# Patient Record
Sex: MALE | Race: WHITE | NOT HISPANIC OR LATINO | Employment: OTHER | URBAN - METROPOLITAN AREA
[De-identification: names, ages, dates, MRNs, and addresses within clinical notes are randomized per-mention and may not be internally consistent; named-entity substitution may affect disease eponyms.]

---

## 2019-01-15 ENCOUNTER — LAB VISIT (OUTPATIENT)
Dept: LAB | Facility: HOSPITAL | Age: 54
End: 2019-01-15
Attending: NURSE PRACTITIONER
Payer: COMMERCIAL

## 2019-01-15 ENCOUNTER — INITIAL CONSULT (OUTPATIENT)
Dept: HEMATOLOGY/ONCOLOGY | Facility: CLINIC | Age: 54
End: 2019-01-15
Payer: COMMERCIAL

## 2019-01-15 VITALS
SYSTOLIC BLOOD PRESSURE: 136 MMHG | OXYGEN SATURATION: 99 % | HEART RATE: 79 BPM | TEMPERATURE: 98 F | WEIGHT: 169.31 LBS | DIASTOLIC BLOOD PRESSURE: 90 MMHG

## 2019-01-15 DIAGNOSIS — D58.2 ELEVATED HEMOGLOBIN: ICD-10-CM

## 2019-01-15 DIAGNOSIS — R53.83 FATIGUE, UNSPECIFIED TYPE: ICD-10-CM

## 2019-01-15 DIAGNOSIS — F17.210 SMOKING GREATER THAN 30 PACK YEARS: ICD-10-CM

## 2019-01-15 DIAGNOSIS — R71.8 ELEVATED HEMATOCRIT: ICD-10-CM

## 2019-01-15 DIAGNOSIS — R71.8 ELEVATED HEMATOCRIT: Primary | ICD-10-CM

## 2019-01-15 DIAGNOSIS — Z12.9 SCREENING FOR CANCER: ICD-10-CM

## 2019-01-15 LAB
ANION GAP SERPL CALC-SCNC: 8 MMOL/L
BASOPHILS # BLD AUTO: 0.05 K/UL
BASOPHILS NFR BLD: 0.6 %
BUN SERPL-MCNC: 8 MG/DL
CALCIUM SERPL-MCNC: 9.2 MG/DL
CHLORIDE SERPL-SCNC: 107 MMOL/L
CO2 SERPL-SCNC: 24 MMOL/L
CREAT SERPL-MCNC: 0.8 MG/DL
CRP SERPL-MCNC: 1 MG/L
DIFFERENTIAL METHOD: ABNORMAL
EOSINOPHIL # BLD AUTO: 0.4 K/UL
EOSINOPHIL NFR BLD: 4.5 %
ERYTHROCYTE [DISTWIDTH] IN BLOOD BY AUTOMATED COUNT: 15 %
EST. GFR  (AFRICAN AMERICAN): >60 ML/MIN/1.73 M^2
EST. GFR  (NON AFRICAN AMERICAN): >60 ML/MIN/1.73 M^2
FERRITIN SERPL-MCNC: 43 NG/ML
GLUCOSE SERPL-MCNC: 96 MG/DL
HCT VFR BLD AUTO: 48.2 %
HGB BLD-MCNC: 16.5 G/DL
IRON SERPL-MCNC: 50 UG/DL
LYMPHOCYTES # BLD AUTO: 4.3 K/UL
LYMPHOCYTES NFR BLD: 48.3 %
MCH RBC QN AUTO: 30.5 PG
MCHC RBC AUTO-ENTMCNC: 34.2 G/DL
MCV RBC AUTO: 89 FL
MONOCYTES # BLD AUTO: 0.5 K/UL
MONOCYTES NFR BLD: 5.4 %
NEUTROPHILS # BLD AUTO: 3.7 K/UL
NEUTROPHILS NFR BLD: 41.2 %
PLATELET # BLD AUTO: 290 K/UL
PMV BLD AUTO: 9 FL
POTASSIUM SERPL-SCNC: 4 MMOL/L
RBC # BLD AUTO: 5.41 M/UL
SATURATED IRON: 12 %
SODIUM SERPL-SCNC: 139 MMOL/L
TESTOST SERPL-MCNC: 638 NG/DL
TOTAL IRON BINDING CAPACITY: 417 UG/DL
TRANSFERRIN SERPL-MCNC: 282 MG/DL
TSH SERPL DL<=0.005 MIU/L-ACNC: 1.61 UIU/ML
VIT B12 SERPL-MCNC: >2000 PG/ML
WBC # BLD AUTO: 8.94 K/UL

## 2019-01-15 PROCEDURE — 86140 C-REACTIVE PROTEIN: CPT

## 2019-01-15 PROCEDURE — 85025 COMPLETE CBC W/AUTO DIFF WBC: CPT

## 2019-01-15 PROCEDURE — 81403 MOPATH PROCEDURE LEVEL 4: CPT

## 2019-01-15 PROCEDURE — 83540 ASSAY OF IRON: CPT

## 2019-01-15 PROCEDURE — 84443 ASSAY THYROID STIM HORMONE: CPT

## 2019-01-15 PROCEDURE — 82607 VITAMIN B-12: CPT

## 2019-01-15 PROCEDURE — 81219 CALR GENE COM VARIANTS: CPT

## 2019-01-15 PROCEDURE — 99999 PR PBB SHADOW E&M-NEW PATIENT-LVL III: CPT | Mod: PBBFAC,,, | Performed by: NURSE PRACTITIONER

## 2019-01-15 PROCEDURE — 99205 PR OFFICE/OUTPT VISIT, NEW, LEVL V, 60-74 MIN: ICD-10-PCS | Mod: S$GLB,,, | Performed by: NURSE PRACTITIONER

## 2019-01-15 PROCEDURE — 82728 ASSAY OF FERRITIN: CPT

## 2019-01-15 PROCEDURE — 82668 ASSAY OF ERYTHROPOIETIN: CPT

## 2019-01-15 PROCEDURE — 81270 JAK2 GENE: CPT

## 2019-01-15 PROCEDURE — 84403 ASSAY OF TOTAL TESTOSTERONE: CPT

## 2019-01-15 PROCEDURE — 81403 MOPATH PROCEDURE LEVEL 4: CPT | Mod: 91

## 2019-01-15 PROCEDURE — 99205 OFFICE O/P NEW HI 60 MIN: CPT | Mod: S$GLB,,, | Performed by: NURSE PRACTITIONER

## 2019-01-15 PROCEDURE — 36415 COLL VENOUS BLD VENIPUNCTURE: CPT

## 2019-01-15 PROCEDURE — 99999 PR PBB SHADOW E&M-NEW PATIENT-LVL III: ICD-10-PCS | Mod: PBBFAC,,, | Performed by: NURSE PRACTITIONER

## 2019-01-15 PROCEDURE — 80048 BASIC METABOLIC PNL TOTAL CA: CPT

## 2019-01-15 RX ORDER — LANOLIN ALCOHOL/MO/W.PET/CERES
100 CREAM (GRAM) TOPICAL DAILY
COMMUNITY
End: 2019-05-27

## 2019-01-15 RX ORDER — LIDOCAINE HYDROCHLORIDE 10 MG/ML
1 INJECTION, SOLUTION EPIDURAL; INFILTRATION; INTRACAUDAL; PERINEURAL ONCE
Status: CANCELLED | OUTPATIENT
Start: 2019-01-15 | End: 2019-01-15

## 2019-01-15 RX ORDER — BUPROPION HYDROCHLORIDE 100 MG/1
300 TABLET ORAL DAILY
COMMUNITY
End: 2019-03-04

## 2019-01-15 RX ORDER — NAPROXEN SODIUM 220 MG/1
81 TABLET, FILM COATED ORAL DAILY
COMMUNITY
End: 2020-02-28

## 2019-01-15 RX ORDER — FERROUS SULFATE 324(65)MG
325 TABLET, DELAYED RELEASE (ENTERIC COATED) ORAL DAILY
COMMUNITY
End: 2019-01-25 | Stop reason: ALTCHOICE

## 2019-01-15 NOTE — LETTER
January 15, 2019      Kaylin Fxo MD  [address] [facility] [address]           [place] - [facility]ology [facility]cology  [id number] [place] [address]  Phone: [phone]  Fax: [phone]          Patient: Sukhjinder Presley   MR Number: [id number]   YOB: 1965   Date of Visit: 1/15/2019       Dear Dr. Kaylin Fox:    Thank you for referring Sukhjinder Presley to me for evaluation. Attached you will find relevant portions of my assessment and plan of care.    If you have questions, please do not hesitate to call me. I look forward to following Sukhjinder Presley along with you.    Sincerely,    Fannie Presley, NP    Enclosure  CC:  No Recipients    If you would like to receive this communication electronically, please contact [email or web address] or [phone] to request more information on When You Wish Link access.    For providers and/or their staff who would like to refer a patient to [facility], please contact us through our one-stop-shop provider referral line, Kody Alvarado, at [phone].    If you feel you have received this communication in error or would no longer like to receive these types of communications, please e-mail [email or web address]

## 2019-01-15 NOTE — ASSESSMENT & PLAN NOTE
Review of past medical records reveal H/H 20.5/60. The patient does report having repeat Phlebotomies since this time. He reports more recent labs have been drawn but I do not have access to these records     PV workup today. Will also check Testosterone, TSH, Vitamin B12 for c/o fatigue and ED. Will also have patient scheduled for Abdominal US  and CT Chest     Amb referral placed to Smoking Cessation Program for 35 year smoking history    F/U 10 days to discuss results and further treatment recommendations

## 2019-01-15 NOTE — PROGRESS NOTES
Subjective:       Patient ID: Sukhjinder Presley is a 53 y.o. male.    Chief Complaint: Consult for evaluation of Polycythemia Vera    HPI: 53 y.o male who presents to the Heme-Onc clinic today as a referral for Dr. Fox, PCP for evaluation and management of PV. He denies any significant medical history. The patient tells me he has had an elevated platelet count for at least the past 10 years. He tells me he was found to have a hemoglobin around 20 and was asked to have phlebotomies every 4 weeks by his PCP as he has been doing. Reports his last phlebotomy was 1/4/19. He is a current 1.5 PPD smoker with a 35+ year smoking history. He reports blurry vision, fatigue, erectile dysfunction. The patient denies headache, flushing, heat intolerance, CP, SOB, Dizziness, syncope, bowel or urinary complaints, hematuria, hematochezia, melena or any other symptoms.    Social History     Socioeconomic History    Marital status: Unknown     Spouse name: Not on file    Number of children: Not on file    Years of education: Not on file    Highest education level: Not on file   Social Needs    Financial resource strain: Not on file    Food insecurity - worry: Not on file    Food insecurity - inability: Not on file    Transportation needs - medical: Not on file    Transportation needs - non-medical: Not on file   Occupational History    Not on file   Tobacco Use    Smoking status: Current Every Day Smoker     Packs/day: 1.50     Years: 35.00     Pack years: 52.50     Types: Cigarettes     Start date: 1/2/1984   Substance and Sexual Activity    Alcohol use: Yes     Alcohol/week: 27.6 oz     Types: 42 Cans of beer, 4 Shots of liquor per week     Comment: nancie Virgen    Drug use: Yes    Sexual activity: Yes     Partners: Female     Birth control/protection: None   Other Topics Concern    Not on file   Social History Narrative    Not on file       Past Medical History:   Diagnosis Date    Anemia        Family History   Problem  Relation Age of Onset    Intestinal malrotation Mother     Leukemia Father     No Known Problems Sister     Coronary artery disease Brother     Coronary artery disease Maternal Grandmother     Stomach cancer Maternal Grandfather        Past Surgical History:   Procedure Laterality Date    HERNIA REPAIR  1995    TONSILLECTOMY         Review of Systems   Constitutional: Positive for activity change and fatigue. Negative for appetite change, chills, diaphoresis, fever and unexpected weight change.   HENT: Negative for congestion, mouth sores, nosebleeds, sore throat, trouble swallowing and voice change.    Eyes: Positive for visual disturbance. Negative for photophobia, pain and discharge.   Respiratory: Positive for cough (intermittent). Negative for choking, chest tightness, shortness of breath, wheezing and stridor.    Cardiovascular: Negative for chest pain, palpitations and leg swelling.   Gastrointestinal: Negative for abdominal distention, abdominal pain, anal bleeding, blood in stool, constipation, diarrhea, nausea, rectal pain and vomiting.   Genitourinary: Negative for difficulty urinating, dysuria and hematuria.   Musculoskeletal: Negative for arthralgias, back pain and myalgias.   Skin: Negative for pallor, rash and wound.   Neurological: Negative for dizziness, syncope, weakness, light-headedness, numbness and headaches.   Hematological: Negative for adenopathy. Does not bruise/bleed easily.   Psychiatric/Behavioral: The patient is nervous/anxious.             Medication List           Accurate as of 1/15/19  5:02 PM. If you have any questions, ask your nurse or doctor.               CONTINUE taking these medications    aspirin 81 MG Chew     buPROPion 100 MG tablet  Commonly known as:  WELLBUTRIN     ferrous sulfate 324 mg (65 mg iron) Tbec     VITAMIN B-12 1000 MCG tablet  Generic drug:  cyanocobalamin          Objective:     Vitals:    01/15/19 1435   BP: (!) 136/90   Pulse: 79   Temp: 98 °F  (36.7 °C)       Physical Exam   Constitutional: He is oriented to person, place, and time. He appears well-developed and well-nourished. He is cooperative.   HENT:   Head: Normocephalic.   Right Ear: Hearing, tympanic membrane and external ear normal. No drainage.   Left Ear: Hearing, tympanic membrane and external ear normal. No drainage.   Nose: Nose normal.   Mouth/Throat: Oropharynx is clear and moist.   Eyes: Conjunctivae, EOM and lids are normal. Right eye exhibits no discharge. Left eye exhibits no discharge. No scleral icterus.   Neck: Normal range of motion. No thyroid mass present.   Cardiovascular: Normal rate, regular rhythm and normal heart sounds.   No murmur heard.  Pulmonary/Chest: Effort normal and breath sounds normal. No respiratory distress. He has no wheezes. He has no rhonchi. He has no rales.   Abdominal: Soft. Bowel sounds are normal. He exhibits no distension. There is no tenderness.   Genitourinary:   Genitourinary Comments: deferred   Musculoskeletal: Normal range of motion. He exhibits no edema.   Lymphadenopathy:        Head (right side): No submandibular, no preauricular and no posterior auricular adenopathy present.        Head (left side): No submandibular, no preauricular and no posterior auricular adenopathy present.        Right cervical: No superficial cervical adenopathy present.       Left cervical: No superficial cervical adenopathy present.        Right: No supraclavicular adenopathy present.        Left: No supraclavicular adenopathy present.   Neurological: He is alert and oriented to person, place, and time.   Skin: Skin is warm, dry and intact. No cyanosis. Nails show no clubbing.   Psychiatric: His speech is normal and behavior is normal. Thought content normal. His mood appears anxious.   Vitals reviewed.       Assessment:     Problem List Items Addressed This Visit        Oncology    Elevated hematocrit - Primary     Review of past medical records reveal H/H 20.5/60.  The patient does report having repeat Phlebotomies since this time. He reports more recent labs have been drawn but I do not have access to these records     PV workup today. Will also check Testosterone, TSH, Vitamin B12 for c/o fatigue and ED. Will also have patient scheduled for Abdominal US  and CT Chest     Amb referral placed to Smoking Cessation Program for 35 year smoking history    F/U 10 days to discuss results and further treatment recommendations         Relevant Orders    CBC auto differential (Completed)    Basic metabolic panel (Completed)    MPN (JAK2 V617F)WITH REFLEX TO CALR,MPL    Iron and TIBC    Ferritin    C-reactive protein (Completed)    US Abdomen Complete    Erythropoietin    Bcr/Abl Gene Rearrangement (PCR)    JAK2 Exon 12 And Other Non-V617 Mutation Detection, Bld    CBC auto differential    Elevated hemoglobin    Relevant Orders    CBC auto differential (Completed)    Basic metabolic panel (Completed)    MPN (JAK2 V617F)WITH REFLEX TO CALR,MPL    Iron and TIBC    Ferritin    C-reactive protein (Completed)    US Abdomen Complete    Erythropoietin    Bcr/Abl Gene Rearrangement (PCR)    JAK2 Exon 12 And Other Non-V617 Mutation Detection, Bld    CBC auto differential      Other Visit Diagnoses     Fatigue, unspecified type        Relevant Orders    TSH (Completed)    Vitamin B12    Testosterone    Screening for cancer        Relevant Orders    CT Chest Without Contrast    Smoking greater than 30 pack years        Relevant Orders    Ambulatory referral to Smoking Cessation Program            Plan:     Elevated hematocrit  -     CBC auto differential; Future; Expected date: 01/15/2019  -     Basic metabolic panel; Future; Expected date: 01/15/2019  -     MPN (JAK2 V617F)WITH REFLEX TO CALR,MPL; Future; Expected date: 01/15/2019  -     Iron and TIBC; Future; Expected date: 01/15/2019  -     Ferritin; Future; Expected date: 01/15/2019  -     C-reactive protein; Future; Expected date:  01/15/2019  -     US Abdomen Complete; Future; Expected date: 01/15/2019  -     Erythropoietin; Future; Expected date: 01/15/2019  -     Bcr/Abl Gene Rearrangement (PCR); Future; Expected date: 01/15/2019  -     JAK2 Exon 12 And Other Non-V617 Mutation Detection, Bld; Future; Expected date: 01/15/2019  -     CBC auto differential; Future; Expected date: 01/15/2019    Elevated hemoglobin  -     CBC auto differential; Future; Expected date: 01/15/2019  -     Basic metabolic panel; Future; Expected date: 01/15/2019  -     MPN (JAK2 V617F)WITH REFLEX TO CALR,MPL; Future; Expected date: 01/15/2019  -     Iron and TIBC; Future; Expected date: 01/15/2019  -     Ferritin; Future; Expected date: 01/15/2019  -     C-reactive protein; Future; Expected date: 01/15/2019  -     US Abdomen Complete; Future; Expected date: 01/15/2019  -     Erythropoietin; Future; Expected date: 01/15/2019  -     Bcr/Abl Gene Rearrangement (PCR); Future; Expected date: 01/15/2019  -     JAK2 Exon 12 And Other Non-V617 Mutation Detection, Bld; Future; Expected date: 01/15/2019  -     CBC auto differential; Future; Expected date: 01/15/2019    Fatigue, unspecified type  -     TSH; Future; Expected date: 01/15/2019  -     Vitamin B12; Future; Expected date: 01/15/2019  -     Testosterone; Future; Expected date: 01/15/2019    Screening for cancer  -     CT Chest Without Contrast; Future; Expected date: 01/15/2019    Smoking greater than 30 pack years  -     Ambulatory referral to Smoking Cessation Program    Other orders  -     lidocaine (PF) 10 mg/ml (1%) injection 10 mg          I will review assessment/plan with collaborating physician     KOFFI Woodruff

## 2019-01-17 LAB — EPO SERPL-ACNC: 6.2 MIU/ML

## 2019-01-21 LAB
JAK2 EXON 12 MUTATION DETECTION BLOOD: NORMAL
PATH REPORT.FINAL DX SPEC: NORMAL

## 2019-01-23 ENCOUNTER — TELEPHONE (OUTPATIENT)
Dept: RADIOLOGY | Facility: HOSPITAL | Age: 54
End: 2019-01-23

## 2019-01-23 LAB
MPNR  FINAL DIAGNOSIS: NORMAL
MPNR  SPECIMEN TYPE: NORMAL
MPNR RESULT: NORMAL

## 2019-01-25 ENCOUNTER — OFFICE VISIT (OUTPATIENT)
Dept: HEMATOLOGY/ONCOLOGY | Facility: CLINIC | Age: 54
End: 2019-01-25
Payer: COMMERCIAL

## 2019-01-25 ENCOUNTER — HOSPITAL ENCOUNTER (OUTPATIENT)
Dept: RADIOLOGY | Facility: HOSPITAL | Age: 54
Discharge: HOME | End: 2019-01-25
Attending: NURSE PRACTITIONER
Payer: COMMERCIAL

## 2019-01-25 VITALS
SYSTOLIC BLOOD PRESSURE: 122 MMHG | TEMPERATURE: 99 F | OXYGEN SATURATION: 98 % | DIASTOLIC BLOOD PRESSURE: 82 MMHG | HEIGHT: 72 IN | RESPIRATION RATE: 20 BRPM | WEIGHT: 168 LBS | BODY MASS INDEX: 22.75 KG/M2 | HEART RATE: 82 BPM

## 2019-01-25 DIAGNOSIS — D58.2 ELEVATED HEMOGLOBIN: ICD-10-CM

## 2019-01-25 DIAGNOSIS — R71.8 ELEVATED HEMATOCRIT: ICD-10-CM

## 2019-01-25 DIAGNOSIS — H04.123 DRY EYES: ICD-10-CM

## 2019-01-25 DIAGNOSIS — H54.7 DECREASED VISION: ICD-10-CM

## 2019-01-25 DIAGNOSIS — R71.8 ELEVATED HEMATOCRIT: Primary | ICD-10-CM

## 2019-01-25 DIAGNOSIS — E61.1 IRON DEFICIENCY: ICD-10-CM

## 2019-01-25 PROCEDURE — 99214 PR OFFICE/OUTPT VISIT, EST, LEVL IV, 30-39 MIN: ICD-10-PCS | Mod: S$GLB,,, | Performed by: NURSE PRACTITIONER

## 2019-01-25 PROCEDURE — 76700 US EXAM ABDOM COMPLETE: CPT | Mod: TC

## 2019-01-25 PROCEDURE — 99999 PR PBB SHADOW E&M-EST. PATIENT-LVL III: ICD-10-PCS | Mod: PBBFAC,,, | Performed by: NURSE PRACTITIONER

## 2019-01-25 PROCEDURE — 76700 US EXAM ABDOM COMPLETE: CPT | Mod: 26,,, | Performed by: RADIOLOGY

## 2019-01-25 PROCEDURE — 3008F BODY MASS INDEX DOCD: CPT | Mod: CPTII,S$GLB,, | Performed by: NURSE PRACTITIONER

## 2019-01-25 PROCEDURE — 99999 PR PBB SHADOW E&M-EST. PATIENT-LVL III: CPT | Mod: PBBFAC,,, | Performed by: NURSE PRACTITIONER

## 2019-01-25 PROCEDURE — 99214 OFFICE O/P EST MOD 30 MIN: CPT | Mod: S$GLB,,, | Performed by: NURSE PRACTITIONER

## 2019-01-25 PROCEDURE — 76700 US ABDOMEN COMPLETE: ICD-10-PCS | Mod: 26,,, | Performed by: RADIOLOGY

## 2019-01-25 PROCEDURE — 3008F PR BODY MASS INDEX (BMI) DOCUMENTED: ICD-10-PCS | Mod: CPTII,S$GLB,, | Performed by: NURSE PRACTITIONER

## 2019-01-25 NOTE — ASSESSMENT & PLAN NOTE
Hemoglobin 17.1 in the upper limits of normal. Workup for polycythemia vera negative.  No mutations noted erythropoietin level within normal limits.    For now we will continue to follow patient's H&H trend.  I have asked him to hold phlebotomies for now continue taking a baby aspirin p.o. Daily.  He will begin smoking cessation program 01/30/2019    I have placed ambulatory referral to Ophthalmology for continue complaints of decreased vision and dry eyes.    F/U 1 month with CBC, iron studies.  Will also add bcr/ABL fish to lab work

## 2019-01-25 NOTE — PROGRESS NOTES
Subjective:       Patient ID: Sukhjinder Presley is a 53 y.o. male.    Chief Complaint: Consult for evaluation of Polycythemia Vera    HPI: 53 y.o male who presents to the Heme-Onc clinic today as a referral for Dr. Fox, PCP for evaluation and management of PV. He denies any significant medical history. The patient tells me he has had an elevated platelet count for at least the past 10 years. He tells me he was found to have a hemoglobin around 20 and was asked to have phlebotomies every 4 weeks by his PCP as he has been doing. Reports his last phlebotomy was 1/4/19. He is a current 1.5 PPD smoker with a 35+ year smoking history.  Laboratory workup not consistent with polycythemia vera diagnosis. CT Chest negative for malignancy. Abdominal US unremarkable.    Hemoglobin 17.1, in the upper limits of normal. Remaining CBC unremarkable. BMP WNL. I had detailed discussion with patient. I strongly recommended him to proceed with smoking cessation. I did discuss patient Colonoscopy recommendations. He declines at this time but agrees to revisit discussion at follow up visit.    The patient is scheduled to begin smoking cessation program 01/30/2019.      He reports continued blurry vision, fatigue, erectile dysfunction. The patient denies headache, flushing, heat intolerance, CP, SOB, Dizziness, syncope, bowel or urinary complaints, hematuria, hematochezia, melena or any other symptoms.    Social History     Socioeconomic History    Marital status: Unknown     Spouse name: Not on file    Number of children: Not on file    Years of education: Not on file    Highest education level: Not on file   Social Needs    Financial resource strain: Not on file    Food insecurity - worry: Not on file    Food insecurity - inability: Not on file    Transportation needs - medical: Not on file    Transportation needs - non-medical: Not on file   Occupational History    Not on file   Tobacco Use    Smoking status: Current Every Day  Smoker     Packs/day: 1.50     Years: 35.00     Pack years: 52.50     Types: Cigarettes     Start date: 1/2/1984   Substance and Sexual Activity    Alcohol use: Yes     Alcohol/week: 27.6 oz     Types: 42 Cans of beer, 4 Shots of liquor per week     Comment: nancie Virgen    Drug use: Yes    Sexual activity: Yes     Partners: Female     Birth control/protection: None   Other Topics Concern    Not on file   Social History Narrative    Not on file       Past Medical History:   Diagnosis Date    Anemia        Family History   Problem Relation Age of Onset    Intestinal malrotation Mother     Leukemia Father     No Known Problems Sister     Coronary artery disease Brother     Coronary artery disease Maternal Grandmother     Stomach cancer Maternal Grandfather        Past Surgical History:   Procedure Laterality Date    HERNIA REPAIR  1995    TONSILLECTOMY         Review of Systems   Constitutional: Positive for activity change and fatigue. Negative for appetite change, chills, diaphoresis, fever and unexpected weight change.   HENT: Negative for congestion, mouth sores, nosebleeds, sore throat, trouble swallowing and voice change.    Eyes: Positive for visual disturbance. Negative for photophobia, pain and discharge.   Respiratory: Positive for cough (intermittent). Negative for choking, chest tightness, shortness of breath, wheezing and stridor.    Cardiovascular: Negative for chest pain, palpitations and leg swelling.   Gastrointestinal: Negative for abdominal distention, abdominal pain, anal bleeding, blood in stool, constipation, diarrhea, nausea, rectal pain and vomiting.   Genitourinary: Negative for difficulty urinating, dysuria and hematuria.   Musculoskeletal: Negative for arthralgias, back pain and myalgias.   Skin: Negative for pallor, rash and wound.   Neurological: Negative for dizziness, syncope, weakness, light-headedness, numbness and headaches.   Hematological: Negative for adenopathy. Does  not bruise/bleed easily.   Psychiatric/Behavioral: The patient is nervous/anxious.             Medication List           Accurate as of 1/25/19 12:05 PM. If you have any questions, ask your nurse or doctor.               CONTINUE taking these medications    aspirin 81 MG Chew     buPROPion 100 MG tablet  Commonly known as:  WELLBUTRIN     VITAMIN B-12 1000 MCG tablet  Generic drug:  cyanocobalamin        STOP taking these medications    ferrous sulfate 324 mg (65 mg iron) Tbec  Stopped by:  Fannie Presley NP          Objective:     Vitals:    01/25/19 1109   BP: 122/82   Pulse: 82   Resp: 20   Temp: 98.8 °F (37.1 °C)       Physical Exam   Constitutional: He is oriented to person, place, and time. He appears well-developed and well-nourished. He is cooperative.   HENT:   Head: Normocephalic.   Right Ear: Hearing, tympanic membrane and external ear normal. No drainage.   Left Ear: Hearing, tympanic membrane and external ear normal. No drainage.   Nose: Nose normal.   Mouth/Throat: Oropharynx is clear and moist.   Eyes: Conjunctivae, EOM and lids are normal. Right eye exhibits no discharge. Left eye exhibits no discharge. No scleral icterus.   Neck: Normal range of motion. No thyroid mass present.   Cardiovascular: Normal rate, regular rhythm and normal heart sounds.   No murmur heard.  Pulmonary/Chest: Effort normal and breath sounds normal. No respiratory distress. He has no wheezes. He has no rhonchi. He has no rales.   Abdominal: Soft. Bowel sounds are normal. He exhibits no distension. There is no tenderness.   Genitourinary:   Genitourinary Comments: deferred   Musculoskeletal: Normal range of motion. He exhibits no edema.   Lymphadenopathy:        Head (right side): No submandibular, no preauricular and no posterior auricular adenopathy present.        Head (left side): No submandibular, no preauricular and no posterior auricular adenopathy present.        Right cervical: No superficial cervical adenopathy  present.       Left cervical: No superficial cervical adenopathy present.        Right: No supraclavicular adenopathy present.        Left: No supraclavicular adenopathy present.   Neurological: He is alert and oriented to person, place, and time.   Skin: Skin is warm, dry and intact. No cyanosis. Nails show no clubbing.   Psychiatric: His speech is normal and behavior is normal. Thought content normal. His mood appears anxious.   Vitals reviewed.       Assessment:     Problem List Items Addressed This Visit        Oncology    Elevated hematocrit - Primary     Hemoglobin 17.1 in the upper limits of normal. Workup for polycythemia vera negative.  No mutations noted erythropoietin level within normal limits.    For now we will continue to follow patient's H&H trend.  I have asked him to hold phlebotomies for now continue taking a baby aspirin p.o. Daily.  He will begin smoking cessation program 01/30/2019    I have placed ambulatory referral to Ophthalmology for continue complaints of decreased vision and dry eyes.    F/U 1 month with CBC, iron studies.  Will also add bcr/ABL fish to lab work         Relevant Orders    BCR/ABL, FISH (D-FISH), Blood    CBC auto differential    Basic metabolic panel    Iron and TIBC    Ferritin    C-reactive protein    Elevated hemoglobin    Relevant Orders    BCR/ABL, FISH (D-FISH), Blood    CBC auto differential    Basic metabolic panel    Iron and TIBC    Ferritin    C-reactive protein      Other Visit Diagnoses     Dry eyes        Relevant Orders    Ambulatory Referral to Ophthalmology    Decreased vision        Relevant Orders    Ambulatory Referral to Ophthalmology    Iron deficiency        Relevant Orders    CBC auto differential    Basic metabolic panel    Iron and TIBC    Ferritin    C-reactive protein            Plan:     Elevated hematocrit  -     BCR/ABL, FISH (D-FISH), Blood; Future; Expected date: 01/25/2019  -     CBC auto differential; Future; Expected date:  01/25/2019  -     Basic metabolic panel; Future; Expected date: 01/25/2019  -     Iron and TIBC; Future; Expected date: 01/25/2019  -     Ferritin; Future; Expected date: 01/25/2019  -     C-reactive protein; Future; Expected date: 01/25/2019    Elevated hemoglobin  -     BCR/ABL, FISH (D-FISH), Blood; Future; Expected date: 01/25/2019  -     CBC auto differential; Future; Expected date: 01/25/2019  -     Basic metabolic panel; Future; Expected date: 01/25/2019  -     Iron and TIBC; Future; Expected date: 01/25/2019  -     Ferritin; Future; Expected date: 01/25/2019  -     C-reactive protein; Future; Expected date: 01/25/2019    Dry eyes  -     Ambulatory Referral to Ophthalmology    Decreased vision  -     Ambulatory Referral to Ophthalmology    Iron deficiency  -     CBC auto differential; Future; Expected date: 01/25/2019  -     Basic metabolic panel; Future; Expected date: 01/25/2019  -     Iron and TIBC; Future; Expected date: 01/25/2019  -     Ferritin; Future; Expected date: 01/25/2019  -     C-reactive protein; Future; Expected date: 01/25/2019          I will review assessment/plan with collaborating physician     KOFFI Woodruff

## 2019-01-30 ENCOUNTER — CLINICAL SUPPORT (OUTPATIENT)
Dept: SMOKING CESSATION | Facility: CLINIC | Age: 54
End: 2019-01-30
Payer: COMMERCIAL

## 2019-01-30 DIAGNOSIS — F17.200 NICOTINE DEPENDENCE: Primary | ICD-10-CM

## 2019-01-30 PROCEDURE — 99404 PREV MED CNSL INDIV APPRX 60: CPT | Mod: S$GLB,,, | Performed by: GENERAL PRACTICE

## 2019-01-30 PROCEDURE — 99404 PR PREVENT COUNSEL,INDIV,60 MIN: ICD-10-PCS | Mod: S$GLB,,, | Performed by: GENERAL PRACTICE

## 2019-01-30 PROCEDURE — 99999 PR PBB SHADOW E&M-EST. PATIENT-LVL I: ICD-10-PCS | Mod: PBBFAC,,,

## 2019-01-30 PROCEDURE — 99999 PR PBB SHADOW E&M-EST. PATIENT-LVL I: CPT | Mod: PBBFAC,,,

## 2019-01-30 RX ORDER — IBUPROFEN 200 MG
1 TABLET ORAL DAILY
Qty: 14 PATCH | Refills: 1 | Status: SHIPPED | OUTPATIENT
Start: 2019-01-30 | End: 2019-02-21 | Stop reason: SDUPTHER

## 2019-02-07 ENCOUNTER — CLINICAL SUPPORT (OUTPATIENT)
Dept: SMOKING CESSATION | Facility: CLINIC | Age: 54
End: 2019-02-07
Payer: COMMERCIAL

## 2019-02-07 DIAGNOSIS — F17.200 NICOTINE DEPENDENCE: Primary | ICD-10-CM

## 2019-02-07 PROCEDURE — 99404 PR PREVENT COUNSEL,INDIV,60 MIN: ICD-10-PCS | Mod: S$GLB,,, | Performed by: GENERAL PRACTICE

## 2019-02-07 PROCEDURE — 99404 PREV MED CNSL INDIV APPRX 60: CPT | Mod: S$GLB,,, | Performed by: GENERAL PRACTICE

## 2019-02-07 NOTE — PROGRESS NOTES
Individual Follow-Up Form    2/7/2019    Clinical Status of Patient: Outpatient    Length of Service: 60 minutes    Continuing Medication: yes  Patches       Target Symptoms: Withdrawal and medication side effects. The following were  rated moderate (3) to severe (4) on TCRS:  · Moderate (3): increased appetite  · Severe (4): none    Comments: Patient was seen today for a smoking cessation progress update. He states that he has been able to reduce his smoking from 1 1/2 ppd to 5 cigarettes daily. He continues to use the 21 mg nicotine patches daily with no adverse side effects noted. Discussed an aggressive tapering plan for the remaining cigarettes. Discussed no more purchasing cigarettes. He verbalized understanding. His wife continues to smoke but is interested in quitting. Discussed healthy eating habits. He denies any negative thoughts or behavior at this time. Will continue on 21 mg nicotine patches until next scheduled follow up appointment in 2 weeks.    Diagnosis: F17.200    Next Visit: 2 weeks

## 2019-02-21 ENCOUNTER — CLINICAL SUPPORT (OUTPATIENT)
Dept: SMOKING CESSATION | Facility: CLINIC | Age: 54
End: 2019-02-21
Payer: COMMERCIAL

## 2019-02-21 ENCOUNTER — OFFICE VISIT (OUTPATIENT)
Dept: OPHTHALMOLOGY | Facility: CLINIC | Age: 54
End: 2019-02-21
Payer: COMMERCIAL

## 2019-02-21 DIAGNOSIS — H04.129 DRY EYE: Primary | ICD-10-CM

## 2019-02-21 DIAGNOSIS — F17.200 NICOTINE DEPENDENCE: Primary | ICD-10-CM

## 2019-02-21 PROCEDURE — 99999 PR PBB SHADOW E&M-EST. PATIENT-LVL I: ICD-10-PCS | Mod: PBBFAC,,,

## 2019-02-21 PROCEDURE — 99404 PR PREVENT COUNSEL,INDIV,60 MIN: ICD-10-PCS | Mod: S$GLB,,, | Performed by: GENERAL PRACTICE

## 2019-02-21 PROCEDURE — 92004 PR EYE EXAM, NEW PATIENT,COMPREHESV: ICD-10-PCS | Mod: S$GLB,,, | Performed by: OPTOMETRIST

## 2019-02-21 PROCEDURE — 99999 PR PBB SHADOW E&M-EST. PATIENT-LVL I: CPT | Mod: PBBFAC,,, | Performed by: OPTOMETRIST

## 2019-02-21 PROCEDURE — 99999 PR PBB SHADOW E&M-EST. PATIENT-LVL I: CPT | Mod: PBBFAC,,,

## 2019-02-21 PROCEDURE — 92004 COMPRE OPH EXAM NEW PT 1/>: CPT | Mod: S$GLB,,, | Performed by: OPTOMETRIST

## 2019-02-21 PROCEDURE — 99999 PR PBB SHADOW E&M-EST. PATIENT-LVL I: ICD-10-PCS | Mod: PBBFAC,,, | Performed by: OPTOMETRIST

## 2019-02-21 PROCEDURE — 99404 PREV MED CNSL INDIV APPRX 60: CPT | Mod: S$GLB,,, | Performed by: GENERAL PRACTICE

## 2019-02-21 RX ORDER — IBUPROFEN 200 MG
1 TABLET ORAL DAILY
Qty: 14 PATCH | Refills: 0 | Status: SHIPPED | OUTPATIENT
Start: 2019-02-21 | End: 2019-03-28 | Stop reason: DRUGHIGH

## 2019-02-21 RX ORDER — MICONAZOLE NITRATE 2 %
2 CREAM (GRAM) TOPICAL
Qty: 380 EACH | Refills: 1 | Status: SHIPPED | OUTPATIENT
Start: 2019-02-21 | End: 2019-05-27

## 2019-02-21 RX ORDER — LOTEPREDNOL ETABONATE 5 MG/G
GEL OPHTHALMIC
Qty: 5 G | Refills: 0 | Status: SHIPPED | OUTPATIENT
Start: 2019-02-21 | End: 2019-03-07

## 2019-02-21 NOTE — PROGRESS NOTES
Individual Follow-Up Form    2/21/2019    Clinical Status of Patient: Outpatient    Length of Service: 60 minutes    Continuing Medication: yes  Patches    Other Medications: nicotine gum as needed     Target Symptoms: Withdrawal and medication side effects. The following were  rated moderate (3) to severe (4) on TCRS:  · Moderate (3): increased appetite, desire tobacco  · Severe (4): none    Comments: Patient was seen today for a smoking cessation progress update. The patient remains on the prescribed tobacco cessation medication regimen of 21 mg Nicoderm CQ daily without any negative side effects at this time. The patient denies any abnormal behavioral or mental changes at this time. He states that he has been able to reduce his smoking to 2-3 cigarettes daily but unable to completely refrain from smoking. Discussed triggers and cues. Discussed the need and want to smoke. He feels that he needs additional support. Discussed the use of nicotine gum. He verbalized understanding.Discussed healthy eating habits and physical activities. Will continue to encourage and monitor progress.    Diagnosis: F17.200    Next Visit: 2 weeks

## 2019-02-21 NOTE — LETTER
February 22, 2019      Fannie Presley NP  [address] [place] [address]           [facility]hthal[facility]  [address] [facility] [address] [facility]  [place] [address]  Phone: [phone]  Fax: [phone]          Patient: Sukhjinder Presley   MR Number: [id number]   YOB: 1965   Date of Visit: 2/21/2019       Dear Fannie Presley:    Thank you for referring Sukhjinder Presley to me for evaluation. Attached you will find relevant portions of my assessment and plan of care.    If you have questions, please do not hesitate to call me. I look forward to following Sukhjinder Presley along with you.    Sincerely,    Rah Grady, OD    Enclosure  CC:  No Recipients    If you would like to receive this communication electronically, please contact [email or web address] or [phone] to request more information on [employer] access.    For providers and/or their staff who would like to refer a patient to [facility], please contact us through our one-stop-shop provider referral line, Kody Alvarado, at [phone].    If you feel you have received this communication in error or would no longer like to receive these types of communications, please e-mail [email or web address]

## 2019-02-22 NOTE — PROGRESS NOTES
HPI     PTs last exam was more than 10 years ago. PT c/o blurred near va and   wears otc readers prn.   HPI    Any vision changes since last exam: decreased overall va  Eye pain: no  Other ocular symptoms: dryness, itching, redness, crusting OU, chronic,   clear eyes prn with temporary relief    Do you wear currently wear glasses or contacts? otc readers    Interested in contacts today? no    Do you plan on getting new glasses today? If needed      Last edited by Dianna Dubon MA on 2/21/2019  3:22 PM. (History)            Assessment /Plan     For exam results, see Encounter Report.    Dry eye    Other orders  -     loteprednol etabonate (LOTEMAX) 0.5 % DrpG; Place 1 drop into both eyes 4 (four) times daily for 7 days, THEN 1 drop 2 (two) times daily for 7 days.  Dispense: 5 g; Refill: 0      D/c clear eyes  Start lotemax gel as above  In 2 weeks, start Theratears bid-prn OU  If symptoms worsen or persist, RTC and will consider restasis/xiidra     Otherwise, RTC 1 yr for undilated eye exam or PRN if any problems.   Discussed above and answered questions.

## 2019-03-04 ENCOUNTER — CLINICAL SUPPORT (OUTPATIENT)
Dept: SMOKING CESSATION | Facility: CLINIC | Age: 54
End: 2019-03-04
Payer: COMMERCIAL

## 2019-03-04 ENCOUNTER — OFFICE VISIT (OUTPATIENT)
Dept: HEMATOLOGY/ONCOLOGY | Facility: CLINIC | Age: 54
End: 2019-03-04
Payer: COMMERCIAL

## 2019-03-04 ENCOUNTER — LAB VISIT (OUTPATIENT)
Dept: LAB | Facility: HOSPITAL | Age: 54
End: 2019-03-04
Payer: COMMERCIAL

## 2019-03-04 VITALS
SYSTOLIC BLOOD PRESSURE: 132 MMHG | WEIGHT: 172.38 LBS | BODY MASS INDEX: 23.35 KG/M2 | DIASTOLIC BLOOD PRESSURE: 85 MMHG | HEIGHT: 72 IN | OXYGEN SATURATION: 98 % | TEMPERATURE: 99 F | HEART RATE: 87 BPM

## 2019-03-04 DIAGNOSIS — E61.1 IRON DEFICIENCY: ICD-10-CM

## 2019-03-04 DIAGNOSIS — F17.200 NICOTINE DEPENDENCE: Primary | ICD-10-CM

## 2019-03-04 DIAGNOSIS — D58.2 ELEVATED HEMOGLOBIN: ICD-10-CM

## 2019-03-04 DIAGNOSIS — R71.8 ELEVATED HEMATOCRIT: Primary | ICD-10-CM

## 2019-03-04 DIAGNOSIS — R71.8 ELEVATED HEMATOCRIT: ICD-10-CM

## 2019-03-04 LAB
ANION GAP SERPL CALC-SCNC: 11 MMOL/L
BASOPHILS # BLD AUTO: 0.07 K/UL
BASOPHILS NFR BLD: 0.8 %
BUN SERPL-MCNC: 8 MG/DL
CALCIUM SERPL-MCNC: 9.8 MG/DL
CHLORIDE SERPL-SCNC: 103 MMOL/L
CO2 SERPL-SCNC: 27 MMOL/L
CREAT SERPL-MCNC: 0.9 MG/DL
CRP SERPL-MCNC: 4.3 MG/L
DIFFERENTIAL METHOD: ABNORMAL
EOSINOPHIL # BLD AUTO: 0.5 K/UL
EOSINOPHIL NFR BLD: 5.6 %
ERYTHROCYTE [DISTWIDTH] IN BLOOD BY AUTOMATED COUNT: 13.7 %
EST. GFR  (AFRICAN AMERICAN): >60 ML/MIN/1.73 M^2
EST. GFR  (NON AFRICAN AMERICAN): >60 ML/MIN/1.73 M^2
FERRITIN SERPL-MCNC: 18 NG/ML
GLUCOSE SERPL-MCNC: 155 MG/DL
HCT VFR BLD AUTO: 54.3 %
HGB BLD-MCNC: 17.8 G/DL
IMM GRANULOCYTES # BLD AUTO: 0.02 K/UL
IMM GRANULOCYTES NFR BLD AUTO: 0.2 %
IRON SERPL-MCNC: 70 UG/DL
LYMPHOCYTES # BLD AUTO: 3 K/UL
LYMPHOCYTES NFR BLD: 35.5 %
MCH RBC QN AUTO: 29.4 PG
MCHC RBC AUTO-ENTMCNC: 32.8 G/DL
MCV RBC AUTO: 90 FL
MONOCYTES # BLD AUTO: 0.4 K/UL
MONOCYTES NFR BLD: 4.8 %
NEUTROPHILS # BLD AUTO: 4.6 K/UL
NEUTROPHILS NFR BLD: 53.3 %
NRBC BLD-RTO: 0 /100 WBC
PLATELET # BLD AUTO: 258 K/UL
PMV BLD AUTO: 8.8 FL
POTASSIUM SERPL-SCNC: 4.2 MMOL/L
RBC # BLD AUTO: 6.05 M/UL
SATURATED IRON: 15 %
SODIUM SERPL-SCNC: 141 MMOL/L
TOTAL IRON BINDING CAPACITY: 459 UG/DL
TRANSFERRIN SERPL-MCNC: 310 MG/DL
WBC # BLD AUTO: 8.57 K/UL

## 2019-03-04 PROCEDURE — 99404 PREV MED CNSL INDIV APPRX 60: CPT | Mod: S$GLB,,, | Performed by: GENERAL PRACTICE

## 2019-03-04 PROCEDURE — 85025 COMPLETE CBC W/AUTO DIFF WBC: CPT

## 2019-03-04 PROCEDURE — 3008F BODY MASS INDEX DOCD: CPT | Mod: CPTII,S$GLB,, | Performed by: NURSE PRACTITIONER

## 2019-03-04 PROCEDURE — 3008F PR BODY MASS INDEX (BMI) DOCUMENTED: ICD-10-PCS | Mod: CPTII,S$GLB,, | Performed by: NURSE PRACTITIONER

## 2019-03-04 PROCEDURE — 82728 ASSAY OF FERRITIN: CPT

## 2019-03-04 PROCEDURE — 99214 OFFICE O/P EST MOD 30 MIN: CPT | Mod: S$GLB,,, | Performed by: NURSE PRACTITIONER

## 2019-03-04 PROCEDURE — 86140 C-REACTIVE PROTEIN: CPT

## 2019-03-04 PROCEDURE — 99999 PR PBB SHADOW E&M-EST. PATIENT-LVL III: CPT | Mod: PBBFAC,,, | Performed by: NURSE PRACTITIONER

## 2019-03-04 PROCEDURE — 99214 PR OFFICE/OUTPT VISIT, EST, LEVL IV, 30-39 MIN: ICD-10-PCS | Mod: S$GLB,,, | Performed by: NURSE PRACTITIONER

## 2019-03-04 PROCEDURE — 99404 PR PREVENT COUNSEL,INDIV,60 MIN: ICD-10-PCS | Mod: S$GLB,,, | Performed by: GENERAL PRACTICE

## 2019-03-04 PROCEDURE — 36415 COLL VENOUS BLD VENIPUNCTURE: CPT

## 2019-03-04 PROCEDURE — 80048 BASIC METABOLIC PNL TOTAL CA: CPT

## 2019-03-04 PROCEDURE — 83540 ASSAY OF IRON: CPT

## 2019-03-04 PROCEDURE — 99999 PR PBB SHADOW E&M-EST. PATIENT-LVL III: ICD-10-PCS | Mod: PBBFAC,,, | Performed by: NURSE PRACTITIONER

## 2019-03-04 RX ORDER — BUPROPION HYDROCHLORIDE 300 MG/1
TABLET ORAL
COMMUNITY
Start: 2019-02-11 | End: 2019-05-27

## 2019-03-04 NOTE — PROGRESS NOTES
Individual Follow-Up Form    3/4/2019    Clinical Status of Patient: Outpatient    Length of Service: 60 minutes    Continuing Medication: yes  Patches    Other Medications: nicotine gum as needed     Target Symptoms: Withdrawal and medication side effects. The following were  rated moderate (3) to severe (4) on TCRS:  · Moderate (3): irritable, restless, desire tobacco, sleep disturbances  · Severe (4): none    Comments: Patient was seen today for a smoking cessation progress update. He is here today with his wife (also a smoker) and their 3 children. Discussed progress made thus far. He states that the weekend is very hard for him to refrain because his wife also smokes. She is interested in quitting but does not qualify for our program. Discussed coping strategies and house rules. Both verbalized understanding. The patient remains on the prescribed tobacco cessation medication regimen of 21 mg Nicoderm CQ daily with nicotine gum as needed without any negative side effects at this time. He feels that during the work week, his cigarette that he has at work after eating breakfast is the hardest to refrain from and states that if he can get past that cigarette he is confident that he will be successful for the rest of the day. Discussed making changes to his morning routine. The patient denies any abnormal behavioral or mental changes at this time. His wife states that he is very irritable on the weekends. He states that he smokes more on the weekends. Discussed not bring cigarettes with him to work. Discussed changes at home. Will continue to encourage and monitor progress. Will follow up in 1 week by telephone.  Diagnosis: F17.200    Next Visit: 1 week

## 2019-03-04 NOTE — PATIENT INSTRUCTIONS
Hematocrit  Does this test have other names?  HCT, packed cell volume, PCV  What is this test?  This test measures how much of your blood is made up of red blood cells.  Normal blood contains white blood cells, red blood cells, platelets, and the fluid portion called plasma. The word hematocrit means to separate. In this test, your red blood cells are  from the rest of your blood so they can be measured.  Your hematocrit (HCT) shows whether you have a normal amount of red blood cells, too many, or too few. To measure your HCT, your blood sample is spun at a high speed to separate the red blood cells.  Why do I need this test?  You may need this blood test as part of routine blood testing. You may also need your hematocrit checked before having surgery or if your healthcare provider suspects you have a red blood cell disorder. Too many red blood cells, or thick blood, is called polycythemia. Too few red blood cells, or thin blood, is called anemia.  Polycythemia may cause:  · Heart attack  · Stroke  · Headache  · Blurred vision  · Dizziness  Anemia can be caused by blood loss, your body making fewer red blood cells, or increased destruction of red blood cells. Symptoms may include:  · Shortness of breath  · Dizziness  · Headache  · Cold, pale skin  · Chest pain  What other tests might I have along with this test?  Your healthcare provider may also order a complete blood count, or CBC, which is a blood test that counts all the different types of cells in your blood.  Your healthcare provider may also order a test that measures your hemoglobin to find out how much oxygen your red blood cells are carrying.  What do my test results mean?  Many things may affect your lab test results. These include the method each lab uses to do the test. Even if your test results are different from the normal value, you may not have a problem. To learn what the results mean for you, talk with your healthcare provider.  Results  are given as a percentage. Normal hematocrit values are different for men, women, and children. Normal values are:  · 36 to 48 percent for women  · 42 to 52 percent for men  · 30 to 44 percent for children, depending upon age  If your HCT is high, it may mean your body is making too many red blood cells. Your HCT may also be high if your plasma is too low. This can happen when you are dehydrated or in shock.  If your HCT is low, it means you may have:  · Anemia  · White blood cell cancers, such as leukemia  · Chronic illness  · Bleeding  How is this test done?  The test requires a blood sample, which is drawn through a needle from a vein in your arm.  Does this test pose any risks?  Taking a blood sample with a needle carries risks that include bleeding, infection, bruising, or feeling dizzy. When the needle pricks your arm, you may feel a slight stinging sensation or pain. Afterward, the site may be slightly sore.  What might affect my test results?  Living at a high altitude may cause your HCT to be higher than normal. Being pregnant or being older than 60 can cause your HCT to be lower than normal.  Certain medicines can also affect your results.  How do I get ready for this test?  You don't need to prepare for this test. But be sure your healthcare provider knows about all medicines, herbs, vitamins, and supplements you are taking. This includes medicines that don't need a prescription and any illicit drugs you may use.  © 7566-9695 [employer]. [address] [place] [address], [place] [address]. All rights reserved. This information is not intended as a substitute for professional medical care. Always follow your healthcare professional's instructions.

## 2019-03-04 NOTE — PROGRESS NOTES
Subjective:       Patient ID: Sukhjinder Presley is a 53 y.o. male.    Chief Complaint: Abnormal Lab    53 year old male, presents for ongoing evaluation of Polycythemia. Patient was referred by PCP for polycythemia which was treated with monthly blood donation. Initial workup, negative for JAK2 and Hgb/Hct were in normal range. Patient is a long term smoker, and reports a 3 pack per day habit. He is being seen by the smoking cessation clinic and has reduced smoking to 1 pack per day. His goal is to quit smoking. No other significant medical history.       Review of Systems   Constitutional: Negative for chills, diaphoresis, fever and unexpected weight change.   HENT: Negative for congestion, hearing loss, nosebleeds, postnasal drip and trouble swallowing.    Eyes: Negative for discharge and visual disturbance.   Respiratory: Negative for cough, chest tightness and shortness of breath.    Cardiovascular: Negative for chest pain and palpitations.   Gastrointestinal: Negative for abdominal distention, abdominal pain, constipation, diarrhea, nausea and vomiting.   Endocrine: Negative for cold intolerance and heat intolerance.   Genitourinary: Negative for difficulty urinating, dysuria, flank pain, frequency and hematuria.   Musculoskeletal: Negative for arthralgias, back pain and myalgias.   Skin: Negative.  Negative for color change, pallor and rash.   Neurological: Negative for dizziness, weakness, light-headedness and headaches.   Hematological: Negative for adenopathy. Does not bruise/bleed easily.   Psychiatric/Behavioral: Negative for agitation, behavioral problems and confusion. The patient is nervous/anxious.        Medication List with Changes/Refills   Current Medications    ASPIRIN 81 MG CHEW    Take 81 mg by mouth once daily.    BUPROPION (WELLBUTRIN XL) 300 MG 24 HR TABLET        CYANOCOBALAMIN (VITAMIN B-12) 1000 MCG TABLET    Take 100 mcg by mouth once daily.    LOTEPREDNOL ETABONATE (LOTEMAX) 0.5 % DRPG     Place 1 drop into both eyes 4 (four) times daily for 7 days, THEN 1 drop 2 (two) times daily for 7 days.    NICOTINE (NICODERM CQ) 21 MG/24 HR    Place 1 patch onto the skin once daily.    NICOTINE, POLACRILEX, (NICORETTE) 2 MG GUM    Take 1 each (2 mg total) by mouth as needed (use no more than 8 pieces of gum in 24 hours).   Discontinued Medications    BUPROPION (WELLBUTRIN) 100 MG TABLET    Take 300 mg by mouth once daily.      Objective:     Vitals:    03/04/19 0948   BP: 132/85   Pulse: 87   Temp: 98.8 °F (37.1 °C)     Physical Exam   Constitutional: He is oriented to person, place, and time. He appears well-developed and well-nourished. No distress.   HENT:   Head: Normocephalic and atraumatic.   Right Ear: Hearing and external ear normal.   Left Ear: Hearing and external ear normal.   Nose: Nose normal. No mucosal edema or rhinorrhea. No epistaxis.   Mouth/Throat: Uvula is midline, oropharynx is clear and moist and mucous membranes are normal.   Eyes: Conjunctivae and EOM are normal. Pupils are equal, round, and reactive to light. Right eye exhibits no chemosis and no discharge. Left eye exhibits no chemosis and no discharge.   Neck: Trachea normal and normal range of motion. Neck supple. No thyroid mass and no thyromegaly present.   Cardiovascular: Normal rate, regular rhythm, normal heart sounds and intact distal pulses.   No murmur heard.  Pulses:       Dorsalis pedis pulses are 2+ on the right side, and 2+ on the left side.   Pulmonary/Chest: Effort normal and breath sounds normal. No respiratory distress. He has no decreased breath sounds. He has no wheezes.   Abdominal: Soft. Bowel sounds are normal. He exhibits no distension. There is no tenderness.   Musculoskeletal: Normal range of motion. He exhibits no edema or tenderness.   Lymphadenopathy:     He has no cervical adenopathy.     He has no axillary adenopathy.        Right: No supraclavicular adenopathy present.        Left: No supraclavicular  adenopathy present.   Neurological: He is alert and oriented to person, place, and time. He has normal strength.   Skin: Skin is warm, dry and intact. Capillary refill takes less than 2 seconds. No rash noted. He is not diaphoretic. No erythema.   Psychiatric: His speech is normal and behavior is normal. Judgment and thought content normal. His mood appears anxious. Cognition and memory are normal.   Vitals reviewed.      Assessment:       Problem List Items Addressed This Visit        Oncology    Elevated hematocrit - Primary    Relevant Orders    CBC auto differential    Comprehensive metabolic panel    Elevated hemoglobin          Plan:       1) Hct: 54.3, patient is scheduled to donate blood today. He refused to have therapeutic phlebotomy performed at clinic.  2) Return to clinic in 1 month for labs only and Return in 2 months with labs for follow-up.     I will review assessment/plan with collaborating physician Dr. Cosby.

## 2019-03-28 ENCOUNTER — TELEPHONE (OUTPATIENT)
Dept: SMOKING CESSATION | Facility: CLINIC | Age: 54
End: 2019-03-28

## 2019-03-28 ENCOUNTER — CLINICAL SUPPORT (OUTPATIENT)
Dept: SMOKING CESSATION | Facility: CLINIC | Age: 54
End: 2019-03-28
Payer: COMMERCIAL

## 2019-03-28 DIAGNOSIS — F17.200 NICOTINE DEPENDENCE: Primary | ICD-10-CM

## 2019-03-28 PROCEDURE — 99401 PREV MED CNSL INDIV APPRX 15: CPT | Mod: S$GLB,,, | Performed by: GENERAL PRACTICE

## 2019-03-28 PROCEDURE — 99999 PR PBB SHADOW E&M-EST. PATIENT-LVL I: CPT | Mod: PBBFAC,,,

## 2019-03-28 PROCEDURE — 99401 PR PREVENT COUNSEL,INDIV,15 MIN: ICD-10-PCS | Mod: S$GLB,,, | Performed by: GENERAL PRACTICE

## 2019-03-28 PROCEDURE — 99999 PR PBB SHADOW E&M-EST. PATIENT-LVL I: ICD-10-PCS | Mod: PBBFAC,,,

## 2019-03-28 RX ORDER — IBUPROFEN 200 MG
1 TABLET ORAL DAILY
Qty: 14 PATCH | Refills: 1 | Status: SHIPPED | OUTPATIENT
Start: 2019-03-28 | End: 2019-04-04 | Stop reason: DRUGHIGH

## 2019-03-28 NOTE — PROGRESS NOTES
Individual Follow-Up Form    3/28/2019    Clinical Status of Patient: Outpatient    Length of Service: 15 minutes    Continuing Medication: yes  Patches    Other Medications: nicotine gum as needed     Target Symptoms: Withdrawal and medication side effects. The following were  rated moderate (3) to severe (4) on TCRS:  · Moderate (3): irritable, desire tobacco   · Severe (4): none    Comments: Spoke with patient by telephone for a smoking cessation progress update. He states that he was able to go 7 days without smoking while using the nicotine patch but became stressed at work on day and smoked 2 cigarettes. He ran out of patches and has relapsed. He is only smoking 5-8 cigarettes per day. Discussed Step 2 patch use and possible side effects. He verbalized understanding. Discussed coping strategies. Encouraged a clinic follow up appointment. He denies any negative thoughts or behavior at this time. Will continue to encourage and monitor progress.    Diagnosis: F17.200    Next Visit: 1 week

## 2019-04-04 ENCOUNTER — CLINICAL SUPPORT (OUTPATIENT)
Dept: SMOKING CESSATION | Facility: CLINIC | Age: 54
End: 2019-04-04
Payer: COMMERCIAL

## 2019-04-04 ENCOUNTER — LAB VISIT (OUTPATIENT)
Dept: LAB | Facility: HOSPITAL | Age: 54
End: 2019-04-04
Attending: NURSE PRACTITIONER
Payer: COMMERCIAL

## 2019-04-04 DIAGNOSIS — F17.200 NICOTINE DEPENDENCE: Primary | ICD-10-CM

## 2019-04-04 DIAGNOSIS — R71.8 ELEVATED HEMATOCRIT: ICD-10-CM

## 2019-04-04 LAB
ALBUMIN SERPL BCP-MCNC: 3.1 G/DL (ref 3.5–5.2)
ALP SERPL-CCNC: 101 U/L (ref 55–135)
ALT SERPL W/O P-5'-P-CCNC: 10 U/L (ref 10–44)
ANION GAP SERPL CALC-SCNC: 9 MMOL/L (ref 8–16)
AST SERPL-CCNC: 17 U/L (ref 10–40)
BASOPHILS # BLD AUTO: 0.1 K/UL (ref 0–0.2)
BASOPHILS NFR BLD: 1 % (ref 0–1.9)
BILIRUB SERPL-MCNC: 0.5 MG/DL (ref 0.1–1)
BUN SERPL-MCNC: 11 MG/DL (ref 6–20)
CALCIUM SERPL-MCNC: 9.3 MG/DL (ref 8.7–10.5)
CHLORIDE SERPL-SCNC: 104 MMOL/L (ref 95–110)
CO2 SERPL-SCNC: 26 MMOL/L (ref 23–29)
CREAT SERPL-MCNC: 0.8 MG/DL (ref 0.5–1.4)
DIFFERENTIAL METHOD: NORMAL
EOSINOPHIL # BLD AUTO: 0.5 K/UL (ref 0–0.5)
EOSINOPHIL NFR BLD: 5.3 % (ref 0–8)
ERYTHROCYTE [DISTWIDTH] IN BLOOD BY AUTOMATED COUNT: 13.5 % (ref 11.5–14.5)
EST. GFR  (AFRICAN AMERICAN): >60 ML/MIN/1.73 M^2
EST. GFR  (NON AFRICAN AMERICAN): >60 ML/MIN/1.73 M^2
GLUCOSE SERPL-MCNC: 87 MG/DL (ref 70–110)
HCT VFR BLD AUTO: 46.7 % (ref 40–54)
HGB BLD-MCNC: 15.5 G/DL (ref 14–18)
IMM GRANULOCYTES # BLD AUTO: 0.02 K/UL (ref 0–0.04)
IMM GRANULOCYTES NFR BLD AUTO: 0.2 % (ref 0–0.5)
LYMPHOCYTES # BLD AUTO: 3.1 K/UL (ref 1–4.8)
LYMPHOCYTES NFR BLD: 30.8 % (ref 18–48)
MCH RBC QN AUTO: 29.1 PG (ref 27–31)
MCHC RBC AUTO-ENTMCNC: 33.2 G/DL (ref 32–36)
MCV RBC AUTO: 88 FL (ref 82–98)
MONOCYTES # BLD AUTO: 0.8 K/UL (ref 0.3–1)
MONOCYTES NFR BLD: 7.6 % (ref 4–15)
NEUTROPHILS # BLD AUTO: 5.5 K/UL (ref 1.8–7.7)
NEUTROPHILS NFR BLD: 55.1 % (ref 38–73)
NRBC BLD-RTO: 0 /100 WBC
PLATELET # BLD AUTO: 323 K/UL (ref 150–350)
PMV BLD AUTO: 9.2 FL (ref 9.2–12.9)
POTASSIUM SERPL-SCNC: 3.9 MMOL/L (ref 3.5–5.1)
PROT SERPL-MCNC: 7.8 G/DL (ref 6–8.4)
RBC # BLD AUTO: 5.33 M/UL (ref 4.6–6.2)
SODIUM SERPL-SCNC: 139 MMOL/L (ref 136–145)
WBC # BLD AUTO: 10.01 K/UL (ref 3.9–12.7)

## 2019-04-04 PROCEDURE — 99402 PR PREVENT COUNSEL,INDIV,30 MIN: ICD-10-PCS | Mod: S$GLB,,, | Performed by: GENERAL PRACTICE

## 2019-04-04 PROCEDURE — 85025 COMPLETE CBC W/AUTO DIFF WBC: CPT

## 2019-04-04 PROCEDURE — 99999 PR PBB SHADOW E&M-EST. PATIENT-LVL I: ICD-10-PCS | Mod: PBBFAC,,,

## 2019-04-04 PROCEDURE — 36415 COLL VENOUS BLD VENIPUNCTURE: CPT

## 2019-04-04 PROCEDURE — 99999 PR PBB SHADOW E&M-EST. PATIENT-LVL I: CPT | Mod: PBBFAC,,,

## 2019-04-04 PROCEDURE — 80053 COMPREHEN METABOLIC PANEL: CPT

## 2019-04-04 PROCEDURE — 99402 PREV MED CNSL INDIV APPRX 30: CPT | Mod: S$GLB,,, | Performed by: GENERAL PRACTICE

## 2019-04-04 RX ORDER — NICOTINE 7MG/24HR
1 PATCH, TRANSDERMAL 24 HOURS TRANSDERMAL DAILY
Qty: 7 PATCH | Refills: 1 | Status: SHIPPED | OUTPATIENT
Start: 2019-04-04 | End: 2019-05-27

## 2019-04-04 NOTE — PROGRESS NOTES
Individual Follow-Up Form    4/4/2019    Clinical Status of Patient: Outpatient    Length of Service: 30 minutes    Continuing Medication: yes  Wellbutrin    Other Medications: Nicotine patches     Target Symptoms: Withdrawal and medication side effects. The following were  rated moderate (3) to severe (4) on TCRS:  · Moderate (3): sleep disturbances  · Severe (4): none    Comments: Patient was seen today for a smoking cessation progress update. He states that he was able to go 1 week without smoking but ran out of patches and began to smoke again. He states that once he picked up his patches he has not been able to refrain from smoking. He states that his smoking is in the morning when driving to work and in the evening when drinking beer. He states that if he keeps himself busy in the evening, he is not tempted to smoke. Discussed coping strategies and routine changes. He verbalized understanding. The patient remains on the prescribed tobacco cessation medication regimen of 14 mg Nicoderm CQ without any negative side effects at this time. The patient denies any abnormal behavioral or mental changes at this time. Will continue to encourage and monitor progress.    Diagnosis: F17.200    Next Visit: 2 weeks

## 2019-05-06 ENCOUNTER — OFFICE VISIT (OUTPATIENT)
Dept: HEMATOLOGY/ONCOLOGY | Facility: CLINIC | Age: 54
End: 2019-05-06
Payer: COMMERCIAL

## 2019-05-06 ENCOUNTER — LAB VISIT (OUTPATIENT)
Dept: LAB | Facility: HOSPITAL | Age: 54
End: 2019-05-06
Attending: INTERNAL MEDICINE
Payer: COMMERCIAL

## 2019-05-06 VITALS
OXYGEN SATURATION: 99 % | DIASTOLIC BLOOD PRESSURE: 87 MMHG | TEMPERATURE: 98 F | HEIGHT: 72 IN | BODY MASS INDEX: 23.26 KG/M2 | SYSTOLIC BLOOD PRESSURE: 124 MMHG | HEART RATE: 100 BPM | WEIGHT: 171.75 LBS

## 2019-05-06 DIAGNOSIS — R71.8 ELEVATED HEMATOCRIT: Primary | ICD-10-CM

## 2019-05-06 DIAGNOSIS — D58.2 ELEVATED HEMOGLOBIN: ICD-10-CM

## 2019-05-06 DIAGNOSIS — R71.8 ELEVATED HEMATOCRIT: ICD-10-CM

## 2019-05-06 LAB
ALBUMIN SERPL BCP-MCNC: 3.2 G/DL (ref 3.5–5.2)
ALP SERPL-CCNC: 95 U/L (ref 55–135)
ALT SERPL W/O P-5'-P-CCNC: 10 U/L (ref 10–44)
ANION GAP SERPL CALC-SCNC: 9 MMOL/L (ref 8–16)
AST SERPL-CCNC: 11 U/L (ref 10–40)
BASOPHILS # BLD AUTO: 0.07 K/UL (ref 0–0.2)
BASOPHILS NFR BLD: 0.7 % (ref 0–1.9)
BILIRUB SERPL-MCNC: 0.3 MG/DL (ref 0.1–1)
BUN SERPL-MCNC: 13 MG/DL (ref 6–20)
CALCIUM SERPL-MCNC: 9.4 MG/DL (ref 8.7–10.5)
CHLORIDE SERPL-SCNC: 105 MMOL/L (ref 95–110)
CO2 SERPL-SCNC: 27 MMOL/L (ref 23–29)
CREAT SERPL-MCNC: 0.8 MG/DL (ref 0.5–1.4)
DIFFERENTIAL METHOD: ABNORMAL
EOSINOPHIL # BLD AUTO: 0.6 K/UL (ref 0–0.5)
EOSINOPHIL NFR BLD: 6 % (ref 0–8)
ERYTHROCYTE [DISTWIDTH] IN BLOOD BY AUTOMATED COUNT: 13.4 % (ref 11.5–14.5)
EST. GFR  (AFRICAN AMERICAN): >60 ML/MIN/1.73 M^2
EST. GFR  (NON AFRICAN AMERICAN): >60 ML/MIN/1.73 M^2
GLUCOSE SERPL-MCNC: 101 MG/DL (ref 70–110)
HCT VFR BLD AUTO: 44.7 % (ref 40–54)
HGB BLD-MCNC: 15.6 G/DL (ref 14–18)
LYMPHOCYTES # BLD AUTO: 3.6 K/UL (ref 1–4.8)
LYMPHOCYTES NFR BLD: 34.6 % (ref 18–48)
MCH RBC QN AUTO: 29.5 PG (ref 27–31)
MCHC RBC AUTO-ENTMCNC: 34.9 G/DL (ref 32–36)
MCV RBC AUTO: 85 FL (ref 82–98)
MONOCYTES # BLD AUTO: 0.9 K/UL (ref 0.3–1)
MONOCYTES NFR BLD: 9.1 % (ref 4–15)
NEUTROPHILS # BLD AUTO: 5.1 K/UL (ref 1.8–7.7)
NEUTROPHILS NFR BLD: 49.6 % (ref 38–73)
PLATELET # BLD AUTO: 343 K/UL (ref 150–350)
PMV BLD AUTO: 8.9 FL (ref 9.2–12.9)
POTASSIUM SERPL-SCNC: 4.3 MMOL/L (ref 3.5–5.1)
PROT SERPL-MCNC: 8 G/DL (ref 6–8.4)
RBC # BLD AUTO: 5.29 M/UL (ref 4.6–6.2)
SODIUM SERPL-SCNC: 141 MMOL/L (ref 136–145)
WBC # BLD AUTO: 10.25 K/UL (ref 3.9–12.7)

## 2019-05-06 PROCEDURE — 80053 COMPREHEN METABOLIC PANEL: CPT

## 2019-05-06 PROCEDURE — 3008F BODY MASS INDEX DOCD: CPT | Mod: CPTII,S$GLB,, | Performed by: NURSE PRACTITIONER

## 2019-05-06 PROCEDURE — 99214 OFFICE O/P EST MOD 30 MIN: CPT | Mod: S$GLB,,, | Performed by: NURSE PRACTITIONER

## 2019-05-06 PROCEDURE — 99999 PR PBB SHADOW E&M-EST. PATIENT-LVL III: ICD-10-PCS | Mod: PBBFAC,,, | Performed by: NURSE PRACTITIONER

## 2019-05-06 PROCEDURE — 3008F PR BODY MASS INDEX (BMI) DOCUMENTED: ICD-10-PCS | Mod: CPTII,S$GLB,, | Performed by: NURSE PRACTITIONER

## 2019-05-06 PROCEDURE — 99214 PR OFFICE/OUTPT VISIT, EST, LEVL IV, 30-39 MIN: ICD-10-PCS | Mod: S$GLB,,, | Performed by: NURSE PRACTITIONER

## 2019-05-06 PROCEDURE — 36415 COLL VENOUS BLD VENIPUNCTURE: CPT

## 2019-05-06 PROCEDURE — 85025 COMPLETE CBC W/AUTO DIFF WBC: CPT

## 2019-05-06 PROCEDURE — 99999 PR PBB SHADOW E&M-EST. PATIENT-LVL III: CPT | Mod: PBBFAC,,, | Performed by: NURSE PRACTITIONER

## 2019-05-06 RX ORDER — ALLOPURINOL 300 MG/1
300 TABLET ORAL
COMMUNITY
Start: 2019-05-01 | End: 2019-05-27

## 2019-05-06 RX ORDER — KETOROLAC TROMETHAMINE 30 MG/ML
2 INJECTION, SOLUTION INTRAMUSCULAR; INTRAVENOUS
COMMUNITY
End: 2019-05-27

## 2019-05-06 RX ORDER — INDOMETHACIN 50 MG/1
CAPSULE ORAL
COMMUNITY
Start: 2019-05-01 | End: 2019-05-27

## 2019-05-06 NOTE — PROGRESS NOTES
Subjective:       Patient ID: Sukhjinder Presley is a 53 y.o. male.    Chief Complaint: Abnormal Lab and Results    53 year old male, presents for ongoing evaluation of Polycythemia. Patient was referred by PCP for polycythemia which was treated with monthly blood donation. Initial workup, negative for JAK2 and Hgb/Hct were in normal range. Patient is a long term smoker, and reports a 3 pack per day habit. He is being seen by the smoking cessation clinic and has reduced smoking to 1 pack per day. His goal is to quit smoking. No other significant medical history.     Review of Systems   Constitutional: Negative for chills, diaphoresis, fever and unexpected weight change.   HENT: Negative for congestion, hearing loss, nosebleeds, postnasal drip and trouble swallowing.    Eyes: Negative for discharge and visual disturbance.   Respiratory: Negative for cough, chest tightness and shortness of breath.    Cardiovascular: Negative for chest pain and palpitations.   Gastrointestinal: Negative for abdominal distention, abdominal pain, constipation, diarrhea, nausea and vomiting.   Endocrine: Negative for cold intolerance and heat intolerance.   Genitourinary: Negative for difficulty urinating, dysuria, flank pain, frequency and hematuria.   Musculoskeletal: Negative for arthralgias, back pain and myalgias.   Skin: Negative.  Negative for color change, pallor and rash.   Neurological: Negative for dizziness, weakness, light-headedness and headaches.   Hematological: Negative for adenopathy. Does not bruise/bleed easily.   Psychiatric/Behavioral: Negative for agitation, behavioral problems and confusion. The patient is nervous/anxious.        Medication List with Changes/Refills   Current Medications    ALLOPURINOL (ZYLOPRIM) 300 MG TABLET        ASPIRIN 81 MG CHEW    Take 81 mg by mouth once daily.    BUPROPION (WELLBUTRIN XL) 300 MG 24 HR TABLET        CYANOCOBALAMIN (VITAMIN B-12) 1000 MCG TABLET    Take 100 mcg by mouth once  daily.    INDOMETHACIN (INDOCIN) 50 MG CAPSULE        KETOROLAC (TORADOL) 60 MG/2 ML SOLN    2 mLs.    NICOTINE (NICODERM CQ) 7 MG/24 HR    Place 1 patch onto the skin once daily.    NICOTINE, POLACRILEX, (NICORETTE) 2 MG GUM    Take 1 each (2 mg total) by mouth as needed (use no more than 8 pieces of gum in 24 hours).     Objective:     Vitals:    05/06/19 0858   BP: 124/87   Pulse: 100   Temp: 97.5 °F (36.4 °C)     Physical Exam   Constitutional: He is oriented to person, place, and time. He appears well-developed and well-nourished. No distress.   HENT:   Head: Normocephalic and atraumatic.   Right Ear: Hearing and external ear normal.   Left Ear: Hearing and external ear normal.   Nose: Nose normal. No mucosal edema or rhinorrhea. No epistaxis.   Mouth/Throat: Uvula is midline, oropharynx is clear and moist and mucous membranes are normal.   Eyes: Pupils are equal, round, and reactive to light. Conjunctivae and EOM are normal. Right eye exhibits no chemosis and no discharge. Left eye exhibits no chemosis and no discharge.   Neck: Trachea normal and normal range of motion. Neck supple. No thyroid mass and no thyromegaly present.   Cardiovascular: Normal rate, regular rhythm, normal heart sounds and intact distal pulses.   No murmur heard.  Pulses:       Dorsalis pedis pulses are 2+ on the right side, and 2+ on the left side.   Pulmonary/Chest: Effort normal and breath sounds normal. No respiratory distress. He has no decreased breath sounds. He has no wheezes.   Abdominal: Soft. Bowel sounds are normal. He exhibits no distension. There is no tenderness.   Musculoskeletal: Normal range of motion. He exhibits no edema or tenderness.   Lymphadenopathy:     He has no cervical adenopathy.     He has no axillary adenopathy.        Right: No supraclavicular adenopathy present.        Left: No supraclavicular adenopathy present.   Neurological: He is alert and oriented to person, place, and time. He has normal strength.    Skin: Skin is warm, dry and intact. Capillary refill takes less than 2 seconds. No rash noted. He is not diaphoretic. No erythema.   Psychiatric: His speech is normal and behavior is normal. Judgment and thought content normal. His mood appears anxious. Cognition and memory are normal.   Vitals reviewed.      Assessment:       Problem List Items Addressed This Visit        Oncology    Elevated hematocrit - Primary    Relevant Orders    CBC auto differential    Comprehensive metabolic panel    Elevated hemoglobin          Plan:       1) Hct: 44.7, patient is scheduled to donate blood this week. He refused to have therapeutic phlebotomy performed at clinic.  2) Return to clinic in 2 months with labs for follow-up.     I will review assessment/plan with collaborating physician Dr. Aden.

## 2019-05-06 NOTE — PATIENT INSTRUCTIONS
Hematocrit  Does this test have other names?  HCT, packed cell volume, PCV  What is this test?  This test measures how much of your blood is made up of red blood cells.  Normal blood contains white blood cells, red blood cells, platelets, and the fluid portion called plasma. The word hematocrit means to separate. In this test, your red blood cells are  from the rest of your blood so they can be measured.  Your hematocrit (HCT) shows whether you have a normal amount of red blood cells, too many, or too few. To measure your HCT, your blood sample is spun at a high speed to separate the red blood cells.  Why do I need this test?  You may need this blood test as part of routine blood testing. You may also need your hematocrit checked before having surgery or if your healthcare provider suspects you have a red blood cell disorder. Too many red blood cells, or thick blood, is called polycythemia. Too few red blood cells, or thin blood, is called anemia.  Polycythemia may cause:  · Heart attack  · Stroke  · Headache  · Blurred vision  · Dizziness  Anemia can be caused by blood loss, your body making fewer red blood cells, or increased destruction of red blood cells. Symptoms may include:  · Shortness of breath  · Dizziness  · Headache  · Cold, pale skin  · Chest pain  What other tests might I have along with this test?  Your healthcare provider may also order a complete blood count, or CBC, which is a blood test that counts all the different types of cells in your blood.  Your healthcare provider may also order a test that measures your hemoglobin to find out how much oxygen your red blood cells are carrying.  What do my test results mean?  Many things may affect your lab test results. These include the method each lab uses to do the test. Even if your test results are different from the normal value, you may not have a problem. To learn what the results mean for you, talk with your healthcare provider.  Results  are given as a percentage. Normal hematocrit values are different for men, women, and children. Normal values are:  · 36 to 48 percent for women  · 42 to 52 percent for men  · 30 to 44 percent for children, depending upon age  If your HCT is high, it may mean your body is making too many red blood cells. Your HCT may also be high if your plasma is too low. This can happen when you are dehydrated or in shock.  If your HCT is low, it means you may have:  · Anemia  · White blood cell cancers, such as leukemia  · Chronic illness  · Bleeding  How is this test done?  The test requires a blood sample, which is drawn through a needle from a vein in your arm.  Does this test pose any risks?  Taking a blood sample with a needle carries risks that include bleeding, infection, bruising, or feeling dizzy. When the needle pricks your arm, you may feel a slight stinging sensation or pain. Afterward, the site may be slightly sore.  What might affect my test results?  Living at a high altitude may cause your HCT to be higher than normal. Being pregnant or being older than 60 can cause your HCT to be lower than normal.  Certain medicines can also affect your results.  How do I get ready for this test?  You don't need to prepare for this test. But be sure your healthcare provider knows about all medicines, herbs, vitamins, and supplements you are taking. This includes medicines that don't need a prescription and any illicit drugs you may use.  © 4974-2630 [employer]. [address] [place] [address], [place] [address]. All rights reserved. This information is not intended as a substitute for professional medical care. Always follow your healthcare professional's instructions.

## 2019-05-27 ENCOUNTER — OFFICE VISIT (OUTPATIENT)
Dept: INTERNAL MEDICINE | Facility: CLINIC | Age: 54
End: 2019-05-27
Payer: COMMERCIAL

## 2019-05-27 VITALS
SYSTOLIC BLOOD PRESSURE: 110 MMHG | OXYGEN SATURATION: 97 % | HEART RATE: 96 BPM | DIASTOLIC BLOOD PRESSURE: 68 MMHG | TEMPERATURE: 99 F | WEIGHT: 167.31 LBS | BODY MASS INDEX: 22.69 KG/M2

## 2019-05-27 DIAGNOSIS — Z23 NEED FOR PNEUMOCOCCAL VACCINATION: ICD-10-CM

## 2019-05-27 DIAGNOSIS — Z00.00 ROUTINE GENERAL MEDICAL EXAMINATION AT A HEALTH CARE FACILITY: Primary | ICD-10-CM

## 2019-05-27 DIAGNOSIS — K92.1 HEMATOCHEZIA: ICD-10-CM

## 2019-05-27 DIAGNOSIS — K62.9 ANAL LESION: ICD-10-CM

## 2019-05-27 DIAGNOSIS — F17.200 SMOKER: ICD-10-CM

## 2019-05-27 DIAGNOSIS — D75.1 POLYCYTHEMIA: ICD-10-CM

## 2019-05-27 DIAGNOSIS — K59.00 CONSTIPATION, UNSPECIFIED CONSTIPATION TYPE: ICD-10-CM

## 2019-05-27 PROCEDURE — 99396 PREV VISIT EST AGE 40-64: CPT | Mod: 25,S$GLB,, | Performed by: INTERNAL MEDICINE

## 2019-05-27 PROCEDURE — 90471 IMMUNIZATION ADMIN: CPT | Mod: S$GLB,,, | Performed by: INTERNAL MEDICINE

## 2019-05-27 PROCEDURE — 90732 PPSV23 VACC 2 YRS+ SUBQ/IM: CPT | Mod: S$GLB,,, | Performed by: INTERNAL MEDICINE

## 2019-05-27 PROCEDURE — 99999 PR PBB SHADOW E&M-EST. PATIENT-LVL III: ICD-10-PCS | Mod: PBBFAC,,, | Performed by: INTERNAL MEDICINE

## 2019-05-27 PROCEDURE — 90732 PNEUMOCOCCAL POLYSACCHARIDE VACCINE 23-VALENT =>2YO SQ IM: ICD-10-PCS | Mod: S$GLB,,, | Performed by: INTERNAL MEDICINE

## 2019-05-27 PROCEDURE — 99396 PR PREVENTIVE VISIT,EST,40-64: ICD-10-PCS | Mod: 25,S$GLB,, | Performed by: INTERNAL MEDICINE

## 2019-05-27 PROCEDURE — 90471 PNEUMOCOCCAL POLYSACCHARIDE VACCINE 23-VALENT =>2YO SQ IM: ICD-10-PCS | Mod: S$GLB,,, | Performed by: INTERNAL MEDICINE

## 2019-05-27 PROCEDURE — 99999 PR PBB SHADOW E&M-EST. PATIENT-LVL III: CPT | Mod: PBBFAC,,, | Performed by: INTERNAL MEDICINE

## 2019-05-27 NOTE — PATIENT INSTRUCTIONS
Magnesium citrate over counter today. Repeat tomorrow if now response.     miralax daily if needed after magnesium citrate.

## 2019-05-28 NOTE — PROGRESS NOTES
Subjective:      Patient ID: Sukhjinder Presley is a 53 y.o. male.    Chief Complaint: Establish Care    HPI   54 yo with   Patient Active Problem List   Diagnosis    Elevated hematocrit    Elevated hemoglobin    Smoker    Polycythemia     Past Medical History:   Diagnosis Date    Anemia      Here today for annual prev exam.  Compliant with meds without significant side effects. Energy and appetite are good.   Reports 3 weeks of constipation, bloody mucous type stool. Some abd cramping with straining for bms. Reports h/o hemorrhoids. Had small amounts of stool this week.   Review of Systems   Constitutional: Negative for chills and fever.   HENT: Negative for ear pain and sore throat.    Respiratory: Negative for cough.    Cardiovascular: Negative for chest pain, palpitations and leg swelling.   Gastrointestinal: Positive for constipation. Negative for abdominal distention, blood in stool, diarrhea, nausea and vomiting.   Genitourinary: Negative for dysuria and hematuria.   Skin: Negative for rash.   Neurological: Negative for seizures and syncope.     Objective:   /68 (BP Location: Left arm, Patient Position: Sitting, BP Method: Medium (Manual))   Pulse 96   Temp 99.1 °F (37.3 °C) (Tympanic)   Wt 75.9 kg (167 lb 5.3 oz)   SpO2 97%   BMI 22.69 kg/m²     Physical Exam   Constitutional: He is oriented to person, place, and time. He appears well-developed and well-nourished. No distress.   HENT:   Head: Normocephalic and atraumatic.   Mouth/Throat: Oropharynx is clear and moist.   Eyes: Pupils are equal, round, and reactive to light. EOM are normal.   Neck: Neck supple. No thyromegaly present.   Cardiovascular: Normal rate and regular rhythm.   Pulmonary/Chest: Breath sounds normal. He has no wheezes. He has no rales.   Abdominal: Soft. Bowel sounds are normal. He exhibits no distension and no mass. There is no tenderness. There is no rebound and no guarding.   Lymphadenopathy:     He has no cervical  adenopathy.   Neurological: He is alert and oriented to person, place, and time.   Skin: Skin is warm and dry.   Psychiatric: He has a normal mood and affect. His behavior is normal.   mostly flesh colored lesion to anus. On of which has erythematous area c/w mucosa. No thrombosed external hemorrhoids.     Assessment:     1. Routine general medical examination at a health care facility    2. Smoker    3. Constipation, unspecified constipation type    4. Hematochezia    5. Need for pneumococcal vaccination    6. Polycythemia    7. Anal lesion      Plan:   Routine general medical examination at a health care facility  -     Lipid panel; Future; Expected date: 05/27/2019  -     PSA, Screening; Future; Expected date: 05/27/2019    Smoker  -     (In Office Administered) Pneumococcal Polysaccharide Vaccine (23 Valent) (SQ/IM)    Constipation, unspecified constipation type  -     Ambulatory Referral to Gastroenterology    Hematochezia  -     Ambulatory Referral to Gastroenterology    Need for pneumococcal vaccination  -     (In Office Administered) Pneumococcal Polysaccharide Vaccine (23 Valent) (SQ/IM)    Polycythemia    Anal lesion  -     Ambulatory Referral to Gastroenterology        Lab Frequency Next Occurrence   CT Chest Without Contrast Once 01/15/2019   CBC auto differential Once 05/06/2019   Comprehensive metabolic panel Once 05/06/2019   Lipid panel Once 05/27/2019   PSA, Screening Once 05/27/2019       Problem List Items Addressed This Visit        Oncology    Polycythemia    Overview     Sees [facility].          Current Assessment & Plan     Cont recs and f/u as per heme/onc            Other    Smoker    Relevant Orders    (In Office Administered) Pneumococcal Polysaccharide Vaccine (23 Valent) (SQ/IM) (Completed)      Other Visit Diagnoses     Routine general medical examination at a health care facility    -  Primary    Relevant Orders    Lipid panel    PSA, Screening    Constipation, unspecified  constipation type        Relevant Orders    Ambulatory Referral to Gastroenterology    Hematochezia        Relevant Orders    Ambulatory Referral to Gastroenterology    Need for pneumococcal vaccination        Relevant Orders    (In Office Administered) Pneumococcal Polysaccharide Vaccine (23 Valent) (SQ/IM) (Completed)    Anal lesion        Relevant Orders    Ambulatory Referral to Gastroenterology          Follow up in about 1 year (around 5/27/2020), or if symptoms worsen or fail to improve.

## 2019-05-29 ENCOUNTER — CLINICAL SUPPORT (OUTPATIENT)
Dept: SMOKING CESSATION | Facility: CLINIC | Age: 54
End: 2019-05-29
Payer: COMMERCIAL

## 2019-05-29 DIAGNOSIS — F17.200 NICOTINE DEPENDENCE: Primary | ICD-10-CM

## 2019-05-29 PROCEDURE — 99401 PR PREVENT COUNSEL,INDIV,15 MIN: ICD-10-PCS | Mod: S$GLB,,, | Performed by: GENERAL PRACTICE

## 2019-05-29 PROCEDURE — 99401 PREV MED CNSL INDIV APPRX 15: CPT | Mod: S$GLB,,, | Performed by: GENERAL PRACTICE

## 2019-05-29 NOTE — PROGRESS NOTES
Individual Follow-Up Form    5/29/2019    Clinical Status of Patient: Outpatient    Length of Service: 15 minutes    Continuing Medication: no     Target Symptoms: Withdrawal and medication side effects. The following were  rated moderate (3) to severe (4) on TCRS:  · Moderate (3): desire tobacco  · Severe (4): none    Comments: Spoke with patient in regards to his smoking cessation progress. He stated that he was told by his PCP to discontinue all medications because he was to begin Prevnar shots. He states that he has not used any cessation medications in at least a week and states that he remains at 3-5 cigarettes daily. He states that he is still interested in continuing with the smoking cessation program and will accept advice in ways to quit smoking without medication. Discussed tapering plan and coping strategies. Discussed the expiration of his Trust benefits. He states that he would like to renew his benefits so that he can continue with the program. Will renew when available. The patient denies any abnormal behavioral or mental changes at this time. Will continue to encourage and monitor progress.    Diagnosis: F17.200    Next Visit: 2 weeks

## 2019-06-01 ENCOUNTER — LAB VISIT (OUTPATIENT)
Dept: LAB | Facility: HOSPITAL | Age: 54
End: 2019-06-01
Attending: INTERNAL MEDICINE
Payer: COMMERCIAL

## 2019-06-01 DIAGNOSIS — Z00.00 ROUTINE GENERAL MEDICAL EXAMINATION AT A HEALTH CARE FACILITY: ICD-10-CM

## 2019-06-01 LAB
CHOLEST SERPL-MCNC: 158 MG/DL (ref 120–199)
CHOLEST/HDLC SERPL: 5.3 {RATIO} (ref 2–5)
COMPLEXED PSA SERPL-MCNC: 0.37 NG/ML (ref 0–4)
HDLC SERPL-MCNC: 30 MG/DL (ref 40–75)
HDLC SERPL: 19 % (ref 20–50)
LDLC SERPL CALC-MCNC: 89.2 MG/DL (ref 63–159)
NONHDLC SERPL-MCNC: 128 MG/DL
TRIGL SERPL-MCNC: 194 MG/DL (ref 30–150)

## 2019-06-01 PROCEDURE — 84153 ASSAY OF PSA TOTAL: CPT

## 2019-06-01 PROCEDURE — 80061 LIPID PANEL: CPT

## 2019-06-01 PROCEDURE — 36415 COLL VENOUS BLD VENIPUNCTURE: CPT

## 2019-06-02 ENCOUNTER — PATIENT MESSAGE (OUTPATIENT)
Dept: INTERNAL MEDICINE | Facility: CLINIC | Age: 54
End: 2019-06-02

## 2019-06-03 NOTE — TELEPHONE ENCOUNTER
Pt stated he saw his lipid panel results on [facility] and wants to know what results mean.  Notified pt that Dr. Elizalde has not yet had a chance to review his results since he just completed lab work on Saturday and Dr. Elizalde is not in clinic on Saturday or Sunday.  Notified pt that once Dr. Elizalde has reviewed his results, he will be contacted either thru [facility] account or by phone call.  Pt verbalized understanding.

## 2019-06-04 ENCOUNTER — TELEPHONE (OUTPATIENT)
Dept: INTERNAL MEDICINE | Facility: CLINIC | Age: 54
End: 2019-06-04

## 2019-06-04 NOTE — TELEPHONE ENCOUNTER
Conveyed lab results and recommendation to pt who verbalized understanding and scheduled appt with Dr. Elizalde for 6/6/19.  Pt also scheduled GI appt with Eli De Los Santos for 6/5/19 at 1:40 at [facility].

## 2019-06-04 NOTE — TELEPHONE ENCOUNTER
----- Message from Tiera Castaneda sent at 6/4/2019  3:42 PM CDT -----  Contact: Adelaida/wife  Type:  Needs Medical Advice    Who Called: Adelaida  Symptoms (please be specific): Abnormal amount of bowel movements along with dark colored stool  How long has patient had these symptoms: Last visit  Pharmacy name and phone #: [facility]  Ph: [phone]  Would the patient rather a call back or a response via [facility]? Call back   Best Call Back Number: Please call her at [phone]  Additional Information: n/a

## 2019-06-04 NOTE — TELEPHONE ENCOUNTER
----- Message from Sherice Santamaria sent at 6/4/2019  9:35 AM CDT -----  Contact: pt  Type:  Test Results    Who Called: Pt  Name of Test (Lab/Mammo/Etc):Lab results  Date of Test: 6/1  Ordering Provider: Dr Elizalde  Where the test was performed: [facility]  Would the patient rather a call back or a response via [facility]?call back  Best Call Back Number: [phone]  Additional Information:  na

## 2019-06-04 NOTE — TELEPHONE ENCOUNTER
psa is normal.Lipid panel indicates cholesterol medication or further testing regarding cardiac risk should be considered. Please have pt f/u with me in clinic to discuss further.

## 2019-06-05 ENCOUNTER — OFFICE VISIT (OUTPATIENT)
Dept: GASTROENTEROLOGY | Facility: CLINIC | Age: 54
End: 2019-06-05
Payer: COMMERCIAL

## 2019-06-05 ENCOUNTER — HOSPITAL ENCOUNTER (OUTPATIENT)
Dept: RADIOLOGY | Facility: HOSPITAL | Age: 54
Discharge: HOME | End: 2019-06-05
Attending: NURSE PRACTITIONER
Payer: COMMERCIAL

## 2019-06-05 VITALS
SYSTOLIC BLOOD PRESSURE: 102 MMHG | BODY MASS INDEX: 19.59 KG/M2 | HEART RATE: 61 BPM | HEIGHT: 72 IN | DIASTOLIC BLOOD PRESSURE: 70 MMHG | WEIGHT: 144.63 LBS

## 2019-06-05 DIAGNOSIS — Z80.0 FAMILY HISTORY OF STOMACH CANCER: ICD-10-CM

## 2019-06-05 DIAGNOSIS — K92.1 MELENA: ICD-10-CM

## 2019-06-05 DIAGNOSIS — R19.5 CHANGE IN STOOL: ICD-10-CM

## 2019-06-05 DIAGNOSIS — R63.4 WEIGHT LOSS: ICD-10-CM

## 2019-06-05 DIAGNOSIS — K62.5 RECTAL BLEEDING: ICD-10-CM

## 2019-06-05 DIAGNOSIS — K62.5 RECTAL BLEEDING: Primary | ICD-10-CM

## 2019-06-05 PROCEDURE — 3008F PR BODY MASS INDEX (BMI) DOCUMENTED: ICD-10-PCS | Mod: CPTII,S$GLB,, | Performed by: NURSE PRACTITIONER

## 2019-06-05 PROCEDURE — 3008F BODY MASS INDEX DOCD: CPT | Mod: CPTII,S$GLB,, | Performed by: NURSE PRACTITIONER

## 2019-06-05 PROCEDURE — 74019 RADEX ABDOMEN 2 VIEWS: CPT | Mod: TC

## 2019-06-05 PROCEDURE — 99204 OFFICE O/P NEW MOD 45 MIN: CPT | Mod: S$GLB,,, | Performed by: NURSE PRACTITIONER

## 2019-06-05 PROCEDURE — 99999 PR PBB SHADOW E&M-EST. PATIENT-LVL III: ICD-10-PCS | Mod: PBBFAC,,, | Performed by: NURSE PRACTITIONER

## 2019-06-05 PROCEDURE — 99204 PR OFFICE/OUTPT VISIT, NEW, LEVL IV, 45-59 MIN: ICD-10-PCS | Mod: S$GLB,,, | Performed by: NURSE PRACTITIONER

## 2019-06-05 PROCEDURE — 74019 RADEX ABDOMEN 2 VIEWS: CPT | Mod: 26,,, | Performed by: RADIOLOGY

## 2019-06-05 PROCEDURE — 99999 PR PBB SHADOW E&M-EST. PATIENT-LVL III: CPT | Mod: PBBFAC,,, | Performed by: NURSE PRACTITIONER

## 2019-06-05 PROCEDURE — 74019 XR ABDOMEN FLAT AND ERECT: ICD-10-PCS | Mod: 26,,, | Performed by: RADIOLOGY

## 2019-06-05 RX ORDER — SODIUM, POTASSIUM,MAG SULFATES 17.5-3.13G
SOLUTION, RECONSTITUTED, ORAL ORAL
Qty: 354 ML | Refills: 1 | Status: ON HOLD | OUTPATIENT
Start: 2019-06-05 | End: 2019-06-06 | Stop reason: ALTCHOICE

## 2019-06-05 RX ORDER — SODIUM, POTASSIUM,MAG SULFATES 17.5-3.13G
SOLUTION, RECONSTITUTED, ORAL ORAL
Qty: 354 ML | Refills: 0 | Status: SHIPPED | OUTPATIENT
Start: 2019-06-05 | End: 2019-06-05

## 2019-06-05 NOTE — PROGRESS NOTES
Clinic Consult:  [facility] Consultation Note    Reason for Consult:  The primary encounter diagnosis was Rectal bleeding. Diagnoses of Change in stool, Melena, Family history of stomach cancer, and Weight loss were also pertinent to this visit.    PCP: Drake Elizalde   [address] [facility] / [place] [address]    HPI:  This is a 53 y.o. male here for evaluation of the above. He was referred by Dr. Elizalde. He presents to clinic with bowel changes. He reports doing well until a couple of months ago when he started with weight loss and acute change in bowel habits. He developed constipation and abdominal pain over his lower abdomen. He reports having only very small thin stools. He saw primary care for this and was told to take magnesium citrate. He completed one bottle last week which did produce bowel movements but he continued with abdominal pain and trouble having a bowel movement the next day. He has also been having rectal bleeding. It is a mix between bright red blood and darker red and black stools. He denies any epigastric pain. He has had a 28 lb weight loss over the last couple of months. No prior colonoscopy. He does have a family history of stomach cancer but no family history of colon cancer.     Review of Systems   Constitutional: Positive for weight loss. Negative for fever and malaise/fatigue.   HENT: Negative for sore throat.    Respiratory: Negative for cough and wheezing.    Cardiovascular: Negative for chest pain and palpitations.   Gastrointestinal: Positive for abdominal pain, blood in stool, constipation and melena. Negative for heartburn, nausea and vomiting.   Genitourinary: Negative for dysuria and frequency.   Musculoskeletal: Negative for back pain, joint pain, myalgias and neck pain.   Skin: Negative for itching and rash.   Neurological: Negative for dizziness, speech change, seizures, loss of consciousness and headaches.   Psychiatric/Behavioral: Negative for depression  and substance abuse. The patient is not nervous/anxious.        Medical History:  has a past medical history of Anemia.    Surgical History:  has a past surgical history that includes Hernia repair (1995) and Tonsillectomy.    Family History: family history includes Coronary artery disease in his brother and maternal grandmother; Intestinal malrotation in his mother; Leukemia in his father; No Known Problems in his sister; Stomach cancer in his maternal grandfather..     Social History:  reports that he has been smoking cigarettes.  He started smoking about 35 years ago. He has a 35.00 pack-year smoking history. His smokeless tobacco use includes chew. He reports that he drinks about 27.6 oz of alcohol per week. He reports that he does not use drugs.    Allergies: Reviewed    Home Medications:   Current Outpatient Medications on File Prior to Visit   Medication Sig Dispense Refill    aspirin 81 MG Chew Take 81 mg by mouth once daily.       No current facility-administered medications on file prior to visit.        Physical Exam:  /70   Pulse 61   Ht 6' (1.829 m)   Wt 65.6 kg (144 lb 10 oz)   BMI 19.61 kg/m²   Body mass index is 19.61 kg/m².  Physical Exam   Constitutional: He is oriented to person, place, and time and well-developed, well-nourished, and in no distress. No distress.   HENT:   Head: Normocephalic.   Eyes: Pupils are equal, round, and reactive to light. Conjunctivae are normal.   Cardiovascular: Normal rate, regular rhythm and normal heart sounds.   Pulmonary/Chest: Effort normal and breath sounds normal. No respiratory distress.   Abdominal: Soft. Bowel sounds are normal. He exhibits no distension. There is no tenderness.   Neurological: He is alert and oriented to person, place, and time. No cranial nerve deficit.   Skin: Skin is warm and dry. No rash noted.   Psychiatric: Mood and affect normal.       Labs: Pertinent labs reviewed.    Assessment:  1. Rectal bleeding    2. Change in stool     3. Melena    4. Family history of stomach cancer    5. Weight loss         Recommendations:  Rectal bleeding  Change in stool  Weight loss  - I have concerns for colon cancer given his acute change in bowel pattern, rectal bleeding, and weight loss.   - will plan for colonoscopy tomorrow. He did eat a lunch today. Discussed with endoscopy and appointment is for the afternoon. He will complete his prep early (0600) and will notify endoscopy lab if he is not clean (by 0900) and he will complete 1/2 of a miralax prep.   -     Case request GI: COLONOSCOPY, EGD (ESOPHAGOGASTRODUODENOSCOPY)  -     Discontinue: SUPREP BOWEL PREP KIT 17.5-3.13-1.6 gram SolR; Use as directed  Dispense: 354 mL; Refill: 0  -     X-Ray Abdomen Flat And Erect; Future; Expected date: 06/05/2019    Melena  Family history of stomach cancer  - will plan for EGD at time of colonoscopy   -     Case request GI: COLONOSCOPY, EGD (ESOPHAGOGASTRODUODENOSCOPY)  -     Discontinue: SUPREP BOWEL PREP KIT 17.5-3.13-1.6 gram SolR; Use as directed  Dispense: 354 mL; Refill: 0  -     X-Ray Abdomen Flat And Erect; Future; Expected date: 06/05/2019    Other orders  -     SUPREP BOWEL PREP KIT 17.5-3.13-1.6 gram SolR; Use as directed  Dispense: 354 mL; Refill: 1    Follow up to be determined after procedure.    Thank you so much for allowing me to participate in the care of KOFFI Sam

## 2019-06-05 NOTE — LETTER
June 5, 2019      Drake Elizalde MD  [address]  [place] [address]           [facility]  [address] [place] [address]  Phone: [phone]  Fax: [phone]          Patient: Sukhjinder Presley   MR Number: [id number]   YOB: 1965   Date of Visit: 6/5/2019       Dear Dr. Drake Elizalde:    Thank you for referring Sukhjinder Presley to me for evaluation. Attached you will find relevant portions of my assessment and plan of care.    If you have questions, please do not hesitate to call me. I look forward to following Sukhjinder Presley along with you.    Sincerely,    Eli De Los Santos, NP    Enclosure  CC:  No Recipients    If you would like to receive this communication electronically, please contact [email or web address] or [phone] to request more information on [employer] access.    For providers and/or their staff who would like to refer a patient to [facility], please contact us through our one-stop-shop provider referral line, Kody Alvarado, at [phone].    If you feel you have received this communication in error or would no longer like to receive these types of communications, please e-mail [email or web address]

## 2019-06-06 ENCOUNTER — HOSPITAL ENCOUNTER (OUTPATIENT)
Facility: HOSPITAL | Age: 54
Discharge: HOME | End: 2019-06-06
Attending: INTERNAL MEDICINE | Admitting: INTERNAL MEDICINE
Payer: COMMERCIAL

## 2019-06-06 ENCOUNTER — ANESTHESIA (OUTPATIENT)
Dept: ENDOSCOPY | Facility: HOSPITAL | Age: 54
End: 2019-06-06
Payer: COMMERCIAL

## 2019-06-06 ENCOUNTER — ANESTHESIA EVENT (OUTPATIENT)
Dept: ENDOSCOPY | Facility: HOSPITAL | Age: 54
End: 2019-06-06
Payer: COMMERCIAL

## 2019-06-06 VITALS
DIASTOLIC BLOOD PRESSURE: 75 MMHG | BODY MASS INDEX: 21.83 KG/M2 | HEIGHT: 72 IN | HEART RATE: 81 BPM | OXYGEN SATURATION: 100 % | WEIGHT: 161.19 LBS | SYSTOLIC BLOOD PRESSURE: 112 MMHG | RESPIRATION RATE: 18 BRPM | TEMPERATURE: 98 F

## 2019-06-06 DIAGNOSIS — K62.89 RECTAL MASS: Primary | ICD-10-CM

## 2019-06-06 DIAGNOSIS — K63.89 COLONIC MASS: Primary | ICD-10-CM

## 2019-06-06 DIAGNOSIS — N20.0 KIDNEY STONES: Primary | ICD-10-CM

## 2019-06-06 PROBLEM — K62.5 RECTAL BLEEDING: Status: ACTIVE | Noted: 2019-06-06

## 2019-06-06 PROCEDURE — 37000009 HC ANESTHESIA EA ADD 15 MINS: Performed by: INTERNAL MEDICINE

## 2019-06-06 PROCEDURE — 27201012 HC FORCEPS, HOT/COLD, DISP: Performed by: INTERNAL MEDICINE

## 2019-06-06 PROCEDURE — 43235 EGD DIAGNOSTIC BRUSH WASH: CPT | Performed by: INTERNAL MEDICINE

## 2019-06-06 PROCEDURE — 88305 TISSUE SPECIMEN TO PATHOLOGY - SURGERY: ICD-10-PCS | Mod: 26,,, | Performed by: PATHOLOGY

## 2019-06-06 PROCEDURE — 63600175 PHARM REV CODE 636 W HCPCS: Performed by: NURSE ANESTHETIST, CERTIFIED REGISTERED

## 2019-06-06 PROCEDURE — 43235 PR EGD, FLEX, DIAGNOSTIC: ICD-10-PCS | Mod: 51,,, | Performed by: INTERNAL MEDICINE

## 2019-06-06 PROCEDURE — 45385 COLONOSCOPY W/LESION REMOVAL: CPT | Performed by: INTERNAL MEDICINE

## 2019-06-06 PROCEDURE — 88305 TISSUE EXAM BY PATHOLOGIST: CPT | Performed by: PATHOLOGY

## 2019-06-06 PROCEDURE — 25000003 PHARM REV CODE 250: Performed by: INTERNAL MEDICINE

## 2019-06-06 PROCEDURE — 45380 PR COLONOSCOPY,BIOPSY: ICD-10-PCS | Mod: 59,,, | Performed by: INTERNAL MEDICINE

## 2019-06-06 PROCEDURE — 43235 EGD DIAGNOSTIC BRUSH WASH: CPT | Mod: 51,,, | Performed by: INTERNAL MEDICINE

## 2019-06-06 PROCEDURE — 45381 COLONOSCOPY SUBMUCOUS NJX: CPT | Mod: 51,,, | Performed by: INTERNAL MEDICINE

## 2019-06-06 PROCEDURE — 45385 PR COLONOSCOPY,REMV LESN,SNARE: ICD-10-PCS | Mod: 33,,, | Performed by: INTERNAL MEDICINE

## 2019-06-06 PROCEDURE — 45381 COLONOSCOPY SUBMUCOUS NJX: CPT | Performed by: INTERNAL MEDICINE

## 2019-06-06 PROCEDURE — 45385 COLONOSCOPY W/LESION REMOVAL: CPT | Mod: 33,,, | Performed by: INTERNAL MEDICINE

## 2019-06-06 PROCEDURE — 37000008 HC ANESTHESIA 1ST 15 MINUTES: Performed by: INTERNAL MEDICINE

## 2019-06-06 PROCEDURE — 45380 COLONOSCOPY AND BIOPSY: CPT | Mod: 59,,, | Performed by: INTERNAL MEDICINE

## 2019-06-06 PROCEDURE — 27201089 HC SNARE, DISP (ANY): Performed by: INTERNAL MEDICINE

## 2019-06-06 PROCEDURE — 45381 PR COLONOSCPY,FLEX,W/DIR SUBMUC INJECT: ICD-10-PCS | Mod: 51,,, | Performed by: INTERNAL MEDICINE

## 2019-06-06 PROCEDURE — 25000003 PHARM REV CODE 250: Performed by: NURSE ANESTHETIST, CERTIFIED REGISTERED

## 2019-06-06 PROCEDURE — 88305 TISSUE EXAM BY PATHOLOGIST: CPT | Mod: 26,,, | Performed by: PATHOLOGY

## 2019-06-06 PROCEDURE — 45380 COLONOSCOPY AND BIOPSY: CPT | Performed by: INTERNAL MEDICINE

## 2019-06-06 RX ORDER — SODIUM CHLORIDE, SODIUM LACTATE, POTASSIUM CHLORIDE, CALCIUM CHLORIDE 600; 310; 30; 20 MG/100ML; MG/100ML; MG/100ML; MG/100ML
INJECTION, SOLUTION INTRAVENOUS CONTINUOUS
Status: DISCONTINUED | OUTPATIENT
Start: 2019-06-06 | End: 2019-06-06 | Stop reason: HOSPADM

## 2019-06-06 RX ORDER — PROPOFOL 10 MG/ML
VIAL (ML) INTRAVENOUS
Status: DISCONTINUED | OUTPATIENT
Start: 2019-06-06 | End: 2019-06-06

## 2019-06-06 RX ORDER — SODIUM CHLORIDE, SODIUM LACTATE, POTASSIUM CHLORIDE, CALCIUM CHLORIDE 600; 310; 30; 20 MG/100ML; MG/100ML; MG/100ML; MG/100ML
INJECTION, SOLUTION INTRAVENOUS CONTINUOUS PRN
Status: DISCONTINUED | OUTPATIENT
Start: 2019-06-06 | End: 2019-06-06

## 2019-06-06 RX ORDER — LIDOCAINE HYDROCHLORIDE 20 MG/ML
INJECTION, SOLUTION EPIDURAL; INFILTRATION; INTRACAUDAL; PERINEURAL
Status: DISCONTINUED | OUTPATIENT
Start: 2019-06-06 | End: 2019-06-06

## 2019-06-06 RX ADMIN — SODIUM CHLORIDE, SODIUM LACTATE, POTASSIUM CHLORIDE, AND CALCIUM CHLORIDE: .6; .31; .03; .02 INJECTION, SOLUTION INTRAVENOUS at 02:06

## 2019-06-06 RX ADMIN — PROPOFOL 40 MG: 10 INJECTION, EMULSION INTRAVENOUS at 03:06

## 2019-06-06 RX ADMIN — LIDOCAINE HYDROCHLORIDE 100 MG: 20 INJECTION, SOLUTION EPIDURAL; INFILTRATION; INTRACAUDAL; PERINEURAL at 02:06

## 2019-06-06 RX ADMIN — PROPOFOL 40 MG: 10 INJECTION, EMULSION INTRAVENOUS at 02:06

## 2019-06-06 RX ADMIN — PROPOFOL 70 MG: 10 INJECTION, EMULSION INTRAVENOUS at 02:06

## 2019-06-06 RX ADMIN — PROPOFOL 30 MG: 10 INJECTION, EMULSION INTRAVENOUS at 02:06

## 2019-06-06 RX ADMIN — PROPOFOL 50 MG: 10 INJECTION, EMULSION INTRAVENOUS at 02:06

## 2019-06-06 NOTE — TRANSFER OF CARE
Anesthesia Transfer of Care Note    Patient: Sukhjinder Presley    Procedure(s) Performed: Procedure(s) (LRB):  COLONOSCOPY (N/A)  EGD (ESOPHAGOGASTRODUODENOSCOPY) (N/A)    Patient location: GI    Anesthesia Type: MAC    Transport from OR: Transported from OR on room air with adequate spontaneous ventilation    Post pain: adequate analgesia    Post assessment: no apparent anesthetic complications and tolerated procedure well    Post vital signs: stable    Level of consciousness: awake, alert and oriented    Nausea/Vomiting: no nausea/vomiting    Complications: none    Transfer of care protocol was followed      Last vitals:   Visit Vitals  /71 (BP Location: Left arm, Patient Position: Lying)   Pulse 85   Temp 36.7 °C (98 °F) (Temporal)   Resp 18   Ht 6' (1.829 m)   Wt 73.1 kg (161 lb 2.5 oz)   SpO2 97%   BMI 21.86 kg/m²

## 2019-06-06 NOTE — [employer] PATIENT INSTRUCTIONS
Discharge Summary/Instructions after an Endoscopic Procedure  Patient Name: Sukhjinder Presley  Patient MRN: [id number]  Patient YOB: 1965 Thursday, June 06, 2019 Anjelica Saavedra MD  RESTRICTIONS:  During your procedure today, you received medications for sedation.  These   medications may affect your judgment, balance and coordination.  Therefore,   for 24 hours, you have the following restrictions:   - DO NOT drive a car, operate machinery, make legal/financial decisions,   sign important papers or drink alcohol.    ACTIVITY:  Today: no heavy lifting, straining or running due to procedural   sedation/anesthesia.  The following day: return to full activity including work.  DIET:  Eat and drink normally unless instructed otherwise.     TREATMENT FOR COMMON SIDE EFFECTS:  - Mild abdominal pain, nausea, belching, bloating or excessive gas:  rest,   eat lightly and use a heating pad.  - Sore Throat: treat with throat lozenges and/or gargle with warm salt   water.  - Because air was used during the procedure, expelling large amounts of air   from your rectum or belching is normal.  - If a bowel prep was taken, you may not have a bowel movement for 1-3 days.    This is normal.  SYMPTOMS TO WATCH FOR AND REPORT TO YOUR PHYSICIAN:  1. Abdominal pain or bloating, other than gas cramps.  2. Chest pain.  3. Back pain.  4. Signs of infection such as: chills or fever occurring within 24 hours   after the procedure.  5. Rectal bleeding, which would show as bright red, maroon, or black stools.   (A tablespoon of blood from the rectum is not serious, especially if   hemorrhoids are present.)  6. Vomiting.  7. Weakness or dizziness.  GO DIRECTLY TO THE NEAREST EMERGENCY ROOM IF YOU HAVE ANY OF THE FOLLOWING:      Difficulty breathing              Chills and/or fever over 101 F   Persistent vomiting and/or vomiting blood   Severe abdominal pain   Severe chest pain   Black, tarry stools   Bleeding- more than one  tablespoon   Any other symptom or condition that you feel may need urgent attention  Your doctor recommends these additional instructions:  If any biopsies were taken, your doctors clinic will contact you in 1 to 2   weeks with any results.  - Patient has a contact number available for emergencies.  The signs and   symptoms of potential delayed complications were discussed with the   patient.  Return to normal activities tomorrow.  Written discharge   instructions were provided to the patient.   - Discharge patient to home (via wheelchair).   - Resume previous diet today.   - Continue present medications.   - Await pathology results.   - Refer to a colo-rectal surgeon in 1 week.   - Repeat colonoscopy is recommended.  The colonoscopy date will be   determined after pathology results from today's exam become available for   review.  For questions, problems or results please call your physician Anjelica Saavedra MD at Work:  [phone]  If you have any questions about the above instructions, call the GI   department at [phone] or call the endoscopy unit at [phone]   from 7am until 3 pm.  [facility], EMERGENCY ROOM PHONE NUMBER:   [phone]  IF A COMPLICATION OR EMERGENCY SITUATION ARISES AND YOU ARE UNABLE TO REACH   YOUR PHYSICIAN - GO DIRECTLY TO THE EMERGENCY ROOM.  I have read or have had read to me these discharge instructions for my   procedure and have received a written copy.  I understand these   instructions and will follow-up with my physician if I have any questions.     __________________________________       _____________________________________  Nurse Signature                                          Patient/Designated   Responsible Party Signature  MD Anjelica Pérez MD  6/6/2019 3:36:14 PM  This report has been verified and signed electronically.  [employer]

## 2019-06-06 NOTE — ANESTHESIA POSTPROCEDURE EVALUATION
Anesthesia Post Evaluation    Patient: Sukhjinder Presley    Procedure(s) Performed: Procedure(s) (LRB):  COLONOSCOPY (N/A)  EGD (ESOPHAGOGASTRODUODENOSCOPY) (N/A)    Final Anesthesia Type: MAC  Patient location during evaluation: GI PACU  Patient participation: Yes- Able to Participate  Level of consciousness: awake and alert and oriented  Post-procedure vital signs: reviewed and stable  Pain management: adequate  Airway patency: patent  PONV status at discharge: No PONV  Anesthetic complications: no      Cardiovascular status: hemodynamically stable  Respiratory status: unassisted, spontaneous ventilation and room air  Hydration status: euvolemic  Follow-up not needed.          Vitals Value Taken Time   /71 6/6/2019  3:28 PM   Temp 36.7 °C (98 °F) 6/6/2019  3:28 PM   Pulse 85 6/6/2019  3:28 PM   Resp 18 6/6/2019  3:28 PM   SpO2 97 % 6/6/2019  3:28 PM         No case tracking events are documented in the log.      Pain/Stacy Score: Stacy Score: 8 (6/6/2019  3:28 PM)

## 2019-06-06 NOTE — DISCHARGE INSTRUCTIONS
Understanding Colon and Rectal Polyps    The colon (also called the large intestine) is a muscular tube that forms the last part of the digestive tract. It absorbs water and stores food waste. The colon is about 4 to 6 feet long. The rectum is the last 6 inches of the colon. The colon and rectum have a smooth lining composed of millions of cells. Changes in these cells can lead to growths in the colon that can become cancerous and should be removed. Multiple tests are available to screen for colon cancer, but the colonoscopy is the most recommended test. During colonoscopy, these polyps can be removed. How often you need this test depends on many things including your condition, your family history, symptoms, and what the findings were at the previous colonoscopy.   When the colon lining changes  Changes that happen in the cells that line the colon or rectum can lead to growths called polyps. Over a period of years, polyps can turn cancerous. Removing polyps early may prevent cancer from ever forming.  Polyps  Polyps are fleshy clumps of tissue that form on the lining of the colon or rectum. Small polyps are usually benign (not cancerous). However, over time, cells in a polyp can change and become cancerous. Certain types of polyps known as adenomatous polyps are premalignant. The risk for invasive cancer increases with the size of the polyp and certain cell and gene features. This means that they can become cancerous if they're not removed. Hyperplastic polyps are benign. They can grow quite large and not turn cancerous.   Cancer  Almost all colorectal cancers start when polyp cells begin growing abnormally. As a cancerous tumor grows, it may involve more and more of the colon or rectum. In time, cancer can also grow beyond the colon or rectum and spread to nearby organs or to glands called lymph nodes. The cells can also travel to other parts of the body. This is known as metastasis. The earlier a cancerous  tumor is removed, the better the chance of preventing its spread.    Date Last Reviewed: 8/1/2016  © 6464-3974 [employer]. [address] [place] [address], [place] [address]. All rights reserved. This information is not intended as a substitute for professional medical care. Always follow your healthcare professional's instructions.        Upper GI Endoscopy     During endoscopy, a long, flexible tube is used to view the inside of your upper GI tract.      Upper GI endoscopy allows your healthcare provider to look directly into the beginning of your gastrointestinal (GI) tract. The esophagus, stomach, and duodenum (the first part of the small intestine) make up the upper GI tract.   Before the exam  Follow these and any other instructions you are given before your endoscopy. If you dont follow the healthcare providers instructions carefully, the test may need to be canceled or done over:  · Don't eat or drink anything after midnight the night before your exam. If your exam is in the afternoon, drink only clear liquids in the morning. Don't eat or drink anything for 8 hours before the exam. In some cases, you may be able to take medicines with sips of water until 2 hours before the procedure. Speak with your healthcare provider about this.   · Bring your X-rays and any other test results you have.  · Because you will be sedated, arrange for an adult to drive you home after the exam.  · Tell your healthcare provider before the exam if you are taking any medicines or have any medical problems.  The procedure  Here is what to expect:  · You will lie on the endoscopy table. Usually patients lie on the left side.  · You will be monitored and given oxygen.  · Your throat may be numbed with a spray or gargle. You are given medicine through an intravenous (IV) line that will help you relax and remain comfortable. You may be awake or asleep during the procedure.  · The healthcare provider will put the endoscope in  your mouth and down your esophagus. It is thinner than most pieces of food that you swallow. It will not affect your breathing. The medicine helps keep you from gagging.  · Air is put into your GI tract to expand it. It can make you burp.  · During the procedure, the healthcare provider can take biopsies (tissue samples), remove abnormalities, such as polyps, or treat abnormalities through a variety of devices placed through the endoscope. You will not feel this.   · The endoscope carries images of your upper GI tract to a video screen. If you are awake, you may be able to look at the images.  · After the procedure is done, you will rest for a time. An adult must drive you home.  When to call your healthcare provider  Contact your healthcare provider if you have:  · Black or tarry stools, or blood in your stool  · Fever  · Pain in your belly that does not go away  · Nausea and vomiting, or vomiting blood   Date Last Reviewed: 7/1/2016  © 1408-8224 [employer]. [address] [place] [address], [place] [address]. All rights reserved. This information is not intended as a substitute for professional medical care. Always follow your healthcare professional's instructions.

## 2019-06-06 NOTE — DISCHARGE SUMMARY
Endoscopy Discharge Summary      Admit Date: 6/6/2019    Discharge Date and Time:  6/6/2019 3:40 PM    Attending Physician: Anjelica Saavedra MD     Discharge Physician: Anjelica Saavedra MD     Principal Admitting Diagnoses: Rectal bleeding         Discharge Diagnosis: The encounter diagnosis was Colonic mass.     Discharged Condition: Good    Indication for Admission: Rectal bleeding     Hospital Course: Patient was admitted for an inpatient procedure and tolerated the procedure well with no complications.    Significant Diagnostic Studies: Colonoscopy with biopsy    Pathology (if any):  Specimen (12h ago, onward)    Start     Ordered    06/06/19 1457  Specimen to Pathology - Surgery  Once     Comments:  1. Ascending Colon Polyp2. Rectal Mass Biopsies     Start Status     06/06/19 1457 Collected (06/06/19 1524) Order ID: [id number]       06/06/19 1524          Estimated Blood Loss: 0 ml.    Discussed with: patient.    Disposition: Home.    Follow Up/Patient Instructions:   Current Discharge Medication List      CONTINUE these medications which have NOT CHANGED    Details   aspirin 81 MG Chew Take 81 mg by mouth once daily.             Discharge Procedure Orders   Diet general     Call MD for:  temperature >100.4     Call MD for:  persistent nausea and vomiting     Call MD for:  severe uncontrolled pain     Call MD for:  difficulty breathing, headache or visual disturbances     Activity as tolerated       Follow-up Information     Anjelica Saavedra MD. Call in 1 week.    Specialty:  Gastroenterology  Why:  For pathology results  Contact information:  [id number]  [place] [id number] [phone]             Jan New MD. Schedule an appointment as soon as possible for a visit in 1 week.    Specialty:  Colon and Rectal Surgery  Contact information:  [address] [place] [address] [address] [place] [id number] [phone]

## 2019-06-06 NOTE — H&P
PRE PROCEDURE H&P    Patient Name: Sukhjinder Presley  MRN: [id number]  : 1965  Date of Procedure:  2019  Referring Physician: Eli De Los Santos NP  Primary Physician: Drake Elizalde MD  Procedure Physician: Anjelica Saavedra MD       Planned Procedure: Colonoscopy and EGD  Diagnosis: anemia, rectal bleeding  Chief Complaint: Same as above    HPI: Patient is an 53 y.o. male is here for the above.     Last colonoscopy: no prior   Family history: negative   Anticoagulation: none     Past Medical History:   Past Medical History:   Diagnosis Date    Anemia         Past Surgical History:  Past Surgical History:   Procedure Laterality Date    HERNIA REPAIR      TONSILLECTOMY          Home Medications:  Prior to Admission medications    Medication Sig Start Date End Date Taking? Authorizing Provider   SUPREP BOWEL PREP KIT 17.5-3.13-1.6 gram SolR Use as directed 19 Yes Eli De Los Santos NP   aspirin 81 MG Chew Take 81 mg by mouth once daily.    Historical Provider, MD        Allergies:  Review of patient's allergies indicates:  No Known Allergies     Social History:   Social History     Socioeconomic History    Marital status: Significant Other     Spouse name: Not on file    Number of children: Not on file    Years of education: Not on file    Highest education level: Not on file   Occupational History    Not on file   Social Needs    Financial resource strain: Somewhat hard    Food insecurity:     Worry: Sometimes true     Inability: Sometimes true    Transportation needs:     Medical: No     Non-medical: No   Tobacco Use    Smoking status: Current Every Day Smoker     Packs/day: 1.00     Years: 35.00     Pack years: 35.00     Types: Cigarettes     Start date: 1984    Smokeless tobacco: Current User     Types: Chew   Substance and Sexual Activity    Alcohol use: Yes     Alcohol/week: 27.6 oz     Types: 42 Cans of beer, 4 Shots of liquor per week     Frequency: 4 or more times a week      Drinks per session: 5 or 6     Binge frequency: Daily or almost daily     Comment: nancie Virgen    Drug use: Never    Sexual activity: Yes     Partners: Female     Birth control/protection: None   Lifestyle    Physical activity:     Days per week: 1 day     Minutes per session: 60 min    Stress: Not on file   Relationships    Social connections:     Talks on phone: More than three times a week     Gets together: More than three times a week     Attends Religion service: Not on file     Active member of club or organization: No     Attends meetings of clubs or organizations: Never     Relationship status: Living with partner   Other Topics Concern    Not on file   Social History Narrative    Not on file       Family History:  Family History   Problem Relation Age of Onset    Intestinal malrotation Mother     Leukemia Father     No Known Problems Sister     Coronary artery disease Brother     Coronary artery disease Maternal Grandmother     Stomach cancer Maternal Grandfather        ROS: No acute cardiac events, no acute respiratory complaints.     Physical Exam (all patients):    /81 (BP Location: Left arm, Patient Position: Lying)   Pulse 95   Temp 98.8 °F (37.1 °C) (Temporal)   Resp 18   Ht 6' (1.829 m)   Wt 73.1 kg (161 lb 2.5 oz)   SpO2 99%   BMI 21.86 kg/m²   Lungs: Clear to auscultation bilaterally, respirations unlabored  Heart: Regular rate and rhythm, S1 and S2 normal, no obvious murmurs  Abdomen:         Soft, non-tender, bowel sounds normal, no masses, no organomegaly    Lab Results   Component Value Date    WBC 10.25 05/06/2019    MCV 85 05/06/2019    RDW 13.4 05/06/2019     05/06/2019     05/06/2019    BUN 13 05/06/2019     05/06/2019    K 4.3 05/06/2019     05/06/2019        SEDATION PLAN: per anesthesia      History reviewed, vital signs satisfactory, cardiopulmonary status satisfactory, sedation options, risks and plans have been discussed with the  patient  All their questions were answered and the patient agrees to the sedation procedures as planned and the patient is deemed an appropriate candidate for the sedation as planned.    Procedure explained to patient, informed consent obtained and placed in chart.    Anjelica Saavedra  6/6/2019  2:21 PM

## 2019-06-06 NOTE — [employer] PATIENT INSTRUCTIONS
Discharge Summary/Instructions after an Endoscopic Procedure  Patient Name: Sukhjinder Presley  Patient MRN: [id number]  Patient YOB: 1965 Thursday, June 06, 2019 Anjelica Saavedra MD  RESTRICTIONS:  During your procedure today, you received medications for sedation.  These   medications may affect your judgment, balance and coordination.  Therefore,   for 24 hours, you have the following restrictions:   - DO NOT drive a car, operate machinery, make legal/financial decisions,   sign important papers or drink alcohol.    ACTIVITY:  Today: no heavy lifting, straining or running due to procedural   sedation/anesthesia.  The following day: return to full activity including work.  DIET:  Eat and drink normally unless instructed otherwise.     TREATMENT FOR COMMON SIDE EFFECTS:  - Mild abdominal pain, nausea, belching, bloating or excessive gas:  rest,   eat lightly and use a heating pad.  - Sore Throat: treat with throat lozenges and/or gargle with warm salt   water.  - Because air was used during the procedure, expelling large amounts of air   from your rectum or belching is normal.  - If a bowel prep was taken, you may not have a bowel movement for 1-3 days.    This is normal.  SYMPTOMS TO WATCH FOR AND REPORT TO YOUR PHYSICIAN:  1. Abdominal pain or bloating, other than gas cramps.  2. Chest pain.  3. Back pain.  4. Signs of infection such as: chills or fever occurring within 24 hours   after the procedure.  5. Rectal bleeding, which would show as bright red, maroon, or black stools.   (A tablespoon of blood from the rectum is not serious, especially if   hemorrhoids are present.)  6. Vomiting.  7. Weakness or dizziness.  GO DIRECTLY TO THE NEAREST EMERGENCY ROOM IF YOU HAVE ANY OF THE FOLLOWING:      Difficulty breathing              Chills and/or fever over 101 F   Persistent vomiting and/or vomiting blood   Severe abdominal pain   Severe chest pain   Black, tarry stools   Bleeding- more than one  tablespoon   Any other symptom or condition that you feel may need urgent attention  Your doctor recommends these additional instructions:  If any biopsies were taken, your doctors clinic will contact you in 1 to 2   weeks with any results.  - Patient has a contact number available for emergencies.  The signs and   symptoms of potential delayed complications were discussed with the   patient.  Return to normal activities tomorrow.  Written discharge   instructions were provided to the patient.   - Discharge patient to home (via wheelchair).   - Resume previous diet today.   - Continue present medications.  For questions, problems or results please call your physician Anjelica Saavedra MD at Work:  [phone]  If you have any questions about the above instructions, call the GI   department at [phone] or call the endoscopy unit at [phone]   from 7am until 3 pm.  [facility] [place], EMERGENCY ROOM PHONE NUMBER:   [phone]  IF A COMPLICATION OR EMERGENCY SITUATION ARISES AND YOU ARE UNABLE TO REACH   YOUR PHYSICIAN - GO DIRECTLY TO THE EMERGENCY ROOM.  I have read or have had read to me these discharge instructions for my   procedure and have received a written copy.  I understand these   instructions and will follow-up with my physician if I have any questions.     __________________________________       _____________________________________  Nurse Signature                                          Patient/Designated   Responsible Party Signature  MD Anjelica Pérez MD  6/6/2019 3:38:27 PM  This report has been verified and signed electronically.  [employer]

## 2019-06-06 NOTE — ANESTHESIA PREPROCEDURE EVALUATION
06/06/2019  Sukhjinder Presley is a 53 y.o., male.    Anesthesia Evaluation    I have reviewed the Patient Summary Reports.    I have reviewed the Nursing Notes.   I have reviewed the Medications.     Review of Systems  Anesthesia Hx:  No problems with previous Anesthesia  Denies Family Hx of Anesthesia complications.   Denies Personal Hx of Anesthesia complications.   Social:  Smoker, Alcohol Use 1ppd x 30+yrs   Hematology/Oncology:     Oncology Normal    -- Anemia:   Cardiovascular:   Denies Hypertension.  Denies MI.   Denies CABG/stent.         Pulmonary:   Denies COPD.  Denies Asthma.  Denies Sleep Apnea.    Renal/:  Renal/ Normal     Hepatic/GI:   Bowel Prep. Denies GERD. Denies Liver Disease.  Denies Hepatitis.    Musculoskeletal:  Musculoskeletal Normal    Neurological:   Denies CVA. Denies Seizures.    Endocrine:  Endocrine Normal        Physical Exam  General:  Well nourished    Airway/Jaw/Neck:  Airway Findings: Mouth Opening: Normal Tongue: Normal  General Airway Assessment: Adult  Mallampati: II      Dental:  Dental Findings: In tact   Chest/Lungs:  Chest/Lungs Findings: Clear to auscultation, Normal Respiratory Rate     Heart/Vascular:  Heart Findings: Rate: Normal  Rhythm: Regular Rhythm  Sounds: Normal             Anesthesia Plan  Type of Anesthesia, risks & benefits discussed:  Anesthesia Type:  MAC  Patient's Preference:   Intra-op Monitoring Plan: standard ASA monitors  Intra-op Monitoring Plan Comments:   Post Op Pain Control Plan:   Post Op Pain Control Plan Comments:   Induction:   IV  Beta Blocker:  Patient is not currently on a Beta-Blocker (No further documentation required).       Informed Consent: Patient understands risks and agrees with Anesthesia plan.  Questions answered. Anesthesia consent signed with patient.  ASA Score: 2     Day of Surgery Review of History & Physical: I have  interviewed and examined the patient. I have reviewed the patient's H&P dated:  There are no significant changes.          Ready For Surgery From Anesthesia Perspective.

## 2019-06-06 NOTE — ANESTHESIA RELEASE NOTE
Anesthesia Release from PACU Note    Patient: Sukhjinder Presley    Procedure(s) Performed: Procedure(s) (LRB):  COLONOSCOPY (N/A)  EGD (ESOPHAGOGASTRODUODENOSCOPY) (N/A)    Anesthesia type: MAC    Post pain: Adequate analgesia    Post assessment: no apparent anesthetic complications and tolerated procedure well    Last Vitals:   Visit Vitals  /71 (BP Location: Left arm, Patient Position: Lying)   Pulse 85   Temp 36.7 °C (98 °F) (Temporal)   Resp 18   Ht 6' (1.829 m)   Wt 73.1 kg (161 lb 2.5 oz)   SpO2 97%   BMI 21.86 kg/m²       Post vital signs: stable    Level of consciousness: awake, alert  and oriented    Nausea/Vomiting: no nausea/no vomiting    Complications: none    Airway Patency: patent    Respiratory: unassisted, spontaneous ventilation, room air    Cardiovascular: stable and blood pressure at baseline    Hydration: euvolemic

## 2019-06-07 ENCOUNTER — PATIENT MESSAGE (OUTPATIENT)
Dept: GASTROENTEROLOGY | Facility: CLINIC | Age: 54
End: 2019-06-07

## 2019-06-10 ENCOUNTER — TELEPHONE (OUTPATIENT)
Dept: GASTROENTEROLOGY | Facility: CLINIC | Age: 54
End: 2019-06-10

## 2019-06-11 ENCOUNTER — OFFICE VISIT (OUTPATIENT)
Dept: SURGERY | Facility: CLINIC | Age: 54
End: 2019-06-11
Payer: COMMERCIAL

## 2019-06-11 ENCOUNTER — LAB VISIT (OUTPATIENT)
Dept: LAB | Facility: HOSPITAL | Age: 54
End: 2019-06-11
Attending: COLON & RECTAL SURGERY
Payer: COMMERCIAL

## 2019-06-11 ENCOUNTER — TELEPHONE (OUTPATIENT)
Dept: INTERNAL MEDICINE | Facility: CLINIC | Age: 54
End: 2019-06-11

## 2019-06-11 VITALS
SYSTOLIC BLOOD PRESSURE: 112 MMHG | WEIGHT: 165.13 LBS | DIASTOLIC BLOOD PRESSURE: 70 MMHG | TEMPERATURE: 99 F | HEART RATE: 96 BPM | BODY MASS INDEX: 22.39 KG/M2

## 2019-06-11 DIAGNOSIS — F17.200 SMOKER: ICD-10-CM

## 2019-06-11 DIAGNOSIS — K62.5 RECTAL BLEEDING: ICD-10-CM

## 2019-06-11 DIAGNOSIS — K62.89 RECTAL MASS: ICD-10-CM

## 2019-06-11 DIAGNOSIS — C20 RECTAL ADENOCARCINOMA: Primary | ICD-10-CM

## 2019-06-11 DIAGNOSIS — C20 RECTAL ADENOCARCINOMA: ICD-10-CM

## 2019-06-11 LAB
BASOPHILS # BLD AUTO: 0.05 K/UL (ref 0–0.2)
BASOPHILS NFR BLD: 0.4 % (ref 0–1.9)
DIFFERENTIAL METHOD: ABNORMAL
EOSINOPHIL # BLD AUTO: 0.7 K/UL (ref 0–0.5)
EOSINOPHIL NFR BLD: 5.7 % (ref 0–8)
ERYTHROCYTE [DISTWIDTH] IN BLOOD BY AUTOMATED COUNT: 14 % (ref 11.5–14.5)
HCT VFR BLD AUTO: 41.9 % (ref 40–54)
HGB BLD-MCNC: 13.1 G/DL (ref 14–18)
INR PPP: 1 (ref 0.8–1.2)
LYMPHOCYTES # BLD AUTO: 4.5 K/UL (ref 1–4.8)
LYMPHOCYTES NFR BLD: 38 % (ref 18–48)
MCH RBC QN AUTO: 26.5 PG (ref 27–31)
MCHC RBC AUTO-ENTMCNC: 31.3 G/DL (ref 32–36)
MCV RBC AUTO: 85 FL (ref 82–98)
MONOCYTES # BLD AUTO: 0.8 K/UL (ref 0.3–1)
MONOCYTES NFR BLD: 6.7 % (ref 4–15)
NEUTROPHILS # BLD AUTO: 5.8 K/UL (ref 1.8–7.7)
NEUTROPHILS NFR BLD: 49.2 % (ref 38–73)
PLATELET # BLD AUTO: 461 K/UL (ref 150–350)
PMV BLD AUTO: 8.6 FL (ref 9.2–12.9)
PROTHROMBIN TIME: 10.2 SEC (ref 9–12.5)
RBC # BLD AUTO: 4.95 M/UL (ref 4.6–6.2)
WBC # BLD AUTO: 11.88 K/UL (ref 3.9–12.7)

## 2019-06-11 PROCEDURE — 45300 PROCTOSIGMOIDOSCOPY DX: CPT | Mod: S$GLB,,, | Performed by: COLON & RECTAL SURGERY

## 2019-06-11 PROCEDURE — 99245 OFF/OP CONSLTJ NEW/EST HI 55: CPT | Mod: 25,S$GLB,, | Performed by: COLON & RECTAL SURGERY

## 2019-06-11 PROCEDURE — 45300 PR PROCTOSIGMOIDOSCOPY,RIGID,DIAG2S: ICD-10-PCS | Mod: S$GLB,,, | Performed by: COLON & RECTAL SURGERY

## 2019-06-11 PROCEDURE — 99999 PR PBB SHADOW E&M-EST. PATIENT-LVL III: ICD-10-PCS | Mod: PBBFAC,,, | Performed by: COLON & RECTAL SURGERY

## 2019-06-11 PROCEDURE — 36415 COLL VENOUS BLD VENIPUNCTURE: CPT

## 2019-06-11 PROCEDURE — 99245 PR OFFICE CONSULTATION,LEVEL V: ICD-10-PCS | Mod: 25,S$GLB,, | Performed by: COLON & RECTAL SURGERY

## 2019-06-11 PROCEDURE — 99999 PR PBB SHADOW E&M-EST. PATIENT-LVL III: CPT | Mod: PBBFAC,,, | Performed by: COLON & RECTAL SURGERY

## 2019-06-11 PROCEDURE — 80048 BASIC METABOLIC PNL TOTAL CA: CPT

## 2019-06-11 PROCEDURE — 85025 COMPLETE CBC W/AUTO DIFF WBC: CPT

## 2019-06-11 PROCEDURE — 82378 CARCINOEMBRYONIC ANTIGEN: CPT

## 2019-06-11 PROCEDURE — 85610 PROTHROMBIN TIME: CPT

## 2019-06-11 NOTE — PROGRESS NOTES
History & Physical    SUBJECTIVE:     Chief Complaint   Patient presents with    Consult     Rectal Mass    CC: rectal adenocarcinoma  Ref: Anjelica Saavedra MD    History of Present Illness:  Patient is a 53 y.o. male presents for evaluation of rectal cancer.  Patient reports he has had increasing pelvic/rectal pain along with decreased bowel movements over the last 6 weeks.  Reports an uncomfortable feeling in his pelvis associated with mucous discharge from his rectum as well as bloody bowel movements that are thin and less than normal. He reports associated chills, fatigue and 16 lb weight loss over the past 5 months.  He has also noticed a decrease in appetite as well. He underwent a colonoscopy on 06/06/2019 where a nonobstructing rectal mass was found with biopsy showing well-differentiated adenocarcinoma.  He was then referred to [facility] for evaluation.  He has no family history of colorectal cancer or IBD.    PMHx:  Nephrolithiasis  PSHx:  Left inguinal hernia repair, lithotripsy  All: NKDA  FamHx: negative for CRC or IBD; +gastric cancer in maternal grandfather  SocHx: +tobacco (1ppd); +etoh (6 beers/day); no illicits      Review of patient's allergies indicates:  No Known Allergies    Current Outpatient Medications   Medication Sig Dispense Refill    aspirin 81 MG Chew Take 81 mg by mouth once daily.       No current facility-administered medications for this visit.        Past Medical History:   Diagnosis Date    Anemia      Past Surgical History:   Procedure Laterality Date    COLONOSCOPY N/A 6/6/2019    [facility] at [facility]    EGD (ESOPHAGOGASTRODUODENOSCOPY) N/A 6/6/2019    Per[facility] by [facility] at [facility] ENDO    HERNIA REPAIR  1995    TONSILLECTOMY       Family History   Problem Relation Age of Onset    Intestinal malrotation Mother     Leukemia Father     No Known Problems Sister     Coronary artery disease Brother     Coronary artery  disease Maternal Grandmother     Stomach cancer Maternal Grandfather      Social History     Tobacco Use    Smoking status: Current Every Day Smoker     Packs/day: 1.00     Years: 35.00     Pack years: 35.00     Types: Cigarettes     Start date: 1/2/1984    Smokeless tobacco: Current User     Types: Chew   Substance Use Topics    Alcohol use: Yes     Alcohol/week: 27.6 oz     Types: 42 Cans of beer, 4 Shots of liquor per week     Frequency: 4 or more times a week     Drinks per session: 5 or 6     Binge frequency: Daily or almost daily     Comment: nancie Virgen    Drug use: Never        Review of Systems:  Review of Systems   Constitutional: Positive for appetite change, chills, fatigue and unexpected weight change. Negative for activity change and fever.   HENT: Negative for congestion, ear pain, sore throat and trouble swallowing.    Eyes: Negative for pain, redness and itching.   Respiratory: Negative for cough, shortness of breath and wheezing.    Cardiovascular: Negative for chest pain, palpitations and leg swelling.   Gastrointestinal: Positive for anal bleeding, blood in stool, constipation and rectal pain. Negative for abdominal distention, abdominal pain and diarrhea.   Endocrine: Negative for cold intolerance, heat intolerance and polyuria.   Genitourinary: Negative for dysuria, flank pain, frequency and hematuria.   Musculoskeletal: Negative for gait problem, joint swelling and neck pain.   Skin: Negative for color change, rash and wound.   Allergic/Immunologic: Negative for environmental allergies and immunocompromised state.   Neurological: Negative for dizziness, speech difficulty, weakness and numbness.   Psychiatric/Behavioral: Negative for agitation, confusion and hallucinations.       OBJECTIVE:     Vital Signs (Most Recent)  Temp: 98.9 °F (37.2 °C) (06/11/19 1259)  Pulse: 96 (06/11/19 1259)  BP: 112/70 (06/11/19 1259)     74.9 kg (165 lb 2 oz)     Physical Exam:  Physical Exam   Constitutional:  He is oriented to person, place, and time. He appears well-developed.   HENT:   Head: Normocephalic and atraumatic.   Eyes: Conjunctivae and EOM are normal.   Neck: Normal range of motion. No thyromegaly present.   Cardiovascular: Normal rate and regular rhythm.   Pulmonary/Chest: Effort normal. No respiratory distress.   Abdominal: Soft. He exhibits no distension and no mass. There is no tenderness.   Genitourinary:   Genitourinary Comments: Anorectal: +right anterior external hemorrhoid which is enlarged and nonthrombosed; ELOISA with good tone, no blood, no palpable lesions/masses   Musculoskeletal: Normal range of motion. He exhibits no edema or tenderness.   Neurological: He is alert and oriented to person, place, and time.   Skin: Skin is warm and dry. Capillary refill takes less than 2 seconds. No rash noted.   Psychiatric: He has a normal mood and affect.     Proctoscopy Procedure Note    Pre-procedure diagnosis: Rectal adenocarcinoma    Post-procedure diagnosis: Rectal adenocarcinoma    Procedure:  Proctoscopy    Surgeon: Jan New MD    Assistant: Rupali Young MA    Specimen: none    Findings:  Proctoscope inserted to 15 cm.  Circumferentially examined. Left lateral malignant appearing mass seen at 11-13 cm with most inferior portion being at 10-11 cm with distal tattoo visible.  No other visible lesions apparent.  External hemorrhoids visible, most enlarged in right anterolateral position.    Patient tolerated procedure well.      Laboratory  Lab Results   Component Value Date    WBC 10.25 05/06/2019    HGB 15.6 05/06/2019    HCT 44.7 05/06/2019     05/06/2019    CHOL 158 06/01/2019    TRIG 194 (H) 06/01/2019    HDL 30 (L) 06/01/2019    ALT 10 05/06/2019    AST 11 05/06/2019     05/06/2019    K 4.3 05/06/2019     05/06/2019    CREATININE 0.8 05/06/2019    BUN 13 05/06/2019    CO2 27 05/06/2019    TSH 1.607 01/15/2019    PSA 0.37 06/01/2019         Diagnostic Results:  Colonoscopy  images and report reviewed which shows a rectal mass that malignant    ASSESSMENT/PLAN:     53-year-old male with rectal adenocarcinoma    - needs to complete staging workup with MRI of pelvis rectal cancer protocol, CT scan chest abdomen pelvis to rule out metastatic disease  - will send for lab work to include CBC, BMP, coags and CEA level  - once workup complete, will present at [facility] for consensus opinion regarding treatment (surgery vs neoadj chemo-rad vs chemo)  - RTC 2 weeks once full staging workup completed    Rectal adenocarcinoma  -     MRI Rectal Cancer W W/O Contrast; Future; Expected date: 06/11/2019  -     CBC auto differential; Future; Expected date: 06/11/2019  -     Basic metabolic panel; Future; Expected date: 06/11/2019  -     Protime-INR; Future; Expected date: 06/11/2019  -     CEA; Future; Expected date: 06/11/2019  -     CT Chest Abdoment Pelvis With Contrast; Future; Expected date: 06/11/2019      Jan New MD  Colon and Rectal Surgery  [facility]

## 2019-06-11 NOTE — LETTER
June 11, 2019      Anjelica Saavedra MD  [address]  [place] [address]           '[facility]  [address] [place] [address]  Phone: [phone]  Fax: [phone]          Patient: Sukhjinder Presley   MR Number: [id number]   YOB: 1965   Date of Visit: 6/11/2019       Dear Dr. Anjelica Saavedra:    Thank you for referring Sukhjinder Presley to me for evaluation. Attached you will find relevant portions of my assessment and plan of care.    If you have questions, please do not hesitate to call me. I look forward to following Sukhjinder Presley along with you.    Sincerely,    Jan New MD    Enclosure  CC:  No Recipients    If you would like to receive this communication electronically, please contact [email or web address] or [phone] to request more information on [employer] Link access.    For providers and/or their staff who would like to refer a patient to [facility], please contact us through our one-stop-shop provider referral line, [facility], at [phone].    If you feel you have received this communication in error or would no longer like to receive these types of communications, please e-mail [email or web address]

## 2019-06-11 NOTE — TELEPHONE ENCOUNTER
----- Message from Sherice Santamaria sent at 6/11/2019  2:11 PM CDT -----  Contact: wife/Adelaida  Please call pt wife @ [phone] regarding pt appt today, states pt at [facility] taking lab, wife states pt will be on the way after, do not know long, but pt still wants to come to appt today, any questions call adelaida @ [phone].

## 2019-06-11 NOTE — TELEPHONE ENCOUNTER
Informed pt that if he is more than 30 minutes late, he will have to reschedule his appt. Pt verbalized understanding.

## 2019-06-12 ENCOUNTER — CLINICAL SUPPORT (OUTPATIENT)
Dept: SMOKING CESSATION | Facility: CLINIC | Age: 54
End: 2019-06-12
Payer: COMMERCIAL

## 2019-06-12 DIAGNOSIS — F17.200 NICOTINE DEPENDENCE: Primary | ICD-10-CM

## 2019-06-12 LAB
ANION GAP SERPL CALC-SCNC: 10 MMOL/L (ref 8–16)
BUN SERPL-MCNC: 11 MG/DL (ref 6–20)
CALCIUM SERPL-MCNC: 9.8 MG/DL (ref 8.7–10.5)
CHLORIDE SERPL-SCNC: 103 MMOL/L (ref 95–110)
CO2 SERPL-SCNC: 26 MMOL/L (ref 23–29)
CREAT SERPL-MCNC: 0.8 MG/DL (ref 0.5–1.4)
EST. GFR  (AFRICAN AMERICAN): >60 ML/MIN/1.73 M^2
EST. GFR  (NON AFRICAN AMERICAN): >60 ML/MIN/1.73 M^2
GLUCOSE SERPL-MCNC: 84 MG/DL (ref 70–110)
POTASSIUM SERPL-SCNC: 4.8 MMOL/L (ref 3.5–5.1)
SODIUM SERPL-SCNC: 139 MMOL/L (ref 136–145)

## 2019-06-12 PROCEDURE — 99407 BEHAV CHNG SMOKING > 10 MIN: CPT | Mod: S$GLB,,, | Performed by: GENERAL PRACTICE

## 2019-06-12 PROCEDURE — 99407 PR TOBACCO USE CESSATION INTENSIVE >10 MINUTES: ICD-10-PCS | Mod: S$GLB,,, | Performed by: GENERAL PRACTICE

## 2019-06-13 ENCOUNTER — OFFICE VISIT (OUTPATIENT)
Dept: INTERNAL MEDICINE | Facility: CLINIC | Age: 54
End: 2019-06-13
Payer: COMMERCIAL

## 2019-06-13 ENCOUNTER — CLINICAL SUPPORT (OUTPATIENT)
Dept: SMOKING CESSATION | Facility: CLINIC | Age: 54
End: 2019-06-13
Payer: COMMERCIAL

## 2019-06-13 VITALS
BODY MASS INDEX: 22.54 KG/M2 | TEMPERATURE: 99 F | OXYGEN SATURATION: 98 % | WEIGHT: 166.25 LBS | HEART RATE: 110 BPM | SYSTOLIC BLOOD PRESSURE: 110 MMHG | DIASTOLIC BLOOD PRESSURE: 82 MMHG

## 2019-06-13 DIAGNOSIS — Z91.89 AT RISK FOR CORONARY ARTERY DISEASE: ICD-10-CM

## 2019-06-13 DIAGNOSIS — F17.200 NICOTINE DEPENDENCE: Primary | ICD-10-CM

## 2019-06-13 LAB — CEA SERPL-MCNC: 2.2 NG/ML (ref 0–5)

## 2019-06-13 PROCEDURE — 99999 PR PBB SHADOW E&M-EST. PATIENT-LVL III: CPT | Mod: PBBFAC,,, | Performed by: INTERNAL MEDICINE

## 2019-06-13 PROCEDURE — 99999 PR PBB SHADOW E&M-EST. PATIENT-LVL I: CPT | Mod: PBBFAC,,,

## 2019-06-13 PROCEDURE — 3008F BODY MASS INDEX DOCD: CPT | Mod: CPTII,S$GLB,, | Performed by: INTERNAL MEDICINE

## 2019-06-13 PROCEDURE — 99403 PREV MED CNSL INDIV APPRX 45: CPT | Mod: S$GLB,,, | Performed by: GENERAL PRACTICE

## 2019-06-13 PROCEDURE — 99999 PR PBB SHADOW E&M-EST. PATIENT-LVL I: ICD-10-PCS | Mod: PBBFAC,,,

## 2019-06-13 PROCEDURE — 99213 PR OFFICE/OUTPT VISIT, EST, LEVL III, 20-29 MIN: ICD-10-PCS | Mod: S$GLB,,, | Performed by: INTERNAL MEDICINE

## 2019-06-13 PROCEDURE — 99999 PR PBB SHADOW E&M-EST. PATIENT-LVL III: ICD-10-PCS | Mod: PBBFAC,,, | Performed by: INTERNAL MEDICINE

## 2019-06-13 PROCEDURE — 99403 PR PREVENT COUNSEL,INDIV,45 MIN: ICD-10-PCS | Mod: S$GLB,,, | Performed by: GENERAL PRACTICE

## 2019-06-13 PROCEDURE — 3008F PR BODY MASS INDEX (BMI) DOCUMENTED: ICD-10-PCS | Mod: CPTII,S$GLB,, | Performed by: INTERNAL MEDICINE

## 2019-06-13 PROCEDURE — 99213 OFFICE O/P EST LOW 20 MIN: CPT | Mod: S$GLB,,, | Performed by: INTERNAL MEDICINE

## 2019-06-13 RX ORDER — VARENICLINE TARTRATE 0.5 (11)-1
KIT ORAL
Qty: 1 PACKAGE | Refills: 0 | Status: SHIPPED | OUTPATIENT
Start: 2019-06-13 | End: 2019-07-26 | Stop reason: ALTCHOICE

## 2019-06-13 NOTE — PROGRESS NOTES
"Individual Follow-Up Form    6/13/2019    Clinical Status of Patient: Outpatient    Length of Service: 45 minutes     Target Symptoms: Withdrawal and medication side effects. The following were  rated moderate (3) to severe (4) on TCRS:  · Moderate (3): desire tobacco, decreased appetite, difficulty concentrating. Sleep disturbances  · Severe (4): none    Comments: Patient was seen today for a smoking cessation progress update. He states that he is only using aspirin at this time but is interested in using a cessation medication. Discussed options of medications. He states that he needs to quit "now". He stated that his wife needs to quit with him. She is here today with her . She states that if he quits then she feels like she would be able to quit. He stated that he does not want to use Wellbutrin again because he felt anxious. He is interested in using Chantix. Discussed use and possible side effects. Discussed an aggressive tapering quit plan. He verbalized understanding. Will follow up in 1 week for a progress update.     Diagnosis: F17.200    Next Visit: 1 week  "

## 2019-06-13 NOTE — PROGRESS NOTES
Subjective:      Patient ID: Sukhjinder Presley is a 53 y.o. male.    Chief Complaint: Results    HPI   54 yo with   Patient Active Problem List   Diagnosis    Elevated hematocrit    Elevated hemoglobin    Smoker    Polycythemia    Rectal bleeding    At risk for coronary artery disease     Past Medical History:   Diagnosis Date    Anemia      Here today for lab review/ management of elevated ascvd.   Brother with mi in his 40s.     Past Surgical History:   Procedure Laterality Date    COLONOSCOPY N/A 6/6/2019    Performed [facility] at [facility] END[facility]    EGD (ESOPHAGOGASTRODUODENOSCOPY) N/A 6/6/2019    Performed by [facility] at [facility] ENDO    HERNIA REPAIR  1995    TONSILLECTOMY       Social History     Socioeconomic History    Marital status: Significant Other     Spouse name: Not on file    Number of children: Not on file    Years of education: Not on file    Highest education level: Not on file   Occupational History    Not on file   Social Needs    Financial resource strain: Somewhat hard    Food insecurity:     Worry: Sometimes true     Inability: Sometimes true    Transportation needs:     Medical: No     Non-medical: No   Tobacco Use    Smoking status: Current Every Day Smoker     Packs/day: 1.00     Years: 35.00     Pack years: 35.00     Types: Cigarettes     Start date: 1/2/1984    Smokeless tobacco: Current User     Types: Chew   Substance and Sexual Activity    Alcohol use: Yes     Alcohol/week: 27.6 oz     Types: 42 Cans of beer, 4 Shots of liquor per week     Frequency: 4 or more times a week     Drinks per session: 5 or 6     Binge frequency: Daily or almost daily     Comment: nancie Virgen    Drug use: Never    Sexual activity: Yes     Partners: Female     Birth control/protection: None   Lifestyle    Physical activity:     Days per week: 1 day     Minutes per session: 60 min    Stress: Not on file   Relationships    Social connections:     Talks on phone: More than  three times a week     Gets together: More than three times a week     Attends Hinduism service: Not on file     Active member of club or organization: No     Attends meetings of clubs or organizations: Never     Relationship status: Living with partner   Other Topics Concern    Not on file   Social History Narrative    Not on file     family history includes Coronary artery disease in his brother and maternal grandmother; Intestinal malrotation in his mother; Leukemia in his father; No Known Problems in his sister; Stomach cancer in his maternal grandfather.    Review of Systems   Constitutional: Negative for chills and fever.   HENT: Negative for ear pain and sore throat.    Respiratory: Negative for cough.    Cardiovascular: Negative for chest pain.   Gastrointestinal: Negative for abdominal pain and blood in stool.   Genitourinary: Negative for dysuria and hematuria.   Neurological: Negative for seizures and syncope.     Objective:   /82 (BP Location: Right arm, Patient Position: Sitting, BP Method: Medium (Manual))   Pulse 110   Temp 98.9 °F (37.2 °C) (Tympanic)   Wt 75.4 kg (166 lb 3.6 oz)   SpO2 98%   BMI 22.54 kg/m²     Physical Exam   Constitutional: He appears well-developed and well-nourished. No distress.   Cardiovascular: Normal rate.   Pulmonary/Chest: Effort normal and breath sounds normal.   Skin: Skin is warm and dry.   Psychiatric: He has a normal mood and affect. His behavior is normal.       Assessment:     1. At risk for coronary artery disease      Plan:   At risk for coronary artery disease  -     CT Cardiac Scoring; Future; Expected date: 06/13/2019     Patient agrees to statin medication if recommended.  Risks and benefits of statins discussed with patient including statin induced myalgia, rhabdo myelo cysts, and statin induced liver injury.    Lab Frequency Next Occurrence   CT Chest Without Contrast Once 01/15/2019   CBC auto differential Once 05/06/2019   Comprehensive  metabolic panel Once 05/06/2019   MRI Rectal Cancer W W/O Contrast Once 06/11/2019   CT Chest Abdoment Pelvis With Contrast Once 06/11/2019       Problem List Items Addressed This Visit        Cardiac/Vascular    At risk for coronary artery disease    Relevant Orders    CT Cardiac Scoring (Completed)          Follow up if symptoms worsen or fail to improve.

## 2019-06-14 ENCOUNTER — TELEPHONE (OUTPATIENT)
Dept: RADIOLOGY | Facility: HOSPITAL | Age: 54
End: 2019-06-14

## 2019-06-14 ENCOUNTER — PATIENT MESSAGE (OUTPATIENT)
Dept: SURGERY | Facility: CLINIC | Age: 54
End: 2019-06-14

## 2019-06-17 ENCOUNTER — HOSPITAL ENCOUNTER (OUTPATIENT)
Dept: RADIOLOGY | Facility: HOSPITAL | Age: 54
Discharge: HOME | End: 2019-06-17
Attending: COLON & RECTAL SURGERY
Payer: COMMERCIAL

## 2019-06-17 DIAGNOSIS — Z91.89 AT RISK FOR CORONARY ARTERY DISEASE: ICD-10-CM

## 2019-06-17 DIAGNOSIS — C20 RECTAL ADENOCARCINOMA: ICD-10-CM

## 2019-06-17 PROCEDURE — 71260 CT CHEST ABDOMEN PELVIS WITH CONTRAST (XPD): ICD-10-PCS | Mod: 26,,, | Performed by: RADIOLOGY

## 2019-06-17 PROCEDURE — 72197 MRI PELVIS W/O & W/DYE: CPT | Mod: TC

## 2019-06-17 PROCEDURE — 74177 CT ABD & PELVIS W/CONTRAST: CPT | Mod: 26,,, | Performed by: RADIOLOGY

## 2019-06-17 PROCEDURE — 75571 CT HRT W/O DYE W/CA TEST: CPT | Mod: TC

## 2019-06-17 PROCEDURE — 75571 CT HRT W/O DYE W/CA TEST: CPT | Mod: 26,,, | Performed by: RADIOLOGY

## 2019-06-17 PROCEDURE — 75571 CT CALCIUM SCORING CARDIAC: ICD-10-PCS | Mod: 26,,, | Performed by: RADIOLOGY

## 2019-06-17 PROCEDURE — 72197 MRI RECTAL CANCER W W/O CONTRAST: ICD-10-PCS | Mod: 26,,, | Performed by: RADIOLOGY

## 2019-06-17 PROCEDURE — 25500020 PHARM REV CODE 255: Performed by: COLON & RECTAL SURGERY

## 2019-06-17 PROCEDURE — 74177 CT ABD & PELVIS W/CONTRAST: CPT | Mod: TC

## 2019-06-17 PROCEDURE — 74177 CT CHEST ABDOMEN PELVIS WITH CONTRAST (XPD): ICD-10-PCS | Mod: 26,,, | Performed by: RADIOLOGY

## 2019-06-17 PROCEDURE — 71260 CT THORAX DX C+: CPT | Mod: 26,,, | Performed by: RADIOLOGY

## 2019-06-17 PROCEDURE — 72197 MRI PELVIS W/O & W/DYE: CPT | Mod: 26,,, | Performed by: RADIOLOGY

## 2019-06-17 PROCEDURE — A9585 GADOBUTROL INJECTION: HCPCS | Performed by: COLON & RECTAL SURGERY

## 2019-06-17 RX ORDER — GADOBUTROL 604.72 MG/ML
7.5 INJECTION INTRAVENOUS
Status: COMPLETED | OUTPATIENT
Start: 2019-06-17 | End: 2019-06-17

## 2019-06-17 RX ADMIN — IOHEXOL 75 ML: 350 INJECTION, SOLUTION INTRAVENOUS at 08:06

## 2019-06-17 RX ADMIN — IOHEXOL 30 ML: 350 INJECTION, SOLUTION INTRAVENOUS at 07:06

## 2019-06-17 RX ADMIN — GADOBUTROL 7.5 ML: 604.72 INJECTION INTRAVENOUS at 11:06

## 2019-06-19 ENCOUNTER — PATIENT MESSAGE (OUTPATIENT)
Dept: SURGERY | Facility: CLINIC | Age: 54
End: 2019-06-19

## 2019-06-19 ENCOUNTER — PATIENT MESSAGE (OUTPATIENT)
Dept: INTERNAL MEDICINE | Facility: CLINIC | Age: 54
End: 2019-06-19

## 2019-06-24 ENCOUNTER — TELEPHONE (OUTPATIENT)
Dept: PHARMACY | Facility: CLINIC | Age: 54
End: 2019-06-24

## 2019-06-24 ENCOUNTER — OFFICE VISIT (OUTPATIENT)
Dept: HEMATOLOGY/ONCOLOGY | Facility: CLINIC | Age: 54
End: 2019-06-24
Payer: COMMERCIAL

## 2019-06-24 ENCOUNTER — OFFICE VISIT (OUTPATIENT)
Dept: SURGERY | Facility: CLINIC | Age: 54
End: 2019-06-24
Payer: COMMERCIAL

## 2019-06-24 VITALS
TEMPERATURE: 98 F | HEART RATE: 82 BPM | WEIGHT: 167.13 LBS | SYSTOLIC BLOOD PRESSURE: 117 MMHG | DIASTOLIC BLOOD PRESSURE: 81 MMHG | BODY MASS INDEX: 22.66 KG/M2

## 2019-06-24 VITALS
TEMPERATURE: 98 F | OXYGEN SATURATION: 98 % | WEIGHT: 167.13 LBS | DIASTOLIC BLOOD PRESSURE: 81 MMHG | HEIGHT: 72 IN | SYSTOLIC BLOOD PRESSURE: 117 MMHG | BODY MASS INDEX: 22.64 KG/M2 | HEART RATE: 82 BPM

## 2019-06-24 DIAGNOSIS — C20 RECTAL ADENOCARCINOMA: Primary | ICD-10-CM

## 2019-06-24 DIAGNOSIS — C20 RECTAL CANCER: Primary | ICD-10-CM

## 2019-06-24 PROCEDURE — 99999 PR PBB SHADOW E&M-EST. PATIENT-LVL III: CPT | Mod: PBBFAC,,, | Performed by: INTERNAL MEDICINE

## 2019-06-24 PROCEDURE — 99245 PR OFFICE CONSULTATION,LEVEL V: ICD-10-PCS | Mod: S$GLB,,, | Performed by: INTERNAL MEDICINE

## 2019-06-24 PROCEDURE — 99214 PR OFFICE/OUTPT VISIT, EST, LEVL IV, 30-39 MIN: ICD-10-PCS | Mod: S$GLB,,, | Performed by: COLON & RECTAL SURGERY

## 2019-06-24 PROCEDURE — 3008F BODY MASS INDEX DOCD: CPT | Mod: CPTII,S$GLB,, | Performed by: COLON & RECTAL SURGERY

## 2019-06-24 PROCEDURE — 99245 OFF/OP CONSLTJ NEW/EST HI 55: CPT | Mod: S$GLB,,, | Performed by: INTERNAL MEDICINE

## 2019-06-24 PROCEDURE — 3008F PR BODY MASS INDEX (BMI) DOCUMENTED: ICD-10-PCS | Mod: CPTII,S$GLB,, | Performed by: COLON & RECTAL SURGERY

## 2019-06-24 PROCEDURE — 99999 PR PBB SHADOW E&M-EST. PATIENT-LVL III: ICD-10-PCS | Mod: PBBFAC,,, | Performed by: INTERNAL MEDICINE

## 2019-06-24 PROCEDURE — 99999 PR PBB SHADOW E&M-EST. PATIENT-LVL III: ICD-10-PCS | Mod: PBBFAC,,, | Performed by: COLON & RECTAL SURGERY

## 2019-06-24 PROCEDURE — 99214 OFFICE O/P EST MOD 30 MIN: CPT | Mod: S$GLB,,, | Performed by: COLON & RECTAL SURGERY

## 2019-06-24 PROCEDURE — 99999 PR PBB SHADOW E&M-EST. PATIENT-LVL III: CPT | Mod: PBBFAC,,, | Performed by: COLON & RECTAL SURGERY

## 2019-06-24 RX ORDER — TRAMADOL HYDROCHLORIDE 50 MG/1
50 TABLET ORAL EVERY 6 HOURS PRN
Qty: 90 TABLET | Refills: 0 | Status: SHIPPED | OUTPATIENT
Start: 2019-06-24 | End: 2020-02-28

## 2019-06-24 RX ORDER — CAPECITABINE 500 MG/1
TABLET, FILM COATED ORAL
Qty: 168 TABLET | Refills: 0 | Status: SHIPPED | OUTPATIENT
Start: 2019-07-08 | End: 2020-10-13

## 2019-06-24 RX ORDER — OXYCODONE HYDROCHLORIDE 5 MG/1
5 TABLET ORAL EVERY 4 HOURS PRN
Qty: 10 TABLET | Refills: 0 | Status: SHIPPED | OUTPATIENT
Start: 2019-06-24 | End: 2019-07-05 | Stop reason: SDUPTHER

## 2019-06-24 NOTE — PROGRESS NOTES
History & Physical    SUBJECTIVE:     Chief Complaint   Patient presents with    Follow-up   CC: rectal adenocarcinoma  Ref: Anjelica Saavedra MD    History of Present Illness:  Patient is a 53 y.o. male presents for evaluation of rectal cancer.  Patient reports he has had increasing pelvic/rectal pain along with decreased bowel movements over the last 6 weeks.  Reports an uncomfortable feeling in his pelvis associated with mucous discharge from his rectum as well as bloody bowel movements that are thin and less than normal. He reports associated chills, fatigue and 16 lb weight loss over the past 5 months.  He has also noticed a decrease in appetite as well. He underwent a colonoscopy on 06/06/2019 where a nonobstructing rectal mass was found with biopsy showing well-differentiated adenocarcinoma.  He was then referred to Colorectal surgery Clinic for evaluation.  He has no family history of colorectal cancer or IBD.    Interval history:  Since last clinic visit, the patient has done well. He underwent a CT scan of chest abdomen pelvis without any definitive signs of metastasis.  He underwent a pelvic MRI rectal cancer protocol which showed that the tumor was invading through to the pericolonic fat with threatening the margin of the mesorectal fascia, as well as 2 suspicious lymph nodes.  Continues to have bowel movements although he states that these are mucus in nature.  He is tolerating a diet although less than usual.  Denies any fever, chills, nausea, vomiting.  Is attempting to quit smoking right now with Chantix.    PMHx:  Nephrolithiasis  PSHx:  Left inguinal hernia repair, lithotripsy  All: NKDA  FamHx: negative for CRC or IBD; +gastric cancer in maternal grandfather  SocHx: +tobacco (1ppd); +etoh (6 beers/day); no illicits      Review of patient's allergies indicates:  No Known Allergies    Current Outpatient Medications   Medication Sig Dispense Refill    aspirin 81 MG Chew Take 81 mg by mouth once daily.       varenicline (CHANTIX STARTING MONTH BOX) 0.5 mg (11)- 1 mg (42) tablet Take one 0.5mg tab by mouth once daily X3 days,then increase to one 0.5mg tab twice daily X4 days,then increase to one 1mg tab twice daily 1 Package 0     No current facility-administered medications for this visit.        Past Medical History:   Diagnosis Date    Anemia      Past Surgical History:   Procedure Laterality Date    COLONOSCOPY N/A 6/6/2019    [facility]    EGD (ESOPHAGOGASTRODUODENOSCOPY) N/A 6/6/2019    [facility] at [facility]    HERNIA REPAIR  1995    TONSILLECTOMY       Family History   Problem Relation Age of Onset    Intestinal malrotation Mother     Leukemia Father     No Known Problems Sister     Coronary artery disease Brother     Coronary artery disease Maternal Grandmother     Stomach cancer Maternal Grandfather      Social History     Tobacco Use    Smoking status: Current Every Day Smoker     Packs/day: 1.00     Years: 35.00     Pack years: 35.00     Types: Cigarettes     Start date: 1/2/1984    Smokeless tobacco: Current User     Types: Chew   Substance Use Topics    Alcohol use: Yes     Alcohol/week: 27.6 oz     Types: 42 Cans of beer, 4 Shots of liquor per week     Frequency: 4 or more times a week     Drinks per session: 5 or 6     Binge frequency: Daily or almost daily     Comment: nancie Virgen    Drug use: Never        Review of Systems:  Review of Systems   Constitutional: Positive for appetite change, chills, fatigue and unexpected weight change. Negative for activity change and fever.   HENT: Negative for congestion, ear pain, sore throat and trouble swallowing.    Eyes: Negative for pain, redness and itching.   Respiratory: Negative for cough, shortness of breath and wheezing.    Cardiovascular: Negative for chest pain, palpitations and leg swelling.   Gastrointestinal: Positive for anal bleeding, blood in stool, constipation and rectal  pain. Negative for abdominal distention, abdominal pain and diarrhea.   Endocrine: Negative for cold intolerance, heat intolerance and polyuria.   Genitourinary: Negative for dysuria, flank pain, frequency and hematuria.   Musculoskeletal: Negative for gait problem, joint swelling and neck pain.   Skin: Negative for color change, rash and wound.   Allergic/Immunologic: Negative for environmental allergies and immunocompromised state.   Neurological: Negative for dizziness, speech difficulty, weakness and numbness.   Psychiatric/Behavioral: Negative for agitation, confusion and hallucinations.       OBJECTIVE:     Vital Signs (Most Recent)  Temp: 98.2 °F (36.8 °C) (06/24/19 1214)  Pulse: 82 (06/24/19 1214)  BP: 117/81 (06/24/19 1214)     75.8 kg (167 lb 1.7 oz)     Physical Exam:  Physical Exam   Constitutional: He is oriented to person, place, and time. He appears well-developed.   HENT:   Head: Normocephalic and atraumatic.   Eyes: Conjunctivae and EOM are normal.   Neck: Normal range of motion. No thyromegaly present.   Cardiovascular: Normal rate and regular rhythm.   Pulmonary/Chest: Effort normal. No respiratory distress.   Abdominal: Soft. He exhibits no distension and no mass. There is no tenderness.   Genitourinary:   Genitourinary Comments: Anorectal: +right anterior external hemorrhoid which is enlarged and nonthrombosed; ELOISA with good tone, no blood, no palpable lesions/masses   Musculoskeletal: Normal range of motion. He exhibits no edema or tenderness.   Neurological: He is alert and oriented to person, place, and time.   Skin: Skin is warm and dry. Capillary refill takes less than 2 seconds. No rash noted.   Psychiatric: He has a normal mood and affect.     Laboratory  Lab Results   Component Value Date    WBC 11.88 06/11/2019    HGB 13.1 (L) 06/11/2019    HCT 41.9 06/11/2019     (H) 06/11/2019    CHOL 158 06/01/2019    TRIG 194 (H) 06/01/2019    HDL 30 (L) 06/01/2019    ALT 10 05/06/2019    AST  11 05/06/2019     06/11/2019    K 4.8 06/11/2019     06/11/2019    CREATININE 0.8 06/11/2019    BUN 11 06/11/2019    CO2 26 06/11/2019    TSH 1.607 01/15/2019    PSA 0.37 06/01/2019    INR 1.0 06/11/2019       Diagnostic Results:  Colonoscopy images and report reviewed which shows a rectal mass that malignant     MRI:  FINDINGS:  Tumor Location: Tumor location (from anal verge):    Mid with the inferior margins measuring approximately 8 cm from the anal verge.    Relationship to anterior peritoneal reflection: Straddles with the majority of the tumor positioned above the peritoneal reflection.    Tumor Characteristics:    Circumferential extent/location (clock face): Near circumferential at the inferior and mid portions of the tumor from 10:00 to 8:00 more superior portions of the tumor appear circumferential.    Tumor Length: 9.7 cm    Mucinous: No    Tumor Extent:    Does doesextend beyond muscularis into perirectal fat.    There is isspiculation of the periectal fat.    The tumor abuts the surface of the mesial rectal fascia on the left.  There is otherwise no definite evidence of invasion of the adjacent structures.    Lymph Nodes:    There are are 2 perirectal lymph nodes greater than 5 mmin the mesorectal fat. 9 mm lymph nodes in the mesorectal fat on the right abuts the mesorectal fascia.    There are multiple subcentimeter lymph nodes within the pelvic sidewall bilaterally..    There are no lymph nodes that are suspicious based on heterogeneous speculated morphology.    There is no evident vascular invasion.      Impression       Large rectal mass as detailed above with invasion of the muscularis and abutment of the mesorectal fascia on the left.    Two suspicious mesorectal lymph nodes, the larger of which abuts the mesorectal fascia on the right.         CT Scan:  FINDINGS:  Chest:    There are no discrete nodules or masses identified in the lungs.  No pleural effusion.  A few subcentimeter  lymph nodes present in the mediastinum.  No definite pathologic lymphadenopathy.  Heart and pericardium are unremarkable.    Abdomen/pelvis:    There are tiny granulomatous calcifications in the liver and spleen.  No other hepatic or splenic lesions.  No radiopaque gallstones or biliary tract dilatation.  Portal vein is patent.    There is a 16 mm calculus in the lower pole of the left kidney and an adjacent smaller calculus measuring 5 mm.  No hydronephrosis or other urinary obstructive change on either side.  Adrenal glands are not enlarged.    There is a large irregular soft tissue mass in the rectum measuring 7.7 x 6.9 cm transverse and approximately 9.5 cm craniocaudad.  There is thickening and infiltration of the perirectal fascia and a small amount of fluid in the presacral space.  Small lymph nodes are identified in the mesorectal compartment.  No bowel obstruction.    There is aortoiliac atherosclerosis.  Small subcentimeter lymph nodes present in the periaortic retroperitoneum.    The urinary bladder has a normal contour.  Punctate calcifications present within the prostate gland.    Skeletal:    There are no suspicious intraosseous lesions.      Impression       1. Large rectal mass highly suspicious for malignancy.  Associated stranding of the perirectal fascia and small mesorectal compartment lymph nodes.  2. Nonobstructing left nephrolithiasis.         ASSESSMENT/PLAN:     53-year-old male with rectal adenocarcinoma with likely T3N1 disease based upon preop imaging without evidence of metastatic disease    - long discussion with the patient regarding the natural course and history of rectal cancer, especially with high rectal cancers involving a threatened CRM and positive oscar disease.  Discussed that I would recommend pursuing neoadjuvant chemo radiation treatment (long-course) with re-evaluation of an MRI following this treatment and likely surgical excision if favorable response and lack of  metastatic disease after this neoadjuvant treatment.  - Will present at Rectal Cancer MD for consensus opinion regarding treatment   - referred to Medical Oncology for discussion regarding chemotherapy.  Patient is hesitant to undergo any chemotherapy or radiation as he is concerned about spread of disease and previous stories he has heard from other people regarding treatments as well as what he saw his family member go through during chemotherapy.  I discussed this in detail with him and discussed that he should at least have a conversation with the oncology department regarding chemotherapy as neoadjuvant chemo radiation during this stage has been shown to decrease local cancer recurrence.  I did discuss that this does not increase his overall survival based upon previous studies.  - will also refer to Radiation Oncology for discussion for neoadjuvant radiation treatment concurrently with chemotherapy  - discussed with him that if he pursues chemoradiation, he may require MediPort placement and we will be available to do that as needed  - RTC after tumor board discussion and evaluation by Medical Oncology and Hematology Oncology    Rectal adenocarcinoma  -     Ambulatory Referral to Hematology / Oncology  -     Ambulatory Referral to Radiation Oncology    Jan New MD  Colon and Rectal Surgery  [facility]

## 2019-06-24 NOTE — TELEPHONE ENCOUNTER
Informed Patient  that [facility] received prescription for Xeloda and benefits investigation is required.  [facility] will be back in touch once insurance determination is received.

## 2019-06-24 NOTE — PROGRESS NOTES
Subjective:       Patient ID: Sukhjinder Presley is a 53 y.o. male.    Chief Complaint: Rectal cancer [C20]  HPI: We have an opportunity to see Mr. Sukhjinder Presley in Hematology Oncology clinic at [facility] on 06/24/2019.  Mr. Sukhjinder Presley is a 53 y.o.with tenesmus and rectal bleeding.  Colonoscopy showed rectal cancer and ascending colon polyp.  Has tenesmus 16-18 times per day.      No history exists.     Past Medical History:   Diagnosis Date    Anemia      Family History   Problem Relation Age of Onset    Intestinal malrotation Mother     Leukemia Father     No Known Problems Sister     Coronary artery disease Brother     Coronary artery disease Maternal Grandmother     Stomach cancer Maternal Grandfather      Social History     Socioeconomic History    Marital status: Significant Other     Spouse name: Not on file    Number of children: Not on file    Years of education: Not on file    Highest education level: Not on file   Occupational History    Not on file   Social Needs    Financial resource strain: Somewhat hard    Food insecurity:     Worry: Sometimes true     Inability: Sometimes true    Transportation needs:     Medical: No     Non-medical: No   Tobacco Use    Smoking status: Current Every Day Smoker     Packs/day: 1.00     Years: 35.00     Pack years: 35.00     Types: Cigarettes     Start date: 1/2/1984    Smokeless tobacco: Current User     Types: Chew   Substance and Sexual Activity    Alcohol use: Yes     Alcohol/week: 27.6 oz     Types: 42 Cans of beer, 4 Shots of liquor per week     Frequency: 4 or more times a week     Drinks per session: 5 or 6     Binge frequency: Daily or almost daily     Comment: nancie Virgen    Drug use: Never    Sexual activity: Yes     Partners: Female     Birth control/protection: None   Lifestyle    Physical activity:     Days per week: 1 day     Minutes per session: 60 min    Stress: Not on file   Relationships    Social connections:     Talks  on phone: More than three times a week     Gets together: More than three times a week     Attends [employer] service: Not on file     Active member of club or organization: No     Attends meetings of clubs or organizations: Never     Relationship status: Living with partner   Other Topics Concern    Not on file   Social History Narrative    Not on file     Past Surgical History:   Procedure Laterality Date    COLONOSCOPY N/A 6/6/2019    Per[facility] at [facility]O    EGD (ESOPHAGOGASTRODUODENOSCOPY) N/A 6/6/2019    Per[facility] by [facility] at [facility] ENDO    HERNIA REPAIR  1995    TONSILLECTOMY       Current Outpatient Medications   Medication Sig Dispense Refill    aspirin 81 MG Chew Take 81 mg by mouth once daily.      varenicline (CHANTIX STARTING MONTH BOX) 0.5 mg (11)- 1 mg (42) tablet Take one 0.5mg tab by mouth once daily X3 days,then increase to one 0.5mg tab twice daily X4 days,then increase to one 1mg tab twice daily 1 Package 0    oxyCODONE (ROXICODONE) 5 MG immediate release tablet Take 1 tablet (5 mg total) by mouth every 4 (four) hours as needed for Pain. 10 tablet 0    traMADol (ULTRAM) 50 mg tablet Take 1 tablet (50 mg total) by mouth every 6 (six) hours as needed for Pain. 90 tablet 0     No current facility-administered medications for this visit.        Labs:  Lab Results   Component Value Date    WBC 11.88 06/11/2019    HGB 13.1 (L) 06/11/2019    HCT 41.9 06/11/2019    MCV 85 06/11/2019     (H) 06/11/2019     BMP  Lab Results   Component Value Date     06/11/2019    K 4.8 06/11/2019     06/11/2019    CO2 26 06/11/2019    BUN 11 06/11/2019    CREATININE 0.8 06/11/2019    CALCIUM 9.8 06/11/2019    ANIONGAP 10 06/11/2019    ESTGFRAFRICA >60.0 06/11/2019    EGFRNONAA >60.0 06/11/2019     Lab Results   Component Value Date    ALT 10 05/06/2019    AST 11 05/06/2019    ALKPHOS 95 05/06/2019    BILITOT 0.3 05/06/2019       Lab Results   Component Value  Date    IRON 70 03/04/2019    TIBC 459 (H) 03/04/2019    FERRITIN 18 (L) 03/04/2019     Lab Results   Component Value Date    QQHPYNOW57 >2000 (H) 01/15/2019     No results found for: FOLATE  Lab Results   Component Value Date    TSH 1.607 01/15/2019       I have reviewed the radiology reports and examined the scan/xray images.    Review of Systems   Constitutional: Negative.    HENT: Negative.    Eyes: Negative.    Respiratory: Negative.    Cardiovascular: Negative.    Gastrointestinal: Negative.    Endocrine: Negative.    Genitourinary: Negative.    Musculoskeletal: Negative.    Skin: Negative.    Allergic/Immunologic: Negative.    Neurological: Negative.    Hematological: Negative.    Psychiatric/Behavioral: Negative.      ECOG SCORE              Objective:     Vitals:    06/24/19 1320   BP: 117/81   Pulse: 82   Temp: 98.2 °F (36.8 °C)   Body mass index is 22.66 kg/m².  Physical Exam   Constitutional: He is oriented to person, place, and time. He appears well-developed and well-nourished.   HENT:   Head: Normocephalic and atraumatic.   Eyes: Conjunctivae and EOM are normal.   Neck: Normal range of motion. Neck supple.   Cardiovascular: Normal rate and regular rhythm.   Pulmonary/Chest: Effort normal and breath sounds normal.   Abdominal: Soft. Bowel sounds are normal.   Musculoskeletal: Normal range of motion.   Neurological: He is alert and oriented to person, place, and time.   Skin: Skin is warm and dry.   Psychiatric: He has a normal mood and affect. His behavior is normal. Judgment and thought content normal.   Nursing note and vitals reviewed.        Assessment:      1. Rectal cancer           Plan:     Rectal cancer   We discussed neoadjuvant chemoradiation with xeloda during radiation.  Refer to see Dr. Mishra.  Would use standard approach with neoadjuvant chemoradiation follow by LAR/APR the adjuvant chemotherapy.    We discussed the benefit and risk of treatment.  Goal of treatment is for curative intent  with improved chance of cancer free survival and overall survival. Risk of treatment includes but is not limited to bone marrow suppression, fatigue, nausea, coronary artery spasm.  -     traMADol (ULTRAM) 50 mg tablet; Take 1 tablet (50 mg total) by mouth every 6 (six) hours as needed for Pain.  Dispense: 90 tablet; Refill: 0  -     oxyCODONE (ROXICODONE) 5 MG immediate release tablet; Take 1 tablet (5 mg total) by mouth every 4 (four) hours as needed for Pain.  Dispense: 10 tablet; Refill: 0

## 2019-06-25 NOTE — TELEPHONE ENCOUNTER
Capecitabine is approved by the patient's insurance with a high copay. Patient may be eligible for a [facility] al for Capecitabine copay assistance. We will be assisting the patient in the application process. [facility] application requires prescriber consent and signature. Sending a staff message to Dr Song Aden regarding [facility] al for Capecitabine copay assistance and faxing the application for his review and signature.

## 2019-06-25 NOTE — TELEPHONE ENCOUNTER
DOCUMENTATION ONLY:  Capecitabine 500mg Tablet #168/38 does not require a prior authorization through the patient's insurance.     Co-pay: $100.00    Patient Assistance IS required. Sending to the financial assistance team to investigate assistance options. Dez TORRES

## 2019-06-26 ENCOUNTER — CLINICAL SUPPORT (OUTPATIENT)
Dept: SMOKING CESSATION | Facility: CLINIC | Age: 54
End: 2019-06-26
Payer: COMMERCIAL

## 2019-06-26 DIAGNOSIS — F17.200 NICOTINE DEPENDENCE: Primary | ICD-10-CM

## 2019-06-26 PROCEDURE — 99407 BEHAV CHNG SMOKING > 10 MIN: CPT | Mod: S$GLB,,, | Performed by: GENERAL PRACTICE

## 2019-06-26 PROCEDURE — 99407 PR TOBACCO USE CESSATION INTENSIVE >10 MINUTES: ICD-10-PCS | Mod: S$GLB,,, | Performed by: GENERAL PRACTICE

## 2019-06-26 NOTE — PROGRESS NOTES
[facility]  RADIATION ONCOLOGY CONSULTATION    Name: Sukhjinder Presley  : 1965      Patient Referred To Radiation Oncology By:  Dr. Jan New MD  [id number] [place] [address] [place] [address]    DIAGNOSIS:  Rectal adenocarcinoma stage cIIIB: cT3 cN2a cM0  1. 19 EGD and colonoscopy showed a large nonobstructing mass the rectum, circumferential with oozing.  There was a 7 mm ascending colon polyp.  The rectal mass returned well-differentiated adenocarcinoma and the ascending colon polyp returned tubular adenoma.    2. Proctoscopy showed a left lateral mass 11-13 cm with the most inferior portion at 10-11 cm visible tattoo.    3. 19 CT chest, abdomen and pelvis with contrast showed a large soft tissue mass in the rectum 7.7 x 6.9 x 9.5 cm with thickening and infiltration of the perirectal fascia and fluid in the presacral space with small lymph nodes in the mesorectum.  On my review, there is a round lymph node just to the left of the rectosigmoid junction just superior to the level the bifurcation of the iliacs into the internal and external.  There was also a small round lymph node just anterior to left side of L5 vertebral body.    4. 19 MRI rectal protocol showed a tumor in the mid rectum with inferior margin 8 cm from the anal verge with the majority of the tumor above the peritoneal reflection.  The tumor was near circumferential at the inferior and mid portions from 10-8 o'clock and more superiorly the tumor was circumferential.  It measured 9.7 cm in length.  It extended beyond the muscularis into the perirectal fat.  It abutted the surface of the mesorectal fascia on the left.  There were perirectal lymph nodes greater than 5 mm short axis in the mesorectal fat measuring 7.4 mm in the left anterior lateral mesorectum, 6.4 mm more inferior in the left lateral mesorectum, 5.8 mm right lateral mesorectum and 9 mm lymph node in the mesorectal fat on the right  abutting the mesorectal fascia.  There were bilateral subcentimeter pelvic sidewall lymph nodes.  5. 6/11/19 CEA 2.2    HISTORY OF PRESENT ILLNESS:  Sukhjinder Presley is a pleasant 53 y.o. male who has a history of polycythemia vera.  He presented with anemia, rectal pain, bloody mucus, 16 lb 5 month weight loss and decreased bowel movements.  6/6/19 EGD and colonoscopy showed a large nonobstructing mass the rectum, circumferential with oozing.  There was a 7 mm ascending colon polyp.  The rectal mass returned well-differentiated adenocarcinoma and the ascending colon polyp returned tubular adenoma.  He was seen by Dr. New colorectal surgery.  He had a right anterior external hemorrhoid non thrombosed and large.  Good rectal tone was seen on ELOISA with no blood or palpable masses.  Proctoscopy showed a left lateral mass 11-13 cm with the most inferior portion at 10-11 cm with a visible tattoo.  He was seen by Dr. Aden and given tramadol and oxycodone.  6/17/19 CT chest, abdomen and pelvis with contrast showed a large soft tissue mass in the rectum 7.7 x 6.9 x 9.5 cm with thickening and infiltration of the perirectal fascia and fluid in the presacral space with small lymph nodes in the mesorectum.  On my review, there is around lymph node just to the left of the rectosigmoid junction just superior to the level the bifurcation of the iliacs into the internal and external.  There was also a small round lymph node just anterior to left side of L5 vertebral body.  6/17/19 MRI rectal protocol showed a tumor in the mid rectum with inferior margin 8 cm from the anal verge with the majority of the tumor above the peritoneal reflection.  The tumor was near circumferential at the inferior and mid portions from 10-8 o'clock and more superiorly the tumor was circumferential.  It measured 9.7 cm in length.  It extended beyond the muscularis into the perirectal fat.  It abutted the surface of the mesorectal fascia on the left.  There  were perirectal lymph nodes greater than 5 mm short axis in the mesorectal fat measuring 7.4 mm in the left anterior lateral mesorectum, 6.4 mm more inferior in the left lateral mesorectum, 5.8 mm right lateral mesorectum and 9 mm lymph node in the mesorectal fat on the right abutting the mesorectal fascia.  There were bilateral subcentimeter pelvic sidewall lymph nodes.    REVIEW OF SYSTEMS: (Positive findings bold, otherwise negative)   Constitutional: fever, fatigue, weight loss 22 lbs past 6 months  Eyes: blurred vision in the past 3 months, double vision   ENT: ear pain, new mouth lesions, jaw pain, difficulty swallowing, sore throat  Cardiovascular: chest pain on exertion, reflux, extremity swelling  Respiratory: shortness of breath, dyspnea, cough, hemoptysis.   GI: abdominal pain, diarrhea, constipation, blood in stool, painful bowel movements  : painful or burning urination, denies blood in urine  Musculoskeletal: new bone or joint pains  Neurologic: headache, seizure, focal numbness or tingling, balance changes, speech changes  Lymph: new or enlarged lymph nodes  Psychiatric: depression, anxiety    PRIOR RADIATION HISTORY: None    PAST MEDICAL HISTORY:  Past Medical History:   Diagnosis Date    Anemia        PAST SURGICAL HISTORY:  Past Surgical History:   Procedure Laterality Date    COLONOSCOPY N/A 6/6/2019    Per[facility]    EGD (ESOPHAGOGASTRODUODENOSCOPY) N/A 6/6/2019    Performed by [facility] at [facility] END[facility]    HERNIA REPAIR  1995    TONSILLECTOMY         ALLERGIES:   Review of patient's allergies indicates:  No Known Allergies    MEDICATIONS:    Current Outpatient Medications:     aspirin 81 MG Chew, Take 81 mg by mouth once daily., Disp: , Rfl:     traMADol (ULTRAM) 50 mg tablet, Take 1 tablet (50 mg total) by mouth every 6 (six) hours as needed for Pain., Disp: 90 tablet, Rfl: 0    varenicline (CHANTIX STARTING MONTH BOX) 0.5 mg (11)- 1 mg (42)  tablet, Take one 0.5mg tab by mouth once daily X3 days,then increase to one 0.5mg tab twice daily X4 days,then increase to one 1mg tab twice daily, Disp: 1 Package, Rfl: 0    [START ON 7/8/2019] capecitabine (XELODA) 500 MG Tab, Take 3 tablets (1500 mg) by mouth twice daily after breakfast and dinner on days of radiation only., Disp: 168 tablet, Rfl: 0    oxyCODONE (ROXICODONE) 5 MG immediate release tablet, Take 1 tablet (5 mg total) by mouth every 4 (four) hours as needed for Pain., Disp: 10 tablet, Rfl: 0    SOCIAL HISTORY:  Social History     Socioeconomic History    Marital status: Significant Other     Spouse name: Not on file    Number of children: Not on file    Years of education: Not on file    Highest education level: Not on file   Occupational History    Not on file   Social Needs    Financial resource strain: Somewhat hard    Food insecurity:     Worry: Sometimes true     Inability: Sometimes true    Transportation needs:     Medical: No     Non-medical: No   Tobacco Use    Smoking status: Current Every Day Smoker     Packs/day: 1.00     Years: 35.00     Pack years: 35.00     Types: Cigarettes     Start date: 1/2/1984    Smokeless tobacco: Current User     Types: Chew   Substance and Sexual Activity    Alcohol use: Yes     Alcohol/week: 27.6 oz     Types: 42 Cans of beer, 4 Shots of liquor per week     Frequency: 4 or more times a week     Drinks per session: 5 or 6     Binge frequency: Daily or almost daily     Comment: nancie Virgen    Drug use: Never    Sexual activity: Yes     Partners: Female     Birth control/protection: None   Lifestyle    Physical activity:     Days per week: 1 day     Minutes per session: 60 min    Stress: Not on file   Relationships    Social connections:     Talks on phone: More than three times a week     Gets together: More than three times a week     Attends Jewish service: Not on file     Active member of club or organization: No     Attends meetings of  clubs or organizations: Never     Relationship status: Living with partner   Other Topics Concern    Not on file   Social History Narrative    Not on file       FAMILY HISTORY:  Family History   Problem Relation Age of Onset    Intestinal malrotation Mother     Leukemia Father     No Known Problems Sister     Coronary artery disease Brother     Coronary artery disease Maternal Grandmother     Stomach cancer Maternal Grandfather        PHYSICAL EXAMINATION:  Constitutional: well appearing, no acute distress, ECOG 1  Vitals:    /76   Pulse 95   Temp 99 °F (37.2 °C)   Resp 18   Ht 6' (1.829 m)   Wt 75.9 kg (167 lb 6.4 oz)   SpO2 98%   BMI 22.70 kg/m²   Eyes: sclera anicteric, EOMI, pupils equal, round and reactive to light  ENT: oral cavity without lesions, moist mucous membranes  Lymphatic: no cervical, supraclavicular or inguinal adenopathy  Cardiovascular: regular rate, no murmurs, no edema of the upper or lower extremities, radial pulse 2+  Respiratory: unlabored effort, clear to auscultation, no wheezes  Abdomen: soft, non-tender, no rigidity, no masses, no hepatomegaly  Rectal: good sphincter tone, mass palpable at the distal extent of the inspecting digit in occupies over 50% of the lumen, no blood  Spine: non-tender to percussion cervical, thoracic and lumbosacral spine    IMAGING AND LABORATORY FINDINGS: As per HPI; images reviewed personally.    ASSESSMENT:  Locally advanced rectal adenocarcinoma    PLAN:  We discussed the role of radiation in the treatment of node positive rectal adenocarcinoma or T3 disease that may have a positive circumferential resection margin.  I agree with Dr. Aden and Dr. New that long course neoadjuvant chemoradiation with 5 FU based chemotherapy followed by surgery and adjuvant chemotherapy, if indicated, is reasonable.  I recommend 45 Gy in 25 fractions to the pelvis with a 5.4 Gy in 3 fraction boost to gross disease including enlarged lymph nodes.  I will  consider IMRT to spare the small bowel, bone marrow, bladder.  Radiation planning will be performed soon will begin concurrent with chemotherapy in the next 2 weeks.    We discussed the techniques, toxicities and indications of radiation and I answered the patient's questions to their apparent satisfaction.    JUANA Mishra M.D.  Radiation Oncology  [facility] [address] [place] [address]  Ph: [phone]  [email or web address]

## 2019-06-27 ENCOUNTER — INITIAL CONSULT (OUTPATIENT)
Dept: RADIATION ONCOLOGY | Facility: CLINIC | Age: 54
End: 2019-06-27
Payer: COMMERCIAL

## 2019-06-27 VITALS
OXYGEN SATURATION: 98 % | HEIGHT: 72 IN | HEART RATE: 95 BPM | TEMPERATURE: 99 F | WEIGHT: 167.38 LBS | RESPIRATION RATE: 18 BRPM | SYSTOLIC BLOOD PRESSURE: 110 MMHG | BODY MASS INDEX: 22.67 KG/M2 | DIASTOLIC BLOOD PRESSURE: 76 MMHG

## 2019-06-27 DIAGNOSIS — C20 RECTAL CANCER: Primary | ICD-10-CM

## 2019-06-27 PROCEDURE — 99245 PR OFFICE CONSULTATION,LEVEL V: ICD-10-PCS | Mod: S$GLB,,, | Performed by: RADIOLOGY

## 2019-06-27 PROCEDURE — 99999 PR PBB SHADOW E&M-EST. PATIENT-LVL III: CPT | Mod: PBBFAC,,, | Performed by: RADIOLOGY

## 2019-06-27 PROCEDURE — 99999 PR PBB SHADOW E&M-EST. PATIENT-LVL III: ICD-10-PCS | Mod: PBBFAC,,, | Performed by: RADIOLOGY

## 2019-06-27 PROCEDURE — 99245 OFF/OP CONSLTJ NEW/EST HI 55: CPT | Mod: S$GLB,,, | Performed by: RADIOLOGY

## 2019-06-27 NOTE — LETTER
June 27, 2019      Jan New MD  [address] [place] [address] Dr Xiomy ROSA [address]            [facility] - Radiation Oncology  [id number] [place] [address] [address]  [place] [address]  Phone: [phone]  Fax: [phone]          Patient: Sukhjinder Presley   MR Number: [id number]   YOB: 1965   Date of Visit: 6/27/2019       Dear Dr. Jan New:    Thank you for referring Sukhjinder Presley to me for evaluation. Attached you will find relevant portions of my assessment and plan of care.    If you have questions, please do not hesitate to call me. I look forward to following Sukhjinder Presley along with you.    Sincerely,    Artem Mishra II, MD    Enclosure  CC:  No Recipients    If you would like to receive this communication electronically, please contact [email or web address] or [phone] to request more information on [email or web address] Link access.    For providers and/or their staff who would like to refer a patient to [facility], please contact us through our one-stop-shop provider referral line, Kody Alvarado, at [phone].    If you feel you have received this communication in error or would no longer like to receive these types of communications, please e-mail [email or web address]

## 2019-06-28 ENCOUNTER — TELEPHONE (OUTPATIENT)
Dept: HEMATOLOGY/ONCOLOGY | Facility: CLINIC | Age: 54
End: 2019-06-28

## 2019-06-28 NOTE — TELEPHONE ENCOUNTER
Called to speak with pt regarding chart note received from Dr. Mishra with radiation oncology.  After reviewing and speaking with Dr. Aden it was noted that the pt is going to be taking concurrent chemo radiation with Xeloda.  Pt stated that he has spoken with [facility] and paying his copay will not be a problem if needed.  Explained to pt the need for chemotherapy teaching prior to starting.  Pt scheduled for teaching on same day as XRT SIM per request.  Pt stated that he is trying to take off as little time from work as possible.  Spoke with Radiation staff and had SIM moved from that morning to that afternoon to help accommodate his work schedule.  Pt stated that Dr. Aden had said that he would begin treatment on 7/8.  Explained that he will only take the xeloda on days of radiation.  Explained that once the SIM is complete that it normally takes 7 days for the treatment plan to be complete.  Pt verbalized understanding.  Pt stated that he will be here on 7/3.  Explained to call with any questions or concerns.

## 2019-07-01 NOTE — TELEPHONE ENCOUNTER
FOR DOCUMENTATION ONLY:  Financial Assistance for Capecitabine is approved from 7/1/19 to 12/31/19  Source: [facility]  Assistance Amount: $100/month for 6 months

## 2019-07-01 NOTE — TELEPHONE ENCOUNTER
Patient is approved for [facility] al. Capecitabine is approved with the patient's insurance/al with a $0.00 copay. We will reach out to the patient to notify of the approval, offer consultation, and schedule a delivery of the medication. Sending a staff message to Dr Song Aden regarding Capecitabine approval.

## 2019-07-02 NOTE — TELEPHONE ENCOUNTER
2nd attempt: LVM for the patient in regards to initial Xeloda consult/shipment. Patient should initiate on 7/8 - sent MyChart to request a call back since today is the last day of the week we can ship medications.

## 2019-07-02 NOTE — TELEPHONE ENCOUNTER
The patient returned our call in regards to initial capecitabine (Xeloda) consult/shipment. Capecitabine will be shipped on 7/2 to arrive at patient's home on 7/3 via [employer]. $ 0 copay after applying CAGNO to $100 copay. Patient plans to start capecitabine on 7/8 with concurrent radiation. Address confirmed. The patient declined in-depth consultation since he has chemo school scheduled 7/3.    Patient was already aware of administration directions:  -Take 3 tablets (1500mg) by mouth twice daily within 30 minutes of a meal. He's aware to take on days of RT ONLY. We will dispense full quantity in one fill to prevent him from having to pay a high copay ([employer] only authorizes funding once per month).  -Reviewed PO chemo handling precautions.    Patient was counseled on the following possible side effects, which include, but are not limited to:  - Rash: HC1% cream will be provided with first shipment only. Apply topically prn rash - avoid open skin or wounds.  - HF syndrome: Eucerin cream will be provided with first shipment only. Apply to hands and feet at least once per day to help prevent HF syndrome.  - Subsequent creams can be purchased OTC at local pharmacy.    DDIs:  Medication list reviewed, no DDIs expected with Xeloda.    Patient was given 2 patient education handouts on how to handle oral chemotherapy and specific recommendations- do's and don'ts.    We reviewed [facility] background including hours, RPh on call, [employer] shipping - no signature required, charting system and communication with provider's offices. Patient plans to start capecitabine on 7/8. Patient understands to report any medication changes to [facility] and provider. All questions answered and addressed to patients satisfaction. Informed him a pharmacist will f/u with patient in 1 week from start and follow chart to ensure no Xeloda needed again in future.

## 2019-07-03 ENCOUNTER — HOSPITAL ENCOUNTER (OUTPATIENT)
Dept: RADIATION THERAPY | Facility: HOSPITAL | Age: 54
Discharge: HOME | End: 2019-07-03
Attending: RADIOLOGY
Payer: COMMERCIAL

## 2019-07-03 ENCOUNTER — OFFICE VISIT (OUTPATIENT)
Dept: HEMATOLOGY/ONCOLOGY | Facility: CLINIC | Age: 54
End: 2019-07-03
Payer: COMMERCIAL

## 2019-07-03 ENCOUNTER — HOSPITAL ENCOUNTER (OUTPATIENT)
Dept: RADIOLOGY | Facility: HOSPITAL | Age: 54
Discharge: HOME | End: 2019-07-03
Attending: RADIOLOGY
Payer: COMMERCIAL

## 2019-07-03 DIAGNOSIS — C20 RECTAL CANCER: ICD-10-CM

## 2019-07-03 DIAGNOSIS — Z71.9 ENCOUNTER FOR EDUCATION: Primary | ICD-10-CM

## 2019-07-03 PROCEDURE — 77334 RADIATION TREATMENT AID(S): CPT | Mod: 26,,, | Performed by: RADIOLOGY

## 2019-07-03 PROCEDURE — 77290 THER RAD SIMULAJ FIELD CPLX: CPT | Mod: 26,,, | Performed by: RADIOLOGY

## 2019-07-03 PROCEDURE — 77334 RADIATION TREATMENT AID(S): CPT | Mod: TC | Performed by: RADIOLOGY

## 2019-07-03 PROCEDURE — 99215 OFFICE O/P EST HI 40 MIN: CPT | Mod: S$GLB,,, | Performed by: NURSE PRACTITIONER

## 2019-07-03 PROCEDURE — 77263 THER RADIOLOGY TX PLNG CPLX: CPT | Mod: ,,, | Performed by: RADIOLOGY

## 2019-07-03 PROCEDURE — 77334 PR  RADN TREATMENT AID(S) COMPLX: ICD-10-PCS | Mod: 26,,, | Performed by: RADIOLOGY

## 2019-07-03 PROCEDURE — 77014 HC CT GUIDANCE RADIATION THERAPY FLDS PLACEMENT: CPT | Mod: TC | Performed by: RADIOLOGY

## 2019-07-03 PROCEDURE — 99999 PR PBB SHADOW E&M-EST. PATIENT-LVL I: CPT | Mod: PBBFAC,,, | Performed by: NURSE PRACTITIONER

## 2019-07-03 PROCEDURE — 99999 PR PBB SHADOW E&M-EST. PATIENT-LVL I: ICD-10-PCS | Mod: PBBFAC,,, | Performed by: NURSE PRACTITIONER

## 2019-07-03 PROCEDURE — 77263 PR  RADIATION THERAPY PLAN COMPLEX: ICD-10-PCS | Mod: ,,, | Performed by: RADIOLOGY

## 2019-07-03 PROCEDURE — 77290 THER RAD SIMULAJ FIELD CPLX: CPT | Mod: TC | Performed by: RADIOLOGY

## 2019-07-03 PROCEDURE — 77290 PR  SET RADN THERAPY FIELD COMPLEX: ICD-10-PCS | Mod: 26,,, | Performed by: RADIOLOGY

## 2019-07-03 PROCEDURE — 99215 PR OFFICE/OUTPT VISIT, EST, LEVL V, 40-54 MIN: ICD-10-PCS | Mod: S$GLB,,, | Performed by: NURSE PRACTITIONER

## 2019-07-03 NOTE — PROGRESS NOTES
52 y/o male who presents to the [facility] today for chemotherapy teaching.  Patient given the Navigation Notebook.  I had a detailed discussion with patient in regards to how to use notebook.  I had a detailed discussion with patient regarding the rationale for chemotherapy, how the chemotherapy works, the process of treatment, potential side effects along with managing the potential side effects.  Also discussed with patient signs and symptoms to report.     I had a detailed discussion with the patient and reviewed the specific medication(s) and gave them a handout describing the potential side effects of XELODA along with the management of those potential side effects. Also discussed with patient signs and symptoms to report.     Discussed in detail potential side effects such as:  Nausea and vomiting  Bone Marrow Suppression  Thrombocytopenia precautions  Anemia  Leukopenia  Fatigue  Anorexia  Stomatitis  Diarrhea  Cystitis  Gastritis  Fever > 100.4  Antiemetics instructions  Skin care  Constipation  Rash  Photosensitivity  Sunscreen SPF 30   Small frequent meals  Increased protein  Increased calories  Vitamin support   Taste alterations  Neuropathys  Increased Hydration  Renal toxicity   DVTs  Stress   Depression   Community resources  Hand and foot syndrome- symptoms and self care tips  Importance of monitoring blood sugars if diabetic and reporting elevations or lows    60 minutes face to face time spent with patient. 50 minutes spent educating patient regarding XELODA. 10 minutes spent adrdessing questions/concerns. Also signed consent

## 2019-07-04 ENCOUNTER — PATIENT MESSAGE (OUTPATIENT)
Dept: HEMATOLOGY/ONCOLOGY | Facility: CLINIC | Age: 54
End: 2019-07-04

## 2019-07-05 ENCOUNTER — TELEPHONE (OUTPATIENT)
Dept: HEMATOLOGY/ONCOLOGY | Facility: CLINIC | Age: 54
End: 2019-07-05

## 2019-07-05 DIAGNOSIS — C20 RECTAL CANCER: ICD-10-CM

## 2019-07-05 RX ORDER — OXYCODONE HYDROCHLORIDE 5 MG/1
5 TABLET ORAL EVERY 4 HOURS PRN
Qty: 90 TABLET | Refills: 0 | Status: SHIPPED | OUTPATIENT
Start: 2019-07-05 | End: 2019-07-29 | Stop reason: SDUPTHER

## 2019-07-05 NOTE — TELEPHONE ENCOUNTER
"Called pt to address Distress Screening 8/10. The most pressing concern for him today is significant pain. He said he's gotten to wear he has had tears in his eyes and complains of pressure, having difficulty sitting. SW will attempt to further escalate with Dr. Aden. Provided pt with SW phone number to call if he has not gotten a response before the end of today. Also discussed that radiation will hopefully help ease some of this pain and encouraged him to take his pain medication as prescribed, that taking it more regimented may help prevent the pain from becoming unbearable.     In terms of distress screening noted concerns; normalized and validated emotional concerns. Discussed that should this begin to interfere with QOL, there are things that can be done to help (counseling, medication).     Pt says he has met deductible for this year but is receptive to financial assistance. Has a al for oral chemo. GUERITA will see if there is other outside assistance available to ease financial burden of treatment.     He works as a  and is planning to continue working as much as he can. Says his job is helpful when it comes to needing to take off.     He said someone told him about [facility] "for protein drinks." GUERITA will make a referral and ask them to contact him about registering.     No other needs expressed at this time. Plans made to f/u in person on 7/10 to assess any additional needs. Provided him with SW contact info.   "

## 2019-07-08 PROCEDURE — 77470 SPECIAL RADIATION TREATMENT: CPT | Mod: 59,TC | Performed by: RADIOLOGY

## 2019-07-08 PROCEDURE — 77301 PR  INTEN MOD RADIOTHER PLAN W/DOSE VOL HIST: ICD-10-PCS | Mod: 26,,, | Performed by: RADIOLOGY

## 2019-07-08 PROCEDURE — 77301 RADIOTHERAPY DOSE PLAN IMRT: CPT | Mod: 26,,, | Performed by: RADIOLOGY

## 2019-07-08 PROCEDURE — 77301 RADIOTHERAPY DOSE PLAN IMRT: CPT | Mod: TC | Performed by: RADIOLOGY

## 2019-07-09 PROCEDURE — 77338 DESIGN MLC DEVICE FOR IMRT: CPT | Mod: 26,,, | Performed by: RADIOLOGY

## 2019-07-09 PROCEDURE — 77300 RADIATION THERAPY DOSE PLAN: CPT | Mod: 26,,, | Performed by: RADIOLOGY

## 2019-07-09 PROCEDURE — 77338 DESIGN MLC DEVICE FOR IMRT: CPT | Mod: TC | Performed by: RADIOLOGY

## 2019-07-09 PROCEDURE — 77300 RADIATION THERAPY DOSE PLAN: CPT | Mod: TC | Performed by: RADIOLOGY

## 2019-07-09 PROCEDURE — 77300 PR RADIATION THERAPY,DOSIMETRY PLAN: ICD-10-PCS | Mod: 26,,, | Performed by: RADIOLOGY

## 2019-07-09 PROCEDURE — 77338 PR  MLC IMRT DESIGN & CONSTRUCTION PER IMRT PLAN: ICD-10-PCS | Mod: 26,,, | Performed by: RADIOLOGY

## 2019-07-10 ENCOUNTER — DOCUMENTATION ONLY (OUTPATIENT)
Dept: RADIATION ONCOLOGY | Facility: CLINIC | Age: 54
End: 2019-07-10

## 2019-07-10 ENCOUNTER — OFFICE VISIT (OUTPATIENT)
Dept: HEMATOLOGY/ONCOLOGY | Facility: CLINIC | Age: 54
End: 2019-07-10
Payer: COMMERCIAL

## 2019-07-10 ENCOUNTER — LAB VISIT (OUTPATIENT)
Dept: LAB | Facility: HOSPITAL | Age: 54
End: 2019-07-10
Attending: INTERNAL MEDICINE
Payer: COMMERCIAL

## 2019-07-10 VITALS
TEMPERATURE: 99 F | HEART RATE: 90 BPM | HEIGHT: 72 IN | RESPIRATION RATE: 18 BRPM | OXYGEN SATURATION: 96 % | SYSTOLIC BLOOD PRESSURE: 111 MMHG | DIASTOLIC BLOOD PRESSURE: 69 MMHG | WEIGHT: 161.38 LBS | BODY MASS INDEX: 21.86 KG/M2

## 2019-07-10 DIAGNOSIS — C20 RECTAL CANCER: ICD-10-CM

## 2019-07-10 DIAGNOSIS — C20 RECTAL CANCER: Primary | ICD-10-CM

## 2019-07-10 DIAGNOSIS — R11.0 NAUSEA: Primary | ICD-10-CM

## 2019-07-10 DIAGNOSIS — R50.9 FEVER AND CHILLS: Primary | ICD-10-CM

## 2019-07-10 LAB
ALBUMIN SERPL BCP-MCNC: 3.3 G/DL (ref 3.5–5.2)
ALP SERPL-CCNC: 82 U/L (ref 55–135)
ALT SERPL W/O P-5'-P-CCNC: 10 U/L (ref 10–44)
ANION GAP SERPL CALC-SCNC: 9 MMOL/L (ref 8–16)
AST SERPL-CCNC: 11 U/L (ref 10–40)
BASOPHILS # BLD AUTO: 0.03 K/UL (ref 0–0.2)
BASOPHILS NFR BLD: 0.2 % (ref 0–1.9)
BILIRUB SERPL-MCNC: 0.6 MG/DL (ref 0.1–1)
BUN SERPL-MCNC: 9 MG/DL (ref 6–20)
CALCIUM SERPL-MCNC: 9.6 MG/DL (ref 8.7–10.5)
CEA SERPL-MCNC: 2.3 NG/ML (ref 0–5)
CHLORIDE SERPL-SCNC: 98 MMOL/L (ref 95–110)
CO2 SERPL-SCNC: 27 MMOL/L (ref 23–29)
CREAT SERPL-MCNC: 0.8 MG/DL (ref 0.5–1.4)
DIFFERENTIAL METHOD: ABNORMAL
EOSINOPHIL # BLD AUTO: 0.5 K/UL (ref 0–0.5)
EOSINOPHIL NFR BLD: 4.3 % (ref 0–8)
ERYTHROCYTE [DISTWIDTH] IN BLOOD BY AUTOMATED COUNT: 13.4 % (ref 11.5–14.5)
EST. GFR  (AFRICAN AMERICAN): >60 ML/MIN/1.73 M^2
EST. GFR  (NON AFRICAN AMERICAN): >60 ML/MIN/1.73 M^2
GLUCOSE SERPL-MCNC: 119 MG/DL (ref 70–110)
HCT VFR BLD AUTO: 32.9 % (ref 40–54)
HGB BLD-MCNC: 11.6 G/DL (ref 14–18)
LYMPHOCYTES # BLD AUTO: 4.2 K/UL (ref 1–4.8)
LYMPHOCYTES NFR BLD: 34.7 % (ref 18–48)
MCH RBC QN AUTO: 28.3 PG (ref 27–31)
MCHC RBC AUTO-ENTMCNC: 35.3 G/DL (ref 32–36)
MCV RBC AUTO: 80 FL (ref 82–98)
MONOCYTES # BLD AUTO: 1.1 K/UL (ref 0.3–1)
MONOCYTES NFR BLD: 8.9 % (ref 4–15)
NEUTROPHILS # BLD AUTO: 6.3 K/UL (ref 1.8–7.7)
NEUTROPHILS NFR BLD: 52.1 % (ref 38–73)
PLATELET # BLD AUTO: 378 K/UL (ref 150–350)
PMV BLD AUTO: 8.4 FL (ref 9.2–12.9)
POTASSIUM SERPL-SCNC: 3.9 MMOL/L (ref 3.5–5.1)
PROT SERPL-MCNC: 8.1 G/DL (ref 6–8.4)
RBC # BLD AUTO: 4.1 M/UL (ref 4.6–6.2)
SODIUM SERPL-SCNC: 134 MMOL/L (ref 136–145)
WBC # BLD AUTO: 12.2 K/UL (ref 3.9–12.7)

## 2019-07-10 PROCEDURE — 3008F PR BODY MASS INDEX (BMI) DOCUMENTED: ICD-10-PCS | Mod: CPTII,S$GLB,, | Performed by: INTERNAL MEDICINE

## 2019-07-10 PROCEDURE — 99214 PR OFFICE/OUTPT VISIT, EST, LEVL IV, 30-39 MIN: ICD-10-PCS | Mod: S$GLB,,, | Performed by: INTERNAL MEDICINE

## 2019-07-10 PROCEDURE — 77014 HC CT GUIDANCE RADIATION THERAPY FLDS PLACEMENT: CPT | Mod: TC | Performed by: RADIOLOGY

## 2019-07-10 PROCEDURE — 99999 PR PBB SHADOW E&M-EST. PATIENT-LVL III: ICD-10-PCS | Mod: PBBFAC,,, | Performed by: INTERNAL MEDICINE

## 2019-07-10 PROCEDURE — 82378 CARCINOEMBRYONIC ANTIGEN: CPT

## 2019-07-10 PROCEDURE — 80053 COMPREHEN METABOLIC PANEL: CPT

## 2019-07-10 PROCEDURE — 77386 HC IMRT, COMPLEX: CPT | Performed by: RADIOLOGY

## 2019-07-10 PROCEDURE — 99214 OFFICE O/P EST MOD 30 MIN: CPT | Mod: S$GLB,,, | Performed by: INTERNAL MEDICINE

## 2019-07-10 PROCEDURE — 99999 PR PBB SHADOW E&M-EST. PATIENT-LVL III: CPT | Mod: PBBFAC,,, | Performed by: INTERNAL MEDICINE

## 2019-07-10 PROCEDURE — 3008F BODY MASS INDEX DOCD: CPT | Mod: CPTII,S$GLB,, | Performed by: INTERNAL MEDICINE

## 2019-07-10 PROCEDURE — 36415 COLL VENOUS BLD VENIPUNCTURE: CPT

## 2019-07-10 PROCEDURE — 85025 COMPLETE CBC W/AUTO DIFF WBC: CPT

## 2019-07-10 RX ORDER — MORPHINE SULFATE 15 MG/1
15-30 TABLET, FILM COATED, EXTENDED RELEASE ORAL NIGHTLY
Qty: 60 TABLET | Refills: 0 | Status: SHIPPED | OUTPATIENT
Start: 2019-07-10 | End: 2019-07-29 | Stop reason: SDUPTHER

## 2019-07-10 RX ORDER — PROMETHAZINE HYDROCHLORIDE 25 MG/1
25 TABLET ORAL EVERY 4 HOURS
Qty: 25 TABLET | Refills: 2 | Status: SHIPPED | OUTPATIENT
Start: 2019-07-10 | End: 2019-12-04 | Stop reason: SDUPTHER

## 2019-07-10 NOTE — PLAN OF CARE
START ON PATHWAY REGIMEN - Colorectal    [id number]        Capecitabine (Xeloda(R))     **Always confirm dose/schedule in your pharmacy ordering system**    Patient Characteristics:  Rectal Neoadjuvant/Adjuvant, T3 - T4, N0 or Any T, N+, Neoadjuvant Therapy  Current evidence of distant metastases<= No  AJCC T Category: TX  AJCC N Category: NX  AJCC M Category: M0  AJCC 8 Stage Grouping: IIIA  Intent of Therapy:  Curative Intent, Discussed with Patient

## 2019-07-10 NOTE — PROGRESS NOTES
Subjective:       Patient ID: Sukhjinder Presley is a 53 y.o. male.    Chief Complaint: Rectal cancer [C20]  HPI: We have an opportunity to see Mr. Sukhjinder Presley in Hematology Oncology clinic at [facility] on 07/10/2019.  Mr. Sukhjinder Presley is a 53 y.o. gentleman with rectal cancer starting neoadjuvant chemoradiation today.  Reported having had chills over the past 3 days.  Denies any cough, congestion, dysuria.       Rectal cancer    6/24/2019 Initial Diagnosis     Rectal cancer         6/26/2019 Cancer Staged     Staging form: Colon and Rectum, AJCC 8th Edition  - Clinical stage from 6/26/2019: Stage IIIB (cT3, cN2a, cM0) - Signed by Artem Mishra II, MD on 6/26/2019         7/10/2019 -  Chemotherapy     Treatment Summary   Plan Name: OP CAPECITABINE 5 DAYS + RADIOTHERAPY  Treatment Goal: Curative  Status: Active  Start Date: 7/10/2019 (Planned)  End Date: 7/10/2019 (Planned)  Provider: Song Aden MD  Chemotherapy: [No matching medication found in this treatment plan]          Past Medical History:   Diagnosis Date    Anemia      Family History   Problem Relation Age of Onset    Intestinal malrotation Mother     Leukemia Father     No Known Problems Sister     Coronary artery disease Brother     Coronary artery disease Maternal Grandmother     Stomach cancer Maternal Grandfather      Social History     Socioeconomic History    Marital status: Significant Other     Spouse name: Not on file    Number of children: Not on file    Years of education: Not on file    Highest education level: Not on file   Occupational History    Not on file   Social Needs    Financial resource strain: Somewhat hard    Food insecurity:     Worry: Sometimes true     Inability: Sometimes true    Transportation needs:     Medical: No     Non-medical: No   Tobacco Use    Smoking status: Current Every Day Smoker     Packs/day: 1.00     Years: 35.00     Pack years: 35.00     Types: Cigarettes     Start date: 1/2/1984     Smokeless tobacco: Current User     Types: Chew   Substance and Sexual Activity    Alcohol use: Yes     Alcohol/week: 27.6 oz     Types: 42 Cans of beer, 4 Shots of liquor per week     Frequency: 4 or more times a week     Drinks per session: 5 or 6     Binge frequency: Daily or almost daily     Comment: nancie Virgen    Drug use: Never    Sexual activity: Yes     Partners: Female     Birth control/protection: None   Lifestyle    Physical activity:     Days per week: 1 day     Minutes per session: 60 min    Stress: Not on file   Relationships    Social connections:     Talks on phone: More than three times a week     Gets together: More than three times a week     Attends [employer]ism service: Not on file     Active member of club or organization: No     Attends meetings of clubs or organizations: Never     Relationship status: Living with partner   Other Topics Concern    Not on file   Social History Narrative    Not on file     Past Surgical History:   Procedure Laterality Date    COLONOSCOPY N/A 6/6/2019    [facility]    EGD (ESOPHAGOGASTRODUODENOSCOPY) N/A 6/6/2019    Performed by [facility] at [facility] ENDO    HERNIA REPAIR  1995    TONSILLECTOMY       Current Outpatient Medications   Medication Sig Dispense Refill    aspirin 81 MG Chew Take 81 mg by mouth once daily.      capecitabine (XELODA) 500 MG Tab Take 3 tablets (1500 mg) by mouth twice daily after breakfast and dinner on days of radiation only. 168 tablet 0    oxyCODONE (ROXICODONE) 5 MG immediate release tablet Take 1 tablet (5 mg total) by mouth every 4 (four) hours as needed for Pain. 90 tablet 0    promethazine (PHENERGAN) 25 MG tablet Take 1 tablet (25 mg total) by mouth every 4 (four) hours. 25 tablet 2    traMADol (ULTRAM) 50 mg tablet Take 1 tablet (50 mg total) by mouth every 6 (six) hours as needed for Pain. 90 tablet 0    varenicline (CHANTIX STARTING MONTH BOX) 0.5 mg (11)- 1 mg (42)  tablet Take one 0.5mg tab by mouth once daily X3 days,then increase to one 0.5mg tab twice daily X4 days,then increase to one 1mg tab twice daily 1 Package 0    morphine (MS CONTIN) 15 MG 12 hr tablet Take 1-2 tablets (15-30 mg total) by mouth nightly. 60 tablet 0     No current facility-administered medications for this visit.        Labs:  Lab Results   Component Value Date    WBC 11.88 06/11/2019    HGB 13.1 (L) 06/11/2019    HCT 41.9 06/11/2019    MCV 85 06/11/2019     (H) 06/11/2019     BMP  Lab Results   Component Value Date     06/11/2019    K 4.8 06/11/2019     06/11/2019    CO2 26 06/11/2019    BUN 11 06/11/2019    CREATININE 0.8 06/11/2019    CALCIUM 9.8 06/11/2019    ANIONGAP 10 06/11/2019    ESTGFRAFRICA >60.0 06/11/2019    EGFRNONAA >60.0 06/11/2019     Lab Results   Component Value Date    ALT 10 05/06/2019    AST 11 05/06/2019    ALKPHOS 95 05/06/2019    BILITOT 0.3 05/06/2019       Lab Results   Component Value Date    IRON 70 03/04/2019    TIBC 459 (H) 03/04/2019    FERRITIN 18 (L) 03/04/2019     Lab Results   Component Value Date    GVCPJCND09 >2000 (H) 01/15/2019     No results found for: FOLATE  Lab Results   Component Value Date    TSH 1.607 01/15/2019       I have reviewed the radiology reports and examined the scan/xray images.    Review of Systems   Constitutional: Negative.    HENT: Negative.    Eyes: Negative.    Respiratory: Negative.    Cardiovascular: Negative.    Gastrointestinal: Negative.    Endocrine: Negative.    Genitourinary: Negative.    Musculoskeletal: Negative.    Skin: Negative.    Allergic/Immunologic: Negative.    Neurological: Negative.    Hematological: Negative.    Psychiatric/Behavioral: Negative.      ECOG SCORE    0 - Fully active-able to carry on all pre-disease performance without restriction            Objective:     Vitals:    07/10/19 1612   BP: 111/69   Pulse: 90   Resp: 18   Temp: 99.3 °F (37.4 °C)   Body mass index is 21.89 kg/m².  Physical  Exam   Constitutional: He is oriented to person, place, and time. He appears well-developed and well-nourished.   HENT:   Head: Normocephalic and atraumatic.   Eyes: Conjunctivae and EOM are normal.   Neck: Normal range of motion. Neck supple.   Cardiovascular: Normal rate and regular rhythm.   Pulmonary/Chest: Effort normal and breath sounds normal.   Abdominal: Soft. Bowel sounds are normal.   Musculoskeletal: Normal range of motion.   Neurological: He is alert and oriented to person, place, and time.   Skin: Skin is warm and dry.   Psychiatric: He has a normal mood and affect. His behavior is normal. Judgment and thought content normal.   Nursing note and vitals reviewed.        Assessment:      1. Fever and chills    2. Rectal cancer           Plan:     Fever and chills  Likely viral  Advised to drink a lot of fluids  Check UA    Rectal cancer  -     morphine (MS CONTIN) 15 MG 12 hr tablet; Take 1-2 tablets (15-30 mg total) by mouth nightly.  Dispense: 60 tablet; Refill: 0

## 2019-07-11 PROCEDURE — 77014 HC CT GUIDANCE RADIATION THERAPY FLDS PLACEMENT: CPT | Mod: TC | Performed by: RADIOLOGY

## 2019-07-11 PROCEDURE — 77386 HC IMRT, COMPLEX: CPT | Performed by: RADIOLOGY

## 2019-07-11 NOTE — PLAN OF CARE
Problem: Adult Inpatient Plan of Care  Goal: Plan of Care Review  Outcome: Ongoing (interventions implemented as appropriate)  Day 1 of outpatient xrt to the rectum. Pelvis handout & verbal instructions were given. Contact info was provided. Skin care & side effects reviewed, skin care products given. Patient verbalized understanding.

## 2019-07-12 PROCEDURE — 77386 HC IMRT, COMPLEX: CPT | Performed by: RADIOLOGY

## 2019-07-12 PROCEDURE — 77014 HC CT GUIDANCE RADIATION THERAPY FLDS PLACEMENT: CPT | Mod: TC | Performed by: RADIOLOGY

## 2019-07-15 PROCEDURE — 77014 HC CT GUIDANCE RADIATION THERAPY FLDS PLACEMENT: CPT | Mod: TC | Performed by: RADIOLOGY

## 2019-07-15 PROCEDURE — 77386 HC IMRT, COMPLEX: CPT | Performed by: RADIOLOGY

## 2019-07-16 ENCOUNTER — DOCUMENTATION ONLY (OUTPATIENT)
Dept: RADIATION ONCOLOGY | Facility: CLINIC | Age: 54
End: 2019-07-16

## 2019-07-16 ENCOUNTER — DOCUMENTATION ONLY (OUTPATIENT)
Dept: HEMATOLOGY/ONCOLOGY | Facility: CLINIC | Age: 54
End: 2019-07-16

## 2019-07-16 PROCEDURE — 77014 HC CT GUIDANCE RADIATION THERAPY FLDS PLACEMENT: CPT | Mod: TC | Performed by: RADIOLOGY

## 2019-07-16 PROCEDURE — 77336 RADIATION PHYSICS CONSULT: CPT | Performed by: RADIOLOGY

## 2019-07-16 PROCEDURE — 77386 HC IMRT, COMPLEX: CPT | Performed by: RADIOLOGY

## 2019-07-16 NOTE — PLAN OF CARE
Problem: Adult Inpatient Plan of Care  Goal: Plan of Care Review  Outcome: Ongoing (interventions implemented as appropriate)  Day 5 outpatient xrt to rectum. Rectal bleeding has stopped. BM is easier. Pt c/o poor appetite. Encouraged to drink supplements. Will continue to monitor.

## 2019-07-16 NOTE — PROGRESS NOTES
Brief f/u with pt prior to his check in with Dr. David. Pt is well-groomed, smiling and in good spirits. Provided him with SW contact info as well as checklist of services. Encouraged him to look at it and f/u with SW later this week to be sure if he needs anything else it is addressed. Plans made to f/u again later this week.

## 2019-07-17 ENCOUNTER — OFFICE VISIT (OUTPATIENT)
Dept: HEMATOLOGY/ONCOLOGY | Facility: CLINIC | Age: 54
End: 2019-07-17
Payer: COMMERCIAL

## 2019-07-17 VITALS
DIASTOLIC BLOOD PRESSURE: 72 MMHG | TEMPERATURE: 99 F | WEIGHT: 158.06 LBS | BODY MASS INDEX: 21.41 KG/M2 | OXYGEN SATURATION: 98 % | HEART RATE: 98 BPM | RESPIRATION RATE: 18 BRPM | HEIGHT: 72 IN | SYSTOLIC BLOOD PRESSURE: 112 MMHG

## 2019-07-17 DIAGNOSIS — N20.0 KIDNEY STONE: ICD-10-CM

## 2019-07-17 DIAGNOSIS — C20 RECTAL CANCER: Primary | ICD-10-CM

## 2019-07-17 PROCEDURE — 99999 PR PBB SHADOW E&M-EST. PATIENT-LVL III: CPT | Mod: PBBFAC,,, | Performed by: INTERNAL MEDICINE

## 2019-07-17 PROCEDURE — 3008F BODY MASS INDEX DOCD: CPT | Mod: CPTII,S$GLB,, | Performed by: INTERNAL MEDICINE

## 2019-07-17 PROCEDURE — 99215 OFFICE O/P EST HI 40 MIN: CPT | Mod: 25,S$GLB,, | Performed by: INTERNAL MEDICINE

## 2019-07-17 PROCEDURE — 99999 PR PBB SHADOW E&M-EST. PATIENT-LVL III: ICD-10-PCS | Mod: PBBFAC,,, | Performed by: INTERNAL MEDICINE

## 2019-07-17 PROCEDURE — 77014 HC CT GUIDANCE RADIATION THERAPY FLDS PLACEMENT: CPT | Mod: TC | Performed by: RADIOLOGY

## 2019-07-17 PROCEDURE — 3008F PR BODY MASS INDEX (BMI) DOCUMENTED: ICD-10-PCS | Mod: CPTII,S$GLB,, | Performed by: INTERNAL MEDICINE

## 2019-07-17 PROCEDURE — 77386 HC IMRT, COMPLEX: CPT | Performed by: RADIOLOGY

## 2019-07-17 PROCEDURE — 99215 PR OFFICE/OUTPT VISIT, EST, LEVL V, 40-54 MIN: ICD-10-PCS | Mod: 25,S$GLB,, | Performed by: INTERNAL MEDICINE

## 2019-07-17 RX ORDER — DRONABINOL 5 MG/1
5 CAPSULE ORAL
Qty: 60 CAPSULE | Refills: 1 | Status: SHIPPED | OUTPATIENT
Start: 2019-07-17 | End: 2019-07-29 | Stop reason: SDUPTHER

## 2019-07-17 RX ORDER — SILVER SULFADIAZINE 10 G/1000G
CREAM TOPICAL
Qty: 400 G | Refills: 1 | Status: SHIPPED | OUTPATIENT
Start: 2019-07-17 | End: 2020-02-28

## 2019-07-17 RX ORDER — MEGESTROL ACETATE 40 MG/1
40 TABLET ORAL 4 TIMES DAILY
Qty: 90 TABLET | Refills: 1 | Status: SHIPPED | OUTPATIENT
Start: 2019-07-17 | End: 2020-01-31 | Stop reason: SDUPTHER

## 2019-07-17 NOTE — PROGRESS NOTES
Subjective:       Patient ID: Sukhjinder Presley is a 53 y.o. male.    Chief Complaint: Rectal cancer [C20]  HPI: We have an opportunity to see Mr. Sukhjinder Presley in Hematology Oncology clinic at [facility] on 07/17/2019.  Mr. Sukhjinder Presley is a 53 y.o. gentleman with rectal cancer on neoadjuvant chemoradiation.  Has perianal discomfort, decreased appetite, weight loss.       Rectal cancer    6/24/2019 Initial Diagnosis     Rectal cancer         6/26/2019 Cancer Staged     Staging form: Colon and Rectum, AJCC 8th Edition  - Clinical stage from 6/26/2019: Stage IIIB (cT3, cN2a, cM0) - Signed by Artem Mishra II, MD on 6/26/2019         7/10/2019 -  Chemotherapy     Treatment Summary   Plan Name: OP CAPECITABINE 5 DAYS + RADIOTHERAPY  Treatment Goal: Curative  Status: Active  Start Date: 7/10/2019 (Planned)  End Date: 7/10/2019 (Planned)  Provider: Song Aden MD  Chemotherapy: [No matching medication found in this treatment plan]          Past Medical History:   Diagnosis Date    Anemia      Family History   Problem Relation Age of Onset    Intestinal malrotation Mother     Leukemia Father     No Known Problems Sister     Coronary artery disease Brother     Coronary artery disease Maternal Grandmother     Stomach cancer Maternal Grandfather      Social History     Socioeconomic History    Marital status: Significant Other     Spouse name: Not on file    Number of children: Not on file    Years of education: Not on file    Highest education level: Not on file   Occupational History    Not on file   Social Needs    Financial resource strain: Somewhat hard    Food insecurity:     Worry: Sometimes true     Inability: Sometimes true    Transportation needs:     Medical: No     Non-medical: No   Tobacco Use    Smoking status: Current Every Day Smoker     Packs/day: 1.00     Years: 35.00     Pack years: 35.00     Types: Cigarettes     Start date: 1/2/1984    Smokeless tobacco: Current User      Types: Chew   Substance and Sexual Activity    Alcohol use: Yes     Alcohol/week: 27.6 oz     Types: 42 Cans of beer, 4 Shots of liquor per week     Frequency: 4 or more times a week     Drinks per session: 5 or 6     Binge frequency: Daily or almost daily     Comment: nancie Virgen    Drug use: Never    Sexual activity: Yes     Partners: Female     Birth control/protection: None   Lifestyle    Physical activity:     Days per week: 1 day     Minutes per session: 60 min    Stress: Not on file   Relationships    Social connections:     Talks on phone: More than three times a week     Gets together: More than three times a week     Attends Latter day service: Not on file     Active member of club or organization: No     Attends meetings of clubs or organizations: Never     Relationship status: Living with partner   Other Topics Concern    Not on file   Social History Narrative    Not on file     Past Surgical History:   Procedure Laterality Date    COLONOSCOPY N/A 6/6/2019    [facility]    EGD (ESOPHAGOGASTRODUODENOSCOPY) N/A 6/6/2019    [facility] ENDO    HERNIA REPAIR  1995    TONSILLECTOMY       Current Outpatient Medications   Medication Sig Dispense Refill    aspirin 81 MG Chew Take 81 mg by mouth once daily.      capecitabine (XELODA) 500 MG Tab Take 3 tablets (1500 mg) by mouth twice daily after breakfast and dinner on days of radiation only. 168 tablet 0    morphine (MS CONTIN) 15 MG 12 hr tablet Take 1-2 tablets (15-30 mg total) by mouth nightly. 60 tablet 0    oxyCODONE (ROXICODONE) 5 MG immediate release tablet Take 1 tablet (5 mg total) by mouth every 4 (four) hours as needed for Pain. 90 tablet 0    promethazine (PHENERGAN) 25 MG tablet Take 1 tablet (25 mg total) by mouth every 4 (four) hours. 25 tablet 2    traMADol (ULTRAM) 50 mg tablet Take 1 tablet (50 mg total) by mouth every 6 (six) hours as needed for Pain. 90 tablet 0     varenicline (CHANTIX STARTING MONTH BOX) 0.5 mg (11)- 1 mg (42) tablet Take one 0.5mg tab by mouth once daily X3 days,then increase to one 0.5mg tab twice daily X4 days,then increase to one 1mg tab twice daily 1 Package 0    dronabinol (MARINOL) 5 MG capsule Take 1 capsule (5 mg total) by mouth 2 (two) times daily before meals. 60 capsule 1    megestrol (MEGACE) 40 MG Tab Take 1 tablet (40 mg total) by mouth 4 (four) times daily. 90 tablet 1    silver sulfADIAZINE 1% (SILVADENE) 1 % cream Apply to affected area daily 400 g 1     No current facility-administered medications for this visit.        Labs:  Lab Results   Component Value Date    WBC 12.20 07/10/2019    HGB 11.6 (L) 07/10/2019    HCT 32.9 (L) 07/10/2019    MCV 80 (L) 07/10/2019     (H) 07/10/2019     BMP  Lab Results   Component Value Date     (L) 07/10/2019    K 3.9 07/10/2019    CL 98 07/10/2019    CO2 27 07/10/2019    BUN 9 07/10/2019    CREATININE 0.8 07/10/2019    CALCIUM 9.6 07/10/2019    ANIONGAP 9 07/10/2019    ESTGFRAFRICA >60 07/10/2019    EGFRNONAA >60 07/10/2019     Lab Results   Component Value Date    ALT 10 07/10/2019    AST 11 07/10/2019    ALKPHOS 82 07/10/2019    BILITOT 0.6 07/10/2019       Lab Results   Component Value Date    IRON 70 03/04/2019    TIBC 459 (H) 03/04/2019    FERRITIN 18 (L) 03/04/2019     Lab Results   Component Value Date    PIDYLFIW38 >2000 (H) 01/15/2019     No results found for: FOLATE  Lab Results   Component Value Date    TSH 1.607 01/15/2019       I have reviewed the radiology reports and examined the scan/xray images.    Review of Systems   Constitutional: Negative.    HENT: Negative.    Eyes: Negative.    Respiratory: Negative.    Cardiovascular: Negative.    Gastrointestinal: Negative.    Endocrine: Negative.    Genitourinary: Negative.    Musculoskeletal: Negative.    Skin: Negative.    Allergic/Immunologic: Negative.    Neurological: Negative.    Hematological: Negative.     Psychiatric/Behavioral: Negative.      ECOG SCORE    0 - Fully active-able to carry on all pre-disease performance without restriction            Objective:     Vitals:    07/17/19 1548   BP: 112/72   Pulse: 98   Resp: 18   Temp: 99.2 °F (37.3 °C)   Body mass index is 21.44 kg/m².  Physical Exam   Constitutional: He is oriented to person, place, and time. He appears well-developed and well-nourished.   HENT:   Head: Normocephalic and atraumatic.   Eyes: Conjunctivae and EOM are normal.   Neck: Normal range of motion. Neck supple.   Cardiovascular: Normal rate and regular rhythm.   Pulmonary/Chest: Effort normal and breath sounds normal.   Abdominal: Soft. Bowel sounds are normal.   Musculoskeletal: Normal range of motion.   Neurological: He is alert and oriented to person, place, and time.   Skin: Skin is warm and dry.   Psychiatric: He has a normal mood and affect. His behavior is normal. Judgment and thought content normal.   Nursing note and vitals reviewed.        Assessment:      1. Rectal cancer    2. Kidney stone           Plan:     Rectal cancer    Continue chemoradiation.  -     dronabinol (MARINOL) 5 MG capsule; Take 1 capsule (5 mg total) by mouth 2 (two) times daily before meals.  Dispense: 60 capsule; Refill: 1  -     megestrol (MEGACE) 40 MG Tab; Take 1 tablet (40 mg total) by mouth 4 (four) times daily.  Dispense: 90 tablet; Refill: 1  -     CBC auto differential; Future; Expected date: 07/24/2019  -     Comprehensive metabolic panel; Future; Expected date: 07/24/2019  -     CEA; Future; Expected date: 07/24/2019  -     Iron and TIBC; Future; Expected date: 07/24/2019  -     Ferritin; Future; Expected date: 07/24/2019  -     silver sulfADIAZINE 1% (SILVADENE) 1 % cream; Apply to affected area daily  Dispense: 400 g; Refill: 1    Kidney stone    Will monitor while doing rectal cancer treatment  Advised to drink a lot of fluids daily

## 2019-07-18 PROCEDURE — 77386 HC IMRT, COMPLEX: CPT | Performed by: RADIOLOGY

## 2019-07-18 PROCEDURE — 77014 HC CT GUIDANCE RADIATION THERAPY FLDS PLACEMENT: CPT | Mod: TC | Performed by: RADIOLOGY

## 2019-07-19 ENCOUNTER — DOCUMENTATION ONLY (OUTPATIENT)
Dept: HEMATOLOGY/ONCOLOGY | Facility: CLINIC | Age: 54
End: 2019-07-19

## 2019-07-19 PROCEDURE — 77014 HC CT GUIDANCE RADIATION THERAPY FLDS PLACEMENT: CPT | Mod: TC | Performed by: RADIOLOGY

## 2019-07-19 PROCEDURE — 77386 HC IMRT, COMPLEX: CPT | Performed by: RADIOLOGY

## 2019-07-19 NOTE — PROGRESS NOTES
Brief f/u with pt. Only requests help with financial assistance. SW will look into available financial assistance and SW colleague will f/u with pt next week to discuss available options. Pt appreciative and had no other needs at this time.

## 2019-07-22 PROCEDURE — 77014 HC CT GUIDANCE RADIATION THERAPY FLDS PLACEMENT: CPT | Mod: TC | Performed by: RADIOLOGY

## 2019-07-22 PROCEDURE — 77386 HC IMRT, COMPLEX: CPT | Performed by: RADIOLOGY

## 2019-07-23 ENCOUNTER — PATIENT MESSAGE (OUTPATIENT)
Dept: HEMATOLOGY/ONCOLOGY | Facility: CLINIC | Age: 54
End: 2019-07-23

## 2019-07-23 PROCEDURE — 77336 RADIATION PHYSICS CONSULT: CPT | Performed by: RADIOLOGY

## 2019-07-23 PROCEDURE — 77386 HC IMRT, COMPLEX: CPT | Performed by: RADIOLOGY

## 2019-07-23 PROCEDURE — 77014 HC CT GUIDANCE RADIATION THERAPY FLDS PLACEMENT: CPT | Mod: TC | Performed by: RADIOLOGY

## 2019-07-24 ENCOUNTER — DOCUMENTATION ONLY (OUTPATIENT)
Dept: RADIATION ONCOLOGY | Facility: CLINIC | Age: 54
End: 2019-07-24

## 2019-07-24 ENCOUNTER — TELEPHONE (OUTPATIENT)
Dept: RADIOLOGY | Facility: HOSPITAL | Age: 54
End: 2019-07-24

## 2019-07-24 ENCOUNTER — HOSPITAL ENCOUNTER (EMERGENCY)
Facility: HOSPITAL | Age: 54
Discharge: HOME | End: 2019-07-24
Attending: FAMILY MEDICINE
Payer: COMMERCIAL

## 2019-07-24 ENCOUNTER — OFFICE VISIT (OUTPATIENT)
Dept: HEMATOLOGY/ONCOLOGY | Facility: CLINIC | Age: 54
End: 2019-07-24
Payer: COMMERCIAL

## 2019-07-24 ENCOUNTER — LAB VISIT (OUTPATIENT)
Dept: LAB | Facility: HOSPITAL | Age: 54
End: 2019-07-24
Attending: NURSE PRACTITIONER
Payer: COMMERCIAL

## 2019-07-24 ENCOUNTER — INFUSION (OUTPATIENT)
Dept: INFUSION THERAPY | Facility: HOSPITAL | Age: 54
End: 2019-07-24
Attending: NURSE PRACTITIONER
Payer: COMMERCIAL

## 2019-07-24 VITALS
WEIGHT: 150.13 LBS | TEMPERATURE: 100 F | OXYGEN SATURATION: 99 % | HEIGHT: 72 IN | DIASTOLIC BLOOD PRESSURE: 74 MMHG | BODY MASS INDEX: 20.34 KG/M2 | SYSTOLIC BLOOD PRESSURE: 111 MMHG | HEART RATE: 87 BPM | RESPIRATION RATE: 16 BRPM

## 2019-07-24 VITALS
DIASTOLIC BLOOD PRESSURE: 82 MMHG | BODY MASS INDEX: 20.34 KG/M2 | SYSTOLIC BLOOD PRESSURE: 122 MMHG | OXYGEN SATURATION: 98 % | HEART RATE: 103 BPM | RESPIRATION RATE: 17 BRPM | HEIGHT: 72 IN | TEMPERATURE: 101 F | WEIGHT: 150.13 LBS

## 2019-07-24 VITALS — TEMPERATURE: 102 F

## 2019-07-24 DIAGNOSIS — R50.9 FEVER: Primary | ICD-10-CM

## 2019-07-24 DIAGNOSIS — R41.3 MEMORY LOSS: ICD-10-CM

## 2019-07-24 DIAGNOSIS — R50.9 FEVER, UNSPECIFIED FEVER CAUSE: ICD-10-CM

## 2019-07-24 DIAGNOSIS — R41.3 MEMORY LOSS: Primary | ICD-10-CM

## 2019-07-24 DIAGNOSIS — C20 RECTAL CANCER: ICD-10-CM

## 2019-07-24 DIAGNOSIS — R50.9 FEVER, UNSPECIFIED FEVER CAUSE: Primary | ICD-10-CM

## 2019-07-24 DIAGNOSIS — R63.4 WEIGHT LOSS: ICD-10-CM

## 2019-07-24 DIAGNOSIS — C20 RECTAL CANCER: Primary | ICD-10-CM

## 2019-07-24 DIAGNOSIS — E87.6 HYPOKALEMIA: ICD-10-CM

## 2019-07-24 LAB
ALBUMIN SERPL BCP-MCNC: 2.7 G/DL (ref 3.5–5.2)
ALBUMIN SERPL BCP-MCNC: 2.7 G/DL (ref 3.5–5.2)
ALP SERPL-CCNC: 69 U/L (ref 55–135)
ALP SERPL-CCNC: 72 U/L (ref 55–135)
ALT SERPL W/O P-5'-P-CCNC: 8 U/L (ref 10–44)
ALT SERPL W/O P-5'-P-CCNC: 8 U/L (ref 10–44)
ANION GAP SERPL CALC-SCNC: 12 MMOL/L (ref 8–16)
ANION GAP SERPL CALC-SCNC: 12 MMOL/L (ref 8–16)
AST SERPL-CCNC: 12 U/L (ref 10–40)
AST SERPL-CCNC: 13 U/L (ref 10–40)
BASOPHILS # BLD AUTO: 0.01 K/UL (ref 0–0.2)
BASOPHILS # BLD AUTO: 0.02 K/UL (ref 0–0.2)
BASOPHILS NFR BLD: 0.2 % (ref 0–1.9)
BASOPHILS NFR BLD: 0.3 % (ref 0–1.9)
BILIRUB SERPL-MCNC: 0.4 MG/DL (ref 0.1–1)
BILIRUB SERPL-MCNC: 0.4 MG/DL (ref 0.1–1)
BILIRUB UR QL STRIP: NEGATIVE
BUN SERPL-MCNC: 10 MG/DL (ref 6–20)
BUN SERPL-MCNC: 10 MG/DL (ref 6–20)
CALCIUM SERPL-MCNC: 9.3 MG/DL (ref 8.7–10.5)
CALCIUM SERPL-MCNC: 9.5 MG/DL (ref 8.7–10.5)
CEA SERPL-MCNC: 1.8 NG/ML (ref 0–5)
CHLORIDE SERPL-SCNC: 102 MMOL/L (ref 95–110)
CHLORIDE SERPL-SCNC: 102 MMOL/L (ref 95–110)
CLARITY UR: CLEAR
CO2 SERPL-SCNC: 25 MMOL/L (ref 23–29)
CO2 SERPL-SCNC: 27 MMOL/L (ref 23–29)
COLOR UR: YELLOW
CREAT SERPL-MCNC: 0.7 MG/DL (ref 0.5–1.4)
CREAT SERPL-MCNC: 0.8 MG/DL (ref 0.5–1.4)
DIFFERENTIAL METHOD: ABNORMAL
DIFFERENTIAL METHOD: ABNORMAL
EOSINOPHIL # BLD AUTO: 0.2 K/UL (ref 0–0.5)
EOSINOPHIL # BLD AUTO: 0.2 K/UL (ref 0–0.5)
EOSINOPHIL NFR BLD: 2.4 % (ref 0–8)
EOSINOPHIL NFR BLD: 2.7 % (ref 0–8)
ERYTHROCYTE [DISTWIDTH] IN BLOOD BY AUTOMATED COUNT: 13.5 % (ref 11.5–14.5)
ERYTHROCYTE [DISTWIDTH] IN BLOOD BY AUTOMATED COUNT: 13.7 % (ref 11.5–14.5)
EST. GFR  (AFRICAN AMERICAN): >60 ML/MIN/1.73 M^2
EST. GFR  (AFRICAN AMERICAN): >60 ML/MIN/1.73 M^2
EST. GFR  (NON AFRICAN AMERICAN): >60 ML/MIN/1.73 M^2
EST. GFR  (NON AFRICAN AMERICAN): >60 ML/MIN/1.73 M^2
FERRITIN SERPL-MCNC: 180 NG/ML (ref 20–300)
GLUCOSE SERPL-MCNC: 122 MG/DL (ref 70–110)
GLUCOSE SERPL-MCNC: 138 MG/DL (ref 70–110)
GLUCOSE UR QL STRIP: NEGATIVE
HCT VFR BLD AUTO: 31.3 % (ref 40–54)
HCT VFR BLD AUTO: 31.8 % (ref 40–54)
HGB BLD-MCNC: 10.8 G/DL (ref 14–18)
HGB BLD-MCNC: 10.8 G/DL (ref 14–18)
HGB UR QL STRIP: NEGATIVE
IRON SERPL-MCNC: 19 UG/DL (ref 45–160)
KETONES UR QL STRIP: NEGATIVE
LACTATE SERPL-SCNC: 2.1 MMOL/L (ref 0.5–2.2)
LEUKOCYTE ESTERASE UR QL STRIP: NEGATIVE
LYMPHOCYTES # BLD AUTO: 1.2 K/UL (ref 1–4.8)
LYMPHOCYTES # BLD AUTO: 1.3 K/UL (ref 1–4.8)
LYMPHOCYTES NFR BLD: 18.8 % (ref 18–48)
LYMPHOCYTES NFR BLD: 19.7 % (ref 18–48)
MAGNESIUM SERPL-MCNC: 1.7 MG/DL (ref 1.6–2.6)
MAGNESIUM SERPL-MCNC: 1.8 MG/DL (ref 1.6–2.6)
MCH RBC QN AUTO: 27.4 PG (ref 27–31)
MCH RBC QN AUTO: 27.6 PG (ref 27–31)
MCHC RBC AUTO-ENTMCNC: 34 G/DL (ref 32–36)
MCHC RBC AUTO-ENTMCNC: 34.5 G/DL (ref 32–36)
MCV RBC AUTO: 80 FL (ref 82–98)
MCV RBC AUTO: 81 FL (ref 82–98)
MONOCYTES # BLD AUTO: 0.5 K/UL (ref 0.3–1)
MONOCYTES # BLD AUTO: 0.7 K/UL (ref 0.3–1)
MONOCYTES NFR BLD: 10.2 % (ref 4–15)
MONOCYTES NFR BLD: 7.5 % (ref 4–15)
NEUTROPHILS # BLD AUTO: 4.5 K/UL (ref 1.8–7.7)
NEUTROPHILS # BLD AUTO: 4.7 K/UL (ref 1.8–7.7)
NEUTROPHILS NFR BLD: 67.7 % (ref 38–73)
NEUTROPHILS NFR BLD: 71.3 % (ref 38–73)
NITRITE UR QL STRIP: NEGATIVE
PH UR STRIP: 6 [PH] (ref 5–8)
PHOSPHATE SERPL-MCNC: 2.9 MG/DL (ref 2.7–4.5)
PHOSPHATE SERPL-MCNC: 3 MG/DL (ref 2.7–4.5)
PLATELET # BLD AUTO: 275 K/UL (ref 150–350)
PLATELET # BLD AUTO: 281 K/UL (ref 150–350)
PMV BLD AUTO: 7.8 FL (ref 9.2–12.9)
PMV BLD AUTO: 8 FL (ref 9.2–12.9)
POTASSIUM SERPL-SCNC: 3.4 MMOL/L (ref 3.5–5.1)
POTASSIUM SERPL-SCNC: 3.9 MMOL/L (ref 3.5–5.1)
PROCALCITONIN SERPL IA-MCNC: 0.04 NG/ML
PROT SERPL-MCNC: 7.5 G/DL (ref 6–8.4)
PROT SERPL-MCNC: 7.7 G/DL (ref 6–8.4)
PROT UR QL STRIP: NEGATIVE
RBC # BLD AUTO: 3.91 M/UL (ref 4.6–6.2)
RBC # BLD AUTO: 3.94 M/UL (ref 4.6–6.2)
SATURATED IRON: 8 % (ref 20–50)
SODIUM SERPL-SCNC: 139 MMOL/L (ref 136–145)
SODIUM SERPL-SCNC: 141 MMOL/L (ref 136–145)
SP GR UR STRIP: 1.02 (ref 1–1.03)
TOTAL IRON BINDING CAPACITY: 246 UG/DL (ref 250–450)
TRANSFERRIN SERPL-MCNC: 166 MG/DL (ref 200–375)
URN SPEC COLLECT METH UR: NORMAL
UROBILINOGEN UR STRIP-ACNC: 1 EU/DL
WBC # BLD AUTO: 6.56 K/UL (ref 3.9–12.7)
WBC # BLD AUTO: 6.66 K/UL (ref 3.9–12.7)

## 2019-07-24 PROCEDURE — 83540 ASSAY OF IRON: CPT

## 2019-07-24 PROCEDURE — 93010 EKG 12-LEAD: ICD-10-PCS | Mod: ,,, | Performed by: INTERNAL MEDICINE

## 2019-07-24 PROCEDURE — 81003 URINALYSIS AUTO W/O SCOPE: CPT

## 2019-07-24 PROCEDURE — 77014 HC CT GUIDANCE RADIATION THERAPY FLDS PLACEMENT: CPT | Mod: TC | Performed by: RADIOLOGY

## 2019-07-24 PROCEDURE — 96361 HYDRATE IV INFUSION ADD-ON: CPT

## 2019-07-24 PROCEDURE — 80053 COMPREHEN METABOLIC PANEL: CPT

## 2019-07-24 PROCEDURE — 99285 EMERGENCY DEPT VISIT HI MDM: CPT | Mod: 25

## 2019-07-24 PROCEDURE — 85025 COMPLETE CBC W/AUTO DIFF WBC: CPT | Mod: 91

## 2019-07-24 PROCEDURE — 83735 ASSAY OF MAGNESIUM: CPT | Mod: 91

## 2019-07-24 PROCEDURE — 84100 ASSAY OF PHOSPHORUS: CPT | Mod: 91

## 2019-07-24 PROCEDURE — 63600175 PHARM REV CODE 636 W HCPCS: Performed by: NURSE PRACTITIONER

## 2019-07-24 PROCEDURE — 84100 ASSAY OF PHOSPHORUS: CPT

## 2019-07-24 PROCEDURE — 83735 ASSAY OF MAGNESIUM: CPT

## 2019-07-24 PROCEDURE — 87040 BLOOD CULTURE FOR BACTERIA: CPT | Mod: 59

## 2019-07-24 PROCEDURE — 82378 CARCINOEMBRYONIC ANTIGEN: CPT

## 2019-07-24 PROCEDURE — 85025 COMPLETE CBC W/AUTO DIFF WBC: CPT

## 2019-07-24 PROCEDURE — 99215 PR OFFICE/OUTPT VISIT, EST, LEVL V, 40-54 MIN: ICD-10-PCS | Mod: 25,S$GLB,, | Performed by: NURSE PRACTITIONER

## 2019-07-24 PROCEDURE — 63600175 PHARM REV CODE 636 W HCPCS: Performed by: FAMILY MEDICINE

## 2019-07-24 PROCEDURE — 99215 OFFICE O/P EST HI 40 MIN: CPT | Mod: 25,S$GLB,, | Performed by: NURSE PRACTITIONER

## 2019-07-24 PROCEDURE — 96360 HYDRATION IV INFUSION INIT: CPT

## 2019-07-24 PROCEDURE — 3008F PR BODY MASS INDEX (BMI) DOCUMENTED: ICD-10-PCS | Mod: CPTII,S$GLB,, | Performed by: NURSE PRACTITIONER

## 2019-07-24 PROCEDURE — 82728 ASSAY OF FERRITIN: CPT

## 2019-07-24 PROCEDURE — 84145 PROCALCITONIN (PCT): CPT

## 2019-07-24 PROCEDURE — 80053 COMPREHEN METABOLIC PANEL: CPT | Mod: 91

## 2019-07-24 PROCEDURE — 93005 ELECTROCARDIOGRAM TRACING: CPT

## 2019-07-24 PROCEDURE — 93010 ELECTROCARDIOGRAM REPORT: CPT | Mod: ,,, | Performed by: INTERNAL MEDICINE

## 2019-07-24 PROCEDURE — 36415 COLL VENOUS BLD VENIPUNCTURE: CPT

## 2019-07-24 PROCEDURE — 99999 PR PBB SHADOW E&M-EST. PATIENT-LVL III: CPT | Mod: PBBFAC,,, | Performed by: NURSE PRACTITIONER

## 2019-07-24 PROCEDURE — 83605 ASSAY OF LACTIC ACID: CPT

## 2019-07-24 PROCEDURE — 3008F BODY MASS INDEX DOCD: CPT | Mod: CPTII,S$GLB,, | Performed by: NURSE PRACTITIONER

## 2019-07-24 PROCEDURE — 99999 PR PBB SHADOW E&M-EST. PATIENT-LVL III: ICD-10-PCS | Mod: PBBFAC,,, | Performed by: NURSE PRACTITIONER

## 2019-07-24 PROCEDURE — 77386 HC IMRT, COMPLEX: CPT | Performed by: RADIOLOGY

## 2019-07-24 RX ORDER — HEPARIN 100 UNIT/ML
500 SYRINGE INTRAVENOUS
Status: CANCELLED | OUTPATIENT
Start: 2019-07-24

## 2019-07-24 RX ORDER — SODIUM CHLORIDE 9 MG/ML
1000 INJECTION, SOLUTION INTRAVENOUS
Status: COMPLETED | OUTPATIENT
Start: 2019-07-24 | End: 2019-07-24

## 2019-07-24 RX ORDER — POTASSIUM CHLORIDE 20 MEQ/1
20 TABLET, EXTENDED RELEASE ORAL DAILY
Qty: 3 TABLET | Refills: 0 | Status: SHIPPED | OUTPATIENT
Start: 2019-07-24 | End: 2019-07-27

## 2019-07-24 RX ORDER — SODIUM CHLORIDE 0.9 % (FLUSH) 0.9 %
10 SYRINGE (ML) INJECTION
Status: CANCELLED | OUTPATIENT
Start: 2019-07-24

## 2019-07-24 RX ORDER — AMOXICILLIN AND CLAVULANATE POTASSIUM 875; 125 MG/1; MG/1
1 TABLET, FILM COATED ORAL 2 TIMES DAILY
Qty: 14 TABLET | Refills: 0 | Status: SHIPPED | OUTPATIENT
Start: 2019-07-24 | End: 2019-08-05

## 2019-07-24 RX ADMIN — SODIUM CHLORIDE 1000 ML: 0.9 INJECTION, SOLUTION INTRAVENOUS at 04:07

## 2019-07-24 RX ADMIN — SODIUM CHLORIDE 2043 ML: 0.9 INJECTION, SOLUTION INTRAVENOUS at 05:07

## 2019-07-24 NOTE — DISCHARGE INSTRUCTIONS
[place] [facility] [facility] [place]  [address] [facility] [facility]  [address] [address]  [phone] phone     [phone] fax  Hours of Operation: Monday- Friday 8:00am- 5:00pm  After hours phone  [phone]  Hematology / Oncology Physicians on call      Dr. Harjeet Aden      Please call with any concerns regarding your appointment today.

## 2019-07-24 NOTE — PLAN OF CARE
Problem: Adult Inpatient Plan of Care  Goal: Plan of Care Review  Outcome: Ongoing (interventions implemented as appropriate)  Day 11 outpatient xrt to rectum. Temp 100.6 and fatigued. Will get fluids today in med onc. Rectal urgency. No BRBPR past week other than when wipes.7 lbs wt loss past week. Needs to increase to 3-4 supplements per day. Will continue to monitor.

## 2019-07-24 NOTE — ED PROVIDER NOTES
SCRIBE #1 NOTE: I, Lydia Maria, am scribing for, and in the presence of, Yanna Akins MD. I have scribed the entire note.       History     Chief Complaint   Patient presents with    Fever     had tmax 102.6 @[facility]/ sent over     Review of patient's allergies indicates:  No Known Allergies      History of Present Illness     HPI     7/24/2019, 5:29 PM  History obtained from the patient and family member      History of Present Illness: Sukhjinder Presley is a 53 y.o. male patient with a PMHx of anemia and rectal cancer who presents to the Emergency Department for evaluation of a fever (Tmax 102.6 F) which onset gradually 2 weeks ago. Family member reports pt has been running low grade fevers (99 F) since pt began taking Xeloda (chemotherapy). He reports a spike in temperature today. Pt visited with Fannie Presley NP (Hematology and Oncology) for sxs of short term memory loss issues earlier today and referred to the ED for further evaluation of sxs. Symptoms are constant and moderate in severity. No mitigating or exacerbating factors reported. Associated sxs include fatigue, chills, and decreased appetite. Patient denies any diaphoresis, activity change, sore throat, rhinorrhea, SOB, CP, abd pain, n/v/d, dysuria, urinary frequency/urgency, dizziness, extremity weakness/numbness, and all other sxs at this time. Pt is also undergoing radiation at this time. No further complaints or concerns at this time.       Arrival mode: Personal vehicle    PCP: Drake Elizalde MD        Past Medical History:  Past Medical History:   Diagnosis Date    Anemia     Cancer        Past Surgical History:  Past Surgical History:   Procedure Laterality Date    COLONOSCOPY N/A 6/6/2019    Performed by [facility] at [facility]    EGD (ESOPHAGOGASTRODUODENOSCOPY) N/A 6/6/2019    Performed by [facility]ra MD at [facility] ENDO    HERNIA REPAIR  1995    TONSILLECTOMY           Family History:  Family History   Problem  Relation Age of Onset    Intestinal malrotation Mother     Leukemia Father     No Known Problems Sister     Coronary artery disease Brother     Coronary artery disease Maternal Grandmother     Stomach cancer Maternal Grandfather        Social History:  Social History     Tobacco Use    Smoking status: Current Every Day Smoker     Packs/day: 1.00     Years: 35.00     Pack years: 35.00     Types: Cigarettes     Start date: 1/2/1984    Smokeless tobacco: Current User     Types: Chew   Substance and Sexual Activity    Alcohol use: Yes     Alcohol/week: 27.6 oz     Types: 42 Cans of beer, 4 Shots of liquor per week     Frequency: 4 or more times a week     Drinks per session: 5 or 6     Binge frequency: Daily or almost daily     Comment: nancie Virgen    Drug use: Never    Sexual activity: Yes     Partners: Female     Birth control/protection: None        Review of Systems     Review of Systems   Constitutional: Positive for appetite change (decreased), chills, fatigue and fever (Tmax 102.6 F). Negative for activity change and diaphoresis.   HENT: Negative for rhinorrhea and sore throat.    Respiratory: Negative for cough and shortness of breath.    Cardiovascular: Negative for chest pain and leg swelling.   Gastrointestinal: Negative for abdominal pain, diarrhea, nausea and vomiting.   Genitourinary: Negative for dysuria, frequency and urgency.   Musculoskeletal: Negative for back pain.   Skin: Negative for rash.   Neurological: Negative for dizziness, weakness and numbness.   Hematological: Does not bruise/bleed easily.   All other systems reviewed and are negative.       Physical Exam     Initial Vitals [07/24/19 1655]   BP Pulse Resp Temp SpO2   128/73 107 15 100 °F (37.8 °C) 99 %      MAP       --          Physical Exam  Nursing Notes and Vital Signs Reviewed.  Constitutional: Patient is in no acute distress. Well-developed and well-nourished.  Head: Atraumatic. Normocephalic.  Eyes: PERRL. EOM intact.  Conjunctivae are not pale. No scleral icterus.  ENT: Mucous membranes are moist. Oropharynx is clear and symmetric.    Neck: Supple. Full ROM. No lymphadenopathy.  Cardiovascular: Regular rate. Regular rhythm. No murmurs, rubs, or gallops. Distal pulses are 2+ and symmetric.  Pulmonary/Chest: No respiratory distress. Clear to auscultation bilaterally. No wheezing or rales.  Abdominal: Soft and non-distended.  There is no tenderness.  No rebound, guarding, or rigidity.  Musculoskeletal: Moves all extremities. No obvious deformities. No edema. No calf tenderness.  Skin: Warm and dry.  Neurological:  Alert, awake, and appropriate.  Normal speech.  No acute focal neurological deficits are appreciated.  Psychiatric: Normal affect. Good eye contact. Appropriate in content.     ED Course   Procedures  ED Vital Signs:  Vitals:    07/24/19 1655 07/24/19 1715 07/24/19 1828 07/24/19 1832   BP: 128/73  108/67    Pulse: 107 107 85    Resp: 15  13    Temp: 100 °F (37.8 °C)   99.4 °F (37.4 °C)   TempSrc: Oral   Oral   SpO2: 99%  98%    Weight: 68.1 kg (150 lb 2.1 oz)      Height: 6' (1.829 m)       07/24/19 1930   BP: 111/74   Pulse: 87   Resp: 16   Temp: 99.5 °F (37.5 °C)   TempSrc: Oral   SpO2: 99%   Weight:    Height:        Abnormal Lab Results:  Labs Reviewed   CBC W/ AUTO DIFFERENTIAL - Abnormal; Notable for the following components:       Result Value    RBC 3.91 (*)     Hemoglobin 10.8 (*)     Hematocrit 31.3 (*)     Mean Corpuscular Volume 80 (*)     MPV 8.0 (*)     All other components within normal limits   COMPREHENSIVE METABOLIC PANEL - Abnormal; Notable for the following components:    Glucose 122 (*)     Albumin 2.7 (*)     ALT 8 (*)     All other components within normal limits   CULTURE, BLOOD    Narrative:     Aerobic and anaerobic   CULTURE, BLOOD    Narrative:     Aerobic and anaerobic   LACTIC ACID, PLASMA   URINALYSIS, REFLEX TO URINE CULTURE    Narrative:     Preferred Collection Type->Urine, Clean Catch    PROCALCITONIN        All Lab Results:  Results for orders placed or performed during the hospital encounter of 07/24/19   Blood culture x two cultures. Draw prior to antibiotics.   Result Value Ref Range    Blood Culture, Routine No Growth to date     Blood Culture, Routine No Growth to date    Blood culture x two cultures. Draw prior to antibiotics.   Result Value Ref Range    Blood Culture, Routine No Growth to date     Blood Culture, Routine No Growth to date    CBC auto differential   Result Value Ref Range    WBC 6.66 3.90 - 12.70 K/uL    RBC 3.91 (L) 4.60 - 6.20 M/uL    Hemoglobin 10.8 (L) 14.0 - 18.0 g/dL    Hematocrit 31.3 (L) 40.0 - 54.0 %    Mean Corpuscular Volume 80 (L) 82 - 98 fL    Mean Corpuscular Hemoglobin 27.6 27.0 - 31.0 pg    Mean Corpuscular Hemoglobin Conc 34.5 32.0 - 36.0 g/dL    RDW 13.7 11.5 - 14.5 %    Platelets 281 150 - 350 K/uL    MPV 8.0 (L) 9.2 - 12.9 fL    Gran # (ANC) 4.5 1.8 - 7.7 K/uL    Lymph # 1.3 1.0 - 4.8 K/uL    Mono # 0.7 0.3 - 1.0 K/uL    Eos # 0.2 0.0 - 0.5 K/uL    Baso # 0.02 0.00 - 0.20 K/uL    Gran% 67.7 38.0 - 73.0 %    Lymph% 19.7 18.0 - 48.0 %    Mono% 10.2 4.0 - 15.0 %    Eosinophil% 2.4 0.0 - 8.0 %    Basophil% 0.3 0.0 - 1.9 %    Differential Method Automated    Comprehensive metabolic panel   Result Value Ref Range    Sodium 139 136 - 145 mmol/L    Potassium 3.9 3.5 - 5.1 mmol/L    Chloride 102 95 - 110 mmol/L    CO2 25 23 - 29 mmol/L    Glucose 122 (H) 70 - 110 mg/dL    BUN, Bld 10 6 - 20 mg/dL    Creatinine 0.7 0.5 - 1.4 mg/dL    Calcium 9.3 8.7 - 10.5 mg/dL    Total Protein 7.5 6.0 - 8.4 g/dL    Albumin 2.7 (L) 3.5 - 5.2 g/dL    Total Bilirubin 0.4 0.1 - 1.0 mg/dL    Alkaline Phosphatase 69 55 - 135 U/L    AST 13 10 - 40 U/L    ALT 8 (L) 10 - 44 U/L    Anion Gap 12 8 - 16 mmol/L    eGFR if African American >60 >60 mL/min/1.73 m^2    eGFR if non African American >60 >60 mL/min/1.73 m^2   Lactic acid, plasma #1   Result Value Ref Range    Lactate (Lactic Acid)  2.1 0.5 - 2.2 mmol/L   Urinalysis, Reflex to Urine Culture Urine, Clean Catch   Result Value Ref Range    Specimen UA Urine, Clean Catch     Color, UA Yellow Yellow, Straw, Reshma    Appearance, UA Clear Clear    pH, UA 6.0 5.0 - 8.0    Specific Gravity, UA 1.025 1.005 - 1.030    Protein, UA Negative Negative    Glucose, UA Negative Negative    Ketones, UA Negative Negative    Bilirubin (UA) Negative Negative    Occult Blood UA Negative Negative    Nitrite, UA Negative Negative    Urobilinogen, UA 1.0 <2.0 EU/dL    Leukocytes, UA Negative Negative   Procalcitonin   Result Value Ref Range    Procalcitonin 0.04 <0.25 ng/mL       Imaging Results:  Imaging Results          CT Head Without Contrast (Final result)  Result time 07/24/19 18:59:21    Final result by Jack Lopez Jr., MD (07/24/19 18:59:21)                 Impression:      No acute or significant CT abnormality.    All CT scans at this facility use dose modulation, iterative reconstruction, and/or weight base dosing when appropriate to reduce radiation dose to as low as reasonably achievable.      Electronically signed by: Jack Lopez Jr., MD  Date:    07/24/2019  Time:    18:59             Narrative:    EXAMINATION:  CT HEAD WITHOUT CONTRAST    CLINICAL HISTORY:  Confusion/delirium, altered LOC, unexplained;    TECHNIQUE:  Contiguous axial images were obtained from the skull base through the vertex without intravenous contrast.    COMPARISON:  None    FINDINGS:  No intracranial hemorrhage. No mass effect or midline shift. Normal parenchymal attenuation. The ventricles and sulci are normal in size and configuration. The paranasal sinuses and mastoid air cells are clear.  No concerning osseous findings.                               X-Ray Chest AP Portable (Final result)  Result time 07/24/19 17:45:10    Final result by Jack Lopez Jr., MD (07/24/19 17:45:10)                 Impression:      No acute findings.      Electronically signed by: Jack Lopez  MD Pura  Date:    07/24/2019  Time:    17:45             Narrative:    EXAMINATION:  XR CHEST AP PORTABLE    CLINICAL HISTORY:  Sepsis;    COMPARISON:  None    FINDINGS:  Lungs are clear.  Heart size within normal limits.No significant bony findings.                                 The EKG was ordered, reviewed, and independently interpreted by the ED provider.  Interpretation time: 1706  Rate: 95 BPM  Rhythm: normal sinus rhythm  Interpretation: Possible left atrial enlargement. Incomplete RBBB. No STEMI.           The Emergency Provider reviewed the vital signs and test results, which are outlined above.     ED Discussion     7:22 PM: Discussed pt's case with Dr. Abad (Hem and Onc) who recommends holding Xeloda and he will speak with Dr. Aden tomorrow.    7:45 PM: Reassessed pt at this time. Discussed with pt all pertinent ED information and results. Discussed pt dx and plan of tx. Gave pt all f/u and return to the ED instructions. All questions and concerns were addressed at this time. Pt expresses understanding of information and instructions, and is comfortable with plan to discharge. Pt is stable for discharge.    I discussed with patient and/or family/caretaker that evaluation in the ED does not suggest any emergent or life threatening medical conditions requiring immediate intervention beyond what was provided in the ED, and I believe patient is safe for discharge.  Regardless, an unremarkable evaluation in the ED does not preclude the development or presence of a serious of life threatening condition. As such, patient was instructed to return immediately for any worsening or change in current symptoms.      ED Medication(s):  Medications   sodium chloride 0.9% bolus 2,043 mL (0 mL/kg × 68.1 kg Intravenous Stopped 7/24/19 1938)       Discharge Medication List as of 7/24/2019  7:25 PM      START taking these medications    Details   amoxicillin-clavulanate 875-125mg (AUGMENTIN) 875-125 mg per tablet Take  1 tablet by mouth 2 (two) times daily., Starting Wed 7/24/2019, Print             Follow-up Information     Song Aden MD.    Specialty:  Hematology and Oncology  Contact information:  [address]  [place] [address]  [phone]                         Medical Decision Making:   Clinical Tests:   Lab Tests: Ordered  Radiological Study: Ordered and Reviewed  Medical Tests: Ordered and Reviewed             Scribe Attestation:   Scribe #1: I performed the above scribed service and the documentation accurately describes the services I performed. I attest to the accuracy of the note.     Attending:   Physician Attestation Statement for Scribe #1: I, Yanna Akins MD, personally performed the services described in this documentation, as scribed by Lydia Maria, in my presence, and it is both accurate and complete.           Clinical Impression       ICD-10-CM ICD-9-CM   1. Fever R50.9 780.60       Disposition:   Disposition: Discharged  Condition: Stable         Yanna Akins MD  07/26/19 3370

## 2019-07-24 NOTE — PROGRESS NOTES
Subjective:       Patient ID: Sukhjinder Presley is a 53 y.o. male.    Chief Complaint: Fatigue, Memory loss, appetite loss    HPI: 53 y.o with rectal cancer currently being treated with neoadjuvant chemoradiation. He presents today with c/o decreased concentration at work, short term memory loss, decreased appetite, weight loss, fatigue.    On exam patient found to have low grade 100.6. . Patient denies dysuria. Does report frequent urination at night. Does report noting odor to urine. Denies CP, SOB, dizziness, lightheadedness, chills, night sweats, cough, sore throat, congestion, rhinorrhea.   Social History     Socioeconomic History    Marital status: Significant Other     Spouse name: Not on file    Number of children: Not on file    Years of education: Not on file    Highest education level: Not on file   Occupational History    Not on file   Social Needs    Financial resource strain: Somewhat hard    Food insecurity:     Worry: Sometimes true     Inability: Sometimes true    Transportation needs:     Medical: No     Non-medical: No   Tobacco Use    Smoking status: Current Every Day Smoker     Packs/day: 1.00     Years: 35.00     Pack years: 35.00     Types: Cigarettes     Start date: 1/2/1984    Smokeless tobacco: Current User     Types: Chew   Substance and Sexual Activity    Alcohol use: Yes     Alcohol/week: 27.6 oz     Types: 42 Cans of beer, 4 Shots of liquor per week     Frequency: 4 or more times a week     Drinks per session: 5 or 6     Binge frequency: Daily or almost daily     Comment: nancie Virgen    Drug use: Never    Sexual activity: Yes     Partners: Female     Birth control/protection: None   Lifestyle    Physical activity:     Days per week: 1 day     Minutes per session: 60 min    Stress: Not on file   Relationships    Social connections:     Talks on phone: More than three times a week     Gets together: More than three times a week     Attends [employer] service: Not on file      Active member of club or organization: No     Attends meetings of clubs or organizations: Never     Relationship status: Living with partner   Other Topics Concern    Not on file   Social History Narrative    Not on file       Past Medical History:   Diagnosis Date    Anemia        Family History   Problem Relation Age of Onset    Intestinal malrotation Mother     Leukemia Father     No Known Problems Sister     Coronary artery disease Brother     Coronary artery disease Maternal Grandmother     Stomach cancer Maternal Grandfather        Past Surgical History:   Procedure Laterality Date    COLONOSCOPY N/A 6/6/2019    Performed [facility]    EGD (ESOPHAGOGASTRODUODENOSCOPY) N/A 6/6/2019    Performed by [facility]jel[facility] MD at [facility]    HERNIA REPAIR  1995    TONSILLECTOMY         Review of Systems   Constitutional: Positive for activity change, appetite change, fatigue and unexpected weight change. Negative for chills, diaphoresis and fever.   HENT: Negative for congestion, mouth sores, nosebleeds, sore throat, trouble swallowing and voice change.    Eyes: Negative for photophobia and visual disturbance.   Respiratory: Negative for cough, chest tightness, shortness of breath and wheezing.    Cardiovascular: Negative for chest pain, palpitations and leg swelling.   Gastrointestinal: Negative for abdominal distention, abdominal pain, constipation, diarrhea, nausea and vomiting.   Genitourinary: Positive for decreased urine volume and frequency. Negative for difficulty urinating, dysuria, flank pain, hematuria and urgency.   Musculoskeletal: Negative for arthralgias, back pain and myalgias.   Skin: Negative for pallor, rash and wound.   Neurological: Negative for dizziness, syncope, facial asymmetry, weakness, light-headedness and headaches.   Hematological: Negative for adenopathy. Does not bruise/bleed easily.   Psychiatric/Behavioral: Positive for decreased concentration,  dysphoric mood and sleep disturbance. The patient is nervous/anxious.          Medication List with Changes/Refills   New Medications    POTASSIUM CHLORIDE SA (K-DUR,KLOR-CON) 20 MEQ TABLET    Take 1 tablet (20 mEq total) by mouth once daily. for 3 days   Current Medications    ASPIRIN 81 MG CHEW    Take 81 mg by mouth once daily.    CAPECITABINE (XELODA) 500 MG TAB    Take 3 tablets (1500 mg) by mouth twice daily after breakfast and dinner on days of radiation only.    DRONABINOL (MARINOL) 5 MG CAPSULE    Take 1 capsule (5 mg total) by mouth 2 (two) times daily before meals.    MEGESTROL (MEGACE) 40 MG TAB    Take 1 tablet (40 mg total) by mouth 4 (four) times daily.    MORPHINE (MS CONTIN) 15 MG 12 HR TABLET    Take 1-2 tablets (15-30 mg total) by mouth nightly.    OXYCODONE (ROXICODONE) 5 MG IMMEDIATE RELEASE TABLET    Take 1 tablet (5 mg total) by mouth every 4 (four) hours as needed for Pain.    PROMETHAZINE (PHENERGAN) 25 MG TABLET    Take 1 tablet (25 mg total) by mouth every 4 (four) hours.    SILVER SULFADIAZINE 1% (SILVADENE) 1 % CREAM    Apply to affected area daily    TRAMADOL (ULTRAM) 50 MG TABLET    Take 1 tablet (50 mg total) by mouth every 6 (six) hours as needed for Pain.    VARENICLINE (CHANTIX STARTING MONTH BOX) 0.5 MG (11)- 1 MG (42) TABLET    Take one 0.5mg tab by mouth once daily X3 days,then increase to one 0.5mg tab twice daily X4 days,then increase to one 1mg tab twice daily     Objective:     Vitals:    07/24/19 1455   BP: 122/82   Pulse: 103   Resp: 17   Temp: (!) 100.6 °F (38.1 °C)     Lab Results   Component Value Date    WBC 6.56 07/24/2019    HGB 10.8 (L) 07/24/2019    HCT 31.8 (L) 07/24/2019    MCV 81 (L) 07/24/2019     07/24/2019     BMP  Lab Results   Component Value Date     07/24/2019    K 3.4 (L) 07/24/2019     07/24/2019    CO2 27 07/24/2019    BUN 10 07/24/2019    CREATININE 0.8 07/24/2019    CALCIUM 9.5 07/24/2019    ANIONGAP 12 07/24/2019    ESTGFRAFRICA  >60 07/24/2019    EGFRNONAA >60 07/24/2019     Lab Results   Component Value Date    ALT 8 (L) 07/24/2019    AST 12 07/24/2019    ALKPHOS 72 07/24/2019    BILITOT 0.4 07/24/2019         Physical Exam   Constitutional: He is oriented to person, place, and time. He appears well-developed. He is cooperative. He appears ill. He appears distressed.   HENT:   Head: Normocephalic.   Right Ear: External ear normal.   Left Ear: External ear normal.   Nose: Nose normal.   Mouth/Throat: Oropharynx is clear and moist.   Eyes: Conjunctivae, EOM and lids are normal. Right eye exhibits no discharge. Left eye exhibits no discharge. No scleral icterus.   Neck: Normal range of motion. No thyroid mass present.   Cardiovascular: Regular rhythm and normal heart sounds. Tachycardia present.   No murmur heard.  Pulmonary/Chest: Effort normal and breath sounds normal. No respiratory distress. He has no wheezes. He has no rhonchi. He has no rales.   Abdominal: Soft. Bowel sounds are normal. He exhibits no distension. There is no tenderness.   Genitourinary:   Genitourinary Comments: deferred   Musculoskeletal: Normal range of motion. He exhibits no edema.   Lymphadenopathy:        Head (right side): No submandibular, no preauricular and no posterior auricular adenopathy present.        Head (left side): No submandibular, no preauricular and no posterior auricular adenopathy present.        Right cervical: No superficial cervical adenopathy present.       Left cervical: No superficial cervical adenopathy present.   Neurological: He is alert and oriented to person, place, and time.   Skin: Skin is warm, dry and intact.   Psychiatric: His speech is normal and behavior is normal. Thought content normal. His mood appears anxious. He exhibits a depressed mood.   Vitals reviewed.       Assessment:     Problem List Items Addressed This Visit        Oncology    Rectal cancer     Patient seen urgently today for evaluation of short term memory issues.  Patient noted to have fever 100.6.    Will check UA with urine culture today. Spoke with Dr. Aden, patient's primary Oncologist who agree to f/u UA results in my absence today.  Proceed with 1L NS fluid bolus today. Patient scheduled for CXR (fever workup), CT head in am (memory loss).     Nutrition consult placed for decreased appetite, unintentional weight loss secondary to anorexia. Discussed with patient increase in protein/calories. Encouraged to try protein shakes/supplements    F/u as previously scheduled with Dr. Aden 7/29.  He knows to contact office for fever > 100.4. Discussed S&S to report to ED.  Encouraged adequate PO hydration 2L daily. Discussed techniques to increase calorie consumption.                 Other    Fever    Relevant Orders    Urinalysis    Urine culture    X-Ray Chest PA And Lateral    CT Head W Wo Contrast    Urine culture      Other Visit Diagnoses     Memory loss    -  Primary    Relevant Orders    Magnesium    Phosphorus    Urinalysis    Urine culture    X-Ray Chest PA And Lateral    CT Head W Wo Contrast    Weight loss        Relevant Orders    Ambulatory consult to Nutrition Services    Hypokalemia        Relevant Medications    potassium chloride SA (K-DUR,KLOR-CON) 20 MEQ tablet            Plan:     Memory loss  -     Magnesium; Future; Expected date: 07/24/2019  -     Phosphorus; Future; Expected date: 07/24/2019  -     Urinalysis; Future; Expected date: 07/24/2019  -     Urine culture  -     Cancel: BLOOD CULTURE X'S 2; Future; Expected date: 07/24/2019  -     X-Ray Chest PA And Lateral; Future; Expected date: 07/24/2019  -     CT Head W Wo Contrast; Future; Expected date: 07/24/2019    Fever, unspecified fever cause  -     Urinalysis; Future; Expected date: 07/24/2019  -     Urine culture  -     Cancel: BLOOD CULTURE X'S 2; Future; Expected date: 07/24/2019  -     X-Ray Chest PA And Lateral; Future; Expected date: 07/24/2019  -     CT Head W Wo Contrast; Future; Expected  date: 07/24/2019  -     Urine culture; Future; Expected date: 07/24/2019    Weight loss  -     Ambulatory consult to Nutrition Services    Hypokalemia  -     potassium chloride SA (K-DUR,KLOR-CON) 20 MEQ tablet; Take 1 tablet (20 mEq total) by mouth once daily. for 3 days  Dispense: 3 tablet; Refill: 0    Rectal cancer          I will review assessment/plan with collaborating physician Dr. Song Presley, [facility]

## 2019-07-24 NOTE — ASSESSMENT & PLAN NOTE
Patient seen urgently today for evaluation of short term memory issues. Patient noted to have fever 100.6.    Will check UA with urine culture today. Spoke with Dr. Aden, patient's primary Oncologist who agree to f/u UA results in my absence today.  Proceed with 1L NS fluid bolus today. Patient scheduled for CXR (fever workup), CT head in am (memory loss).     Nutrition consult placed for decreased appetite, unintentional weight loss secondary to anorexia. Discussed with patient increase in protein/calories. Encouraged to try protein shakes/supplements    F/u as previously scheduled with Dr. Aden 7/29.  He knows to contact office for fever > 100.4. Discussed S&S to report to ED.  Encouraged adequate PO hydration 2L daily. Discussed techniques to increase calorie consumption.

## 2019-07-25 ENCOUNTER — PATIENT MESSAGE (OUTPATIENT)
Dept: HEMATOLOGY/ONCOLOGY | Facility: CLINIC | Age: 54
End: 2019-07-25

## 2019-07-25 PROCEDURE — 77014 HC CT GUIDANCE RADIATION THERAPY FLDS PLACEMENT: CPT | Mod: TC | Performed by: RADIOLOGY

## 2019-07-25 PROCEDURE — 77386 HC IMRT, COMPLEX: CPT | Performed by: RADIOLOGY

## 2019-07-25 NOTE — ED NOTES
In bed resting,aaox3,skin warm dry to touch,equal bilateral chest rise and fall denies any shortness of breath,side rails up x 2,bed locked and in low position,C-monitor on and recording 88 normal sinus,instructed to call with any needs. Patient reports feeling better than when he initially came into the ER

## 2019-07-26 ENCOUNTER — PATIENT MESSAGE (OUTPATIENT)
Dept: INTERNAL MEDICINE | Facility: CLINIC | Age: 54
End: 2019-07-26

## 2019-07-26 ENCOUNTER — CLINICAL SUPPORT (OUTPATIENT)
Dept: SMOKING CESSATION | Facility: CLINIC | Age: 54
End: 2019-07-26
Payer: COMMERCIAL

## 2019-07-26 DIAGNOSIS — F17.200 NICOTINE DEPENDENCE: Primary | ICD-10-CM

## 2019-07-26 PROCEDURE — 99999 PR PBB SHADOW E&M-EST. PATIENT-LVL I: ICD-10-PCS | Mod: PBBFAC,,,

## 2019-07-26 PROCEDURE — 77014 HC CT GUIDANCE RADIATION THERAPY FLDS PLACEMENT: CPT | Mod: TC | Performed by: RADIOLOGY

## 2019-07-26 PROCEDURE — 77386 HC IMRT, COMPLEX: CPT | Performed by: RADIOLOGY

## 2019-07-26 PROCEDURE — 99401 PREV MED CNSL INDIV APPRX 15: CPT | Mod: S$GLB,,, | Performed by: GENERAL PRACTICE

## 2019-07-26 PROCEDURE — 99999 PR PBB SHADOW E&M-EST. PATIENT-LVL I: CPT | Mod: PBBFAC,,,

## 2019-07-26 PROCEDURE — 99401 PR PREVENT COUNSEL,INDIV,15 MIN: ICD-10-PCS | Mod: S$GLB,,, | Performed by: GENERAL PRACTICE

## 2019-07-26 RX ORDER — VARENICLINE TARTRATE 1 MG/1
1 TABLET, FILM COATED ORAL 2 TIMES DAILY
Qty: 60 TABLET | Refills: 2 | Status: SHIPPED | OUTPATIENT
Start: 2019-07-26 | End: 2020-02-28

## 2019-07-26 NOTE — PROGRESS NOTES
Individual Follow-Up Form    7/26/2019    Clinical Status of Patient: Outpatient    Length of Service: 15 minutes    Continuing Medication: yes  Chantix    Target Symptoms: Withdrawal and medication side effects. The following were  rated moderate (3) to severe (4) on TCRS:  · Moderate (3): desire tobacco, loss of appetite, fatigue, tired, sleep disturbances  · Severe (4): none    Comments: Spoke with patient by telephone in regards to his smoking cessation progress. He stated that he completed the starter pack of Chantix and was able to reduce his smoking to 2-3 cigarettes daily. He states that he continues with chemotherapy and radiation. He stated that he has been feeling tired and fatigues but feels that it may be from the chemotherapy. He states that he has no appetite and forces himself to eat so he can take medicine. Discussed how nicotine can affect his chemotherapy and radiation results/ benefits. Discussed how nicotine can exacerbate the side affects of chemotherapy. Discussed his success in reducing the amount that he smokes daily but encouraged total abstinence. He verbalized understanding and stated that he would like to continue to use Chantix. Discussed a new prescription being sent to his pharmacy. Reviewed possible side effects of Chantix and proper dose instructions. Reviewed Trust benefits and encouraged a clinic follow up appointment. Will continue to encourage and monitor progress.    Diagnosis: F17.200    Next Visit: 2 weeks

## 2019-07-29 ENCOUNTER — OFFICE VISIT (OUTPATIENT)
Dept: HEMATOLOGY/ONCOLOGY | Facility: CLINIC | Age: 54
End: 2019-07-29
Payer: COMMERCIAL

## 2019-07-29 ENCOUNTER — DOCUMENTATION ONLY (OUTPATIENT)
Dept: HEMATOLOGY/ONCOLOGY | Facility: CLINIC | Age: 54
End: 2019-07-29

## 2019-07-29 VITALS
SYSTOLIC BLOOD PRESSURE: 109 MMHG | WEIGHT: 151.44 LBS | HEIGHT: 72 IN | TEMPERATURE: 99 F | HEART RATE: 106 BPM | DIASTOLIC BLOOD PRESSURE: 73 MMHG | OXYGEN SATURATION: 95 % | BODY MASS INDEX: 20.51 KG/M2

## 2019-07-29 DIAGNOSIS — G47.00 INSOMNIA, UNSPECIFIED TYPE: ICD-10-CM

## 2019-07-29 DIAGNOSIS — C20 RECTAL CANCER: Primary | ICD-10-CM

## 2019-07-29 LAB — BACTERIA BLD CULT: NORMAL

## 2019-07-29 PROCEDURE — 77014 HC CT GUIDANCE RADIATION THERAPY FLDS PLACEMENT: CPT | Mod: TC | Performed by: RADIOLOGY

## 2019-07-29 PROCEDURE — 99999 PR PBB SHADOW E&M-EST. PATIENT-LVL III: CPT | Mod: PBBFAC,,, | Performed by: INTERNAL MEDICINE

## 2019-07-29 PROCEDURE — 3008F BODY MASS INDEX DOCD: CPT | Mod: CPTII,S$GLB,, | Performed by: INTERNAL MEDICINE

## 2019-07-29 PROCEDURE — 99215 PR OFFICE/OUTPT VISIT, EST, LEVL V, 40-54 MIN: ICD-10-PCS | Mod: S$GLB,,, | Performed by: INTERNAL MEDICINE

## 2019-07-29 PROCEDURE — 77386 HC IMRT, COMPLEX: CPT | Performed by: RADIOLOGY

## 2019-07-29 PROCEDURE — 3008F PR BODY MASS INDEX (BMI) DOCUMENTED: ICD-10-PCS | Mod: CPTII,S$GLB,, | Performed by: INTERNAL MEDICINE

## 2019-07-29 PROCEDURE — 99999 PR PBB SHADOW E&M-EST. PATIENT-LVL III: ICD-10-PCS | Mod: PBBFAC,,, | Performed by: INTERNAL MEDICINE

## 2019-07-29 PROCEDURE — 99215 OFFICE O/P EST HI 40 MIN: CPT | Mod: S$GLB,,, | Performed by: INTERNAL MEDICINE

## 2019-07-29 RX ORDER — MORPHINE SULFATE 15 MG/1
45 TABLET, FILM COATED, EXTENDED RELEASE ORAL NIGHTLY
Qty: 90 TABLET | Refills: 0 | Status: SHIPPED | OUTPATIENT
Start: 2019-07-29 | End: 2019-10-15 | Stop reason: SDUPTHER

## 2019-07-29 RX ORDER — DRONABINOL 10 MG/1
10 CAPSULE ORAL
Qty: 60 CAPSULE | Refills: 1 | Status: SHIPPED | OUTPATIENT
Start: 2019-07-29 | End: 2019-09-30 | Stop reason: SDUPTHER

## 2019-07-29 RX ORDER — TEMAZEPAM 7.5 MG/1
7.5-15 CAPSULE ORAL NIGHTLY PRN
Qty: 60 CAPSULE | Refills: 0 | Status: SHIPPED | OUTPATIENT
Start: 2019-07-29 | End: 2019-08-28

## 2019-07-29 RX ORDER — OXYCODONE HYDROCHLORIDE 10 MG/1
10 TABLET ORAL EVERY 8 HOURS PRN
Qty: 90 TABLET | Refills: 0 | Status: SHIPPED | OUTPATIENT
Start: 2019-07-29 | End: 2019-08-19 | Stop reason: SDUPTHER

## 2019-07-29 NOTE — PROGRESS NOTES
Subjective:       Patient ID: Sukhjinder Presley is a 53 y.o. male.    Chief Complaint: Rectal cancer [C20]  HPI: We have an opportunity to see Mr. Sukhjinder Presley in Hematology Oncology clinic at [facility] on 07/29/2019.  Mr. Sukhjinder Presley is a 53 y.o. gentleman with rectal canceron neoadjuvant chemoradiation today. Has significant rectal pain, tenesmus, passing mucous, decreased appetite.       Rectal cancer    6/24/2019 Initial Diagnosis     Rectal cancer         6/26/2019 Cancer Staged     Staging form: Colon and Rectum, AJCC 8th Edition  - Clinical stage from 6/26/2019: Stage IIIB (cT3, cN2a, cM0) - Signed by Artem Mishra II, MD on 6/26/2019         7/10/2019 -  Chemotherapy     Treatment Summary   Plan Name: OP CAPECITABINE 5 DAYS + RADIOTHERAPY  Treatment Goal: Curative  Status: Active  Start Date: 7/10/2019 (Planned)  End Date: 7/10/2019 (Planned)  Provider: Song Aden MD  Chemotherapy: [No matching medication found in this treatment plan]          Past Medical History:   Diagnosis Date    Anemia     Cancer      Family History   Problem Relation Age of Onset    Intestinal malrotation Mother     Leukemia Father     No Known Problems Sister     Coronary artery disease Brother     Coronary artery disease Maternal Grandmother     Stomach cancer Maternal Grandfather      Social History     Socioeconomic History    Marital status: Significant Other     Spouse name: Not on file    Number of children: Not on file    Years of education: Not on file    Highest education level: Not on file   Occupational History    Not on file   Social Needs    Financial resource strain: Somewhat hard    Food insecurity:     Worry: Sometimes true     Inability: Sometimes true    Transportation needs:     Medical: No     Non-medical: No   Tobacco Use    Smoking status: Current Every Day Smoker     Packs/day: 1.00     Years: 35.00     Pack years: 35.00     Types: Cigarettes     Start date: 1/2/1984     Smokeless tobacco: Current User     Types: Chew   Substance and Sexual Activity    Alcohol use: Yes     Alcohol/week: 27.6 oz     Types: 42 Cans of beer, 4 Shots of liquor per week     Frequency: 4 or more times a week     Drinks per session: 5 or 6     Binge frequency: Daily or almost daily     Comment: nancie Virgen    Drug use: Never    Sexual activity: Yes     Partners: Female     Birth control/protection: None   Lifestyle    Physical activity:     Days per week: 1 day     Minutes per session: 60 min    Stress: Not on file   Relationships    Social connections:     Talks on phone: More than three times a week     Gets together: More than three times a week     Attends Ep[employer] service: Not on file     Active member of club or organization: No     Attends meetings of clubs or organizations: Never     Relationship status: Living with partner   Other Topics Concern    Not on file   Social History Narrative    Not on file     Past Surgical History:   Procedure Laterality Date    COLONOSCOPY N/A 6/6/2019    [facility]    EGD (ESOPHAGOGASTRODUODENOSCOPY) N/A 6/6/2019    [facility]    HERNIA REPAIR  1995    TONSILLECTOMY       Current Outpatient Medications   Medication Sig Dispense Refill    amoxicillin-clavulanate 875-125mg (AUGMENTIN) 875-125 mg per tablet Take 1 tablet by mouth 2 (two) times daily. 14 tablet 0    aspirin 81 MG Chew Take 81 mg by mouth once daily.      capecitabine (XELODA) 500 MG Tab Take 3 tablets (1500 mg) by mouth twice daily after breakfast and dinner on days of radiation only. 168 tablet 0    dronabinol (MARINOL) 10 MG capsule Take 1 capsule (10 mg total) by mouth 2 (two) times daily before meals. 60 capsule 1    megestrol (MEGACE) 40 MG Tab Take 1 tablet (40 mg total) by mouth 4 (four) times daily. 90 tablet 1    morphine (MS CONTIN) 15 MG 12 hr tablet Take 3 tablets (45 mg total) by mouth nightly. 90 tablet  0    oxyCODONE (ROXICODONE) 10 mg Tab immediate release tablet Take 1 tablet (10 mg total) by mouth every 8 (eight) hours as needed for Pain. 90 tablet 0    promethazine (PHENERGAN) 25 MG tablet Take 1 tablet (25 mg total) by mouth every 4 (four) hours. 25 tablet 2    silver sulfADIAZINE 1% (SILVADENE) 1 % cream Apply to affected area daily 400 g 1    temazepam (RESTORIL) 7.5 MG Cap Take 1-2 capsules (7.5-15 mg total) by mouth nightly as needed (sleep). 60 capsule 0    traMADol (ULTRAM) 50 mg tablet Take 1 tablet (50 mg total) by mouth every 6 (six) hours as needed for Pain. 90 tablet 0    varenicline (CHANTIX) 1 mg Tab Take 1 tablet (1 mg total) by mouth 2 (two) times daily. 60 tablet 2     No current facility-administered medications for this visit.        Labs:  Lab Results   Component Value Date    WBC 6.66 07/24/2019    HGB 10.8 (L) 07/24/2019    HCT 31.3 (L) 07/24/2019    MCV 80 (L) 07/24/2019     07/24/2019     BMP  Lab Results   Component Value Date     07/24/2019    K 3.9 07/24/2019     07/24/2019    CO2 25 07/24/2019    BUN 10 07/24/2019    CREATININE 0.7 07/24/2019    CALCIUM 9.3 07/24/2019    ANIONGAP 12 07/24/2019    ESTGFRAFRICA >60 07/24/2019    EGFRNONAA >60 07/24/2019     Lab Results   Component Value Date    ALT 8 (L) 07/24/2019    AST 13 07/24/2019    ALKPHOS 69 07/24/2019    BILITOT 0.4 07/24/2019       Lab Results   Component Value Date    IRON 19 (L) 07/24/2019    TIBC 246 (L) 07/24/2019    FERRITIN 180 07/24/2019     Lab Results   Component Value Date    INMARVVZ99 >2000 (H) 01/15/2019     No results found for: FOLATE  Lab Results   Component Value Date    TSH 1.607 01/15/2019       I have reviewed the radiology reports and examined the scan/xray images.    Review of Systems   Constitutional: Negative.    HENT: Negative.    Eyes: Negative.    Respiratory: Negative.    Cardiovascular: Negative.    Gastrointestinal: Negative.    Endocrine: Negative.    Genitourinary:  Negative.    Musculoskeletal: Negative.    Skin: Negative.    Allergic/Immunologic: Negative.    Neurological: Negative.    Hematological: Negative.    Psychiatric/Behavioral: Negative.      ECOG SCORE    0 - Fully active-able to carry on all pre-disease performance without restriction            Objective:     Vitals:    07/29/19 1549   BP: 109/73   Pulse: 106   Temp: 98.7 °F (37.1 °C)   Body mass index is 20.54 kg/m².  Physical Exam   Constitutional: He is oriented to person, place, and time. He appears well-developed and well-nourished.   HENT:   Head: Normocephalic and atraumatic.   Eyes: Conjunctivae and EOM are normal.   Neck: Normal range of motion. Neck supple.   Cardiovascular: Normal rate and regular rhythm.   Pulmonary/Chest: Effort normal and breath sounds normal.   Abdominal: Soft. Bowel sounds are normal.   Musculoskeletal: Normal range of motion.   Neurological: He is alert and oriented to person, place, and time.   Skin: Skin is warm and dry.   Psychiatric: He has a normal mood and affect. His behavior is normal. Judgment and thought content normal.   Nursing note and vitals reviewed.        Assessment:      1. Rectal cancer    2. Insomnia, unspecified type           Plan:     Rectal cancer  Continue on xeloda 1500 mg bid on days of radiation  -     CT Pelvis With Contrast; Future; Expected date: 07/29/2019  -     dronabinol (MARINOL) 10 MG capsule; Take 1 capsule (10 mg total) by mouth 2 (two) times daily before meals.  Dispense: 60 capsule; Refill: 1  -     morphine (MS CONTIN) 15 MG 12 hr tablet; Take 3 tablets (45 mg total) by mouth nightly.  Dispense: 90 tablet; Refill: 0  -     oxyCODONE (ROXICODONE) 10 mg Tab immediate release tablet; Take 1 tablet (10 mg total) by mouth every 8 (eight) hours as needed for Pain.  Dispense: 90 tablet; Refill: 0    Insomnia, unspecified type  -     temazepam (RESTORIL) 7.5 MG Cap; Take 1-2 capsules (7.5-15 mg total) by mouth nightly as needed (sleep).  Dispense:  60 capsule; Refill: 0

## 2019-07-29 NOTE — PROGRESS NOTES
Met with pt to f/u. He was accompanied by a female (she was not introduced). Pt has applied for [facility] Financial Assistance. In the meantime, gave pt information on the [employer]. Also gave him a gas card to help with cost of coming to treatment every day. We discussed internal patient assistance, if needed. Referral to Cancer Services requested (it was not noticed until later that this had been done the first time SW called pt). Nevertheless, referral was completed a second time, specifically for Boost. Questions asked about FMLA. Encouraged him to speak to HR about what they require and that nurse practitioners will assist with paperwork if needed. No additional needs requested at this time. SW will continue to follow pt and remain available for assistance with ongoing needs.

## 2019-07-30 ENCOUNTER — HOSPITAL ENCOUNTER (OUTPATIENT)
Dept: RADIOLOGY | Facility: HOSPITAL | Age: 54
Discharge: HOME | End: 2019-07-30
Attending: INTERNAL MEDICINE
Payer: COMMERCIAL

## 2019-07-30 ENCOUNTER — TELEPHONE (OUTPATIENT)
Dept: PHARMACY | Facility: CLINIC | Age: 54
End: 2019-07-30

## 2019-07-30 ENCOUNTER — TELEPHONE (OUTPATIENT)
Dept: RADIOLOGY | Facility: HOSPITAL | Age: 54
End: 2019-07-30

## 2019-07-30 DIAGNOSIS — C20 RECTAL CANCER: ICD-10-CM

## 2019-07-30 LAB — BACTERIA BLD CULT: NORMAL

## 2019-07-30 PROCEDURE — 72193 CT PELVIS WITH  CONTRAST: ICD-10-PCS | Mod: 26,,, | Performed by: RADIOLOGY

## 2019-07-30 PROCEDURE — 72193 CT PELVIS W/DYE: CPT | Mod: TC

## 2019-07-30 PROCEDURE — 77336 RADIATION PHYSICS CONSULT: CPT | Performed by: RADIOLOGY

## 2019-07-30 PROCEDURE — 72193 CT PELVIS W/DYE: CPT | Mod: 26,,, | Performed by: RADIOLOGY

## 2019-07-30 PROCEDURE — 77014 HC CT GUIDANCE RADIATION THERAPY FLDS PLACEMENT: CPT | Mod: TC | Performed by: RADIOLOGY

## 2019-07-30 PROCEDURE — 25500020 PHARM REV CODE 255: Performed by: INTERNAL MEDICINE

## 2019-07-30 PROCEDURE — 77386 HC IMRT, COMPLEX: CPT | Performed by: RADIOLOGY

## 2019-07-30 RX ADMIN — IOHEXOL 75 ML: 350 INJECTION, SOLUTION INTRAVENOUS at 11:07

## 2019-07-30 RX ADMIN — IOHEXOL 30 ML: 350 INJECTION, SOLUTION INTRAVENOUS at 09:07

## 2019-07-30 NOTE — TELEPHONE ENCOUNTER
"7 Day Touchbase - Xeloda:  Patient reached and confirmed that he started Xeloda/XRT on 7/10/19.  He was initially planned to have 5.5 weeks of Xeloda/XRT on Monday through Friday only.  This would have totaled 28 radiation treatments but they have since reduced him down to 25.  Discussed appropriate disposal as he may have a handful of pills remaining.      As for treatment, he notes that he has "good days and bad days".  His major issues are decreased appetite and fatigue.  He was recently started on Megace tablets to help boost his appetite and reports drinking Boost w/Protein 3 or 4 times daily.  He eats smaller meals/snacks throughout the day and dose feel like the Megace is helping overall.  He reports a weight loss of 30 lbs since diagnosis a little over a month ago.      For his fatigue, it was discussed that nutrition can definitely play a role.  He continues to increase his appetite regularly.  Additionally, this is an unfortunate side effect of the medication as well as the radiation procedures.  He also reports not sleeping well at night which is compounding the issue.  He said he gets to sleep fine but due to his condition, he wakes up many times/night to use the restroom.      Confirmed appropriate dosing, administration and handling.  He did not have any additional questions or concerns but is looking forward to completing his last 11 treatment and having therapy complete.  He is aware to call [facility] or provider for any questions or concerns that may arise.    "

## 2019-07-31 ENCOUNTER — DOCUMENTATION ONLY (OUTPATIENT)
Dept: RADIATION ONCOLOGY | Facility: CLINIC | Age: 54
End: 2019-07-31

## 2019-07-31 PROCEDURE — 77014 HC CT GUIDANCE RADIATION THERAPY FLDS PLACEMENT: CPT | Mod: TC | Performed by: RADIOLOGY

## 2019-07-31 PROCEDURE — 77386 HC IMRT, COMPLEX: CPT | Performed by: RADIOLOGY

## 2019-07-31 NOTE — PLAN OF CARE
Problem: Adult Inpatient Plan of Care  Goal: Plan of Care Review  Outcome: Ongoing (interventions implemented as appropriate)  Day 16 outpatient xrt rectum. Tolerating therapy well. Eating more food for breakfast; Taking 1-3 cans per day of supplements. No nausea. Will continue to monitor.

## 2019-08-01 ENCOUNTER — HOSPITAL ENCOUNTER (OUTPATIENT)
Dept: RADIATION THERAPY | Facility: HOSPITAL | Age: 54
Discharge: HOME | End: 2019-08-01
Attending: RADIOLOGY
Payer: COMMERCIAL

## 2019-08-01 PROCEDURE — 77014 HC CT GUIDANCE RADIATION THERAPY FLDS PLACEMENT: CPT | Mod: TC | Performed by: RADIOLOGY

## 2019-08-01 PROCEDURE — 77386 HC IMRT, COMPLEX: CPT | Performed by: RADIOLOGY

## 2019-08-02 PROCEDURE — 77014 HC CT GUIDANCE RADIATION THERAPY FLDS PLACEMENT: CPT | Mod: TC | Performed by: RADIOLOGY

## 2019-08-02 PROCEDURE — 77386 HC IMRT, COMPLEX: CPT | Performed by: RADIOLOGY

## 2019-08-05 ENCOUNTER — OFFICE VISIT (OUTPATIENT)
Dept: HEMATOLOGY/ONCOLOGY | Facility: CLINIC | Age: 54
End: 2019-08-05
Payer: COMMERCIAL

## 2019-08-05 VITALS
HEART RATE: 85 BPM | SYSTOLIC BLOOD PRESSURE: 102 MMHG | WEIGHT: 155.19 LBS | BODY MASS INDEX: 21.02 KG/M2 | TEMPERATURE: 98 F | OXYGEN SATURATION: 99 % | DIASTOLIC BLOOD PRESSURE: 67 MMHG | HEIGHT: 72 IN

## 2019-08-05 DIAGNOSIS — C20 RECTAL CANCER: Primary | ICD-10-CM

## 2019-08-05 DIAGNOSIS — D50.0 IRON DEFICIENCY ANEMIA DUE TO CHRONIC BLOOD LOSS: ICD-10-CM

## 2019-08-05 PROBLEM — D50.9 IRON DEFICIENCY ANEMIA: Status: ACTIVE | Noted: 2019-08-05

## 2019-08-05 PROCEDURE — 77014 HC CT GUIDANCE RADIATION THERAPY FLDS PLACEMENT: CPT | Mod: TC | Performed by: RADIOLOGY

## 2019-08-05 PROCEDURE — 3008F BODY MASS INDEX DOCD: CPT | Mod: CPTII,S$GLB,, | Performed by: INTERNAL MEDICINE

## 2019-08-05 PROCEDURE — 99215 PR OFFICE/OUTPT VISIT, EST, LEVL V, 40-54 MIN: ICD-10-PCS | Mod: S$GLB,,, | Performed by: INTERNAL MEDICINE

## 2019-08-05 PROCEDURE — 77386 HC IMRT, COMPLEX: CPT | Performed by: RADIOLOGY

## 2019-08-05 PROCEDURE — 99999 PR PBB SHADOW E&M-EST. PATIENT-LVL III: CPT | Mod: PBBFAC,,, | Performed by: INTERNAL MEDICINE

## 2019-08-05 PROCEDURE — 99999 PR PBB SHADOW E&M-EST. PATIENT-LVL III: ICD-10-PCS | Mod: PBBFAC,,, | Performed by: INTERNAL MEDICINE

## 2019-08-05 PROCEDURE — 99215 OFFICE O/P EST HI 40 MIN: CPT | Mod: S$GLB,,, | Performed by: INTERNAL MEDICINE

## 2019-08-05 PROCEDURE — 3008F PR BODY MASS INDEX (BMI) DOCUMENTED: ICD-10-PCS | Mod: CPTII,S$GLB,, | Performed by: INTERNAL MEDICINE

## 2019-08-05 NOTE — PROGRESS NOTES
Subjective:       Patient ID: Sukhjinder Presley is a 53 y.o. male.    Chief Complaint: Rectal cancer [C20]  HPI: We have an opportunity to see Mr. Sukhjinder Presley in Hematology Oncology clinic at [facility] on 08/05/2019.  Mr. Sukhjinder Presley is a 53 y.o. gentleman with rectal cancer on neoadjuvant chemoradiation today. Has significant rectal pain, tenesmus, passing mucous, decreased appetite.  Reported symptoms have improved with better pain control and appetite.       Rectal cancer    6/24/2019 Initial Diagnosis     Rectal cancer         6/26/2019 Cancer Staged     Staging form: Colon and Rectum, AJCC 8th Edition  - Clinical stage from 6/26/2019: Stage IIIB (cT3, cN2a, cM0) - Signed by Artem Mishra II, MD on 6/26/2019         7/10/2019 -  Chemotherapy     Treatment Summary   Plan Name: OP CAPECITABINE 5 DAYS + RADIOTHERAPY  Treatment Goal: Curative  Status: Active  Start Date: 7/10/2019 (Planned)  End Date: 7/10/2019 (Planned)  Provider: Song Aden MD  Chemotherapy: [No matching medication found in this treatment plan]          Past Medical History:   Diagnosis Date    Anemia     Cancer      Family History   Problem Relation Age of Onset    Intestinal malrotation Mother     Leukemia Father     No Known Problems Sister     Coronary artery disease Brother     Coronary artery disease Maternal Grandmother     Stomach cancer Maternal Grandfather      Social History     Socioeconomic History    Marital status: Significant Other     Spouse name: Not on file    Number of children: Not on file    Years of education: Not on file    Highest education level: Not on file   Occupational History    Not on file   Social Needs    Financial resource strain: Somewhat hard    Food insecurity:     Worry: Sometimes true     Inability: Sometimes true    Transportation needs:     Medical: No     Non-medical: No   Tobacco Use    Smoking status: Current Every Day Smoker     Packs/day: 1.00     Years: 35.00      Pack years: 35.00     Types: Cigarettes     Start date: 1/2/1984    Smokeless tobacco: Current User     Types: Chew   Substance and Sexual Activity    Alcohol use: Yes     Alcohol/week: 27.6 oz     Types: 42 Cans of beer, 4 Shots of liquor per week     Frequency: 4 or more times a week     Drinks per session: 5 or 6     Binge frequency: Daily or almost daily     Comment: nancie Virgen    Drug use: Never    Sexual activity: Yes     Partners: Female     Birth control/protection: None   Lifestyle    Physical activity:     Days per week: 1 day     Minutes per session: 60 min    Stress: Not on file   Relationships    Social connections:     Talks on phone: More than three times a week     Gets together: More than three times a week     Attends Faith service: Not on file     Active member of club or organization: No     Attends meetings of clubs or organizations: Never     Relationship status: Living with partner   Other Topics Concern    Not on file   Social History Narrative    Not on file     Past Surgical History:   Procedure Laterality Date    COLONOSCOPY N/A 6/6/2019    [facility]    EGD (ESOPHAGOGASTRODUODENOSCOPY) N/A 6/6/2019    [facility]    HERNIA REPAIR  1995    TONSILLECTOMY       Current Outpatient Medications   Medication Sig Dispense Refill    aspirin 81 MG Chew Take 81 mg by mouth once daily.      capecitabine (XELODA) 500 MG Tab Take 3 tablets (1500 mg) by mouth twice daily after breakfast and dinner on days of radiation only. 168 tablet 0    dronabinol (MARINOL) 10 MG capsule Take 1 capsule (10 mg total) by mouth 2 (two) times daily before meals. 60 capsule 1    megestrol (MEGACE) 40 MG Tab Take 1 tablet (40 mg total) by mouth 4 (four) times daily. 90 tablet 1    morphine (MS CONTIN) 15 MG 12 hr tablet Take 3 tablets (45 mg total) by mouth nightly. 90 tablet 0    oxyCODONE (ROXICODONE) 10 mg Tab immediate release  tablet Take 1 tablet (10 mg total) by mouth every 8 (eight) hours as needed for Pain. 90 tablet 0    promethazine (PHENERGAN) 25 MG tablet Take 1 tablet (25 mg total) by mouth every 4 (four) hours. 25 tablet 2    silver sulfADIAZINE 1% (SILVADENE) 1 % cream Apply to affected area daily 400 g 1    temazepam (RESTORIL) 7.5 MG Cap Take 1-2 capsules (7.5-15 mg total) by mouth nightly as needed (sleep). 60 capsule 0    traMADol (ULTRAM) 50 mg tablet Take 1 tablet (50 mg total) by mouth every 6 (six) hours as needed for Pain. 90 tablet 0    varenicline (CHANTIX) 1 mg Tab Take 1 tablet (1 mg total) by mouth 2 (two) times daily. 60 tablet 2    iron fum-B12-IF-C-folic acid (FOLTRIN) 110-0.5 mg capsule Take 1 capsule by mouth 2 (two) times daily. 60 capsule 1     No current facility-administered medications for this visit.        Labs:  Lab Results   Component Value Date    WBC 6.66 07/24/2019    HGB 10.8 (L) 07/24/2019    HCT 31.3 (L) 07/24/2019    MCV 80 (L) 07/24/2019     07/24/2019     BMP  Lab Results   Component Value Date     07/24/2019    K 3.9 07/24/2019     07/24/2019    CO2 25 07/24/2019    BUN 10 07/24/2019    CREATININE 0.7 07/24/2019    CALCIUM 9.3 07/24/2019    ANIONGAP 12 07/24/2019    ESTGFRAFRICA >60 07/24/2019    EGFRNONAA >60 07/24/2019     Lab Results   Component Value Date    ALT 8 (L) 07/24/2019    AST 13 07/24/2019    ALKPHOS 69 07/24/2019    BILITOT 0.4 07/24/2019       Lab Results   Component Value Date    IRON 19 (L) 07/24/2019    TIBC 246 (L) 07/24/2019    FERRITIN 180 07/24/2019     Lab Results   Component Value Date    UFDYEMYC68 >2000 (H) 01/15/2019     No results found for: FOLATE  Lab Results   Component Value Date    TSH 1.607 01/15/2019       I have reviewed the radiology reports and examined the scan/xray images.    Review of Systems   Constitutional: Negative.    HENT: Negative.    Eyes: Negative.    Respiratory: Negative.    Cardiovascular: Negative.     Gastrointestinal: Negative.    Endocrine: Negative.    Genitourinary: Negative.    Musculoskeletal: Negative.    Skin: Negative.    Allergic/Immunologic: Negative.    Neurological: Negative.    Hematological: Negative.    Psychiatric/Behavioral: Negative.      ECOG SCORE    0 - Fully active-able to carry on all pre-disease performance without restriction            Objective:     Vitals:    08/05/19 1456   BP: 102/67   Pulse: 85   Temp: 98.2 °F (36.8 °C)   Body mass index is 21.05 kg/m².  Physical Exam   Constitutional: He is oriented to person, place, and time. He appears well-developed and well-nourished.   HENT:   Head: Normocephalic and atraumatic.   Eyes: Conjunctivae and EOM are normal.   Neck: Normal range of motion. Neck supple.   Cardiovascular: Normal rate and regular rhythm.   Pulmonary/Chest: Effort normal and breath sounds normal.   Abdominal: Soft. Bowel sounds are normal.   Musculoskeletal: Normal range of motion.   Neurological: He is alert and oriented to person, place, and time.   Skin: Skin is warm and dry.   Psychiatric: He has a normal mood and affect. His behavior is normal. Judgment and thought content normal.   Nursing note and vitals reviewed.        Assessment:      1. Rectal cancer    2. Iron deficiency anemia due to chronic blood loss           Plan:     Rectal cancer  Continue on neoadjuvant xeloda concurrently with radiation.  Has appointment to see Dr. New on 8/19 for next step in cancer treatment.      Iron deficiency anemia due to chronic blood loss    -     iron fum-B12-IF-C-folic acid (FOLTRIN) 110-0.5 mg capsule; Take 1 capsule by mouth 2 (two) times daily.  Dispense: 60 capsule; Refill: 1  -     CBC auto differential; Future; Expected date: 08/21/2019  -     Comprehensive metabolic panel; Future; Expected date: 08/21/2019  -     Ferritin; Future; Expected date: 08/21/2019  -     Folate; Future; Expected date: 08/21/2019  -     Vitamin B12; Future; Expected date:  08/21/2019  -     Iron and TIBC; Future; Expected date: 08/21/2019

## 2019-08-06 PROCEDURE — 77336 RADIATION PHYSICS CONSULT: CPT | Performed by: RADIOLOGY

## 2019-08-06 PROCEDURE — 77014 HC CT GUIDANCE RADIATION THERAPY FLDS PLACEMENT: CPT | Mod: TC | Performed by: RADIOLOGY

## 2019-08-06 PROCEDURE — 77386 HC IMRT, COMPLEX: CPT | Performed by: RADIOLOGY

## 2019-08-07 ENCOUNTER — DOCUMENTATION ONLY (OUTPATIENT)
Dept: RADIATION ONCOLOGY | Facility: CLINIC | Age: 54
End: 2019-08-07

## 2019-08-07 DIAGNOSIS — L58.9 RADIATION DERMATITIS: Primary | ICD-10-CM

## 2019-08-07 PROCEDURE — 77014 HC CT GUIDANCE RADIATION THERAPY FLDS PLACEMENT: CPT | Mod: TC | Performed by: RADIOLOGY

## 2019-08-07 PROCEDURE — 77386 HC IMRT, COMPLEX: CPT | Performed by: RADIOLOGY

## 2019-08-07 RX ORDER — LIDOCAINE 50 MG/G
OINTMENT TOPICAL
Qty: 30 G | Refills: 3 | Status: SHIPPED | OUTPATIENT
Start: 2019-08-07 | End: 2019-08-19 | Stop reason: SDUPTHER

## 2019-08-07 NOTE — PLAN OF CARE
Problem: Adult Inpatient Plan of Care  Goal: Plan of Care Review  Outcome: Ongoing (interventions implemented as appropriate)  Day 21 of outpatient xrt to the rectum. no diarrhea, no N&V, slight pain off and on, takes tramdol. Will continue to monitor.

## 2019-08-08 PROCEDURE — 77014 HC CT GUIDANCE RADIATION THERAPY FLDS PLACEMENT: CPT | Mod: TC | Performed by: RADIOLOGY

## 2019-08-08 PROCEDURE — 77386 HC IMRT, COMPLEX: CPT | Performed by: RADIOLOGY

## 2019-08-09 PROCEDURE — 77014 HC CT GUIDANCE RADIATION THERAPY FLDS PLACEMENT: CPT | Mod: TC | Performed by: RADIOLOGY

## 2019-08-09 PROCEDURE — 77386 HC IMRT, COMPLEX: CPT | Performed by: RADIOLOGY

## 2019-08-12 PROCEDURE — 77014 HC CT GUIDANCE RADIATION THERAPY FLDS PLACEMENT: CPT | Mod: TC | Performed by: RADIOLOGY

## 2019-08-12 PROCEDURE — 77386 HC IMRT, COMPLEX: CPT | Performed by: RADIOLOGY

## 2019-08-13 PROCEDURE — 77386 HC IMRT, COMPLEX: CPT | Performed by: RADIOLOGY

## 2019-08-13 PROCEDURE — 77336 RADIATION PHYSICS CONSULT: CPT | Performed by: RADIOLOGY

## 2019-08-13 PROCEDURE — 77014 HC CT GUIDANCE RADIATION THERAPY FLDS PLACEMENT: CPT | Mod: TC | Performed by: RADIOLOGY

## 2019-08-19 ENCOUNTER — OFFICE VISIT (OUTPATIENT)
Dept: SURGERY | Facility: CLINIC | Age: 54
End: 2019-08-19
Payer: COMMERCIAL

## 2019-08-19 ENCOUNTER — PATIENT MESSAGE (OUTPATIENT)
Dept: SURGERY | Facility: CLINIC | Age: 54
End: 2019-08-19

## 2019-08-19 VITALS
BODY MASS INDEX: 21.71 KG/M2 | SYSTOLIC BLOOD PRESSURE: 104 MMHG | HEART RATE: 88 BPM | DIASTOLIC BLOOD PRESSURE: 68 MMHG | TEMPERATURE: 99 F | WEIGHT: 160.06 LBS

## 2019-08-19 DIAGNOSIS — C20 RECTAL CANCER: ICD-10-CM

## 2019-08-19 DIAGNOSIS — K62.5 RECTAL BLEEDING: ICD-10-CM

## 2019-08-19 DIAGNOSIS — C20 RECTAL ADENOCARCINOMA: Primary | ICD-10-CM

## 2019-08-19 DIAGNOSIS — L58.9 RADIATION DERMATITIS: ICD-10-CM

## 2019-08-19 DIAGNOSIS — C80.1 CANCER: Primary | ICD-10-CM

## 2019-08-19 PROCEDURE — 99214 PR OFFICE/OUTPT VISIT, EST, LEVL IV, 30-39 MIN: ICD-10-PCS | Mod: S$GLB,,, | Performed by: COLON & RECTAL SURGERY

## 2019-08-19 PROCEDURE — 99999 PR PBB SHADOW E&M-EST. PATIENT-LVL IV: ICD-10-PCS | Mod: PBBFAC,,, | Performed by: COLON & RECTAL SURGERY

## 2019-08-19 PROCEDURE — 99999 PR PBB SHADOW E&M-EST. PATIENT-LVL IV: CPT | Mod: PBBFAC,,, | Performed by: COLON & RECTAL SURGERY

## 2019-08-19 PROCEDURE — 3008F BODY MASS INDEX DOCD: CPT | Mod: CPTII,S$GLB,, | Performed by: COLON & RECTAL SURGERY

## 2019-08-19 PROCEDURE — 99214 OFFICE O/P EST MOD 30 MIN: CPT | Mod: S$GLB,,, | Performed by: COLON & RECTAL SURGERY

## 2019-08-19 PROCEDURE — 3008F PR BODY MASS INDEX (BMI) DOCUMENTED: ICD-10-PCS | Mod: CPTII,S$GLB,, | Performed by: COLON & RECTAL SURGERY

## 2019-08-19 RX ORDER — LIDOCAINE HYDROCHLORIDE 10 MG/ML
1 INJECTION, SOLUTION EPIDURAL; INFILTRATION; INTRACAUDAL; PERINEURAL ONCE
Status: DISCONTINUED | OUTPATIENT
Start: 2019-08-19 | End: 2019-10-08 | Stop reason: HOSPADM

## 2019-08-19 RX ORDER — SODIUM CHLORIDE, SODIUM LACTATE, POTASSIUM CHLORIDE, CALCIUM CHLORIDE 600; 310; 30; 20 MG/100ML; MG/100ML; MG/100ML; MG/100ML
INJECTION, SOLUTION INTRAVENOUS CONTINUOUS
Status: CANCELLED | OUTPATIENT
Start: 2019-08-19

## 2019-08-19 RX ORDER — ONDANSETRON 2 MG/ML
4 INJECTION INTRAMUSCULAR; INTRAVENOUS EVERY 12 HOURS PRN
Status: CANCELLED | OUTPATIENT
Start: 2019-08-19

## 2019-08-19 RX ORDER — OXYCODONE HYDROCHLORIDE 10 MG/1
10 TABLET ORAL EVERY 8 HOURS PRN
Qty: 90 TABLET | Refills: 0 | Status: SHIPPED | OUTPATIENT
Start: 2019-08-19 | End: 2019-09-04 | Stop reason: SDUPTHER

## 2019-08-19 RX ORDER — METRONIDAZOLE 500 MG/100ML
500 INJECTION, SOLUTION INTRAVENOUS
Status: CANCELLED | OUTPATIENT
Start: 2019-08-19

## 2019-08-19 RX ORDER — GABAPENTIN 100 MG/1
800 CAPSULE ORAL
Status: CANCELLED | OUTPATIENT
Start: 2019-08-19 | End: 2019-08-19

## 2019-08-19 RX ORDER — LIDOCAINE 50 MG/G
OINTMENT TOPICAL
Qty: 30 G | Refills: 3 | Status: SHIPPED | OUTPATIENT
Start: 2019-08-19 | End: 2019-11-22 | Stop reason: SDUPTHER

## 2019-08-19 RX ORDER — HEPARIN SODIUM 5000 [USP'U]/ML
5000 INJECTION, SOLUTION INTRAVENOUS; SUBCUTANEOUS
Status: CANCELLED | OUTPATIENT
Start: 2019-08-19 | End: 2019-08-20

## 2019-08-19 RX ORDER — ACETAMINOPHEN 325 MG/1
1000 TABLET ORAL ONCE
Status: CANCELLED | OUTPATIENT
Start: 2019-08-19 | End: 2019-08-19

## 2019-08-19 RX ORDER — OXYCODONE HYDROCHLORIDE 10 MG/1
10 TABLET ORAL EVERY 8 HOURS PRN
Qty: 90 TABLET | Refills: 0 | Status: SHIPPED | OUTPATIENT
Start: 2019-08-19 | End: 2019-08-19 | Stop reason: SDUPTHER

## 2019-08-19 RX ORDER — CELECOXIB 100 MG/1
400 CAPSULE ORAL
Status: CANCELLED | OUTPATIENT
Start: 2019-08-19 | End: 2019-08-19

## 2019-08-19 NOTE — TELEPHONE ENCOUNTER
----- Message from Flores Sharp sent at 8/19/2019  9:42 AM CDT -----  Contact: [facility]  Caller is calling to have supporting document to have controlled substance (more than 7 days) Please resend the script with documents.           ..  [facility] - [facility] GIRON [facility] [address] [place] [address] [address] [facility]  [place] [id number]  Phone: [phone] Fax: [phone]

## 2019-08-19 NOTE — PROGRESS NOTES
History & Physical    SUBJECTIVE:     Chief Complaint   Patient presents with    Established Patient     Follow Up    CC: rectal adenocarcinoma  Ref: Anjelica Saavedra MD    History of Present Illness:  Patient is a 53 y.o. male presents for evaluation of rectal cancer.  Patient reports he has had increasing pelvic/rectal pain along with decreased bowel movements over the last 6 weeks.  Reports an uncomfortable feeling in his pelvis associated with mucous discharge from his rectum as well as bloody bowel movements that are thin and less than normal. He reports associated chills, fatigue and 16 lb weight loss over the past 5 months.  He has also noticed a decrease in appetite as well. He underwent a colonoscopy on 06/06/2019 where a nonobstructing rectal mass was found with biopsy showing well-differentiated adenocarcinoma.  He was then referred to [facility] for evaluation.  He has no family history of colorectal cancer or IBD.    8/13/19: Completed neoadj chemoXRT    Interval history:  Since last clinic visit, the patient has completed neoadjuvant chemo XRT on 08/13/2019.  He has had good response to his treatment for his radiation oncologist.  He states that he is no longer having any blood in his stool or rectal bleeding. He does have some perianal excoriations from his radiation treatment as the field was low.  He is tolerating regular diet.  He denies any fever, chills, nausea, vomiting.  He is still smoking.  He has gained around 10 lb recently.      PMHx:  Nephrolithiasis  PSHx:  Left inguinal hernia repair, lithotripsy  All: NKDA  FamHx: negative for CRC or IBD; +gastric cancer in maternal grandfather  SocHx: +tobacco (1ppd); +etoh (6 beers/day); no illicits      Review of patient's allergies indicates:  No Known Allergies    Current Outpatient Medications   Medication Sig Dispense Refill    aspirin 81 MG Chew Take 81 mg by mouth once daily.      capecitabine (XELODA) 500 MG Tab Take 3 tablets  (1500 mg) by mouth twice daily after breakfast and dinner on days of radiation only. 168 tablet 0    dronabinol (MARINOL) 10 MG capsule Take 1 capsule (10 mg total) by mouth 2 (two) times daily before meals. 60 capsule 1    iron fum-B12-IF-C-folic acid (FOLTRIN) 110-0.5 mg capsule Take 1 capsule by mouth 2 (two) times daily. 60 capsule 1    lidocaine (XYLOCAINE) 5 % Oint ointment Apply topically as needed. 30 g 3    megestrol (MEGACE) 40 MG Tab Take 1 tablet (40 mg total) by mouth 4 (four) times daily. 90 tablet 1    morphine (MS CONTIN) 15 MG 12 hr tablet Take 3 tablets (45 mg total) by mouth nightly. 90 tablet 0    oxyCODONE (ROXICODONE) 10 mg Tab immediate release tablet Take 1 tablet (10 mg total) by mouth every 8 (eight) hours as needed for Pain (medically necessary). 90 tablet 0    promethazine (PHENERGAN) 25 MG tablet Take 1 tablet (25 mg total) by mouth every 4 (four) hours. 25 tablet 2    silver sulfADIAZINE 1% (SILVADENE) 1 % cream Apply to affected area daily 400 g 1    temazepam (RESTORIL) 7.5 MG Cap Take 1-2 capsules (7.5-15 mg total) by mouth nightly as needed (sleep). 60 capsule 0    traMADol (ULTRAM) 50 mg tablet Take 1 tablet (50 mg total) by mouth every 6 (six) hours as needed for Pain. 90 tablet 0    varenicline (CHANTIX) 1 mg Tab Take 1 tablet (1 mg total) by mouth 2 (two) times daily. 60 tablet 2     No current facility-administered medications for this visit.        Past Medical History:   Diagnosis Date    Anemia     Cancer      Past Surgical History:   Procedure Laterality Date    COLONOSCOPY N/A 6/6/2019    [facility]    EGD (ESOPHAGOGASTRODUODENOSCOPY) N/A 6/6/2019    [facility]    HERNIA REPAIR  1995    TONSILLECTOMY       Family History   Problem Relation Age of Onset    Intestinal malrotation Mother     Leukemia Father     No Known Problems Sister     Coronary artery disease Brother     Coronary  artery disease Maternal Grandmother     Stomach cancer Maternal Grandfather      Social History     Tobacco Use    Smoking status: Current Every Day Smoker     Packs/day: 1.00     Years: 35.00     Pack years: 35.00     Types: Cigarettes     Start date: 1/2/1984    Smokeless tobacco: Current User     Types: Chew   Substance Use Topics    Alcohol use: Yes     Alcohol/week: 27.6 oz     Types: 42 Cans of beer, 4 Shots of liquor per week     Frequency: 4 or more times a week     Drinks per session: 5 or 6     Binge frequency: Daily or almost daily     Comment: nancie Virgen    Drug use: Never        Review of Systems:  Review of Systems   Constitutional: Negative for activity change, appetite change, chills, fatigue, fever and unexpected weight change.   HENT: Negative for congestion, ear pain, sore throat and trouble swallowing.    Eyes: Negative for pain, redness and itching.   Respiratory: Negative for cough, shortness of breath and wheezing.    Cardiovascular: Negative for chest pain, palpitations and leg swelling.   Gastrointestinal: Positive for rectal pain. Negative for abdominal distention, abdominal pain, anal bleeding, blood in stool, constipation and diarrhea.   Endocrine: Negative for cold intolerance, heat intolerance and polyuria.   Genitourinary: Negative for dysuria, flank pain, frequency and hematuria.   Musculoskeletal: Negative for gait problem, joint swelling and neck pain.   Skin: Negative for color change, rash and wound.   Allergic/Immunologic: Negative for environmental allergies and immunocompromised state.   Neurological: Negative for dizziness, speech difficulty, weakness and numbness.   Psychiatric/Behavioral: Negative for agitation, confusion and hallucinations.       OBJECTIVE:     Vital Signs (Most Recent)  Temp: 98.9 °F (37.2 °C) (08/19/19 1026)  Pulse: 88 (08/19/19 1026)  BP: 104/68 (08/19/19 1026)     72.6 kg (160 lb 0.9 oz)     Physical Exam:  Physical Exam   Constitutional: He is  oriented to person, place, and time. He appears well-developed.   HENT:   Head: Normocephalic and atraumatic.   Eyes: Conjunctivae and EOM are normal.   Neck: Normal range of motion. No thyromegaly present.   Cardiovascular: Normal rate and regular rhythm.   Pulmonary/Chest: Effort normal. No respiratory distress.   Abdominal: Soft. He exhibits no distension and no mass. There is no tenderness.   Genitourinary:   Genitourinary Comments: Anorectal: deferred due to patient request   Musculoskeletal: Normal range of motion. He exhibits no edema or tenderness.   Neurological: He is alert and oriented to person, place, and time.   Skin: Skin is warm and dry. Capillary refill takes less than 2 seconds. No rash noted.   Psychiatric: He has a normal mood and affect.     Laboratory  Lab Results   Component Value Date    WBC 6.66 07/24/2019    HGB 10.8 (L) 07/24/2019    HCT 31.3 (L) 07/24/2019     07/24/2019    CHOL 158 06/01/2019    TRIG 194 (H) 06/01/2019    HDL 30 (L) 06/01/2019    ALT 8 (L) 07/24/2019    AST 13 07/24/2019     07/24/2019    K 3.9 07/24/2019     07/24/2019    CREATININE 0.7 07/24/2019    BUN 10 07/24/2019    CO2 25 07/24/2019    TSH 1.607 01/15/2019    PSA 0.37 06/01/2019    INR 1.0 06/11/2019       Diagnostic Results:  Colonoscopy images and report reviewed which shows a rectal mass that malignant     MRI:  FINDINGS:  Tumor Location: Tumor location (from anal verge):    Mid with the inferior margins measuring approximately 8 cm from the anal verge.    Relationship to anterior peritoneal reflection: Straddles with the majority of the tumor positioned above the peritoneal reflection.    Tumor Characteristics:    Circumferential extent/location (clock face): Near circumferential at the inferior and mid portions of the tumor from 10:00 to 8:00 more superior portions of the tumor appear circumferential.    Tumor Length: 9.7 cm    Mucinous: No    Tumor Extent:    Does doesextend beyond  muscularis into perirectal fat.    There is isspiculation of the periectal fat.    The tumor abuts the surface of the mesial rectal fascia on the left.  There is otherwise no definite evidence of invasion of the adjacent structures.    Lymph Nodes:    There are are 2 perirectal lymph nodes greater than 5 mmin the mesorectal fat. 9 mm lymph nodes in the mesorectal fat on the right abuts the mesorectal fascia.    There are multiple subcentimeter lymph nodes within the pelvic sidewall bilaterally..    There are no lymph nodes that are suspicious based on heterogeneous speculated morphology.    There is no evident vascular invasion.      Impression       Large rectal mass as detailed above with invasion of the muscularis and abutment of the mesorectal fascia on the left.    Two suspicious mesorectal lymph nodes, the larger of which abuts the mesorectal fascia on the right.         CT Scan:  FINDINGS:  Chest:    There are no discrete nodules or masses identified in the lungs.  No pleural effusion.  A few subcentimeter lymph nodes present in the mediastinum.  No definite pathologic lymphadenopathy.  Heart and pericardium are unremarkable.    Abdomen/pelvis:    There are tiny granulomatous calcifications in the liver and spleen.  No other hepatic or splenic lesions.  No radiopaque gallstones or biliary tract dilatation.  Portal vein is patent.    There is a 16 mm calculus in the lower pole of the left kidney and an adjacent smaller calculus measuring 5 mm.  No hydronephrosis or other urinary obstructive change on either side.  Adrenal glands are not enlarged.    There is a large irregular soft tissue mass in the rectum measuring 7.7 x 6.9 cm transverse and approximately 9.5 cm craniocaudad.  There is thickening and infiltration of the perirectal fascia and a small amount of fluid in the presacral space.  Small lymph nodes are identified in the mesorectal compartment.  No bowel obstruction.    There is aortoiliac  atherosclerosis.  Small subcentimeter lymph nodes present in the periaortic retroperitoneum.    The urinary bladder has a normal contour.  Punctate calcifications present within the prostate gland.    Skeletal:    There are no suspicious intraosseous lesions.      Impression       1. Large rectal mass highly suspicious for malignancy.  Associated stranding of the perirectal fascia and small mesorectal compartment lymph nodes.  2. Nonobstructing left nephrolithiasis.         ASSESSMENT/PLAN:     53-year-old male with rectal adenocarcinoma with likely T3N1 disease based upon preop imaging without evidence of metastatic disease now s/p neoadj chemoXRT which completed on 8/13/19    - Discussed with the patient that we would re-image his rectum and pelvis around 6 weeks post-chemoXRT treatment  - Will have patient RTC following this for evaluation and preop appointment  - Likely operative intervention 8-12 weeks post chemoXRT completion  - discuss the need of smoking cessation to the patient and the increased risk of anastomotic complications following surgery as well as increased cancer risk with continued smoking  - RTC 6-7 weeks    Rectal adenocarcinoma  -     MRI Rectal Cancer W W/O Contrast; Future; Expected date: 09/24/2019    Jan New MD  Colon and Rectal Surgery  [facility]

## 2019-08-22 ENCOUNTER — PATIENT MESSAGE (OUTPATIENT)
Dept: HEMATOLOGY/ONCOLOGY | Facility: CLINIC | Age: 54
End: 2019-08-22

## 2019-08-28 ENCOUNTER — PATIENT MESSAGE (OUTPATIENT)
Dept: INTERNAL MEDICINE | Facility: CLINIC | Age: 54
End: 2019-08-28

## 2019-08-28 NOTE — TELEPHONE ENCOUNTER
flonase nasal spray 2 spays each nostril for nasal congestion, post nasal drip, runny nose    Delsym as needed for cough    Allegra or zyrtec for allergies, post nasal drip, runny nose, watery eyes.    cepacol throat lozenges for sore throat    mucinex for chest congestion.     Tylenol or ibuprofen for pain or fever.

## 2019-09-03 ENCOUNTER — LAB VISIT (OUTPATIENT)
Dept: LAB | Facility: HOSPITAL | Age: 54
End: 2019-09-03
Attending: INTERNAL MEDICINE
Payer: COMMERCIAL

## 2019-09-03 ENCOUNTER — OFFICE VISIT (OUTPATIENT)
Dept: HEMATOLOGY/ONCOLOGY | Facility: CLINIC | Age: 54
End: 2019-09-03
Attending: INTERNAL MEDICINE
Payer: COMMERCIAL

## 2019-09-03 VITALS
WEIGHT: 162.25 LBS | TEMPERATURE: 98 F | DIASTOLIC BLOOD PRESSURE: 79 MMHG | SYSTOLIC BLOOD PRESSURE: 116 MMHG | BODY MASS INDEX: 21.98 KG/M2 | HEART RATE: 91 BPM | OXYGEN SATURATION: 95 % | HEIGHT: 72 IN

## 2019-09-03 DIAGNOSIS — D50.0 IRON DEFICIENCY ANEMIA DUE TO CHRONIC BLOOD LOSS: ICD-10-CM

## 2019-09-03 DIAGNOSIS — C20 RECTAL CANCER: ICD-10-CM

## 2019-09-03 DIAGNOSIS — D75.1 POLYCYTHEMIA: ICD-10-CM

## 2019-09-03 DIAGNOSIS — K62.5 RECTAL BLEEDING: ICD-10-CM

## 2019-09-03 DIAGNOSIS — C20 RECTAL CANCER: Primary | ICD-10-CM

## 2019-09-03 LAB
ALBUMIN SERPL BCP-MCNC: 3.2 G/DL (ref 3.5–5.2)
ALP SERPL-CCNC: 67 U/L (ref 55–135)
ALT SERPL W/O P-5'-P-CCNC: 7 U/L (ref 10–44)
ANION GAP SERPL CALC-SCNC: 8 MMOL/L (ref 8–16)
AST SERPL-CCNC: 13 U/L (ref 10–40)
BASOPHILS # BLD AUTO: 0.03 K/UL (ref 0–0.2)
BASOPHILS NFR BLD: 0.7 % (ref 0–1.9)
BILIRUB SERPL-MCNC: 0.4 MG/DL (ref 0.1–1)
BUN SERPL-MCNC: 5 MG/DL (ref 6–20)
CALCIUM SERPL-MCNC: 9.3 MG/DL (ref 8.7–10.5)
CHLORIDE SERPL-SCNC: 108 MMOL/L (ref 95–110)
CO2 SERPL-SCNC: 27 MMOL/L (ref 23–29)
CREAT SERPL-MCNC: 0.7 MG/DL (ref 0.5–1.4)
DIFFERENTIAL METHOD: ABNORMAL
EOSINOPHIL # BLD AUTO: 0.3 K/UL (ref 0–0.5)
EOSINOPHIL NFR BLD: 5.7 % (ref 0–8)
ERYTHROCYTE [DISTWIDTH] IN BLOOD BY AUTOMATED COUNT: 17.1 % (ref 11.5–14.5)
EST. GFR  (AFRICAN AMERICAN): >60 ML/MIN/1.73 M^2
EST. GFR  (NON AFRICAN AMERICAN): >60 ML/MIN/1.73 M^2
FERRITIN SERPL-MCNC: 100 NG/ML (ref 20–300)
FOLATE SERPL-MCNC: 8.3 NG/ML (ref 4–24)
GLUCOSE SERPL-MCNC: 108 MG/DL (ref 70–110)
HCT VFR BLD AUTO: 31.6 % (ref 40–54)
HGB BLD-MCNC: 10.3 G/DL (ref 14–18)
IMM GRANULOCYTES # BLD AUTO: 0.01 K/UL (ref 0–0.04)
IMM GRANULOCYTES NFR BLD AUTO: 0.2 % (ref 0–0.5)
IRON SERPL-MCNC: 56 UG/DL (ref 45–160)
LYMPHOCYTES # BLD AUTO: 1.3 K/UL (ref 1–4.8)
LYMPHOCYTES NFR BLD: 29.4 % (ref 18–48)
MCH RBC QN AUTO: 28.7 PG (ref 27–31)
MCHC RBC AUTO-ENTMCNC: 32.6 G/DL (ref 32–36)
MCV RBC AUTO: 88 FL (ref 82–98)
MONOCYTES # BLD AUTO: 0.3 K/UL (ref 0.3–1)
MONOCYTES NFR BLD: 7.8 % (ref 4–15)
NEUTROPHILS # BLD AUTO: 2.5 K/UL (ref 1.8–7.7)
NEUTROPHILS NFR BLD: 56.4 % (ref 38–73)
NRBC BLD-RTO: 0 /100 WBC
PLATELET # BLD AUTO: 226 K/UL (ref 150–350)
PMV BLD AUTO: 8.2 FL (ref 9.2–12.9)
POTASSIUM SERPL-SCNC: 4.6 MMOL/L (ref 3.5–5.1)
PROT SERPL-MCNC: 7.3 G/DL (ref 6–8.4)
RBC # BLD AUTO: 3.59 M/UL (ref 4.6–6.2)
SATURATED IRON: 18 % (ref 20–50)
SODIUM SERPL-SCNC: 143 MMOL/L (ref 136–145)
TOTAL IRON BINDING CAPACITY: 317 UG/DL (ref 250–450)
TRANSFERRIN SERPL-MCNC: 214 MG/DL (ref 200–375)
VIT B12 SERPL-MCNC: 497 PG/ML (ref 210–950)
WBC # BLD AUTO: 4.36 K/UL (ref 3.9–12.7)

## 2019-09-03 PROCEDURE — 3008F PR BODY MASS INDEX (BMI) DOCUMENTED: ICD-10-PCS | Mod: CPTII,S$GLB,, | Performed by: NURSE PRACTITIONER

## 2019-09-03 PROCEDURE — 99215 PR OFFICE/OUTPT VISIT, EST, LEVL V, 40-54 MIN: ICD-10-PCS | Mod: S$GLB,,, | Performed by: NURSE PRACTITIONER

## 2019-09-03 PROCEDURE — 3008F BODY MASS INDEX DOCD: CPT | Mod: CPTII,S$GLB,, | Performed by: NURSE PRACTITIONER

## 2019-09-03 PROCEDURE — 80053 COMPREHEN METABOLIC PANEL: CPT

## 2019-09-03 PROCEDURE — 99999 PR PBB SHADOW E&M-EST. PATIENT-LVL IV: ICD-10-PCS | Mod: PBBFAC,,, | Performed by: NURSE PRACTITIONER

## 2019-09-03 PROCEDURE — 99215 OFFICE O/P EST HI 40 MIN: CPT | Mod: S$GLB,,, | Performed by: NURSE PRACTITIONER

## 2019-09-03 PROCEDURE — 99999 PR PBB SHADOW E&M-EST. PATIENT-LVL IV: CPT | Mod: PBBFAC,,, | Performed by: NURSE PRACTITIONER

## 2019-09-03 PROCEDURE — 85025 COMPLETE CBC W/AUTO DIFF WBC: CPT

## 2019-09-03 PROCEDURE — 82607 VITAMIN B-12: CPT

## 2019-09-03 PROCEDURE — 82728 ASSAY OF FERRITIN: CPT

## 2019-09-03 PROCEDURE — 82746 ASSAY OF FOLIC ACID SERUM: CPT

## 2019-09-03 PROCEDURE — 83540 ASSAY OF IRON: CPT

## 2019-09-03 PROCEDURE — 36415 COLL VENOUS BLD VENIPUNCTURE: CPT

## 2019-09-03 NOTE — LETTER
September 4, 2019      Song Aden MD  [address]  [place] [address]           [facility]  [address]  [place] [address]  Phone: [phone]  Fax: [phone]          Patient: Sukhjinder Presley   MR Number: [id number]   YOB: 1965   Date of Visit: 9/3/2019       Dear Dr. Song Aedn:    Thank you for referring Sukhjinder Presley to me for evaluation. Attached you will find relevant portions of my assessment and plan of care.    If you have questions, please do not hesitate to call me. I look forward to following Sukhjinder Presley along with you.    Sincerely,    Maricarmen Simpson NP    Enclosure  CC:  No Recipients    If you would like to receive this communication electronically, please contact [email or web address] or [phone] to request more information on [employer] access.    For providers and/or their staff who would like to refer a patient to [facility], please contact us through our one-stop-shop provider referral line, Kody Alvarado, at [phone].    If you feel you have received this communication in error or would no longer like to receive these types of communications, please e-mail [email or web address]

## 2019-09-03 NOTE — PATIENT INSTRUCTIONS
Nutrition During Chemotherapy     Drink plenty of liquids, such as water.     During chemotherapy, the energy provided by a healthy diet can help you rebuild normal cells. It can also help you keep up your strength and fight infection. As a result, you may feel better and be more able to cope with side effects. Ask your doctor about your nutrition needs.  Drink plenty of fluids  · Fluids help the body produce urine and decrease constipation. They help prevent kidney and bladder problems. They also help replace fluids lost from vomiting and diarrhea.  · Try water, unsweetened juices, and other flavored drinks without caffeine. They flush toxins from the body.  Get enough calories  · Calories are fuel. The body uses this fuel to perform all of its functions, including healing.  · Its OK to be lean, but be sure you are not underweight. If you are, try eating more calories.  · Eat calorie-dense foods such as avocados, peanut butter, eggs, and ice cream.  · If you need extra calories, add butter, gravy, and sauces to foods (if tolerated).  · If you don't need the extra calories, try to limit foods that are fried, greasy, or high in fat or added sugar.  Eat protein, fruits, and vegetables  · Protein builds muscle, bone, skin, and blood. It helps your body heal and fight infection. It also helps boost your energy level.  · Good protein choices include yogurt, eggs, chicken, lean meats, beans, and peanut butter.  · Fruits and vegetables are full of important vitamins, minerals, and fiber to help your body function properly.  · Try to eat a variety of vegetables, fruits, whole grains, and beans.  · Ask your doctor about instant protein powder or other supplements.  Eating right during treatment  Side effects may make it a little harder to eat well on some days. The following tips will help you continue to get the nutrition you need:  · Be open to new foods and recipes.  · Eat small portions often and slowly.  · Have a  healthy snack instead of a meal if you are not very hungry.  · Try eating in a new setting.  · Physical activity, such as walking, can help increase your appetite. Try to be active for at least 30 minutes each day.  · Boost your diet by getting the vitamins and minerals you need from fruits, vegetables, and whole grains.  · If you live alone and are not up to cooking, ask your healthcare provider about Meals on Wheels or other outreach programs.  · Sometimes, it is best to follow your appetitie. Eat when you are hungry, but when you ar enot, forcing yourself to eat can make you feel bad, nauseated, or even cause you to vomit.   Date Last Reviewed: 1/6/2016 © 2000-2017 [employer]. [address] [place] [address], [place] [address]. All rights reserved. This information is not intended as a substitute for professional medical care. Always follow your healthcare professional's instructions.          Iron-Deficiency Anemia (Adult)  Red blood cells carry oxygen to the tissues of your body. Anemia is a condition in which you have too few red blood cells. You need iron to make red cells. Anemia makes you feel tired and run down. When anemia becomes severe, your skin becomes pale. You may feel short of breath after physical activity. Other symptoms include:  · Headaches  · Dizziness  · Leg cramps with physical activity  · Drowsiness  · Restless legs  Your anemia is caused by not having enough iron in your body. This may be because of:  · Loss of blood. This can be caused by heavy menstrual periods. It can also be caused by bleeding from the stomach or intestines.  · Poor diet. You may not be eating enough foods that contain iron.  · Inability to absorb iron from the foods you eat  · Pregnancy  If your blood count is low enough, your healthcare provider may prescribe an iron supplement. It usually takes about 2 to 3 months of treatment with iron supplements to correct anemia. Severe cases of anemia need a blood  transfusion to quickly ease symptoms and deliver more oxygen to the cells.  Home care  Follow these guidelines when caring for yourself at home:  · Eat foods high in iron. This will boost the amount of iron stored in your body. It is a natural way to build up the number of blood cells. Good sources of iron include beef, liver, spinach and other dark green leafy vegetables, whole grains, beans, and nuts.  · Do not overexert yourself.  · Talk with your healthcare provider before traveling by air or traveling to high altitudes.  Follow-up care  Follow up with your healthcare provider in 2 months, or as advised. This is to have another red blood cell count to be sure your anemia has been fixed.  When to seek medical advice  Call your healthcare provider right away if any of these occur:  · Shortness of breath or chest pain  · Dizziness or fainting  · Vomiting blood or passing red or black-colored stool   Date Last Reviewed: 2/25/2016  © 2426-6299 [employer]. [address] [place] [address], [place] [address]. All rights reserved. This information is not intended as a substitute for professional medical care. Always follow your healthcare professional's instructions.

## 2019-09-03 NOTE — PROGRESS NOTES
Subjective:       Patient ID: Sukhjinder Presley is a 53 y.o. male.    Chief Complaint: Cancer and Results    53 year old male, presents for ongoing evaluation of Polycythemia. Patient was referred by PCP for polycythemia which was treated with monthly blood donation. Initial workup, negative for JAK2 and Hgb/Hct were in normal range. Patient is a long term smoker, and reports a 3 pack per day habit. He is being seen by the smoking cessation clinic and has reduced smoking to 1 pack per day. His goal is to quit smoking. No other significant medical history.  Patient completed neoadjuvant chemoradiation (Xeloda) for rectal cancer on 8/13/19. Reported symptoms have improved with better pain control and appetite.    Cancer   Pertinent negatives include no abdominal pain, arthralgias, chest pain, chills, congestion, coughing, diaphoresis, fever, headaches, myalgias, nausea, rash, vomiting or weakness.     Review of Systems   Constitutional: Negative for chills, diaphoresis, fever and unexpected weight change.   HENT: Negative for congestion, hearing loss, nosebleeds, postnasal drip and trouble swallowing.    Eyes: Negative for discharge and visual disturbance.   Respiratory: Negative for cough, chest tightness and shortness of breath.    Cardiovascular: Negative for chest pain and palpitations.   Gastrointestinal: Negative for abdominal distention, abdominal pain, constipation, diarrhea, nausea and vomiting.   Endocrine: Negative for cold intolerance and heat intolerance.   Genitourinary: Negative for difficulty urinating, dysuria, flank pain, frequency and hematuria.   Musculoskeletal: Negative for arthralgias, back pain and myalgias.   Skin: Negative.  Negative for color change, pallor and rash.   Neurological: Negative for dizziness, weakness, light-headedness and headaches.   Hematological: Negative for adenopathy. Does not bruise/bleed easily.   Psychiatric/Behavioral: Negative for agitation, behavioral problems and  confusion. The patient is nervous/anxious.        Medication List with Changes/Refills   Current Medications    ASPIRIN 81 MG CHEW    Take 81 mg by mouth once daily.    CAPECITABINE (XELODA) 500 MG TAB    Take 3 tablets (1500 mg) by mouth twice daily after breakfast and dinner on days of radiation only.    DRONABINOL (MARINOL) 10 MG CAPSULE    Take 1 capsule (10 mg total) by mouth 2 (two) times daily before meals.    IRON FUM-B12-IF-C-FOLIC ACID (FOLTRIN) 110-0.5 MG CAPSULE    Take 1 capsule by mouth 2 (two) times daily.    LIDOCAINE (XYLOCAINE) 5 % OINT OINTMENT    Apply topically as needed.    MEGESTROL (MEGACE) 40 MG TAB    Take 1 tablet (40 mg total) by mouth 4 (four) times daily.    MORPHINE (MS CONTIN) 15 MG 12 HR TABLET    Take 3 tablets (45 mg total) by mouth nightly.    PROMETHAZINE (PHENERGAN) 25 MG TABLET    Take 1 tablet (25 mg total) by mouth every 4 (four) hours.    SILVER SULFADIAZINE 1% (SILVADENE) 1 % CREAM    Apply to affected area daily    TRAMADOL (ULTRAM) 50 MG TABLET    Take 1 tablet (50 mg total) by mouth every 6 (six) hours as needed for Pain.    VARENICLINE (CHANTIX) 1 MG TAB    Take 1 tablet (1 mg total) by mouth 2 (two) times daily.   Changed and/or Refilled Medications    Modified Medication Previous Medication    OXYCODONE (ROXICODONE) 10 MG TAB IMMEDIATE RELEASE TABLET oxyCODONE (ROXICODONE) 10 mg Tab immediate release tablet       Take 1 tablet (10 mg total) by mouth every 8 (eight) hours as needed for Pain (medically necessary).    Take 1 tablet (10 mg total) by mouth every 8 (eight) hours as needed for Pain (medically necessary).     Objective:     Vitals:    09/03/19 1134   BP: 116/79   Pulse: 91   Temp: 98.4 °F (36.9 °C)     Physical Exam   Constitutional: He is oriented to person, place, and time. He appears well-developed and well-nourished. No distress.   HENT:   Head: Normocephalic and atraumatic.   Right Ear: Hearing and external ear normal.   Left Ear: Hearing and external  ear normal.   Nose: Nose normal. No mucosal edema or rhinorrhea. No epistaxis.   Mouth/Throat: Uvula is midline, oropharynx is clear and moist and mucous membranes are normal.   Eyes: Pupils are equal, round, and reactive to light. Conjunctivae and EOM are normal. Right eye exhibits no chemosis and no discharge. Left eye exhibits no chemosis and no discharge.   Neck: Trachea normal and normal range of motion. Neck supple. No thyroid mass and no thyromegaly present.   Cardiovascular: Normal rate, regular rhythm, normal heart sounds and intact distal pulses.   No murmur heard.  Pulses:       Dorsalis pedis pulses are 2+ on the right side, and 2+ on the left side.   Pulmonary/Chest: Effort normal and breath sounds normal. No respiratory distress. He has no decreased breath sounds. He has no wheezes.   Abdominal: Soft. Bowel sounds are normal. He exhibits no distension. There is no tenderness.   Musculoskeletal: Normal range of motion. He exhibits no edema or tenderness.   Lymphadenopathy:     He has no cervical adenopathy.     He has no axillary adenopathy.        Right: No supraclavicular adenopathy present.        Left: No supraclavicular adenopathy present.   Neurological: He is alert and oriented to person, place, and time. He has normal strength.   Skin: Skin is warm, dry and intact. Capillary refill takes less than 2 seconds. No rash noted. He is not diaphoretic. No erythema.   Psychiatric: His speech is normal and behavior is normal. Judgment and thought content normal. His mood appears anxious. Cognition and memory are normal.   Vitals reviewed.      Assessment:       Problem List Items Addressed This Visit        Oncology    Polycythemia    Rectal cancer - Primary    Relevant Orders    CBC auto differential    Comprehensive metabolic panel    Ferritin    Iron and TIBC    Iron deficiency anemia    Relevant Orders    CBC auto differential    Comprehensive metabolic panel    Ferritin    Iron and TIBC       GI     Rectal bleeding          Plan:       Rectal Cancer:  --Scheduled for surgery 10/8/19.     LISA:  --Will review lab results when available- patient is iron deficient  --Injectafer x2 doses  --Return to clinic in 8 weeks with labs prior to evaluate.     I will review assessment/plan with collaborating physician Dr. Aden.

## 2019-09-04 ENCOUNTER — PATIENT MESSAGE (OUTPATIENT)
Dept: HEMATOLOGY/ONCOLOGY | Facility: CLINIC | Age: 54
End: 2019-09-04

## 2019-09-04 DIAGNOSIS — C20 RECTAL CANCER: ICD-10-CM

## 2019-09-04 PROBLEM — D50.0 IRON DEFICIENCY ANEMIA DUE TO CHRONIC BLOOD LOSS: Status: ACTIVE | Noted: 2019-09-04

## 2019-09-04 RX ORDER — HEPARIN 100 UNIT/ML
500 SYRINGE INTRAVENOUS
Status: CANCELLED | OUTPATIENT
Start: 2019-09-18

## 2019-09-04 RX ORDER — OXYCODONE HYDROCHLORIDE 10 MG/1
10 TABLET ORAL EVERY 8 HOURS PRN
Qty: 90 TABLET | Refills: 0 | Status: SHIPPED | OUTPATIENT
Start: 2019-09-04 | End: 2019-09-30 | Stop reason: SDUPTHER

## 2019-09-04 RX ORDER — SODIUM CHLORIDE 0.9 % (FLUSH) 0.9 %
10 SYRINGE (ML) INJECTION
Status: CANCELLED | OUTPATIENT
Start: 2019-09-11

## 2019-09-04 RX ORDER — SODIUM CHLORIDE 0.9 % (FLUSH) 0.9 %
10 SYRINGE (ML) INJECTION
Status: CANCELLED | OUTPATIENT
Start: 2019-09-18

## 2019-09-04 RX ORDER — HEPARIN 100 UNIT/ML
500 SYRINGE INTRAVENOUS
Status: CANCELLED | OUTPATIENT
Start: 2019-09-11

## 2019-09-10 ENCOUNTER — PATIENT MESSAGE (OUTPATIENT)
Dept: HEMATOLOGY/ONCOLOGY | Facility: CLINIC | Age: 54
End: 2019-09-10

## 2019-09-11 ENCOUNTER — INFUSION (OUTPATIENT)
Dept: INFUSION THERAPY | Facility: HOSPITAL | Age: 54
End: 2019-09-11
Attending: INTERNAL MEDICINE
Payer: COMMERCIAL

## 2019-09-11 VITALS
DIASTOLIC BLOOD PRESSURE: 72 MMHG | HEART RATE: 85 BPM | TEMPERATURE: 99 F | RESPIRATION RATE: 18 BRPM | OXYGEN SATURATION: 96 % | SYSTOLIC BLOOD PRESSURE: 111 MMHG

## 2019-09-11 DIAGNOSIS — D50.0 IRON DEFICIENCY ANEMIA DUE TO CHRONIC BLOOD LOSS: Primary | ICD-10-CM

## 2019-09-11 PROCEDURE — 63600175 PHARM REV CODE 636 W HCPCS: Performed by: NURSE PRACTITIONER

## 2019-09-11 PROCEDURE — 63600175 PHARM REV CODE 636 W HCPCS: Mod: JG | Performed by: NURSE PRACTITIONER

## 2019-09-11 PROCEDURE — 96365 THER/PROPH/DIAG IV INF INIT: CPT

## 2019-09-11 RX ADMIN — FERRIC CARBOXYMALTOSE INJECTION 750 MG: 50 INJECTION, SOLUTION INTRAVENOUS at 01:09

## 2019-09-11 NOTE — PLAN OF CARE
"Problem: Adult Inpatient Plan of Care  Goal: Plan of Care Review  Outcome: Ongoing (interventions implemented as appropriate)  Pt states, " I feel tired a lot"      "

## 2019-09-11 NOTE — DISCHARGE INSTRUCTIONS
.  [facility]  [address] [address]  [address]  [phone] phone     [phone] fax  Hours of Operation: Monday- Friday 8:00am- 5:00pm  After hours phone  [phone]  Hematology / Oncology Physicians on call      Dr. Harjeet Aden    Please call with any concerns regarding your appointment today.

## 2019-09-11 NOTE — PLAN OF CARE
Problem: Adult Inpatient Plan of Care  Goal: Patient-Specific Goal (Individualization)  Outcome: Ongoing (interventions implemented as appropriate)  Pt likes delmi

## 2019-09-13 ENCOUNTER — PATIENT MESSAGE (OUTPATIENT)
Dept: HEMATOLOGY/ONCOLOGY | Facility: CLINIC | Age: 54
End: 2019-09-13

## 2019-09-16 RX ORDER — SODIUM CHLORIDE 0.9 % (FLUSH) 0.9 %
10 SYRINGE (ML) INJECTION
Status: CANCELLED | OUTPATIENT
Start: 2019-09-20

## 2019-09-16 RX ORDER — HEPARIN 100 UNIT/ML
500 SYRINGE INTRAVENOUS
Status: CANCELLED | OUTPATIENT
Start: 2019-09-20

## 2019-09-17 ENCOUNTER — PATIENT MESSAGE (OUTPATIENT)
Dept: HEMATOLOGY/ONCOLOGY | Facility: CLINIC | Age: 54
End: 2019-09-17

## 2019-09-18 ENCOUNTER — INFUSION (OUTPATIENT)
Dept: INFUSION THERAPY | Facility: HOSPITAL | Age: 54
End: 2019-09-18
Attending: INTERNAL MEDICINE
Payer: COMMERCIAL

## 2019-09-18 VITALS
TEMPERATURE: 98 F | HEART RATE: 89 BPM | DIASTOLIC BLOOD PRESSURE: 69 MMHG | OXYGEN SATURATION: 98 % | SYSTOLIC BLOOD PRESSURE: 112 MMHG | RESPIRATION RATE: 18 BRPM

## 2019-09-18 DIAGNOSIS — D50.0 IRON DEFICIENCY ANEMIA DUE TO CHRONIC BLOOD LOSS: Primary | ICD-10-CM

## 2019-09-18 PROCEDURE — 96365 THER/PROPH/DIAG IV INF INIT: CPT

## 2019-09-18 PROCEDURE — 63600175 PHARM REV CODE 636 W HCPCS: Performed by: INTERNAL MEDICINE

## 2019-09-18 RX ADMIN — FERRIC CARBOXYMALTOSE INJECTION 750 MG: 50 INJECTION, SOLUTION INTRAVENOUS at 01:09

## 2019-09-18 NOTE — PLAN OF CARE
Problem: Adult Inpatient Plan of Care  Goal: Patient-Specific Goal (Individualization)  Outcome: Ongoing (interventions implemented as appropriate)  Pt likes feet up, blanket, and pillow

## 2019-09-18 NOTE — DISCHARGE INSTRUCTIONS
FALL PREVENTION   Falls often occur due to slipping, tripping or losing your balance. Here are ways to reduce your risk of falling again.   Was there anything that caused your fall that can be fixed, removed or replaced?   Make your home safe by keeping walkways clear of objects you may trip over.   Use non-slip pads under rugs.   Do not walk in poorly lit areas.   Do not stand on chairs or wobbly ladders.   Use caution when reaching overhead or looking upward. This position can cause a loss of balance.   Be sure your shoes fit properly, have non-slip bottoms and are in good condition.   Be cautious when going up and down stairs, curbs, and when walking on uneven sidewalks.   If your balance is poor, consider using a cane or walker.   If your fall was related to alcohol use, stop or limit alcohol intake.   If your fall was related to use of sleeping medicines, talk to your doctor about this. You may need to reduce your dosage at bedtime if you awaken during the night to go to the bathroom.   To reduce the need for nighttime bathroom trips:   Avoid drinking fluids for several hours before going to bed   Empty your bladder before going to bed   Men can keep a urinal at the bedside   © 9521-7457 [facility], [address] [place] [address], [place] [address]. All rights reserved. This information is not intended as a substitute for professional medical care. Always follow your healthcare professional's instructions.  [facility]  [address] [address]  [phone] phone     [phone] fax  Hours of Operation: Monday- Friday 8:00am- 5:00pm  After hours phone  [phone]  Hematology / Oncology Physicians on call      Dr. Harjeet Aden      Please call with any concerns regarding your appointment today.

## 2019-09-18 NOTE — PLAN OF CARE
"Problem: Adult Inpatient Plan of Care  Goal: Plan of Care Review  Outcome: Ongoing (interventions implemented as appropriate)  Pt states "I am doing much better"      "

## 2019-09-23 ENCOUNTER — TELEPHONE (OUTPATIENT)
Dept: RADIOLOGY | Facility: HOSPITAL | Age: 54
End: 2019-09-23

## 2019-09-24 ENCOUNTER — CLINICAL SUPPORT (OUTPATIENT)
Dept: SMOKING CESSATION | Facility: CLINIC | Age: 54
End: 2019-09-24
Payer: COMMERCIAL

## 2019-09-24 ENCOUNTER — HOSPITAL ENCOUNTER (OUTPATIENT)
Dept: RADIOLOGY | Facility: HOSPITAL | Age: 54
Discharge: HOME | End: 2019-09-24
Attending: COLON & RECTAL SURGERY
Payer: COMMERCIAL

## 2019-09-24 DIAGNOSIS — F17.200 NICOTINE DEPENDENCE: Primary | ICD-10-CM

## 2019-09-24 DIAGNOSIS — C20 RECTAL ADENOCARCINOMA: ICD-10-CM

## 2019-09-24 PROCEDURE — 72197 MRI RECTAL CANCER W W/O CONTRAST: ICD-10-PCS | Mod: 26,,, | Performed by: RADIOLOGY

## 2019-09-24 PROCEDURE — 25500020 PHARM REV CODE 255: Performed by: COLON & RECTAL SURGERY

## 2019-09-24 PROCEDURE — 99407 BEHAV CHNG SMOKING > 10 MIN: CPT | Mod: S$GLB,,, | Performed by: GENERAL PRACTICE

## 2019-09-24 PROCEDURE — 72197 MRI PELVIS W/O & W/DYE: CPT | Mod: 26,,, | Performed by: RADIOLOGY

## 2019-09-24 PROCEDURE — 99407 PR TOBACCO USE CESSATION INTENSIVE >10 MINUTES: ICD-10-PCS | Mod: S$GLB,,, | Performed by: GENERAL PRACTICE

## 2019-09-24 PROCEDURE — 72197 MRI PELVIS W/O & W/DYE: CPT | Mod: TC

## 2019-09-24 PROCEDURE — A9585 GADOBUTROL INJECTION: HCPCS | Performed by: COLON & RECTAL SURGERY

## 2019-09-24 RX ORDER — IBUPROFEN 200 MG
1 TABLET ORAL DAILY
Qty: 14 PATCH | Refills: 1 | Status: SHIPPED | OUTPATIENT
Start: 2019-09-24 | End: 2020-02-28

## 2019-09-24 RX ORDER — GADOBUTROL 604.72 MG/ML
7 INJECTION INTRAVENOUS
Status: COMPLETED | OUTPATIENT
Start: 2019-09-24 | End: 2019-09-24

## 2019-09-24 RX ADMIN — GADOBUTROL 7 ML: 604.72 INJECTION INTRAVENOUS at 12:09

## 2019-09-30 ENCOUNTER — OFFICE VISIT (OUTPATIENT)
Dept: HEMATOLOGY/ONCOLOGY | Facility: CLINIC | Age: 54
End: 2019-09-30
Payer: COMMERCIAL

## 2019-09-30 ENCOUNTER — OFFICE VISIT (OUTPATIENT)
Dept: SURGERY | Facility: CLINIC | Age: 54
End: 2019-09-30
Payer: COMMERCIAL

## 2019-09-30 ENCOUNTER — LAB VISIT (OUTPATIENT)
Dept: LAB | Facility: HOSPITAL | Age: 54
End: 2019-09-30
Attending: COLON & RECTAL SURGERY
Payer: COMMERCIAL

## 2019-09-30 VITALS
WEIGHT: 162.94 LBS | DIASTOLIC BLOOD PRESSURE: 82 MMHG | HEART RATE: 86 BPM | SYSTOLIC BLOOD PRESSURE: 112 MMHG | TEMPERATURE: 98 F | BODY MASS INDEX: 22.1 KG/M2

## 2019-09-30 VITALS
HEART RATE: 91 BPM | SYSTOLIC BLOOD PRESSURE: 119 MMHG | RESPIRATION RATE: 18 BRPM | WEIGHT: 164.44 LBS | DIASTOLIC BLOOD PRESSURE: 78 MMHG | HEIGHT: 72 IN | OXYGEN SATURATION: 99 % | TEMPERATURE: 98 F | BODY MASS INDEX: 22.27 KG/M2

## 2019-09-30 DIAGNOSIS — C20 RECTAL ADENOCARCINOMA: Primary | ICD-10-CM

## 2019-09-30 DIAGNOSIS — C20 RECTAL CANCER: Primary | ICD-10-CM

## 2019-09-30 DIAGNOSIS — C20 RECTAL ADENOCARCINOMA: ICD-10-CM

## 2019-09-30 DIAGNOSIS — F17.200 SMOKER: ICD-10-CM

## 2019-09-30 LAB
ALBUMIN SERPL BCP-MCNC: 3.5 G/DL (ref 3.5–5.2)
ALP SERPL-CCNC: 95 U/L (ref 55–135)
ALT SERPL W/O P-5'-P-CCNC: 9 U/L (ref 10–44)
ANION GAP SERPL CALC-SCNC: 11 MMOL/L (ref 8–16)
AST SERPL-CCNC: 15 U/L (ref 10–40)
BASOPHILS # BLD AUTO: 0.05 K/UL (ref 0–0.2)
BASOPHILS NFR BLD: 0.8 % (ref 0–1.9)
BILIRUB SERPL-MCNC: 0.4 MG/DL (ref 0.1–1)
BUN SERPL-MCNC: 5 MG/DL (ref 6–20)
CALCIUM SERPL-MCNC: 8.8 MG/DL (ref 8.7–10.5)
CEA SERPL-MCNC: 1.4 NG/ML (ref 0–5)
CHLORIDE SERPL-SCNC: 103 MMOL/L (ref 95–110)
CO2 SERPL-SCNC: 25 MMOL/L (ref 23–29)
CREAT SERPL-MCNC: 0.8 MG/DL (ref 0.5–1.4)
DIFFERENTIAL METHOD: ABNORMAL
EOSINOPHIL # BLD AUTO: 0.3 K/UL (ref 0–0.5)
EOSINOPHIL NFR BLD: 5 % (ref 0–8)
ERYTHROCYTE [DISTWIDTH] IN BLOOD BY AUTOMATED COUNT: 15.1 % (ref 11.5–14.5)
EST. GFR  (AFRICAN AMERICAN): >60 ML/MIN/1.73 M^2
EST. GFR  (NON AFRICAN AMERICAN): >60 ML/MIN/1.73 M^2
GLUCOSE SERPL-MCNC: 137 MG/DL (ref 70–110)
HCT VFR BLD AUTO: 40.9 % (ref 40–54)
HGB BLD-MCNC: 13.5 G/DL (ref 14–18)
IMM GRANULOCYTES # BLD AUTO: 0.03 K/UL (ref 0–0.04)
IMM GRANULOCYTES NFR BLD AUTO: 0.5 % (ref 0–0.5)
LYMPHOCYTES # BLD AUTO: 1.7 K/UL (ref 1–4.8)
LYMPHOCYTES NFR BLD: 27.6 % (ref 18–48)
MCH RBC QN AUTO: 30.3 PG (ref 27–31)
MCHC RBC AUTO-ENTMCNC: 33 G/DL (ref 32–36)
MCV RBC AUTO: 92 FL (ref 82–98)
MONOCYTES # BLD AUTO: 0.4 K/UL (ref 0.3–1)
MONOCYTES NFR BLD: 6.1 % (ref 4–15)
NEUTROPHILS # BLD AUTO: 3.6 K/UL (ref 1.8–7.7)
NEUTROPHILS NFR BLD: 60 % (ref 38–73)
NRBC BLD-RTO: 0 /100 WBC
PLATELET # BLD AUTO: 338 K/UL (ref 150–350)
PMV BLD AUTO: 9 FL (ref 9.2–12.9)
POTASSIUM SERPL-SCNC: 3.7 MMOL/L (ref 3.5–5.1)
PROT SERPL-MCNC: 7.9 G/DL (ref 6–8.4)
RBC # BLD AUTO: 4.45 M/UL (ref 4.6–6.2)
SODIUM SERPL-SCNC: 139 MMOL/L (ref 136–145)
WBC # BLD AUTO: 6.02 K/UL (ref 3.9–12.7)

## 2019-09-30 PROCEDURE — 99214 PR OFFICE/OUTPT VISIT, EST, LEVL IV, 30-39 MIN: ICD-10-PCS | Mod: 25,S$GLB,, | Performed by: COLON & RECTAL SURGERY

## 2019-09-30 PROCEDURE — 3008F BODY MASS INDEX DOCD: CPT | Mod: CPTII,S$GLB,, | Performed by: INTERNAL MEDICINE

## 2019-09-30 PROCEDURE — 80053 COMPREHEN METABOLIC PANEL: CPT

## 2019-09-30 PROCEDURE — 85025 COMPLETE CBC W/AUTO DIFF WBC: CPT

## 2019-09-30 PROCEDURE — 45300 PROCTOSIGMOIDOSCOPY DX: CPT | Mod: S$GLB,,, | Performed by: COLON & RECTAL SURGERY

## 2019-09-30 PROCEDURE — 3008F PR BODY MASS INDEX (BMI) DOCUMENTED: ICD-10-PCS | Mod: CPTII,S$GLB,, | Performed by: INTERNAL MEDICINE

## 2019-09-30 PROCEDURE — 99999 PR PBB SHADOW E&M-EST. PATIENT-LVL IV: ICD-10-PCS | Mod: PBBFAC,,, | Performed by: COLON & RECTAL SURGERY

## 2019-09-30 PROCEDURE — 99214 OFFICE O/P EST MOD 30 MIN: CPT | Mod: 25,S$GLB,, | Performed by: COLON & RECTAL SURGERY

## 2019-09-30 PROCEDURE — 3008F BODY MASS INDEX DOCD: CPT | Mod: CPTII,S$GLB,, | Performed by: COLON & RECTAL SURGERY

## 2019-09-30 PROCEDURE — 99215 PR OFFICE/OUTPT VISIT, EST, LEVL V, 40-54 MIN: ICD-10-PCS | Mod: S$GLB,,, | Performed by: INTERNAL MEDICINE

## 2019-09-30 PROCEDURE — 99999 PR PBB SHADOW E&M-EST. PATIENT-LVL III: ICD-10-PCS | Mod: PBBFAC,,, | Performed by: INTERNAL MEDICINE

## 2019-09-30 PROCEDURE — 99999 PR PBB SHADOW E&M-EST. PATIENT-LVL III: CPT | Mod: PBBFAC,,, | Performed by: INTERNAL MEDICINE

## 2019-09-30 PROCEDURE — 36415 COLL VENOUS BLD VENIPUNCTURE: CPT

## 2019-09-30 PROCEDURE — 45300 PR PROCTOSIGMOIDOSCOPY,RIGID,DIAG2S: ICD-10-PCS | Mod: S$GLB,,, | Performed by: COLON & RECTAL SURGERY

## 2019-09-30 PROCEDURE — 99999 PR PBB SHADOW E&M-EST. PATIENT-LVL IV: CPT | Mod: PBBFAC,,, | Performed by: COLON & RECTAL SURGERY

## 2019-09-30 PROCEDURE — 3008F PR BODY MASS INDEX (BMI) DOCUMENTED: ICD-10-PCS | Mod: CPTII,S$GLB,, | Performed by: COLON & RECTAL SURGERY

## 2019-09-30 PROCEDURE — 82378 CARCINOEMBRYONIC ANTIGEN: CPT

## 2019-09-30 PROCEDURE — 99215 OFFICE O/P EST HI 40 MIN: CPT | Mod: S$GLB,,, | Performed by: INTERNAL MEDICINE

## 2019-09-30 RX ORDER — PROCHLORPERAZINE MALEATE 10 MG
10 TABLET ORAL EVERY 6 HOURS PRN
Qty: 60 TABLET | Refills: 1 | Status: SHIPPED | OUTPATIENT
Start: 2019-09-30 | End: 2020-02-28

## 2019-09-30 RX ORDER — DIPHENOXYLATE HYDROCHLORIDE AND ATROPINE SULFATE 2.5; .025 MG/1; MG/1
1-2 TABLET ORAL 4 TIMES DAILY PRN
Qty: 120 TABLET | Refills: 1 | Status: SHIPPED | OUTPATIENT
Start: 2019-09-30 | End: 2019-11-29 | Stop reason: SDUPTHER

## 2019-09-30 RX ORDER — LIDOCAINE HYDROCHLORIDE 10 MG/ML
1 INJECTION, SOLUTION EPIDURAL; INFILTRATION; INTRACAUDAL; PERINEURAL ONCE
Status: DISCONTINUED | OUTPATIENT
Start: 2019-09-30 | End: 2019-10-08 | Stop reason: HOSPADM

## 2019-09-30 RX ORDER — SODIUM CHLORIDE 9 MG/ML
INJECTION, SOLUTION INTRAVENOUS CONTINUOUS
Status: CANCELLED | OUTPATIENT
Start: 2019-09-30

## 2019-09-30 RX ORDER — OXYCODONE HYDROCHLORIDE 10 MG/1
10 TABLET ORAL EVERY 8 HOURS PRN
Qty: 90 TABLET | Refills: 0 | Status: SHIPPED | OUTPATIENT
Start: 2019-09-30 | End: 2019-10-15 | Stop reason: SDUPTHER

## 2019-09-30 RX ORDER — ONDANSETRON 8 MG/1
8 TABLET, ORALLY DISINTEGRATING ORAL EVERY 12 HOURS PRN
Qty: 50 TABLET | Refills: 1 | Status: SHIPPED | OUTPATIENT
Start: 2019-09-30 | End: 2019-12-04 | Stop reason: SDUPTHER

## 2019-09-30 RX ORDER — ONDANSETRON 2 MG/ML
4 INJECTION INTRAMUSCULAR; INTRAVENOUS EVERY 12 HOURS PRN
Status: CANCELLED | OUTPATIENT
Start: 2019-09-30

## 2019-09-30 RX ORDER — DRONABINOL 10 MG/1
10 CAPSULE ORAL
Qty: 60 CAPSULE | Refills: 1 | Status: SHIPPED | OUTPATIENT
Start: 2019-09-30 | End: 2019-12-04 | Stop reason: SDUPTHER

## 2019-09-30 NOTE — PROGRESS NOTES
Subjective:       Patient ID: Sukhjinder Presley is a 53 y.o. male.    Chief Complaint: Rectal cancer [C20]  HPI: We have an opportunity to see Mr. Sukhjinder Presley in Hematology Oncology clinic at [facility] on 09/30/2019.  Mr. Sukhjinder Presley is a 53 y.o. gentleman with rectal cancer completed neoadjuvant chemoradiation with concurrent xeloda.  Reported symptoms have improved with better pain control and appetite.  Restaging MRI rectum showed      Impression       1. Large rectal mass with invasion of the muscularis and involvement of the mesorectal fat and fascia along the left and posterior aspect of the mass.  Single lymph node within the mesorectal fat posteriorly measuring approximately 6 mm.  Additional subcentimeter obturator and external iliac chain lymph nodes also seen bilaterally.  When compared to the study prior to neoadjuvant therapy there has been significant decrease in wall thickening.     Has been evaluated by Dr. New, given fascia involvement, likely resection would be R1.  Recommended total neoadjuvant chemotherapy prior to surgery.       Rectal cancer    6/24/2019 Initial Diagnosis     Rectal cancer      6/26/2019 Cancer Staged     Staging form: Colon and Rectum, AJCC 8th Edition  - Clinical stage from 6/26/2019: Stage IIIB (cT3, cN2a, cM0) - Signed by Artem Mishra II, MD on 6/26/2019      7/10/2019 - 8/13/2019 Radiation Therapy     Treatment Site Ref. ID Energy Dose/Fx (Gy) #Fx Dose Correction (Gy) Total Dose (Gy) Start Date End Date Elapsed Days   RECTUM Pelvis 6X 2 25 / 25 0 50 7/10/2019 8/13/2019 34           7/10/2019 - 7/10/2019 Chemotherapy     Treatment Summary   Plan Name: OP CAPECITABINE 5 DAYS + RADIOTHERAPY  Treatment Goal: Curative  Status: Inactive  Start Date: [No treatment day found]  End Date: [No treatment day found]  Provider: Song Aden MD  Chemotherapy: [No matching medication found in this treatment plan]      10/9/2019 -  Chemotherapy     Treatment Summary    Plan Name: OP FOLFOXIRI Q2W  Treatment Goal: Curative  Status: Active  Start Date: 10/9/2019 (Planned)  End Date: 3/13/2020 (Planned)  Provider: Song Aden MD  Chemotherapy: fluorouracil (ADRUCIL) 3,200 mg/m2 = 6,240 mg in sodium chloride 0.9% 224.8 mL chemo infusion, 3,200 mg/m2, Intravenous, Over 48 hours, 0 of 12 cycles  irinotecan (CAMPTOSAR) 165 mg/m2 = 322 mg in sodium chloride 0.9% 500 mL chemo infusion, 165 mg/m2, Intravenous, Clinic/HOD 1 time, 0 of 12 cycles  leucovorin calcium 200 mg/m2 = 390 mg in dextrose 5 % 250 mL infusion, 200 mg/m2, Intravenous, Clinic/HOD 1 time, 0 of 12 cycles  oxaliplatin (ELOXATIN) 85 mg/m2 = 166 mg in dextrose 5 % 500 mL chemo infusion, 85 mg/m2, Intravenous, Clinic/HOD 1 time, 0 of 12 cycles       Past Medical History:   Diagnosis Date    Anemia     Cancer      Family History   Problem Relation Age of Onset    Intestinal malrotation Mother     Leukemia Father     No Known Problems Sister     Coronary artery disease Brother     Coronary artery disease Maternal Grandmother     Stomach cancer Maternal Grandfather      Social History     Socioeconomic History    Marital status: Significant Other     Spouse name: Not on file    Number of children: Not on file    Years of education: Not on file    Highest education level: Not on file   Occupational History    Not on file   Social Needs    Financial resource strain: Somewhat hard    Food insecurity:     Worry: Sometimes true     Inability: Sometimes true    Transportation needs:     Medical: No     Non-medical: No   Tobacco Use    Smoking status: Current Every Day Smoker     Packs/day: 1.00     Years: 35.00     Pack years: 35.00     Types: Cigarettes     Start date: 1/2/1984    Smokeless tobacco: Current User     Types: Chew   Substance and Sexual Activity    Alcohol use: Yes     Alcohol/week: 46.0 standard drinks     Types: 42 Cans of beer, 4 Shots of liquor per week     Frequency: 4 or more times a week      Drinks per session: 5 or 6     Binge frequency: Daily or almost daily     Comment: nancie Virgen    Drug use: Never    Sexual activity: Yes     Partners: Female     Birth control/protection: None   Lifestyle    Physical activity:     Days per week: 1 day     Minutes per session: 60 min    Stress: Not on file   Relationships    Social connections:     Talks on phone: More than three times a week     Gets together: More than three times a week     Attends [employer] service: Not on file     Active member of club or organization: No     Attends meetings of clubs or organizations: Never     Relationship status: Living with partner   Other Topics Concern    Not on file   Social History Narrative    Not on file     Past Surgical History:   Procedure Laterality Date    COLONOSCOPY N/A 6/6/2019    Procedure: COLONOSCOPY;  Surgeon: Anjelica Saavedra MD;  Location: [facility];  Service: Endoscopy;  Laterality: N/A;    ESOPHAGOGASTRODUODENOSCOPY N/A 6/6/2019    Procedure: EGD (ESOPHAGOGASTRODUODENOSCOPY);  Surgeon: Anjelica Saavedra MD;  Location: [facility];  Service: Endoscopy;  Laterality: N/A;    HERNIA REPAIR  1995    TONSILLECTOMY       Current Outpatient Medications   Medication Sig Dispense Refill    aspirin 81 MG Chew Take 81 mg by mouth once daily.      capecitabine (XELODA) 500 MG Tab Take 3 tablets (1500 mg) by mouth twice daily after breakfast and dinner on days of radiation only. 168 tablet 0    dronabinol (MARINOL) 10 MG capsule Take 1 capsule (10 mg total) by mouth 2 (two) times daily before meals. 60 capsule 1    iron fum-B12-IF-C-folic acid (FOLTRIN) 110-0.5 mg capsule Take 1 capsule by mouth 2 (two) times daily. 60 capsule 1    lidocaine (XYLOCAINE) 5 % Oint ointment Apply topically as needed. 30 g 3    megestrol (MEGACE) 40 MG Tab Take 1 tablet (40 mg total) by mouth 4 (four) times daily. 90 tablet 1    morphine (MS CONTIN) 15 MG 12 hr tablet Take 3 tablets (45 mg total) by mouth nightly. 90 tablet  0    nicotine (NICODERM CQ) 21 mg/24 hr Place 1 patch onto the skin once daily. 14 patch 1    oxyCODONE (ROXICODONE) 10 mg Tab immediate release tablet Take 1 tablet (10 mg total) by mouth every 8 (eight) hours as needed for Pain (medically necessary). 90 tablet 0    promethazine (PHENERGAN) 25 MG tablet Take 1 tablet (25 mg total) by mouth every 4 (four) hours. 25 tablet 2    silver sulfADIAZINE 1% (SILVADENE) 1 % cream Apply to affected area daily 400 g 1    traMADol (ULTRAM) 50 mg tablet Take 1 tablet (50 mg total) by mouth every 6 (six) hours as needed for Pain. 90 tablet 0    varenicline (CHANTIX) 1 mg Tab Take 1 tablet (1 mg total) by mouth 2 (two) times daily. 60 tablet 2    diphenoxylate-atropine 2.5-0.025 mg (LOMOTIL) 2.5-0.025 mg per tablet Take 1-2 tablets by mouth 4 (four) times daily as needed for Diarrhea. 120 tablet 1    ondansetron (ZOFRAN-ODT) 8 MG TbDL Take 1 tablet (8 mg total) by mouth every 12 (twelve) hours as needed. 50 tablet 1    prochlorperazine (COMPAZINE) 10 MG tablet Take 1 tablet (10 mg total) by mouth every 6 (six) hours as needed. 60 tablet 1     Current Facility-Administered Medications   Medication Dose Route Frequency Provider Last Rate Last Dose    lidocaine (PF) 10 mg/ml (1%) injection 10 mg  1 mL Intradermal Once Jan New MD        lidocaine (PF) 10 mg/ml (1%) injection 10 mg  1 mL Intradermal Once Jan New MD           Labs:  Lab Results   Component Value Date    WBC 4.36 09/03/2019    HGB 10.3 (L) 09/03/2019    HCT 31.6 (L) 09/03/2019    MCV 88 09/03/2019     09/03/2019     BMP  Lab Results   Component Value Date     09/03/2019    K 4.6 09/03/2019     09/03/2019    CO2 27 09/03/2019    BUN 5 (L) 09/03/2019    CREATININE 0.7 09/03/2019    CALCIUM 9.3 09/03/2019    ANIONGAP 8 09/03/2019    ESTGFRAFRICA >60 09/03/2019    EGFRNONAA >60 09/03/2019     Lab Results   Component Value Date    ALT 7 (L) 09/03/2019    AST 13 09/03/2019     ALKPHOS 67 09/03/2019    BILITOT 0.4 09/03/2019       Lab Results   Component Value Date    IRON 56 09/03/2019    TIBC 317 09/03/2019    FERRITIN 100 09/03/2019     Lab Results   Component Value Date    GGBSETFX08 497 09/03/2019     Lab Results   Component Value Date    FOLATE 8.3 09/03/2019     Lab Results   Component Value Date    TSH 1.607 01/15/2019       I have reviewed the radiology reports and examined the scan/xray images.    Review of Systems   Constitutional: Negative.    HENT: Negative.    Eyes: Negative.    Respiratory: Negative.    Cardiovascular: Negative.    Gastrointestinal: Negative.    Endocrine: Negative.    Genitourinary: Negative.    Musculoskeletal: Negative.    Skin: Negative.    Allergic/Immunologic: Negative.    Neurological: Negative.    Hematological: Negative.    Psychiatric/Behavioral: Negative.      ECOG SCORE    0 - Fully active-able to carry on all pre-disease performance without restriction            Objective:     Vitals:    09/30/19 1317   BP: 119/78   Pulse: 91   Resp: 18   Temp: 98.3 °F (36.8 °C)   Body mass index is 22.31 kg/m².  Physical Exam   Constitutional: He is oriented to person, place, and time. He appears well-developed and well-nourished.   HENT:   Head: Normocephalic and atraumatic.   Eyes: Conjunctivae and EOM are normal.   Neck: Normal range of motion. Neck supple.   Cardiovascular: Normal rate and regular rhythm.   Pulmonary/Chest: Effort normal and breath sounds normal.   Abdominal: Soft. Bowel sounds are normal.   Musculoskeletal: Normal range of motion.   Neurological: He is alert and oriented to person, place, and time.   Skin: Skin is warm and dry.   Psychiatric: He has a normal mood and affect. His behavior is normal. Judgment and thought content normal.   Nursing note and vitals reviewed.        Assessment:      1. Rectal cancer           Plan:     Rectal cancer    Will give avastin folfoxiri given this combination has highest chance of response. Will need  neulasta on body on day 3 at chemo take down.    Dr. New will put in port in left chest on 10/8.  Start chemo on 10/9.    We discussed the benefit and risk of treatment.  Goal of treatment is for curative intent with improved chance of cancer free survival and overall survival. Risk of treatment includes but is not limited to bone marrow suppression, diarrhea, neuropathy, fatigue.    Will plan for MRI rectum in 2 months after 4 treatments.  If has SD or response, will give another 4 treatments to make total of 8 treatments prior to surgery.    -     diphenoxylate-atropine 2.5-0.025 mg (LOMOTIL) 2.5-0.025 mg per tablet; Take 1-2 tablets by mouth 4 (four) times daily as needed for Diarrhea.  Dispense: 120 tablet; Refill: 1  -     prochlorperazine (COMPAZINE) 10 MG tablet; Take 1 tablet (10 mg total) by mouth every 6 (six) hours as needed.  Dispense: 60 tablet; Refill: 1  -     ondansetron (ZOFRAN-ODT) 8 MG TbDL; Take 1 tablet (8 mg total) by mouth every 12 (twelve) hours as needed.  Dispense: 50 tablet; Refill: 1  -     dronabinol (MARINOL) 10 MG capsule; Take 1 capsule (10 mg total) by mouth 2 (two) times daily before meals.  Dispense: 60 capsule; Refill: 1  -     oxyCODONE (ROXICODONE) 10 mg Tab immediate release tablet; Take 1 tablet (10 mg total) by mouth every 8 (eight) hours as needed for Pain (medically necessary).  Dispense: 90 tablet; Refill: 0  -     CBC auto differential; Future; Expected date: 10/09/2019  -     Comprehensive metabolic panel; Future; Expected date: 10/09/2019  -     Comprehensive metabolic panel; Future; Expected date: 10/23/2019  -     CBC auto differential; Future; Expected date: 10/23/2019

## 2019-09-30 NOTE — PROGRESS NOTES
History & Physical    SUBJECTIVE:     Chief Complaint   Patient presents with    Established Patient     1 Month Follow Up    CC: rectal adenocarcinoma  Ref: Anjelica Saavedra MD    History of Present Illness:  Patient is a 53 y.o. male presents for evaluation of rectal cancer.  Patient reports he has had increasing pelvic/rectal pain along with decreased bowel movements over the last 6 weeks.  Reports an uncomfortable feeling in his pelvis associated with mucous discharge from his rectum as well as bloody bowel movements that are thin and less than normal. He reports associated chills, fatigue and 16 lb weight loss over the past 5 months.  He has also noticed a decrease in appetite as well. He underwent a colonoscopy on 06/06/2019 where a nonobstructing rectal mass was found with biopsy showing well-differentiated adenocarcinoma.  He was then referred to [facility] for evaluation.  He has no family history of colorectal cancer or IBD.    8/13/19: Completed neoadj chemoXRT    Interval history:  Since last clinic visit, the patient underwent repeat MRI which showed improvement in good tumor response but still with possible threatened mesorectal fascia. Patient is doing very well by tolerating regular diet and is not losing weight.  He reports no further blood per rectum with bowel movements.  He denies any fever, chills, nausea, vomiting.  He is still smoking as well. He has no perianal excoriations currently or perianal pain.    PMHx:  Nephrolithiasis  PSHx:  Left inguinal hernia repair, lithotripsy  All: NKDA  FamHx: negative for CRC or IBD; +gastric cancer in maternal grandfather  SocHx: +tobacco (1ppd); +etoh (6 beers/day); no illicits      Review of patient's allergies indicates:  No Known Allergies    Current Outpatient Medications   Medication Sig Dispense Refill    aspirin 81 MG Chew Take 81 mg by mouth once daily.      capecitabine (XELODA) 500 MG Tab Take 3 tablets (1500 mg) by mouth twice  daily after breakfast and dinner on days of radiation only. 168 tablet 0    iron fum-B12-IF-C-folic acid (FOLTRIN) 110-0.5 mg capsule Take 1 capsule by mouth 2 (two) times daily. 60 capsule 1    lidocaine (XYLOCAINE) 5 % Oint ointment Apply topically as needed. 30 g 3    megestrol (MEGACE) 40 MG Tab Take 1 tablet (40 mg total) by mouth 4 (four) times daily. 90 tablet 1    morphine (MS CONTIN) 15 MG 12 hr tablet Take 3 tablets (45 mg total) by mouth nightly. 90 tablet 0    nicotine (NICODERM CQ) 21 mg/24 hr Place 1 patch onto the skin once daily. 14 patch 1    promethazine (PHENERGAN) 25 MG tablet Take 1 tablet (25 mg total) by mouth every 4 (four) hours. 25 tablet 2    silver sulfADIAZINE 1% (SILVADENE) 1 % cream Apply to affected area daily 400 g 1    traMADol (ULTRAM) 50 mg tablet Take 1 tablet (50 mg total) by mouth every 6 (six) hours as needed for Pain. 90 tablet 0    varenicline (CHANTIX) 1 mg Tab Take 1 tablet (1 mg total) by mouth 2 (two) times daily. 60 tablet 2    diphenoxylate-atropine 2.5-0.025 mg (LOMOTIL) 2.5-0.025 mg per tablet Take 1-2 tablets by mouth 4 (four) times daily as needed for Diarrhea. 120 tablet 1    dronabinol (MARINOL) 10 MG capsule Take 1 capsule (10 mg total) by mouth 2 (two) times daily before meals. 60 capsule 1    ondansetron (ZOFRAN-ODT) 8 MG TbDL Take 1 tablet (8 mg total) by mouth every 12 (twelve) hours as needed. 50 tablet 1    oxyCODONE (ROXICODONE) 10 mg Tab immediate release tablet Take 1 tablet (10 mg total) by mouth every 8 (eight) hours as needed for Pain (medically necessary). 90 tablet 0    prochlorperazine (COMPAZINE) 10 MG tablet Take 1 tablet (10 mg total) by mouth every 6 (six) hours as needed. 60 tablet 1     Current Facility-Administered Medications   Medication Dose Route Frequency Provider Last Rate Last Dose    lidocaine (PF) 10 mg/ml (1%) injection 10 mg  1 mL Intradermal Once Jan New MD        lidocaine (PF) 10 mg/ml (1%) injection  10 mg  1 mL Intradermal Once Jan New MD           Past Medical History:   Diagnosis Date    Anemia     Cancer      Past Surgical History:   Procedure Laterality Date    COLONOSCOPY N/A 6/6/2019    Procedure: COLONOSCOPY;  Surgeon: Anjelica Saavedra MD;  Location: [facility];  Service: Endoscopy;  Laterality: N/A;    ESOPHAGOGASTRODUODENOSCOPY N/A 6/6/2019    Procedure: EGD (ESOPHAGOGASTRODUODENOSCOPY);  Surgeon: Anjelica Saavedra MD;  Location: [facility];  Service: Endoscopy;  Laterality: N/A;    HERNIA REPAIR  1995    TONSILLECTOMY       Family History   Problem Relation Age of Onset    Intestinal malrotation Mother     Leukemia Father     No Known Problems Sister     Coronary artery disease Brother     Coronary artery disease Maternal Grandmother     Stomach cancer Maternal Grandfather      Social History     Tobacco Use    Smoking status: Current Every Day Smoker     Packs/day: 1.00     Years: 35.00     Pack years: 35.00     Types: Cigarettes     Start date: 1/2/1984    Smokeless tobacco: Current User     Types: Chew   Substance Use Topics    Alcohol use: Yes     Alcohol/week: 46.0 standard drinks     Types: 42 Cans of beer, 4 Shots of liquor per week     Frequency: 4 or more times a week     Drinks per session: 5 or 6     Binge frequency: Daily or almost daily     Comment: nancie 7    Drug use: Never        Review of Systems:  Review of Systems   Constitutional: Negative for activity change, appetite change, chills, fatigue, fever and unexpected weight change.   HENT: Negative for congestion, ear pain, sore throat and trouble swallowing.    Eyes: Negative for pain, redness and itching.   Respiratory: Negative for cough, shortness of breath and wheezing.    Cardiovascular: Negative for chest pain, palpitations and leg swelling.   Gastrointestinal: Negative for abdominal distention, abdominal pain, anal bleeding, blood in stool, constipation, diarrhea, nausea, rectal pain and vomiting.    Endocrine: Negative for cold intolerance, heat intolerance and polyuria.   Genitourinary: Negative for dysuria, flank pain, frequency and hematuria.   Musculoskeletal: Negative for gait problem, joint swelling and neck pain.   Skin: Negative for color change, rash and wound.   Allergic/Immunologic: Negative for environmental allergies and immunocompromised state.   Neurological: Negative for dizziness, speech difficulty, weakness and numbness.   Psychiatric/Behavioral: Negative for agitation, confusion and hallucinations.       OBJECTIVE:     Vital Signs (Most Recent)  Temp: 98.3 °F (36.8 °C) (09/30/19 1023)  Pulse: 86 (09/30/19 1023)  BP: 112/82 (09/30/19 1023)     73.9 kg (162 lb 14.7 oz)     Physical Exam:  Physical Exam   Constitutional: He is oriented to person, place, and time. He appears well-developed.   HENT:   Head: Normocephalic and atraumatic.   Eyes: Conjunctivae and EOM are normal.   Neck: Normal range of motion. No thyromegaly present.   Cardiovascular: Normal rate and regular rhythm.   Pulmonary/Chest: Effort normal. No respiratory distress.   Abdominal: Soft. He exhibits no distension and no mass. There is no tenderness.   Genitourinary:   Genitourinary Comments: Anorectal:  No external lesions or excoriations or skin changes; ELOISA with good tone, no blood and no palpable masses   Musculoskeletal: Normal range of motion. He exhibits no edema or tenderness.   Neurological: He is alert and oriented to person, place, and time.   Skin: Skin is warm and dry. Capillary refill takes less than 2 seconds. No rash noted.   Psychiatric: He has a normal mood and affect.     Proctoscopy Procedure Note    Pre-procedure diagnosis: Rectal adenocarinoma    Post-procedure diagnosis: Rectal adenocarcinoma    Procedure: Anoscopy    Surgeon: Jan New MD    Assistant: Nichelle Javier LPN    Specimen: none    Findings:  Proctoscope inserted to 10 cm.  The previous mass was seen in the left lateral position at 10 cm  which has responded well and is significantly decreased in size intraluminally.  There is no longer bleeding at the mass it in a complex is less of the circumference and previously described.    Patient tolerated procedure well.      Laboratory  Lab Results   Component Value Date    WBC 4.36 09/03/2019    HGB 10.3 (L) 09/03/2019    HCT 31.6 (L) 09/03/2019     09/03/2019    CHOL 158 06/01/2019    TRIG 194 (H) 06/01/2019    HDL 30 (L) 06/01/2019    ALT 7 (L) 09/03/2019    AST 13 09/03/2019     09/03/2019    K 4.6 09/03/2019     09/03/2019    CREATININE 0.7 09/03/2019    BUN 5 (L) 09/03/2019    CO2 27 09/03/2019    TSH 1.607 01/15/2019    PSA 0.37 06/01/2019    INR 1.0 06/11/2019       Diagnostic Results:  Colonoscopy images and report reviewed which shows a rectal mass that malignant     MRI 9/24/19:  FINDINGS:  Tumor Location: Tumor location (from anal verge):    The inferior margin of the tumor is located approximately 7.5-8 cm from the anal verge.    Relationship to anterior peritoneal reflection: The superior most extent of the tumor is seen straddling the peritoneal reflection.  There is circumferential wall thickening superior to the peritoneal reflection some of which may be secondary to in incomplete distension although involvement of the higher rectum and inferior sigmoid colon cannot be excluded.    Tumor Characteristics:    Circumferential extent/location (clock face): There is circumferential involvement of the rectum.  When compared to the study prior to neoadjuvant chemotherapy there is decreased circumferential wall thickening when compared to the prior exam.    Tumor Length: At least 8 cm    Tumor Extent:    The tumor extends beyond the muscularis into the perirectal fat.  There is abutment and involvement of the left side of the mesorectal fascia beginning at approximately 03:00 o'clock to 06:00 o'clock.    There is no definitive involvement/invasion of the adjacent  structures.    Lymph Nodes:    There is a single 6 mm lymph node within the mesorectal fat at the superior most extent of definitive tumor located approximately the 6 to 7:00 position and located approximately 13 cm per proximal to the anal verge.  There are a couple of small nonenlarged external iliac chain lymph nodes bilaterally and what appear to represent 2 nonenlarged obturator lymph nodes bilaterally.  The largest of these lymph nodes measures approximately 7 mm by 3 mm.      Impression       1. Large rectal mass with invasion of the muscularis and involvement of the mesorectal fat and fascia along the left and posterior aspect of the mass.  Single lymph node within the mesorectal fat posteriorly measuring approximately 6 mm.  Additional subcentimeter obturator and external iliac chain lymph nodes also seen bilaterally.  When compared to the study prior to neoadjuvant therapy there has been significant decrease in wall thickening.     FINDINGS:  Tumor Location: Tumor location (from anal verge):    The inferior margin of the tumor is located approximately 7.5-8 cm from the anal verge.    Relationship to anterior peritoneal reflection: The superior most extent of the tumor is seen straddling the peritoneal reflection.  There is circumferential wall thickening superior to the peritoneal reflection some of which may be secondary to in incomplete distension although involvement of the higher rectum and inferior sigmoid colon cannot be excluded.    Tumor Characteristics:    Circumferential extent/location (clock face): There is circumferential involvement of the rectum.  When compared to the study prior to neoadjuvant chemotherapy there is decreased circumferential wall thickening when compared to the prior exam.    Tumor Length: At least 8 cm    Tumor Extent:    The tumor extends beyond the muscularis into the perirectal fat.  There is abutment and involvement of the left side of the mesorectal fascia beginning at  approximately 03:00 o'clock to 06:00 o'clock.    There is no definitive involvement/invasion of the adjacent structures.    Lymph Nodes:    There is a single 6 mm lymph node within the mesorectal fat at the superior most extent of definitive tumor located approximately the 6 to 7:00 position and located approximately 13 cm per proximal to the anal verge.  There are a couple of small nonenlarged external iliac chain lymph nodes bilaterally and what appear to represent 2 nonenlarged obturator lymph nodes bilaterally.  The largest of these lymph nodes measures approximately 7 mm by 3 mm.      Impression       1. Large rectal mass with invasion of the muscularis and involvement of the mesorectal fat and fascia along the left and posterior aspect of the mass.  Single lymph node within the mesorectal fat posteriorly measuring approximately 6 mm.  Additional subcentimeter obturator and external iliac chain lymph nodes also seen bilaterally.  When compared to the study prior to neoadjuvant therapy there has been significant decrease in wall thickening.         ASSESSMENT/PLAN:     53-year-old male with rectal adenocarcinoma with likely T3N1 disease based upon preop imaging without evidence of metastatic disease now s/p neoadj chemoXRT which completed on 8/13/19 but with post-tx MRI showing continued threatened mesorectal fascia    - Discussed with the patient and his family that although he has had a good response with chemotherapy and radiation, we should still pursue additional neoadjuvant treatment as he has a currently threatened mesorectal fascia which increases his risk of incomplete resection during surgery. Discussed that we should evaluate him for neoadjuvant full-dose chemotherapy for a COLLINS approach.  - Will send to [facility] today to discuss COLLINS approach and neoadj full course ChemoTx   - If agreeable, we will perform a Mediport insertion in the near future after discussion with [facility]  - Will send for labs today  to include CBC, CMP and CEA level  - This will also allow him to quit smoking as we discussed need of smoking cessation and the increased risk of anastomotic complications following surgery as well as increased cancer risk with continued smoking  - RTC after chemotx is completed for restaging    Jan New MD  Colon and Rectal Surgery  [facility] [place] [facility]

## 2019-09-30 NOTE — H&P (VIEW-ONLY)
History & Physical    SUBJECTIVE:     Chief Complaint   Patient presents with    Established Patient     1 Month Follow Up    CC: rectal adenocarcinoma  Ref: Anjelica Saavedra MD    History of Present Illness:  Patient is a 53 y.o. male presents for evaluation of rectal cancer.  Patient reports he has had increasing pelvic/rectal pain along with decreased bowel movements over the last 6 weeks.  Reports an uncomfortable feeling in his pelvis associated with mucous discharge from his rectum as well as bloody bowel movements that are thin and less than normal. He reports associated chills, fatigue and 16 lb weight loss over the past 5 months.  He has also noticed a decrease in appetite as well. He underwent a colonoscopy on 06/06/2019 where a nonobstructing rectal mass was found with biopsy showing well-differentiated adenocarcinoma.  He was then referred to [facility]orectal [facility] for evaluation.  He has no family history of colorectal cancer or IBD.    8/13/19: Completed neoadj chemoXRT    Interval history:  Since last clinic visit, the patient underwent repeat MRI which showed improvement in good tumor response but still with possible threatened mesorectal fascia. Patient is doing very well by tolerating regular diet and is not losing weight.  He reports no further blood per rectum with bowel movements.  He denies any fever, chills, nausea, vomiting.  He is still smoking as well. He has no perianal excoriations currently or perianal pain.    PMHx:  Nephrolithiasis  PSHx:  Left inguinal hernia repair, lithotripsy  All: NKDA  FamHx: negative for CRC or IBD; +gastric cancer in maternal grandfather  SocHx: +tobacco (1ppd); +etoh (6 beers/day); no illicits      Review of patient's allergies indicates:  No Known Allergies    Current Outpatient Medications   Medication Sig Dispense Refill    aspirin 81 MG Chew Take 81 mg by mouth once daily.      capecitabine (XELODA) 500 MG Tab Take 3 tablets (1500 mg) by mouth twice  daily after breakfast and dinner on days of radiation only. 168 tablet 0    iron fum-B12-IF-C-folic acid (FOLTRIN) 110-0.5 mg capsule Take 1 capsule by mouth 2 (two) times daily. 60 capsule 1    lidocaine (XYLOCAINE) 5 % Oint ointment Apply topically as needed. 30 g 3    megestrol (MEGACE) 40 MG Tab Take 1 tablet (40 mg total) by mouth 4 (four) times daily. 90 tablet 1    morphine (MS CONTIN) 15 MG 12 hr tablet Take 3 tablets (45 mg total) by mouth nightly. 90 tablet 0    nicotine (NICODERM CQ) 21 mg/24 hr Place 1 patch onto the skin once daily. 14 patch 1    promethazine (PHENERGAN) 25 MG tablet Take 1 tablet (25 mg total) by mouth every 4 (four) hours. 25 tablet 2    silver sulfADIAZINE 1% (SILVADENE) 1 % cream Apply to affected area daily 400 g 1    traMADol (ULTRAM) 50 mg tablet Take 1 tablet (50 mg total) by mouth every 6 (six) hours as needed for Pain. 90 tablet 0    varenicline (CHANTIX) 1 mg Tab Take 1 tablet (1 mg total) by mouth 2 (two) times daily. 60 tablet 2    diphenoxylate-atropine 2.5-0.025 mg (LOMOTIL) 2.5-0.025 mg per tablet Take 1-2 tablets by mouth 4 (four) times daily as needed for Diarrhea. 120 tablet 1    dronabinol (MARINOL) 10 MG capsule Take 1 capsule (10 mg total) by mouth 2 (two) times daily before meals. 60 capsule 1    ondansetron (ZOFRAN-ODT) 8 MG TbDL Take 1 tablet (8 mg total) by mouth every 12 (twelve) hours as needed. 50 tablet 1    oxyCODONE (ROXICODONE) 10 mg Tab immediate release tablet Take 1 tablet (10 mg total) by mouth every 8 (eight) hours as needed for Pain (medically necessary). 90 tablet 0    prochlorperazine (COMPAZINE) 10 MG tablet Take 1 tablet (10 mg total) by mouth every 6 (six) hours as needed. 60 tablet 1     Current Facility-Administered Medications   Medication Dose Route Frequency Provider Last Rate Last Dose    lidocaine (PF) 10 mg/ml (1%) injection 10 mg  1 mL Intradermal Once Jan New MD        lidocaine (PF) 10 mg/ml (1%) injection  10 mg  1 mL Intradermal Once Jan New MD           Past Medical History:   Diagnosis Date    Anemia     Cancer      Past Surgical History:   Procedure Laterality Date    COLONOSCOPY N/A 6/6/2019    Procedure: COLONOSCOPY;  Surgeon: Anjelica Saavedra MD;  Location: [facility];  Service: Endoscopy;  Laterality: N/A;    ESOPHAGOGASTRODUODENOSCOPY N/A 6/6/2019    Procedure: EGD (ESOPHAGOGASTRODUODENOSCOPY);  Surgeon: Anjelica Saavedra MD;  Location: [facility];  Service: Endoscopy;  Laterality: N/A;    HERNIA REPAIR  1995    TONSILLECTOMY       Family History   Problem Relation Age of Onset    Intestinal malrotation Mother     Leukemia Father     No Known Problems Sister     Coronary artery disease Brother     Coronary artery disease Maternal Grandmother     Stomach cancer Maternal Grandfather      Social History     Tobacco Use    Smoking status: Current Every Day Smoker     Packs/day: 1.00     Years: 35.00     Pack years: 35.00     Types: Cigarettes     Start date: 1/2/1984    Smokeless tobacco: Current User     Types: Chew   Substance Use Topics    Alcohol use: Yes     Alcohol/week: 46.0 standard drinks     Types: 42 Cans of beer, 4 Shots of liquor per week     Frequency: 4 or more times a week     Drinks per session: 5 or 6     Binge frequency: Daily or almost daily     Comment: nancie 7    Drug use: Never        Review of Systems:  Review of Systems   Constitutional: Negative for activity change, appetite change, chills, fatigue, fever and unexpected weight change.   HENT: Negative for congestion, ear pain, sore throat and trouble swallowing.    Eyes: Negative for pain, redness and itching.   Respiratory: Negative for cough, shortness of breath and wheezing.    Cardiovascular: Negative for chest pain, palpitations and leg swelling.   Gastrointestinal: Negative for abdominal distention, abdominal pain, anal bleeding, blood in stool, constipation, diarrhea, nausea, rectal pain and vomiting.    Endocrine: Negative for cold intolerance, heat intolerance and polyuria.   Genitourinary: Negative for dysuria, flank pain, frequency and hematuria.   Musculoskeletal: Negative for gait problem, joint swelling and neck pain.   Skin: Negative for color change, rash and wound.   Allergic/Immunologic: Negative for environmental allergies and immunocompromised state.   Neurological: Negative for dizziness, speech difficulty, weakness and numbness.   Psychiatric/Behavioral: Negative for agitation, confusion and hallucinations.       OBJECTIVE:     Vital Signs (Most Recent)  Temp: 98.3 °F (36.8 °C) (09/30/19 1023)  Pulse: 86 (09/30/19 1023)  BP: 112/82 (09/30/19 1023)     73.9 kg (162 lb 14.7 oz)     Physical Exam:  Physical Exam   Constitutional: He is oriented to person, place, and time. He appears well-developed.   HENT:   Head: Normocephalic and atraumatic.   Eyes: Conjunctivae and EOM are normal.   Neck: Normal range of motion. No thyromegaly present.   Cardiovascular: Normal rate and regular rhythm.   Pulmonary/Chest: Effort normal. No respiratory distress.   Abdominal: Soft. He exhibits no distension and no mass. There is no tenderness.   Genitourinary:   Genitourinary Comments: Anorectal:  No external lesions or excoriations or skin changes; ELOISA with good tone, no blood and no palpable masses   Musculoskeletal: Normal range of motion. He exhibits no edema or tenderness.   Neurological: He is alert and oriented to person, place, and time.   Skin: Skin is warm and dry. Capillary refill takes less than 2 seconds. No rash noted.   Psychiatric: He has a normal mood and affect.     Proctoscopy Procedure Note    Pre-procedure diagnosis: Rectal adenocarinoma    Post-procedure diagnosis: Rectal adenocarcinoma    Procedure: Anoscopy    Surgeon: Jan New MD    Assistant: Nichelle Javier LPN    Specimen: none    Findings:  Proctoscope inserted to 10 cm.  The previous mass was seen in the left lateral position at 10 cm  which has responded well and is significantly decreased in size intraluminally.  There is no longer bleeding at the mass it in a complex is less of the circumference and previously described.    Patient tolerated procedure well.      Laboratory  Lab Results   Component Value Date    WBC 4.36 09/03/2019    HGB 10.3 (L) 09/03/2019    HCT 31.6 (L) 09/03/2019     09/03/2019    CHOL 158 06/01/2019    TRIG 194 (H) 06/01/2019    HDL 30 (L) 06/01/2019    ALT 7 (L) 09/03/2019    AST 13 09/03/2019     09/03/2019    K 4.6 09/03/2019     09/03/2019    CREATININE 0.7 09/03/2019    BUN 5 (L) 09/03/2019    CO2 27 09/03/2019    TSH 1.607 01/15/2019    PSA 0.37 06/01/2019    INR 1.0 06/11/2019       Diagnostic Results:  Colonoscopy images and report reviewed which shows a rectal mass that malignant     MRI 9/24/19:  FINDINGS:  Tumor Location: Tumor location (from anal verge):    The inferior margin of the tumor is located approximately 7.5-8 cm from the anal verge.    Relationship to anterior peritoneal reflection: The superior most extent of the tumor is seen straddling the peritoneal reflection.  There is circumferential wall thickening superior to the peritoneal reflection some of which may be secondary to in incomplete distension although involvement of the higher rectum and inferior sigmoid colon cannot be excluded.    Tumor Characteristics:    Circumferential extent/location (clock face): There is circumferential involvement of the rectum.  When compared to the study prior to neoadjuvant chemotherapy there is decreased circumferential wall thickening when compared to the prior exam.    Tumor Length: At least 8 cm    Tumor Extent:    The tumor extends beyond the muscularis into the perirectal fat.  There is abutment and involvement of the left side of the mesorectal fascia beginning at approximately 03:00 o'clock to 06:00 o'clock.    There is no definitive involvement/invasion of the adjacent  structures.    Lymph Nodes:    There is a single 6 mm lymph node within the mesorectal fat at the superior most extent of definitive tumor located approximately the 6 to 7:00 position and located approximately 13 cm per proximal to the anal verge.  There are a couple of small nonenlarged external iliac chain lymph nodes bilaterally and what appear to represent 2 nonenlarged obturator lymph nodes bilaterally.  The largest of these lymph nodes measures approximately 7 mm by 3 mm.      Impression       1. Large rectal mass with invasion of the muscularis and involvement of the mesorectal fat and fascia along the left and posterior aspect of the mass.  Single lymph node within the mesorectal fat posteriorly measuring approximately 6 mm.  Additional subcentimeter obturator and external iliac chain lymph nodes also seen bilaterally.  When compared to the study prior to neoadjuvant therapy there has been significant decrease in wall thickening.     FINDINGS:  Tumor Location: Tumor location (from anal verge):    The inferior margin of the tumor is located approximately 7.5-8 cm from the anal verge.    Relationship to anterior peritoneal reflection: The superior most extent of the tumor is seen straddling the peritoneal reflection.  There is circumferential wall thickening superior to the peritoneal reflection some of which may be secondary to in incomplete distension although involvement of the higher rectum and inferior sigmoid colon cannot be excluded.    Tumor Characteristics:    Circumferential extent/location (clock face): There is circumferential involvement of the rectum.  When compared to the study prior to neoadjuvant chemotherapy there is decreased circumferential wall thickening when compared to the prior exam.    Tumor Length: At least 8 cm    Tumor Extent:    The tumor extends beyond the muscularis into the perirectal fat.  There is abutment and involvement of the left side of the mesorectal fascia beginning at  approximately 03:00 o'clock to 06:00 o'clock.    There is no definitive involvement/invasion of the adjacent structures.    Lymph Nodes:    There is a single 6 mm lymph node within the mesorectal fat at the superior most extent of definitive tumor located approximately the 6 to 7:00 position and located approximately 13 cm per proximal to the anal verge.  There are a couple of small nonenlarged external iliac chain lymph nodes bilaterally and what appear to represent 2 nonenlarged obturator lymph nodes bilaterally.  The largest of these lymph nodes measures approximately 7 mm by 3 mm.      Impression       1. Large rectal mass with invasion of the muscularis and involvement of the mesorectal fat and fascia along the left and posterior aspect of the mass.  Single lymph node within the mesorectal fat posteriorly measuring approximately 6 mm.  Additional subcentimeter obturator and external iliac chain lymph nodes also seen bilaterally.  When compared to the study prior to neoadjuvant therapy there has been significant decrease in wall thickening.         ASSESSMENT/PLAN:     53-year-old male with rectal adenocarcinoma with likely T3N1 disease based upon preop imaging without evidence of metastatic disease now s/p neoadj chemoXRT which completed on 8/13/19 but with post-tx MRI showing continued threatened mesorectal fascia    - Discussed with the patient and his family that although he has had a good response with chemotherapy and radiation, we should still pursue additional neoadjuvant treatment as he has a currently threatened mesorectal fascia which increases his risk of incomplete resection during surgery. Discussed that we should evaluate him for neoadjuvant full-dose chemotherapy for a COLLINS approach.  - Will send to [facility] today to discuss COLLINS approach and neoadj full course ChemoTx   - If agreeable, we will perform a Mediport insertion in the near future after discussion with [facility]  - Will send for labs today  to include CBC, CMP and CEA level  - This will also allow him to quit smoking as we discussed need of smoking cessation and the increased risk of anastomotic complications following surgery as well as increased cancer risk with continued smoking  - RTC after chemotx is completed for restaging    Jan New MD  Colon and Rectal Surgery  [facility]

## 2019-10-01 ENCOUNTER — TELEPHONE (OUTPATIENT)
Dept: HEMATOLOGY/ONCOLOGY | Facility: CLINIC | Age: 54
End: 2019-10-01

## 2019-10-01 ENCOUNTER — PATIENT MESSAGE (OUTPATIENT)
Dept: SURGERY | Facility: HOSPITAL | Age: 54
End: 2019-10-01

## 2019-10-01 NOTE — TELEPHONE ENCOUNTER
Spoke with patient regarding setting up all appts for new patient chemo .  Lab md and chemo and teaching appts. Agreeable to pt for time date and location. Direct contact information given . Pt verifies intent to attend appts as scheduled and will call me with any further concerns

## 2019-10-02 ENCOUNTER — TELEPHONE (OUTPATIENT)
Dept: SURGERY | Facility: CLINIC | Age: 54
End: 2019-10-02

## 2019-10-02 ENCOUNTER — TELEPHONE (OUTPATIENT)
Dept: HEMATOLOGY/ONCOLOGY | Facility: CLINIC | Age: 54
End: 2019-10-02

## 2019-10-02 RX ORDER — EPINEPHRINE 0.3 MG/.3ML
0.3 INJECTION SUBCUTANEOUS ONCE AS NEEDED
Status: CANCELLED | OUTPATIENT
Start: 2019-10-09

## 2019-10-02 RX ORDER — SODIUM CHLORIDE 0.9 % (FLUSH) 0.9 %
10 SYRINGE (ML) INJECTION
Status: CANCELLED | OUTPATIENT
Start: 2019-10-09

## 2019-10-02 RX ORDER — DIPHENHYDRAMINE HYDROCHLORIDE 50 MG/ML
50 INJECTION INTRAMUSCULAR; INTRAVENOUS ONCE AS NEEDED
Status: CANCELLED | OUTPATIENT
Start: 2019-10-09

## 2019-10-02 RX ORDER — ATROPINE SULFATE 0.4 MG/ML
0.4 INJECTION, SOLUTION ENDOTRACHEAL; INTRAMEDULLARY; INTRAMUSCULAR; INTRAVENOUS; SUBCUTANEOUS ONCE AS NEEDED
Status: CANCELLED | OUTPATIENT
Start: 2019-10-09

## 2019-10-02 RX ORDER — HEPARIN 100 UNIT/ML
500 SYRINGE INTRAVENOUS
Status: CANCELLED | OUTPATIENT
Start: 2019-10-09

## 2019-10-02 RX ORDER — HEPARIN 100 UNIT/ML
500 SYRINGE INTRAVENOUS
Status: CANCELLED | OUTPATIENT
Start: 2019-10-11

## 2019-10-02 RX ORDER — SODIUM CHLORIDE 0.9 % (FLUSH) 0.9 %
10 SYRINGE (ML) INJECTION
Status: CANCELLED | OUTPATIENT
Start: 2019-10-11

## 2019-10-02 NOTE — TELEPHONE ENCOUNTER
"Called patient to change appointment time from 1600 to 1400, patient stated "that fine I can make it for 1400"ok and he can   "

## 2019-10-02 NOTE — TELEPHONE ENCOUNTER
Called patient in response to his questions about his labs from 9/30/19. Per Dr New everything is within normal limits. Understanding verbalized.

## 2019-10-03 ENCOUNTER — OFFICE VISIT (OUTPATIENT)
Dept: HEMATOLOGY/ONCOLOGY | Facility: CLINIC | Age: 54
End: 2019-10-03
Payer: COMMERCIAL

## 2019-10-03 DIAGNOSIS — Z71.9 ENCOUNTER FOR EDUCATION: ICD-10-CM

## 2019-10-03 DIAGNOSIS — C20 RECTAL CANCER: Primary | ICD-10-CM

## 2019-10-03 PROCEDURE — 99999 PR PBB SHADOW E&M-EST. PATIENT-LVL I: ICD-10-PCS | Mod: PBBFAC,,, | Performed by: NURSE PRACTITIONER

## 2019-10-03 PROCEDURE — 90686 FLU VACCINE (QUAD) GREATER THAN OR EQUAL TO 3YO PRESERVATIVE FREE IM: ICD-10-PCS | Mod: S$GLB,,, | Performed by: NURSE PRACTITIONER

## 2019-10-03 PROCEDURE — 90471 FLU VACCINE (QUAD) GREATER THAN OR EQUAL TO 3YO PRESERVATIVE FREE IM: ICD-10-PCS | Mod: S$GLB,,, | Performed by: NURSE PRACTITIONER

## 2019-10-03 PROCEDURE — 99999 PR PBB SHADOW E&M-EST. PATIENT-LVL I: CPT | Mod: PBBFAC,,, | Performed by: NURSE PRACTITIONER

## 2019-10-03 PROCEDURE — 90686 IIV4 VACC NO PRSV 0.5 ML IM: CPT | Mod: S$GLB,,, | Performed by: NURSE PRACTITIONER

## 2019-10-03 PROCEDURE — 99215 PR OFFICE/OUTPT VISIT, EST, LEVL V, 40-54 MIN: ICD-10-PCS | Mod: 25,S$GLB,, | Performed by: NURSE PRACTITIONER

## 2019-10-03 PROCEDURE — 90471 IMMUNIZATION ADMIN: CPT | Mod: S$GLB,,, | Performed by: NURSE PRACTITIONER

## 2019-10-03 PROCEDURE — 99215 OFFICE O/P EST HI 40 MIN: CPT | Mod: 25,S$GLB,, | Performed by: NURSE PRACTITIONER

## 2019-10-03 NOTE — PROGRESS NOTES
54 y/o male for chemotherapy teaching. Patient given the Navigation Notebook. Explained the contents of the chemotherapy binder. Discussed with patient the rationale for chemotherapy, how it works, the process of treatment, potential side effects and symptoms to report.  Consents signed, witnessed, and scanned into system.  Copy of upcoming appointments printed and placed into binder.  Patient states that he would like his flu vaccine today.     Reviewed the specific medication and gave them a handout describing the potential side effects of FOLFOXIRI and AVASTIN.      Discussed potential side effects such as:  Nausea and vomiting  Myelosuppression  Fatigue  Anorexia  Alopecia  Stomatitis  Diarrhea  Gastritis  Fever > 100.4  Antiemetics instructions  Skin care  Constipation  Rash  Photosensitivity  Sunscreen  Small freq meals  Increased protein  Increased calories  Vitamin support   Taste alterations  Neuropathys including cold sensitvity neuropathy  Hydration  Renal toxicity  Hepatic toxicity  [facility] resources  Thrombocytopenia precautions  Hand and foot syndrome- symptoms and self care tips  Posterior reversible encephalopathy syndrome  High blood pressure  Delayed wound healing  Fistula formation  Bleeding  GI perforation  Reproductive Concers    Phone numbers to contact healthcare team provided to patient.  Patient verbalized understanding plan of care and how to contact healthcare team if needed.

## 2019-10-07 NOTE — PRE ADMISSION SCREENING
Pre op instructions reviewed with patient per phone:    To confirm, Your surgeon has instructed you:  Surgery is scheduled 10/8/2019 at 0700.      Please report to [facility] 1st floor main lobby by 0530.   Pre admit office to call afternoon prior to surgery with final arrival time      INSTRUCTIONS IMPORTANT!!!  ¨You may have water or clear juice up until three hours prior to your surgery. OK to brush teeth, no gum, candy or mints!    ¨ Take only these medicines with a small swallow of water-morning of surgery.  None    ____  Do not wear makeup, including mascara.  ____  No powder, lotions or creams to surgical area.  ____  Please remove all jewelry, including piercings and leave at home.  ____  No money or valuables needed. Please leave at home.  ____  Please bring identification and insurance information to hospital.  ____  If going home the same day, arrange for a ride home. You will not be able to   drive if Anesthesia was used.  ____  Children, under 12 years old, must remain in the waiting room with an adult.  They are not allowed in patient areas.  ____  Wear loose fitting clothing. Allow for dressings, bandages.  ____  Stop Aspirin, Ibuprofen, Motrin and Aleve at least 5-7 days before surgery, unless otherwise instructed by your doctor, or the nurse.   You MAY use Tylenol/acetaminophen until day of surgery.  ____  If you take diabetic medication, do not take am of surgery unless instructed by   Doctor.  ____ Stop taking any Fish Oil supplement or any Vitamins that contain Vitamin E at least 5 days prior to surgery.          Bathing Instructions-- The night before surgery and the morning prior to coming to the hospital:   -Do not shave the surgical area.   -Shower and wash your hair and body as usual with anti-bacterial  soap and shampoo.   -Rinse your hair and body completely.   -Use one packet of hibiclens to wash the surgical site (using your hand) gently for 5 minutes.  Do not  scrub you skin too hard.   -Do not use hibiclens on your head, face, or genitals.   -Do not wash with anti-bacterial soap after you use the hibiclens.   -Rinse your body thoroughly.   -Dry with clean, soft towel.  Do not use lotion, cream, deodorant, or powders on   the surgical site.    Use antibacterial soap in place of hibiclens if your surgery is on the head, face or genitals.         Surgical Site Infection    Prevention of surgical site infections:     -Keep incisions clean and dry.   -Do not soak/submerge incisions in water until completely healed.   -Do not apply lotions, powders, creams, or deodorants to site.   -Always make sure hands are cleaned with antibacterial soap/ alcohol-based   prior to touching the surgical site.  (This includes doctors, nurses, staff, and yourself.)    Signs and symptoms:   -Redness and pain around the area where you had surgery   -Drainage of cloudy fluid from your surgical wound   -Fever over 100.4  I have read or had read and explained to me, and understand the above information.

## 2019-10-08 ENCOUNTER — ANESTHESIA (OUTPATIENT)
Dept: SURGERY | Facility: HOSPITAL | Age: 54
End: 2019-10-08
Payer: COMMERCIAL

## 2019-10-08 ENCOUNTER — ANESTHESIA EVENT (OUTPATIENT)
Dept: SURGERY | Facility: HOSPITAL | Age: 54
End: 2019-10-08
Payer: COMMERCIAL

## 2019-10-08 ENCOUNTER — HOSPITAL ENCOUNTER (OUTPATIENT)
Facility: HOSPITAL | Age: 54
Discharge: HOME | End: 2019-10-08
Attending: COLON & RECTAL SURGERY | Admitting: COLON & RECTAL SURGERY
Payer: COMMERCIAL

## 2019-10-08 DIAGNOSIS — C20 RECTAL ADENOCARCINOMA: ICD-10-CM

## 2019-10-08 PROCEDURE — 37000008 HC ANESTHESIA 1ST 15 MINUTES: Performed by: COLON & RECTAL SURGERY

## 2019-10-08 PROCEDURE — 63600175 PHARM REV CODE 636 W HCPCS: Performed by: NURSE ANESTHETIST, CERTIFIED REGISTERED

## 2019-10-08 PROCEDURE — 37000009 HC ANESTHESIA EA ADD 15 MINS: Performed by: COLON & RECTAL SURGERY

## 2019-10-08 PROCEDURE — 25000003 PHARM REV CODE 250: Performed by: NURSE ANESTHETIST, CERTIFIED REGISTERED

## 2019-10-08 PROCEDURE — 36561 PR INSERT TUNNELED CV CATH WITH PORT: ICD-10-PCS | Mod: LT,,, | Performed by: COLON & RECTAL SURGERY

## 2019-10-08 PROCEDURE — 71000033 HC RECOVERY, INTIAL HOUR: Performed by: COLON & RECTAL SURGERY

## 2019-10-08 PROCEDURE — C1788 PORT, INDWELLING, IMP: HCPCS | Performed by: COLON & RECTAL SURGERY

## 2019-10-08 PROCEDURE — 36000707: Performed by: COLON & RECTAL SURGERY

## 2019-10-08 PROCEDURE — 36000706: Performed by: COLON & RECTAL SURGERY

## 2019-10-08 PROCEDURE — 71000015 HC POSTOP RECOV 1ST HR: Performed by: COLON & RECTAL SURGERY

## 2019-10-08 PROCEDURE — 77001 FLUOROGUIDE FOR VEIN DEVICE: CPT | Mod: 26,,, | Performed by: COLON & RECTAL SURGERY

## 2019-10-08 PROCEDURE — 36561 INSERT TUNNELED CV CATH: CPT | Mod: LT,,, | Performed by: COLON & RECTAL SURGERY

## 2019-10-08 PROCEDURE — 63600175 PHARM REV CODE 636 W HCPCS: Performed by: COLON & RECTAL SURGERY

## 2019-10-08 PROCEDURE — 77001 CHG FLUOROGUIDE CNTRL VEN ACCESS,PLACE,REPLACE,REMOVE: ICD-10-PCS | Mod: 26,,, | Performed by: COLON & RECTAL SURGERY

## 2019-10-08 RX ORDER — SODIUM CHLORIDE 9 MG/ML
INJECTION, SOLUTION INTRAVENOUS CONTINUOUS
Status: DISCONTINUED | OUTPATIENT
Start: 2019-10-08 | End: 2019-10-08 | Stop reason: HOSPADM

## 2019-10-08 RX ORDER — FENTANYL CITRATE 50 UG/ML
25 INJECTION, SOLUTION INTRAMUSCULAR; INTRAVENOUS EVERY 5 MIN PRN
Status: DISCONTINUED | OUTPATIENT
Start: 2019-10-08 | End: 2019-10-08 | Stop reason: HOSPADM

## 2019-10-08 RX ORDER — OXYCODONE HYDROCHLORIDE 5 MG/1
5 TABLET ORAL EVERY 4 HOURS PRN
Status: DISCONTINUED | OUTPATIENT
Start: 2019-10-08 | End: 2019-10-08 | Stop reason: HOSPADM

## 2019-10-08 RX ORDER — ONDANSETRON 2 MG/ML
4 INJECTION INTRAMUSCULAR; INTRAVENOUS EVERY 12 HOURS PRN
Status: DISCONTINUED | OUTPATIENT
Start: 2019-10-08 | End: 2019-10-08 | Stop reason: HOSPADM

## 2019-10-08 RX ORDER — ONDANSETRON 2 MG/ML
4 INJECTION INTRAMUSCULAR; INTRAVENOUS DAILY PRN
Status: DISCONTINUED | OUTPATIENT
Start: 2019-10-08 | End: 2019-10-08 | Stop reason: HOSPADM

## 2019-10-08 RX ORDER — LIDOCAINE HYDROCHLORIDE 10 MG/ML
INJECTION, SOLUTION EPIDURAL; INFILTRATION; INTRACAUDAL; PERINEURAL
Status: DISCONTINUED | OUTPATIENT
Start: 2019-10-08 | End: 2019-10-08

## 2019-10-08 RX ORDER — ONDANSETRON 2 MG/ML
INJECTION INTRAMUSCULAR; INTRAVENOUS
Status: DISCONTINUED | OUTPATIENT
Start: 2019-10-08 | End: 2019-10-08

## 2019-10-08 RX ORDER — FENTANYL CITRATE 50 UG/ML
INJECTION, SOLUTION INTRAMUSCULAR; INTRAVENOUS
Status: DISCONTINUED | OUTPATIENT
Start: 2019-10-08 | End: 2019-10-08

## 2019-10-08 RX ORDER — CEFAZOLIN SODIUM 2 G/50ML
2 SOLUTION INTRAVENOUS
Status: COMPLETED | OUTPATIENT
Start: 2019-10-08 | End: 2019-10-08

## 2019-10-08 RX ORDER — ACETAMINOPHEN 325 MG/1
325 TABLET ORAL EVERY 6 HOURS PRN
Refills: 0 | COMMUNITY
Start: 2019-10-08 | End: 2020-02-28

## 2019-10-08 RX ORDER — OXYCODONE HYDROCHLORIDE 5 MG/1
10 TABLET ORAL EVERY 4 HOURS PRN
Status: DISCONTINUED | OUTPATIENT
Start: 2019-10-08 | End: 2019-10-08 | Stop reason: HOSPADM

## 2019-10-08 RX ORDER — MIDAZOLAM HYDROCHLORIDE 1 MG/ML
INJECTION, SOLUTION INTRAMUSCULAR; INTRAVENOUS
Status: DISCONTINUED | OUTPATIENT
Start: 2019-10-08 | End: 2019-10-08

## 2019-10-08 RX ORDER — PROPOFOL 10 MG/ML
VIAL (ML) INTRAVENOUS
Status: DISCONTINUED | OUTPATIENT
Start: 2019-10-08 | End: 2019-10-08

## 2019-10-08 RX ORDER — SODIUM CHLORIDE, SODIUM LACTATE, POTASSIUM CHLORIDE, CALCIUM CHLORIDE 600; 310; 30; 20 MG/100ML; MG/100ML; MG/100ML; MG/100ML
INJECTION, SOLUTION INTRAVENOUS CONTINUOUS PRN
Status: DISCONTINUED | OUTPATIENT
Start: 2019-10-08 | End: 2019-10-08

## 2019-10-08 RX ADMIN — PROPOFOL 30 MG: 10 INJECTION, EMULSION INTRAVENOUS at 07:10

## 2019-10-08 RX ADMIN — FENTANYL CITRATE 50 MCG: 50 INJECTION, SOLUTION INTRAMUSCULAR; INTRAVENOUS at 06:10

## 2019-10-08 RX ADMIN — ONDANSETRON 4 MG: 2 INJECTION, SOLUTION INTRAMUSCULAR; INTRAVENOUS at 07:10

## 2019-10-08 RX ADMIN — LIDOCAINE HYDROCHLORIDE 50 MG: 10 INJECTION, SOLUTION EPIDURAL; INFILTRATION; INTRACAUDAL; PERINEURAL at 06:10

## 2019-10-08 RX ADMIN — FENTANYL CITRATE 50 MCG: 50 INJECTION, SOLUTION INTRAMUSCULAR; INTRAVENOUS at 07:10

## 2019-10-08 RX ADMIN — CEFAZOLIN SODIUM 2 G: 2 SOLUTION INTRAVENOUS at 06:10

## 2019-10-08 RX ADMIN — SODIUM CHLORIDE, SODIUM LACTATE, POTASSIUM CHLORIDE, AND CALCIUM CHLORIDE: 600; 310; 30; 20 INJECTION, SOLUTION INTRAVENOUS at 06:10

## 2019-10-08 RX ADMIN — PROPOFOL 50 MG: 10 INJECTION, EMULSION INTRAVENOUS at 06:10

## 2019-10-08 RX ADMIN — MIDAZOLAM 2 MG: 1 INJECTION INTRAMUSCULAR; INTRAVENOUS at 06:10

## 2019-10-08 NOTE — OP NOTE
[facility]  Surgery Department  Operative Note    SUMMARY     Date of Procedure: 10/8/2019     Procedure:   1. Mediport insertion  2.  Fluoroscopic guidance for MediPort insertion    Surgeon(s) and Role:     * Jan New MD - Primary    Assisting Surgeon: None    Pre-Operative Diagnosis: Rectal adenocarcinoma [C20]    Post-Operative Diagnosis: Post-Op Diagnosis Codes:     * Rectal adenocarcinoma [C20]    Anesthesia: General/MAC    Technical Procedures Used:   1. Mediport insertion  2.  Fluoroscopic guidance for MediPort insertion    Indications for Procedure:  53-year-old male with rectal adenocarcinoma who presents for MediPort placement for neoadjuvant chemotherapy    Findings of the Procedure:  Left subclavian vein in usual position with insertion of MediPort on 1st stick    Description of the Procedure:   Patient was brought to the operating room placed supine on the table. MAC anesthesia was then induced. The chest and neck was then prepped and draped in usual sterile fashion. A preoperative surgical time-out was then performed confirming correct patient, procedure and preoperative medications given.  Small incision was made just beneath the left clavicle lateral to the curve of the clavicle.  A needle and syringe were then used to access the left subclavian vein, where dark red blood and nonpulsatile flow were observed.  A guidewire was inserted through the needle until ectopy was confirmed on cardiac monitoring.  Fluoroscopy was then used to confirm location of the wire into the subclavian vein terminating at the SVC.  Needle was removed and guidewire left in place. The vein dilator and insertion sheath were then guided over the guidewire.  The guidewire and inner portions of the insertion sheath/dilator were then removed leaving only the insertion sheath present.  A finger was placed over the opening to the insertion sheath to prevent air embolus.  Catheter for the port was then  injected with heparinized saline and inserted directly into the insertion sheath.  Fluoroscopy was then used to confirm location of the catheter at the tip of the SVC and right atrium.  The catheter was held in place with forceps while the insertion sheath was peeled away until was fully removed. The distal end of the catheter remained clamped with hemostat to prevent air embolus.  An incision 2 cm beneath the needlestick site was then fashioned horizontal direction that was approximately 4 cm wide.  A subcutaneous pocket was then created with sharp and blunt dissection above the level of the muscle fascia beneath the subcutaneous fat.  This was fashioned large enough to accommodate the port. The catheter was then attached to a tunneling device was then tunneled from the needle insertion site to the port site. Once there, it was cut to length to allow no kinking after insertion into the port.  Prior to cutting, a hemostat was placed on the proximal portion of the catheter to prevent air embolus.  The remaining catheter was then attached to the port which was inserted beneath the skin in the pocket previously created and the hemostat was removed. The Godoy needle was then used to access the port through the skin and blood was easily aspirated and was then flushed with heparinized saline. A final fluoroscopic image was obtained showing continued good placement of the catheter near the SVC/Right atrium junction and without kinking to the port. Hemostasis was ensured and the port pocket.  Deep dermal stitches of 3-0 Vicryl suture then used to close the defect where the port placement was made.  Skin was then run with a running 4-0 Monocryl suture.  Interrupted 4-0 Monocryl suture was then used to close the site at the needle insertion. Skin glue was then applied to the sites. The patient was then woken from MAC anesthesia.  He was taken postanesthesia care unit in stable condition where he will right chest x-ray to  confirm placement and rule out pneumothorax.  He tolerated procedure well.    Significant Surgical Tasks Conducted by the Assistant(s), if Applicable: N/A    Complications: No    Estimated Blood Loss (EBL): <5mL           Implants:   Implant Name Type Inv. Item Serial No.  Lot No. LRB No. Used   PORT POWER CLEAR VIEW - [id number]  PORT POWER CLEAR VIEW  [employer] [id number] Left 1   PORT POWER CLEAR VIEW - [id number]  PORT POWER CLEAR VIEW  [employer] [id number] Left 1       Specimens:   Specimen (12h ago, onward)    None                  Condition: Good    Disposition: PACU - hemodynamically stable.    Attestation: I performed the procedure.

## 2019-10-08 NOTE — INTERVAL H&P NOTE
The patient has been examined and the H&P has been reviewed:    I concur with the findings and no changes have occurred since H&P was written.   All risks, benefits and alternatives fully explained to patient. Risks include, but are not limited to, bleeding, infection, pneumothorax, postop port infection, postop sepsis, damage to great vessels, arrhthymias, postoperative abscess, perioperative MI, CVA and death.  All questions field and appropriately answered to patient's satisfaction.  Consent signed and placed on chart.      Anesthesia/Surgery risks, benefits and alternative options discussed and understood by patient/family.          Active Hospital Problems    Diagnosis  POA    Rectal adenocarcinoma [C20]  Yes      Resolved Hospital Problems   No resolved problems to display.

## 2019-10-08 NOTE — ANESTHESIA POSTPROCEDURE EVALUATION
Anesthesia Post Evaluation    Patient: Sukhjinder Presley    Procedure(s) Performed: Procedure(s) (LRB):  [id number] (N/A)    Final Anesthesia Type: MAC  Patient location during evaluation: PACU  Patient participation: Yes- Able to Participate  Level of consciousness: awake and alert  Post-procedure vital signs: reviewed and stable  Pain management: adequate  Airway patency: patent  PONV status at discharge: No PONV  Anesthetic complications: no      Cardiovascular status: hemodynamically stable  Respiratory status: spontaneous ventilation  Hydration status: euvolemic  Follow-up not needed.          Vitals Value Taken Time   BP 98/64 10/8/2019  8:19 AM   Temp 36.5 °C (97.7 °F) 10/8/2019  8:19 AM   Pulse 79 10/8/2019  8:22 AM   Resp 12 10/8/2019  8:21 AM   SpO2 99 % 10/8/2019  8:22 AM   Vitals shown include unvalidated device data.      Event Time     Out of Recovery 08:25:28          Pain/Stacy Score: Stacy Score: 10 (10/8/2019  8:15 AM)

## 2019-10-08 NOTE — DISCHARGE SUMMARY
[facility]  Discharge Note  Short Stay    Admit Date: 10/8/2019    Discharge Date and Time: 10/8/2019  9:35 AM     Attending Physician: Jan New     Discharge Provider: Jan New    Diagnoses:  Active Hospital Problems    Diagnosis  POA    *Rectal adenocarcinoma [C20]  Yes      Resolved Hospital Problems   No resolved problems to display.       Discharged Condition: good    Hospital Course: Patient was admitted for an outpatient procedure and tolerated the procedure well with no complications.    Final Diagnoses: Same as principal problem.    Disposition: Home or Self Care    Follow up/Patient Instructions:    Medications:  Reconciled Home Medications:      Medication List      START taking these medications    acetaminophen 325 MG tablet  Commonly known as:  TYLENOL  Take 1 tablet (325 mg total) by mouth every 6 (six) hours as needed for Pain.        CONTINUE taking these medications    aspirin 81 MG Chew  Take 81 mg by mouth once daily.     capecitabine 500 MG Tab  Commonly known as:  XELODA  Take 3 tablets (1500 mg) by mouth twice daily after breakfast and dinner on days of radiation only.     diphenoxylate-atropine 2.5-0.025 mg 2.5-0.025 mg per tablet  Commonly known as:  LOMOTIL  Take 1-2 tablets by mouth 4 (four) times daily as needed for Diarrhea.     dronabinol 10 MG capsule  Commonly known as:  MARINOL  Take 1 capsule (10 mg total) by mouth 2 (two) times daily before meals.     iron fum-B12-IF-C-folic acid 110-0.5 mg capsule  Commonly known as:  FOLTRIN  Take 1 capsule by mouth 2 (two) times daily.     lidocaine 5 % Oint ointment  Commonly known as:  XYLOCAINE  Apply topically as needed.     megestrol 40 MG Tab  Commonly known as:  MEGACE  Take 1 tablet (40 mg total) by mouth 4 (four) times daily.     morphine 15 MG 12 hr tablet  Commonly known as:  MS CONTIN  Take 3 tablets (45 mg total) by mouth nightly.     nicotine 21 mg/24 hr  Commonly known as:  NICODERM CQ  Place 1 patch  onto the skin once daily.     ondansetron 8 MG Tbdl  Commonly known as:  ZOFRAN-ODT  Take 1 tablet (8 mg total) by mouth every 12 (twelve) hours as needed.     oxyCODONE 10 mg Tab immediate release tablet  Commonly known as:  ROXICODONE  Take 1 tablet (10 mg total) by mouth every 8 (eight) hours as needed for Pain (medically necessary).     prochlorperazine 10 MG tablet  Commonly known as:  COMPAZINE  Take 1 tablet (10 mg total) by mouth every 6 (six) hours as needed.     promethazine 25 MG tablet  Commonly known as:  PHENERGAN  Take 1 tablet (25 mg total) by mouth every 4 (four) hours.     silver sulfADIAZINE 1% 1 % cream  Commonly known as:  SILVADENE  Apply to affected area daily     traMADol 50 mg tablet  Commonly known as:  ULTRAM  Take 1 tablet (50 mg total) by mouth every 6 (six) hours as needed for Pain.     varenicline 1 mg Tab  Commonly known as:  CHANTIX  Take 1 tablet (1 mg total) by mouth 2 (two) times daily.          Discharge Procedure Orders   Diet general     Call MD for:  extreme fatigue     Call MD for:  persistent dizziness or light-headedness     Call MD for:  hives     Call MD for:  redness, tenderness, or signs of infection (pain, swelling, redness, odor or green/yellow discharge around incision site)     Call MD for:  difficulty breathing, headache or visual disturbances     Call MD for:  severe uncontrolled pain     Call MD for:  persistent nausea and vomiting     Call MD for:  temperature >100.4     No dressing needed     Activity as tolerated     Follow-up Information     Jan New MD.    Specialty:  Colon and Rectal Surgery  Why:  As needed  Contact information:  [address] [place] [address] DR Xiomy ROSA [id number] [phone]                   Discharge Procedure Orders (must include Diet, Follow-up, Activity):   Discharge Procedure Orders (must include Diet, Follow-up, Activity)   Diet general     Call MD for:  extreme fatigue     Call MD for:  persistent dizziness or  light-headedness     Call MD for:  hives     Call MD for:  redness, tenderness, or signs of infection (pain, swelling, redness, odor or green/yellow discharge around incision site)     Call MD for:  difficulty breathing, headache or visual disturbances     Call MD for:  severe uncontrolled pain     Call MD for:  persistent nausea and vomiting     Call MD for:  temperature >100.4     No dressing needed     Activity as tolerated

## 2019-10-08 NOTE — ANESTHESIA RELEASE NOTE
Anesthesia Release from PACU Note    Patient: Sukhjinder Presley    Procedure(s) Performed: Procedure(s) (LRB):  [id number] (N/A)    Anesthesia type: MAC    Post pain: Adequate analgesia    Post assessment: no apparent anesthetic complications, tolerated procedure well and no evidence of recall    Last Vitals:   Visit Vitals  /71 (BP Location: Right arm, Patient Position: Sitting)   Temp 36.6 °C (97.9 °F) (Temporal)   Resp 18   Ht 6' (1.829 m)   Wt 74.6 kg (164 lb 7.4 oz)   SpO2 98%   BMI 22.31 kg/m²       Post vital signs: stable    Level of consciousness: responds to stimulation    Nausea/Vomiting: no nausea/no vomiting    Complications: none    Airway Patency: patent    Respiratory: unassisted    Cardiovascular: stable and blood pressure at baseline    Hydration: euvolemic

## 2019-10-08 NOTE — TRANSFER OF CARE
Anesthesia Transfer of Care Note    Patient: Sukhjinder Presley    Procedure(s) Performed: Procedure(s) (LRB):  [id number]-CATH (N/A)    Patient location: PACU    Anesthesia Type: MAC    Transport from OR: Transported from OR on room air with adequate spontaneous ventilation    Post pain: adequate analgesia    Post assessment: no apparent anesthetic complications    Post vital signs: stable    Level of consciousness: responds to stimulation    Nausea/Vomiting: no nausea/vomiting    Complications: none    Transfer of care protocol was followed      Last vitals:   Visit Vitals  /71 (BP Location: Right arm, Patient Position: Sitting)   Temp 36.6 °C (97.9 °F) (Temporal)   Resp 18   Ht 6' (1.829 m)   Wt 74.6 kg (164 lb 7.4 oz)   SpO2 98%   BMI 22.31 kg/m²

## 2019-10-08 NOTE — ANESTHESIA PREPROCEDURE EVALUATION
10/08/2019  Sukhjinder Presley is a 53 y.o., male.    Anesthesia Evaluation    I have reviewed the Patient Summary Reports.    I have reviewed the Nursing Notes.   I have reviewed the Medications.     Review of Systems  Anesthesia Hx:  No problems with previous Anesthesia Denies Hx of Anesthetic complications  Neg history of prior surgery. Denies Family Hx of Anesthesia complications.   Denies Personal Hx of Anesthesia complications.   Social:  Smoker, Alcohol Use    Hematology/Oncology:  Hematology Normal   Oncology Normal     EENT/Dental:EENT/Dental Normal   Cardiovascular:  Cardiovascular Normal Exercise tolerance: good  NYHA Classification I ECG has been reviewed.    Pulmonary:  Pulmonary Normal    Renal/:   Chronic Renal Disease renal calculi    Hepatic/GI:  Hepatic/GI Normal    Musculoskeletal:  Musculoskeletal Normal    Neurological:  Neurology Normal    Endocrine:  Endocrine Normal    Dermatological:  Skin Normal    Psych:  Psychiatric Normal           Physical Exam  General:  Well nourished    Airway/Jaw/Neck:  Airway Findings: Mouth Opening: Normal Tongue: Normal  General Airway Assessment: Adult  Mallampati: II  TM Distance: 4 - 6 cm  Jaw/Neck Findings:  Neck ROM: Normal ROM      Dental:  Dental Findings: In tact   Chest/Lungs:  Chest/Lungs Findings: Clear to auscultation, Normal Respiratory Rate     Heart/Vascular:  Heart Findings: Rate: Normal  Rhythm: Regular Rhythm  Sounds: Normal        Mental Status:  Mental Status Findings:  Cooperative, Alert and Oriented        Patient Active Problem List   Diagnosis    Elevated hematocrit    Elevated hemoglobin    Smoker    Polycythemia    Rectal bleeding    At risk for coronary artery disease    Rectal cancer    Kidney stone    Fever    Iron deficiency anemia    Iron deficiency anemia due to chronic blood loss    Rectal adenocarcinoma     Lab  Results   Component Value Date    WBC 6.02 09/30/2019    HGB 13.5 (L) 09/30/2019    HCT 40.9 09/30/2019    MCV 92 09/30/2019     09/30/2019       Chemistry        Component Value Date/Time     09/30/2019 1251    K 3.7 09/30/2019 1251     09/30/2019 1251    CO2 25 09/30/2019 1251    BUN 5 (L) 09/30/2019 1251    CREATININE 0.8 09/30/2019 1251     (H) 09/30/2019 1251        Component Value Date/Time    CALCIUM 8.8 09/30/2019 1251    ALKPHOS 95 09/30/2019 1251    AST 15 09/30/2019 1251    ALT 9 (L) 09/30/2019 1251    BILITOT 0.4 09/30/2019 1251    ESTGFRAFRICA >60.0 09/30/2019 1251    EGFRNONAA >60.0 09/30/2019 1251            Anesthesia Plan  Type of Anesthesia, risks & benefits discussed:  Anesthesia Type:  general  Patient's Preference:   Intra-op Monitoring Plan: standard ASA monitors  Intra-op Monitoring Plan Comments:   Post Op Pain Control Plan: per primary service following discharge from PACU  Post Op Pain Control Plan Comments:   Induction:   IV  Beta Blocker:  Patient is not currently on a Beta-Blocker (No further documentation required).       Informed Consent: Patient understands risks and agrees with Anesthesia plan.  Questions answered. Anesthesia consent signed with patient.  ASA Score: 2     Day of Surgery Review of History & Physical: I have interviewed and examined the patient. I have reviewed the patient's H&P dated: 9/30/2019. There are no significant changes.  H&P update referred to the surgeon.         Ready For Surgery From Anesthesia Perspective.

## 2019-10-09 ENCOUNTER — LAB VISIT (OUTPATIENT)
Dept: LAB | Facility: HOSPITAL | Age: 54
End: 2019-10-09
Attending: INTERNAL MEDICINE
Payer: COMMERCIAL

## 2019-10-09 ENCOUNTER — OFFICE VISIT (OUTPATIENT)
Dept: HEMATOLOGY/ONCOLOGY | Facility: CLINIC | Age: 54
End: 2019-10-09
Payer: COMMERCIAL

## 2019-10-09 ENCOUNTER — INFUSION (OUTPATIENT)
Dept: INFUSION THERAPY | Facility: HOSPITAL | Age: 54
End: 2019-10-09
Attending: INTERNAL MEDICINE
Payer: COMMERCIAL

## 2019-10-09 ENCOUNTER — DOCUMENTATION ONLY (OUTPATIENT)
Dept: HEMATOLOGY/ONCOLOGY | Facility: CLINIC | Age: 54
End: 2019-10-09

## 2019-10-09 VITALS
WEIGHT: 168.19 LBS | BODY MASS INDEX: 22.78 KG/M2 | TEMPERATURE: 98 F | HEART RATE: 91 BPM | SYSTOLIC BLOOD PRESSURE: 114 MMHG | HEIGHT: 72 IN | DIASTOLIC BLOOD PRESSURE: 75 MMHG | OXYGEN SATURATION: 98 %

## 2019-10-09 VITALS
WEIGHT: 168.19 LBS | HEIGHT: 72 IN | HEART RATE: 73 BPM | DIASTOLIC BLOOD PRESSURE: 68 MMHG | SYSTOLIC BLOOD PRESSURE: 104 MMHG | BODY MASS INDEX: 22.78 KG/M2

## 2019-10-09 VITALS
HEART RATE: 79 BPM | BODY MASS INDEX: 22.27 KG/M2 | TEMPERATURE: 98 F | RESPIRATION RATE: 19 BRPM | SYSTOLIC BLOOD PRESSURE: 112 MMHG | DIASTOLIC BLOOD PRESSURE: 68 MMHG | OXYGEN SATURATION: 98 % | WEIGHT: 164.44 LBS | HEIGHT: 72 IN

## 2019-10-09 DIAGNOSIS — C20 RECTAL ADENOCARCINOMA: Primary | ICD-10-CM

## 2019-10-09 DIAGNOSIS — C20 RECTAL CANCER: Primary | ICD-10-CM

## 2019-10-09 DIAGNOSIS — C20 RECTAL CANCER: ICD-10-CM

## 2019-10-09 LAB
ALBUMIN SERPL BCP-MCNC: 3.1 G/DL (ref 3.5–5.2)
ALP SERPL-CCNC: 84 U/L (ref 55–135)
ALT SERPL W/O P-5'-P-CCNC: 7 U/L (ref 10–44)
ANION GAP SERPL CALC-SCNC: 8 MMOL/L (ref 8–16)
AST SERPL-CCNC: 10 U/L (ref 10–40)
BASOPHILS # BLD AUTO: 0.04 K/UL (ref 0–0.2)
BASOPHILS NFR BLD: 0.6 % (ref 0–1.9)
BILIRUB SERPL-MCNC: 0.6 MG/DL (ref 0.1–1)
BUN SERPL-MCNC: 6 MG/DL (ref 6–20)
CALCIUM SERPL-MCNC: 9.2 MG/DL (ref 8.7–10.5)
CHLORIDE SERPL-SCNC: 104 MMOL/L (ref 95–110)
CO2 SERPL-SCNC: 30 MMOL/L (ref 23–29)
CREAT SERPL-MCNC: 0.8 MG/DL (ref 0.5–1.4)
DIFFERENTIAL METHOD: ABNORMAL
EOSINOPHIL # BLD AUTO: 0.2 K/UL (ref 0–0.5)
EOSINOPHIL NFR BLD: 3.3 % (ref 0–8)
ERYTHROCYTE [DISTWIDTH] IN BLOOD BY AUTOMATED COUNT: 14.7 % (ref 11.5–14.5)
EST. GFR  (AFRICAN AMERICAN): >60 ML/MIN/1.73 M^2
EST. GFR  (NON AFRICAN AMERICAN): >60 ML/MIN/1.73 M^2
GLUCOSE SERPL-MCNC: 91 MG/DL (ref 70–110)
HCT VFR BLD AUTO: 40 % (ref 40–54)
HGB BLD-MCNC: 13.4 G/DL (ref 14–18)
IMM GRANULOCYTES # BLD AUTO: 0.02 K/UL (ref 0–0.04)
IMM GRANULOCYTES NFR BLD AUTO: 0.3 % (ref 0–0.5)
LYMPHOCYTES # BLD AUTO: 1.6 K/UL (ref 1–4.8)
LYMPHOCYTES NFR BLD: 23.8 % (ref 18–48)
MCH RBC QN AUTO: 29.7 PG (ref 27–31)
MCHC RBC AUTO-ENTMCNC: 33.5 G/DL (ref 32–36)
MCV RBC AUTO: 89 FL (ref 82–98)
MONOCYTES # BLD AUTO: 0.6 K/UL (ref 0.3–1)
MONOCYTES NFR BLD: 8 % (ref 4–15)
NEUTROPHILS # BLD AUTO: 4.4 K/UL (ref 1.8–7.7)
NEUTROPHILS NFR BLD: 64.3 % (ref 38–73)
NRBC BLD-RTO: 0 /100 WBC
PLATELET # BLD AUTO: 205 K/UL (ref 150–350)
PMV BLD AUTO: 8.1 FL (ref 9.2–12.9)
POTASSIUM SERPL-SCNC: 4.2 MMOL/L (ref 3.5–5.1)
PROT SERPL-MCNC: 7.4 G/DL (ref 6–8.4)
RBC # BLD AUTO: 4.51 M/UL (ref 4.6–6.2)
SODIUM SERPL-SCNC: 142 MMOL/L (ref 136–145)
WBC # BLD AUTO: 6.9 K/UL (ref 3.9–12.7)

## 2019-10-09 PROCEDURE — 99999 PR PBB SHADOW E&M-EST. PATIENT-LVL IV: CPT | Mod: PBBFAC,,, | Performed by: INTERNAL MEDICINE

## 2019-10-09 PROCEDURE — 63600175 PHARM REV CODE 636 W HCPCS: Performed by: INTERNAL MEDICINE

## 2019-10-09 PROCEDURE — 99215 OFFICE O/P EST HI 40 MIN: CPT | Mod: 25,S$GLB,, | Performed by: INTERNAL MEDICINE

## 2019-10-09 PROCEDURE — 99999 PR PBB SHADOW E&M-EST. PATIENT-LVL IV: ICD-10-PCS | Mod: PBBFAC,,, | Performed by: INTERNAL MEDICINE

## 2019-10-09 PROCEDURE — 96368 THER/DIAG CONCURRENT INF: CPT

## 2019-10-09 PROCEDURE — 3008F BODY MASS INDEX DOCD: CPT | Mod: CPTII,S$GLB,, | Performed by: INTERNAL MEDICINE

## 2019-10-09 PROCEDURE — 96417 CHEMO IV INFUS EACH ADDL SEQ: CPT

## 2019-10-09 PROCEDURE — 99215 PR OFFICE/OUTPT VISIT, EST, LEVL V, 40-54 MIN: ICD-10-PCS | Mod: 25,S$GLB,, | Performed by: INTERNAL MEDICINE

## 2019-10-09 PROCEDURE — 36415 COLL VENOUS BLD VENIPUNCTURE: CPT

## 2019-10-09 PROCEDURE — 96416 CHEMO PROLONG INFUSE W/PUMP: CPT

## 2019-10-09 PROCEDURE — 96375 TX/PRO/DX INJ NEW DRUG ADDON: CPT

## 2019-10-09 PROCEDURE — 3008F PR BODY MASS INDEX (BMI) DOCUMENTED: ICD-10-PCS | Mod: CPTII,S$GLB,, | Performed by: INTERNAL MEDICINE

## 2019-10-09 PROCEDURE — 85025 COMPLETE CBC W/AUTO DIFF WBC: CPT

## 2019-10-09 PROCEDURE — 96413 CHEMO IV INFUSION 1 HR: CPT

## 2019-10-09 PROCEDURE — 96415 CHEMO IV INFUSION ADDL HR: CPT

## 2019-10-09 PROCEDURE — 80053 COMPREHEN METABOLIC PANEL: CPT

## 2019-10-09 PROCEDURE — 96367 TX/PROPH/DG ADDL SEQ IV INF: CPT

## 2019-10-09 RX ORDER — ATROPINE SULFATE 0.4 MG/ML
0.4 INJECTION, SOLUTION ENDOTRACHEAL; INTRAMEDULLARY; INTRAMUSCULAR; INTRAVENOUS; SUBCUTANEOUS ONCE AS NEEDED
Status: COMPLETED | OUTPATIENT
Start: 2019-10-09 | End: 2019-10-09

## 2019-10-09 RX ADMIN — IRINOTECAN HYDROCHLORIDE 326 MG: 20 INJECTION, SOLUTION INTRAVENOUS at 11:10

## 2019-10-09 RX ADMIN — OXALIPLATIN 167 MG: 5 INJECTION, SOLUTION, CONCENTRATE INTRAVENOUS at 01:10

## 2019-10-09 RX ADMIN — BEVACIZUMAB 380 MG: 400 INJECTION, SOLUTION INTRAVENOUS at 09:10

## 2019-10-09 RX ADMIN — DEXTROSE: 50 INJECTION, SOLUTION INTRAVENOUS at 01:10

## 2019-10-09 RX ADMIN — LEUCOVORIN CALCIUM 395 MG: 100 INJECTION, POWDER, LYOPHILIZED, FOR SOLUTION INTRAMUSCULAR; INTRAVENOUS at 01:10

## 2019-10-09 RX ADMIN — PALONOSETRON: 0.25 INJECTION, SOLUTION INTRAVENOUS at 09:10

## 2019-10-09 RX ADMIN — SODIUM CHLORIDE: 9 INJECTION, SOLUTION INTRAVENOUS at 09:10

## 2019-10-09 RX ADMIN — ATROPINE SULFATE 0.4 MG: 0.4 INJECTION, SOLUTION INTRAMUSCULAR; INTRAVENOUS; SUBCUTANEOUS at 11:10

## 2019-10-09 RX ADMIN — FLUOROURACIL 6305 MG: 50 INJECTION, SOLUTION INTRAVENOUS at 03:10

## 2019-10-09 NOTE — PROGRESS NOTES
Brief f/u with pt and wife at wife's request. She asked about financial assistance, specifically with [facility] billing. Referred her to speak to financial counselor. Also let her know of available patient assistance if needed and encouraged pt and wife to let SW know if they need to apply. The verbalized agreement. SW will f/u further as needed.

## 2019-10-09 NOTE — PROGRESS NOTES
Subjective:      Patient ID: Sukhjinder Presley is a 53 y.o. male.    Chief Complaint: Rectal Cancer    The patient is a 53-year-old  male who presents to the Hematology Oncology Clinic today for follow-up to initiate cycle 1 day 1 of neoadjuvant chemotherapy with FOLFIRINOX plus Avastin for rectal carcinoma.  The patient is followed in the outpatient Hematology Oncology Clinic by Dr. Song Aden and I am evaluating the patient today for the 1st time.  I have reviewed all the patient's relevant clinical history available in the medical record and have utilized this in my evaluation and management recommendations today.  Today the patient reports that overall he feels well and has no specific complaints.  He denies any neoplasm related pain.  He denies any chest pain or shortness of breath.  He denies any loss of appetite or unintentional weight loss.  He denies any melena, hematochezia, hematemesis, hemoptysis or hematuria.  He denies any nausea, vomiting or abdominal pain. He denies any bowel or urinary complaints.  The patient is previously completed neoadjuvant chemoradiation with Xeloda and reports that he tolerated this well without any significant side effects.    Review of Systems   Constitutional: Negative for activity change, appetite change, chills, diaphoresis, fatigue, fever and unexpected weight change.   HENT: Negative for congestion, dental problem, ear pain, mouth sores, nosebleeds, postnasal drip, sinus pressure, sore throat, tinnitus, trouble swallowing and voice change.    Eyes: Negative for photophobia, pain, discharge, redness, itching and visual disturbance.   Respiratory: Negative for cough, chest tightness, shortness of breath, wheezing and stridor.    Cardiovascular: Negative for chest pain, palpitations and leg swelling.   Gastrointestinal: Negative for abdominal distention, abdominal pain, anal bleeding, blood in stool, constipation, diarrhea, nausea and vomiting.   Endocrine: Negative  for cold intolerance, heat intolerance, polydipsia, polyphagia and polyuria.   Genitourinary: Negative for decreased urine volume, difficulty urinating, dysuria, flank pain, frequency, hematuria and urgency.   Musculoskeletal: Negative for arthralgias, back pain, gait problem, joint swelling and myalgias.   Skin: Negative for pallor and rash.   Allergic/Immunologic: Negative for immunocompromised state.   Neurological: Negative for dizziness, syncope, weakness, light-headedness and headaches.   Hematological: Negative for adenopathy. Does not bruise/bleed easily.   Psychiatric/Behavioral: Negative for agitation and confusion. The patient is not nervous/anxious.        Medication List with Changes/Refills   New Medications    DIPHENHYDRAMINE-ALUMINUM-MAGNESIUM HYDROXIDE-SIMETHICONE-LIDOCAINE HCL 2%    Swish and spit 15 mLs every 4 (four) hours as needed.   Current Medications    ACETAMINOPHEN (TYLENOL) 325 MG TABLET    Take 1 tablet (325 mg total) by mouth every 6 (six) hours as needed for Pain.    ASPIRIN 81 MG CHEW    Take 81 mg by mouth once daily.    CAPECITABINE (XELODA) 500 MG TAB    Take 3 tablets (1500 mg) by mouth twice daily after breakfast and dinner on days of radiation only.    DIPHENOXYLATE-ATROPINE 2.5-0.025 MG (LOMOTIL) 2.5-0.025 MG PER TABLET    Take 1-2 tablets by mouth 4 (four) times daily as needed for Diarrhea.    DRONABINOL (MARINOL) 10 MG CAPSULE    Take 1 capsule (10 mg total) by mouth 2 (two) times daily before meals.    IRON FUM-B12-IF-C-FOLIC ACID (FOLTRIN) 110-0.5 MG CAPSULE    Take 1 capsule by mouth 2 (two) times daily.    LIDOCAINE (XYLOCAINE) 5 % OINT OINTMENT    Apply topically as needed.    MEGESTROL (MEGACE) 40 MG TAB    Take 1 tablet (40 mg total) by mouth 4 (four) times daily.    MORPHINE (MS CONTIN) 15 MG 12 HR TABLET    Take 3 tablets (45 mg total) by mouth nightly.    NICOTINE (NICODERM CQ) 21 MG/24 HR    Place 1 patch onto the skin once daily.    ONDANSETRON (ZOFRAN-ODT) 8 MG  TBDL    Take 1 tablet (8 mg total) by mouth every 12 (twelve) hours as needed.    OXYCODONE (ROXICODONE) 10 MG TAB IMMEDIATE RELEASE TABLET    Take 1 tablet (10 mg total) by mouth every 8 (eight) hours as needed for Pain (medically necessary).    PROCHLORPERAZINE (COMPAZINE) 10 MG TABLET    Take 1 tablet (10 mg total) by mouth every 6 (six) hours as needed.    PROMETHAZINE (PHENERGAN) 25 MG TABLET    Take 1 tablet (25 mg total) by mouth every 4 (four) hours.    SILVER SULFADIAZINE 1% (SILVADENE) 1 % CREAM    Apply to affected area daily    TRAMADOL (ULTRAM) 50 MG TABLET    Take 1 tablet (50 mg total) by mouth every 6 (six) hours as needed for Pain.    VARENICLINE (CHANTIX) 1 MG TAB    Take 1 tablet (1 mg total) by mouth 2 (two) times daily.     Review of patient's allergies indicates:  No Known Allergies    Lab: I have reviewed all of the patient's relevant lab work available in the medical record and have utilized this in my evaluation and management recommendations today    Imaging: I have reviewed all of the patient's diagnostic/imaging results available in the medical record and have utilized this in my evaluation and management recommendations today.      Objective:     Vitals:    10/09/19 0825   BP: 114/75   Pulse: 91   Temp: 98.3 °F (36.8 °C)       Physical Exam   Constitutional: He is oriented to person, place, and time. He appears well-developed and well-nourished. No distress.   HENT:   Head: Normocephalic and atraumatic.   Nose: Nose normal.   Mouth/Throat: Oropharynx is clear and moist. No oropharyngeal exudate.   Eyes: Pupils are equal, round, and reactive to light. EOM are normal. No scleral icterus.   Neck: Normal range of motion. Neck supple. No tracheal deviation present. No thyromegaly present.   Cardiovascular: Normal rate, regular rhythm, normal heart sounds and intact distal pulses.   No murmur heard.  Pulmonary/Chest: Effort normal and breath sounds normal. No stridor. No respiratory distress.  He has no wheezes. He has no rales. He exhibits no tenderness.   Abdominal: Soft. Bowel sounds are normal. He exhibits no distension and no mass. There is no tenderness. There is no rebound and no guarding.   Musculoskeletal: Normal range of motion. He exhibits no edema or tenderness.   Lymphadenopathy:     He has no cervical adenopathy.   Neurological: He is alert and oriented to person, place, and time. Coordination normal.   Skin: Skin is warm. No rash noted. He is not diaphoretic. No erythema.   Psychiatric: He has a normal mood and affect. His speech is normal and behavior is normal. Judgment and thought content normal. Cognition and memory are normal.   Nursing note and vitals reviewed.      Assessment:     1. Rectal adenocarcinoma        Plan:   1.  I had a detailed discussion with the patient today with regard to his current clinical situation.  Informed consent was taken to proceed with cycle 1 day 1 of neoadjuvant chemotherapy with FOLFIRINOX plus Avastin.  Risks/benefits and common side effects were reviewed and he expressed understanding and agreement to proceed with this.  2.  He will be given Neulasta on day 3 for reduction of chemotherapy-related neutropenia.  3.  He knows to seek immediate medical attention for any signs/symptoms of infection or increased bleeding.  4.  Prescription for magic mouthwash p.r.n. chemotherapy-related mucositis was sent to the pharmacy today.    Follow-up in 1 week with labs.  He knows to call sooner if any new problems or questions.  Rectal adenocarcinoma  -     CBC auto differential; Future; Expected date: 10/09/2019  -     CMP; Future; Expected date: 10/09/2019  -     diphenhydrAMINE-aluminum-magnesium hydroxide-simethicone-lidocaine HCl 2%; Swish and spit 15 mLs every 4 (four) hours as needed.  Dispense: 500 mL; Refill: 2

## 2019-10-09 NOTE — PLAN OF CARE
Pt. Is stable. Pt. Received FOLFOXIRI today. Pt. Will return in 1 week for count recheck and MD visit.

## 2019-10-09 NOTE — DISCHARGE INSTRUCTIONS
[facility]  [address] [facility]  [address]  [phone] phone     [phone] fax  Hours of Operation: Monday- Friday 8:00am- 5:00pm  After hours phone  [phone]  Hematology / Oncology Physicians on call      Dr. Harjeet Aden      Please call with any concerns regarding your appointment today.    HOME CARE AFTER CHEMOTHERAPY   Meals   Many patients feel sick and lose their appetites during treatment. Eat small meals several times a day. Choose bland foods with little taste or smell if you have problems with nausea. Be sure to cook all food thoroughly. This kills bacteria and helps you avoid intestinal infection. Soft foods are easier to swallow and digest.   Activity   Exercise keeps you strong and keeps your heart and lungs active. Talk to your doctor about an appropriate exercise program for you.   Skin Care   To prevent a skin infection, bathe or shower once a day. Use a moisturizing soap and wash with warm water. Avoid very hot or cold water. Chemotherapy can make your skin dry . Apply moisturizing lotion to help relieve dry skin. Some drugs used in high doses can cause slight burns to appear (like sunburn). Ask for a special cream to help relieve the burn and protect your skin.   Prevent Mouth Sores   During chemotherapy, many people get mouth sores. Do the following to help prevent mouth sores or to ease discomfort.   Brush your teeth with a soft-bristle toothbrush after every meal.  Don't use dental floss if your platelet count is below 50,000. Your doctor or nurse will tell you if this is the case.  Use an oral swab or special soft toothbrush if your gums bleed during regular brushing.  Use mouthwash as directed. If you can't tolerate commercial mouthwash, use salt and baking soda to clean your mouth. Mix 1 teaspoon of salt and 1 teaspoon of baking soda into a glass of water. Swish and spit.  Call your  doctor or return to this facility if you develop any of the following:   Sore throat   White patches in the mouth or throat   Fever of 100.4ºF (38ºC) or higher, or as directed by your healthcare provider  © 2000-2011 [facility], [address] [place] [id number]. All rights reserved. This information is not intended as a substitute for professional medical care. Always follow your healthcare professional's   FALL PREVENTION   Falls often occur due to slipping, tripping or losing your balance. Here are ways to reduce your risk of falling again.   Was there anything that caused your fall that can be fixed, removed or replaced?   Make your home safe by keeping walkways clear of objects you may trip over.   Use non-slip pads under rugs.   Do not walk in poorly lit areas.   Do not stand on chairs or wobbly ladders.   Use caution when reaching overhead or looking upward. This position can cause a loss of balance.   Be sure your shoes fit properly, have non-slip bottoms and are in good condition.   Be cautious when going up and down stairs, curbs, and when walking on uneven sidewalks.   If your balance is poor, consider using a cane or walker.   If your fall was related to alcohol use, stop or limit alcohol intake.   If your fall was related to use of sleeping medicines, talk to your doctor about this. You may need to reduce your dosage at bedtime if you awaken during the night to go to the bathroom.   To reduce the need for nighttime bathroom trips:   Avoid drinking fluids for several hours before going to bed   Empty your bladder before going to bed   Men can keep a urinal at the bedside   © 2000-2011 [facility], [address] [place] [address]. All rights reserved. This information is not intended as a substitute for professional medical care. Always follow your healthcare professional's instructions.  Support Groups/Classes    Support groups and classes are being offered at the   [facility] and [facility]    "Cooking with Cancer" (Nutrition Class):  Second Wednesday of each month   at noon at the [facility] [place].  Metastatic Support Group:  Third Tuesday of each month   at noon at the [facility].  Next Steps Class/Group: Second and fourth Thursday of each month at noon at the [facility].  Hope Chest (Breast Cancer Support Group): First Tuesday of each month   at 5:30pm at the [facility] location.  [employer] [facility]: [facility]: Second and third Tuesday of each month from 7:30am - 2pm.  [facility]: First and fourth Tuesday of each month from 7:30am - 2pm    If you are interested in attending or would like more information please ask our social workers or your nurse!  "

## 2019-10-10 ENCOUNTER — DOCUMENTATION ONLY (OUTPATIENT)
Dept: SURGERY | Facility: HOSPITAL | Age: 54
End: 2019-10-10

## 2019-10-10 NOTE — PROGRESS NOTES
Multidisciplinary Rectal Cancer Conference - Evaluation and Recommendation Summary    10/9/2019  Sukhjinder Presley  [id number]  53 y.o. male    1. Evaluation    C scope on 6/6/19 - non obstructing mass    MRI date: 6/17/19    Tumor Location in Rectum: middle third    Indication of Sphincter Involvement:  Uninvolved    Pretreatment Circumferential Resection Margin (CRM) status:  Threatened Tumor abuts CRM on the left.     Pretreatment (clinical) AJCC Stage: IIIB  Stage I  [] I: T1N0M0  [] I: T2N0M0  Stage II  [] IIA: T3N0M0  [] IIB Q3cN7E4  [] IIC: Y5pU8V2  Stage III  [] IIIA: T1-2N1M0  [] IIIA: W5P7bE3  [x] IIIB: T3-B1gP6K3  [] IIIB: T2-3N2aM0  [] IIIB: T1-2N2bM0  [] IIIC: T3hM8oN4  [] IIIC: T3-7sA5mV1  [] IIIC: Q3pQ2-4F6   Stage IV  [] IV: Q8-1Q7-7H6r-b    CEA level:   Lab Results   Component Value Date    CEA 1.4 09/30/2019       2. Treatment     Neoadjuvant Therapy Recommendation: Long Course Chemoradiotherapy - completed on 8/13    CT scan 7/30 during treatment: Unchanged appearance of large rectal mass, mild curcumferential bladder wall thickening.     MRI rectum 9/24: Large mass with invasion of muscularis and involvement of the mesorectal fat and fascia along the left and posterior aspect of the mass.   Single LN within the mesorectal fat posteriorly measuring 6 mm.   Subcentimeter obturator and external iliac changes LNDs seen bilaterally.     Recommendations:     Complete COLLINS with initiation of FOLFOX.     After follow by [facility].

## 2019-10-11 ENCOUNTER — INFUSION (OUTPATIENT)
Dept: INFUSION THERAPY | Facility: HOSPITAL | Age: 54
End: 2019-10-11
Payer: COMMERCIAL

## 2019-10-11 VITALS
RESPIRATION RATE: 16 BRPM | HEART RATE: 78 BPM | OXYGEN SATURATION: 99 % | SYSTOLIC BLOOD PRESSURE: 124 MMHG | TEMPERATURE: 99 F | DIASTOLIC BLOOD PRESSURE: 83 MMHG

## 2019-10-11 DIAGNOSIS — C20 RECTAL CANCER: Primary | ICD-10-CM

## 2019-10-11 PROCEDURE — 25000003 PHARM REV CODE 250: Performed by: INTERNAL MEDICINE

## 2019-10-11 PROCEDURE — 96377 APPLICATON ON-BODY INJECTOR: CPT

## 2019-10-11 PROCEDURE — 63600175 PHARM REV CODE 636 W HCPCS: Mod: JG | Performed by: INTERNAL MEDICINE

## 2019-10-11 PROCEDURE — A4216 STERILE WATER/SALINE, 10 ML: HCPCS | Performed by: INTERNAL MEDICINE

## 2019-10-11 RX ORDER — HEPARIN 100 UNIT/ML
500 SYRINGE INTRAVENOUS
Status: DISCONTINUED | OUTPATIENT
Start: 2019-10-11 | End: 2019-10-11 | Stop reason: HOSPADM

## 2019-10-11 RX ORDER — SODIUM CHLORIDE 0.9 % (FLUSH) 0.9 %
10 SYRINGE (ML) INJECTION
Status: DISCONTINUED | OUTPATIENT
Start: 2019-10-11 | End: 2019-10-11 | Stop reason: HOSPADM

## 2019-10-11 RX ADMIN — PEGFILGRASTIM 6 MG: KIT SUBCUTANEOUS at 02:10

## 2019-10-11 RX ADMIN — Medication 10 ML: at 02:10

## 2019-10-11 RX ADMIN — HEPARIN SODIUM (PORCINE) LOCK FLUSH IV SOLN 100 UNIT/ML 500 UNITS: 100 SOLUTION at 02:10

## 2019-10-11 NOTE — NURSING
Pt here for 5FU continuous pump d/c. Pump stopped and disconnected. Existing dressing appeared clean and intact. Left chestwall mediport flushed with 10ml NS and 5 ml heparin solution. Needle D/C, site without redness, swelling, or drainage noted. Dressing applied. Patient tolerated well. Patient to return to clinic on October 16, 2019.

## 2019-10-11 NOTE — DISCHARGE INSTRUCTIONS
[facility]  [address] [address]  [address]  [phone] phone     [phone] fax  Hours of Operation: Monday- Friday 8:00am- 5:00pm  After hours phone  [phone]  Hematology / Oncology Physicians on call      Dr. Harjeet Aden    Please call with any concerns regarding your appointment today.HOME CARE AFTER CHEMOTHERAPY   Meals   Many patients feel sick and lose their appetites during treatment. Eat small meals several times a day. Choose bland foods with little taste or smell if you have problems with nausea. Be sure to cook all food thoroughly. This kills bacteria and helps you avoid intestinal infection. Soft foods are easier to swallow and digest.   Activity   Exercise keeps you strong and keeps your heart and lungs active. Talk to your doctor about an appropriate exercise program for you.   Skin Care   To prevent a skin infection, bathe or shower once a day. Use a moisturizing soap and wash with warm water. Avoid very hot or cold water. Chemotherapy can make your skin dry . Apply moisturizing lotion to help relieve dry skin. Some drugs used in high doses can cause slight burns to appear (like sunburn). Ask for a special cream to help relieve the burn and protect your skin.   Prevent Mouth Sores   During chemotherapy, many people get mouth sores. Do the following to help prevent mouth sores or to ease discomfort.   Brush your teeth with a soft-bristle toothbrush after every meal.  Don't use dental floss if your platelet count is below 50,000. Your doctor or nurse will tell you if this is the case.  Use an oral swab or special soft toothbrush if your gums bleed during regular brushing.  Use mouthwash as directed. If you can't tolerate commercial mouthwash, use salt and baking soda to clean your mouth. Mix 1 teaspoon of salt and 1 teaspoon of baking soda into a glass of water. Swish and spit.  Call your doctor or  return to this facility if you develop any of the following:   Sore throat   White patches in the mouth or throat   Fever of 100.4ºF (38ºC) or higher, or as directed by your healthcare provider  © 2000-2011 [facility], [address] [place] [address]. All rights reserved. This information is not intended as a substitute for professional medical care. Always follow your healthcare professional's   FALL PREVENTION   Falls often occur due to slipping, tripping or losing your balance. Here are ways to reduce your risk of falling again.   Was there anything that caused your fall that can be fixed, removed or replaced?   Make your home safe by keeping walkways clear of objects you may trip over.   Use non-slip pads under rugs.   Do not walk in poorly lit areas.   Do not stand on chairs or wobbly ladders.   Use caution when reaching overhead or looking upward. This position can cause a loss of balance.   Be sure your shoes fit properly, have non-slip bottoms and are in good condition.   Be cautious when going up and down stairs, curbs, and when walking on uneven sidewalks.   If your balance is poor, consider using a cane or walker.   If your fall was related to alcohol use, stop or limit alcohol intake.   If your fall was related to use of sleeping medicines, talk to your doctor about this. You may need to reduce your dosage at bedtime if you awaken during the night to go to the bathroom.   To reduce the need for nighttime bathroom trips:   Avoid drinking fluids for several hours before going to bed   Empty your bladder before going to bed   Men can keep a urinal at the bedside   © 2000-2011 [facility], [address] [place] [address]. All rights reserved. This information is not intended as a substitute for professional medical care. Always follow your healthcare professional's instructions.  WAYS TO HELP PREVENT INFECTION         WASH YOUR HANDS OFTEN DURING THE DAY, ESPECIALLY BEFORE YOU  EAT, AFTER USING THE BATHROOM, AND AFTER TOUCHING ANIMALS     STAY AWAY FROM PEOPLE WHO HAVE ILLNESSES YOU CAN CATCH; SUCH AS COLDS, FLU, CHICKEN POX     TRY TO AVOID CROWDS     STAY AWAY FROM CHILDREN WHO RECENTLY HAVE RECEIVED LIVE VIRUS VACCINES     MAINTAIN GOOD MOUTH CARE     DO NOT SQUEEZE OR SCRATCH PIMPLES     CLEAN CUTS & SCRAPES RIGHT AWAY AND DAILY UNTIL HEALED WITH WARM WATER, SOAP & AN ANTISEPTIC     AVOID CONTACT WITH LITTER BOXES, BIRD CAGES, & FISH TANKS     AVOID STANDING WATER, IE., BIRD BATHS, FLOWER POTS/VASES, OR HUMIDIFIERS     WEAR GLOVES WHEN GARDENING OR CLEANING UP AFTER OTHERS, ESPECIALLY BABIES & SMALL CHILDREN     DO NOT EAT RAW FISH, SEAFOOD, MEAT, OR EGGS

## 2019-10-15 DIAGNOSIS — C20 RECTAL CANCER: ICD-10-CM

## 2019-10-15 RX ORDER — MORPHINE SULFATE 15 MG/1
45 TABLET, FILM COATED, EXTENDED RELEASE ORAL NIGHTLY
Qty: 90 TABLET | Refills: 0 | Status: SHIPPED | OUTPATIENT
Start: 2019-10-15 | End: 2019-12-04 | Stop reason: SDUPTHER

## 2019-10-15 RX ORDER — OXYCODONE HYDROCHLORIDE 10 MG/1
10 TABLET ORAL EVERY 8 HOURS PRN
Qty: 90 TABLET | Refills: 0 | Status: SHIPPED | OUTPATIENT
Start: 2019-10-15 | End: 2019-12-04 | Stop reason: SDUPTHER

## 2019-10-16 ENCOUNTER — LAB VISIT (OUTPATIENT)
Dept: LAB | Facility: HOSPITAL | Age: 54
End: 2019-10-16
Attending: INTERNAL MEDICINE
Payer: COMMERCIAL

## 2019-10-16 ENCOUNTER — PATIENT MESSAGE (OUTPATIENT)
Dept: HEMATOLOGY/ONCOLOGY | Facility: CLINIC | Age: 54
End: 2019-10-16

## 2019-10-16 ENCOUNTER — OFFICE VISIT (OUTPATIENT)
Dept: HEMATOLOGY/ONCOLOGY | Facility: CLINIC | Age: 54
End: 2019-10-16
Payer: COMMERCIAL

## 2019-10-16 VITALS
RESPIRATION RATE: 14 BRPM | TEMPERATURE: 98 F | BODY MASS INDEX: 22.01 KG/M2 | HEART RATE: 92 BPM | OXYGEN SATURATION: 98 % | SYSTOLIC BLOOD PRESSURE: 116 MMHG | WEIGHT: 162.5 LBS | HEIGHT: 72 IN | DIASTOLIC BLOOD PRESSURE: 81 MMHG

## 2019-10-16 DIAGNOSIS — C20 RECTAL ADENOCARCINOMA: Primary | ICD-10-CM

## 2019-10-16 DIAGNOSIS — C20 RECTAL ADENOCARCINOMA: ICD-10-CM

## 2019-10-16 LAB
ALBUMIN SERPL BCP-MCNC: 3.5 G/DL (ref 3.5–5.2)
ALP SERPL-CCNC: 118 U/L (ref 55–135)
ALT SERPL W/O P-5'-P-CCNC: 13 U/L (ref 10–44)
ANION GAP SERPL CALC-SCNC: 8 MMOL/L (ref 8–16)
AST SERPL-CCNC: 16 U/L (ref 10–40)
BASOPHILS # BLD AUTO: 0.02 K/UL (ref 0–0.2)
BASOPHILS NFR BLD: 0.2 % (ref 0–1.9)
BILIRUB SERPL-MCNC: 0.4 MG/DL (ref 0.1–1)
BUN SERPL-MCNC: 8 MG/DL (ref 6–20)
CALCIUM SERPL-MCNC: 9.7 MG/DL (ref 8.7–10.5)
CHLORIDE SERPL-SCNC: 103 MMOL/L (ref 95–110)
CO2 SERPL-SCNC: 29 MMOL/L (ref 23–29)
CREAT SERPL-MCNC: 0.8 MG/DL (ref 0.5–1.4)
DIFFERENTIAL METHOD: ABNORMAL
EOSINOPHIL # BLD AUTO: 0.3 K/UL (ref 0–0.5)
EOSINOPHIL NFR BLD: 3.2 % (ref 0–8)
ERYTHROCYTE [DISTWIDTH] IN BLOOD BY AUTOMATED COUNT: 13.4 % (ref 11.5–14.5)
EST. GFR  (AFRICAN AMERICAN): >60 ML/MIN/1.73 M^2
EST. GFR  (NON AFRICAN AMERICAN): >60 ML/MIN/1.73 M^2
GLUCOSE SERPL-MCNC: 114 MG/DL (ref 70–110)
HCT VFR BLD AUTO: 40.5 % (ref 40–54)
HGB BLD-MCNC: 13.8 G/DL (ref 14–18)
IMM GRANULOCYTES # BLD AUTO: 0.11 K/UL (ref 0–0.04)
IMM GRANULOCYTES NFR BLD AUTO: 1.1 % (ref 0–0.5)
LYMPHOCYTES # BLD AUTO: 2.1 K/UL (ref 1–4.8)
LYMPHOCYTES NFR BLD: 19.8 % (ref 18–48)
MCH RBC QN AUTO: 29.7 PG (ref 27–31)
MCHC RBC AUTO-ENTMCNC: 34.1 G/DL (ref 32–36)
MCV RBC AUTO: 87 FL (ref 82–98)
MONOCYTES # BLD AUTO: 0.9 K/UL (ref 0.3–1)
MONOCYTES NFR BLD: 8.4 % (ref 4–15)
NEUTROPHILS # BLD AUTO: 7.1 K/UL (ref 1.8–7.7)
NEUTROPHILS NFR BLD: 68.4 % (ref 38–73)
NRBC BLD-RTO: 0 /100 WBC
PLATELET # BLD AUTO: 216 K/UL (ref 150–350)
PMV BLD AUTO: 8.9 FL (ref 9.2–12.9)
POTASSIUM SERPL-SCNC: 4.1 MMOL/L (ref 3.5–5.1)
PROT SERPL-MCNC: 7.9 G/DL (ref 6–8.4)
RBC # BLD AUTO: 4.64 M/UL (ref 4.6–6.2)
SODIUM SERPL-SCNC: 140 MMOL/L (ref 136–145)
WBC # BLD AUTO: 10.33 K/UL (ref 3.9–12.7)

## 2019-10-16 PROCEDURE — 3008F BODY MASS INDEX DOCD: CPT | Mod: CPTII,S$GLB,, | Performed by: INTERNAL MEDICINE

## 2019-10-16 PROCEDURE — 99999 PR PBB SHADOW E&M-EST. PATIENT-LVL IV: CPT | Mod: PBBFAC,,, | Performed by: INTERNAL MEDICINE

## 2019-10-16 PROCEDURE — 3008F PR BODY MASS INDEX (BMI) DOCUMENTED: ICD-10-PCS | Mod: CPTII,S$GLB,, | Performed by: INTERNAL MEDICINE

## 2019-10-16 PROCEDURE — 36415 COLL VENOUS BLD VENIPUNCTURE: CPT

## 2019-10-16 PROCEDURE — 80053 COMPREHEN METABOLIC PANEL: CPT

## 2019-10-16 PROCEDURE — 85025 COMPLETE CBC W/AUTO DIFF WBC: CPT

## 2019-10-16 PROCEDURE — 99999 PR PBB SHADOW E&M-EST. PATIENT-LVL IV: ICD-10-PCS | Mod: PBBFAC,,, | Performed by: INTERNAL MEDICINE

## 2019-10-16 PROCEDURE — 99215 OFFICE O/P EST HI 40 MIN: CPT | Mod: S$GLB,,, | Performed by: INTERNAL MEDICINE

## 2019-10-16 PROCEDURE — 99215 PR OFFICE/OUTPT VISIT, EST, LEVL V, 40-54 MIN: ICD-10-PCS | Mod: S$GLB,,, | Performed by: INTERNAL MEDICINE

## 2019-10-16 NOTE — PROGRESS NOTES
Subjective:      Patient ID: Sukhjinder Presley is a 53 y.o. male.    Chief Complaint:  Follow-up    ONCOLOGICAL HISTORY: Stage IIIB (cT3, cN2a, cM0)    CURRENT TREATMENT: FOLFIRINOX plus Avastin    HPI:  Mr. Presley was seen at [facility] today in the company of his wife.  He is a patient of Dr. Reeder whom I had the pleasure of meeting for the 1st time today.  He is a 53-year-old male with diagnosis of Stage IIIB (cT3, cN2a, cM0) who is currently undergoing neoadjuvant chemotherapy with FOLFIRINOX plus Avastin for rectal adenocarcinoma.  I have reviewed all the patient's relevant clinical history available in the medical record and have utilized eczema evaluation and management recommendations today.  Overall he seems to be doing well.  He did complain of episode of diarrhea and nausea and vomiting but claimed that does wear relieved with antiemetics and anti diarrheal medications.  He denies any systemic symptoms such as fever, chills, chest pain or shortness of breath, dysuria.      Review of Systems   Constitutional: Negative for activity change, appetite change, chills, diaphoresis, fatigue, fever and unexpected weight change.   HENT: Negative for congestion, dental problem, ear pain, mouth sores, nosebleeds, postnasal drip, sinus pressure, sore throat, tinnitus, trouble swallowing and voice change.    Eyes: Negative for photophobia, pain, discharge, redness, itching and visual disturbance.   Respiratory: Negative for cough, chest tightness, shortness of breath, wheezing and stridor.    Cardiovascular: Negative for chest pain, palpitations and leg swelling.   Gastrointestinal: Negative for abdominal distention, abdominal pain, anal bleeding, blood in stool, constipation, diarrhea, nausea and vomiting.   Endocrine: Negative for cold intolerance, heat intolerance, polydipsia, polyphagia and polyuria.   Genitourinary: Negative for decreased urine volume, difficulty urinating, dysuria, flank pain, frequency,  hematuria and urgency.   Musculoskeletal: Negative for arthralgias, back pain, gait problem, joint swelling and myalgias.   Skin: Negative for pallor and rash.   Allergic/Immunologic: Negative for immunocompromised state.   Neurological: Negative for dizziness, syncope, weakness, light-headedness and headaches.   Hematological: Negative for adenopathy. Does not bruise/bleed easily.   Psychiatric/Behavioral: Negative for agitation and confusion. The patient is not nervous/anxious.        Medication List with Changes/Refills   Current Medications    ACETAMINOPHEN (TYLENOL) 325 MG TABLET    Take 1 tablet (325 mg total) by mouth every 6 (six) hours as needed for Pain.    ASPIRIN 81 MG CHEW    Take 81 mg by mouth once daily.    CAPECITABINE (XELODA) 500 MG TAB    Take 3 tablets (1500 mg) by mouth twice daily after breakfast and dinner on days of radiation only.    DIPHENOXYLATE-ATROPINE 2.5-0.025 MG (LOMOTIL) 2.5-0.025 MG PER TABLET    Take 1-2 tablets by mouth 4 (four) times daily as needed for Diarrhea.    DRONABINOL (MARINOL) 10 MG CAPSULE    Take 1 capsule (10 mg total) by mouth 2 (two) times daily before meals.    IRON FUM-B12-IF-C-FOLIC ACID (FOLTRIN) 110-0.5 MG CAPSULE    Take 1 capsule by mouth 2 (two) times daily.    LIDOCAINE (XYLOCAINE) 5 % OINT OINTMENT    Apply topically as needed.    MAGIC MOUTHWASH DIPHEN/ANTAC/LIDOC    Swish and spit 15 ml by mouth every 4 hours as needed    MEGESTROL (MEGACE) 40 MG TAB    Take 1 tablet (40 mg total) by mouth 4 (four) times daily.    MORPHINE (MS CONTIN) 15 MG 12 HR TABLET    Take 3 tablets (45 mg total) by mouth nightly.    NICOTINE (NICODERM CQ) 21 MG/24 HR    Place 1 patch onto the skin once daily.    ONDANSETRON (ZOFRAN-ODT) 8 MG TBDL    Take 1 tablet (8 mg total) by mouth every 12 (twelve) hours as needed.    OXYCODONE (ROXICODONE) 10 MG TAB IMMEDIATE RELEASE TABLET    Take 1 tablet (10 mg total) by mouth every 8 (eight) hours as needed for Pain (medically  necessary).    PROCHLORPERAZINE (COMPAZINE) 10 MG TABLET    Take 1 tablet (10 mg total) by mouth every 6 (six) hours as needed.    PROMETHAZINE (PHENERGAN) 25 MG TABLET    Take 1 tablet (25 mg total) by mouth every 4 (four) hours.    SILVER SULFADIAZINE 1% (SILVADENE) 1 % CREAM    Apply to affected area daily    TRAMADOL (ULTRAM) 50 MG TABLET    Take 1 tablet (50 mg total) by mouth every 6 (six) hours as needed for Pain.    VARENICLINE (CHANTIX) 1 MG TAB    Take 1 tablet (1 mg total) by mouth 2 (two) times daily.     Review of patient's allergies indicates:  No Known Allergies    Lab: I have reviewed all of the patient's relevant lab work available in the medical record and have utilized this in my evaluation and management recommendations today    Imaging: I have reviewed all of the patient's diagnostic/imaging results available in the medical record and have utilized this in my evaluation and management recommendations today.      Objective:     Vitals:    10/16/19 1413   BP: 116/81   Pulse: 92   Resp: 14   Temp: 98.2 °F (36.8 °C)       Physical Exam   Constitutional: He is oriented to person, place, and time. He appears well-developed and well-nourished. No distress.   HENT:   Head: Normocephalic and atraumatic.   Nose: Nose normal.   Mouth/Throat: Oropharynx is clear and moist. No oropharyngeal exudate.   Eyes: Pupils are equal, round, and reactive to light. EOM are normal. No scleral icterus.   Neck: Normal range of motion. Neck supple. No tracheal deviation present. No thyromegaly present.   Cardiovascular: Normal rate, regular rhythm, normal heart sounds and intact distal pulses.   No murmur heard.  Pulmonary/Chest: Effort normal and breath sounds normal. No stridor. No respiratory distress. He has no wheezes. He has no rales. He exhibits no tenderness.   Abdominal: Soft. Bowel sounds are normal. He exhibits no distension and no mass. There is no tenderness. There is no rebound and no guarding.    Musculoskeletal: Normal range of motion. He exhibits no edema or tenderness.   Lymphadenopathy:     He has no cervical adenopathy.   Neurological: He is alert and oriented to person, place, and time. Coordination normal.   Skin: Skin is warm. No rash noted. He is not diaphoretic. No erythema.   Psychiatric: He has a normal mood and affect. His speech is normal and behavior is normal. Judgment and thought content normal. Cognition and memory are normal.   Nursing note and vitals reviewed.      Assessment:     1. Rectal adenocarcinoma        Plan:   I discussed extensively the prognosis of stage III rectal carcinoma. Reviewed patient's labs today they appear stable.  He had 1 episode of diarrhea and 1 episode of nausea and vomiting, he claimed that the antiemetics and antidiarrheal medications were helpful.  I reiterated common side effect seen with this regimen including but not limited to GI toxicity such as nausea, vomiting, diarrhea.  Occasionally rash.  Patient will follow up with us in 1 week for the 2nd cycle of his chemotherapy.  He knows to contact the clinic with any question or concerns.    Follow-up in 1 week with labs.       Raphael Valencia MD

## 2019-10-18 ENCOUNTER — PATIENT MESSAGE (OUTPATIENT)
Dept: HEMATOLOGY/ONCOLOGY | Facility: CLINIC | Age: 54
End: 2019-10-18

## 2019-10-23 ENCOUNTER — INFUSION (OUTPATIENT)
Dept: INFUSION THERAPY | Facility: HOSPITAL | Age: 54
End: 2019-10-23
Payer: COMMERCIAL

## 2019-10-23 ENCOUNTER — OFFICE VISIT (OUTPATIENT)
Dept: HEMATOLOGY/ONCOLOGY | Facility: CLINIC | Age: 54
End: 2019-10-23
Payer: COMMERCIAL

## 2019-10-23 ENCOUNTER — LAB VISIT (OUTPATIENT)
Dept: LAB | Facility: HOSPITAL | Age: 54
End: 2019-10-23
Attending: INTERNAL MEDICINE
Payer: COMMERCIAL

## 2019-10-23 VITALS
SYSTOLIC BLOOD PRESSURE: 122 MMHG | TEMPERATURE: 99 F | DIASTOLIC BLOOD PRESSURE: 81 MMHG | HEIGHT: 72 IN | OXYGEN SATURATION: 98 % | HEART RATE: 83 BPM | RESPIRATION RATE: 18 BRPM | WEIGHT: 167.31 LBS | BODY MASS INDEX: 22.66 KG/M2

## 2019-10-23 VITALS
BODY MASS INDEX: 22.66 KG/M2 | SYSTOLIC BLOOD PRESSURE: 108 MMHG | HEIGHT: 72 IN | WEIGHT: 167.31 LBS | HEART RATE: 66 BPM | DIASTOLIC BLOOD PRESSURE: 77 MMHG

## 2019-10-23 DIAGNOSIS — C20 RECTAL CANCER: Primary | ICD-10-CM

## 2019-10-23 DIAGNOSIS — C20 RECTAL ADENOCARCINOMA: ICD-10-CM

## 2019-10-23 LAB
ALBUMIN SERPL BCP-MCNC: 3.5 G/DL (ref 3.5–5.2)
ALP SERPL-CCNC: 111 U/L (ref 55–135)
ALT SERPL W/O P-5'-P-CCNC: 16 U/L (ref 10–44)
ANION GAP SERPL CALC-SCNC: 8 MMOL/L (ref 8–16)
AST SERPL-CCNC: 13 U/L (ref 10–40)
BASOPHILS # BLD AUTO: 0.07 K/UL (ref 0–0.2)
BASOPHILS NFR BLD: 0.6 % (ref 0–1.9)
BILIRUB SERPL-MCNC: 0.3 MG/DL (ref 0.1–1)
BUN SERPL-MCNC: 4 MG/DL (ref 6–20)
CALCIUM SERPL-MCNC: 9 MG/DL (ref 8.7–10.5)
CHLORIDE SERPL-SCNC: 107 MMOL/L (ref 95–110)
CO2 SERPL-SCNC: 29 MMOL/L (ref 23–29)
CREAT SERPL-MCNC: 0.8 MG/DL (ref 0.5–1.4)
DIFFERENTIAL METHOD: ABNORMAL
EOSINOPHIL # BLD AUTO: 0.4 K/UL (ref 0–0.5)
EOSINOPHIL NFR BLD: 2.9 % (ref 0–8)
ERYTHROCYTE [DISTWIDTH] IN BLOOD BY AUTOMATED COUNT: 13.7 % (ref 11.5–14.5)
EST. GFR  (AFRICAN AMERICAN): >60 ML/MIN/1.73 M^2
EST. GFR  (NON AFRICAN AMERICAN): >60 ML/MIN/1.73 M^2
GLUCOSE SERPL-MCNC: 103 MG/DL (ref 70–110)
HCT VFR BLD AUTO: 39 % (ref 40–54)
HGB BLD-MCNC: 13.2 G/DL (ref 14–18)
IMM GRANULOCYTES # BLD AUTO: 0.49 K/UL (ref 0–0.04)
IMM GRANULOCYTES NFR BLD AUTO: 3.9 % (ref 0–0.5)
LYMPHOCYTES # BLD AUTO: 2 K/UL (ref 1–4.8)
LYMPHOCYTES NFR BLD: 15.6 % (ref 18–48)
MCH RBC QN AUTO: 29.3 PG (ref 27–31)
MCHC RBC AUTO-ENTMCNC: 33.8 G/DL (ref 32–36)
MCV RBC AUTO: 87 FL (ref 82–98)
MONOCYTES # BLD AUTO: 0.5 K/UL (ref 0.3–1)
MONOCYTES NFR BLD: 4 % (ref 4–15)
NEUTROPHILS # BLD AUTO: 9.6 K/UL (ref 1.8–7.7)
NEUTROPHILS NFR BLD: 76.9 % (ref 38–73)
NRBC BLD-RTO: 0 /100 WBC
PLATELET # BLD AUTO: 199 K/UL (ref 150–350)
PMV BLD AUTO: 8.8 FL (ref 9.2–12.9)
POTASSIUM SERPL-SCNC: 4 MMOL/L (ref 3.5–5.1)
PROT SERPL-MCNC: 7.4 G/DL (ref 6–8.4)
RBC # BLD AUTO: 4.5 M/UL (ref 4.6–6.2)
SODIUM SERPL-SCNC: 144 MMOL/L (ref 136–145)
WBC # BLD AUTO: 12.49 K/UL (ref 3.9–12.7)

## 2019-10-23 PROCEDURE — 99215 OFFICE O/P EST HI 40 MIN: CPT | Mod: 25,S$GLB,, | Performed by: INTERNAL MEDICINE

## 2019-10-23 PROCEDURE — 96375 TX/PRO/DX INJ NEW DRUG ADDON: CPT

## 2019-10-23 PROCEDURE — 85025 COMPLETE CBC W/AUTO DIFF WBC: CPT

## 2019-10-23 PROCEDURE — 96367 TX/PROPH/DG ADDL SEQ IV INF: CPT

## 2019-10-23 PROCEDURE — 3008F PR BODY MASS INDEX (BMI) DOCUMENTED: ICD-10-PCS | Mod: CPTII,S$GLB,, | Performed by: INTERNAL MEDICINE

## 2019-10-23 PROCEDURE — 99999 PR PBB SHADOW E&M-EST. PATIENT-LVL IV: ICD-10-PCS | Mod: PBBFAC,,, | Performed by: INTERNAL MEDICINE

## 2019-10-23 PROCEDURE — 63600175 PHARM REV CODE 636 W HCPCS: Performed by: INTERNAL MEDICINE

## 2019-10-23 PROCEDURE — 96415 CHEMO IV INFUSION ADDL HR: CPT

## 2019-10-23 PROCEDURE — A4216 STERILE WATER/SALINE, 10 ML: HCPCS | Performed by: INTERNAL MEDICINE

## 2019-10-23 PROCEDURE — 36415 COLL VENOUS BLD VENIPUNCTURE: CPT

## 2019-10-23 PROCEDURE — 99215 PR OFFICE/OUTPT VISIT, EST, LEVL V, 40-54 MIN: ICD-10-PCS | Mod: 25,S$GLB,, | Performed by: INTERNAL MEDICINE

## 2019-10-23 PROCEDURE — 3008F BODY MASS INDEX DOCD: CPT | Mod: CPTII,S$GLB,, | Performed by: INTERNAL MEDICINE

## 2019-10-23 PROCEDURE — 99999 PR PBB SHADOW E&M-EST. PATIENT-LVL IV: CPT | Mod: PBBFAC,,, | Performed by: INTERNAL MEDICINE

## 2019-10-23 PROCEDURE — 25000003 PHARM REV CODE 250: Performed by: INTERNAL MEDICINE

## 2019-10-23 PROCEDURE — 96417 CHEMO IV INFUS EACH ADDL SEQ: CPT

## 2019-10-23 PROCEDURE — 96413 CHEMO IV INFUSION 1 HR: CPT

## 2019-10-23 PROCEDURE — 96416 CHEMO PROLONG INFUSE W/PUMP: CPT

## 2019-10-23 PROCEDURE — 80053 COMPREHEN METABOLIC PANEL: CPT

## 2019-10-23 RX ORDER — EPINEPHRINE 0.3 MG/.3ML
0.3 INJECTION SUBCUTANEOUS ONCE AS NEEDED
Status: CANCELLED | OUTPATIENT
Start: 2019-10-23

## 2019-10-23 RX ORDER — SODIUM CHLORIDE 0.9 % (FLUSH) 0.9 %
10 SYRINGE (ML) INJECTION
Status: CANCELLED | OUTPATIENT
Start: 2019-10-25

## 2019-10-23 RX ORDER — ATROPINE SULFATE 0.4 MG/ML
0.4 INJECTION, SOLUTION ENDOTRACHEAL; INTRAMEDULLARY; INTRAMUSCULAR; INTRAVENOUS; SUBCUTANEOUS ONCE AS NEEDED
Status: COMPLETED | OUTPATIENT
Start: 2019-10-23 | End: 2019-10-23

## 2019-10-23 RX ORDER — HEPARIN 100 UNIT/ML
500 SYRINGE INTRAVENOUS
Status: CANCELLED | OUTPATIENT
Start: 2019-10-23

## 2019-10-23 RX ORDER — SODIUM CHLORIDE 0.9 % (FLUSH) 0.9 %
10 SYRINGE (ML) INJECTION
Status: CANCELLED | OUTPATIENT
Start: 2019-10-23

## 2019-10-23 RX ORDER — ATROPINE SULFATE 0.4 MG/ML
0.4 INJECTION, SOLUTION ENDOTRACHEAL; INTRAMEDULLARY; INTRAMUSCULAR; INTRAVENOUS; SUBCUTANEOUS ONCE AS NEEDED
Status: CANCELLED | OUTPATIENT
Start: 2019-10-23

## 2019-10-23 RX ORDER — DIPHENHYDRAMINE HYDROCHLORIDE 50 MG/ML
50 INJECTION INTRAMUSCULAR; INTRAVENOUS ONCE AS NEEDED
Status: CANCELLED | OUTPATIENT
Start: 2019-10-23

## 2019-10-23 RX ORDER — HEPARIN 100 UNIT/ML
500 SYRINGE INTRAVENOUS
Status: CANCELLED | OUTPATIENT
Start: 2019-10-25

## 2019-10-23 RX ORDER — SODIUM CHLORIDE 0.9 % (FLUSH) 0.9 %
10 SYRINGE (ML) INJECTION
Status: DISCONTINUED | OUTPATIENT
Start: 2019-10-23 | End: 2019-10-23 | Stop reason: HOSPADM

## 2019-10-23 RX ADMIN — PALONOSETRON: 0.25 INJECTION, SOLUTION INTRAVENOUS at 10:10

## 2019-10-23 RX ADMIN — FLUOROURACIL 6270 MG: 50 INJECTION, SOLUTION INTRAVENOUS at 03:10

## 2019-10-23 RX ADMIN — LEUCOVORIN CALCIUM 390 MG: 100 INJECTION, POWDER, LYOPHILIZED, FOR SOLUTION INTRAMUSCULAR; INTRAVENOUS at 01:10

## 2019-10-23 RX ADMIN — BEVACIZUMAB 380 MG: 400 INJECTION, SOLUTION INTRAVENOUS at 10:10

## 2019-10-23 RX ADMIN — IRINOTECAN HYDROCHLORIDE 324 MG: 20 INJECTION, SOLUTION INTRAVENOUS at 11:10

## 2019-10-23 RX ADMIN — ATROPINE SULFATE 0.4 MG: 0.4 INJECTION, SOLUTION INTRAMUSCULAR; INTRAVENOUS; SUBCUTANEOUS at 11:10

## 2019-10-23 RX ADMIN — OXALIPLATIN 167 MG: 5 INJECTION, SOLUTION, CONCENTRATE INTRAVENOUS at 01:10

## 2019-10-23 RX ADMIN — SODIUM CHLORIDE: 9 INJECTION, SOLUTION INTRAVENOUS at 10:10

## 2019-10-23 RX ADMIN — SODIUM CHLORIDE, PRESERVATIVE FREE 10 ML: 5 INJECTION INTRAVENOUS at 10:10

## 2019-10-23 RX ADMIN — DEXTROSE: 50 INJECTION, SOLUTION INTRAVENOUS at 03:10

## 2019-10-23 NOTE — DISCHARGE INSTRUCTIONS
[facility]  [address] [address] [facility] [address]  [address]  [phone] phone     [phone] fax  Hours of Operation: Monday- Friday 8:00am- 5:00pm  After hours phone  [phone]  Hematology / Oncology Physicians on call      LUCIA Laurent Dr., Dr., Dr., Dr., NP Sydney Prescott, NP Tyesha Taylor, NP    Please call with any concerns regarding your appointment today.HOME CARE AFTER CHEMOTHERAPY   Meals   Many patients feel sick and lose their appetites during treatment. Eat small meals several times a day. Choose bland foods with little taste or smell if you have problems with nausea. Be sure to cook all food thoroughly. This kills bacteria and helps you avoid intestinal infection. Soft foods are easier to swallow and digest.   Activity   Exercise keeps you strong and keeps your heart and lungs active. Talk to your doctor about an appropriate exercise program for you.   Skin Care   To prevent a skin infection, bathe or shower once a day. Use a moisturizing soap and wash with warm water. Avoid very hot or cold water. Chemotherapy can make your skin dry . Apply moisturizing lotion to help relieve dry skin. Some drugs used in high doses can cause slight burns to appear (like sunburn). Ask for a special cream to help relieve the burn and protect your skin.   Prevent Mouth Sores   During chemotherapy, many people get mouth sores. Do the following to help prevent mouth sores or to ease discomfort.   Brush your teeth with a soft-bristle toothbrush after every meal.  Don't use dental floss if your platelet count is below 50,000. Your doctor or nurse will tell you if this is the case.  Use an oral swab or special soft toothbrush if your gums bleed during regular brushing.  Use mouthwash as directed. If you can't tolerate commercial mouthwash, use salt and baking soda to clean your mouth. Mix 1 teaspoon of salt and 1  teaspoon of baking soda into a glass of water. Swish and spit.  Call your doctor or return to this facility if you develop any of the following:   Sore throat   White patches in the mouth or throat   Fever of 100.4ºF (38ºC) or higher, or as directed by your healthcare provider  © 2000-2011 [facility], [address] [place] [address]. All rights reserved. This information is not intended as a substitute for professional medical care. Always follow your healthcare professional's   FALL PREVENTION   Falls often occur due to slipping, tripping or losing your balance. Here are ways to reduce your risk of falling again.   Was there anything that caused your fall that can be fixed, removed or replaced?   Make your home safe by keeping walkways clear of objects you may trip over.   Use non-slip pads under rugs.   Do not walk in poorly lit areas.   Do not stand on chairs or wobbly ladders.   Use caution when reaching overhead or looking upward. This position can cause a loss of balance.   Be sure your shoes fit properly, have non-slip bottoms and are in good condition.   Be cautious when going up and down stairs, curbs, and when walking on uneven sidewalks.   If your balance is poor, consider using a cane or walker.   If your fall was related to alcohol use, stop or limit alcohol intake.   If your fall was related to use of sleeping medicines, talk to your doctor about this. You may need to reduce your dosage at bedtime if you awaken during the night to go to the bathroom.   To reduce the need for nighttime bathroom trips:   Avoid drinking fluids for several hours before going to bed   Empty your bladder before going to bed   Men can keep a urinal at the bedside   © 2000-2011 [facility], [address] [place] [address]. All rights reserved. This information is not intended as a substitute for professional medical care. Always follow your healthcare professional's instructions.  Support  "Groups/Classes    Support groups and classes are being offered at the   [facility] and [facility]    "Cooking with Cancer" (Nutrition Class):  Second Wednesday of each month   at noon at the Cancer Center.  Metastatic Support Group:  Third Tuesday of each month   at noon at the Cancer Center.  Next Steps Class/Group: Second and fourth Thursday of each month at noon at the Cancer Center.  Hope [facility] (Breast Cancer Support Group): First Tuesday of each month   at 5:30pm at the [facility] location.  [employer]: [facility]: Second and third Tuesday of each month from 7:30am - 2pm.  [facility]: First and fourth Tuesday of each month from 7:30am - 2pm    If you are interested in attending or would like more information please ask our social workers or your nurse!  "

## 2019-10-23 NOTE — PROGRESS NOTES
Subjective:      Patient ID: Sukhjinder Presley is a 53 y.o. male.    Chief Complaint: Rectal Cancer    The patient is a 53-year-old  male who presents to the Hematology Oncology Clinic today for follow-up to initiate cycle 2 day 1 of neoadjuvant chemotherapy with FOLFIRINOX plus Avastin for rectal carcinoma.  The patient is followed in the outpatient Hematology Oncology Clinic by Dr. Song Aden.  I have reviewed all the patient's relevant clinical history available in the medical record and have utilized this in my evaluation and management recommendations today.  Today the patient reports that overall he feels well and has no specific complaints.  He denies any neoplasm related pain.  He denies any chest pain or shortness of breath.  He denies any loss of appetite or unintentional weight loss.  He denies any melena, hematochezia, hematemesis, hemoptysis or hematuria.  He denies any nausea, vomiting or abdominal pain. He denies any bowel or urinary complaints.  The patient is previously completed neoadjuvant chemoradiation with Xeloda and reports that he tolerated this well without any significant side effects.  He reports that overall he tolerated cycle 1 well without any significant side effects.    Review of Systems   Constitutional: Negative for activity change, appetite change, chills, diaphoresis, fatigue, fever and unexpected weight change.   HENT: Negative for congestion, dental problem, ear pain, mouth sores, nosebleeds, postnasal drip, sinus pressure, sore throat, tinnitus, trouble swallowing and voice change.    Eyes: Negative for photophobia, pain, discharge, redness, itching and visual disturbance.   Respiratory: Negative for cough, chest tightness, shortness of breath, wheezing and stridor.    Cardiovascular: Negative for chest pain, palpitations and leg swelling.   Gastrointestinal: Negative for abdominal distention, abdominal pain, anal bleeding, blood in stool, constipation, diarrhea, nausea and  vomiting.   Endocrine: Negative for cold intolerance, heat intolerance, polydipsia, polyphagia and polyuria.   Genitourinary: Negative for decreased urine volume, difficulty urinating, dysuria, flank pain, frequency, hematuria and urgency.   Musculoskeletal: Negative for arthralgias, back pain, gait problem, joint swelling and myalgias.   Skin: Negative for pallor and rash.   Allergic/Immunologic: Negative for immunocompromised state.   Neurological: Negative for dizziness, syncope, weakness, light-headedness and headaches.   Hematological: Negative for adenopathy. Does not bruise/bleed easily.   Psychiatric/Behavioral: Negative for agitation and confusion. The patient is not nervous/anxious.        Medication List with Changes/Refills   Current Medications    ACETAMINOPHEN (TYLENOL) 325 MG TABLET    Take 1 tablet (325 mg total) by mouth every 6 (six) hours as needed for Pain.    ASPIRIN 81 MG CHEW    Take 81 mg by mouth once daily.    CAPECITABINE (XELODA) 500 MG TAB    Take 3 tablets (1500 mg) by mouth twice daily after breakfast and dinner on days of radiation only.    DIPHENOXYLATE-ATROPINE 2.5-0.025 MG (LOMOTIL) 2.5-0.025 MG PER TABLET    Take 1-2 tablets by mouth 4 (four) times daily as needed for Diarrhea.    DRONABINOL (MARINOL) 10 MG CAPSULE    Take 1 capsule (10 mg total) by mouth 2 (two) times daily before meals.    IRON FUM-B12-IF-C-FOLIC ACID (FOLTRIN) 110-0.5 MG CAPSULE    Take 1 capsule by mouth 2 (two) times daily.    LIDOCAINE (XYLOCAINE) 5 % OINT OINTMENT    Apply topically as needed.    MAGIC MOUTHWASH DIPHEN/ANTAC/LIDOC    Swish and spit 15 ml by mouth every 4 hours as needed    MEGESTROL (MEGACE) 40 MG TAB    Take 1 tablet (40 mg total) by mouth 4 (four) times daily.    MORPHINE (MS CONTIN) 15 MG 12 HR TABLET    Take 3 tablets (45 mg total) by mouth nightly.    NICOTINE (NICODERM CQ) 21 MG/24 HR    Place 1 patch onto the skin once daily.    ONDANSETRON (ZOFRAN-ODT) 8 MG TBDL    Take 1 tablet (8  mg total) by mouth every 12 (twelve) hours as needed.    OXYCODONE (ROXICODONE) 10 MG TAB IMMEDIATE RELEASE TABLET    Take 1 tablet (10 mg total) by mouth every 8 (eight) hours as needed for Pain (medically necessary).    PROCHLORPERAZINE (COMPAZINE) 10 MG TABLET    Take 1 tablet (10 mg total) by mouth every 6 (six) hours as needed.    PROMETHAZINE (PHENERGAN) 25 MG TABLET    Take 1 tablet (25 mg total) by mouth every 4 (four) hours.    SILVER SULFADIAZINE 1% (SILVADENE) 1 % CREAM    Apply to affected area daily    TRAMADOL (ULTRAM) 50 MG TABLET    Take 1 tablet (50 mg total) by mouth every 6 (six) hours as needed for Pain.    VARENICLINE (CHANTIX) 1 MG TAB    Take 1 tablet (1 mg total) by mouth 2 (two) times daily.     Review of patient's allergies indicates:  No Known Allergies    Lab: I have reviewed all of the patient's relevant lab work available in the medical record and have utilized this in my evaluation and management recommendations today    Imaging: I have reviewed all of the patient's diagnostic/imaging results available in the medical record and have utilized this in my evaluation and management recommendations today.      Objective:     Vitals:    10/23/19 0918   BP: 122/81   Pulse: 83   Resp: 18   Temp: 98.8 °F (37.1 °C)       Physical Exam   Constitutional: He is oriented to person, place, and time. He appears well-developed and well-nourished. No distress.   HENT:   Head: Normocephalic and atraumatic.   Nose: Nose normal.   Mouth/Throat: Oropharynx is clear and moist. No oropharyngeal exudate.   Eyes: Pupils are equal, round, and reactive to light. EOM are normal. No scleral icterus.   Neck: Normal range of motion. Neck supple. No tracheal deviation present. No thyromegaly present.   Cardiovascular: Normal rate, regular rhythm, normal heart sounds and intact distal pulses.   No murmur heard.  Pulmonary/Chest: Effort normal and breath sounds normal. No stridor. No respiratory distress. He has no  wheezes. He has no rales. He exhibits no tenderness.   Abdominal: Soft. Bowel sounds are normal. He exhibits no distension and no mass. There is no tenderness. There is no rebound and no guarding.   Musculoskeletal: Normal range of motion. He exhibits no edema or tenderness.   Lymphadenopathy:     He has no cervical adenopathy.   Neurological: He is alert and oriented to person, place, and time. Coordination normal.   Skin: Skin is warm. No rash noted. He is not diaphoretic. No erythema.   Psychiatric: He has a normal mood and affect. His speech is normal and behavior is normal. Judgment and thought content normal. Cognition and memory are normal.   Nursing note and vitals reviewed.      Assessment:     1. Rectal cancer        Plan:   1.  I had a detailed discussion with the patient today with regard to his current clinical situation.  Informed consent was taken to proceed with cycle 2 day 1 of neoadjuvant chemotherapy with FOLFIRINOX plus Avastin.  Risks/benefits and common side effects were reviewed and he expressed understanding and agreement to proceed with this.  2.  He will be given Neulasta on day 3 for reduction of chemotherapy-related neutropenia.  3.  He knows to seek immediate medical attention for any signs/symptoms of infection or increased bleeding.  4.  Prescription for magic mouthwash p.r.n. chemotherapy-related mucositis was sent to the pharmacy previously.    Follow-up in 2 weeks with labs for cycle 3 of chemotherapy.  He knows to call sooner if any new problems or questions.  Rectal cancer  -     CBC auto differential; Future; Expected date: 10/23/2019  -     CMP; Future; Expected date: 10/23/2019

## 2019-10-25 ENCOUNTER — INFUSION (OUTPATIENT)
Dept: INFUSION THERAPY | Facility: HOSPITAL | Age: 54
End: 2019-10-25
Attending: INTERNAL MEDICINE
Payer: COMMERCIAL

## 2019-10-25 VITALS
OXYGEN SATURATION: 99 % | DIASTOLIC BLOOD PRESSURE: 80 MMHG | HEART RATE: 82 BPM | SYSTOLIC BLOOD PRESSURE: 122 MMHG | TEMPERATURE: 99 F

## 2019-10-25 DIAGNOSIS — C20 RECTAL CANCER: Primary | ICD-10-CM

## 2019-10-25 PROCEDURE — 96377 APPLICATON ON-BODY INJECTOR: CPT

## 2019-10-25 PROCEDURE — 63600175 PHARM REV CODE 636 W HCPCS: Performed by: INTERNAL MEDICINE

## 2019-10-25 PROCEDURE — 96523 IRRIG DRUG DELIVERY DEVICE: CPT

## 2019-10-25 PROCEDURE — 25000003 PHARM REV CODE 250: Performed by: INTERNAL MEDICINE

## 2019-10-25 PROCEDURE — A4216 STERILE WATER/SALINE, 10 ML: HCPCS | Performed by: INTERNAL MEDICINE

## 2019-10-25 RX ORDER — HEPARIN 100 UNIT/ML
500 SYRINGE INTRAVENOUS
Status: DISCONTINUED | OUTPATIENT
Start: 2019-10-25 | End: 2019-10-25 | Stop reason: HOSPADM

## 2019-10-25 RX ORDER — SODIUM CHLORIDE 0.9 % (FLUSH) 0.9 %
10 SYRINGE (ML) INJECTION
Status: DISCONTINUED | OUTPATIENT
Start: 2019-10-25 | End: 2019-10-25 | Stop reason: HOSPADM

## 2019-10-25 RX ADMIN — PEGFILGRASTIM 6 MG: KIT SUBCUTANEOUS at 03:10

## 2019-10-25 RX ADMIN — HEPARIN SODIUM (PORCINE) LOCK FLUSH IV SOLN 100 UNIT/ML 500 UNITS: 100 SOLUTION at 03:10

## 2019-10-25 RX ADMIN — Medication 10 ML: at 03:10

## 2019-10-31 ENCOUNTER — LAB VISIT (OUTPATIENT)
Dept: LAB | Facility: HOSPITAL | Age: 54
End: 2019-10-31
Payer: COMMERCIAL

## 2019-10-31 DIAGNOSIS — C20 RECTAL CANCER: ICD-10-CM

## 2019-10-31 DIAGNOSIS — D50.0 IRON DEFICIENCY ANEMIA DUE TO CHRONIC BLOOD LOSS: ICD-10-CM

## 2019-10-31 LAB
ALBUMIN SERPL BCP-MCNC: 3.9 G/DL (ref 3.5–5.2)
ALP SERPL-CCNC: 113 U/L (ref 55–135)
ALT SERPL W/O P-5'-P-CCNC: 27 U/L (ref 10–44)
ANION GAP SERPL CALC-SCNC: 9 MMOL/L (ref 8–16)
AST SERPL-CCNC: 22 U/L (ref 10–40)
BASOPHILS # BLD AUTO: 0.09 K/UL (ref 0–0.2)
BASOPHILS NFR BLD: 1.4 % (ref 0–1.9)
BILIRUB SERPL-MCNC: 0.5 MG/DL (ref 0.1–1)
BUN SERPL-MCNC: 9 MG/DL (ref 6–20)
CALCIUM SERPL-MCNC: 9.6 MG/DL (ref 8.7–10.5)
CHLORIDE SERPL-SCNC: 107 MMOL/L (ref 95–110)
CO2 SERPL-SCNC: 27 MMOL/L (ref 23–29)
CREAT SERPL-MCNC: 0.8 MG/DL (ref 0.5–1.4)
DIFFERENTIAL METHOD: ABNORMAL
EOSINOPHIL # BLD AUTO: 0.3 K/UL (ref 0–0.5)
EOSINOPHIL NFR BLD: 4.2 % (ref 0–8)
ERYTHROCYTE [DISTWIDTH] IN BLOOD BY AUTOMATED COUNT: 13.5 % (ref 11.5–14.5)
EST. GFR  (AFRICAN AMERICAN): >60 ML/MIN/1.73 M^2
EST. GFR  (NON AFRICAN AMERICAN): >60 ML/MIN/1.73 M^2
FERRITIN SERPL-MCNC: 856 NG/ML (ref 20–300)
GLUCOSE SERPL-MCNC: 117 MG/DL (ref 70–110)
HCT VFR BLD AUTO: 39.5 % (ref 40–54)
HGB BLD-MCNC: 13.2 G/DL (ref 14–18)
IMM GRANULOCYTES # BLD AUTO: 0.04 K/UL (ref 0–0.04)
IMM GRANULOCYTES NFR BLD AUTO: 0.6 % (ref 0–0.5)
IRON SERPL-MCNC: 94 UG/DL (ref 45–160)
LYMPHOCYTES # BLD AUTO: 1.7 K/UL (ref 1–4.8)
LYMPHOCYTES NFR BLD: 26 % (ref 18–48)
MCH RBC QN AUTO: 28.8 PG (ref 27–31)
MCHC RBC AUTO-ENTMCNC: 33.4 G/DL (ref 32–36)
MCV RBC AUTO: 86 FL (ref 82–98)
MONOCYTES # BLD AUTO: 0.6 K/UL (ref 0.3–1)
MONOCYTES NFR BLD: 10 % (ref 4–15)
NEUTROPHILS # BLD AUTO: 3.7 K/UL (ref 1.8–7.7)
NEUTROPHILS NFR BLD: 58.4 % (ref 38–73)
NRBC BLD-RTO: 0 /100 WBC
PLATELET # BLD AUTO: 196 K/UL (ref 150–350)
PLATELET BLD QL SMEAR: ABNORMAL
PMV BLD AUTO: 9.8 FL (ref 9.2–12.9)
POTASSIUM SERPL-SCNC: 4.3 MMOL/L (ref 3.5–5.1)
PROT SERPL-MCNC: 8 G/DL (ref 6–8.4)
RBC # BLD AUTO: 4.58 M/UL (ref 4.6–6.2)
SATURATED IRON: 28 % (ref 20–50)
SODIUM SERPL-SCNC: 143 MMOL/L (ref 136–145)
TOTAL IRON BINDING CAPACITY: 339 UG/DL (ref 250–450)
TRANSFERRIN SERPL-MCNC: 229 MG/DL (ref 200–375)
WBC # BLD AUTO: 6.38 K/UL (ref 3.9–12.7)

## 2019-10-31 PROCEDURE — 80053 COMPREHEN METABOLIC PANEL: CPT

## 2019-10-31 PROCEDURE — 85025 COMPLETE CBC W/AUTO DIFF WBC: CPT

## 2019-10-31 PROCEDURE — 36415 COLL VENOUS BLD VENIPUNCTURE: CPT

## 2019-10-31 PROCEDURE — 82728 ASSAY OF FERRITIN: CPT

## 2019-10-31 PROCEDURE — 83540 ASSAY OF IRON: CPT

## 2019-11-05 ENCOUNTER — LAB VISIT (OUTPATIENT)
Dept: LAB | Facility: HOSPITAL | Age: 54
End: 2019-11-05
Attending: INTERNAL MEDICINE
Payer: COMMERCIAL

## 2019-11-05 DIAGNOSIS — C20 RECTAL CANCER: ICD-10-CM

## 2019-11-05 LAB
ALBUMIN SERPL BCP-MCNC: 3.5 G/DL (ref 3.5–5.2)
ALP SERPL-CCNC: 103 U/L (ref 55–135)
ALT SERPL W/O P-5'-P-CCNC: 17 U/L (ref 10–44)
ANION GAP SERPL CALC-SCNC: 8 MMOL/L (ref 8–16)
AST SERPL-CCNC: 16 U/L (ref 10–40)
BASOPHILS # BLD AUTO: 0.09 K/UL (ref 0–0.2)
BASOPHILS NFR BLD: 0.8 % (ref 0–1.9)
BILIRUB SERPL-MCNC: 0.3 MG/DL (ref 0.1–1)
BUN SERPL-MCNC: 5 MG/DL (ref 6–20)
CALCIUM SERPL-MCNC: 8.8 MG/DL (ref 8.7–10.5)
CHLORIDE SERPL-SCNC: 108 MMOL/L (ref 95–110)
CO2 SERPL-SCNC: 27 MMOL/L (ref 23–29)
CREAT SERPL-MCNC: 0.8 MG/DL (ref 0.5–1.4)
DIFFERENTIAL METHOD: ABNORMAL
EOSINOPHIL # BLD AUTO: 0.3 K/UL (ref 0–0.5)
EOSINOPHIL NFR BLD: 2.1 % (ref 0–8)
ERYTHROCYTE [DISTWIDTH] IN BLOOD BY AUTOMATED COUNT: 14.7 % (ref 11.5–14.5)
EST. GFR  (AFRICAN AMERICAN): >60 ML/MIN/1.73 M^2
EST. GFR  (NON AFRICAN AMERICAN): >60 ML/MIN/1.73 M^2
GLUCOSE SERPL-MCNC: 106 MG/DL (ref 70–110)
HCT VFR BLD AUTO: 38 % (ref 40–54)
HGB BLD-MCNC: 12.7 G/DL (ref 14–18)
IMM GRANULOCYTES # BLD AUTO: 0.52 K/UL (ref 0–0.04)
IMM GRANULOCYTES NFR BLD AUTO: 4.4 % (ref 0–0.5)
LYMPHOCYTES # BLD AUTO: 2 K/UL (ref 1–4.8)
LYMPHOCYTES NFR BLD: 17.1 % (ref 18–48)
MCH RBC QN AUTO: 29.2 PG (ref 27–31)
MCHC RBC AUTO-ENTMCNC: 33.4 G/DL (ref 32–36)
MCV RBC AUTO: 87 FL (ref 82–98)
MONOCYTES # BLD AUTO: 0.8 K/UL (ref 0.3–1)
MONOCYTES NFR BLD: 6.8 % (ref 4–15)
NEUTROPHILS # BLD AUTO: 8.6 K/UL (ref 1.8–7.7)
NEUTROPHILS NFR BLD: 73.2 % (ref 38–73)
NRBC BLD-RTO: 0 /100 WBC
PLATELET # BLD AUTO: 248 K/UL (ref 150–350)
PMV BLD AUTO: 8.8 FL (ref 9.2–12.9)
POTASSIUM SERPL-SCNC: 3.6 MMOL/L (ref 3.5–5.1)
PROT SERPL-MCNC: 6.9 G/DL (ref 6–8.4)
RBC # BLD AUTO: 4.35 M/UL (ref 4.6–6.2)
SODIUM SERPL-SCNC: 143 MMOL/L (ref 136–145)
WBC # BLD AUTO: 11.69 K/UL (ref 3.9–12.7)

## 2019-11-05 PROCEDURE — 36415 COLL VENOUS BLD VENIPUNCTURE: CPT

## 2019-11-05 PROCEDURE — 80053 COMPREHEN METABOLIC PANEL: CPT

## 2019-11-05 PROCEDURE — 85025 COMPLETE CBC W/AUTO DIFF WBC: CPT

## 2019-11-06 ENCOUNTER — INFUSION (OUTPATIENT)
Dept: INFUSION THERAPY | Facility: HOSPITAL | Age: 54
End: 2019-11-06
Attending: INTERNAL MEDICINE
Payer: COMMERCIAL

## 2019-11-06 ENCOUNTER — OFFICE VISIT (OUTPATIENT)
Dept: HEMATOLOGY/ONCOLOGY | Facility: CLINIC | Age: 54
End: 2019-11-06
Payer: COMMERCIAL

## 2019-11-06 ENCOUNTER — DOCUMENTATION ONLY (OUTPATIENT)
Dept: HEMATOLOGY/ONCOLOGY | Facility: CLINIC | Age: 54
End: 2019-11-06

## 2019-11-06 VITALS
WEIGHT: 163.56 LBS | SYSTOLIC BLOOD PRESSURE: 112 MMHG | HEIGHT: 72 IN | DIASTOLIC BLOOD PRESSURE: 73 MMHG | HEART RATE: 65 BPM | BODY MASS INDEX: 22.15 KG/M2

## 2019-11-06 VITALS
HEART RATE: 80 BPM | WEIGHT: 163.56 LBS | DIASTOLIC BLOOD PRESSURE: 88 MMHG | HEIGHT: 72 IN | SYSTOLIC BLOOD PRESSURE: 131 MMHG | RESPIRATION RATE: 18 BRPM | OXYGEN SATURATION: 99 % | BODY MASS INDEX: 22.15 KG/M2 | TEMPERATURE: 98 F

## 2019-11-06 DIAGNOSIS — C20 RECTAL CANCER: Primary | ICD-10-CM

## 2019-11-06 PROCEDURE — 96417 CHEMO IV INFUS EACH ADDL SEQ: CPT

## 2019-11-06 PROCEDURE — A4216 STERILE WATER/SALINE, 10 ML: HCPCS | Performed by: INTERNAL MEDICINE

## 2019-11-06 PROCEDURE — 99215 PR OFFICE/OUTPT VISIT, EST, LEVL V, 40-54 MIN: ICD-10-PCS | Mod: 25,S$GLB,, | Performed by: INTERNAL MEDICINE

## 2019-11-06 PROCEDURE — 3008F PR BODY MASS INDEX (BMI) DOCUMENTED: ICD-10-PCS | Mod: CPTII,S$GLB,, | Performed by: INTERNAL MEDICINE

## 2019-11-06 PROCEDURE — 3008F BODY MASS INDEX DOCD: CPT | Mod: CPTII,S$GLB,, | Performed by: INTERNAL MEDICINE

## 2019-11-06 PROCEDURE — 63600175 PHARM REV CODE 636 W HCPCS: Performed by: INTERNAL MEDICINE

## 2019-11-06 PROCEDURE — 99999 PR PBB SHADOW E&M-EST. PATIENT-LVL V: ICD-10-PCS | Mod: PBBFAC,,, | Performed by: INTERNAL MEDICINE

## 2019-11-06 PROCEDURE — 96415 CHEMO IV INFUSION ADDL HR: CPT

## 2019-11-06 PROCEDURE — 96368 THER/DIAG CONCURRENT INF: CPT

## 2019-11-06 PROCEDURE — 96367 TX/PROPH/DG ADDL SEQ IV INF: CPT

## 2019-11-06 PROCEDURE — 96375 TX/PRO/DX INJ NEW DRUG ADDON: CPT

## 2019-11-06 PROCEDURE — 96413 CHEMO IV INFUSION 1 HR: CPT

## 2019-11-06 PROCEDURE — 99215 OFFICE O/P EST HI 40 MIN: CPT | Mod: 25,S$GLB,, | Performed by: INTERNAL MEDICINE

## 2019-11-06 PROCEDURE — 96416 CHEMO PROLONG INFUSE W/PUMP: CPT

## 2019-11-06 PROCEDURE — 25000003 PHARM REV CODE 250: Performed by: INTERNAL MEDICINE

## 2019-11-06 PROCEDURE — 99999 PR PBB SHADOW E&M-EST. PATIENT-LVL V: CPT | Mod: PBBFAC,,, | Performed by: INTERNAL MEDICINE

## 2019-11-06 RX ORDER — SODIUM CHLORIDE 0.9 % (FLUSH) 0.9 %
10 SYRINGE (ML) INJECTION
Status: CANCELLED | OUTPATIENT
Start: 2019-11-08

## 2019-11-06 RX ORDER — ATROPINE SULFATE 0.4 MG/ML
0.4 INJECTION, SOLUTION ENDOTRACHEAL; INTRAMEDULLARY; INTRAMUSCULAR; INTRAVENOUS; SUBCUTANEOUS ONCE AS NEEDED
Status: COMPLETED | OUTPATIENT
Start: 2019-11-06 | End: 2019-11-06

## 2019-11-06 RX ORDER — SODIUM CHLORIDE 0.9 % (FLUSH) 0.9 %
10 SYRINGE (ML) INJECTION
Status: CANCELLED | OUTPATIENT
Start: 2019-11-06

## 2019-11-06 RX ORDER — DIPHENHYDRAMINE HYDROCHLORIDE 50 MG/ML
50 INJECTION INTRAMUSCULAR; INTRAVENOUS ONCE AS NEEDED
Status: CANCELLED | OUTPATIENT
Start: 2019-11-06

## 2019-11-06 RX ORDER — EPINEPHRINE 0.3 MG/.3ML
0.3 INJECTION SUBCUTANEOUS ONCE AS NEEDED
Status: CANCELLED | OUTPATIENT
Start: 2019-11-06

## 2019-11-06 RX ORDER — HEPARIN 100 UNIT/ML
500 SYRINGE INTRAVENOUS
Status: CANCELLED | OUTPATIENT
Start: 2019-11-08

## 2019-11-06 RX ORDER — SODIUM CHLORIDE 0.9 % (FLUSH) 0.9 %
10 SYRINGE (ML) INJECTION
Status: DISCONTINUED | OUTPATIENT
Start: 2019-11-06 | End: 2019-11-06 | Stop reason: HOSPADM

## 2019-11-06 RX ORDER — HEPARIN 100 UNIT/ML
500 SYRINGE INTRAVENOUS
Status: CANCELLED | OUTPATIENT
Start: 2019-11-06

## 2019-11-06 RX ORDER — ATROPINE SULFATE 0.4 MG/ML
0.4 INJECTION, SOLUTION ENDOTRACHEAL; INTRAMEDULLARY; INTRAMUSCULAR; INTRAVENOUS; SUBCUTANEOUS ONCE AS NEEDED
Status: CANCELLED | OUTPATIENT
Start: 2019-11-06

## 2019-11-06 RX ADMIN — FLUOROURACIL 6210 MG: 50 INJECTION, SOLUTION INTRAVENOUS at 02:11

## 2019-11-06 RX ADMIN — BEVACIZUMAB 370 MG: 400 INJECTION, SOLUTION INTRAVENOUS at 09:11

## 2019-11-06 RX ADMIN — DEXTROSE: 50 INJECTION, SOLUTION INTRAVENOUS at 12:11

## 2019-11-06 RX ADMIN — PALONOSETRON: 0.25 INJECTION, SOLUTION INTRAVENOUS at 09:11

## 2019-11-06 RX ADMIN — LEUCOVORIN CALCIUM 390 MG: 500 INJECTION, POWDER, LYOPHILIZED, FOR SOLUTION INTRAMUSCULAR; INTRAVENOUS at 12:11

## 2019-11-06 RX ADMIN — Medication 10 ML: at 09:11

## 2019-11-06 RX ADMIN — ATROPINE SULFATE 0.4 MG: 0.4 INJECTION, SOLUTION INTRAMUSCULAR; INTRAVENOUS; SUBCUTANEOUS at 10:11

## 2019-11-06 RX ADMIN — IRINOTECAN HYDROCHLORIDE 320 MG: 20 INJECTION, SOLUTION INTRAVENOUS at 10:11

## 2019-11-06 RX ADMIN — SODIUM CHLORIDE: 9 INJECTION, SOLUTION INTRAVENOUS at 09:11

## 2019-11-06 RX ADMIN — OXALIPLATIN 165 MG: 100 INJECTION, SOLUTION, CONCENTRATE INTRAVENOUS at 12:11

## 2019-11-06 NOTE — PLAN OF CARE
Pt. Is stable. Pt. Received FOLFOXIRI today. Pt. Will return Friday 11/8/19 to remove pump and get neulasta OBI.

## 2019-11-06 NOTE — DISCHARGE INSTRUCTIONS
[facility]  [address] [address]  [address]  [phone] phone     [phone] fax  Hours of Operation: Monday- Friday 8:00am- 5:00pm  After hours phone  [phone]  Hematology / Oncology Physicians on call      LUCIA Laurent Dr., Dr., Dr., Dr., NP Sydney Prescott, NP Tyesha Taylor, NP    Please call with any concerns regarding your appointment today.HOME CARE AFTER CHEMOTHERAPY   Meals   Many patients feel sick and lose their appetites during treatment. Eat small meals several times a day. Choose bland foods with little taste or smell if you have problems with nausea. Be sure to cook all food thoroughly. This kills bacteria and helps you avoid intestinal infection. Soft foods are easier to swallow and digest.   Activity   Exercise keeps you strong and keeps your heart and lungs active. Talk to your doctor about an appropriate exercise program for you.   Skin Care   To prevent a skin infection, bathe or shower once a day. Use a moisturizing soap and wash with warm water. Avoid very hot or cold water. Chemotherapy can make your skin dry . Apply moisturizing lotion to help relieve dry skin. Some drugs used in high doses can cause slight burns to appear (like sunburn). Ask for a special cream to help relieve the burn and protect your skin.   Prevent Mouth Sores   During chemotherapy, many people get mouth sores. Do the following to help prevent mouth sores or to ease discomfort.   Brush your teeth with a soft-bristle toothbrush after every meal.  Don't use dental floss if your platelet count is below 50,000. Your doctor or nurse will tell you if this is the case.  Use an oral swab or special soft toothbrush if your gums bleed during regular brushing.  Use mouthwash as directed. If you can't tolerate commercial mouthwash, use salt and baking soda to clean your mouth. Mix 1 teaspoon of salt and 1  teaspoon of baking soda into a glass of water. Swish and spit.  Call your doctor or return to this facility if you develop any of the following:   Sore throat   White patches in the mouth or throat   Fever of 100.4ºF (38ºC) or higher, or as directed by your healthcare provider  © 2000-2011 [facility] [place], [address] [place] [address]. All rights reserved. This information is not intended as a substitute for professional medical care. Always follow your healthcare professional's   FALL PREVENTION   Falls often occur due to slipping, tripping or losing your balance. Here are ways to reduce your risk of falling again.   Was there anything that caused your fall that can be fixed, removed or replaced?   Make your home safe by keeping walkways clear of objects you may trip over.   Use non-slip pads under rugs.   Do not walk in poorly lit areas.   Do not stand on chairs or wobbly ladders.   Use caution when reaching overhead or looking upward. This position can cause a loss of balance.   Be sure your shoes fit properly, have non-slip bottoms and are in good condition.   Be cautious when going up and down stairs, curbs, and when walking on uneven sidewalks.   If your balance is poor, consider using a cane or walker.   If your fall was related to alcohol use, stop or limit alcohol intake.   If your fall was related to use of sleeping medicines, talk to your doctor about this. You may need to reduce your dosage at bedtime if you awaken during the night to go to the bathroom.   To reduce the need for nighttime bathroom trips:   Avoid drinking fluids for several hours before going to bed   Empty your bladder before going to bed   Men can keep a urinal at the bedside   © 2000-2011 [facility] [place], [address] [place] [address]. All rights reserved. This information is not intended as a substitute for professional medical care. Always follow your healthcare professional's instructions.  Support  "Groups/Classes    Support groups and classes are being offered at the   [facility] and [facility]!!    "Cooking with Cancer" (Nutrition Class):  Second Wednesday of each month   at noon at the Cancer Center.  Metastatic Support Group:  Third Tuesday of each month   at noon at the Cancer Center.  Next Steps Class/Group: Second and fourth Thursday of each month at noon at the Cancer Center.  [facility] (Breast Cancer Support Group): First Tuesday of each month   at 5:30pm at the [facility] location.  [employer]: [facility]: Second and third Tuesday of each month from 7:30am - 2pm.  [facility]: First and fourth Tuesday of each month from 7:30am - 2pm    If you are interested in attending or would like more information please ask our social workers or your nurse!  "

## 2019-11-06 NOTE — PROGRESS NOTES
Sw met with patient and significant other while in infusion to follow up. Sw introduced herself. Sw asked patient to tell her about himself. Pt reports on how life has been. He reports on multiple medical bills and providing for significant other and their 3 children. Pt reports he is the only source of income in the household. Significant other is disabled. Pt reports he has been feeling fine during treatment. Sw discussed [employer] through [employer] [facility]. Sw provided patient will eligibilty guidelines. Pt agreed that SW could apply for him. Pt reports financial counselor have all his proof of income if needed. Sw offered [employer] to assist with housing and travel expenses. Pt signed [employer]. Pt will bring receipts to Sw on Friday when he return for his appt. Sw provided patient with contact information and card if he have any future concerns.     Sw completed [employer] and submitted to MD for signature. Ez applied for [employer] online. Sw provided pt's significant other with confirmation of completed application. Ez will continue to f/u.

## 2019-11-06 NOTE — PROGRESS NOTES
Subjective:       Patient ID: Sukhjinder Presley is a 53 y.o. male.    Chief Complaint: Follow-up; Results; Chemotherapy; and Colon Cancer    HPI 53-year-old male history of rectal carcinoma cycle 3 day 1 of FOLFIRI plus Avastin patient is tolerating therapy well denies nausea vomiting fevers chills night sweats wife recently diagnosed with metastatic sarcoma ECOG status 1    Past Medical History:   Diagnosis Date    Anemia     Cancer      Family History   Problem Relation Age of Onset    Intestinal malrotation Mother     Leukemia Father     No Known Problems Sister     Coronary artery disease Brother     Coronary artery disease Maternal Grandmother     Stomach cancer Maternal Grandfather      Social History     Socioeconomic History    Marital status: Significant Other     Spouse name: Not on file    Number of children: Not on file    Years of education: Not on file    Highest education level: Not on file   Occupational History    Not on file   Social Needs    Financial resource strain: Somewhat hard    Food insecurity:     Worry: Sometimes true     Inability: Sometimes true    Transportation needs:     Medical: No     Non-medical: No   Tobacco Use    Smoking status: Current Every Day Smoker     Packs/day: 1.00     Years: 35.00     Pack years: 35.00     Types: Cigarettes     Start date: 1/2/1984    Smokeless tobacco: Current User     Types: Chew   Substance and Sexual Activity    Alcohol use: Yes     Alcohol/week: 46.0 standard drinks     Types: 42 Cans of beer, 4 Shots of liquor per week     Frequency: 4 or more times a week     Drinks per session: 5 or 6     Binge frequency: Daily or almost daily     Comment: nancie 7, beer daily,  None 72 hours prior to surgery    Drug use: Never    Sexual activity: Yes     Partners: Female     Birth control/protection: None   Lifestyle    Physical activity:     Days per week: 1 day     Minutes per session: 60 min    Stress: Not on file   Relationships     Social connections:     Talks on phone: More than three times a week     Gets together: More than three times a week     Attends Islam service: Not on file     Active member of club or organization: No     Attends meetings of clubs or organizations: Never     Relationship status: Living with partner   Other Topics Concern    Not on file   Social History Narrative    Not on file     Past Surgical History:   Procedure Laterality Date    COLONOSCOPY N/A 6/6/2019    Procedure: COLONOSCOPY;  Surgeon: Anjelica Saavedra MD;  Location: [facility] ENDO;  Service: Endoscopy;  Laterality: N/A;    ESOPHAGOGASTRODUODENOSCOPY N/A 6/6/2019    Procedure: EGD (ESOPHAGOGASTRODUODENOSCOPY);  Surgeon: Anjelica Saavedra MD;  Location: [facility] ENDO;  Service: Endoscopy;  Laterality: N/A;    HERNIA REPAIR  1995    INSERTION OF TUNNELED CENTRAL VENOUS CATHETER (CVC) WITH SUBCUTANEOUS PORT N/A 10/8/2019    Procedure: [id number];  Surgeon: Jan New MD;  Location: [facility] OR;  Service: General;  Laterality: N/A;    TONSILLECTOMY         Labs:  Lab Results   Component Value Date    WBC 11.69 11/05/2019    HGB 12.7 (L) 11/05/2019    HCT 38.0 (L) 11/05/2019    MCV 87 11/05/2019     11/05/2019     BMP  Lab Results   Component Value Date     11/05/2019    K 3.6 11/05/2019     11/05/2019    CO2 27 11/05/2019    BUN 5 (L) 11/05/2019    CREATININE 0.8 11/05/2019    CALCIUM 8.8 11/05/2019    ANIONGAP 8 11/05/2019    ESTGFRAFRICA >60 11/05/2019    EGFRNONAA >60 11/05/2019     Lab Results   Component Value Date    ALT 17 11/05/2019    AST 16 11/05/2019    ALKPHOS 103 11/05/2019    BILITOT 0.3 11/05/2019       Lab Results   Component Value Date    IRON 94 10/31/2019    TIBC 339 10/31/2019    FERRITIN 856 (H) 10/31/2019     Lab Results   Component Value Date    ANGEIYQM24 497 09/03/2019     Lab Results   Component Value Date    FOLATE 8.3 09/03/2019     Lab Results   Component Value Date    TSH 1.607 01/15/2019          Review of Systems   Constitutional: Negative for activity change, appetite change, chills, diaphoresis, fatigue, fever and unexpected weight change.   HENT: Negative for congestion, dental problem, drooling, ear discharge, ear pain, facial swelling, hearing loss, mouth sores, nosebleeds, postnasal drip, rhinorrhea, sinus pressure, sneezing, sore throat, tinnitus, trouble swallowing and voice change.    Eyes: Negative for photophobia, pain, discharge, redness, itching and visual disturbance.   Respiratory: Negative for apnea, cough, choking, chest tightness, shortness of breath, wheezing and stridor.    Cardiovascular: Negative for chest pain, palpitations and leg swelling.   Gastrointestinal: Negative for abdominal distention, abdominal pain, anal bleeding, blood in stool, constipation, diarrhea, nausea, rectal pain and vomiting.   Endocrine: Negative for cold intolerance, heat intolerance, polydipsia, polyphagia and polyuria.   Genitourinary: Negative for decreased urine volume, difficulty urinating, discharge, dysuria, enuresis, flank pain, frequency, genital sores, hematuria, penile pain, penile swelling, scrotal swelling, testicular pain and urgency.   Musculoskeletal: Negative for arthralgias, back pain, gait problem, joint swelling, myalgias, neck pain and neck stiffness.   Skin: Negative for color change, pallor, rash and wound.   Allergic/Immunologic: Negative for environmental allergies, food allergies and immunocompromised state.   Neurological: Negative for dizziness, tremors, seizures, syncope, facial asymmetry, speech difficulty, weakness, light-headedness, numbness and headaches.   Hematological: Negative for adenopathy. Does not bruise/bleed easily.   Psychiatric/Behavioral: Positive for dysphoric mood. Negative for agitation, behavioral problems, confusion, decreased concentration, hallucinations, self-injury, sleep disturbance and suicidal ideas. The patient is nervous/anxious. The patient  is not hyperactive.        Objective:      Physical Exam   Constitutional: He is oriented to person, place, and time. He appears well-developed and well-nourished. He appears distressed.   HENT:   Head: Normocephalic.   Right Ear: External ear normal.   Left Ear: External ear normal.   Nose: Nose normal. Right sinus exhibits no maxillary sinus tenderness and no frontal sinus tenderness. Left sinus exhibits no maxillary sinus tenderness and no frontal sinus tenderness.   Mouth/Throat: Oropharynx is clear and moist. No oropharyngeal exudate.   Eyes: Pupils are equal, round, and reactive to light. EOM and lids are normal. Right eye exhibits no discharge. Left eye exhibits no discharge. Right conjunctiva is not injected. Right conjunctiva has no hemorrhage. Left conjunctiva is not injected. Left conjunctiva has no hemorrhage. No scleral icterus. Right eye exhibits normal extraocular motion. Left eye exhibits normal extraocular motion.   Neck: Normal range of motion. Neck supple. No JVD present. No tracheal deviation present. No thyromegaly present.   Cardiovascular: Normal rate and regular rhythm.   Pulmonary/Chest: Effort normal. No stridor. No respiratory distress.   Abdominal: Soft. He exhibits no mass. There is no hepatosplenomegaly, splenomegaly or hepatomegaly. There is no tenderness.   Musculoskeletal: Normal range of motion. He exhibits no edema or tenderness.   Lymphadenopathy:        Head (right side): No posterior auricular and no occipital adenopathy present.        Head (left side): No posterior auricular and no occipital adenopathy present.     He has no cervical adenopathy.        Right cervical: No superficial cervical, no deep cervical and no posterior cervical adenopathy present.       Left cervical: No superficial cervical, no deep cervical and no posterior cervical adenopathy present.     He has no axillary adenopathy.        Right: No supraclavicular adenopathy present.        Left: No  supraclavicular adenopathy present.   Neurological: He is alert and oriented to person, place, and time. He has normal strength. No cranial nerve deficit. Coordination normal.   Skin: Skin is dry. No rash noted. He is not diaphoretic. No cyanosis or erythema. Nails show no clubbing.   Psychiatric: He has a normal mood and affect. His behavior is normal. Judgment and thought content normal. Cognition and memory are normal.   Vitals reviewed.          Assessment:      1. 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

## 2019-11-08 ENCOUNTER — INFUSION (OUTPATIENT)
Dept: INFUSION THERAPY | Facility: HOSPITAL | Age: 54
End: 2019-11-08
Payer: COMMERCIAL

## 2019-11-08 VITALS
BODY MASS INDEX: 22.15 KG/M2 | DIASTOLIC BLOOD PRESSURE: 81 MMHG | HEART RATE: 75 BPM | TEMPERATURE: 99 F | RESPIRATION RATE: 98 BRPM | WEIGHT: 163.56 LBS | HEIGHT: 72 IN | SYSTOLIC BLOOD PRESSURE: 127 MMHG

## 2019-11-08 DIAGNOSIS — C20 RECTAL CANCER: Primary | ICD-10-CM

## 2019-11-08 PROCEDURE — 25000003 PHARM REV CODE 250: Performed by: INTERNAL MEDICINE

## 2019-11-08 PROCEDURE — 96377 APPLICATON ON-BODY INJECTOR: CPT

## 2019-11-08 PROCEDURE — A4216 STERILE WATER/SALINE, 10 ML: HCPCS | Performed by: INTERNAL MEDICINE

## 2019-11-08 PROCEDURE — 63600175 PHARM REV CODE 636 W HCPCS: Performed by: INTERNAL MEDICINE

## 2019-11-08 RX ORDER — SODIUM CHLORIDE 0.9 % (FLUSH) 0.9 %
10 SYRINGE (ML) INJECTION
Status: DISCONTINUED | OUTPATIENT
Start: 2019-11-08 | End: 2019-11-08 | Stop reason: HOSPADM

## 2019-11-08 RX ORDER — HEPARIN 100 UNIT/ML
500 SYRINGE INTRAVENOUS
Status: DISCONTINUED | OUTPATIENT
Start: 2019-11-08 | End: 2019-11-08 | Stop reason: HOSPADM

## 2019-11-08 RX ADMIN — HEPARIN SODIUM (PORCINE) LOCK FLUSH IV SOLN 100 UNIT/ML 500 UNITS: 100 SOLUTION at 02:11

## 2019-11-08 RX ADMIN — PEGFILGRASTIM 6 MG: KIT SUBCUTANEOUS at 02:11

## 2019-11-08 RX ADMIN — Medication 10 ML: at 02:11

## 2019-11-08 NOTE — NURSING
1440pm: Pt here for 5FU continuous pump d/c. Pump stopped and disconnected.  Existing dressing appeared clean and intact.  Right chestwall mediport  Flushed with 10ml NS and 5 ml heparin solution.  Needle D/C, site without redness, swelling, or drainage noted.  Dressing applied.  Patient tolerated well.  Patient to return to clinic in 2 weeks.

## 2019-11-08 NOTE — DISCHARGE INSTRUCTIONS
[facility]  [address] [facility]  [address] [address]  [phone] phone     [phone] fax  Hours of Operation: Monday- Friday 8:00am- 5:00pm  After hours phone  [phone]  Hematology / Oncology Physicians on call      LUCIA Laurent Dr., Dr., Dr., Dr., NP Sydney Prescott, NP Tyesha Taylor, NP    Please call with any concerns regarding your appointment today.HOME CARE AFTER CHEMOTHERAPY   Meals   Many patients feel sick and lose their appetites during treatment. Eat small meals several times a day. Choose bland foods with little taste or smell if you have problems with nausea. Be sure to cook all food thoroughly. This kills bacteria and helps you avoid intestinal infection. Soft foods are easier to swallow and digest.   Activity   Exercise keeps you strong and keeps your heart and lungs active. Talk to your doctor about an appropriate exercise program for you.   Skin Care   To prevent a skin infection, bathe or shower once a day. Use a moisturizing soap and wash with warm water. Avoid very hot or cold water. Chemotherapy can make your skin dry . Apply moisturizing lotion to help relieve dry skin. Some drugs used in high doses can cause slight burns to appear (like sunburn). Ask for a special cream to help relieve the burn and protect your skin.   Prevent Mouth Sores   During chemotherapy, many people get mouth sores. Do the following to help prevent mouth sores or to ease discomfort.   Brush your teeth with a soft-bristle toothbrush after every meal.  Don't use dental floss if your platelet count is below 50,000. Your doctor or nurse will tell you if this is the case.  Use an oral swab or special soft toothbrush if your gums bleed during regular brushing.  Use mouthwash as directed. If you can't tolerate commercial mouthwash, use salt and baking soda to clean your mouth. Mix 1 teaspoon of salt and 1  teaspoon of baking soda into a glass of water. Swish and spit.  Call your doctor or return to this facility if you develop any of the following:   Sore throat   White patches in the mouth or throat   Fever of 100.4ºF (38ºC) or higher, or as directed by your healthcare provider  © 2000-2011 [employer] \[facility] [place] [facility]", [address] [place] [address]. All rights reserved. This information is not intended as a substitute for professional medical care. Always follow your healthcare professional's   FALL PREVENTION   Falls often occur due to slipping, tripping or losing your balance. Here are ways to reduce your risk of falling again.   Was there anything that caused your fall that can be fixed, removed or replaced?   Make your home safe by keeping walkways clear of objects you may trip over.   Use non-slip pads under rugs.   Do not walk in poorly lit areas.   Do not stand on chairs or wobbly ladders.   Use caution when reaching overhead or looking upward. This position can cause a loss of balance.   Be sure your shoes fit properly, have non-slip bottoms and are in good condition.   Be cautious when going up and down stairs, curbs, and when walking on uneven sidewalks.   If your balance is poor, consider using a cane or walker.   If your fall was related to alcohol use, stop or limit alcohol intake.   If your fall was related to use of sleeping medicines, talk to your doctor about this. You may need to reduce your dosage at bedtime if you awaken during the night to go to the bathroom.   To reduce the need for nighttime bathroom trips:   Avoid drinking fluids for several hours before going to bed   Empty your bladder before going to bed   Men can keep a urinal at the bedside   © 2000-2011 [employer] \[facility] [place] [facility]\"", [address] [place] [address]. All rights reserved. This information is not intended as a substitute for professional medical care. Always follow your healthcare professional's instructions.  Support  "Groups/Classes    Support groups and classes are being offered at the   [facility] and [facility]    "Cooking with Cancer" (Nutrition Class):  Second Wednesday of each month   at noon at the Cancer Center.  Metastatic Support Group:  Third Tuesday of each month   at noon at the Cancer Center.  Next Steps Class/Group: Second and fourth Thursday of each month at noon at the Cancer Center.  Hope Chest (Breast Cancer Support Group): First Tuesday of each month   at 5:30pm at the [facility] location.  [employer] [facility]: [facility]: Second and third Tuesday of each month from 7:30am - 2pm.  [facility]: First and fourth Tuesday of each month from 7:30am - 2pm    If you are interested in attending or would like more information please ask our social workers or your nurse!  "

## 2019-11-19 ENCOUNTER — OFFICE VISIT (OUTPATIENT)
Dept: HEMATOLOGY/ONCOLOGY | Facility: CLINIC | Age: 54
End: 2019-11-19
Payer: COMMERCIAL

## 2019-11-19 ENCOUNTER — LAB VISIT (OUTPATIENT)
Dept: LAB | Facility: HOSPITAL | Age: 54
End: 2019-11-19
Attending: INTERNAL MEDICINE
Payer: COMMERCIAL

## 2019-11-19 VITALS
DIASTOLIC BLOOD PRESSURE: 77 MMHG | BODY MASS INDEX: 22.81 KG/M2 | TEMPERATURE: 99 F | WEIGHT: 168.44 LBS | HEART RATE: 90 BPM | HEIGHT: 72 IN | OXYGEN SATURATION: 97 % | SYSTOLIC BLOOD PRESSURE: 117 MMHG

## 2019-11-19 DIAGNOSIS — C20 RECTAL CANCER: Primary | ICD-10-CM

## 2019-11-19 DIAGNOSIS — C20 RECTAL CANCER: ICD-10-CM

## 2019-11-19 DIAGNOSIS — D50.0 IRON DEFICIENCY ANEMIA DUE TO CHRONIC BLOOD LOSS: ICD-10-CM

## 2019-11-19 LAB
ALBUMIN SERPL BCP-MCNC: 3.4 G/DL (ref 3.5–5.2)
ALP SERPL-CCNC: 106 U/L (ref 55–135)
ALT SERPL W/O P-5'-P-CCNC: 17 U/L (ref 10–44)
ANION GAP SERPL CALC-SCNC: 9 MMOL/L (ref 8–16)
AST SERPL-CCNC: 14 U/L (ref 10–40)
BASOPHILS # BLD AUTO: 0.08 K/UL (ref 0–0.2)
BASOPHILS NFR BLD: 0.7 % (ref 0–1.9)
BILIRUB SERPL-MCNC: 0.3 MG/DL (ref 0.1–1)
BUN SERPL-MCNC: 6 MG/DL (ref 6–20)
CALCIUM SERPL-MCNC: 8.9 MG/DL (ref 8.7–10.5)
CHLORIDE SERPL-SCNC: 106 MMOL/L (ref 95–110)
CO2 SERPL-SCNC: 29 MMOL/L (ref 23–29)
CREAT SERPL-MCNC: 0.8 MG/DL (ref 0.5–1.4)
DIFFERENTIAL METHOD: ABNORMAL
EOSINOPHIL # BLD AUTO: 0.2 K/UL (ref 0–0.5)
EOSINOPHIL NFR BLD: 1.9 % (ref 0–8)
ERYTHROCYTE [DISTWIDTH] IN BLOOD BY AUTOMATED COUNT: 16 % (ref 11.5–14.5)
EST. GFR  (AFRICAN AMERICAN): >60 ML/MIN/1.73 M^2
EST. GFR  (NON AFRICAN AMERICAN): >60 ML/MIN/1.73 M^2
GLUCOSE SERPL-MCNC: 103 MG/DL (ref 70–110)
HCT VFR BLD AUTO: 35.6 % (ref 40–54)
HGB BLD-MCNC: 11.9 G/DL (ref 14–18)
IMM GRANULOCYTES # BLD AUTO: 0.25 K/UL (ref 0–0.04)
IMM GRANULOCYTES NFR BLD AUTO: 2.1 % (ref 0–0.5)
LYMPHOCYTES # BLD AUTO: 1.9 K/UL (ref 1–4.8)
LYMPHOCYTES NFR BLD: 15.7 % (ref 18–48)
MCH RBC QN AUTO: 29.3 PG (ref 27–31)
MCHC RBC AUTO-ENTMCNC: 33.4 G/DL (ref 32–36)
MCV RBC AUTO: 88 FL (ref 82–98)
MONOCYTES # BLD AUTO: 1 K/UL (ref 0.3–1)
MONOCYTES NFR BLD: 7.9 % (ref 4–15)
NEUTROPHILS # BLD AUTO: 9 K/UL (ref 1.8–7.7)
NEUTROPHILS NFR BLD: 73.8 % (ref 38–73)
NRBC BLD-RTO: 0 /100 WBC
PLATELET # BLD AUTO: 290 K/UL (ref 150–350)
PLATELET BLD QL SMEAR: ABNORMAL
PMV BLD AUTO: 9.2 FL (ref 9.2–12.9)
POTASSIUM SERPL-SCNC: 3.5 MMOL/L (ref 3.5–5.1)
PROT SERPL-MCNC: 7 G/DL (ref 6–8.4)
RBC # BLD AUTO: 4.06 M/UL (ref 4.6–6.2)
SODIUM SERPL-SCNC: 144 MMOL/L (ref 136–145)
WBC # BLD AUTO: 12.16 K/UL (ref 3.9–12.7)

## 2019-11-19 PROCEDURE — 99215 PR OFFICE/OUTPT VISIT, EST, LEVL V, 40-54 MIN: ICD-10-PCS | Mod: 25,S$GLB,, | Performed by: INTERNAL MEDICINE

## 2019-11-19 PROCEDURE — 36415 COLL VENOUS BLD VENIPUNCTURE: CPT

## 2019-11-19 PROCEDURE — 99999 PR PBB SHADOW E&M-EST. PATIENT-LVL IV: ICD-10-PCS | Mod: PBBFAC,,, | Performed by: INTERNAL MEDICINE

## 2019-11-19 PROCEDURE — 3008F BODY MASS INDEX DOCD: CPT | Mod: CPTII,S$GLB,, | Performed by: INTERNAL MEDICINE

## 2019-11-19 PROCEDURE — 80053 COMPREHEN METABOLIC PANEL: CPT

## 2019-11-19 PROCEDURE — 99215 OFFICE O/P EST HI 40 MIN: CPT | Mod: 25,S$GLB,, | Performed by: INTERNAL MEDICINE

## 2019-11-19 PROCEDURE — 85025 COMPLETE CBC W/AUTO DIFF WBC: CPT

## 2019-11-19 PROCEDURE — 99999 PR PBB SHADOW E&M-EST. PATIENT-LVL IV: CPT | Mod: PBBFAC,,, | Performed by: INTERNAL MEDICINE

## 2019-11-19 PROCEDURE — 3008F PR BODY MASS INDEX (BMI) DOCUMENTED: ICD-10-PCS | Mod: CPTII,S$GLB,, | Performed by: INTERNAL MEDICINE

## 2019-11-19 RX ORDER — SODIUM CHLORIDE 0.9 % (FLUSH) 0.9 %
10 SYRINGE (ML) INJECTION
Status: CANCELLED | OUTPATIENT
Start: 2019-11-22

## 2019-11-19 RX ORDER — HEPARIN 100 UNIT/ML
500 SYRINGE INTRAVENOUS
Status: CANCELLED | OUTPATIENT
Start: 2019-11-20

## 2019-11-19 RX ORDER — DIPHENHYDRAMINE HYDROCHLORIDE 50 MG/ML
50 INJECTION INTRAMUSCULAR; INTRAVENOUS ONCE AS NEEDED
Status: CANCELLED | OUTPATIENT
Start: 2019-11-20

## 2019-11-19 RX ORDER — HEPARIN 100 UNIT/ML
500 SYRINGE INTRAVENOUS
Status: CANCELLED | OUTPATIENT
Start: 2019-11-22

## 2019-11-19 RX ORDER — ATROPINE SULFATE 0.4 MG/ML
0.4 INJECTION, SOLUTION ENDOTRACHEAL; INTRAMEDULLARY; INTRAMUSCULAR; INTRAVENOUS; SUBCUTANEOUS ONCE AS NEEDED
Status: CANCELLED | OUTPATIENT
Start: 2019-11-20

## 2019-11-19 RX ORDER — SODIUM CHLORIDE 0.9 % (FLUSH) 0.9 %
10 SYRINGE (ML) INJECTION
Status: CANCELLED | OUTPATIENT
Start: 2019-11-20

## 2019-11-19 RX ORDER — EPINEPHRINE 0.3 MG/.3ML
0.3 INJECTION SUBCUTANEOUS ONCE AS NEEDED
Status: CANCELLED | OUTPATIENT
Start: 2019-11-20

## 2019-11-19 NOTE — PROGRESS NOTES
Subjective:       Patient ID: Sukhjinder Presley is a 53 y.o. male.    Chief Complaint: Rectal cancer [C20]  HPI: We have an opportunity to see Mr. Sukhjinder Presley in Hematology Oncology clinic at [facility] on 11/19/2019.  Mr. Sukhjinder Presley is a 53 y.o. gentleman with rectal cancer completed neoadjuvant chemoradiation with concurrent xeloda.  Reported symptoms have improved with better pain control and appetite.  Restaging MRI rectum showed      Impression         1. Large rectal mass with invasion of the muscularis and involvement of the mesorectal fat and fascia along the left and posterior aspect of the mass.  Single lymph node within the mesorectal fat posteriorly measuring approximately 6 mm.  Additional subcentimeter obturator and external iliac chain lymph nodes also seen bilaterally.  When compared to the study prior to neoadjuvant therapy there has been significant decrease in wall thickening.      Has been evaluated by Dr. New, given fascia involvement, likely resection would be R1.  Recommended total neoadjuvant chemotherapy prior to surgery.    Currently on chemotherapy with Avastin FOLFOXIRI s/p 3 cycles.       Rectal cancer    6/24/2019 Initial Diagnosis     Rectal cancer      6/26/2019 Cancer Staged     Staging form: Colon and Rectum, AJCC 8th Edition  - Clinical stage from 6/26/2019: Stage IIIB (cT3, cN2a, cM0) - Signed by Artem Mishra II, MD on 6/26/2019      7/10/2019 - 8/13/2019 Radiation Therapy     Treatment Site Ref. ID Energy Dose/Fx (Gy) #Fx Dose Correction (Gy) Total Dose (Gy) Start Date End Date Elapsed Days   RECTUM Pelvis 6X 2 25 / 25 0 50 7/10/2019 8/13/2019 34           7/10/2019 - 7/10/2019 Chemotherapy     Treatment Summary   Plan Name: OP CAPECITABINE 5 DAYS + RADIOTHERAPY  Treatment Goal: Curative  Status: Inactive  Start Date: [No treatment day found]  End Date: [No treatment day found]  Provider: Song Aden MD  Chemotherapy: [No matching medication found in this  treatment plan]      10/9/2019 Tumor Conference       Multidisciplinary Rectal Cancer Conference - Evaluation and Recommendation Summary    10/9/2019  Sukhjinder Presley  [id number]  53 y.o. male    1. Evaluation    C scope on 6/6/19 - non obstructing mass    MRI date: 6/17/19    Tumor Location in Rectum: middle third    Indication of Sphincter Involvement:  Uninvolved    Pretreatment Circumferential Resection Margin (CRM) status:  Threatened Tumor abuts CRM on the left.     Pretreatment (clinical) AJCC Stage: IIIB  Stage I  [] I: T1N0M0  [] I: T2N0M0  Stage II  [] IIA: T3N0M0  [] IIB W4oA7N8  [] IIC: A0lO7Q3  Stage III  [] IIIA: T1-2N1M0  [] IIIA: H5Z1yZ5  [x] IIIB: T3-X1cC2Q9  [] IIIB: T2-3N2aM0  [] IIIB: T1-2N2bM0  [] IIIC: S5dT6fE5  [] IIIC: T3-1cV5jU3  [] IIIC: G6dD8-3S4   Stage IV  [] IV: Y6-6K2-2W5o-b    CEA level:   Lab Results   Component Value Date    CEA 1.4 09/30/2019       2. Treatment     Neoadjuvant Therapy Recommendation: Long Course Chemoradiotherapy - completed on 8/13    CT scan 7/30 during treatment: Unchanged appearance of large rectal mass, mild curcumferential bladder wall thickening.     MRI rectum 9/24: Large mass with invasion of muscularis and involvement of the mesorectal fat and fascia along the left and posterior aspect of the mass.   Single LN within the mesorectal fat posteriorly measuring 6 mm.   Subcentimeter obturator and external iliac changes LNDs seen bilaterally.     Recommendations:     Complete COLLINS with initiation of FOLFOX.     After follow by LAR + DI.       10/9/2019 -  Chemotherapy     Treatment Summary   Plan Name: OP FOLFOXIRI Q2W  Treatment Goal: Curative  Status: Active  Start Date: 10/9/2019  End Date: 1/17/2020 (Planned)  Provider: Song Aden MD  Chemotherapy: fluorouracil 3,200 mg/m2 = 6,305 mg in sodium chloride 0.9% 253 mL chemo infusion, 3,200 mg/m2 = 6,305 mg, Intravenous, Over 48 hours, 3 of 8 cycles  Administration: 6,305 mg (10/9/2019), 6,270 mg (10/23/2019),  6,210 mg (11/6/2019)  bevacizumab (AVASTIN) 5 mg/kg = 380 mg in sodium chloride 0.9% 100 mL chemo infusion, 5 mg/kg = 380 mg (100 % of original dose 5 mg/kg), Intravenous, Clinic/HOD 1 time, 3 of 8 cycles  Dose modification: 5 mg/kg (original dose 5 mg/kg, Cycle 1, Reason: MD Discretion), 5 mg/kg (original dose 5 mg/kg, Cycle 2)  Administration: 380 mg (10/9/2019), 380 mg (10/23/2019), 370 mg (11/6/2019)  irinotecan (CAMPTOSAR) 165 mg/m2 = 326 mg in sodium chloride 0.9% 266.3 mL chemo infusion, 165 mg/m2 = 326 mg, Intravenous, Clinic/HOD 1 time, 3 of 8 cycles  Administration: 326 mg (10/9/2019), 324 mg (10/23/2019), 320 mg (11/6/2019)  leucovorin calcium 200 mg/m2 = 395 mg in dextrose 5 % 269.75 mL infusion, 200 mg/m2 = 395 mg, Intravenous, Clinic/HOD 1 time, 3 of 8 cycles  Administration: 395 mg (10/9/2019), 390 mg (10/23/2019), 390 mg (11/6/2019)  oxaliplatin (ELOXATIN) 85 mg/m2 = 167 mg in dextrose 5 % 533.4 mL chemo infusion, 85 mg/m2 = 167 mg, Intravenous, Clinic/HOD 1 time, 3 of 8 cycles  Administration: 167 mg (10/9/2019), 167 mg (10/23/2019), 165 mg (11/6/2019)       Past Medical History:   Diagnosis Date    Anemia     Cancer      Family History   Problem Relation Age of Onset    Intestinal malrotation Mother     Leukemia Father     No Known Problems Sister     Coronary artery disease Brother     Coronary artery disease Maternal Grandmother     Stomach cancer Maternal Grandfather      Social History     Socioeconomic History    Marital status: Significant Other     Spouse name: Not on file    Number of children: Not on file    Years of education: Not on file    Highest education level: Not on file   Occupational History    Not on file   Social Needs    Financial resource strain: Somewhat hard    Food insecurity:     Worry: Sometimes true     Inability: Sometimes true    Transportation needs:     Medical: No     Non-medical: No   Tobacco Use    Smoking status: Current Every Day Smoker      Packs/day: 1.00     Years: 35.00     Pack years: 35.00     Types: Cigarettes     Start date: 1/2/1984    Smokeless tobacco: Current User     Types: Chew   Substance and Sexual Activity    Alcohol use: Yes     Alcohol/week: 46.0 standard drinks     Types: 42 Cans of beer, 4 Shots of liquor per week     Frequency: 4 or more times a week     Drinks per session: 5 or 6     Binge frequency: Daily or almost daily     Comment: segrams 7, beer daily,  None 72 hours prior to surgery    Drug use: Never    Sexual activity: Yes     Partners: Female     Birth control/protection: None   Lifestyle    Physical activity:     Days per week: 1 day     Minutes per session: 60 min    Stress: Not on file   Relationships    Social connections:     Talks on phone: More than three times a week     Gets together: More than three times a week     Attends Sabianism service: Not on file     Active member of club or organization: No     Attends meetings of clubs or organizations: Never     Relationship status: Living with partner   Other Topics Concern    Not on file   Social History Narrative    Not on file     Past Surgical History:   Procedure Laterality Date    COLONOSCOPY N/A 6/6/2019    Procedure: COLONOSCOPY;  Surgeon: Anjelica Saavedra MD;  Location: [facility];  Service: Endoscopy;  Laterality: N/A;    ESOPHAGOGASTRODUODENOSCOPY N/A 6/6/2019    Procedure: EGD (ESOPHAGOGASTRODUODENOSCOPY);  Surgeon: Anjelica Saavedra MD;  Location: [facility];  Service: Endoscopy;  Laterality: N/A;    HERNIA REPAIR  1995    INSERTION OF TUNNELED CENTRAL VENOUS CATHETER (CVC) WITH SUBCUTANEOUS PORT N/A 10/8/2019    Procedure: [id number];  Surgeon: Jan New MD;  Location: [facility];  Service: General;  Laterality: N/A;    TONSILLECTOMY       Current Outpatient Medications   Medication Sig Dispense Refill    acetaminophen (TYLENOL) 325 MG tablet Take 1 tablet (325 mg total) by mouth every 6 (six) hours as needed for Pain.  0     aspirin 81 MG Chew Take 81 mg by mouth once daily.      capecitabine (XELODA) 500 MG Tab Take 3 tablets (1500 mg) by mouth twice daily after breakfast and dinner on days of radiation only. 168 tablet 0    diphenoxylate-atropine 2.5-0.025 mg (LOMOTIL) 2.5-0.025 mg per tablet Take 1-2 tablets by mouth 4 (four) times daily as needed for Diarrhea. 120 tablet 1    dronabinol (MARINOL) 10 MG capsule Take 1 capsule (10 mg total) by mouth 2 (two) times daily before meals. 60 capsule 1    iron fum-B12-IF-C-folic acid (FOLTRIN) 110-0.5 mg capsule Take 1 capsule by mouth 2 (two) times daily. 60 capsule 1    lidocaine (XYLOCAINE) 5 % Oint ointment Apply topically as needed. 30 g 3    magic mouthwash diphen/antac/lidoc Swish and spit 15 ml by mouth every 4 hours as needed 500 mL 2    megestrol (MEGACE) 40 MG Tab Take 1 tablet (40 mg total) by mouth 4 (four) times daily. 90 tablet 1    morphine (MS CONTIN) 15 MG 12 hr tablet Take 3 tablets (45 mg total) by mouth nightly. 90 tablet 0    nicotine (NICODERM CQ) 21 mg/24 hr Place 1 patch onto the skin once daily. 14 patch 1    ondansetron (ZOFRAN-ODT) 8 MG TbDL Take 1 tablet (8 mg total) by mouth every 12 (twelve) hours as needed. 50 tablet 1    oxyCODONE (ROXICODONE) 10 mg Tab immediate release tablet Take 1 tablet (10 mg total) by mouth every 8 (eight) hours as needed for Pain (medically necessary). 90 tablet 0    prochlorperazine (COMPAZINE) 10 MG tablet Take 1 tablet (10 mg total) by mouth every 6 (six) hours as needed. 60 tablet 1    promethazine (PHENERGAN) 25 MG tablet Take 1 tablet (25 mg total) by mouth every 4 (four) hours. 25 tablet 2    silver sulfADIAZINE 1% (SILVADENE) 1 % cream Apply to affected area daily 400 g 1    traMADol (ULTRAM) 50 mg tablet Take 1 tablet (50 mg total) by mouth every 6 (six) hours as needed for Pain. 90 tablet 0    varenicline (CHANTIX) 1 mg Tab Take 1 tablet (1 mg total) by mouth 2 (two) times daily. 60 tablet 2     No  current facility-administered medications for this visit.        Labs:  Lab Results   Component Value Date    WBC 11.69 11/05/2019    HGB 12.7 (L) 11/05/2019    HCT 38.0 (L) 11/05/2019    MCV 87 11/05/2019     11/05/2019     BMP  Lab Results   Component Value Date     11/05/2019    K 3.6 11/05/2019     11/05/2019    CO2 27 11/05/2019    BUN 5 (L) 11/05/2019    CREATININE 0.8 11/05/2019    CALCIUM 8.8 11/05/2019    ANIONGAP 8 11/05/2019    ESTGFRAFRICA >60 11/05/2019    EGFRNONAA >60 11/05/2019     Lab Results   Component Value Date    ALT 17 11/05/2019    AST 16 11/05/2019    ALKPHOS 103 11/05/2019    BILITOT 0.3 11/05/2019       Lab Results   Component Value Date    IRON 94 10/31/2019    TIBC 339 10/31/2019    FERRITIN 856 (H) 10/31/2019     Lab Results   Component Value Date    ILLJSZTX52 497 09/03/2019     Lab Results   Component Value Date    FOLATE 8.3 09/03/2019     Lab Results   Component Value Date    TSH 1.607 01/15/2019       I have reviewed the radiology reports and examined the scan/xray images.    Review of Systems   Constitutional: Negative.    HENT: Negative.    Eyes: Negative.    Respiratory: Negative.    Cardiovascular: Negative.    Gastrointestinal: Negative.    Endocrine: Negative.    Genitourinary: Negative.    Musculoskeletal: Negative.    Skin: Negative.    Allergic/Immunologic: Negative.    Neurological: Negative.    Hematological: Negative.    Psychiatric/Behavioral: Negative.      ECOG SCORE    0 - Fully active-able to carry on all pre-disease performance without restriction            Objective:   There were no vitals filed for this visit.There is no height or weight on file to calculate BMI.  Physical Exam   Constitutional: He is oriented to person, place, and time. He appears well-developed and well-nourished.   HENT:   Head: Normocephalic and atraumatic.   Eyes: Conjunctivae and EOM are normal.   Neck: Normal range of motion. Neck supple.   Cardiovascular: Normal rate  and regular rhythm.   Pulmonary/Chest: Effort normal and breath sounds normal.   Abdominal: Soft. Bowel sounds are normal.   Musculoskeletal: Normal range of motion.   Neurological: He is alert and oriented to person, place, and time.   Skin: Skin is warm and dry.   Psychiatric: He has a normal mood and affect. His behavior is normal. Judgment and thought content normal.   Nursing note and vitals reviewed.        Assessment:      1. Rectal cancer    2. Iron deficiency anemia due to chronic blood loss           Plan:     Rectal cancer  Continue with Avastin FOLFOXIRI, 4th of 8 cycle tomorrow.   Restage with Ct chest abdomen pelvis after 4th cycle.    Iron deficiency anemia due to chronic blood loss

## 2019-11-20 ENCOUNTER — INFUSION (OUTPATIENT)
Dept: INFUSION THERAPY | Facility: HOSPITAL | Age: 54
End: 2019-11-20
Payer: COMMERCIAL

## 2019-11-20 VITALS
SYSTOLIC BLOOD PRESSURE: 116 MMHG | HEART RATE: 70 BPM | OXYGEN SATURATION: 98 % | WEIGHT: 168.44 LBS | HEIGHT: 72 IN | TEMPERATURE: 98 F | DIASTOLIC BLOOD PRESSURE: 79 MMHG | BODY MASS INDEX: 22.81 KG/M2 | RESPIRATION RATE: 18 BRPM

## 2019-11-20 DIAGNOSIS — C20 RECTAL CANCER: Primary | ICD-10-CM

## 2019-11-20 PROCEDURE — 96417 CHEMO IV INFUS EACH ADDL SEQ: CPT

## 2019-11-20 PROCEDURE — 96367 TX/PROPH/DG ADDL SEQ IV INF: CPT

## 2019-11-20 PROCEDURE — 25000003 PHARM REV CODE 250: Performed by: INTERNAL MEDICINE

## 2019-11-20 PROCEDURE — 63600175 PHARM REV CODE 636 W HCPCS: Performed by: INTERNAL MEDICINE

## 2019-11-20 PROCEDURE — A4216 STERILE WATER/SALINE, 10 ML: HCPCS | Performed by: INTERNAL MEDICINE

## 2019-11-20 PROCEDURE — 96416 CHEMO PROLONG INFUSE W/PUMP: CPT

## 2019-11-20 PROCEDURE — 96375 TX/PRO/DX INJ NEW DRUG ADDON: CPT

## 2019-11-20 PROCEDURE — 96415 CHEMO IV INFUSION ADDL HR: CPT

## 2019-11-20 PROCEDURE — 96368 THER/DIAG CONCURRENT INF: CPT

## 2019-11-20 PROCEDURE — 96413 CHEMO IV INFUSION 1 HR: CPT

## 2019-11-20 RX ORDER — SODIUM CHLORIDE 0.9 % (FLUSH) 0.9 %
10 SYRINGE (ML) INJECTION
Status: DISCONTINUED | OUTPATIENT
Start: 2019-11-20 | End: 2019-11-20 | Stop reason: HOSPADM

## 2019-11-20 RX ORDER — ATROPINE SULFATE 0.4 MG/ML
0.4 INJECTION, SOLUTION ENDOTRACHEAL; INTRAMEDULLARY; INTRAMUSCULAR; INTRAVENOUS; SUBCUTANEOUS ONCE AS NEEDED
Status: COMPLETED | OUTPATIENT
Start: 2019-11-20 | End: 2019-11-20

## 2019-11-20 RX ADMIN — OXALIPLATIN 167 MG: 5 INJECTION, SOLUTION, CONCENTRATE INTRAVENOUS at 12:11

## 2019-11-20 RX ADMIN — LEUCOVORIN CALCIUM 395 MG: 500 INJECTION, POWDER, LYOPHILIZED, FOR SOLUTION INTRAMUSCULAR; INTRAVENOUS at 12:11

## 2019-11-20 RX ADMIN — BEVACIZUMAB 380 MG: 400 INJECTION, SOLUTION INTRAVENOUS at 09:11

## 2019-11-20 RX ADMIN — FLUOROURACIL 6305 MG: 50 INJECTION, SOLUTION INTRAVENOUS at 02:11

## 2019-11-20 RX ADMIN — SODIUM CHLORIDE: 9 INJECTION, SOLUTION INTRAVENOUS at 09:11

## 2019-11-20 RX ADMIN — ATROPINE SULFATE 0.4 MG: 0.4 INJECTION, SOLUTION INTRAMUSCULAR; INTRAVENOUS; SUBCUTANEOUS at 10:11

## 2019-11-20 RX ADMIN — DEXTROSE: 50 INJECTION, SOLUTION INTRAVENOUS at 12:11

## 2019-11-20 RX ADMIN — IRINOTECAN HYDROCHLORIDE 326 MG: 20 INJECTION, SOLUTION INTRAVENOUS at 10:11

## 2019-11-20 RX ADMIN — PALONOSETRON: 0.25 INJECTION, SOLUTION INTRAVENOUS at 09:11

## 2019-11-20 RX ADMIN — SODIUM CHLORIDE, PRESERVATIVE FREE 10 ML: 5 INJECTION INTRAVENOUS at 09:11

## 2019-11-20 NOTE — DISCHARGE INSTRUCTIONS
[facility]  [address] [facility] [address]  [address] [address]  [phone] phone     [phone] fax  Hours of Operation: Monday- Friday 8:00am- 5:00pm  After hours phone  [phone]  Hematology / Oncology Physicians on call      LUCIA Laurent Dr., Dr., Dr., Dr., NP Sydney Prescott, NP Tyesha Taylor, NP    Please call with any concerns regarding your appointment today.HOME CARE AFTER CHEMOTHERAPY   Meals   Many patients feel sick and lose their appetites during treatment. Eat small meals several times a day. Choose bland foods with little taste or smell if you have problems with nausea. Be sure to cook all food thoroughly. This kills bacteria and helps you avoid intestinal infection. Soft foods are easier to swallow and digest.   Activity   Exercise keeps you strong and keeps your heart and lungs active. Talk to your doctor about an appropriate exercise program for you.   Skin Care   To prevent a skin infection, bathe or shower once a day. Use a moisturizing soap and wash with warm water. Avoid very hot or cold water. Chemotherapy can make your skin dry . Apply moisturizing lotion to help relieve dry skin. Some drugs used in high doses can cause slight burns to appear (like sunburn). Ask for a special cream to help relieve the burn and protect your skin.   Prevent Mouth Sores   During chemotherapy, many people get mouth sores. Do the following to help prevent mouth sores or to ease discomfort.   Brush your teeth with a soft-bristle toothbrush after every meal.  Don't use dental floss if your platelet count is below 50,000. Your doctor or nurse will tell you if this is the case.  Use an oral swab or special soft toothbrush if your gums bleed during regular brushing.  Use mouthwash as directed. If you can't tolerate commercial mouthwash, use salt and baking soda to clean your mouth. Mix 1 teaspoon of salt and 1  teaspoon of baking soda into a glass of water. Swish and spit.  Call your doctor or return to this facility if you develop any of the following:   Sore throat   White patches in the mouth or throat   Fever of 100.4ºF (38ºC) or higher, or as directed by your healthcare provider  © 2000-2011 [employer] [facility], [address] [place] [address]. All rights reserved. This information is not intended as a substitute for professional medical care. Always follow your healthcare professional's   FALL PREVENTION   Falls often occur due to slipping, tripping or losing your balance. Here are ways to reduce your risk of falling again.   Was there anything that caused your fall that can be fixed, removed or replaced?   Make your home safe by keeping walkways clear of objects you may trip over.   Use non-slip pads under rugs.   Do not walk in poorly lit areas.   Do not stand on chairs or wobbly ladders.   Use caution when reaching overhead or looking upward. This position can cause a loss of balance.   Be sure your shoes fit properly, have non-slip bottoms and are in good condition.   Be cautious when going up and down stairs, curbs, and when walking on uneven sidewalks.   If your balance is poor, consider using a cane or walker.   If your fall was related to alcohol use, stop or limit alcohol intake.   If your fall was related to use of sleeping medicines, talk to your doctor about this. You may need to reduce your dosage at bedtime if you awaken during the night to go to the bathroom.   To reduce the need for nighttime bathroom trips:   Avoid drinking fluids for several hours before going to bed   Empty your bladder before going to bed   Men can keep a urinal at the bedside   © 2000-2011 [employer] [facility], [address] [place] [address]. All rights reserved. This information is not intended as a substitute for professional medical care. Always follow your healthcare professional's instructions.  Support  "Groups/Classes    Support groups and classes are being offered at the   [facility] and [facility]!!    "Cooking with Cancer" (Nutrition Class):  Second Wednesday of each month   at noon at the Cancer Center.  Metastatic Support Group:  Third Tuesday of each month   at noon at the Cancer Center.  Next Steps Class/Group: Second and fourth Thursday of each month at noon at the Cancer Center.  Hope Chest (Breast Cancer Support Group): First Tuesday of each month   at 5:30pm at the [facility] location.  [facility] [facility]: [facility]: Second and third Tuesday of each month from 7:30am - 2pm.  [facility]: First and fourth Tuesday of each month from 7:30am - 2pm    If you are interested in attending or would like more information please ask our social workers or your nurse!  "

## 2019-11-22 ENCOUNTER — INFUSION (OUTPATIENT)
Dept: INFUSION THERAPY | Facility: HOSPITAL | Age: 54
End: 2019-11-22
Payer: COMMERCIAL

## 2019-11-22 VITALS
DIASTOLIC BLOOD PRESSURE: 81 MMHG | OXYGEN SATURATION: 97 % | TEMPERATURE: 99 F | SYSTOLIC BLOOD PRESSURE: 125 MMHG | HEART RATE: 81 BPM | RESPIRATION RATE: 16 BRPM

## 2019-11-22 DIAGNOSIS — C20 RECTAL CANCER: Primary | ICD-10-CM

## 2019-11-22 DIAGNOSIS — C80.1 CANCER: ICD-10-CM

## 2019-11-22 DIAGNOSIS — L58.9 RADIATION DERMATITIS: ICD-10-CM

## 2019-11-22 PROCEDURE — 25000003 PHARM REV CODE 250: Performed by: INTERNAL MEDICINE

## 2019-11-22 PROCEDURE — A4216 STERILE WATER/SALINE, 10 ML: HCPCS | Performed by: INTERNAL MEDICINE

## 2019-11-22 PROCEDURE — 96377 APPLICATON ON-BODY INJECTOR: CPT

## 2019-11-22 PROCEDURE — 63600175 PHARM REV CODE 636 W HCPCS: Performed by: INTERNAL MEDICINE

## 2019-11-22 RX ORDER — SODIUM CHLORIDE 0.9 % (FLUSH) 0.9 %
10 SYRINGE (ML) INJECTION
Status: DISCONTINUED | OUTPATIENT
Start: 2019-11-22 | End: 2019-11-22 | Stop reason: HOSPADM

## 2019-11-22 RX ORDER — HEPARIN 100 UNIT/ML
500 SYRINGE INTRAVENOUS
Status: DISCONTINUED | OUTPATIENT
Start: 2019-11-22 | End: 2019-11-22 | Stop reason: HOSPADM

## 2019-11-22 RX ORDER — LIDOCAINE 50 MG/G
OINTMENT TOPICAL
Qty: 30 G | Refills: 3 | Status: ON HOLD | OUTPATIENT
Start: 2019-11-22 | End: 2020-04-09

## 2019-11-22 RX ADMIN — Medication 10 ML: at 01:11

## 2019-11-22 RX ADMIN — HEPARIN SODIUM (PORCINE) LOCK FLUSH IV SOLN 100 UNIT/ML 500 UNITS: 100 SOLUTION at 01:11

## 2019-11-22 RX ADMIN — PEGFILGRASTIM 6 MG: KIT SUBCUTANEOUS at 01:11

## 2019-11-22 NOTE — DISCHARGE INSTRUCTIONS
[facility]  [address] [address]  [phone] phone     [phone] fax  Hours of Operation: Monday- Friday 8:00am- 5:00pm  After hours phone  [phone]  Hematology / Oncology Physicians on call      Dr. Harjeet Lindsey    Please call with any concerns regarding your appointment today.

## 2019-11-22 NOTE — NURSING
Pt here for 5FU continuous pump d/c. Pump stopped and disconnected. Existing dressing appeared clean and intact. Left chestwall mediport flushed with 10ml NS and 5 ml heparin solution.  Needle D/C, site without redness, swelling, or drainage noted.  Dressing applied. Patient tolerated well. OBI applied. Patient to return to clinic in 2 weeks, December 4, 2019.

## 2019-11-27 ENCOUNTER — TELEPHONE (OUTPATIENT)
Dept: RADIOLOGY | Facility: HOSPITAL | Age: 54
End: 2019-11-27

## 2019-11-29 DIAGNOSIS — C20 RECTAL CANCER: ICD-10-CM

## 2019-11-30 RX ORDER — DIPHENOXYLATE HYDROCHLORIDE AND ATROPINE SULFATE 2.5; .025 MG/1; MG/1
1-2 TABLET ORAL 4 TIMES DAILY PRN
Qty: 120 TABLET | Refills: 1 | Status: SHIPPED | OUTPATIENT
Start: 2019-11-30 | End: 2020-01-06 | Stop reason: SDUPTHER

## 2019-12-02 ENCOUNTER — HOSPITAL ENCOUNTER (OUTPATIENT)
Dept: RADIOLOGY | Facility: HOSPITAL | Age: 54
Discharge: HOME | End: 2019-12-02
Attending: INTERNAL MEDICINE
Payer: COMMERCIAL

## 2019-12-02 DIAGNOSIS — C20 RECTAL CANCER: ICD-10-CM

## 2019-12-02 PROCEDURE — 74177 CT ABD & PELVIS W/CONTRAST: CPT | Mod: 26,,, | Performed by: RADIOLOGY

## 2019-12-02 PROCEDURE — 74177 CT ABD & PELVIS W/CONTRAST: CPT | Mod: TC

## 2019-12-02 PROCEDURE — 71260 CT THORAX DX C+: CPT | Mod: 26,,, | Performed by: RADIOLOGY

## 2019-12-02 PROCEDURE — 74177 CT CHEST ABDOMEN PELVIS WITH CONTRAST (XPD): ICD-10-PCS | Mod: 26,,, | Performed by: RADIOLOGY

## 2019-12-02 PROCEDURE — 71260 CT CHEST ABDOMEN PELVIS WITH CONTRAST (XPD): ICD-10-PCS | Mod: 26,,, | Performed by: RADIOLOGY

## 2019-12-02 PROCEDURE — 25500020 PHARM REV CODE 255: Performed by: INTERNAL MEDICINE

## 2019-12-02 RX ORDER — OXYCODONE HYDROCHLORIDE 10 MG/1
10 TABLET ORAL EVERY 8 HOURS PRN
Qty: 90 TABLET | Refills: 0 | OUTPATIENT
Start: 2019-12-02

## 2019-12-02 RX ADMIN — IOHEXOL 75 ML: 350 INJECTION, SOLUTION INTRAVENOUS at 09:12

## 2019-12-02 RX ADMIN — IOHEXOL 30 ML: 350 INJECTION, SOLUTION INTRAVENOUS at 08:12

## 2019-12-04 ENCOUNTER — LAB VISIT (OUTPATIENT)
Dept: LAB | Facility: HOSPITAL | Age: 54
End: 2019-12-04
Attending: INTERNAL MEDICINE
Payer: COMMERCIAL

## 2019-12-04 ENCOUNTER — OFFICE VISIT (OUTPATIENT)
Dept: HEMATOLOGY/ONCOLOGY | Facility: CLINIC | Age: 54
End: 2019-12-04
Payer: COMMERCIAL

## 2019-12-04 ENCOUNTER — DOCUMENTATION ONLY (OUTPATIENT)
Dept: HEMATOLOGY/ONCOLOGY | Facility: CLINIC | Age: 54
End: 2019-12-04

## 2019-12-04 ENCOUNTER — PATIENT MESSAGE (OUTPATIENT)
Dept: INTERNAL MEDICINE | Facility: CLINIC | Age: 54
End: 2019-12-04

## 2019-12-04 VITALS
DIASTOLIC BLOOD PRESSURE: 86 MMHG | HEIGHT: 72 IN | HEART RATE: 86 BPM | TEMPERATURE: 98 F | OXYGEN SATURATION: 99 % | RESPIRATION RATE: 18 BRPM | BODY MASS INDEX: 21.44 KG/M2 | WEIGHT: 158.31 LBS | SYSTOLIC BLOOD PRESSURE: 129 MMHG

## 2019-12-04 DIAGNOSIS — R64 CACHEXIA: ICD-10-CM

## 2019-12-04 DIAGNOSIS — R11.0 NAUSEA: ICD-10-CM

## 2019-12-04 DIAGNOSIS — C20 RECTAL CANCER: ICD-10-CM

## 2019-12-04 DIAGNOSIS — F17.200 SMOKER: ICD-10-CM

## 2019-12-04 DIAGNOSIS — C20 RECTAL CANCER: Primary | ICD-10-CM

## 2019-12-04 DIAGNOSIS — D50.0 IRON DEFICIENCY ANEMIA DUE TO CHRONIC BLOOD LOSS: ICD-10-CM

## 2019-12-04 LAB
ALBUMIN SERPL BCP-MCNC: 3.4 G/DL (ref 3.5–5.2)
ALP SERPL-CCNC: 107 U/L (ref 55–135)
ALT SERPL W/O P-5'-P-CCNC: 8 U/L (ref 10–44)
ANION GAP SERPL CALC-SCNC: 9 MMOL/L (ref 8–16)
ANISOCYTOSIS BLD QL SMEAR: SLIGHT
AST SERPL-CCNC: 10 U/L (ref 10–40)
BASOPHILS # BLD AUTO: 0.07 K/UL (ref 0–0.2)
BASOPHILS NFR BLD: 0.5 % (ref 0–1.9)
BILIRUB SERPL-MCNC: 0.6 MG/DL (ref 0.1–1)
BUN SERPL-MCNC: 5 MG/DL (ref 6–20)
CALCIUM SERPL-MCNC: 8.9 MG/DL (ref 8.7–10.5)
CHLORIDE SERPL-SCNC: 109 MMOL/L (ref 95–110)
CO2 SERPL-SCNC: 27 MMOL/L (ref 23–29)
CREAT SERPL-MCNC: 0.8 MG/DL (ref 0.5–1.4)
DACRYOCYTES BLD QL SMEAR: ABNORMAL
DIFFERENTIAL METHOD: ABNORMAL
EOSINOPHIL # BLD AUTO: 0.3 K/UL (ref 0–0.5)
EOSINOPHIL NFR BLD: 2.4 % (ref 0–8)
ERYTHROCYTE [DISTWIDTH] IN BLOOD BY AUTOMATED COUNT: 16.6 % (ref 11.5–14.5)
EST. GFR  (AFRICAN AMERICAN): >60 ML/MIN/1.73 M^2
EST. GFR  (NON AFRICAN AMERICAN): >60 ML/MIN/1.73 M^2
GLUCOSE SERPL-MCNC: 117 MG/DL (ref 70–110)
HCT VFR BLD AUTO: 36 % (ref 40–54)
HGB BLD-MCNC: 12.1 G/DL (ref 14–18)
IMM GRANULOCYTES # BLD AUTO: 0.62 K/UL (ref 0–0.04)
IMM GRANULOCYTES NFR BLD AUTO: 4.8 % (ref 0–0.5)
LYMPHOCYTES # BLD AUTO: 1.7 K/UL (ref 1–4.8)
LYMPHOCYTES NFR BLD: 12.8 % (ref 18–48)
MCH RBC QN AUTO: 29.5 PG (ref 27–31)
MCHC RBC AUTO-ENTMCNC: 33.6 G/DL (ref 32–36)
MCV RBC AUTO: 88 FL (ref 82–98)
MONOCYTES # BLD AUTO: 1.1 K/UL (ref 0.3–1)
MONOCYTES NFR BLD: 8.2 % (ref 4–15)
NEUTROPHILS # BLD AUTO: 9.8 K/UL (ref 1.8–7.7)
NEUTROPHILS NFR BLD: 76.1 % (ref 38–73)
NRBC BLD-RTO: 0 /100 WBC
PLATELET # BLD AUTO: 222 K/UL (ref 150–350)
PLATELET BLD QL SMEAR: ABNORMAL
PMV BLD AUTO: 8.9 FL (ref 9.2–12.9)
POLYCHROMASIA BLD QL SMEAR: ABNORMAL
POTASSIUM SERPL-SCNC: 3.3 MMOL/L (ref 3.5–5.1)
PROT SERPL-MCNC: 7.1 G/DL (ref 6–8.4)
RBC # BLD AUTO: 4.1 M/UL (ref 4.6–6.2)
SODIUM SERPL-SCNC: 145 MMOL/L (ref 136–145)
WBC # BLD AUTO: 12.92 K/UL (ref 3.9–12.7)

## 2019-12-04 PROCEDURE — 85025 COMPLETE CBC W/AUTO DIFF WBC: CPT

## 2019-12-04 PROCEDURE — 36415 COLL VENOUS BLD VENIPUNCTURE: CPT

## 2019-12-04 PROCEDURE — 3008F PR BODY MASS INDEX (BMI) DOCUMENTED: ICD-10-PCS | Mod: CPTII,S$GLB,, | Performed by: INTERNAL MEDICINE

## 2019-12-04 PROCEDURE — 99214 OFFICE O/P EST MOD 30 MIN: CPT | Mod: 25,S$GLB,, | Performed by: INTERNAL MEDICINE

## 2019-12-04 PROCEDURE — 80053 COMPREHEN METABOLIC PANEL: CPT

## 2019-12-04 PROCEDURE — 99214 PR OFFICE/OUTPT VISIT, EST, LEVL IV, 30-39 MIN: ICD-10-PCS | Mod: 25,S$GLB,, | Performed by: INTERNAL MEDICINE

## 2019-12-04 PROCEDURE — 3008F BODY MASS INDEX DOCD: CPT | Mod: CPTII,S$GLB,, | Performed by: INTERNAL MEDICINE

## 2019-12-04 PROCEDURE — 99999 PR PBB SHADOW E&M-EST. PATIENT-LVL III: ICD-10-PCS | Mod: PBBFAC,,, | Performed by: INTERNAL MEDICINE

## 2019-12-04 PROCEDURE — 99999 PR PBB SHADOW E&M-EST. PATIENT-LVL III: CPT | Mod: PBBFAC,,, | Performed by: INTERNAL MEDICINE

## 2019-12-04 RX ORDER — DRONABINOL 10 MG/1
10 CAPSULE ORAL
Qty: 60 CAPSULE | Refills: 0 | Status: ON HOLD | OUTPATIENT
Start: 2019-12-04 | End: 2022-02-24

## 2019-12-04 RX ORDER — PROMETHAZINE HYDROCHLORIDE 25 MG/1
25 TABLET ORAL EVERY 4 HOURS
Qty: 60 TABLET | Refills: 4 | Status: SHIPPED | OUTPATIENT
Start: 2019-12-04 | End: 2021-11-30 | Stop reason: SDUPTHER

## 2019-12-04 RX ORDER — OXYCODONE HYDROCHLORIDE 10 MG/1
10 TABLET ORAL EVERY 8 HOURS PRN
Qty: 90 TABLET | Refills: 0 | Status: SHIPPED | OUTPATIENT
Start: 2019-12-04 | End: 2020-01-06 | Stop reason: SDUPTHER

## 2019-12-04 RX ORDER — ONDANSETRON 8 MG/1
8 TABLET, ORALLY DISINTEGRATING ORAL EVERY 12 HOURS PRN
Qty: 50 TABLET | Refills: 6 | Status: SHIPPED | OUTPATIENT
Start: 2019-12-04 | End: 2020-02-28

## 2019-12-04 RX ORDER — MORPHINE SULFATE 15 MG/1
45 TABLET, FILM COATED, EXTENDED RELEASE ORAL NIGHTLY
Qty: 90 TABLET | Refills: 0 | Status: SHIPPED | OUTPATIENT
Start: 2019-12-04 | End: 2020-01-06 | Stop reason: SDUPTHER

## 2019-12-04 NOTE — NURSING
"Met with patient during his visit with Dr. Cosby. Discussed plan to hold chemo today, return to see Dr. New in the near future to discuss surgical options, and subsequent f/u with Dr. Aden. Scheduled patient for all appts, discussed chemotherapy side effects and when to call clinic. Patient and family verbalized understanding of all information    Oncology Navigation   Intake  Date of Diagnosis: 19  Cancer Type: GI  MD Assigned: Song Aden  Contact Method: Other  Other Contact Method: face-to-face  Date Worked: 19  Multiple appointments: Yes     Treatment  Current Status: Active  Treatment Type(s): Chemotherapy  Chemotherapy Regimen: FOLFOXIRI  Deferred Chemotherapy Reason: Overall toxicity  Deferred Reason Other: Fatigue, anorexia, 12lb weight loss     Procedures: CT  CT Schedule Date: 19       Support Systems: Spouse/significant other  Concerns: 6 year old child that patient verbalizes needing to "keep up with"      Acuity  Systemic Treatment - predicted or initiated: Chemotherapy regimen with multiple drugs (+1)  Treatment Tolerability: 2  ECO  Comorbidities in Medical History: 2  Hospitalization Within the Past Month: 0   Needed: 0  Support: 0  Verbalizes Financial Concerns: 0  Transportation: 0  History of noncompliance/frequent no shows and cancellations: 0  Verbalizes the need for more education: 0  Other Factors (+1 for Each): 1 (small child at home, trouble keeping up with him)  Navigation Acuity: 7     Follow Up  No follow-ups on file.      "

## 2019-12-04 NOTE — PROGRESS NOTES
Subjective:       Patient ID: Sukhjinder Presley is a 53 y.o. male.    Chief Complaint: Follow-up; Results; and Rectal Cancer    HPI 53-year-old male completed 4 cycles of FOLFIRI plus Avastin for locally advanced rectal carcinoma patient had CT scan on 12/02/2019 and is here for review ECOG status 2    Past Medical History:   Diagnosis Date    Anemia     Cancer      Family History   Problem Relation Age of Onset    Intestinal malrotation Mother     Leukemia Father     No Known Problems Sister     Coronary artery disease Brother     Coronary artery disease Maternal Grandmother     Stomach cancer Maternal Grandfather      Social History     Socioeconomic History    Marital status: Significant Other     Spouse name: Not on file    Number of children: Not on file    Years of education: Not on file    Highest education level: Not on file   Occupational History    Not on file   Social Needs    Financial resource strain: Somewhat hard    Food insecurity:     Worry: Sometimes true     Inability: Sometimes true    Transportation needs:     Medical: No     Non-medical: No   Tobacco Use    Smoking status: Current Every Day Smoker     Packs/day: 1.00     Years: 35.00     Pack years: 35.00     Types: Cigarettes     Start date: 1/2/1984    Smokeless tobacco: Current User     Types: Chew   Substance and Sexual Activity    Alcohol use: Yes     Alcohol/week: 46.0 standard drinks     Types: 42 Cans of beer, 4 Shots of liquor per week     Frequency: 4 or more times a week     Drinks per session: 5 or 6     Binge frequency: Daily or almost daily     Comment: segrams 7, beer daily,  None 72 hours prior to surgery    Drug use: Never    Sexual activity: Yes     Partners: Female     Birth control/protection: None   Lifestyle    Physical activity:     Days per week: 1 day     Minutes per session: 60 min    Stress: Not on file   Relationships    Social connections:     Talks on phone: More than three times a week     Gets  together: More than three times a week     Attends Islam service: Not on file     Active member of club or organization: No     Attends meetings of clubs or organizations: Never     Relationship status: Living with partner   Other Topics Concern    Not on file   Social History Narrative    Not on file     Past Surgical History:   Procedure Laterality Date    COLONOSCOPY N/A 6/6/2019    Procedure: COLONOSCOPY;  Surgeon: Anjelica Saavedra MD;  Location: [facility];  Service: Endoscopy;  Laterality: N/A;    ESOPHAGOGASTRODUODENOSCOPY N/A 6/6/2019    Procedure: EGD (ESOPHAGOGASTRODUODENOSCOPY);  Surgeon: Anjelica Saavedra MD;  Location: [facility] END[facility];  Service: Endoscopy;  Laterality: N/A;    HERNIA REPAIR  1995    INSERTION OF TUNNELED CENTRAL VENOUS CATHETER (CVC) WITH SUBCUTANEOUS PORT N/A 10/8/2019    Procedure: [id number]-H-CATH;  Surgeon: Jan New MD;  Location: [facility] OR;  Service: General;  Laterality: N/A;    TONSILLECTOMY         Labs:  Lab Results   Component Value Date    WBC 12.92 (H) 12/04/2019    HGB 12.1 (L) 12/04/2019    HCT 36.0 (L) 12/04/2019    MCV 88 12/04/2019     12/04/2019     BMP  Lab Results   Component Value Date     12/04/2019    K 3.3 (L) 12/04/2019     12/04/2019    CO2 27 12/04/2019    BUN 5 (L) 12/04/2019    CREATININE 0.8 12/04/2019    CALCIUM 8.9 12/04/2019    ANIONGAP 9 12/04/2019    ESTGFRAFRICA >60 12/04/2019    EGFRNONAA >60 12/04/2019     Lab Results   Component Value Date    ALT 8 (L) 12/04/2019    AST 10 12/04/2019    ALKPHOS 107 12/04/2019    BILITOT 0.6 12/04/2019       Lab Results   Component Value Date    IRON 94 10/31/2019    TIBC 339 10/31/2019    FERRITIN 856 (H) 10/31/2019     Lab Results   Component Value Date    KDZXDFDD23 497 09/03/2019     Lab Results   Component Value Date    FOLATE 8.3 09/03/2019     Lab Results   Component Value Date    TSH 1.607 01/15/2019         Review of Systems   Constitutional: Positive for activity change,  appetite change, fatigue and unexpected weight change. Negative for chills, diaphoresis and fever.   HENT: Negative for congestion, dental problem, drooling, ear discharge, ear pain, facial swelling, hearing loss, mouth sores, nosebleeds, postnasal drip, rhinorrhea, sinus pressure, sneezing, sore throat, tinnitus, trouble swallowing and voice change.    Eyes: Negative for photophobia, pain, discharge, redness, itching and visual disturbance.   Respiratory: Negative for apnea, cough, choking, chest tightness, shortness of breath, wheezing and stridor.    Cardiovascular: Negative for chest pain, palpitations and leg swelling.   Gastrointestinal: Positive for abdominal distention, diarrhea and rectal pain. Negative for abdominal pain, anal bleeding, blood in stool, constipation, nausea and vomiting.   Endocrine: Negative for cold intolerance, heat intolerance, polydipsia, polyphagia and polyuria.   Genitourinary: Negative for decreased urine volume, difficulty urinating, discharge, dysuria, enuresis, flank pain, frequency, genital sores, hematuria, penile pain, penile swelling, scrotal swelling, testicular pain and urgency.   Musculoskeletal: Negative for arthralgias, back pain, gait problem, joint swelling, myalgias, neck pain and neck stiffness.   Skin: Negative for color change, pallor, rash and wound.   Allergic/Immunologic: Negative for environmental allergies, food allergies and immunocompromised state.   Neurological: Positive for weakness. Negative for dizziness, tremors, seizures, syncope, facial asymmetry, speech difficulty, light-headedness, numbness and headaches.   Hematological: Negative for adenopathy. Does not bruise/bleed easily.   Psychiatric/Behavioral: Positive for dysphoric mood. Negative for agitation, behavioral problems, confusion, decreased concentration, hallucinations, self-injury, sleep disturbance and suicidal ideas. The patient is nervous/anxious. The patient is not hyperactive.         Objective:      Physical Exam   Constitutional: He is oriented to person, place, and time. He appears cachectic. He has a sickly appearance. He appears ill. He appears distressed.   HENT:   Head: Normocephalic.   Right Ear: External ear normal.   Left Ear: External ear normal.   Nose: Nose normal. Right sinus exhibits no maxillary sinus tenderness and no frontal sinus tenderness. Left sinus exhibits no maxillary sinus tenderness and no frontal sinus tenderness.   Mouth/Throat: Oropharynx is clear and moist. No oropharyngeal exudate.   Eyes: Pupils are equal, round, and reactive to light. EOM and lids are normal. Right eye exhibits no discharge. Left eye exhibits no discharge. Right conjunctiva is not injected. Right conjunctiva has no hemorrhage. Left conjunctiva is not injected. Left conjunctiva has no hemorrhage. No scleral icterus. Right eye exhibits normal extraocular motion. Left eye exhibits normal extraocular motion.   Neck: Normal range of motion. Neck supple. No JVD present. No tracheal deviation present. No thyromegaly present.   Cardiovascular: Normal rate and regular rhythm.   Pulmonary/Chest: Effort normal. No stridor. No respiratory distress.   Abdominal: Soft. He exhibits no mass. There is no hepatosplenomegaly, splenomegaly or hepatomegaly. There is no tenderness.   Musculoskeletal: Normal range of motion. He exhibits no edema or tenderness.   Lymphadenopathy:        Head (right side): No posterior auricular and no occipital adenopathy present.        Head (left side): No posterior auricular and no occipital adenopathy present.     He has no cervical adenopathy.        Right cervical: No superficial cervical, no deep cervical and no posterior cervical adenopathy present.       Left cervical: No superficial cervical, no deep cervical and no posterior cervical adenopathy present.     He has no axillary adenopathy.        Right: No supraclavicular adenopathy present.        Left: No supraclavicular  adenopathy present.   Neurological: He is alert and oriented to person, place, and time. He has normal strength. No cranial nerve deficit. Coordination normal.   Skin: Skin is dry. No rash noted. He is not diaphoretic. No cyanosis or erythema. Nails show no clubbing.   Psychiatric: His behavior is normal. Judgment and thought content normal. His mood appears anxious. Cognition and memory are normal. He exhibits a depressed mood.   Vitals reviewed.          Assessment:      1. Rectal cancer    2. Nausea    3. Cachexia    4. Smoker    5. Iron deficiency anemia due to chronic blood loss           Plan:   Reviewed images with patient and family present demonstrating response to therapy.  Photographs taken as well the patient was scheduled for his 5th cycle of therapy he has lost 12 lb and does not wish to proceed until he is seen and evaluated by colorectal surgery is see in surgical intervention would be appropriate this time his CT of his chest abdomen pelvis demonstrates no evidence of progressive disease.  It should also be noted that his wife has a locally advanced soft tissue sarcoma of her right pelvis with presumptive diagnosis of metastatic disease in the lung and this is incredibly difficult on the entire family situation.  I have answered questions and I will refer to colorectal surgery to see if further systemic therapy or radiation therapy is warranted at this point or whether we would like to proceed with any surgical intervention.  Will wait for colorectal surgeries consultation and review 25 min face-to-face time coordination of care greater than 50% of time face-to-face patient.          Ken Cosby Jr, MD FACP

## 2019-12-11 ENCOUNTER — OFFICE VISIT (OUTPATIENT)
Dept: SURGERY | Facility: CLINIC | Age: 54
End: 2019-12-11
Payer: COMMERCIAL

## 2019-12-11 VITALS
DIASTOLIC BLOOD PRESSURE: 83 MMHG | HEART RATE: 72 BPM | SYSTOLIC BLOOD PRESSURE: 123 MMHG | BODY MASS INDEX: 22.13 KG/M2 | TEMPERATURE: 98 F | WEIGHT: 163.38 LBS | HEIGHT: 72 IN

## 2019-12-11 DIAGNOSIS — C20 RECTAL ADENOCARCINOMA: Primary | ICD-10-CM

## 2019-12-11 DIAGNOSIS — K62.5 RECTAL BLEEDING: ICD-10-CM

## 2019-12-11 PROCEDURE — 99999 PR PBB SHADOW E&M-EST. PATIENT-LVL III: CPT | Mod: PBBFAC,,, | Performed by: COLON & RECTAL SURGERY

## 2019-12-11 PROCEDURE — 99214 PR OFFICE/OUTPT VISIT, EST, LEVL IV, 30-39 MIN: ICD-10-PCS | Mod: S$GLB,,, | Performed by: COLON & RECTAL SURGERY

## 2019-12-11 PROCEDURE — 3008F PR BODY MASS INDEX (BMI) DOCUMENTED: ICD-10-PCS | Mod: CPTII,S$GLB,, | Performed by: COLON & RECTAL SURGERY

## 2019-12-11 PROCEDURE — 3008F BODY MASS INDEX DOCD: CPT | Mod: CPTII,S$GLB,, | Performed by: COLON & RECTAL SURGERY

## 2019-12-11 PROCEDURE — 99214 OFFICE O/P EST MOD 30 MIN: CPT | Mod: S$GLB,,, | Performed by: COLON & RECTAL SURGERY

## 2019-12-11 PROCEDURE — 99999 PR PBB SHADOW E&M-EST. PATIENT-LVL III: ICD-10-PCS | Mod: PBBFAC,,, | Performed by: COLON & RECTAL SURGERY

## 2019-12-11 NOTE — Clinical Note
He is amenable to continuing the planned chemotx now, as I think and discussed with him it gives him the best chance of R0 resection and will help lessen chance for future recurrence/tumor left behind

## 2019-12-12 NOTE — PROGRESS NOTES
History & Physical    SUBJECTIVE:     Chief Complaint   Patient presents with    Follow-up   CC: rectal adenocarcinoma  Ref: Anjelica Saavedra MD    History of Present Illness:  Patient is a 54 y.o. male presents for evaluation of rectal cancer.  Patient reports he has had increasing pelvic/rectal pain along with decreased bowel movements over the last 6 weeks.  Reports an uncomfortable feeling in his pelvis associated with mucous discharge from his rectum as well as bloody bowel movements that are thin and less than normal. He reports associated chills, fatigue and 16 lb weight loss over the past 5 months.  He has also noticed a decrease in appetite as well. He underwent a colonoscopy on 06/06/2019 where a nonobstructing rectal mass was found with biopsy showing well-differentiated adenocarcinoma.  He was then referred to [facility] for evaluation.  He has no family history of colorectal cancer or IBD.    8/13/19: Completed neoadj chemoXRT    Interval history:  Since last clinic visit, the patient has completed 4 cycles of chemotherapy.  He presents today to discuss possible surgical intervention versus continued chemotherapy.  He recently underwent a CT scan which showed decreasing size of the perirectal tissue this previously seen on CT scan.  He has not undergone a MRI recently. He denies any fever, chills, nausea, vomiting.  He is still smoking as well. He has no perianal excoriations currently or perianal pain. States he initially lost weight around 3 weeks ago but has gained a little bit back while tolerating diet better the last week when he stops chemotherapy.    PMHx:  Nephrolithiasis  PSHx:  Left inguinal hernia repair, lithotripsy  All: NKDA  FamHx: negative for CRC or IBD; +gastric cancer in maternal grandfather  SocHx: +tobacco (1ppd); +etoh (6 beers/day); no illicits      Review of patient's allergies indicates:  No Known Allergies    Current Outpatient Medications   Medication Sig Dispense  Refill    acetaminophen (TYLENOL) 325 MG tablet Take 1 tablet (325 mg total) by mouth every 6 (six) hours as needed for Pain.  0    aspirin 81 MG Chew Take 81 mg by mouth once daily.      diphenoxylate-atropine 2.5-0.025 mg (LOMOTIL) 2.5-0.025 mg per tablet Take 1-2 tablets by mouth 4 (four) times daily as needed for Diarrhea. 120 tablet 1    dronabinol (MARINOL) 10 MG capsule Take 1 capsule (10 mg total) by mouth 2 (two) times daily before meals. 60 capsule 0    iron fum-B12-IF-C-folic acid (FOLTRIN) 110-0.5 mg capsule Take 1 capsule by mouth 2 (two) times daily. 60 capsule 1    lidocaine (XYLOCAINE) 5 % Oint ointment Apply topically as needed. 30 g 3    magic mouthwash diphen/antac/lidoc Swish and spit 15 ml by mouth every 4 hours as needed 500 mL 2    morphine (MS CONTIN) 15 MG 12 hr tablet Take 3 tablets (45 mg total) by mouth nightly. 90 tablet 0    nicotine (NICODERM CQ) 21 mg/24 hr Place 1 patch onto the skin once daily. 14 patch 1    ondansetron (ZOFRAN-ODT) 8 MG TbDL Take 1 tablet (8 mg total) by mouth every 12 (twelve) hours as needed. 50 tablet 6    oxyCODONE (ROXICODONE) 10 mg Tab immediate release tablet Take 1 tablet (10 mg total) by mouth every 8 (eight) hours as needed for Pain (medically necessary). 90 tablet 0    prochlorperazine (COMPAZINE) 10 MG tablet Take 1 tablet (10 mg total) by mouth every 6 (six) hours as needed. 60 tablet 1    promethazine (PHENERGAN) 25 MG tablet Take 1 tablet (25 mg total) by mouth every 4 (four) hours. 60 tablet 4    silver sulfADIAZINE 1% (SILVADENE) 1 % cream Apply to affected area daily 400 g 1    traMADol (ULTRAM) 50 mg tablet Take 1 tablet (50 mg total) by mouth every 6 (six) hours as needed for Pain. 90 tablet 0    capecitabine (XELODA) 500 MG Tab Take 3 tablets (1500 mg) by mouth twice daily after breakfast and dinner on days of radiation only. (Patient not taking: Reported on 12/11/2019.) 168 tablet 0    megestrol (MEGACE) 40 MG Tab Take 1  tablet (40 mg total) by mouth 4 (four) times daily. (Patient not taking: Reported on 12/11/2019.) 90 tablet 1    varenicline (CHANTIX) 1 mg Tab Take 1 tablet (1 mg total) by mouth 2 (two) times daily. 60 tablet 2     No current facility-administered medications for this visit.        Past Medical History:   Diagnosis Date    Anemia     Cancer      Past Surgical History:   Procedure Laterality Date    COLONOSCOPY N/A 6/6/2019    Procedure: COLONOSCOPY;  Surgeon: Anjelica Saavedra MD;  Location: [facility] ENDO;  Service: Endoscopy;  Laterality: N/A;    ESOPHAGOGASTRODUODENOSCOPY N/A 6/6/2019    Procedure: EGD (ESOPHAGOGASTRODUODENOSCOPY);  Surgeon: Anjelica Saavedra MD;  Location: [facility] ENDO;  Service: Endoscopy;  Laterality: N/A;    HERNIA REPAIR  1995    INSERTION OF TUNNELED CENTRAL VENOUS CATHETER (CVC) WITH SUBCUTANEOUS PORT N/A 10/8/2019    Procedure: [id number];  Surgeon: Jan New MD;  Location: [facility] OR;  Service: General;  Laterality: N/A;    TONSILLECTOMY       Family History   Problem Relation Age of Onset    Intestinal malrotation Mother     Leukemia Father     No Known Problems Sister     Coronary artery disease Brother     Coronary artery disease Maternal Grandmother     Stomach cancer Maternal Grandfather      Social History     Tobacco Use    Smoking status: Current Every Day Smoker     Packs/day: 1.00     Years: 35.00     Pack years: 35.00     Types: Cigarettes     Start date: 1/2/1984    Smokeless tobacco: Current User     Types: Chew   Substance Use Topics    Alcohol use: Yes     Alcohol/week: 46.0 standard drinks     Types: 42 Cans of beer, 4 Shots of liquor per week     Frequency: 4 or more times a week     Drinks per session: 5 or 6     Binge frequency: Daily or almost daily     Comment: segrams 7, beer daily,  None 72 hours prior to surgery    Drug use: Never        Review of Systems:  Review of Systems   Constitutional: Negative for activity change, appetite  change, chills, fatigue, fever and unexpected weight change.   HENT: Negative for congestion, ear pain, sore throat and trouble swallowing.    Eyes: Negative for pain, redness and itching.   Respiratory: Negative for cough, shortness of breath and wheezing.    Cardiovascular: Negative for chest pain, palpitations and leg swelling.   Gastrointestinal: Negative for abdominal distention, abdominal pain, anal bleeding, blood in stool, constipation, diarrhea, nausea, rectal pain and vomiting.   Endocrine: Negative for cold intolerance, heat intolerance and polyuria.   Genitourinary: Negative for dysuria, flank pain, frequency and hematuria.   Musculoskeletal: Negative for gait problem, joint swelling and neck pain.   Skin: Negative for color change, rash and wound.   Allergic/Immunologic: Negative for environmental allergies and immunocompromised state.   Neurological: Negative for dizziness, speech difficulty, weakness and numbness.   Psychiatric/Behavioral: Negative for agitation, confusion and hallucinations.       OBJECTIVE:     Vital Signs (Most Recent)  Temp: 98.3 °F (36.8 °C) (12/11/19 1641)  Pulse: 72 (12/11/19 1641)  BP: 123/83 (12/11/19 1641)  6' (1.829 m)  74.1 kg (163 lb 5.8 oz)     Physical Exam:  Physical Exam   Constitutional: He is oriented to person, place, and time. He appears well-developed.   HENT:   Head: Normocephalic and atraumatic.   Eyes: Conjunctivae and EOM are normal.   Neck: Normal range of motion. No thyromegaly present.   Cardiovascular: Normal rate and regular rhythm.   Pulmonary/Chest: Effort normal. No respiratory distress.   Abdominal: Soft. He exhibits no distension and no mass. There is no tenderness.   Genitourinary:   Genitourinary Comments: Anorectal:  No external lesions or excoriations or skin changes; ELOISA with good tone, no blood and no palpable masses   Musculoskeletal: Normal range of motion. He exhibits no edema or tenderness.   Neurological: He is alert and oriented to  person, place, and time.   Skin: Skin is warm and dry. Capillary refill takes less than 2 seconds. No rash noted.   Psychiatric: He has a normal mood and affect.     Laboratory  Lab Results   Component Value Date    WBC 12.92 (H) 12/04/2019    HGB 12.1 (L) 12/04/2019    HCT 36.0 (L) 12/04/2019     12/04/2019    CHOL 158 06/01/2019    TRIG 194 (H) 06/01/2019    HDL 30 (L) 06/01/2019    ALT 8 (L) 12/04/2019    AST 10 12/04/2019     12/04/2019    K 3.3 (L) 12/04/2019     12/04/2019    CREATININE 0.8 12/04/2019    BUN 5 (L) 12/04/2019    CO2 27 12/04/2019    TSH 1.607 01/15/2019    PSA 0.37 06/01/2019    INR 1.0 06/11/2019       Diagnostic Results:  Colonoscopy images and report reviewed which shows a rectal mass that malignant     MRI 9/24/19:  FINDINGS:  Tumor Location: Tumor location (from anal verge):    The inferior margin of the tumor is located approximately 7.5-8 cm from the anal verge.    Relationship to anterior peritoneal reflection: The superior most extent of the tumor is seen straddling the peritoneal reflection.  There is circumferential wall thickening superior to the peritoneal reflection some of which may be secondary to in incomplete distension although involvement of the higher rectum and inferior sigmoid colon cannot be excluded.    Tumor Characteristics:    Circumferential extent/location (clock face): There is circumferential involvement of the rectum.  When compared to the study prior to neoadjuvant chemotherapy there is decreased circumferential wall thickening when compared to the prior exam.    Tumor Length: At least 8 cm    Tumor Extent:    The tumor extends beyond the muscularis into the perirectal fat.  There is abutment and involvement of the left side of the mesorectal fascia beginning at approximately 03:00 o'clock to 06:00 o'clock.    There is no definitive involvement/invasion of the adjacent structures.    Lymph Nodes:    There is a single 6 mm lymph node within the  mesorectal fat at the superior most extent of definitive tumor located approximately the 6 to 7:00 position and located approximately 13 cm per proximal to the anal verge.  There are a couple of small nonenlarged external iliac chain lymph nodes bilaterally and what appear to represent 2 nonenlarged obturator lymph nodes bilaterally.  The largest of these lymph nodes measures approximately 7 mm by 3 mm.      Impression       1. Large rectal mass with invasion of the muscularis and involvement of the mesorectal fat and fascia along the left and posterior aspect of the mass.  Single lymph node within the mesorectal fat posteriorly measuring approximately 6 mm.  Additional subcentimeter obturator and external iliac chain lymph nodes also seen bilaterally.  When compared to the study prior to neoadjuvant therapy there has been significant decrease in wall thickening.     FINDINGS:  Tumor Location: Tumor location (from anal verge):    The inferior margin of the tumor is located approximately 7.5-8 cm from the anal verge.    Relationship to anterior peritoneal reflection: The superior most extent of the tumor is seen straddling the peritoneal reflection.  There is circumferential wall thickening superior to the peritoneal reflection some of which may be secondary to in incomplete distension although involvement of the higher rectum and inferior sigmoid colon cannot be excluded.    Tumor Characteristics:    Circumferential extent/location (clock face): There is circumferential involvement of the rectum.  When compared to the study prior to neoadjuvant chemotherapy there is decreased circumferential wall thickening when compared to the prior exam.    Tumor Length: At least 8 cm    Tumor Extent:    The tumor extends beyond the muscularis into the perirectal fat.  There is abutment and involvement of the left side of the mesorectal fascia beginning at approximately 03:00 o'clock to 06:00 o'clock.    There is no definitive  involvement/invasion of the adjacent structures.    Lymph Nodes:    There is a single 6 mm lymph node within the mesorectal fat at the superior most extent of definitive tumor located approximately the 6 to 7:00 position and located approximately 13 cm per proximal to the anal verge.  There are a couple of small nonenlarged external iliac chain lymph nodes bilaterally and what appear to represent 2 nonenlarged obturator lymph nodes bilaterally.  The largest of these lymph nodes measures approximately 7 mm by 3 mm.      Impression       1. Large rectal mass with invasion of the muscularis and involvement of the mesorectal fat and fascia along the left and posterior aspect of the mass.  Single lymph node within the mesorectal fat posteriorly measuring approximately 6 mm.  Additional subcentimeter obturator and external iliac chain lymph nodes also seen bilaterally.  When compared to the study prior to neoadjuvant therapy there has been significant decrease in wall thickening.     CT Dec 2019:  FINDINGS:  CHEST    No infiltrates or pleural effusions are identified.  No discrete pulmonary nodules or masses are identified.    The aorta is unremarkable in appearance. There is no pericardial effusion.  No enlarged mediastinal, hilar or axillary lymph nodes are identified.  A left-sided MediPort catheter is noted.    ABDOMEN    Liver/gallbladder/biliary: There are a couple of calcified subcapsular calcified granulomas noted within the liver.  The gallbladder is present and unremarkable.  No biliary ductal dilation.    Pancreas: The pancreas is unremarkable in appearance.    Spleen: There are multiple noncalcified granulomas seen scattered throughout the spleen.  The spleen is enlarged and measures up to 14.9 cm in the greatest axial dimension.    Adrenals: Unremarkable    Kidneys: The kidneys are equally perfused and demonstrate no solid masses. Large nonobstructing lower pole left renal calculus.    Bowel/Mesentery: There  is no evidence of bowel obstruction. There is some persistent thickening associated with the rectum along with some stranding of the perirectal fat and thickening of the mesorectal fascia.  The appearance is improved significantly when compared to the prior exam.  There is some persistent thickening versus fluid noted within the presacral soft tissues.    Retroperitoneum: No adenopathy.  The aorta demonstrates a normal caliber.    PELVIS    Genitourinary/Reproductive organs: Unremarkable    Adenopathy: No definitively enlarged lymph nodes although small perirectal lymph nodes would be difficult to exclude on this exam due to the fat stranding and soft tissue thickening.    Free Fluid: As above    Osseus Structures/Soft tissues: No suspicious appearing osseus lesions. No significant soft tissue abnormality.      Impression       1. Significant decrease in soft tissue prominence of the previously noted rectal mass with some persistent circumferential wall thickening still present on the current study.  There is also some persistent stranding within the perirectal fat and thickening of the mesial rectal fascia along with thickening/fluid density noted within the presacral soft tissues.  No metastatic lesions to the chest.  2. Remaining findings as discussed above and stable.       ASSESSMENT/PLAN:     54-year-old male with rectal adenocarcinoma with likely T3N1 disease based upon preop imaging without evidence of metastatic disease now s/p neoadj chemoXRT which completed on 8/13/19 but with post-tx MRI showing continued threatened mesorectal fascia    - Discussed with patient and his wife that he continues to have a good response to neoadj treatment and with his tumor threatening the mesorectal fascia on most recent MRI, I would advocate completing the full course of neoadj treatment  - Once completes neoadj tx, will restage with MRI to evaluate for tumor response and for surgical planning  - Discussed with him that if  he cannot tolerate chemotx at any point, we can rediscuss moving his surgery excision up, but would likely plan for surgery 6 weeks after chemotx (including avastin) has stopped  - RTC after chemotx completed for restaging and surgical discussion/planning    Jan New MD  Colon and Rectal Surgery  [facility] [place]

## 2019-12-17 ENCOUNTER — OFFICE VISIT (OUTPATIENT)
Dept: HEMATOLOGY/ONCOLOGY | Facility: CLINIC | Age: 54
End: 2019-12-17
Payer: COMMERCIAL

## 2019-12-17 VITALS
HEART RATE: 71 BPM | BODY MASS INDEX: 22.27 KG/M2 | DIASTOLIC BLOOD PRESSURE: 91 MMHG | HEIGHT: 72 IN | TEMPERATURE: 99 F | SYSTOLIC BLOOD PRESSURE: 140 MMHG | OXYGEN SATURATION: 99 % | WEIGHT: 164.44 LBS | RESPIRATION RATE: 178 BRPM

## 2019-12-17 DIAGNOSIS — C20 RECTAL CANCER: Primary | ICD-10-CM

## 2019-12-17 PROCEDURE — 99999 PR PBB SHADOW E&M-EST. PATIENT-LVL IV: CPT | Mod: PBBFAC,,, | Performed by: INTERNAL MEDICINE

## 2019-12-17 PROCEDURE — 99999 PR PBB SHADOW E&M-EST. PATIENT-LVL IV: ICD-10-PCS | Mod: PBBFAC,,, | Performed by: INTERNAL MEDICINE

## 2019-12-17 PROCEDURE — 3008F PR BODY MASS INDEX (BMI) DOCUMENTED: ICD-10-PCS | Mod: CPTII,S$GLB,, | Performed by: INTERNAL MEDICINE

## 2019-12-17 PROCEDURE — 99215 OFFICE O/P EST HI 40 MIN: CPT | Mod: S$GLB,,, | Performed by: INTERNAL MEDICINE

## 2019-12-17 PROCEDURE — 99215 PR OFFICE/OUTPT VISIT, EST, LEVL V, 40-54 MIN: ICD-10-PCS | Mod: S$GLB,,, | Performed by: INTERNAL MEDICINE

## 2019-12-17 PROCEDURE — 3008F BODY MASS INDEX DOCD: CPT | Mod: CPTII,S$GLB,, | Performed by: INTERNAL MEDICINE

## 2019-12-17 RX ORDER — TEMAZEPAM 7.5 MG/1
15 CAPSULE ORAL NIGHTLY PRN
Qty: 30 CAPSULE | Refills: 2 | Status: SHIPPED | OUTPATIENT
Start: 2019-12-17 | End: 2020-02-28

## 2019-12-17 RX ORDER — TEMAZEPAM 7.5 MG/1
CAPSULE ORAL
COMMUNITY
Start: 2019-12-05 | End: 2019-12-17 | Stop reason: SDUPTHER

## 2019-12-17 NOTE — PROGRESS NOTES
Subjective:       Patient ID: Sukhjinder Presley is a 54 y.o. male.    Chief Complaint: Rectal cancer [C20]  HPI: We have an opportunity to see Mr. Sukhjinder Presley in Hematology Oncology clinic at [facility] on 12/17/2019.  Mr. Sukhjinder Presley is a 54 y.o. gentleman with rectal cancer completed neoadjuvant chemoradiation with concurrent xeloda.  Reported symptoms have improved with better pain control and appetite.  Restaging MRI rectum showed      Impression         1. Large rectal mass with invasion of the muscularis and involvement of the mesorectal fat and fascia along the left and posterior aspect of the mass.  Single lymph node within the mesorectal fat posteriorly measuring approximately 6 mm.  Additional subcentimeter obturator and external iliac chain lymph nodes also seen bilaterally.  When compared to the study prior to neoadjuvant therapy there has been significant decrease in wall thickening.      Has been evaluated by Dr. New, given fascia involvement, likely resection would be R1.  Recommended total neoadjuvant chemotherapy prior to surgery.     Currently on chemotherapy with Avastin FOLFOXIRI s/p 4 cycles.    CT chest abdomen pelvis showed      Impression       1. Significant decrease in soft tissue prominence of the previously noted rectal mass with some persistent circumferential wall thickening still present on the current study.  There is also some persistent stranding within the perirectal fat and thickening of the mesial rectal fascia along with thickening/fluid density noted within the presacral soft tissues.  No metastatic lesions to the chest.  2. Remaining findings as discussed above and stable.            Rectal cancer    6/24/2019 Initial Diagnosis     Rectal cancer      6/26/2019 Cancer Staged     Staging form: Colon and Rectum, AJCC 8th Edition  - Clinical stage from 6/26/2019: Stage IIIB (cT3, cN2a, cM0) - Signed by Artem Mishra II, MD on 6/26/2019      7/10/2019 - 8/13/2019  Radiation Therapy     Treatment Site Ref. ID Energy Dose/Fx (Gy) #Fx Dose Correction (Gy) Total Dose (Gy) Start Date End Date Elapsed Days   RECTUM Pelvis 6X 2 25 / 25 0 50 7/10/2019 8/13/2019 34           7/10/2019 - 7/10/2019 Chemotherapy     Treatment Summary   Plan Name: OP CAPECITABINE 5 DAYS + RADIOTHERAPY  Treatment Goal: Curative  Status: Inactive  Start Date: [No treatment day found]  End Date: [No treatment day found]  Provider: Song Aden MD  Chemotherapy: [No matching medication found in this treatment plan]      10/9/2019 Tumor Conference       Multidisciplinary Rectal Cancer Conference - Evaluation and Recommendation Summary    10/9/2019  Sukhjinder Presley  [id number]  53 y.o. male    1. Evaluation    C scope on 6/6/19 - non obstructing mass    MRI date: 6/17/19    Tumor Location in Rectum: middle third    Indication of Sphincter Involvement:  Uninvolved    Pretreatment Circumferential Resection Margin (CRM) status:  Threatened Tumor abuts CRM on the left.     Pretreatment (clinical) AJCC Stage: IIIB  Stage I  [] I: T1N0M0  [] I: T2N0M0  Stage II  [] IIA: T3N0M0  [] IIB P3wZ5Y5  [] IIC: Y9oY7R8  Stage III  [] IIIA: T1-2N1M0  [] IIIA: Q2M2fT8  [x] IIIB: T3-I9tS8I9  [] IIIB: T2-3N2aM0  [] IIIB: T1-2N2bM0  [] IIIC: U3gA9nS3  [] IIIC: T3-4cZ4tZ9  [] IIIC: G4bC3-5P9   Stage IV  [] IV: O4-0H5-2A1w-b    CEA level:   Lab Results   Component Value Date    CEA 1.4 09/30/2019       2. Treatment     Neoadjuvant Therapy Recommendation: Long Course Chemoradiotherapy - completed on 8/13    CT scan 7/30 during treatment: Unchanged appearance of large rectal mass, mild curcumferential bladder wall thickening.     MRI rectum 9/24: Large mass with invasion of muscularis and involvement of the mesorectal fat and fascia along the left and posterior aspect of the mass.   Single LN within the mesorectal fat posteriorly measuring 6 mm.   Subcentimeter obturator and external iliac changes LNDs seen bilaterally.      Recommendations:     Complete COLLINS with initiation of FOLFOX.     After follow by DUNCAN + MIRZA.       10/9/2019 -  Chemotherapy     Treatment Summary   Plan Name: OP FOLFOXIRI Q2W  Treatment Goal: Curative  Status: Active  Start Date: 10/9/2019  End Date: 1/17/2020 (Planned)  Provider: Song Aden MD  Chemotherapy: fluorouracil 3,200 mg/m2 = 6,305 mg in sodium chloride 0.9% 253 mL chemo infusion, 3,200 mg/m2 = 6,305 mg, Intravenous, Over 48 hours, 4 of 8 cycles  Administration: 6,305 mg (10/9/2019), 6,270 mg (10/23/2019), 6,210 mg (11/6/2019), 6,305 mg (11/20/2019)  bevacizumab (AVASTIN) 5 mg/kg = 380 mg in sodium chloride 0.9% 100 mL chemo infusion, 5 mg/kg = 380 mg (100 % of original dose 5 mg/kg), Intravenous, Clinic/HOD 1 time, 4 of 8 cycles  Dose modification: 5 mg/kg (original dose 5 mg/kg, Cycle 1, Reason: MD Discretion), 5 mg/kg (original dose 5 mg/kg, Cycle 2)  Administration: 380 mg (10/9/2019), 380 mg (10/23/2019), 370 mg (11/6/2019), 380 mg (11/20/2019)  irinotecan (CAMPTOSAR) 165 mg/m2 = 326 mg in sodium chloride 0.9% 266.3 mL chemo infusion, 165 mg/m2 = 326 mg, Intravenous, Clinic/HOD 1 time, 4 of 8 cycles  Administration: 326 mg (10/9/2019), 324 mg (10/23/2019), 320 mg (11/6/2019), 326 mg (11/20/2019)  leucovorin calcium 200 mg/m2 = 395 mg in dextrose 5 % 269.75 mL infusion, 200 mg/m2 = 395 mg, Intravenous, Clinic/HOD 1 time, 4 of 8 cycles  Administration: 395 mg (10/9/2019), 390 mg (10/23/2019), 390 mg (11/6/2019), 395 mg (11/20/2019)  oxaliplatin (ELOXATIN) 85 mg/m2 = 167 mg in dextrose 5 % 533.4 mL chemo infusion, 85 mg/m2 = 167 mg, Intravenous, Clinic/HOD 1 time, 4 of 8 cycles  Administration: 167 mg (10/9/2019), 167 mg (10/23/2019), 165 mg (11/6/2019), 167 mg (11/20/2019)       Past Medical History:   Diagnosis Date    Anemia     Cancer      Family History   Problem Relation Age of Onset    Intestinal malrotation Mother     Leukemia Father     No Known Problems Sister     Coronary artery  disease Brother     Coronary artery disease Maternal Grandmother     Stomach cancer Maternal Grandfather      Social History     Socioeconomic History    Marital status: Significant Other     Spouse name: Not on file    Number of children: Not on file    Years of education: Not on file    Highest education level: Not on file   Occupational History    Not on file   Social Needs    Financial resource strain: Somewhat hard    Food insecurity:     Worry: Sometimes true     Inability: Sometimes true    Transportation needs:     Medical: No     Non-medical: No   Tobacco Use    Smoking status: Current Every Day Smoker     Packs/day: 1.00     Years: 35.00     Pack years: 35.00     Types: Cigarettes     Start date: 1/2/1984    Smokeless tobacco: Current User     Types: Chew   Substance and Sexual Activity    Alcohol use: Yes     Alcohol/week: 46.0 standard drinks     Types: 42 Cans of beer, 4 Shots of liquor per week     Frequency: 4 or more times a week     Drinks per session: 5 or 6     Binge frequency: Daily or almost daily     Comment: segrams 7, beer daily,  None 72 hours prior to surgery    Drug use: Never    Sexual activity: Yes     Partners: Female     Birth control/protection: None   Lifestyle    Physical activity:     Days per week: 1 day     Minutes per session: 60 min    Stress: Not on file   Relationships    Social connections:     Talks on phone: More than three times a week     Gets together: More than three times a week     Attends [employer] service: Not on file     Active member of club or organization: No     Attends meetings of clubs or organizations: Never     Relationship status: Living with partner   Other Topics Concern    Not on file   Social History Narrative    Not on file     Past Surgical History:   Procedure Laterality Date    COLONOSCOPY N/A 6/6/2019    Procedure: COLONOSCOPY;  Surgeon: Anjelica Saavedra MD;  Location: [facility];  Service: Endoscopy;  Laterality: N/A;     ESOPHAGOGASTRODUODENOSCOPY N/A 6/6/2019    Procedure: EGD (ESOPHAGOGASTRODUODENOSCOPY);  Surgeon: Anjelica Saavedra MD;  Location: [facility];  Service: Endoscopy;  Laterality: N/A;    HERNIA REPAIR  1995    INSERTION OF TUNNELED CENTRAL VENOUS CATHETER (CVC) WITH SUBCUTANEOUS PORT N/A 10/8/2019    Procedure: [id number]-S-CATH;  Surgeon: Jan New MD;  Location: [facility];  Service: General;  Laterality: N/A;    TONSILLECTOMY       Current Outpatient Medications   Medication Sig Dispense Refill    acetaminophen (TYLENOL) 325 MG tablet Take 1 tablet (325 mg total) by mouth every 6 (six) hours as needed for Pain.  0    aspirin 81 MG Chew Take 81 mg by mouth once daily.      capecitabine (XELODA) 500 MG Tab Take 3 tablets (1500 mg) by mouth twice daily after breakfast and dinner on days of radiation only. 168 tablet 0    diphenoxylate-atropine 2.5-0.025 mg (LOMOTIL) 2.5-0.025 mg per tablet Take 1-2 tablets by mouth 4 (four) times daily as needed for Diarrhea. 120 tablet 1    dronabinol (MARINOL) 10 MG capsule Take 1 capsule (10 mg total) by mouth 2 (two) times daily before meals. 60 capsule 0    iron fum-B12-IF-C-folic acid (FOLTRIN) 110-0.5 mg capsule Take 1 capsule by mouth 2 (two) times daily. 60 capsule 1    lidocaine (XYLOCAINE) 5 % Oint ointment Apply topically as needed. 30 g 3    magic mouthwash diphen/antac/lidoc Swish and spit 15 ml by mouth every 4 hours as needed 500 mL 2    megestrol (MEGACE) 40 MG Tab Take 1 tablet (40 mg total) by mouth 4 (four) times daily. 90 tablet 1    morphine (MS CONTIN) 15 MG 12 hr tablet Take 3 tablets (45 mg total) by mouth nightly. 90 tablet 0    nicotine (NICODERM CQ) 21 mg/24 hr Place 1 patch onto the skin once daily. 14 patch 1    ondansetron (ZOFRAN-ODT) 8 MG TbDL Take 1 tablet (8 mg total) by mouth every 12 (twelve) hours as needed. 50 tablet 6    oxyCODONE (ROXICODONE) 10 mg Tab immediate release tablet Take 1 tablet (10 mg total) by mouth every 8  (eight) hours as needed for Pain (medically necessary). 90 tablet 0    prochlorperazine (COMPAZINE) 10 MG tablet Take 1 tablet (10 mg total) by mouth every 6 (six) hours as needed. 60 tablet 1    promethazine (PHENERGAN) 25 MG tablet Take 1 tablet (25 mg total) by mouth every 4 (four) hours. 60 tablet 4    silver sulfADIAZINE 1% (SILVADENE) 1 % cream Apply to affected area daily 400 g 1    temazepam (RESTORIL) 7.5 MG Cap Take 2 capsules (15 mg total) by mouth nightly as needed. 30 capsule 2    traMADol (ULTRAM) 50 mg tablet Take 1 tablet (50 mg total) by mouth every 6 (six) hours as needed for Pain. 90 tablet 0    varenicline (CHANTIX) 1 mg Tab Take 1 tablet (1 mg total) by mouth 2 (two) times daily. 60 tablet 2     No current facility-administered medications for this visit.        Labs:  Lab Results   Component Value Date    WBC 12.92 (H) 12/04/2019    HGB 12.1 (L) 12/04/2019    HCT 36.0 (L) 12/04/2019    MCV 88 12/04/2019     12/04/2019     BMP  Lab Results   Component Value Date     12/04/2019    K 3.3 (L) 12/04/2019     12/04/2019    CO2 27 12/04/2019    BUN 5 (L) 12/04/2019    CREATININE 0.8 12/04/2019    CALCIUM 8.9 12/04/2019    ANIONGAP 9 12/04/2019    ESTGFRAFRICA >60 12/04/2019    EGFRNONAA >60 12/04/2019     Lab Results   Component Value Date    ALT 8 (L) 12/04/2019    AST 10 12/04/2019    ALKPHOS 107 12/04/2019    BILITOT 0.6 12/04/2019       Lab Results   Component Value Date    IRON 94 10/31/2019    TIBC 339 10/31/2019    FERRITIN 856 (H) 10/31/2019     Lab Results   Component Value Date    EVAWKVPD58 497 09/03/2019     Lab Results   Component Value Date    FOLATE 8.3 09/03/2019     Lab Results   Component Value Date    TSH 1.607 01/15/2019       I have reviewed the radiology reports and examined the scan/xray images.    Review of Systems   Constitutional: Negative.    HENT: Negative.    Eyes: Negative.    Respiratory: Negative.    Cardiovascular: Negative.     Gastrointestinal: Negative.    Endocrine: Negative.    Genitourinary: Negative.    Musculoskeletal: Negative.    Skin: Negative.    Allergic/Immunologic: Negative.    Neurological: Negative.    Hematological: Negative.    Psychiatric/Behavioral: Negative.      ECOG SCORE    0 - Fully active-able to carry on all pre-disease performance without restriction            Objective:     Vitals:    12/17/19 1513   BP: (!) 140/91   Pulse: 71   Resp: (!) 178   Temp: 98.6 °F (37 °C)   Body mass index is 22.31 kg/m².  Physical Exam   Constitutional: He is oriented to person, place, and time. He appears well-developed and well-nourished.   HENT:   Head: Normocephalic and atraumatic.   Eyes: Conjunctivae and EOM are normal.   Neck: Normal range of motion. Neck supple.   Cardiovascular: Normal rate and regular rhythm.   Pulmonary/Chest: Effort normal and breath sounds normal.   Abdominal: Soft. Bowel sounds are normal.   Musculoskeletal: Normal range of motion.   Neurological: He is alert and oriented to person, place, and time.   Skin: Skin is warm and dry.   Psychiatric: He has a normal mood and affect. His behavior is normal. Judgment and thought content normal.   Nursing note and vitals reviewed.        Assessment:      1. Rectal cancer           Plan:     Rectal cancer  Restaging CT after 4 cycles of avastin folfoxiri showed response, but per Dr. New would need more response to be able to do R0 resection.  Will plan to resume chemo with another 4 cycles of avastin folfoxiri.  Mr. Presley would like to start on January 6.  -     temazepam (RESTORIL) 7.5 MG Cap; Take 2 capsules (15 mg total) by mouth nightly as needed.  Dispense: 30 capsule; Refill: 2  -     CBC auto differential; Future; Expected date: 01/06/2020  -     Comprehensive metabolic panel; Future; Expected date: 01/06/2020

## 2020-01-06 ENCOUNTER — INFUSION (OUTPATIENT)
Dept: INFUSION THERAPY | Facility: HOSPITAL | Age: 55
End: 2020-01-06
Attending: INTERNAL MEDICINE
Payer: COMMERCIAL

## 2020-01-06 ENCOUNTER — SOCIAL WORK (OUTPATIENT)
Dept: HEMATOLOGY/ONCOLOGY | Facility: CLINIC | Age: 55
End: 2020-01-06

## 2020-01-06 ENCOUNTER — OFFICE VISIT (OUTPATIENT)
Dept: HEMATOLOGY/ONCOLOGY | Facility: CLINIC | Age: 55
End: 2020-01-06
Payer: COMMERCIAL

## 2020-01-06 VITALS
BODY MASS INDEX: 21.77 KG/M2 | WEIGHT: 160.69 LBS | SYSTOLIC BLOOD PRESSURE: 115 MMHG | DIASTOLIC BLOOD PRESSURE: 69 MMHG | HEIGHT: 72 IN | HEART RATE: 70 BPM

## 2020-01-06 VITALS
RESPIRATION RATE: 18 BRPM | DIASTOLIC BLOOD PRESSURE: 88 MMHG | TEMPERATURE: 98 F | HEIGHT: 72 IN | SYSTOLIC BLOOD PRESSURE: 131 MMHG | WEIGHT: 160.69 LBS | OXYGEN SATURATION: 98 % | HEART RATE: 79 BPM | BODY MASS INDEX: 21.77 KG/M2

## 2020-01-06 DIAGNOSIS — C20 RECTAL CANCER: Primary | ICD-10-CM

## 2020-01-06 LAB
ALBUMIN SERPL BCP-MCNC: 3.5 G/DL (ref 3.5–5.2)
ALP SERPL-CCNC: 59 U/L (ref 55–135)
ALT SERPL W/O P-5'-P-CCNC: 6 U/L (ref 10–44)
ANION GAP SERPL CALC-SCNC: 9 MMOL/L (ref 8–16)
AST SERPL-CCNC: 10 U/L (ref 10–40)
BASOPHILS # BLD AUTO: 0.06 K/UL (ref 0–0.2)
BASOPHILS NFR BLD: 1.2 % (ref 0–1.9)
BILIRUB SERPL-MCNC: 0.6 MG/DL (ref 0.1–1)
BUN SERPL-MCNC: 7 MG/DL (ref 6–20)
CALCIUM SERPL-MCNC: 9 MG/DL (ref 8.7–10.5)
CHLORIDE SERPL-SCNC: 108 MMOL/L (ref 95–110)
CO2 SERPL-SCNC: 26 MMOL/L (ref 23–29)
CREAT SERPL-MCNC: 0.7 MG/DL (ref 0.5–1.4)
DIFFERENTIAL METHOD: ABNORMAL
EOSINOPHIL # BLD AUTO: 0.4 K/UL (ref 0–0.5)
EOSINOPHIL NFR BLD: 7.8 % (ref 0–8)
ERYTHROCYTE [DISTWIDTH] IN BLOOD BY AUTOMATED COUNT: 13.4 % (ref 11.5–14.5)
EST. GFR  (AFRICAN AMERICAN): >60 ML/MIN/1.73 M^2
EST. GFR  (NON AFRICAN AMERICAN): >60 ML/MIN/1.73 M^2
GLUCOSE SERPL-MCNC: 91 MG/DL (ref 70–110)
HCT VFR BLD AUTO: 37.4 % (ref 40–54)
HGB BLD-MCNC: 12.6 G/DL (ref 14–18)
IMM GRANULOCYTES # BLD AUTO: 0.01 K/UL (ref 0–0.04)
IMM GRANULOCYTES NFR BLD AUTO: 0.2 % (ref 0–0.5)
LYMPHOCYTES # BLD AUTO: 1.4 K/UL (ref 1–4.8)
LYMPHOCYTES NFR BLD: 27 % (ref 18–48)
MCH RBC QN AUTO: 30 PG (ref 27–31)
MCHC RBC AUTO-ENTMCNC: 33.7 G/DL (ref 32–36)
MCV RBC AUTO: 89 FL (ref 82–98)
MONOCYTES # BLD AUTO: 0.3 K/UL (ref 0.3–1)
MONOCYTES NFR BLD: 5.7 % (ref 4–15)
NEUTROPHILS # BLD AUTO: 3 K/UL (ref 1.8–7.7)
NEUTROPHILS NFR BLD: 58.3 % (ref 38–73)
NRBC BLD-RTO: 0 /100 WBC
PLATELET # BLD AUTO: 182 K/UL (ref 150–350)
PMV BLD AUTO: 9.2 FL (ref 9.2–12.9)
POTASSIUM SERPL-SCNC: 3.7 MMOL/L (ref 3.5–5.1)
PROT SERPL-MCNC: 7 G/DL (ref 6–8.4)
RBC # BLD AUTO: 4.2 M/UL (ref 4.6–6.2)
SODIUM SERPL-SCNC: 143 MMOL/L (ref 136–145)
WBC # BLD AUTO: 5.11 K/UL (ref 3.9–12.7)

## 2020-01-06 PROCEDURE — 3008F PR BODY MASS INDEX (BMI) DOCUMENTED: ICD-10-PCS | Mod: CPTII,S$GLB,, | Performed by: INTERNAL MEDICINE

## 2020-01-06 PROCEDURE — 63600175 PHARM REV CODE 636 W HCPCS: Mod: JG | Performed by: INTERNAL MEDICINE

## 2020-01-06 PROCEDURE — 96375 TX/PRO/DX INJ NEW DRUG ADDON: CPT

## 2020-01-06 PROCEDURE — 96415 CHEMO IV INFUSION ADDL HR: CPT

## 2020-01-06 PROCEDURE — 85025 COMPLETE CBC W/AUTO DIFF WBC: CPT

## 2020-01-06 PROCEDURE — 80053 COMPREHEN METABOLIC PANEL: CPT

## 2020-01-06 PROCEDURE — 96367 TX/PROPH/DG ADDL SEQ IV INF: CPT

## 2020-01-06 PROCEDURE — 3008F BODY MASS INDEX DOCD: CPT | Mod: CPTII,S$GLB,, | Performed by: INTERNAL MEDICINE

## 2020-01-06 PROCEDURE — 99999 PR PBB SHADOW E&M-EST. PATIENT-LVL IV: CPT | Mod: PBBFAC,,, | Performed by: INTERNAL MEDICINE

## 2020-01-06 PROCEDURE — 96416 CHEMO PROLONG INFUSE W/PUMP: CPT

## 2020-01-06 PROCEDURE — 99215 OFFICE O/P EST HI 40 MIN: CPT | Mod: 25,S$GLB,, | Performed by: INTERNAL MEDICINE

## 2020-01-06 PROCEDURE — 25000003 PHARM REV CODE 250: Performed by: INTERNAL MEDICINE

## 2020-01-06 PROCEDURE — 96413 CHEMO IV INFUSION 1 HR: CPT

## 2020-01-06 PROCEDURE — 99215 PR OFFICE/OUTPT VISIT, EST, LEVL V, 40-54 MIN: ICD-10-PCS | Mod: 25,S$GLB,, | Performed by: INTERNAL MEDICINE

## 2020-01-06 PROCEDURE — 96417 CHEMO IV INFUS EACH ADDL SEQ: CPT

## 2020-01-06 PROCEDURE — A4216 STERILE WATER/SALINE, 10 ML: HCPCS | Performed by: INTERNAL MEDICINE

## 2020-01-06 PROCEDURE — 99999 PR PBB SHADOW E&M-EST. PATIENT-LVL IV: ICD-10-PCS | Mod: PBBFAC,,, | Performed by: INTERNAL MEDICINE

## 2020-01-06 PROCEDURE — 96368 THER/DIAG CONCURRENT INF: CPT

## 2020-01-06 RX ORDER — HEPARIN 100 UNIT/ML
500 SYRINGE INTRAVENOUS
Status: CANCELLED | OUTPATIENT
Start: 2020-01-08

## 2020-01-06 RX ORDER — ATROPINE SULFATE 0.4 MG/ML
0.4 INJECTION, SOLUTION ENDOTRACHEAL; INTRAMEDULLARY; INTRAMUSCULAR; INTRAVENOUS; SUBCUTANEOUS ONCE AS NEEDED
Status: CANCELLED | OUTPATIENT
Start: 2020-01-06

## 2020-01-06 RX ORDER — HEPARIN 100 UNIT/ML
500 SYRINGE INTRAVENOUS
Status: CANCELLED | OUTPATIENT
Start: 2020-01-06

## 2020-01-06 RX ORDER — DIPHENHYDRAMINE HYDROCHLORIDE 50 MG/ML
50 INJECTION INTRAMUSCULAR; INTRAVENOUS ONCE AS NEEDED
Status: CANCELLED | OUTPATIENT
Start: 2020-01-06

## 2020-01-06 RX ORDER — MORPHINE SULFATE 15 MG/1
45 TABLET, FILM COATED, EXTENDED RELEASE ORAL NIGHTLY
Qty: 90 TABLET | Refills: 0 | Status: SHIPPED | OUTPATIENT
Start: 2020-01-06 | End: 2020-01-31 | Stop reason: SDUPTHER

## 2020-01-06 RX ORDER — SODIUM CHLORIDE 0.9 % (FLUSH) 0.9 %
10 SYRINGE (ML) INJECTION
Status: DISCONTINUED | OUTPATIENT
Start: 2020-01-06 | End: 2020-01-06 | Stop reason: HOSPADM

## 2020-01-06 RX ORDER — OXYCODONE HYDROCHLORIDE 10 MG/1
10 TABLET ORAL EVERY 8 HOURS PRN
Qty: 90 TABLET | Refills: 0 | Status: SHIPPED | OUTPATIENT
Start: 2020-01-06 | End: 2020-01-31 | Stop reason: SDUPTHER

## 2020-01-06 RX ORDER — ATROPINE SULFATE 0.4 MG/ML
0.4 INJECTION, SOLUTION ENDOTRACHEAL; INTRAMEDULLARY; INTRAMUSCULAR; INTRAVENOUS; SUBCUTANEOUS ONCE AS NEEDED
Status: COMPLETED | OUTPATIENT
Start: 2020-01-06 | End: 2020-01-06

## 2020-01-06 RX ORDER — EPINEPHRINE 0.3 MG/.3ML
0.3 INJECTION SUBCUTANEOUS ONCE AS NEEDED
Status: CANCELLED | OUTPATIENT
Start: 2020-01-06

## 2020-01-06 RX ORDER — DIPHENOXYLATE HYDROCHLORIDE AND ATROPINE SULFATE 2.5; .025 MG/1; MG/1
1-2 TABLET ORAL 4 TIMES DAILY PRN
Qty: 120 TABLET | Refills: 1 | Status: ON HOLD | OUTPATIENT
Start: 2020-01-06 | End: 2020-04-09

## 2020-01-06 RX ORDER — SODIUM CHLORIDE 0.9 % (FLUSH) 0.9 %
10 SYRINGE (ML) INJECTION
Status: CANCELLED | OUTPATIENT
Start: 2020-01-06

## 2020-01-06 RX ORDER — SODIUM CHLORIDE 0.9 % (FLUSH) 0.9 %
10 SYRINGE (ML) INJECTION
Status: CANCELLED | OUTPATIENT
Start: 2020-01-08

## 2020-01-06 RX ADMIN — BEVACIZUMAB 365 MG: 400 INJECTION, SOLUTION INTRAVENOUS at 09:01

## 2020-01-06 RX ADMIN — OXALIPLATIN 163 MG: 5 INJECTION, SOLUTION, CONCENTRATE INTRAVENOUS at 12:01

## 2020-01-06 RX ADMIN — Medication 10 ML: at 08:01

## 2020-01-06 RX ADMIN — LEUCOVORIN CALCIUM 385 MG: 350 INJECTION, POWDER, LYOPHILIZED, FOR SOLUTION INTRAMUSCULAR; INTRAVENOUS at 12:01

## 2020-01-06 RX ADMIN — PALONOSETRON: 0.25 INJECTION, SOLUTION INTRAVENOUS at 09:01

## 2020-01-06 RX ADMIN — DEXTROSE MONOHYDRATE: 50 INJECTION, SOLUTION INTRAVENOUS at 12:01

## 2020-01-06 RX ADMIN — FLUOROURACIL 6145 MG: 50 INJECTION, SOLUTION INTRAVENOUS at 02:01

## 2020-01-06 RX ADMIN — SODIUM CHLORIDE 316 MG: 0.9 INJECTION, SOLUTION INTRAVENOUS at 10:01

## 2020-01-06 RX ADMIN — ATROPINE SULFATE 0.4 MG: 0.4 INJECTION, SOLUTION INTRAMUSCULAR; INTRAVENOUS; SUBCUTANEOUS at 10:01

## 2020-01-06 RX ADMIN — SODIUM CHLORIDE: 9 INJECTION, SOLUTION INTRAVENOUS at 09:01

## 2020-01-06 NOTE — DISCHARGE INSTRUCTIONS
[facility]  [address] [address]  [address] [address]  [phone] phone     [phone] fax  Hours of Operation: Monday- Friday 8:00am- 5:00pm  After hours phone  [phone]  Hematology / Oncology Physicians on call      LUCIA Laurent Dr., Dr., Dr., Dr., NP Sydney Prescott, NP Tyesha Taylor, NP    Please call with any concerns regarding your appointment today.HOME CARE AFTER CHEMOTHERAPY   Meals   Many patients feel sick and lose their appetites during treatment. Eat small meals several times a day. Choose bland foods with little taste or smell if you have problems with nausea. Be sure to cook all food thoroughly. This kills bacteria and helps you avoid intestinal infection. Soft foods are easier to swallow and digest.   Activity   Exercise keeps you strong and keeps your heart and lungs active. Talk to your doctor about an appropriate exercise program for you.   Skin Care   To prevent a skin infection, bathe or shower once a day. Use a moisturizing soap and wash with warm water. Avoid very hot or cold water. Chemotherapy can make your skin dry . Apply moisturizing lotion to help relieve dry skin. Some drugs used in high doses can cause slight burns to appear (like sunburn). Ask for a special cream to help relieve the burn and protect your skin.   Prevent Mouth Sores   During chemotherapy, many people get mouth sores. Do the following to help prevent mouth sores or to ease discomfort.   Brush your teeth with a soft-bristle toothbrush after every meal.  Don't use dental floss if your platelet count is below 50,000. Your doctor or nurse will tell you if this is the case.  Use an oral swab or special soft toothbrush if your gums bleed during regular brushing.  Use mouthwash as directed. If you can't tolerate commercial mouthwash, use salt and baking soda to clean your mouth. Mix 1 teaspoon of salt and 1  teaspoon of baking soda into a glass of water. Swish and spit.  Call your doctor or return to this facility if you develop any of the following:   Sore throat   White patches in the mouth or throat   Fever of 100.4ºF (38ºC) or higher, or as directed by your healthcare provider  © 2000-2011 [facility], [address] [place] [address]. All rights reserved. This information is not intended as a substitute for professional medical care. Always follow your healthcare professional's   FALL PREVENTION   Falls often occur due to slipping, tripping or losing your balance. Here are ways to reduce your risk of falling again.   Was there anything that caused your fall that can be fixed, removed or replaced?   Make your home safe by keeping walkways clear of objects you may trip over.   Use non-slip pads under rugs.   Do not walk in poorly lit areas.   Do not stand on chairs or wobbly ladders.   Use caution when reaching overhead or looking upward. This position can cause a loss of balance.   Be sure your shoes fit properly, have non-slip bottoms and are in good condition.   Be cautious when going up and down stairs, curbs, and when walking on uneven sidewalks.   If your balance is poor, consider using a cane or walker.   If your fall was related to alcohol use, stop or limit alcohol intake.   If your fall was related to use of sleeping medicines, talk to your doctor about this. You may need to reduce your dosage at bedtime if you awaken during the night to go to the bathroom.   To reduce the need for nighttime bathroom trips:   Avoid drinking fluids for several hours before going to bed   Empty your bladder before going to bed   Men can keep a urinal at the bedside   © 2000-2011 [facility], [address] [place] [address]. All rights reserved. This information is not intended as a substitute for professional medical care. Always follow your healthcare professional's instructions.  Support  "Groups/Classes    Support groups and classes are being offered at the   [facility] and [facility]!!    "Cooking with Cancer" (Nutrition Class):  Second Wednesday of each month   at noon at the Cancer Center.  Metastatic Support Group:  Third Tuesday of each month   at noon at the Cancer Center.  Next Steps Class/Group: Second and fourth Thursday of each month at noon at the Cancer Center.  [facility] (Breast Cancer Support Group): First Tuesday of each month   at 5:30pm at the [facility] location.  [facility] [facility]: Second and third Tuesday of each month from 7:30am - 2pm.  [facility]: First and fourth Tuesday of each month from 7:30am - 2pm    If you are interested in attending or would like more information please ask our social workers or your nurse!  "

## 2020-01-06 NOTE — PROGRESS NOTES
Subjective:       Patient ID: Sukhjinder Presley is a 54 y.o. male.    Chief Complaint: Rectal cancer [C20]  HPI: We have an opportunity to see Mr. Sukhjinder Presley in Hematology Oncology clinic at [facility] on 01/05/2020.  Mr. Sukhjinder Presley is a 54 y.o. gentleman with rectal cancer completed neoadjuvant chemoradiation with concurrent xeloda.  Reported symptoms have improved with better pain control and appetite.  Restaging MRI rectum showed      Impression         1. Large rectal mass with invasion of the muscularis and involvement of the mesorectal fat and fascia along the left and posterior aspect of the mass.  Single lymph node within the mesorectal fat posteriorly measuring approximately 6 mm.  Additional subcentimeter obturator and external iliac chain lymph nodes also seen bilaterally.  When compared to the study prior to neoadjuvant therapy there has been significant decrease in wall thickening.      Has been evaluated by Dr. New, given fascia involvement, likely resection would be R1.  Recommended total neoadjuvant chemotherapy prior to surgery.     Currently on chemotherapy with Avastin FOLFOXIRI s/p 4 cycles.     CT chest abdomen pelvis showed            Impression         1. Significant decrease in soft tissue prominence of the previously noted rectal mass with some persistent circumferential wall thickening still present on the current study.  There is also some persistent stranding within the perirectal fat and thickening of the mesial rectal fascia along with thickening/fluid density noted within the presacral soft tissues.  No metastatic lesions to the chest.  2. Remaining findings as discussed above and stable.                Rectal cancer    6/24/2019 Initial Diagnosis     Rectal cancer      6/26/2019 Cancer Staged     Staging form: Colon and Rectum, AJCC 8th Edition  - Clinical stage from 6/26/2019: Stage IIIB (cT3, cN2a, cM0) - Signed by Artem Mishra II, MD on 6/26/2019      7/10/2019 -  8/13/2019 Radiation Therapy     Treatment Site Ref. ID Energy Dose/Fx (Gy) #Fx Dose Correction (Gy) Total Dose (Gy) Start Date End Date Elapsed Days   RECTUM Pelvis 6X 2 25 / 25 0 50 7/10/2019 8/13/2019 34           7/10/2019 - 7/10/2019 Chemotherapy     Treatment Summary   Plan Name: OP CAPECITABINE 5 DAYS + RADIOTHERAPY  Treatment Goal: Curative  Status: Inactive  Start Date: [No treatment day found]  End Date: [No treatment day found]  Provider: Song Aden MD  Chemotherapy: [No matching medication found in this treatment plan]      10/9/2019 Tumor Conference       Multidisciplinary Rectal Cancer Conference - Evaluation and Recommendation Summary    10/9/2019  Sukhjinder Presley  [id number]  53 y.o. male    1. Evaluation    C scope on 6/6/19 - non obstructing mass    MRI date: 6/17/19    Tumor Location in Rectum: middle third    Indication of Sphincter Involvement:  Uninvolved    Pretreatment Circumferential Resection Margin (CRM) status:  Threatened Tumor abuts CRM on the left.     Pretreatment (clinical) AJCC Stage: IIIB  Stage I  [] I: T1N0M0  [] I: T2N0M0  Stage II  [] IIA: T3N0M0  [] IIB E0cP8G4  [] IIC: S8vH0N9  Stage III  [] IIIA: T1-2N1M0  [] IIIA: G1B7sZ5  [x] IIIB: T3-T7sF2O3  [] IIIB: T2-3N2aM0  [] IIIB: T1-2N2bM0  [] IIIC: H4hU6lM4  [] IIIC: T3-3bT7jH7  [] IIIC: Z2xS6-4H0   Stage IV  [] IV: U8-7S8-6N4s-b    CEA level:   Lab Results   Component Value Date    CEA 1.4 09/30/2019       2. Treatment     Neoadjuvant Therapy Recommendation: Long Course Chemoradiotherapy - completed on 8/13    CT scan 7/30 during treatment: Unchanged appearance of large rectal mass, mild curcumferential bladder wall thickening.     MRI rectum 9/24: Large mass with invasion of muscularis and involvement of the mesorectal fat and fascia along the left and posterior aspect of the mass.   Single LN within the mesorectal fat posteriorly measuring 6 mm.   Subcentimeter obturator and external iliac changes LNDs seen bilaterally.      Recommendations:     Complete COLLINS with initiation of FOLFOX.     After follow by DUNCAN + MIRZA.       10/9/2019 -  Chemotherapy     Treatment Summary   Plan Name: OP FOLFOXIRI Q2W  Treatment Goal: Curative  Status: Active  Start Date: 10/9/2019  End Date: 1/17/2020 (Planned)  Provider: Song Aden MD  Chemotherapy: fluorouracil 3,200 mg/m2 = 6,305 mg in sodium chloride 0.9% 253 mL chemo infusion, 3,200 mg/m2 = 6,305 mg, Intravenous, Over 48 hours, 4 of 8 cycles  Administration: 6,305 mg (10/9/2019), 6,270 mg (10/23/2019), 6,210 mg (11/6/2019), 6,305 mg (11/20/2019)  bevacizumab (AVASTIN) 5 mg/kg = 380 mg in sodium chloride 0.9% 100 mL chemo infusion, 5 mg/kg = 380 mg (100 % of original dose 5 mg/kg), Intravenous, Clinic/HOD 1 time, 4 of 8 cycles  Dose modification: 5 mg/kg (original dose 5 mg/kg, Cycle 1, Reason: MD Discretion), 5 mg/kg (original dose 5 mg/kg, Cycle 2)  Administration: 380 mg (10/9/2019), 380 mg (10/23/2019), 370 mg (11/6/2019), 380 mg (11/20/2019)  irinotecan (CAMPTOSAR) 165 mg/m2 = 326 mg in sodium chloride 0.9% 266.3 mL chemo infusion, 165 mg/m2 = 326 mg, Intravenous, Clinic/HOD 1 time, 4 of 8 cycles  Administration: 326 mg (10/9/2019), 324 mg (10/23/2019), 320 mg (11/6/2019), 326 mg (11/20/2019)  leucovorin calcium 200 mg/m2 = 395 mg in dextrose 5 % 269.75 mL infusion, 200 mg/m2 = 395 mg, Intravenous, Clinic/HOD 1 time, 4 of 8 cycles  Administration: 395 mg (10/9/2019), 390 mg (10/23/2019), 390 mg (11/6/2019), 395 mg (11/20/2019)  oxaliplatin (ELOXATIN) 85 mg/m2 = 167 mg in dextrose 5 % 533.4 mL chemo infusion, 85 mg/m2 = 167 mg, Intravenous, Clinic/HOD 1 time, 4 of 8 cycles  Administration: 167 mg (10/9/2019), 167 mg (10/23/2019), 165 mg (11/6/2019), 167 mg (11/20/2019)       Past Medical History:   Diagnosis Date    Anemia     Cancer      Family History   Problem Relation Age of Onset    Intestinal malrotation Mother     Leukemia Father     No Known Problems Sister     Coronary artery  disease Brother     Coronary artery disease Maternal Grandmother     Stomach cancer Maternal Grandfather      Social History     Socioeconomic History    Marital status: Significant Other     Spouse name: Not on file    Number of children: Not on file    Years of education: Not on file    Highest education level: Not on file   Occupational History    Not on file   Social Needs    Financial resource strain: Somewhat hard    Food insecurity:     Worry: Sometimes true     Inability: Sometimes true    Transportation needs:     Medical: No     Non-medical: No   Tobacco Use    Smoking status: Current Every Day Smoker     Packs/day: 1.00     Years: 35.00     Pack years: 35.00     Types: Cigarettes     Start date: 1/2/1984    Smokeless tobacco: Current User     Types: Chew   Substance and Sexual Activity    Alcohol use: Yes     Alcohol/week: 46.0 standard drinks     Types: 42 Cans of beer, 4 Shots of liquor per week     Frequency: 4 or more times a week     Drinks per session: 5 or 6     Binge frequency: Daily or almost daily     Comment: segrams 7, beer daily,  None 72 hours prior to surgery    Drug use: Never    Sexual activity: Yes     Partners: Female     Birth control/protection: None   Lifestyle    Physical activity:     Days per week: 1 day     Minutes per session: 60 min    Stress: Not on file   Relationships    Social connections:     Talks on phone: More than three times a week     Gets together: More than three times a week     Attends [employer] service: Not on file     Active member of club or organization: No     Attends meetings of clubs or organizations: Never     Relationship status: Living with partner   Other Topics Concern    Not on file   Social History Narrative    Not on file     Past Surgical History:   Procedure Laterality Date    COLONOSCOPY N/A 6/6/2019    Procedure: COLONOSCOPY;  Surgeon: Anjelica Saavedra MD;  Location: [facility];  Service: Endoscopy;  Laterality: N/A;     ESOPHAGOGASTRODUODENOSCOPY N/A 6/6/2019    Procedure: EGD (ESOPHAGOGASTRODUODENOSCOPY);  Surgeon: Anjelica Saavedra MD;  Location: [facility] ENDO;  Service: Endoscopy;  Laterality: N/A;    HERNIA REPAIR  1995    INSERTION OF TUNNELED CENTRAL VENOUS CATHETER (CVC) WITH SUBCUTANEOUS PORT N/A 10/8/2019    Procedure: [id number]-HEME-U-CATH;  Surgeon: Jan New MD;  Location: [facility] OR;  Service: General;  Laterality: N/A;    TONSILLECTOMY       Current Outpatient Medications   Medication Sig Dispense Refill    acetaminophen (TYLENOL) 325 MG tablet Take 1 tablet (325 mg total) by mouth every 6 (six) hours as needed for Pain.  0    aspirin 81 MG Chew Take 81 mg by mouth once daily.      capecitabine (XELODA) 500 MG Tab Take 3 tablets (1500 mg) by mouth twice daily after breakfast and dinner on days of radiation only. 168 tablet 0    diphenoxylate-atropine 2.5-0.025 mg (LOMOTIL) 2.5-0.025 mg per tablet Take 1-2 tablets by mouth 4 (four) times daily as needed for Diarrhea. 120 tablet 1    dronabinol (MARINOL) 10 MG capsule Take 1 capsule (10 mg total) by mouth 2 (two) times daily before meals. 60 capsule 0    iron fum-B12-IF-C-folic acid (FOLTRIN) 110-0.5 mg capsule Take 1 capsule by mouth 2 (two) times daily. 60 capsule 1    lidocaine (XYLOCAINE) 5 % Oint ointment Apply topically as needed. 30 g 3    magic mouthwash diphen/antac/lidoc Swish and spit 15 ml by mouth every 4 hours as needed 500 mL 2    megestrol (MEGACE) 40 MG Tab Take 1 tablet (40 mg total) by mouth 4 (four) times daily. 90 tablet 1    morphine (MS CONTIN) 15 MG 12 hr tablet Take 3 tablets (45 mg total) by mouth nightly. 90 tablet 0    nicotine (NICODERM CQ) 21 mg/24 hr Place 1 patch onto the skin once daily. 14 patch 1    ondansetron (ZOFRAN-ODT) 8 MG TbDL Take 1 tablet (8 mg total) by mouth every 12 (twelve) hours as needed. 50 tablet 6    oxyCODONE (ROXICODONE) 10 mg Tab immediate release tablet Take 1 tablet (10 mg total) by mouth every 8  (eight) hours as needed for Pain (medically necessary). 90 tablet 0    prochlorperazine (COMPAZINE) 10 MG tablet Take 1 tablet (10 mg total) by mouth every 6 (six) hours as needed. 60 tablet 1    promethazine (PHENERGAN) 25 MG tablet Take 1 tablet (25 mg total) by mouth every 4 (four) hours. 60 tablet 4    silver sulfADIAZINE 1% (SILVADENE) 1 % cream Apply to affected area daily 400 g 1    temazepam (RESTORIL) 7.5 MG Cap Take 2 capsules (15 mg total) by mouth nightly as needed. 30 capsule 2    traMADol (ULTRAM) 50 mg tablet Take 1 tablet (50 mg total) by mouth every 6 (six) hours as needed for Pain. 90 tablet 0    varenicline (CHANTIX) 1 mg Tab Take 1 tablet (1 mg total) by mouth 2 (two) times daily. 60 tablet 2     No current facility-administered medications for this visit.        Labs:  Lab Results   Component Value Date    WBC 12.92 (H) 12/04/2019    HGB 12.1 (L) 12/04/2019    HCT 36.0 (L) 12/04/2019    MCV 88 12/04/2019     12/04/2019     BMP  Lab Results   Component Value Date     12/04/2019    K 3.3 (L) 12/04/2019     12/04/2019    CO2 27 12/04/2019    BUN 5 (L) 12/04/2019    CREATININE 0.8 12/04/2019    CALCIUM 8.9 12/04/2019    ANIONGAP 9 12/04/2019    ESTGFRAFRICA >60 12/04/2019    EGFRNONAA >60 12/04/2019     Lab Results   Component Value Date    ALT 8 (L) 12/04/2019    AST 10 12/04/2019    ALKPHOS 107 12/04/2019    BILITOT 0.6 12/04/2019       Lab Results   Component Value Date    IRON 94 10/31/2019    TIBC 339 10/31/2019    FERRITIN 856 (H) 10/31/2019     Lab Results   Component Value Date    DPCHDOOW22 497 09/03/2019     Lab Results   Component Value Date    FOLATE 8.3 09/03/2019     Lab Results   Component Value Date    TSH 1.607 01/15/2019       I have reviewed the radiology reports and examined the scan/xray images.    Review of Systems   Constitutional: Negative.    HENT: Negative.    Eyes: Negative.    Respiratory: Negative.    Cardiovascular: Negative.     Gastrointestinal: Negative.    Endocrine: Negative.    Genitourinary: Negative.    Musculoskeletal: Negative.    Skin: Negative.    Allergic/Immunologic: Negative.    Neurological: Negative.    Hematological: Negative.    Psychiatric/Behavioral: Negative.      ECOG SCORE    0 - Fully active-able to carry on all pre-disease performance without restriction            Objective:     Vitals:    01/06/20 0758   BP: 131/88   Pulse: 79   Resp: 18   Temp: 97.6 °F (36.4 °C)   Body mass index is 21.8 kg/m².  Physical Exam   Constitutional: He is oriented to person, place, and time. He appears well-developed and well-nourished.   HENT:   Head: Normocephalic and atraumatic.   Eyes: Conjunctivae and EOM are normal.   Neck: Normal range of motion. Neck supple.   Cardiovascular: Normal rate and regular rhythm.   Pulmonary/Chest: Effort normal and breath sounds normal.   Abdominal: Soft. Bowel sounds are normal.   Musculoskeletal: Normal range of motion.   Neurological: He is alert and oriented to person, place, and time.   Skin: Skin is warm and dry.   Psychiatric: He has a normal mood and affect. His behavior is normal. Judgment and thought content normal.   Nursing note and vitals reviewed.        Assessment:      1. Rectal cancer           Plan:     Rectal cancer  Continue on avastin folfoxiri cycle 5 today.  Restage after cycle 8.  -     CBC auto differential; Future; Expected date: 01/06/2020  -     Comprehensive metabolic panel; Future; Expected date: 01/06/2020  -     morphine (MS CONTIN) 15 MG 12 hr tablet; Take 3 tablets (45 mg total) by mouth nightly.  Dispense: 90 tablet; Refill: 0  -     oxyCODONE (ROXICODONE) 10 mg Tab immediate release tablet; Take 1 tablet (10 mg total) by mouth every 8 (eight) hours as needed for Pain (medically necessary).  Dispense: 90 tablet; Refill: 0  -     diphenoxylate-atropine 2.5-0.025 mg (LOMOTIL) 2.5-0.025 mg per tablet; Take 1 to 2 tablets by mouth 4 (four) times daily as needed for  Diarrhea.  Dispense: 120 tablet; Refill: 1  -     CBC auto differential; Future; Expected date: 01/20/2020  -     Comprehensive metabolic panel; Future; Expected date: 01/20/2020

## 2020-01-07 NOTE — PROGRESS NOTES
Pt was referred to SW for a [facility] referral. ([facility]) SW researched to determine if a referral was sent to [facility]. SW informed pt that a referral was previously sent to [facility] and pt can go  boost when pt is ready.    F/u as needs arise.

## 2020-01-08 ENCOUNTER — INFUSION (OUTPATIENT)
Dept: INFUSION THERAPY | Facility: HOSPITAL | Age: 55
End: 2020-01-08
Attending: INTERNAL MEDICINE
Payer: COMMERCIAL

## 2020-01-08 VITALS
HEART RATE: 80 BPM | DIASTOLIC BLOOD PRESSURE: 77 MMHG | TEMPERATURE: 98 F | RESPIRATION RATE: 18 BRPM | OXYGEN SATURATION: 99 % | SYSTOLIC BLOOD PRESSURE: 128 MMHG

## 2020-01-08 DIAGNOSIS — C20 RECTAL CANCER: Primary | ICD-10-CM

## 2020-01-08 PROCEDURE — 63600175 PHARM REV CODE 636 W HCPCS: Mod: JG | Performed by: INTERNAL MEDICINE

## 2020-01-08 PROCEDURE — 25000003 PHARM REV CODE 250: Performed by: INTERNAL MEDICINE

## 2020-01-08 PROCEDURE — A4216 STERILE WATER/SALINE, 10 ML: HCPCS | Performed by: INTERNAL MEDICINE

## 2020-01-08 PROCEDURE — 96377 APPLICATON ON-BODY INJECTOR: CPT

## 2020-01-08 RX ORDER — SODIUM CHLORIDE 0.9 % (FLUSH) 0.9 %
10 SYRINGE (ML) INJECTION
Status: DISCONTINUED | OUTPATIENT
Start: 2020-01-08 | End: 2020-01-08 | Stop reason: HOSPADM

## 2020-01-08 RX ORDER — HEPARIN 100 UNIT/ML
500 SYRINGE INTRAVENOUS
Status: DISCONTINUED | OUTPATIENT
Start: 2020-01-08 | End: 2020-01-08 | Stop reason: HOSPADM

## 2020-01-08 RX ADMIN — PEGFILGRASTIM 6 MG: KIT SUBCUTANEOUS at 02:01

## 2020-01-08 RX ADMIN — HEPARIN SODIUM (PORCINE) LOCK FLUSH IV SOLN 100 UNIT/ML 500 UNITS: 100 SOLUTION at 02:01

## 2020-01-08 RX ADMIN — Medication 10 ML: at 02:01

## 2020-01-20 ENCOUNTER — INFUSION (OUTPATIENT)
Dept: INFUSION THERAPY | Facility: HOSPITAL | Age: 55
End: 2020-01-20
Attending: INTERNAL MEDICINE
Payer: COMMERCIAL

## 2020-01-20 ENCOUNTER — LAB VISIT (OUTPATIENT)
Dept: LAB | Facility: HOSPITAL | Age: 55
End: 2020-01-20
Attending: INTERNAL MEDICINE
Payer: COMMERCIAL

## 2020-01-20 ENCOUNTER — OFFICE VISIT (OUTPATIENT)
Dept: HEMATOLOGY/ONCOLOGY | Facility: CLINIC | Age: 55
End: 2020-01-20
Payer: COMMERCIAL

## 2020-01-20 ENCOUNTER — DOCUMENTATION ONLY (OUTPATIENT)
Dept: HEMATOLOGY/ONCOLOGY | Facility: CLINIC | Age: 55
End: 2020-01-20

## 2020-01-20 VITALS
BODY MASS INDEX: 21.59 KG/M2 | HEIGHT: 72 IN | HEART RATE: 63 BPM | DIASTOLIC BLOOD PRESSURE: 73 MMHG | WEIGHT: 159.38 LBS | SYSTOLIC BLOOD PRESSURE: 110 MMHG

## 2020-01-20 VITALS
OXYGEN SATURATION: 99 % | HEART RATE: 80 BPM | TEMPERATURE: 97 F | SYSTOLIC BLOOD PRESSURE: 127 MMHG | DIASTOLIC BLOOD PRESSURE: 84 MMHG | HEIGHT: 72 IN | WEIGHT: 159.38 LBS | BODY MASS INDEX: 21.59 KG/M2 | RESPIRATION RATE: 18 BRPM

## 2020-01-20 DIAGNOSIS — D50.0 IRON DEFICIENCY ANEMIA DUE TO CHRONIC BLOOD LOSS: ICD-10-CM

## 2020-01-20 DIAGNOSIS — C20 RECTAL CANCER: Primary | ICD-10-CM

## 2020-01-20 DIAGNOSIS — K62.5 RECTAL BLEEDING: ICD-10-CM

## 2020-01-20 DIAGNOSIS — C20 RECTAL CANCER: ICD-10-CM

## 2020-01-20 LAB
ALBUMIN SERPL BCP-MCNC: 3.5 G/DL (ref 3.5–5.2)
ALP SERPL-CCNC: 102 U/L (ref 55–135)
ALT SERPL W/O P-5'-P-CCNC: 8 U/L (ref 10–44)
ANION GAP SERPL CALC-SCNC: 10 MMOL/L (ref 8–16)
AST SERPL-CCNC: 10 U/L (ref 10–40)
BASOPHILS # BLD AUTO: 0.04 K/UL (ref 0–0.2)
BASOPHILS NFR BLD: 0.4 % (ref 0–1.9)
BILIRUB SERPL-MCNC: 0.3 MG/DL (ref 0.1–1)
BUN SERPL-MCNC: 5 MG/DL (ref 6–20)
CALCIUM SERPL-MCNC: 9.3 MG/DL (ref 8.7–10.5)
CHLORIDE SERPL-SCNC: 107 MMOL/L (ref 95–110)
CO2 SERPL-SCNC: 28 MMOL/L (ref 23–29)
CREAT SERPL-MCNC: 0.8 MG/DL (ref 0.5–1.4)
DIFFERENTIAL METHOD: ABNORMAL
EOSINOPHIL # BLD AUTO: 0.3 K/UL (ref 0–0.5)
EOSINOPHIL NFR BLD: 2.4 % (ref 0–8)
ERYTHROCYTE [DISTWIDTH] IN BLOOD BY AUTOMATED COUNT: 13.5 % (ref 11.5–14.5)
EST. GFR  (AFRICAN AMERICAN): >60 ML/MIN/1.73 M^2
EST. GFR  (NON AFRICAN AMERICAN): >60 ML/MIN/1.73 M^2
GLUCOSE SERPL-MCNC: 111 MG/DL (ref 70–110)
HCT VFR BLD AUTO: 38.5 % (ref 40–54)
HGB BLD-MCNC: 12.7 G/DL (ref 14–18)
IMM GRANULOCYTES # BLD AUTO: 0.18 K/UL (ref 0–0.04)
IMM GRANULOCYTES NFR BLD AUTO: 1.7 % (ref 0–0.5)
LYMPHOCYTES # BLD AUTO: 1.5 K/UL (ref 1–4.8)
LYMPHOCYTES NFR BLD: 14.5 % (ref 18–48)
MCH RBC QN AUTO: 30.1 PG (ref 27–31)
MCHC RBC AUTO-ENTMCNC: 33 G/DL (ref 32–36)
MCV RBC AUTO: 91 FL (ref 82–98)
MONOCYTES # BLD AUTO: 0.5 K/UL (ref 0.3–1)
MONOCYTES NFR BLD: 4.2 % (ref 4–15)
NEUTROPHILS # BLD AUTO: 8.3 K/UL (ref 1.8–7.7)
NEUTROPHILS NFR BLD: 78.5 % (ref 38–73)
NRBC BLD-RTO: 0 /100 WBC
PLATELET # BLD AUTO: 247 K/UL (ref 150–350)
PMV BLD AUTO: 9 FL (ref 9.2–12.9)
POTASSIUM SERPL-SCNC: 4.1 MMOL/L (ref 3.5–5.1)
PROT SERPL-MCNC: 7.5 G/DL (ref 6–8.4)
RBC # BLD AUTO: 4.22 M/UL (ref 4.6–6.2)
SODIUM SERPL-SCNC: 145 MMOL/L (ref 136–145)
WBC # BLD AUTO: 10.61 K/UL (ref 3.9–12.7)

## 2020-01-20 PROCEDURE — 96416 CHEMO PROLONG INFUSE W/PUMP: CPT

## 2020-01-20 PROCEDURE — A4216 STERILE WATER/SALINE, 10 ML: HCPCS | Performed by: INTERNAL MEDICINE

## 2020-01-20 PROCEDURE — 96413 CHEMO IV INFUSION 1 HR: CPT

## 2020-01-20 PROCEDURE — 3008F PR BODY MASS INDEX (BMI) DOCUMENTED: ICD-10-PCS | Mod: CPTII,S$GLB,, | Performed by: INTERNAL MEDICINE

## 2020-01-20 PROCEDURE — 85025 COMPLETE CBC W/AUTO DIFF WBC: CPT

## 2020-01-20 PROCEDURE — 3008F BODY MASS INDEX DOCD: CPT | Mod: CPTII,S$GLB,, | Performed by: INTERNAL MEDICINE

## 2020-01-20 PROCEDURE — 36415 COLL VENOUS BLD VENIPUNCTURE: CPT

## 2020-01-20 PROCEDURE — 96367 TX/PROPH/DG ADDL SEQ IV INF: CPT

## 2020-01-20 PROCEDURE — 96375 TX/PRO/DX INJ NEW DRUG ADDON: CPT

## 2020-01-20 PROCEDURE — 99999 PR PBB SHADOW E&M-EST. PATIENT-LVL III: ICD-10-PCS | Mod: PBBFAC,,, | Performed by: INTERNAL MEDICINE

## 2020-01-20 PROCEDURE — 96415 CHEMO IV INFUSION ADDL HR: CPT

## 2020-01-20 PROCEDURE — 63600175 PHARM REV CODE 636 W HCPCS: Performed by: INTERNAL MEDICINE

## 2020-01-20 PROCEDURE — 99999 PR PBB SHADOW E&M-EST. PATIENT-LVL III: CPT | Mod: PBBFAC,,, | Performed by: INTERNAL MEDICINE

## 2020-01-20 PROCEDURE — 96368 THER/DIAG CONCURRENT INF: CPT

## 2020-01-20 PROCEDURE — 96417 CHEMO IV INFUS EACH ADDL SEQ: CPT

## 2020-01-20 PROCEDURE — 99215 PR OFFICE/OUTPT VISIT, EST, LEVL V, 40-54 MIN: ICD-10-PCS | Mod: 25,S$GLB,, | Performed by: INTERNAL MEDICINE

## 2020-01-20 PROCEDURE — 99215 OFFICE O/P EST HI 40 MIN: CPT | Mod: 25,S$GLB,, | Performed by: INTERNAL MEDICINE

## 2020-01-20 PROCEDURE — 25000003 PHARM REV CODE 250: Performed by: INTERNAL MEDICINE

## 2020-01-20 PROCEDURE — 80053 COMPREHEN METABOLIC PANEL: CPT

## 2020-01-20 RX ORDER — SODIUM CHLORIDE 0.9 % (FLUSH) 0.9 %
10 SYRINGE (ML) INJECTION
Status: CANCELLED | OUTPATIENT
Start: 2020-01-22

## 2020-01-20 RX ORDER — EPINEPHRINE 0.3 MG/.3ML
0.3 INJECTION SUBCUTANEOUS ONCE AS NEEDED
Status: CANCELLED | OUTPATIENT
Start: 2020-01-20

## 2020-01-20 RX ORDER — SODIUM CHLORIDE 0.9 % (FLUSH) 0.9 %
10 SYRINGE (ML) INJECTION
Status: CANCELLED | OUTPATIENT
Start: 2020-01-20

## 2020-01-20 RX ORDER — DIPHENHYDRAMINE HYDROCHLORIDE 50 MG/ML
50 INJECTION INTRAMUSCULAR; INTRAVENOUS ONCE AS NEEDED
Status: CANCELLED | OUTPATIENT
Start: 2020-01-20

## 2020-01-20 RX ORDER — SODIUM CHLORIDE 0.9 % (FLUSH) 0.9 %
10 SYRINGE (ML) INJECTION
Status: DISCONTINUED | OUTPATIENT
Start: 2020-01-20 | End: 2020-01-20 | Stop reason: HOSPADM

## 2020-01-20 RX ORDER — HEPARIN 100 UNIT/ML
500 SYRINGE INTRAVENOUS
Status: CANCELLED | OUTPATIENT
Start: 2020-01-20

## 2020-01-20 RX ORDER — ATROPINE SULFATE 0.4 MG/ML
0.4 INJECTION, SOLUTION ENDOTRACHEAL; INTRAMEDULLARY; INTRAMUSCULAR; INTRAVENOUS; SUBCUTANEOUS ONCE AS NEEDED
Status: CANCELLED | OUTPATIENT
Start: 2020-01-20

## 2020-01-20 RX ORDER — ATROPINE SULFATE 0.4 MG/ML
0.4 INJECTION, SOLUTION ENDOTRACHEAL; INTRAMEDULLARY; INTRAMUSCULAR; INTRAVENOUS; SUBCUTANEOUS ONCE AS NEEDED
Status: COMPLETED | OUTPATIENT
Start: 2020-01-20 | End: 2020-01-20

## 2020-01-20 RX ORDER — HEPARIN 100 UNIT/ML
500 SYRINGE INTRAVENOUS
Status: CANCELLED | OUTPATIENT
Start: 2020-01-22

## 2020-01-20 RX ADMIN — FLUOROURACIL 6145 MG: 50 INJECTION, SOLUTION INTRAVENOUS at 02:01

## 2020-01-20 RX ADMIN — SODIUM CHLORIDE, PRESERVATIVE FREE 10 ML: 5 INJECTION INTRAVENOUS at 08:01

## 2020-01-20 RX ADMIN — DEXTROSE: 5 SOLUTION INTRAVENOUS at 11:01

## 2020-01-20 RX ADMIN — SODIUM CHLORIDE: 9 INJECTION, SOLUTION INTRAVENOUS at 08:01

## 2020-01-20 RX ADMIN — LEUCOVORIN CALCIUM 385 MG: 350 INJECTION, POWDER, LYOPHILIZED, FOR SOLUTION INTRAMUSCULAR; INTRAVENOUS at 11:01

## 2020-01-20 RX ADMIN — OXALIPLATIN 163 MG: 5 INJECTION, SOLUTION, CONCENTRATE INTRAVENOUS at 11:01

## 2020-01-20 RX ADMIN — ATROPINE SULFATE 0.4 MG: 0.4 INJECTION, SOLUTION INTRAMUSCULAR; INTRAVENOUS; SUBCUTANEOUS at 10:01

## 2020-01-20 RX ADMIN — BEVACIZUMAB 360 MG: 400 INJECTION, SOLUTION INTRAVENOUS at 09:01

## 2020-01-20 RX ADMIN — IRINOTECAN HYDROCHLORIDE 316 MG: 20 INJECTION, SOLUTION INTRAVENOUS at 10:01

## 2020-01-20 RX ADMIN — DEXAMETHASONE SODIUM PHOSPHATE: 4 INJECTION, SOLUTION INTRAMUSCULAR; INTRAVENOUS at 08:01

## 2020-01-20 NOTE — PROGRESS NOTES
Subjective:       Patient ID: Sukhjinder Presley is a 54 y.o. male.    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 currently planned surgery on 02/11/2020   Past Medical History:   Diagnosis Date    Anemia     Cancer      Family History   Problem Relation Age of Onset    Intestinal malrotation Mother     Leukemia Father     No Known Problems Sister     Coronary artery disease Brother     Coronary artery disease Maternal Grandmother     Stomach cancer Maternal Grandfather      Social History     Socioeconomic History    Marital status: Significant Other     Spouse name: Not on file    Number of children: Not on file    Years of education: Not on file    Highest education level: Not on file   Occupational History    Not on file   Social Needs    Financial resource strain: Somewhat hard    Food insecurity:     Worry: Sometimes true     Inability: Sometimes true    Transportation needs:     Medical: No     Non-medical: No   Tobacco Use    Smoking status: Current Every Day Smoker     Packs/day: 1.00     Years: 35.00     Pack years: 35.00     Types: Cigarettes     Start date: 1/2/1984    Smokeless tobacco: Current User     Types: Chew   Substance and Sexual Activity    Alcohol use: Yes     Alcohol/week: 46.0 standard drinks     Types: 42 Cans of beer, 4 Shots of liquor per week     Frequency: 4 or more times a week     Drinks per session: 5 or 6     Binge frequency: Daily or almost daily     Comment: nancie 7, beer daily,  None 72 hours prior to surgery    Drug use: Never    Sexual activity: Yes     Partners: Female     Birth control/protection: None   Lifestyle    Physical activity:     Days per week: 1 day     Minutes per session: 60 min    Stress: Not on file   Relationships    Social connections:     Talks on phone:  More than three times a week     Gets together: More than three times a week     Attends [employer] service: Not on file     Active member of club or organization: No     Attends meetings of clubs or organizations: Never     Relationship status: Living with partner   Other Topics Concern    Not on file   Social History Narrative    Not on file     Past Surgical History:   Procedure Laterality Date    COLONOSCOPY N/A 6/6/2019    Procedure: COLONOSCOPY;  Surgeon: Anjelica Saavedra MD;  Location: [facility] ENDO;  Service: Endoscopy;  Laterality: N/A;    ESOPHAGOGASTRODUODENOSCOPY N/A 6/6/2019    Procedure: EGD (ESOPHAGOGASTRODUODENOSCOPY);  Surgeon: Anjelica Saavedra MD;  Location: [facility] ENDO;  Service: Endoscopy;  Laterality: N/A;    HERNIA REPAIR  1995    INSERTION OF TUNNELED CENTRAL VENOUS CATHETER (CVC) WITH SUBCUTANEOUS PORT N/A 10/8/2019    Procedure: [id number]-HUEO-O-CATH;  Surgeon: Jan New MD;  Location: [facility] OR;  Service: General;  Laterality: N/A;    TONSILLECTOMY         Labs:  Lab Results   Component Value Date    WBC 10.61 01/20/2020    HGB 12.7 (L) 01/20/2020    HCT 38.5 (L) 01/20/2020    MCV 91 01/20/2020     01/20/2020     BMP  Lab Results   Component Value Date     01/20/2020    K 4.1 01/20/2020     01/20/2020    CO2 28 01/20/2020    BUN 5 (L) 01/20/2020    CREATININE 0.8 01/20/2020    CALCIUM 9.3 01/20/2020    ANIONGAP 10 01/20/2020    ESTGFRAFRICA >60 01/20/2020    EGFRNONAA >60 01/20/2020     Lab Results   Component Value Date    ALT 8 (L) 01/20/2020    AST 10 01/20/2020    ALKPHOS 102 01/20/2020    BILITOT 0.3 01/20/2020       Lab Results   Component Value Date    IRON 94 10/31/2019    TIBC 339 10/31/2019    FERRITIN 856 (H) 10/31/2019     Lab Results   Component Value Date    FOEWMAOP70 497 09/03/2019     Lab Results   Component Value Date    FOLATE 8.3 09/03/2019     Lab Results   Component Value Date    TSH 1.607 01/15/2019         Review of Systems   Constitutional:  Positive for activity change, appetite change and fatigue. Negative for chills, diaphoresis, fever and unexpected weight change.   HENT: Negative for congestion, dental problem, drooling, ear discharge, ear pain, facial swelling, hearing loss, mouth sores, nosebleeds, postnasal drip, rhinorrhea, sinus pressure, sneezing, sore throat, tinnitus, trouble swallowing and voice change.    Eyes: Negative for photophobia, pain, discharge, redness, itching and visual disturbance.   Respiratory: Negative for apnea, cough, choking, chest tightness, shortness of breath, wheezing and stridor.    Cardiovascular: Negative for chest pain, palpitations and leg swelling.   Gastrointestinal: Negative for abdominal distention, abdominal pain, anal bleeding, blood in stool, constipation, diarrhea, nausea, rectal pain and vomiting.   Endocrine: Negative for cold intolerance, heat intolerance, polydipsia, polyphagia and polyuria.   Genitourinary: Negative for decreased urine volume, difficulty urinating, discharge, dysuria, enuresis, flank pain, frequency, genital sores, hematuria, penile pain, penile swelling, scrotal swelling, testicular pain and urgency.   Musculoskeletal: Negative for arthralgias, back pain, gait problem, joint swelling, myalgias, neck pain and neck stiffness.   Skin: Negative for color change, pallor, rash and wound.   Allergic/Immunologic: Negative for environmental allergies, food allergies and immunocompromised state.   Neurological: Positive for weakness. Negative for dizziness, tremors, seizures, syncope, facial asymmetry, speech difficulty, light-headedness, numbness and headaches.   Hematological: Negative for adenopathy. Does not bruise/bleed easily.   Psychiatric/Behavioral: Positive for dysphoric mood. Negative for agitation, behavioral problems, confusion, decreased concentration, hallucinations, self-injury, sleep disturbance and suicidal ideas. The patient is nervous/anxious. The patient is not  hyperactive.        Objective:      Physical Exam   Constitutional: He is oriented to person, place, and time. He appears well-developed and well-nourished. He appears distressed.   HENT:   Head: Normocephalic.   Right Ear: External ear normal.   Left Ear: External ear normal.   Nose: Nose normal. Right sinus exhibits no maxillary sinus tenderness and no frontal sinus tenderness. Left sinus exhibits no maxillary sinus tenderness and no frontal sinus tenderness.   Mouth/Throat: Oropharynx is clear and moist. No oropharyngeal exudate.   Eyes: Pupils are equal, round, and reactive to light. EOM and lids are normal. Right eye exhibits no discharge. Left eye exhibits no discharge. Right conjunctiva is not injected. Right conjunctiva has no hemorrhage. Left conjunctiva is not injected. Left conjunctiva has no hemorrhage. No scleral icterus. Right eye exhibits normal extraocular motion. Left eye exhibits normal extraocular motion.   Neck: Normal range of motion. Neck supple. No JVD present. No tracheal deviation present. No thyromegaly present.   Cardiovascular: Normal rate and regular rhythm.   Pulmonary/Chest: Effort normal. No stridor. No respiratory distress.   Abdominal: Soft. He exhibits no mass. There is no hepatosplenomegaly, splenomegaly or hepatomegaly. There is no tenderness.   Musculoskeletal: Normal range of motion. He exhibits no edema or tenderness.   Lymphadenopathy:        Head (right side): No posterior auricular and no occipital adenopathy present.        Head (left side): No posterior auricular and no occipital adenopathy present.     He has no cervical adenopathy.        Right cervical: No superficial cervical, no deep cervical and no posterior cervical adenopathy present.       Left cervical: No superficial cervical, no deep cervical and no posterior cervical adenopathy present.     He has no axillary adenopathy.        Right: No supraclavicular adenopathy present.        Left: No supraclavicular  adenopathy present.   Neurological: He is alert and oriented to person, place, and time. He has normal strength. No cranial nerve deficit. Coordination normal.   Skin: Skin is dry. No rash noted. He is not diaphoretic. No cyanosis or erythema. Nails show no clubbing.   Psychiatric: His behavior is normal. Judgment and thought content normal. His mood appears anxious. Cognition and memory are normal.   Vitals reviewed.          Assessment:      1. Rectal cancer    2. Iron deficiency anemia due to chronic blood loss    3. Rectal bleeding     4. Encounter chemotherapy       Plan:     Proceed with cycle 6 day 1 of therapy orders written reviewed last imaging in December of 2019.  Return in 2 weeks primary oncologist reassessment laboratory studies ordered.  Reviewed the fact that the patient's weight has remained stable.  Counts remain stable will discuss with primary oncologist on next visit current plan and long-term plan of action.  Discussed implications with him answered questions        Ken Cosby Jr, MD FACP

## 2020-01-20 NOTE — DISCHARGE INSTRUCTIONS
[facility]  [address] [facility]  [address]  [phone] phone     [phone] fax  Hours of Operation: Monday- Friday 8:00am- 5:00pm  After hours phone  [phone]  Hematology / Oncology Physicians on call      LUCIA Laurent Dr., Dr., Dr., Dr., NP Sydney Prescott, NP Tyesha Taylor, NP    Please call with any concerns regarding your appointment today.HOME CARE AFTER CHEMOTHERAPY   Meals   Many patients feel sick and lose their appetites during treatment. Eat small meals several times a day. Choose bland foods with little taste or smell if you have problems with nausea. Be sure to cook all food thoroughly. This kills bacteria and helps you avoid intestinal infection. Soft foods are easier to swallow and digest.   Activity   Exercise keeps you strong and keeps your heart and lungs active. Talk to your doctor about an appropriate exercise program for you.   Skin Care   To prevent a skin infection, bathe or shower once a day. Use a moisturizing soap and wash with warm water. Avoid very hot or cold water. Chemotherapy can make your skin dry . Apply moisturizing lotion to help relieve dry skin. Some drugs used in high doses can cause slight burns to appear (like sunburn). Ask for a special cream to help relieve the burn and protect your skin.   Prevent Mouth Sores   During chemotherapy, many people get mouth sores. Do the following to help prevent mouth sores or to ease discomfort.   Brush your teeth with a soft-bristle toothbrush after every meal.  Don't use dental floss if your platelet count is below 50,000. Your doctor or nurse will tell you if this is the case.  Use an oral swab or special soft toothbrush if your gums bleed during regular brushing.  Use mouthwash as directed. If you can't tolerate commercial mouthwash, use salt and baking soda to clean your mouth. Mix 1 teaspoon of salt and 1  teaspoon of baking soda into a glass of water. Swish and spit.  Call your doctor or return to this facility if you develop any of the following:   Sore throat   White patches in the mouth or throat   Fever of 100.4ºF (38ºC) or higher, or as directed by your healthcare provider  © 2000-2011 [facility], [address] [place] [address]. All rights reserved. This information is not intended as a substitute for professional medical care. Always follow your healthcare professional's   FALL PREVENTION   Falls often occur due to slipping, tripping or losing your balance. Here are ways to reduce your risk of falling again.   Was there anything that caused your fall that can be fixed, removed or replaced?   Make your home safe by keeping walkways clear of objects you may trip over.   Use non-slip pads under rugs.   Do not walk in poorly lit areas.   Do not stand on chairs or wobbly ladders.   Use caution when reaching overhead or looking upward. This position can cause a loss of balance.   Be sure your shoes fit properly, have non-slip bottoms and are in good condition.   Be cautious when going up and down stairs, curbs, and when walking on uneven sidewalks.   If your balance is poor, consider using a cane or walker.   If your fall was related to alcohol use, stop or limit alcohol intake.   If your fall was related to use of sleeping medicines, talk to your doctor about this. You may need to reduce your dosage at bedtime if you awaken during the night to go to the bathroom.   To reduce the need for nighttime bathroom trips:   Avoid drinking fluids for several hours before going to bed   Empty your bladder before going to bed   Men can keep a urinal at the bedside   © 2000-2011 [facility], [address] [place] [address]. All rights reserved. This information is not intended as a substitute for professional medical care. Always follow your healthcare professional's instructions.  Support  "Groups/Classes    Support groups and classes are being offered at the   [facility] and [facility]!!    "Cooking with Cancer" (Nutrition Class):  Second Wednesday of each month   at noon at the Cancer Center.  Metastatic Support Group:  Third Tuesday of each month   at noon at the Cancer Center.  Next Steps Class/Group: Second and fourth Thursday of each month at noon at the Cancer Center.  [facility] (Breast Cancer Support Group): First Tuesday of each month   at 5:30pm at the [facility] location.  [facility]: [facility]: Second and third Tuesday of each month from 7:30am - 2pm.  [facility]: First and fourth Tuesday of each month from 7:30am - 2pm    If you are interested in attending or would like more information please ask our social workers or your nurse!  "

## 2020-01-20 NOTE — PROGRESS NOTES
Ez met with patient chairside in infusion to follow up. Pt reports he received a letter in the mail stating he did not receive the colorectal al Sw applied for. Sw explained she can apply monthly if he does not receive it. Sw reapplied patient for Colorectal Cancer [employer] award. Ez printed out confirmation and provided to patient. Ez kept copy for her records and will reapply next month if patient is not selected.

## 2020-01-22 ENCOUNTER — INFUSION (OUTPATIENT)
Dept: INFUSION THERAPY | Facility: HOSPITAL | Age: 55
End: 2020-01-22
Attending: INTERNAL MEDICINE
Payer: COMMERCIAL

## 2020-01-22 VITALS
DIASTOLIC BLOOD PRESSURE: 82 MMHG | TEMPERATURE: 99 F | RESPIRATION RATE: 16 BRPM | OXYGEN SATURATION: 98 % | HEART RATE: 77 BPM | SYSTOLIC BLOOD PRESSURE: 121 MMHG

## 2020-01-22 DIAGNOSIS — C20 RECTAL CANCER: Primary | ICD-10-CM

## 2020-01-22 PROCEDURE — 96377 APPLICATON ON-BODY INJECTOR: CPT

## 2020-01-22 PROCEDURE — A4216 STERILE WATER/SALINE, 10 ML: HCPCS | Performed by: INTERNAL MEDICINE

## 2020-01-22 PROCEDURE — 63600175 PHARM REV CODE 636 W HCPCS: Mod: JG | Performed by: INTERNAL MEDICINE

## 2020-01-22 PROCEDURE — 25000003 PHARM REV CODE 250: Performed by: INTERNAL MEDICINE

## 2020-01-22 RX ORDER — HEPARIN 100 UNIT/ML
500 SYRINGE INTRAVENOUS
Status: DISCONTINUED | OUTPATIENT
Start: 2020-01-22 | End: 2020-01-22 | Stop reason: HOSPADM

## 2020-01-22 RX ORDER — SODIUM CHLORIDE 0.9 % (FLUSH) 0.9 %
10 SYRINGE (ML) INJECTION
Status: DISCONTINUED | OUTPATIENT
Start: 2020-01-22 | End: 2020-01-22 | Stop reason: HOSPADM

## 2020-01-22 RX ADMIN — HEPARIN SODIUM (PORCINE) LOCK FLUSH IV SOLN 100 UNIT/ML 500 UNITS: 100 SOLUTION at 02:01

## 2020-01-22 RX ADMIN — PEGFILGRASTIM 6 MG: KIT SUBCUTANEOUS at 02:01

## 2020-01-22 RX ADMIN — Medication 10 ML: at 02:01

## 2020-01-22 NOTE — NURSING
Pt here for 5FU continuous pump d/c and OBI. Pump stopped and disconnected. Existing dressing appeared clean and intact. Left chestwall mediport flushed with 10ml NS and 5 ml heparin solution.  Needle D/C, site without redness, swelling, or drainage noted.  Dressing applied. Patient tolerated well. Patient to return to clinic on February 3, 2020.

## 2020-01-22 NOTE — DISCHARGE INSTRUCTIONS
[facility]  [address] [facility]  [address]  [phone] phone     [phone] fax  Hours of Operation: Monday- Friday 8:00am- 5:00pm  After hours phone  [phone]  Hematology / Oncology Physicians on call      LUCIA Laurent Dr., Dr., Dr., Dr., NP Sydney Prescott, NP Tyesha Taylor, NP    Please call with any concerns regarding your appointment today.    HOME CARE AFTER CHEMOTHERAPY   Meals   Many patients feel sick and lose their appetites during treatment. Eat small meals several times a day. Choose bland foods with little taste or smell if you have problems with nausea. Be sure to cook all food thoroughly. This kills bacteria and helps you avoid intestinal infection. Soft foods are easier to swallow and digest.   Activity   Exercise keeps you strong and keeps your heart and lungs active. Talk to your doctor about an appropriate exercise program for you.   Skin Care   To prevent a skin infection, bathe or shower once a day. Use a moisturizing soap and wash with warm water. Avoid very hot or cold water. Chemotherapy can make your skin dry . Apply moisturizing lotion to help relieve dry skin. Some drugs used in high doses can cause slight burns to appear (like sunburn). Ask for a special cream to help relieve the burn and protect your skin.   Prevent Mouth Sores   During chemotherapy, many people get mouth sores. Do the following to help prevent mouth sores or to ease discomfort.   Brush your teeth with a soft-bristle toothbrush after every meal.  Don't use dental floss if your platelet count is below 50,000. Your doctor or nurse will tell you if this is the case.  Use an oral swab or special soft toothbrush if your gums bleed during regular brushing.  Use mouthwash as directed. If you can't tolerate commercial mouthwash, use salt and baking soda to clean your mouth. Mix 1 teaspoon of salt and 1  teaspoon of baking soda into a glass of water. Swish and spit.  Call your doctor or return to this facility if you develop any of the following:   Sore throat   White patches in the mouth or throat   Fever of 100.4ºF (38ºC) or higher, or as directed by your healthcare provider  © 2000-2011 [facility], [address] [place] [address]. All rights reserved. This information is not intended as a substitute for professional medical care. Always follow your healthcare professional's   FALL PREVENTION   Falls often occur due to slipping, tripping or losing your balance. Here are ways to reduce your risk of falling again.   Was there anything that caused your fall that can be fixed, removed or replaced?   Make your home safe by keeping walkways clear of objects you may trip over.   Use non-slip pads under rugs.   Do not walk in poorly lit areas.   Do not stand on chairs or wobbly ladders.   Use caution when reaching overhead or looking upward. This position can cause a loss of balance.   Be sure your shoes fit properly, have non-slip bottoms and are in good condition.   Be cautious when going up and down stairs, curbs, and when walking on uneven sidewalks.   If your balance is poor, consider using a cane or walker.   If your fall was related to alcohol use, stop or limit alcohol intake.   If your fall was related to use of sleeping medicines, talk to your doctor about this. You may need to reduce your dosage at bedtime if you awaken during the night to go to the bathroom.   To reduce the need for nighttime bathroom trips:   Avoid drinking fluids for several hours before going to bed   Empty your bladder before going to bed   Men can keep a urinal at the bedside   © 2000-2011 [facility], [address] [place] [address]. All rights reserved. This information is not intended as a substitute for professional medical care. Always follow your healthcare professional's instructions.  WAYS TO HELP  PREVENT INFECTION         WASH YOUR HANDS OFTEN DURING THE DAY, ESPECIALLY BEFORE YOU EAT, AFTER USING THE BATHROOM, AND AFTER TOUCHING ANIMALS     STAY AWAY FROM PEOPLE WHO HAVE ILLNESSES YOU CAN CATCH; SUCH AS COLDS, FLU, CHICKEN POX     TRY TO AVOID CROWDS     STAY AWAY FROM CHILDREN WHO RECENTLY HAVE RECEIVED LIVE VIRUS VACCINES     MAINTAIN GOOD MOUTH CARE     DO NOT SQUEEZE OR SCRATCH PIMPLES     CLEAN CUTS & SCRAPES RIGHT AWAY AND DAILY UNTIL HEALED WITH WARM WATER, SOAP & AN ANTISEPTIC     AVOID CONTACT WITH LITTER BOXES, BIRD CAGES, & FISH TANKS     AVOID STANDING WATER, IE., BIRD BATHS, FLOWER POTS/VASES, OR HUMIDIFIERS     WEAR GLOVES WHEN GARDENING OR CLEANING UP AFTER OTHERS, ESPECIALLY BABIES & SMALL CHILDREN     DO NOT EAT RAW FISH, SEAFOOD, MEAT, OR EGGS

## 2020-01-31 ENCOUNTER — PATIENT MESSAGE (OUTPATIENT)
Dept: HEMATOLOGY/ONCOLOGY | Facility: CLINIC | Age: 55
End: 2020-01-31

## 2020-01-31 ENCOUNTER — OFFICE VISIT (OUTPATIENT)
Dept: HEMATOLOGY/ONCOLOGY | Facility: CLINIC | Age: 55
End: 2020-01-31
Payer: COMMERCIAL

## 2020-01-31 VITALS
RESPIRATION RATE: 18 BRPM | HEIGHT: 72 IN | OXYGEN SATURATION: 97 % | BODY MASS INDEX: 22.25 KG/M2 | HEART RATE: 104 BPM | SYSTOLIC BLOOD PRESSURE: 127 MMHG | DIASTOLIC BLOOD PRESSURE: 78 MMHG | TEMPERATURE: 101 F | WEIGHT: 164.25 LBS

## 2020-01-31 DIAGNOSIS — C20 RECTAL CANCER: Primary | ICD-10-CM

## 2020-01-31 DIAGNOSIS — K64.4 EXTERNAL HEMORRHOIDS: ICD-10-CM

## 2020-01-31 PROCEDURE — 3008F PR BODY MASS INDEX (BMI) DOCUMENTED: ICD-10-PCS | Mod: CPTII,S$GLB,, | Performed by: INTERNAL MEDICINE

## 2020-01-31 PROCEDURE — 3008F BODY MASS INDEX DOCD: CPT | Mod: CPTII,S$GLB,, | Performed by: INTERNAL MEDICINE

## 2020-01-31 PROCEDURE — 99999 PR PBB SHADOW E&M-EST. PATIENT-LVL IV: CPT | Mod: PBBFAC,,, | Performed by: INTERNAL MEDICINE

## 2020-01-31 PROCEDURE — 99215 PR OFFICE/OUTPT VISIT, EST, LEVL V, 40-54 MIN: ICD-10-PCS | Mod: S$GLB,,, | Performed by: INTERNAL MEDICINE

## 2020-01-31 PROCEDURE — 99999 PR PBB SHADOW E&M-EST. PATIENT-LVL IV: ICD-10-PCS | Mod: PBBFAC,,, | Performed by: INTERNAL MEDICINE

## 2020-01-31 PROCEDURE — 99215 OFFICE O/P EST HI 40 MIN: CPT | Mod: S$GLB,,, | Performed by: INTERNAL MEDICINE

## 2020-01-31 RX ORDER — PRAMOXINE HYDROCHLORIDE 10 MG/G
AEROSOL, FOAM TOPICAL 3 TIMES DAILY PRN
Qty: 1 CAN | Refills: 1 | Status: ON HOLD | OUTPATIENT
Start: 2020-01-31 | End: 2020-04-09

## 2020-01-31 RX ORDER — HYDROCORTISONE ACETATE 25 MG/1
25 SUPPOSITORY RECTAL 2 TIMES DAILY
Qty: 20 SUPPOSITORY | Refills: 0 | Status: SHIPPED | OUTPATIENT
Start: 2020-01-31 | End: 2020-02-10

## 2020-01-31 RX ORDER — MEGESTROL ACETATE 40 MG/1
40 TABLET ORAL 2 TIMES DAILY
Qty: 60 TABLET | Refills: 1 | Status: SHIPPED | OUTPATIENT
Start: 2020-01-31 | End: 2020-02-24

## 2020-01-31 RX ORDER — OXYCODONE HYDROCHLORIDE 10 MG/1
10 TABLET ORAL EVERY 8 HOURS PRN
Qty: 90 TABLET | Refills: 0 | Status: SHIPPED | OUTPATIENT
Start: 2020-01-31 | End: 2020-02-24 | Stop reason: SDUPTHER

## 2020-01-31 RX ORDER — MORPHINE SULFATE 15 MG/1
45 TABLET, FILM COATED, EXTENDED RELEASE ORAL NIGHTLY
Qty: 90 TABLET | Refills: 0 | Status: SHIPPED | OUTPATIENT
Start: 2020-01-31 | End: 2020-02-24

## 2020-01-31 NOTE — PROGRESS NOTES
Subjective:       Patient ID: Sukhjinder Presley is a 54 y.o. male.    Chief Complaint: Rectal cancer [C20]  HPI: We have an opportunity to see Mr. Sukhjinder Presley in Hematology Oncology clinic at [facility] on 01/31/2020.  Mr. Sukhjinder Presley is a 54 y.o.  gentleman with rectal cancer completed neoadjuvant chemoradiation with concurrent xeloda.  Reported symptoms have improved with better pain control and appetite.  Restaging MRI rectum showed      Impression         1. Large rectal mass with invasion of the muscularis and involvement of the mesorectal fat and fascia along the left and posterior aspect of the mass.  Single lymph node within the mesorectal fat posteriorly measuring approximately 6 mm.  Additional subcentimeter obturator and external iliac chain lymph nodes also seen bilaterally.  When compared to the study prior to neoadjuvant therapy there has been significant decrease in wall thickening.      Has been evaluated by Dr. New, given fascia involvement, likely resection would be R1.  Recommended total neoadjuvant chemotherapy prior to surgery.     Currently on chemotherapy with Avastin FOLFOXIRI s/p 4 cycles.     CT chest abdomen pelvis showed               Impression         1. Significant decrease in soft tissue prominence of the previously noted rectal mass with some persistent circumferential wall thickening still present on the current study.  There is also some persistent stranding within the perirectal fat and thickening of the mesial rectal fascia along with thickening/fluid density noted within the presacral soft tissues.  No metastatic lesions to the chest.  2. Remaining findings as discussed above and stable.     Has had an additional 2 cycles avastin folfoxiri.  Presents today with pain in anal canal.  Reports significant fatigue with his treatment, and feels cannot do his needed work.       Rectal cancer    6/24/2019 Initial Diagnosis     Rectal cancer      6/26/2019 Cancer Staged      Staging form: Colon and Rectum, AJCC 8th Edition  - Clinical stage from 6/26/2019: Stage IIIB (cT3, cN2a, cM0) - Signed by Artem Mishra II, MD on 6/26/2019      7/10/2019 - 8/13/2019 Radiation Therapy     Treatment Site Ref. ID Energy Dose/Fx (Gy) #Fx Dose Correction (Gy) Total Dose (Gy) Start Date End Date Elapsed Days   RECTUM Pelvis 6X 2 25 / 25 0 50 7/10/2019 8/13/2019 34           7/10/2019 - 7/10/2019 Chemotherapy     Treatment Summary   Plan Name: OP CAPECITABINE 5 DAYS + RADIOTHERAPY  Treatment Goal: Curative  Status: Inactive  Start Date: [No treatment day found]  End Date: [No treatment day found]  Provider: Song Aden MD  Chemotherapy: [No matching medication found in this treatment plan]      10/9/2019 Tumor Conference       Multidisciplinary Rectal Cancer Conference - Evaluation and Recommendation Summary    10/9/2019  Sukhjinder Presley  [id number]  53 y.o. male    1. Evaluation    C scope on 6/6/19 - non obstructing mass    MRI date: 6/17/19    Tumor Location in Rectum: middle third    Indication of Sphincter Involvement:  Uninvolved    Pretreatment Circumferential Resection Margin (CRM) status:  Threatened Tumor abuts CRM on the left.     Pretreatment (clinical) AJCC Stage: IIIB  Stage I  [] I: T1N0M0  [] I: T2N0M0  Stage II  [] IIA: T3N0M0  [] IIB L2nL2Q2  [] IIC: G1pA3T9  Stage III  [] IIIA: T1-2N1M0  [] IIIA: V6Z6hS0  [x] IIIB: T3-Y4kW4N5  [] IIIB: T2-3N2aM0  [] IIIB: T1-2N2bM0  [] IIIC: T5tJ5rE7  [] IIIC: T3-1uF5zO5  [] IIIC: A9tL6-8D2   Stage IV  [] IV: Z1-3N5-5Q4d-b    CEA level:   Lab Results   Component Value Date    CEA 1.4 09/30/2019       2. Treatment     Neoadjuvant Therapy Recommendation: Long Course Chemoradiotherapy - completed on 8/13    CT scan 7/30 during treatment: Unchanged appearance of large rectal mass, mild curcumferential bladder wall thickening.     MRI rectum 9/24: Large mass with invasion of muscularis and involvement of the mesorectal fat and fascia along the left and  posterior aspect of the mass.   Single LN within the mesorectal fat posteriorly measuring 6 mm.   Subcentimeter obturator and external iliac changes LNDs seen bilaterally.     Recommendations:     Complete COLLINS with initiation of FOLFOX.     After follow by LAR + DI.       10/9/2019 -  Chemotherapy     Treatment Summary   Plan Name: OP FOLFOXIRI Q2W  Treatment Goal: Curative  Status: Active  Start Date: 10/9/2019  End Date: 2/19/2020 (Planned)  Provider: Song Aden MD  Chemotherapy: fluorouracil 3,200 mg/m2 = 6,305 mg in sodium chloride 0.9% 253 mL chemo infusion, 3,200 mg/m2 = 6,305 mg, Intravenous, Over 48 hours, 6 of 8 cycles  Administration: 6,305 mg (10/9/2019), 6,270 mg (10/23/2019), 6,210 mg (11/6/2019), 6,305 mg (11/20/2019), 6,145 mg (1/6/2020), 6,145 mg (1/20/2020)  bevacizumab (AVASTIN) 5 mg/kg = 380 mg in sodium chloride 0.9% 100 mL chemo infusion, 5 mg/kg = 380 mg (100 % of original dose 5 mg/kg), Intravenous, Clinic/HOD 1 time, 6 of 6 cycles  Dose modification: 5 mg/kg (original dose 5 mg/kg, Cycle 1, Reason: MD Discretion), 5 mg/kg (original dose 5 mg/kg, Cycle 2)  Administration: 380 mg (10/9/2019), 380 mg (10/23/2019), 370 mg (11/6/2019), 380 mg (11/20/2019), 365 mg (1/6/2020), 360 mg (1/20/2020)  irinotecan (CAMPTOSAR) 165 mg/m2 = 326 mg in sodium chloride 0.9% 266.3 mL chemo infusion, 165 mg/m2 = 326 mg, Intravenous, Clinic/HOD 1 time, 6 of 6 cycles  Administration: 326 mg (10/9/2019), 324 mg (10/23/2019), 320 mg (11/6/2019), 326 mg (11/20/2019), 316 mg (1/6/2020), 316 mg (1/20/2020)  leucovorin calcium 200 mg/m2 = 395 mg in dextrose 5 % 269.75 mL infusion, 200 mg/m2 = 395 mg, Intravenous, Clinic/HOD 1 time, 6 of 8 cycles  Administration: 395 mg (10/9/2019), 390 mg (10/23/2019), 390 mg (11/6/2019), 395 mg (11/20/2019), 385 mg (1/6/2020), 385 mg (1/20/2020)  oxaliplatin (ELOXATIN) 85 mg/m2 = 167 mg in dextrose 5 % 533.4 mL chemo infusion, 85 mg/m2 = 167 mg, Intravenous, Clinic/HOD 1 time, 6 of 8  cycles  Administration: 167 mg (10/9/2019), 167 mg (10/23/2019), 165 mg (11/6/2019), 167 mg (11/20/2019), 163 mg (1/6/2020), 163 mg (1/20/2020)       Past Medical History:   Diagnosis Date    Anemia     Cancer      Family History   Problem Relation Age of Onset    Intestinal malrotation Mother     Leukemia Father     No Known Problems Sister     Coronary artery disease Brother     Coronary artery disease Maternal Grandmother     Stomach cancer Maternal Grandfather      Social History     Socioeconomic History    Marital status: Significant Other     Spouse name: Not on file    Number of children: Not on file    Years of education: Not on file    Highest education level: Not on file   Occupational History    Not on file   Social Needs    Financial resource strain: Somewhat hard    Food insecurity:     Worry: Sometimes true     Inability: Sometimes true    Transportation needs:     Medical: No     Non-medical: No   Tobacco Use    Smoking status: Current Every Day Smoker     Packs/day: 1.00     Years: 35.00     Pack years: 35.00     Types: Cigarettes     Start date: 1/2/1984    Smokeless tobacco: Current User     Types: Chew   Substance and Sexual Activity    Alcohol use: Yes     Alcohol/week: 46.0 standard drinks     Types: 42 Cans of beer, 4 Shots of liquor per week     Frequency: 4 or more times a week     Drinks per session: 5 or 6     Binge frequency: Daily or almost daily     Comment: nancie 7, beer daily,  None 72 hours prior to surgery    Drug use: Never    Sexual activity: Yes     Partners: Female     Birth control/protection: None   Lifestyle    Physical activity:     Days per week: 1 day     Minutes per session: 60 min    Stress: Not on file   Relationships    Social connections:     Talks on phone: More than three times a week     Gets together: More than three times a week     Attends Yarsani service: Not on file     Active member of club or organization: No     Attends meetings  of clubs or organizations: Never     Relationship status: Living with partner   Other Topics Concern    Not on file   Social History Narrative    Not on file     Past Surgical History:   Procedure Laterality Date    COLONOSCOPY N/A 6/6/2019    Procedure: COLONOSCOPY;  Surgeon: Anjelica Saavedra MD;  Location: [facility];  Service: Endoscopy;  Laterality: N/A;    ESOPHAGOGASTRODUODENOSCOPY N/A 6/6/2019    Procedure: EGD (ESOPHAGOGASTRODUODENOSCOPY);  Surgeon: Anjelica Saavedra MD;  Location: [facility] ENDO;  Service: Endoscopy;  Laterality: N/A;    HERNIA REPAIR  1995    INSERTION OF TUNNELED CENTRAL VENOUS CATHETER (CVC) WITH SUBCUTANEOUS PORT N/A 10/8/2019    Procedure: FZAVGXGVO-GNYU-K-CATH;  Surgeon: Jan New MD;  Location: [facility] OR;  Service: General;  Laterality: N/A;    TONSILLECTOMY       Current Outpatient Medications   Medication Sig Dispense Refill    acetaminophen (TYLENOL) 325 MG tablet Take 1 tablet (325 mg total) by mouth every 6 (six) hours as needed for Pain.  0    diphenoxylate-atropine 2.5-0.025 mg (LOMOTIL) 2.5-0.025 mg per tablet Take 1 to 2 tablets by mouth 4 (four) times daily as needed for Diarrhea. 120 tablet 1    dronabinol (MARINOL) 10 MG capsule Take 1 capsule (10 mg total) by mouth 2 (two) times daily before meals. 60 capsule 0    iron fum-B12-IF-C-folic acid (FOLTRIN) 110-0.5 mg capsule Take 1 capsule by mouth 2 (two) times daily. 60 capsule 1    megestrol (MEGACE) 40 MG Tab Take 1 tablet (40 mg total) by mouth 2 (two) times daily. 60 tablet 1    morphine (MS CONTIN) 15 MG 12 hr tablet Take 3 tablets (45 mg total) by mouth nightly. 90 tablet 0    ondansetron (ZOFRAN-ODT) 8 MG TbDL Take 1 tablet (8 mg total) by mouth every 12 (twelve) hours as needed. 50 tablet 6    oxyCODONE (ROXICODONE) 10 mg Tab immediate release tablet Take 1 tablet (10 mg total) by mouth every 8 (eight) hours as needed for Pain (medically necessary). 90 tablet 0    prochlorperazine (COMPAZINE) 10 MG  tablet Take 1 tablet (10 mg total) by mouth every 6 (six) hours as needed. 60 tablet 1    promethazine (PHENERGAN) 25 MG tablet Take 1 tablet (25 mg total) by mouth every 4 (four) hours. 60 tablet 4    temazepam (RESTORIL) 7.5 MG Cap Take 2 capsules (15 mg total) by mouth nightly as needed. 30 capsule 2    aspirin 81 MG Chew Take 81 mg by mouth once daily.      capecitabine (XELODA) 500 MG Tab Take 3 tablets (1500 mg) by mouth twice daily after breakfast and dinner on days of radiation only. (Patient not taking: Reported on 1/31/2020.) 168 tablet 0    hydrocortisone (ANUSOL-HC) 25 mg suppository Place 1 suppository (25 mg total) rectally 2 (two) times daily. for 10 days 20 suppository 0    lidocaine (XYLOCAINE) 5 % Oint ointment Apply topically as needed. (Patient not taking: Reported on 1/31/2020) 30 g 3    magic mouthwash diphen/antac/lidoc Swish and spit 15 ml by mouth every 4 hours as needed (Patient not taking: Reported on 1/31/2020) 500 mL 2    nicotine (NICODERM CQ) 21 mg/24 hr Place 1 patch onto the skin once daily. (Patient not taking: Reported on 1/31/2020) 14 patch 1    pramoxine (PROCTOFOAM) 1 % Foam Place rectally 3 (three) times daily as needed. 1 Can 1    silver sulfADIAZINE 1% (SILVADENE) 1 % cream Apply to affected area daily (Patient not taking: Reported on 1/31/2020) 400 g 1    traMADol (ULTRAM) 50 mg tablet Take 1 tablet (50 mg total) by mouth every 6 (six) hours as needed for Pain. (Patient not taking: Reported on 1/31/2020) 90 tablet 0    varenicline (CHANTIX) 1 mg Tab Take 1 tablet (1 mg total) by mouth 2 (two) times daily. (Patient not taking: Reported on 1/31/2020) 60 tablet 2     No current facility-administered medications for this visit.        Labs:  Lab Results   Component Value Date    WBC 10.61 01/20/2020    HGB 12.7 (L) 01/20/2020    HCT 38.5 (L) 01/20/2020    MCV 91 01/20/2020     01/20/2020     BMP  Lab Results   Component Value Date     01/20/2020    K 4.1  01/20/2020     01/20/2020    CO2 28 01/20/2020    BUN 5 (L) 01/20/2020    CREATININE 0.8 01/20/2020    CALCIUM 9.3 01/20/2020    ANIONGAP 10 01/20/2020    ESTGFRAFRICA >60 01/20/2020    EGFRNONAA >60 01/20/2020     Lab Results   Component Value Date    ALT 8 (L) 01/20/2020    AST 10 01/20/2020    ALKPHOS 102 01/20/2020    BILITOT 0.3 01/20/2020       Lab Results   Component Value Date    IRON 94 10/31/2019    TIBC 339 10/31/2019    FERRITIN 856 (H) 10/31/2019     Lab Results   Component Value Date    FYTYAVQL84 497 09/03/2019     Lab Results   Component Value Date    FOLATE 8.3 09/03/2019     Lab Results   Component Value Date    TSH 1.607 01/15/2019       I have reviewed the radiology reports and examined the scan/xray images.    Review of Systems   Constitutional: Positive for fatigue.   HENT: Negative.    Eyes: Negative.    Respiratory: Negative.    Cardiovascular: Negative.    Gastrointestinal: Negative.    Endocrine: Negative.    Genitourinary: Negative.    Musculoskeletal: Negative.    Skin: Negative.    Allergic/Immunologic: Negative.    Neurological: Negative.    Hematological: Negative.    Psychiatric/Behavioral: Negative.      ECOG SCORE    0 - Fully active-able to carry on all pre-disease performance without restriction            Objective:     Vitals:    01/31/20 1444   BP:    Pulse:    Resp:    Temp: (!) 100.5 °F (38.1 °C)   Body mass index is 22.28 kg/m².  Physical Exam   Constitutional: He is oriented to person, place, and time. He appears well-developed and well-nourished.   HENT:   Head: Normocephalic and atraumatic.   Eyes: Conjunctivae and EOM are normal.   Neck: Normal range of motion. Neck supple.   Cardiovascular: Normal rate and regular rhythm.   Pulmonary/Chest: Effort normal and breath sounds normal.   Abdominal: Soft. Bowel sounds are normal.   Musculoskeletal: Normal range of motion.   Neurological: He is alert and oriented to person, place, and time.   Skin: Skin is warm and dry.    Psychiatric: He has a normal mood and affect. His behavior is normal. Judgment and thought content normal.   Nursing note and vitals reviewed.        Assessment:      1. Rectal cancer    2. External hemorrhoids           Plan:     Rectal cancer  Will hold avastin irinotecan from chemo, and plan finish cycle 7 and 8 with infusional 5fu leucovorin oxaliplatin.  -     oxyCODONE (ROXICODONE) 10 mg Tab immediate release tablet; Take 1 tablet (10 mg total) by mouth every 8 (eight) hours as needed for Pain (medically necessary).  Dispense: 90 tablet; Refill: 0  -     morphine (MS CONTIN) 15 MG 12 hr tablet; Take 3 tablets (45 mg total) by mouth nightly.  Dispense: 90 tablet; Refill: 0  -     megestrol (MEGACE) 40 MG Tab; Take 1 tablet (40 mg total) by mouth 2 (two) times daily.  Dispense: 60 tablet; Refill: 1  -     CBC auto differential; Future; Expected date: 02/03/2020  -     Comprehensive metabolic panel; Future; Expected date: 02/03/2020    External hemorrhoids  Anal pain from external hemorrhoids.  Will give trial of proctofoam and anusol.  -     pramoxine (PROCTOFOAM) 1 % Foam; Place rectally 3 (three) times daily as needed.  Dispense: 1 Can; Refill: 1  -     hydrocortisone (ANUSOL-HC) 25 mg suppository; Place 1 suppository (25 mg total) rectally 2 (two) times daily. for 10 days  Dispense: 20 suppository; Refill: 0

## 2020-02-03 ENCOUNTER — INFUSION (OUTPATIENT)
Dept: INFUSION THERAPY | Facility: HOSPITAL | Age: 55
End: 2020-02-03
Attending: INTERNAL MEDICINE
Payer: COMMERCIAL

## 2020-02-03 ENCOUNTER — LAB VISIT (OUTPATIENT)
Dept: LAB | Facility: HOSPITAL | Age: 55
End: 2020-02-03
Attending: INTERNAL MEDICINE
Payer: COMMERCIAL

## 2020-02-03 VITALS
HEIGHT: 72 IN | BODY MASS INDEX: 22.25 KG/M2 | DIASTOLIC BLOOD PRESSURE: 70 MMHG | SYSTOLIC BLOOD PRESSURE: 107 MMHG | RESPIRATION RATE: 18 BRPM | TEMPERATURE: 100 F | OXYGEN SATURATION: 98 % | WEIGHT: 164.25 LBS | HEART RATE: 83 BPM

## 2020-02-03 DIAGNOSIS — D50.0 IRON DEFICIENCY ANEMIA DUE TO CHRONIC BLOOD LOSS: ICD-10-CM

## 2020-02-03 DIAGNOSIS — C20 RECTAL CANCER: Primary | ICD-10-CM

## 2020-02-03 DIAGNOSIS — K62.5 RECTAL BLEEDING: ICD-10-CM

## 2020-02-03 DIAGNOSIS — C20 RECTAL CANCER: ICD-10-CM

## 2020-02-03 LAB
ALBUMIN SERPL BCP-MCNC: 3.3 G/DL (ref 3.5–5.2)
ALP SERPL-CCNC: 115 U/L (ref 55–135)
ALT SERPL W/O P-5'-P-CCNC: <5 U/L (ref 10–44)
ANION GAP SERPL CALC-SCNC: 9 MMOL/L (ref 8–16)
AST SERPL-CCNC: 12 U/L (ref 10–40)
BASOPHILS # BLD AUTO: 0.08 K/UL (ref 0–0.2)
BASOPHILS NFR BLD: 0.6 % (ref 0–1.9)
BILIRUB SERPL-MCNC: 0.3 MG/DL (ref 0.1–1)
BUN SERPL-MCNC: 5 MG/DL (ref 6–20)
CALCIUM SERPL-MCNC: 9.3 MG/DL (ref 8.7–10.5)
CEA SERPL-MCNC: 3.8 NG/ML (ref 0–5)
CHLORIDE SERPL-SCNC: 106 MMOL/L (ref 95–110)
CO2 SERPL-SCNC: 29 MMOL/L (ref 23–29)
CREAT SERPL-MCNC: 0.8 MG/DL (ref 0.5–1.4)
DIFFERENTIAL METHOD: ABNORMAL
EOSINOPHIL # BLD AUTO: 0.2 K/UL (ref 0–0.5)
EOSINOPHIL NFR BLD: 1.5 % (ref 0–8)
ERYTHROCYTE [DISTWIDTH] IN BLOOD BY AUTOMATED COUNT: 14.2 % (ref 11.5–14.5)
EST. GFR  (AFRICAN AMERICAN): >60 ML/MIN/1.73 M^2
EST. GFR  (NON AFRICAN AMERICAN): >60 ML/MIN/1.73 M^2
FERRITIN SERPL-MCNC: 433 NG/ML (ref 20–300)
GLUCOSE SERPL-MCNC: 106 MG/DL (ref 70–110)
HCT VFR BLD AUTO: 35.4 % (ref 40–54)
HGB BLD-MCNC: 11.7 G/DL (ref 14–18)
IMM GRANULOCYTES # BLD AUTO: 0.3 K/UL (ref 0–0.04)
IMM GRANULOCYTES NFR BLD AUTO: 2.1 % (ref 0–0.5)
IRON SERPL-MCNC: 59 UG/DL (ref 45–160)
LDH SERPL L TO P-CCNC: 159 U/L (ref 110–260)
LYMPHOCYTES # BLD AUTO: 1.7 K/UL (ref 1–4.8)
LYMPHOCYTES NFR BLD: 12 % (ref 18–48)
MCH RBC QN AUTO: 29.8 PG (ref 27–31)
MCHC RBC AUTO-ENTMCNC: 33.1 G/DL (ref 32–36)
MCV RBC AUTO: 90 FL (ref 82–98)
MONOCYTES # BLD AUTO: 1 K/UL (ref 0.3–1)
MONOCYTES NFR BLD: 7.2 % (ref 4–15)
NEUTROPHILS # BLD AUTO: 11.3 K/UL (ref 1.8–7.7)
NEUTROPHILS NFR BLD: 78.7 % (ref 38–73)
NRBC BLD-RTO: 0 /100 WBC
PLATELET # BLD AUTO: 277 K/UL (ref 150–350)
PLATELET BLD QL SMEAR: ABNORMAL
PMV BLD AUTO: 8.8 FL (ref 9.2–12.9)
POTASSIUM SERPL-SCNC: 4.1 MMOL/L (ref 3.5–5.1)
PROT SERPL-MCNC: 7.4 G/DL (ref 6–8.4)
RBC # BLD AUTO: 3.93 M/UL (ref 4.6–6.2)
SATURATED IRON: 24 % (ref 20–50)
SODIUM SERPL-SCNC: 144 MMOL/L (ref 136–145)
TOTAL IRON BINDING CAPACITY: 250 UG/DL (ref 250–450)
TRANSFERRIN SERPL-MCNC: 169 MG/DL (ref 200–375)
WBC # BLD AUTO: 14.28 K/UL (ref 3.9–12.7)

## 2020-02-03 PROCEDURE — 85025 COMPLETE CBC W/AUTO DIFF WBC: CPT

## 2020-02-03 PROCEDURE — 82728 ASSAY OF FERRITIN: CPT

## 2020-02-03 PROCEDURE — 96368 THER/DIAG CONCURRENT INF: CPT

## 2020-02-03 PROCEDURE — 96415 CHEMO IV INFUSION ADDL HR: CPT

## 2020-02-03 PROCEDURE — 80053 COMPREHEN METABOLIC PANEL: CPT

## 2020-02-03 PROCEDURE — 96416 CHEMO PROLONG INFUSE W/PUMP: CPT

## 2020-02-03 PROCEDURE — 96367 TX/PROPH/DG ADDL SEQ IV INF: CPT

## 2020-02-03 PROCEDURE — 63600175 PHARM REV CODE 636 W HCPCS: Performed by: INTERNAL MEDICINE

## 2020-02-03 PROCEDURE — 96413 CHEMO IV INFUSION 1 HR: CPT

## 2020-02-03 PROCEDURE — 83615 LACTATE (LD) (LDH) ENZYME: CPT

## 2020-02-03 PROCEDURE — 83540 ASSAY OF IRON: CPT

## 2020-02-03 PROCEDURE — 25000003 PHARM REV CODE 250: Performed by: INTERNAL MEDICINE

## 2020-02-03 PROCEDURE — A4216 STERILE WATER/SALINE, 10 ML: HCPCS | Performed by: INTERNAL MEDICINE

## 2020-02-03 PROCEDURE — 82378 CARCINOEMBRYONIC ANTIGEN: CPT

## 2020-02-03 RX ORDER — SODIUM CHLORIDE 0.9 % (FLUSH) 0.9 %
10 SYRINGE (ML) INJECTION
Status: DISCONTINUED | OUTPATIENT
Start: 2020-02-03 | End: 2020-02-03 | Stop reason: HOSPADM

## 2020-02-03 RX ORDER — EPINEPHRINE 0.3 MG/.3ML
0.3 INJECTION SUBCUTANEOUS ONCE AS NEEDED
Status: CANCELLED | OUTPATIENT
Start: 2020-02-03

## 2020-02-03 RX ORDER — HEPARIN 100 UNIT/ML
500 SYRINGE INTRAVENOUS
Status: CANCELLED | OUTPATIENT
Start: 2020-02-03

## 2020-02-03 RX ORDER — HEPARIN 100 UNIT/ML
500 SYRINGE INTRAVENOUS
Status: CANCELLED | OUTPATIENT
Start: 2020-02-05

## 2020-02-03 RX ORDER — ATROPINE SULFATE 0.4 MG/ML
0.4 INJECTION, SOLUTION ENDOTRACHEAL; INTRAMEDULLARY; INTRAMUSCULAR; INTRAVENOUS; SUBCUTANEOUS ONCE AS NEEDED
Status: CANCELLED | OUTPATIENT
Start: 2020-02-03

## 2020-02-03 RX ORDER — DIPHENHYDRAMINE HYDROCHLORIDE 50 MG/ML
50 INJECTION INTRAMUSCULAR; INTRAVENOUS ONCE AS NEEDED
Status: CANCELLED | OUTPATIENT
Start: 2020-02-03

## 2020-02-03 RX ORDER — SODIUM CHLORIDE 0.9 % (FLUSH) 0.9 %
10 SYRINGE (ML) INJECTION
Status: CANCELLED | OUTPATIENT
Start: 2020-02-05

## 2020-02-03 RX ORDER — SODIUM CHLORIDE 0.9 % (FLUSH) 0.9 %
10 SYRINGE (ML) INJECTION
Status: CANCELLED | OUTPATIENT
Start: 2020-02-03

## 2020-02-03 RX ADMIN — SODIUM CHLORIDE, PRESERVATIVE FREE 10 ML: 5 INJECTION INTRAVENOUS at 09:02

## 2020-02-03 RX ADMIN — DEXAMETHASONE SODIUM PHOSPHATE: 4 INJECTION, SOLUTION INTRAMUSCULAR; INTRAVENOUS at 09:02

## 2020-02-03 RX ADMIN — OXALIPLATIN 166 MG: 5 INJECTION, SOLUTION, CONCENTRATE INTRAVENOUS at 10:02

## 2020-02-03 RX ADMIN — FLUOROURACIL 6240 MG: 50 INJECTION, SOLUTION INTRAVENOUS at 12:02

## 2020-02-03 RX ADMIN — LEUCOVORIN CALCIUM 390 MG: 350 INJECTION, POWDER, LYOPHILIZED, FOR SOLUTION INTRAMUSCULAR; INTRAVENOUS at 10:02

## 2020-02-03 RX ADMIN — SODIUM CHLORIDE: 9 INJECTION, SOLUTION INTRAVENOUS at 09:02

## 2020-02-03 RX ADMIN — DEXTROSE MONOHYDRATE: 50 INJECTION, SOLUTION INTRAVENOUS at 10:02

## 2020-02-03 NOTE — NURSING
Pt. Current CMP not resulted yet due to cmp machine being down at the [facility] and  transport has not picked up cmp for the [facility] to deliver at [facility] to be ran. Dr. Aden ok to proceed with pt. Treatment (Oxaliplatin, Leucovorin, 5fu pump) without cmp results available.

## 2020-02-03 NOTE — PROGRESS NOTES
OK to proceed without chemisty labs back per Gail Jean Baptiste RN, on the wished of Dr. Aden.  Chemistry machine is still down at [facility].

## 2020-02-03 NOTE — DISCHARGE INSTRUCTIONS
[facility]  [address] [address]  [address] [address]  [phone] phone     [phone] fax  Hours of Operation: Monday- Friday 8:00am- 5:00pm  After hours phone  [phone]  Hematology / Oncology Physicians on call      LUCIA Laurent Dr., Dr., Dr., Dr., NP Sydney Prescott, NP Tyesha Taylor, NP    Please call with any concerns regarding your appointment today.HOME CARE AFTER CHEMOTHERAPY   Meals   Many patients feel sick and lose their appetites during treatment. Eat small meals several times a day. Choose bland foods with little taste or smell if you have problems with nausea. Be sure to cook all food thoroughly. This kills bacteria and helps you avoid intestinal infection. Soft foods are easier to swallow and digest.   Activity   Exercise keeps you strong and keeps your heart and lungs active. Talk to your doctor about an appropriate exercise program for you.   Skin Care   To prevent a skin infection, bathe or shower once a day. Use a moisturizing soap and wash with warm water. Avoid very hot or cold water. Chemotherapy can make your skin dry . Apply moisturizing lotion to help relieve dry skin. Some drugs used in high doses can cause slight burns to appear (like sunburn). Ask for a special cream to help relieve the burn and protect your skin.   Prevent Mouth Sores   During chemotherapy, many people get mouth sores. Do the following to help prevent mouth sores or to ease discomfort.   Brush your teeth with a soft-bristle toothbrush after every meal.  Don't use dental floss if your platelet count is below 50,000. Your doctor or nurse will tell you if this is the case.  Use an oral swab or special soft toothbrush if your gums bleed during regular brushing.  Use mouthwash as directed. If you can't tolerate commercial mouthwash, use salt and baking soda to clean your mouth. Mix 1 teaspoon of salt and 1  teaspoon of baking soda into a glass of water. Swish and spit.  Call your doctor or return to this facility if you develop any of the following:   Sore throat   White patches in the mouth or throat   Fever of 100.4ºF (38ºC) or higher, or as directed by your healthcare provider  © 2000-2011 [employer] \[facility] [place] [facility]\"", [address] [place] [address]. All rights reserved. This information is not intended as a substitute for professional medical care. Always follow your healthcare professional's   FALL PREVENTION   Falls often occur due to slipping, tripping or losing your balance. Here are ways to reduce your risk of falling again.   Was there anything that caused your fall that can be fixed, removed or replaced?   Make your home safe by keeping walkways clear of objects you may trip over.   Use non-slip pads under rugs.   Do not walk in poorly lit areas.   Do not stand on chairs or wobbly ladders.   Use caution when reaching overhead or looking upward. This position can cause a loss of balance.   Be sure your shoes fit properly, have non-slip bottoms and are in good condition.   Be cautious when going up and down stairs, curbs, and when walking on uneven sidewalks.   If your balance is poor, consider using a cane or walker.   If your fall was related to alcohol use, stop or limit alcohol intake.   If your fall was related to use of sleeping medicines, talk to your doctor about this. You may need to reduce your dosage at bedtime if you awaken during the night to go to the bathroom.   To reduce the need for nighttime bathroom trips:   Avoid drinking fluids for several hours before going to bed   Empty your bladder before going to bed   Men can keep a urinal at the bedside   © 2000-2011 [employer] \[facility] [place] [facility]\"", [address] [place] [address]. All rights reserved. This information is not intended as a substitute for professional medical care. Always follow your healthcare professional's instructions.  Support  "Groups/Classes    Support groups and classes are being offered at the   [facility] and [facility]!!    "Cooking with Cancer" (Nutrition Class):  Second Wednesday of each month   at noon at the Cancer Center.  Metastatic Support Group:  Third Tuesday of each month   at noon at the Cancer Center.  Next Steps Class/Group: Second and fourth Thursday of each month at noon at the Cancer Center.  [facility] (Breast Cancer Support Group): First Tuesday of each month   at 5:30pm at the [facility] location.  [facility] [facility]: [facility]: Second and third Tuesday of each month from 7:30am - 2pm.  [facility]: First and fourth Tuesday of each month from 7:30am - 2pm    If you are interested in attending or would like more information please ask our social workers or your nurse!  "

## 2020-02-05 ENCOUNTER — INFUSION (OUTPATIENT)
Dept: INFUSION THERAPY | Facility: HOSPITAL | Age: 55
End: 2020-02-05
Attending: INTERNAL MEDICINE
Payer: COMMERCIAL

## 2020-02-05 VITALS
HEART RATE: 74 BPM | OXYGEN SATURATION: 99 % | RESPIRATION RATE: 20 BRPM | SYSTOLIC BLOOD PRESSURE: 127 MMHG | DIASTOLIC BLOOD PRESSURE: 72 MMHG | TEMPERATURE: 99 F

## 2020-02-05 DIAGNOSIS — C20 RECTAL CANCER: Primary | ICD-10-CM

## 2020-02-05 PROCEDURE — 96523 IRRIG DRUG DELIVERY DEVICE: CPT

## 2020-02-05 PROCEDURE — 63600175 PHARM REV CODE 636 W HCPCS: Performed by: INTERNAL MEDICINE

## 2020-02-05 PROCEDURE — A4216 STERILE WATER/SALINE, 10 ML: HCPCS | Performed by: INTERNAL MEDICINE

## 2020-02-05 PROCEDURE — 25000003 PHARM REV CODE 250: Performed by: INTERNAL MEDICINE

## 2020-02-05 RX ORDER — SODIUM CHLORIDE 0.9 % (FLUSH) 0.9 %
10 SYRINGE (ML) INJECTION
Status: DISCONTINUED | OUTPATIENT
Start: 2020-02-05 | End: 2020-02-05 | Stop reason: HOSPADM

## 2020-02-05 RX ORDER — HEPARIN 100 UNIT/ML
500 SYRINGE INTRAVENOUS
Status: DISCONTINUED | OUTPATIENT
Start: 2020-02-05 | End: 2020-02-05 | Stop reason: HOSPADM

## 2020-02-05 RX ADMIN — Medication 10 ML: at 12:02

## 2020-02-05 RX ADMIN — HEPARIN SODIUM (PORCINE) LOCK FLUSH IV SOLN 100 UNIT/ML 500 UNITS: 100 SOLUTION at 12:02

## 2020-02-05 NOTE — NURSING
Pt here for 5FU continuous pump d/c. Pump stopped and disconnected.  Existing dressing appeared clean and intact.  Left chestwall mediport  Flushed with 10ml NS and 5 ml heparin solution.  Needle D/C, site without redness, swelling, or drainage noted.  Dressing applied.  Patient tolerated well.  Patient to return to clinic on 2/17/20.

## 2020-02-13 ENCOUNTER — HOSPITAL ENCOUNTER (OUTPATIENT)
Dept: RADIOLOGY | Facility: HOSPITAL | Age: 55
Discharge: HOME | End: 2020-02-13
Attending: NURSE PRACTITIONER
Payer: COMMERCIAL

## 2020-02-13 ENCOUNTER — OFFICE VISIT (OUTPATIENT)
Dept: HEMATOLOGY/ONCOLOGY | Facility: CLINIC | Age: 55
End: 2020-02-13
Payer: COMMERCIAL

## 2020-02-13 ENCOUNTER — DOCUMENTATION ONLY (OUTPATIENT)
Dept: HEMATOLOGY/ONCOLOGY | Facility: CLINIC | Age: 55
End: 2020-02-13

## 2020-02-13 VITALS
HEIGHT: 72 IN | TEMPERATURE: 98 F | BODY MASS INDEX: 20.82 KG/M2 | RESPIRATION RATE: 18 BRPM | SYSTOLIC BLOOD PRESSURE: 136 MMHG | OXYGEN SATURATION: 99 % | HEART RATE: 98 BPM | WEIGHT: 153.69 LBS | DIASTOLIC BLOOD PRESSURE: 76 MMHG

## 2020-02-13 DIAGNOSIS — R10.2 PELVIC PAIN: ICD-10-CM

## 2020-02-13 DIAGNOSIS — M25.552 PAIN OF LEFT HIP JOINT: ICD-10-CM

## 2020-02-13 DIAGNOSIS — R10.2 PELVIC PAIN: Primary | ICD-10-CM

## 2020-02-13 DIAGNOSIS — R06.2 WHEEZING: ICD-10-CM

## 2020-02-13 DIAGNOSIS — C20 RECTAL CANCER: ICD-10-CM

## 2020-02-13 PROCEDURE — 99999 PR PBB SHADOW E&M-EST. PATIENT-LVL III: ICD-10-PCS | Mod: PBBFAC,,, | Performed by: NURSE PRACTITIONER

## 2020-02-13 PROCEDURE — 99214 OFFICE O/P EST MOD 30 MIN: CPT | Mod: S$GLB,,, | Performed by: NURSE PRACTITIONER

## 2020-02-13 PROCEDURE — 3008F PR BODY MASS INDEX (BMI) DOCUMENTED: ICD-10-PCS | Mod: CPTII,S$GLB,, | Performed by: NURSE PRACTITIONER

## 2020-02-13 PROCEDURE — 3008F BODY MASS INDEX DOCD: CPT | Mod: CPTII,S$GLB,, | Performed by: NURSE PRACTITIONER

## 2020-02-13 PROCEDURE — 99214 PR OFFICE/OUTPT VISIT, EST, LEVL IV, 30-39 MIN: ICD-10-PCS | Mod: S$GLB,,, | Performed by: NURSE PRACTITIONER

## 2020-02-13 PROCEDURE — 71046 XR CHEST PA AND LATERAL: ICD-10-PCS | Mod: 26,,, | Performed by: RADIOLOGY

## 2020-02-13 PROCEDURE — 99999 PR PBB SHADOW E&M-EST. PATIENT-LVL III: CPT | Mod: PBBFAC,,, | Performed by: NURSE PRACTITIONER

## 2020-02-13 PROCEDURE — 71046 X-RAY EXAM CHEST 2 VIEWS: CPT | Mod: 26,,, | Performed by: RADIOLOGY

## 2020-02-13 PROCEDURE — 73502 XR HIP 2 VIEW LEFT: ICD-10-PCS | Mod: 26,LT,, | Performed by: RADIOLOGY

## 2020-02-13 PROCEDURE — 73502 X-RAY EXAM HIP UNI 2-3 VIEWS: CPT | Mod: TC,LT

## 2020-02-13 PROCEDURE — 71046 X-RAY EXAM CHEST 2 VIEWS: CPT | Mod: TC

## 2020-02-13 PROCEDURE — 73502 X-RAY EXAM HIP UNI 2-3 VIEWS: CPT | Mod: 26,LT,, | Performed by: RADIOLOGY

## 2020-02-13 RX ORDER — KETOROLAC TROMETHAMINE 10 MG/1
10 TABLET, FILM COATED ORAL EVERY 6 HOURS PRN
Qty: 12 TABLET | Refills: 0 | Status: SHIPPED | OUTPATIENT
Start: 2020-02-13 | End: 2020-02-13 | Stop reason: SDUPTHER

## 2020-02-13 RX ORDER — KETOROLAC TROMETHAMINE 10 MG/1
10 TABLET, FILM COATED ORAL EVERY 6 HOURS PRN
Qty: 12 TABLET | Refills: 0 | Status: SHIPPED | OUTPATIENT
Start: 2020-02-13 | End: 2020-02-28

## 2020-02-13 NOTE — NURSING
"pts wife left voice mail at 4:57 pm yesterday.  I returned call this am at 7:40 am and spoke to patient.  He stated that he has increased pain in his tail bone and hip area that is new.  He states that he has seen a doctor that said it was hemorrhoids , but he does not believe this is true as he has not had constipation and has had a normal bowel pattern.  He is concerned and wishes to see someone today .  Together we set an appt for this afternoon with NP at the [place] location .  Pt is in agreement with time date and location .  He will attend appt. He has my direct number to call back should he need anything further   Oncology Navigation   Intake  Date of Diagnosis: 19  Cancer Type: GI  MD Assigned: Song Aden  Contact Method: Other  Other Contact Method: face-to-face  Date Worked: 20  Multiple appointments: Yes     Treatment  Current Status: Active  Treatment Type(s): Chemotherapy  Chemotherapy Regimen: FOLFOXIRI  Deferred Chemotherapy Reason: Overall toxicity  Deferred Reason Other: Fatigue, anorexia, 12lb weight loss     Procedures: CT  CT Schedule Date: 19       Support Systems: Spouse/significant other  Concerns: 6 year old child that patient verbalizes needing to "keep up with"      Acuity  Systemic Treatment - predicted or initiated: Chemotherapy regimen with multiple drugs (+1)  Treatment Tolerability: 2  ECO  Comorbidities in Medical History: 2  Hospitalization Within the Past Month: 0   Needed: 0  Support: 0  Verbalizes Financial Concerns: 0  Transportation: 0  History of noncompliance/frequent no shows and cancellations: 0  Verbalizes the need for more education: 0  Other Factors (+1 for Each): 1 (small child at home, trouble keeping up with him)  Navigation Acuity: 7     Follow Up  No follow-ups on file.     "

## 2020-02-14 NOTE — PROGRESS NOTES
"Subjective:       Patient ID: Sukhjinder Presley is a 54 y.o. male.    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 Reports pain has been ongoing. Pain described as sharp, radiating 9/10. Interfering with sleep. Reports warm compresses helps relieve the pain momentarily. Reports not responsive to current pain medications. Denies any trouble walking, extremity numbness, bowel or bladder changes, confusion.     Social History     Socioeconomic History    Marital status: Significant Other     Spouse name: Not on file    Number of children: Not on file    Years of education: Not on file    Highest education level: Not on file   Occupational History    Not on file   Social Needs    Financial resource strain: Somewhat hard    Food insecurity:     Worry: Sometimes true     Inability: Sometimes true    Transportation needs:     Medical: No     Non-medical: No   Tobacco Use    Smoking status: Current Every Day Smoker     Packs/day: 1.00     Years: 35.00     Pack years: 35.00     Types: Cigarettes     Start date: 1/2/1984    Smokeless tobacco: Current User     Types: Chew   Substance and Sexual Activity    Alcohol use: Yes     Alcohol/week: 46.0 standard drinks     Types: 42 Cans of beer, 4 Shots of liquor per week     Frequency: 4 or more times a week     Drinks per session: 5 or 6     Binge frequency: Daily or almost daily     Comment: nancie 7, beer daily,  None 72 hours prior to surgery    Drug use: Never    Sexual activity: Yes     Partners: Female     Birth control/protection: None   Lifestyle    Physical activity:     Days per week: 1 day     Minutes per session: 60 min    Stress: Not on file   Relationships    Social connections:     Talks " on phone: More than three times a week     Gets together: More than three times a week     Attends Cheondoism service: Not on file     Active member of club or organization: No     Attends meetings of clubs or organizations: Never     Relationship status: Living with partner   Other Topics Concern    Not on file   Social History Narrative    Not on file       Past Medical History:   Diagnosis Date    Anemia     Cancer        Family History   Problem Relation Age of Onset    Intestinal malrotation Mother     Leukemia Father     No Known Problems Sister     Coronary artery disease Brother     Coronary artery disease Maternal Grandmother     Stomach cancer Maternal Grandfather        Past Surgical History:   Procedure Laterality Date    COLONOSCOPY N/A 6/6/2019    Procedure: COLONOSCOPY;  Surgeon: Anjelica Saavedra MD;  Location: [facility];  Service: Endoscopy;  Laterality: N/A;    ESOPHAGOGASTRODUODENOSCOPY N/A 6/6/2019    Procedure: EGD (ESOPHAGOGASTRODUODENOSCOPY);  Surgeon: Anjelica Saavedra MD;  Location: [facility];  Service: Endoscopy;  Laterality: N/A;    HERNIA REPAIR  1995    INSERTION OF TUNNELED CENTRAL VENOUS CATHETER (CVC) WITH SUBCUTANEOUS PORT N/A 10/8/2019    Procedure: [id number];  Surgeon: Jan New MD;  Location: [facility] OR;  Service: General;  Laterality: N/A;    TONSILLECTOMY         Review of Systems   Constitutional: Positive for fatigue. Negative for activity change, appetite change, chills, diaphoresis, fever and unexpected weight change.   HENT: Negative for congestion, mouth sores, nosebleeds, sore throat, trouble swallowing and voice change.    Eyes: Negative for photophobia and visual disturbance.   Respiratory: Positive for cough. Negative for chest tightness, shortness of breath and wheezing.    Cardiovascular: Negative for chest pain, palpitations and leg swelling.   Gastrointestinal: Negative for abdominal distention, abdominal pain, blood in stool,  constipation, diarrhea, nausea and vomiting.   Genitourinary: Negative for difficulty urinating, dysuria and hematuria.   Musculoskeletal: Negative for arthralgias, back pain and myalgias.        Pelvic pain/left hip pain   Skin: Negative for pallor, rash and wound.   Neurological: Positive for weakness. Negative for dizziness, syncope and headaches.   Hematological: Negative for adenopathy. Does not bruise/bleed easily.   Psychiatric/Behavioral: The patient is not nervous/anxious.          Medication List with Changes/Refills   New Medications    KETOROLAC (TORADOL) 10 MG TABLET    Take 1 tablet (10 mg total) by mouth every 6 (six) hours as needed for Pain.   Current Medications    ACETAMINOPHEN (TYLENOL) 325 MG TABLET    Take 1 tablet (325 mg total) by mouth every 6 (six) hours as needed for Pain.    ASPIRIN 81 MG CHEW    Take 81 mg by mouth once daily.    CAPECITABINE (XELODA) 500 MG TAB    Take 3 tablets (1500 mg) by mouth twice daily after breakfast and dinner on days of radiation only.    DIPHENOXYLATE-ATROPINE 2.5-0.025 MG (LOMOTIL) 2.5-0.025 MG PER TABLET    Take 1 to 2 tablets by mouth 4 (four) times daily as needed for Diarrhea.    DRONABINOL (MARINOL) 10 MG CAPSULE    Take 1 capsule (10 mg total) by mouth 2 (two) times daily before meals.    IRON FUM-B12-IF-C-FOLIC ACID (FOLTRIN) 110-0.5 MG CAPSULE    Take 1 capsule by mouth 2 (two) times daily.    LIDOCAINE (XYLOCAINE) 5 % OINT OINTMENT    Apply topically as needed.    MAGIC MOUTHWASH DIPHEN/ANTAC/LIDOC    Swish and spit 15 ml by mouth every 4 hours as needed    MEGESTROL (MEGACE) 40 MG TAB    Take 1 tablet (40 mg total) by mouth 2 (two) times daily.    MORPHINE (MS CONTIN) 15 MG 12 HR TABLET    Take 3 tablets (45 mg total) by mouth nightly.    NICOTINE (NICODERM CQ) 21 MG/24 HR    Place 1 patch onto the skin once daily.    ONDANSETRON (ZOFRAN-ODT) 8 MG TBDL    Take 1 tablet (8 mg total) by mouth every 12 (twelve) hours as needed.    OXYCODONE  (ROXICODONE) 10 MG TAB IMMEDIATE RELEASE TABLET    Take 1 tablet (10 mg total) by mouth every 8 (eight) hours as needed for Pain (medically necessary).    PRAMOXINE (PROCTOFOAM) 1 % FOAM    Place rectally 3 (three) times daily as needed.    PROCHLORPERAZINE (COMPAZINE) 10 MG TABLET    Take 1 tablet (10 mg total) by mouth every 6 (six) hours as needed.    PROMETHAZINE (PHENERGAN) 25 MG TABLET    Take 1 tablet (25 mg total) by mouth every 4 (four) hours.    SILVER SULFADIAZINE 1% (SILVADENE) 1 % CREAM    Apply to affected area daily    TEMAZEPAM (RESTORIL) 7.5 MG CAP    Take 2 capsules (15 mg total) by mouth nightly as needed.    TRAMADOL (ULTRAM) 50 MG TABLET    Take 1 tablet (50 mg total) by mouth every 6 (six) hours as needed for Pain.    VARENICLINE (CHANTIX) 1 MG TAB    Take 1 tablet (1 mg total) by mouth 2 (two) times daily.     Objective:     Vitals:    02/13/20 1512   BP: 136/76   Pulse: 98   Resp: 18   Temp: 97.7 °F (36.5 °C)       Physical Exam   Constitutional: He is oriented to person, place, and time. He appears well-developed and well-nourished. He is cooperative.   HENT:   Head: Normocephalic.   Right Ear: External ear normal.   Left Ear: External ear normal.   Nose: Nose normal.   Mouth/Throat: Oropharynx is clear and moist.   Eyes: Conjunctivae, EOM and lids are normal. Right eye exhibits no discharge. Left eye exhibits no discharge. No scleral icterus.   Neck: Normal range of motion. No thyroid mass present.   Cardiovascular: Normal rate, regular rhythm and normal heart sounds.   No murmur heard.  Pulmonary/Chest: Effort normal and breath sounds normal. No respiratory distress. He has no wheezes. He has no rhonchi (coarse lungs). He has no rales.   Abdominal: Soft. Bowel sounds are normal. He exhibits no distension. There is no tenderness.   Genitourinary:   Genitourinary Comments: deferred   Musculoskeletal: Normal range of motion. He exhibits no edema.   Lymphadenopathy:        Head (right side):  No submandibular, no preauricular and no posterior auricular adenopathy present.        Head (left side): No submandibular, no preauricular and no posterior auricular adenopathy present.        Right cervical: No superficial cervical adenopathy present.       Left cervical: No superficial cervical adenopathy present.   Neurological: He is alert and oriented to person, place, and time.   Skin: Skin is warm, dry and intact.   Psychiatric: His speech is normal and behavior is normal. Thought content normal. His mood appears anxious.   Vitals reviewed.       Assessment:     Problem List Items Addressed This Visit        Oncology    Rectal cancer     Check pelvic xray/hip xray and follow up via telephone. Try Toradol 50 mg PO q 6 H x 3 days. Discussed with patient S&S to report and if no improvement.    Patient with coarse lungs and cough. CXR obtained and unremarkable    Keep all follow up appointments as previously scheduled. He knows to call us with any questions, concerns, or worsening symptoms           Other Visit Diagnoses     Pelvic pain    -  Primary    Relevant Orders    X-Ray Pelvis Complete min 3 views    Pain of left hip joint        Relevant Medications    ketorolac (TORADOL) 10 mg tablet    Other Relevant Orders    X-Ray Pelvis Complete min 3 views    Wheezing        Relevant Orders    CXR (Completed)            Plan:     Pelvic pain  -     Cancel: X-Ray Hip 4 or more views Left; Future; Expected date: 02/13/2020  -     X-Ray Pelvis Complete min 3 views; Future; Expected date: 02/13/2020    Pain of left hip joint  -     Cancel: X-Ray Hip 4 or more views Left; Future; Expected date: 02/13/2020  -     X-Ray Pelvis Complete min 3 views; Future; Expected date: 02/13/2020  -     Discontinue: ketorolac (TORADOL) 10 mg tablet; Take 1 tablet (10 mg total) by mouth every 6 (six) hours as needed for Pain.  Dispense: 12 tablet; Refill: 0  -     ketorolac (TORADOL) 10 mg tablet; Take 1 tablet (10 mg total) by mouth  every 6 (six) hours as needed for Pain.  Dispense: 12 tablet; Refill: 0    Wheezing  -     CXR; Future; Expected date: 02/13/2020    Rectal cancer          KOFFI Woodruff

## 2020-02-14 NOTE — ASSESSMENT & PLAN NOTE
Check pelvic xray/hip xray and follow up via telephone. Try Toradol 50 mg PO q 6 H x 3 days. Discussed with patient S&S to report and if no improvement.    Patient with coarse lungs and cough. CXR obtained and unremarkable    Keep all follow up appointments as previously scheduled. He knows to call us with any questions, concerns, or worsening symptoms

## 2020-02-17 ENCOUNTER — OFFICE VISIT (OUTPATIENT)
Dept: HEMATOLOGY/ONCOLOGY | Facility: CLINIC | Age: 55
End: 2020-02-17
Payer: COMMERCIAL

## 2020-02-17 ENCOUNTER — TELEPHONE (OUTPATIENT)
Dept: RADIOLOGY | Facility: HOSPITAL | Age: 55
End: 2020-02-17

## 2020-02-17 VITALS
HEART RATE: 98 BPM | SYSTOLIC BLOOD PRESSURE: 114 MMHG | TEMPERATURE: 97 F | DIASTOLIC BLOOD PRESSURE: 73 MMHG | OXYGEN SATURATION: 99 % | RESPIRATION RATE: 18 BRPM | WEIGHT: 157.44 LBS | BODY MASS INDEX: 21.32 KG/M2 | HEIGHT: 72 IN

## 2020-02-17 DIAGNOSIS — F17.200 SMOKER: ICD-10-CM

## 2020-02-17 DIAGNOSIS — C20 RECTAL CANCER: Primary | ICD-10-CM

## 2020-02-17 DIAGNOSIS — D49.89 NEOPLASM OF PELVIS: ICD-10-CM

## 2020-02-17 DIAGNOSIS — R64 CACHEXIA: ICD-10-CM

## 2020-02-17 DIAGNOSIS — Z51.11 CHEMOTHERAPY MANAGEMENT, ENCOUNTER FOR: ICD-10-CM

## 2020-02-17 PROCEDURE — 3008F BODY MASS INDEX DOCD: CPT | Mod: CPTII,S$GLB,, | Performed by: INTERNAL MEDICINE

## 2020-02-17 PROCEDURE — 99999 PR PBB SHADOW E&M-EST. PATIENT-LVL III: CPT | Mod: PBBFAC,,, | Performed by: INTERNAL MEDICINE

## 2020-02-17 PROCEDURE — 99999 PR PBB SHADOW E&M-EST. PATIENT-LVL III: ICD-10-PCS | Mod: PBBFAC,,, | Performed by: INTERNAL MEDICINE

## 2020-02-17 PROCEDURE — 99214 OFFICE O/P EST MOD 30 MIN: CPT | Mod: S$GLB,,, | Performed by: INTERNAL MEDICINE

## 2020-02-17 PROCEDURE — 3008F PR BODY MASS INDEX (BMI) DOCUMENTED: ICD-10-PCS | Mod: CPTII,S$GLB,, | Performed by: INTERNAL MEDICINE

## 2020-02-17 PROCEDURE — 99214 PR OFFICE/OUTPT VISIT, EST, LEVL IV, 30-39 MIN: ICD-10-PCS | Mod: S$GLB,,, | Performed by: INTERNAL MEDICINE

## 2020-02-17 NOTE — PROGRESS NOTES
Subjective:       Patient ID: Sukhjinder Presley is a 54 y.o. male.    Chief Complaint: Follow-up; Results; Pain; and Rectal Cancer    HPI 54-year-old male presents for cycle 8 FOLFOX chemotherapy in the adjuvant setting patient states over the last 2 weeks the increasing pain last imaging 12 2 19 ECOG status 2.  Past Medical History:   Diagnosis Date    Anemia     Cancer      Family History   Problem Relation Age of Onset    Intestinal malrotation Mother     Leukemia Father     No Known Problems Sister     Coronary artery disease Brother     Coronary artery disease Maternal Grandmother     Stomach cancer Maternal Grandfather      Social History     Socioeconomic History    Marital status: Significant Other     Spouse name: Not on file    Number of children: Not on file    Years of education: Not on file    Highest education level: Not on file   Occupational History    Not on file   Social Needs    Financial resource strain: Somewhat hard    Food insecurity:     Worry: Sometimes true     Inability: Sometimes true    Transportation needs:     Medical: No     Non-medical: No   Tobacco Use    Smoking status: Current Every Day Smoker     Packs/day: 1.00     Years: 35.00     Pack years: 35.00     Types: Cigarettes     Start date: 1/2/1984    Smokeless tobacco: Current User     Types: Chew   Substance and Sexual Activity    Alcohol use: Yes     Alcohol/week: 46.0 standard drinks     Types: 42 Cans of beer, 4 Shots of liquor per week     Frequency: 4 or more times a week     Drinks per session: 5 or 6     Binge frequency: Daily or almost daily     Comment: segrams 7, beer daily,  None 72 hours prior to surgery    Drug use: Never    Sexual activity: Yes     Partners: Female     Birth control/protection: None   Lifestyle    Physical activity:     Days per week: 1 day     Minutes per session: 60 min    Stress: Not on file   Relationships    Social connections:     Talks on phone: More than three times a  week     Gets together: More than three times a week     Attends Pentecostalism service: Not on file     Active member of club or organization: No     Attends meetings of clubs or organizations: Never     Relationship status: Living with partner   Other Topics Concern    Not on file   Social History Narrative    Not on file     Past Surgical History:   Procedure Laterality Date    COLONOSCOPY N/A 6/6/2019    Procedure: COLONOSCOPY;  Surgeon: Anjelica Saavedra MD;  Location: [facility];  Service: Endoscopy;  Laterality: N/A;    ESOPHAGOGASTRODUODENOSCOPY N/A 6/6/2019    Procedure: EGD (ESOPHAGOGASTRODUODENOSCOPY);  Surgeon: Anjelica Saavedra MD;  Location: [facility];  Service: Endoscopy;  Laterality: N/A;    HERNIA REPAIR  1995    INSERTION OF TUNNELED CENTRAL VENOUS CATHETER (CVC) WITH SUBCUTANEOUS PORT N/A 10/8/2019    Procedure: [id number];  Surgeon: Jan New MD;  Location: [facility] OR;  Service: General;  Laterality: N/A;    TONSILLECTOMY         Labs:  Lab Results   Component Value Date    WBC 8.02 02/17/2020    HGB 11.5 (L) 02/17/2020    HCT 34.7 (L) 02/17/2020    MCV 89 02/17/2020     02/17/2020     BMP  Lab Results   Component Value Date     02/03/2020    K 4.1 02/03/2020     02/03/2020    CO2 29 02/03/2020    BUN 5 (L) 02/03/2020    CREATININE 0.8 02/03/2020    CALCIUM 9.3 02/03/2020    ANIONGAP 9 02/03/2020    ESTGFRAFRICA >60 02/03/2020    EGFRNONAA >60 02/03/2020     Lab Results   Component Value Date    ALT <5 (L) 02/03/2020    AST 12 02/03/2020    ALKPHOS 115 02/03/2020    BILITOT 0.3 02/03/2020       Lab Results   Component Value Date    IRON 59 02/03/2020    TIBC 250 02/03/2020    FERRITIN 433 (H) 02/03/2020     Lab Results   Component Value Date    VODKXUCL26 497 09/03/2019     Lab Results   Component Value Date    FOLATE 8.3 09/03/2019     Lab Results   Component Value Date    TSH 1.607 01/15/2019         Review of Systems   Constitutional: Positive for activity  change, appetite change and fatigue. Negative for chills, diaphoresis, fever and unexpected weight change.   HENT: Negative for congestion, dental problem, drooling, ear discharge, ear pain, facial swelling, hearing loss, mouth sores, nosebleeds, postnasal drip, rhinorrhea, sinus pressure, sneezing, sore throat, tinnitus, trouble swallowing and voice change.    Eyes: Negative for photophobia, pain, discharge, redness, itching and visual disturbance.   Respiratory: Negative for apnea, cough, choking, chest tightness, shortness of breath, wheezing and stridor.    Cardiovascular: Negative for chest pain, palpitations and leg swelling.   Gastrointestinal: Positive for rectal pain. Negative for abdominal distention, abdominal pain, anal bleeding, blood in stool, constipation, diarrhea, nausea and vomiting.   Endocrine: Negative for cold intolerance, heat intolerance, polydipsia, polyphagia and polyuria.   Genitourinary: Negative for decreased urine volume, difficulty urinating, discharge, dysuria, enuresis, flank pain, frequency, genital sores, hematuria, penile pain, penile swelling, scrotal swelling, testicular pain and urgency.   Musculoskeletal: Negative for arthralgias, back pain, gait problem, joint swelling, myalgias, neck pain and neck stiffness.   Skin: Negative for color change, pallor, rash and wound.   Allergic/Immunologic: Negative for environmental allergies, food allergies and immunocompromised state.   Neurological: Positive for weakness. Negative for dizziness, tremors, seizures, syncope, facial asymmetry, speech difficulty, light-headedness, numbness and headaches.   Hematological: Negative for adenopathy. Does not bruise/bleed easily.   Psychiatric/Behavioral: Positive for dysphoric mood. Negative for agitation, behavioral problems, confusion, decreased concentration, hallucinations, self-injury, sleep disturbance and suicidal ideas. The patient is nervous/anxious. The patient is not hyperactive.         Objective:      Physical Exam   Constitutional: He is oriented to person, place, and time. He appears well-developed and well-nourished. He appears distressed.   HENT:   Head: Normocephalic.   Right Ear: External ear normal.   Left Ear: External ear normal.   Nose: Nose normal. Right sinus exhibits no maxillary sinus tenderness and no frontal sinus tenderness. Left sinus exhibits no maxillary sinus tenderness and no frontal sinus tenderness.   Mouth/Throat: Oropharynx is clear and moist. No oropharyngeal exudate.   Eyes: Pupils are equal, round, and reactive to light. EOM and lids are normal. Right eye exhibits no discharge. Left eye exhibits no discharge. Right conjunctiva is not injected. Right conjunctiva has no hemorrhage. Left conjunctiva is not injected. Left conjunctiva has no hemorrhage. No scleral icterus. Right eye exhibits normal extraocular motion. Left eye exhibits normal extraocular motion.   Neck: Normal range of motion. Neck supple. No JVD present. No tracheal deviation present. No thyromegaly present.   Cardiovascular: Normal rate and regular rhythm.   Pulmonary/Chest: Effort normal. No stridor. No respiratory distress.   Abdominal: Soft. He exhibits no mass. There is no hepatosplenomegaly, splenomegaly or hepatomegaly. There is no tenderness.   Increasing pain in perineal region   Musculoskeletal: Normal range of motion. He exhibits no edema or tenderness.   Lymphadenopathy:        Head (right side): No posterior auricular and no occipital adenopathy present.        Head (left side): No posterior auricular and no occipital adenopathy present.     He has no cervical adenopathy.        Right cervical: No superficial cervical, no deep cervical and no posterior cervical adenopathy present.       Left cervical: No superficial cervical, no deep cervical and no posterior cervical adenopathy present.     He has no axillary adenopathy.        Right: No supraclavicular adenopathy present.        Left: No  supraclavicular adenopathy present.   Neurological: He is alert and oriented to person, place, and time. He has normal strength. No cranial nerve deficit. Coordination normal.   Skin: Skin is dry. No rash noted. He is not diaphoretic. No cyanosis or erythema. Nails show no clubbing.   Psychiatric: He has a normal mood and affect. His behavior is normal. Judgment and thought content normal. Cognition and memory are normal.   Vitals reviewed.          Assessment:      1. Rectal cancer    2. Neoplasm of pelvis    3. Chemotherapy management, encounter for    4. Cachexia    5. Smoker           Plan:     Discussed with patient at this point would recommend hold further chemotherapy until repeat imaging performed last performed 12/02/2019.  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

## 2020-02-18 ENCOUNTER — HOSPITAL ENCOUNTER (INPATIENT)
Facility: HOSPITAL | Age: 55
LOS: 2 days | Discharge: HOME HEALTH | DRG: 329 | End: 2020-02-20
Attending: COLON & RECTAL SURGERY | Admitting: COLON & RECTAL SURGERY
Payer: COMMERCIAL

## 2020-02-18 ENCOUNTER — OFFICE VISIT (OUTPATIENT)
Dept: HEMATOLOGY/ONCOLOGY | Facility: CLINIC | Age: 55
End: 2020-02-18
Payer: COMMERCIAL

## 2020-02-18 ENCOUNTER — HOSPITAL ENCOUNTER (OUTPATIENT)
Dept: RADIOLOGY | Facility: HOSPITAL | Age: 55
Discharge: HOME | DRG: 329 | End: 2020-02-18
Attending: INTERNAL MEDICINE
Payer: COMMERCIAL

## 2020-02-18 ENCOUNTER — SOCIAL WORK (OUTPATIENT)
Dept: HEMATOLOGY/ONCOLOGY | Facility: CLINIC | Age: 55
End: 2020-02-18

## 2020-02-18 ENCOUNTER — ANESTHESIA EVENT (OUTPATIENT)
Dept: SURGERY | Facility: HOSPITAL | Age: 55
DRG: 329 | End: 2020-02-18
Payer: COMMERCIAL

## 2020-02-18 ENCOUNTER — ANESTHESIA (OUTPATIENT)
Dept: SURGERY | Facility: HOSPITAL | Age: 55
DRG: 329 | End: 2020-02-18
Payer: COMMERCIAL

## 2020-02-18 VITALS
WEIGHT: 161.19 LBS | RESPIRATION RATE: 18 BRPM | SYSTOLIC BLOOD PRESSURE: 105 MMHG | TEMPERATURE: 99 F | OXYGEN SATURATION: 98 % | DIASTOLIC BLOOD PRESSURE: 73 MMHG | BODY MASS INDEX: 21.83 KG/M2 | HEART RATE: 87 BPM | HEIGHT: 72 IN

## 2020-02-18 DIAGNOSIS — Z51.11 CHEMOTHERAPY MANAGEMENT, ENCOUNTER FOR: ICD-10-CM

## 2020-02-18 DIAGNOSIS — R64 CACHEXIA: ICD-10-CM

## 2020-02-18 DIAGNOSIS — C20 RECTAL CANCER: ICD-10-CM

## 2020-02-18 DIAGNOSIS — C20 RECTAL ADENOCARCINOMA: Primary | ICD-10-CM

## 2020-02-18 DIAGNOSIS — D49.89 NEOPLASM OF PELVIS: ICD-10-CM

## 2020-02-18 DIAGNOSIS — C20 RECTAL CANCER: Primary | ICD-10-CM

## 2020-02-18 DIAGNOSIS — F17.200 SMOKER: ICD-10-CM

## 2020-02-18 PROCEDURE — 74177 CT CHEST ABDOMEN PELVIS WITH CONTRAST (XPD): ICD-10-PCS | Mod: 26,,, | Performed by: RADIOLOGY

## 2020-02-18 PROCEDURE — 99999 PR PBB SHADOW E&M-EST. PATIENT-LVL IV: ICD-10-PCS | Mod: PBBFAC,,, | Performed by: INTERNAL MEDICINE

## 2020-02-18 PROCEDURE — 44188 LAP COLOSTOMY: CPT | Mod: ,,, | Performed by: COLON & RECTAL SURGERY

## 2020-02-18 PROCEDURE — 3008F PR BODY MASS INDEX (BMI) DOCUMENTED: ICD-10-PCS | Mod: CPTII,S$GLB,, | Performed by: INTERNAL MEDICINE

## 2020-02-18 PROCEDURE — 25000003 PHARM REV CODE 250: Performed by: COLON & RECTAL SURGERY

## 2020-02-18 PROCEDURE — 71260 CT THORAX DX C+: CPT | Mod: 26,,, | Performed by: RADIOLOGY

## 2020-02-18 PROCEDURE — 37000008 HC ANESTHESIA 1ST 15 MINUTES: Performed by: COLON & RECTAL SURGERY

## 2020-02-18 PROCEDURE — 25500020 PHARM REV CODE 255: Performed by: INTERNAL MEDICINE

## 2020-02-18 PROCEDURE — 63600175 PHARM REV CODE 636 W HCPCS: Performed by: COLON & RECTAL SURGERY

## 2020-02-18 PROCEDURE — 71260 CT CHEST ABDOMEN PELVIS WITH CONTRAST (XPD): ICD-10-PCS | Mod: 26,,, | Performed by: RADIOLOGY

## 2020-02-18 PROCEDURE — 99999 PR PBB SHADOW E&M-EST. PATIENT-LVL IV: CPT | Mod: PBBFAC,,, | Performed by: INTERNAL MEDICINE

## 2020-02-18 PROCEDURE — 3008F BODY MASS INDEX DOCD: CPT | Mod: CPTII,S$GLB,, | Performed by: INTERNAL MEDICINE

## 2020-02-18 PROCEDURE — 94799 UNLISTED PULMONARY SVC/PX: CPT

## 2020-02-18 PROCEDURE — 11000001 HC ACUTE MED/SURG PRIVATE ROOM

## 2020-02-18 PROCEDURE — 99223 1ST HOSP IP/OBS HIGH 75: CPT | Mod: 57,,, | Performed by: COLON & RECTAL SURGERY

## 2020-02-18 PROCEDURE — C9290 INJ, BUPIVACAINE LIPOSOME: HCPCS | Performed by: COLON & RECTAL SURGERY

## 2020-02-18 PROCEDURE — 74177 CT ABD & PELVIS W/CONTRAST: CPT | Mod: 26,,, | Performed by: RADIOLOGY

## 2020-02-18 PROCEDURE — 99900035 HC TECH TIME PER 15 MIN (STAT)

## 2020-02-18 PROCEDURE — 94761 N-INVAS EAR/PLS OXIMETRY MLT: CPT

## 2020-02-18 PROCEDURE — 74177 CT ABD & PELVIS W/CONTRAST: CPT | Mod: TC

## 2020-02-18 PROCEDURE — 63600175 PHARM REV CODE 636 W HCPCS: Performed by: NURSE ANESTHETIST, CERTIFIED REGISTERED

## 2020-02-18 PROCEDURE — 36000711: Performed by: COLON & RECTAL SURGERY

## 2020-02-18 PROCEDURE — 99215 OFFICE O/P EST HI 40 MIN: CPT | Mod: S$GLB,,, | Performed by: INTERNAL MEDICINE

## 2020-02-18 PROCEDURE — 99223 PR INITIAL HOSPITAL CARE,LEVL III: ICD-10-PCS | Mod: 57,,, | Performed by: COLON & RECTAL SURGERY

## 2020-02-18 PROCEDURE — 71000033 HC RECOVERY, INTIAL HOUR: Performed by: COLON & RECTAL SURGERY

## 2020-02-18 PROCEDURE — 27201423 OPTIME MED/SURG SUP & DEVICES STERILE SUPPLY: Performed by: COLON & RECTAL SURGERY

## 2020-02-18 PROCEDURE — 36000710: Performed by: COLON & RECTAL SURGERY

## 2020-02-18 PROCEDURE — S0030 INJECTION, METRONIDAZOLE: HCPCS | Performed by: COLON & RECTAL SURGERY

## 2020-02-18 PROCEDURE — 25000003 PHARM REV CODE 250: Performed by: NURSE ANESTHETIST, CERTIFIED REGISTERED

## 2020-02-18 PROCEDURE — 37000009 HC ANESTHESIA EA ADD 15 MINS: Performed by: COLON & RECTAL SURGERY

## 2020-02-18 PROCEDURE — 44188 PR LAP, SURG COLOSTOMY: ICD-10-PCS | Mod: ,,, | Performed by: COLON & RECTAL SURGERY

## 2020-02-18 PROCEDURE — 99215 PR OFFICE/OUTPT VISIT, EST, LEVL V, 40-54 MIN: ICD-10-PCS | Mod: S$GLB,,, | Performed by: INTERNAL MEDICINE

## 2020-02-18 RX ORDER — ONDANSETRON 2 MG/ML
4 INJECTION INTRAMUSCULAR; INTRAVENOUS EVERY 12 HOURS PRN
Status: DISCONTINUED | OUTPATIENT
Start: 2020-02-18 | End: 2020-02-20 | Stop reason: HOSPADM

## 2020-02-18 RX ORDER — ROCURONIUM BROMIDE 10 MG/ML
INJECTION, SOLUTION INTRAVENOUS
Status: DISCONTINUED | OUTPATIENT
Start: 2020-02-18 | End: 2020-02-18

## 2020-02-18 RX ORDER — GABAPENTIN 100 MG/1
800 CAPSULE ORAL
Status: CANCELLED | OUTPATIENT
Start: 2020-02-18 | End: 2020-02-18

## 2020-02-18 RX ORDER — SODIUM CHLORIDE 9 MG/ML
INJECTION, SOLUTION INTRAVENOUS CONTINUOUS
Status: DISCONTINUED | OUTPATIENT
Start: 2020-02-18 | End: 2020-02-18

## 2020-02-18 RX ORDER — PANTOPRAZOLE SODIUM 40 MG/1
40 TABLET, DELAYED RELEASE ORAL
Status: DISCONTINUED | OUTPATIENT
Start: 2020-02-19 | End: 2020-02-20 | Stop reason: HOSPADM

## 2020-02-18 RX ORDER — HYDROMORPHONE HYDROCHLORIDE 2 MG/ML
0.2 INJECTION, SOLUTION INTRAMUSCULAR; INTRAVENOUS; SUBCUTANEOUS EVERY 5 MIN PRN
Status: DISCONTINUED | OUTPATIENT
Start: 2020-02-18 | End: 2020-02-18 | Stop reason: HOSPADM

## 2020-02-18 RX ORDER — METOCLOPRAMIDE HYDROCHLORIDE 5 MG/ML
10 INJECTION INTRAMUSCULAR; INTRAVENOUS EVERY 10 MIN PRN
Status: DISCONTINUED | OUTPATIENT
Start: 2020-02-18 | End: 2020-02-18 | Stop reason: HOSPADM

## 2020-02-18 RX ORDER — DIPHENHYDRAMINE HYDROCHLORIDE 50 MG/ML
25 INJECTION INTRAMUSCULAR; INTRAVENOUS EVERY 6 HOURS PRN
Status: DISCONTINUED | OUTPATIENT
Start: 2020-02-18 | End: 2020-02-18 | Stop reason: HOSPADM

## 2020-02-18 RX ORDER — FENTANYL CITRATE 50 UG/ML
INJECTION, SOLUTION INTRAMUSCULAR; INTRAVENOUS
Status: DISCONTINUED | OUTPATIENT
Start: 2020-02-18 | End: 2020-02-18

## 2020-02-18 RX ORDER — ACETAMINOPHEN 10 MG/ML
1000 INJECTION, SOLUTION INTRAVENOUS EVERY 8 HOURS
Status: COMPLETED | OUTPATIENT
Start: 2020-02-18 | End: 2020-02-19

## 2020-02-18 RX ORDER — MIDAZOLAM HYDROCHLORIDE 1 MG/ML
INJECTION, SOLUTION INTRAMUSCULAR; INTRAVENOUS
Status: DISCONTINUED | OUTPATIENT
Start: 2020-02-18 | End: 2020-02-18

## 2020-02-18 RX ORDER — METRONIDAZOLE 500 MG/100ML
500 INJECTION, SOLUTION INTRAVENOUS
Status: CANCELLED | OUTPATIENT
Start: 2020-02-18

## 2020-02-18 RX ORDER — ENOXAPARIN SODIUM 100 MG/ML
40 INJECTION SUBCUTANEOUS EVERY 24 HOURS
Status: DISCONTINUED | OUTPATIENT
Start: 2020-02-19 | End: 2020-02-20 | Stop reason: HOSPADM

## 2020-02-18 RX ORDER — OXYCODONE HYDROCHLORIDE 5 MG/1
5 TABLET ORAL EVERY 4 HOURS PRN
Status: DISCONTINUED | OUTPATIENT
Start: 2020-02-18 | End: 2020-02-20 | Stop reason: HOSPADM

## 2020-02-18 RX ORDER — ACETAMINOPHEN 500 MG
1000 TABLET ORAL ONCE
Status: DISCONTINUED | OUTPATIENT
Start: 2020-02-18 | End: 2020-02-18 | Stop reason: HOSPADM

## 2020-02-18 RX ORDER — PROPOFOL 10 MG/ML
VIAL (ML) INTRAVENOUS
Status: DISCONTINUED | OUTPATIENT
Start: 2020-02-18 | End: 2020-02-18

## 2020-02-18 RX ORDER — CELECOXIB 100 MG/1
400 CAPSULE ORAL
Status: COMPLETED | OUTPATIENT
Start: 2020-02-18 | End: 2020-02-18

## 2020-02-18 RX ORDER — LIDOCAINE HYDROCHLORIDE 10 MG/ML
1 INJECTION, SOLUTION EPIDURAL; INFILTRATION; INTRACAUDAL; PERINEURAL ONCE
Status: DISCONTINUED | OUTPATIENT
Start: 2020-02-18 | End: 2020-02-18 | Stop reason: HOSPADM

## 2020-02-18 RX ORDER — ONDANSETRON 2 MG/ML
4 INJECTION INTRAMUSCULAR; INTRAVENOUS EVERY 6 HOURS PRN
Status: CANCELLED | OUTPATIENT
Start: 2020-02-18

## 2020-02-18 RX ORDER — METRONIDAZOLE 500 MG/100ML
500 INJECTION, SOLUTION INTRAVENOUS
Status: COMPLETED | OUTPATIENT
Start: 2020-02-18 | End: 2020-02-18

## 2020-02-18 RX ORDER — CHLORHEXIDINE GLUCONATE ORAL RINSE 1.2 MG/ML
10 SOLUTION DENTAL 2 TIMES DAILY
Status: DISCONTINUED | OUTPATIENT
Start: 2020-02-18 | End: 2020-02-20 | Stop reason: HOSPADM

## 2020-02-18 RX ORDER — SODIUM CHLORIDE, SODIUM LACTATE, POTASSIUM CHLORIDE, CALCIUM CHLORIDE 600; 310; 30; 20 MG/100ML; MG/100ML; MG/100ML; MG/100ML
INJECTION, SOLUTION INTRAVENOUS CONTINUOUS
Status: DISCONTINUED | OUTPATIENT
Start: 2020-02-18 | End: 2020-02-19

## 2020-02-18 RX ORDER — GLYCOPYRROLATE 0.2 MG/ML
INJECTION INTRAMUSCULAR; INTRAVENOUS
Status: DISCONTINUED | OUTPATIENT
Start: 2020-02-18 | End: 2020-02-18

## 2020-02-18 RX ORDER — ONDANSETRON 2 MG/ML
INJECTION INTRAMUSCULAR; INTRAVENOUS
Status: DISCONTINUED | OUTPATIENT
Start: 2020-02-18 | End: 2020-02-18

## 2020-02-18 RX ORDER — DIPHENHYDRAMINE HYDROCHLORIDE 50 MG/ML
12.5 INJECTION INTRAMUSCULAR; INTRAVENOUS EVERY 6 HOURS PRN
Status: DISCONTINUED | OUTPATIENT
Start: 2020-02-18 | End: 2020-02-20 | Stop reason: HOSPADM

## 2020-02-18 RX ORDER — ONDANSETRON 2 MG/ML
4 INJECTION INTRAMUSCULAR; INTRAVENOUS EVERY 6 HOURS PRN
Status: DISCONTINUED | OUTPATIENT
Start: 2020-02-18 | End: 2020-02-18 | Stop reason: HOSPADM

## 2020-02-18 RX ORDER — BUPIVACAINE HYDROCHLORIDE 2.5 MG/ML
INJECTION, SOLUTION EPIDURAL; INFILTRATION; INTRACAUDAL
Status: DISCONTINUED | OUTPATIENT
Start: 2020-02-18 | End: 2020-02-18 | Stop reason: HOSPADM

## 2020-02-18 RX ORDER — SODIUM CHLORIDE, SODIUM LACTATE, POTASSIUM CHLORIDE, CALCIUM CHLORIDE 600; 310; 30; 20 MG/100ML; MG/100ML; MG/100ML; MG/100ML
INJECTION, SOLUTION INTRAVENOUS CONTINUOUS PRN
Status: DISCONTINUED | OUTPATIENT
Start: 2020-02-18 | End: 2020-02-18

## 2020-02-18 RX ORDER — CELECOXIB 100 MG/1
400 CAPSULE ORAL
Status: CANCELLED | OUTPATIENT
Start: 2020-02-18 | End: 2020-02-18

## 2020-02-18 RX ORDER — SUCCINYLCHOLINE CHLORIDE 20 MG/ML
INJECTION INTRAMUSCULAR; INTRAVENOUS
Status: DISCONTINUED | OUTPATIENT
Start: 2020-02-18 | End: 2020-02-18

## 2020-02-18 RX ORDER — SODIUM CHLORIDE 0.9 % (FLUSH) 0.9 %
3 SYRINGE (ML) INJECTION EVERY 8 HOURS
Status: DISCONTINUED | OUTPATIENT
Start: 2020-02-18 | End: 2020-02-18 | Stop reason: HOSPADM

## 2020-02-18 RX ORDER — SODIUM CHLORIDE 9 MG/ML
INJECTION, SOLUTION INTRAVENOUS CONTINUOUS
Status: CANCELLED | OUTPATIENT
Start: 2020-02-18

## 2020-02-18 RX ORDER — ACETAMINOPHEN 325 MG/1
1000 TABLET ORAL ONCE
Status: CANCELLED | OUTPATIENT
Start: 2020-02-18 | End: 2020-02-18

## 2020-02-18 RX ORDER — GABAPENTIN 400 MG/1
800 CAPSULE ORAL
Status: COMPLETED | OUTPATIENT
Start: 2020-02-18 | End: 2020-02-18

## 2020-02-18 RX ORDER — NEOSTIGMINE METHYLSULFATE 1 MG/ML
INJECTION, SOLUTION INTRAVENOUS
Status: DISCONTINUED | OUTPATIENT
Start: 2020-02-18 | End: 2020-02-18

## 2020-02-18 RX ORDER — MEPERIDINE HYDROCHLORIDE 25 MG/ML
12.5 INJECTION INTRAMUSCULAR; INTRAVENOUS; SUBCUTANEOUS ONCE AS NEEDED
Status: DISCONTINUED | OUTPATIENT
Start: 2020-02-18 | End: 2020-02-18 | Stop reason: HOSPADM

## 2020-02-18 RX ORDER — OXYCODONE HYDROCHLORIDE 5 MG/1
10 TABLET ORAL EVERY 4 HOURS PRN
Status: DISCONTINUED | OUTPATIENT
Start: 2020-02-18 | End: 2020-02-20 | Stop reason: HOSPADM

## 2020-02-18 RX ORDER — LIDOCAINE HCL/PF 100 MG/5ML
SYRINGE (ML) INTRAVENOUS
Status: DISCONTINUED | OUTPATIENT
Start: 2020-02-18 | End: 2020-02-18

## 2020-02-18 RX ORDER — HEPARIN SODIUM 5000 [USP'U]/ML
5000 INJECTION, SOLUTION INTRAVENOUS; SUBCUTANEOUS
Status: CANCELLED | OUTPATIENT
Start: 2020-02-18 | End: 2020-02-18

## 2020-02-18 RX ORDER — HEPARIN SODIUM 5000 [USP'U]/ML
5000 INJECTION, SOLUTION INTRAVENOUS; SUBCUTANEOUS
Status: COMPLETED | OUTPATIENT
Start: 2020-02-18 | End: 2020-02-18

## 2020-02-18 RX ADMIN — ONDANSETRON 4 MG: 2 INJECTION, SOLUTION INTRAMUSCULAR; INTRAVENOUS at 02:02

## 2020-02-18 RX ADMIN — HEPARIN SODIUM 5000 UNITS: 5000 INJECTION INTRAVENOUS; SUBCUTANEOUS at 11:02

## 2020-02-18 RX ADMIN — MIDAZOLAM 2 MG: 1 INJECTION INTRAMUSCULAR; INTRAVENOUS at 01:02

## 2020-02-18 RX ADMIN — FENTANYL CITRATE 50 MCG: 50 INJECTION, SOLUTION INTRAMUSCULAR; INTRAVENOUS at 01:02

## 2020-02-18 RX ADMIN — ROBINUL 0.8 MG: 0.2 INJECTION INTRAMUSCULAR; INTRAVENOUS at 02:02

## 2020-02-18 RX ADMIN — BUPIVACAINE 266 MG: 13.3 INJECTION, SUSPENSION, LIPOSOMAL INFILTRATION at 01:02

## 2020-02-18 RX ADMIN — CEFTRIAXONE SODIUM 2 G: 2 INJECTION, SOLUTION INTRAVENOUS at 01:02

## 2020-02-18 RX ADMIN — IOHEXOL 30 ML: 350 INJECTION, SOLUTION INTRAVENOUS at 07:02

## 2020-02-18 RX ADMIN — METRONIDAZOLE 500 MG: 500 SOLUTION INTRAVENOUS at 01:02

## 2020-02-18 RX ADMIN — SODIUM CHLORIDE, SODIUM LACTATE, POTASSIUM CHLORIDE, AND CALCIUM CHLORIDE: 600; 310; 30; 20 INJECTION, SOLUTION INTRAVENOUS at 01:02

## 2020-02-18 RX ADMIN — GABAPENTIN 800 MG: 400 CAPSULE ORAL at 11:02

## 2020-02-18 RX ADMIN — MORPHINE SULFATE 45 MG: 30 TABLET, FILM COATED, EXTENDED RELEASE ORAL at 09:02

## 2020-02-18 RX ADMIN — ACETAMINOPHEN 1000 MG: 10 INJECTION, SOLUTION INTRAVENOUS at 09:02

## 2020-02-18 RX ADMIN — LIDOCAINE HYDROCHLORIDE 80 MG: 20 INJECTION, SOLUTION INTRAVENOUS at 01:02

## 2020-02-18 RX ADMIN — OXYCODONE HYDROCHLORIDE 10 MG: 5 TABLET ORAL at 05:02

## 2020-02-18 RX ADMIN — PROPOFOL 150 MG: 10 INJECTION, EMULSION INTRAVENOUS at 01:02

## 2020-02-18 RX ADMIN — SUCCINYLCHOLINE CHLORIDE 100 MG: 20 INJECTION, SOLUTION INTRAMUSCULAR; INTRAVENOUS at 01:02

## 2020-02-18 RX ADMIN — IOHEXOL 75 ML: 350 INJECTION, SOLUTION INTRAVENOUS at 08:02

## 2020-02-18 RX ADMIN — SODIUM CHLORIDE, SODIUM LACTATE, POTASSIUM CHLORIDE, AND CALCIUM CHLORIDE: .6; .31; .03; .02 INJECTION, SOLUTION INTRAVENOUS at 05:02

## 2020-02-18 RX ADMIN — CHLORHEXIDINE GLUCONATE 0.12% ORAL RINSE 10 ML: 1.2 LIQUID ORAL at 09:02

## 2020-02-18 RX ADMIN — SODIUM CHLORIDE, SODIUM LACTATE, POTASSIUM CHLORIDE, AND CALCIUM CHLORIDE: 600; 310; 30; 20 INJECTION, SOLUTION INTRAVENOUS at 02:02

## 2020-02-18 RX ADMIN — SUGAMMADEX 200 MG: 100 INJECTION, SOLUTION INTRAVENOUS at 02:02

## 2020-02-18 RX ADMIN — CELECOXIB 400 MG: 100 CAPSULE ORAL at 11:02

## 2020-02-18 RX ADMIN — ROCURONIUM BROMIDE 5 MG: 10 INJECTION, SOLUTION INTRAVENOUS at 01:02

## 2020-02-18 RX ADMIN — PIPERACILLIN AND TAZOBACTAM 4.5 G: 4; .5 INJECTION, POWDER, LYOPHILIZED, FOR SOLUTION INTRAVENOUS; PARENTERAL at 11:02

## 2020-02-18 RX ADMIN — NEOSTIGMINE METHYLSULFATE 5 MG: 1 INJECTION INTRAVENOUS at 02:02

## 2020-02-18 RX ADMIN — ROCURONIUM BROMIDE 45 MG: 10 INJECTION, SOLUTION INTRAVENOUS at 01:02

## 2020-02-18 RX ADMIN — ROCURONIUM BROMIDE 20 MG: 10 INJECTION, SOLUTION INTRAVENOUS at 01:02

## 2020-02-18 NOTE — H&P
[facility] -   Colorectal Surgery  History & Physical    Patient Name: Sukhjinder Presley  MRN: [id number]  Admission Date: 2/18/2020  Attending Physician: Jan New MD   Primary Care Provider: Drake Elizalde MD    Patient information was obtained from patient and past medical records.     Subjective:     Chief Complaint/Reason for Admission: perforated rectum in setting of rectal adenocarcinoma on chemotherapy    History of Present Illness: 54-year-old male with rectal adenocarcinoma who has completed neoadjuvant chemo radiation and his currently on neoadjuvant chemotherapy for his COLLINS approach who was seen by his oncologist over the last 2 days with increasing pelvic pain near his coccyx who underwent a CT scan concerning for likely rectal perforation near his tumor into the sidewall.  He presents to the hospital for discussion for diversion of his stool via colostomy to complete his treatment and to allow for future surgical planning and to prevent sepsis given his perforation.  He currently endorses only pelvic pain as well as deep suprapubic pain.  He denies any fever, chills, nausea, vomiting.    Current Facility-Administered Medications on File Prior to Encounter   Medication    [COMPLETED] iohexol (OMNIPAQUE 350) injection 30 mL    [COMPLETED] iohexol (OMNIPAQUE 350) injection 75 mL    [DISCONTINUED] lidocaine (PF) 10 mg/ml (1%) injection 10 mg     Current Outpatient Medications on File Prior to Encounter   Medication Sig    acetaminophen (TYLENOL) 325 MG tablet Take 1 tablet (325 mg total) by mouth every 6 (six) hours as needed for Pain.    dronabinol (MARINOL) 10 MG capsule Take 1 capsule (10 mg total) by mouth 2 (two) times daily before meals.    iron fum-B12-IF-C-folic acid (FOLTRIN) 110-0.5 mg capsule Take 1 capsule by mouth 2 (two) times daily.    ketorolac (TORADOL) 10 mg tablet Take 1 tablet (10 mg total) by mouth every 6 (six) hours as needed for Pain.    oxyCODONE  (ROXICODONE) 10 mg Tab immediate release tablet Take 1 tablet (10 mg total) by mouth every 8 (eight) hours as needed for Pain (medically necessary).    traMADol (ULTRAM) 50 mg tablet Take 1 tablet (50 mg total) by mouth every 6 (six) hours as needed for Pain.    aspirin 81 MG Chew Take 81 mg by mouth once daily.    capecitabine (XELODA) 500 MG Tab Take 3 tablets (1500 mg) by mouth twice daily after breakfast and dinner on days of radiation only.    diphenoxylate-atropine 2.5-0.025 mg (LOMOTIL) 2.5-0.025 mg per tablet Take 1 to 2 tablets by mouth 4 (four) times daily as needed for Diarrhea.    lidocaine (XYLOCAINE) 5 % Oint ointment Apply topically as needed.    magic mouthwash diphen/antac/lidoc Swish and spit 15 ml by mouth every 4 hours as needed    megestrol (MEGACE) 40 MG Tab Take 1 tablet (40 mg total) by mouth 2 (two) times daily.    morphine (MS CONTIN) 15 MG 12 hr tablet Take 3 tablets (45 mg total) by mouth nightly.    nicotine (NICODERM CQ) 21 mg/24 hr Place 1 patch onto the skin once daily.    ondansetron (ZOFRAN-ODT) 8 MG TbDL Take 1 tablet (8 mg total) by mouth every 12 (twelve) hours as needed.    pramoxine (PROCTOFOAM) 1 % Foam Place rectally 3 (three) times daily as needed.    prochlorperazine (COMPAZINE) 10 MG tablet Take 1 tablet (10 mg total) by mouth every 6 (six) hours as needed.    promethazine (PHENERGAN) 25 MG tablet Take 1 tablet (25 mg total) by mouth every 4 (four) hours.    silver sulfADIAZINE 1% (SILVADENE) 1 % cream Apply to affected area daily    temazepam (RESTORIL) 7.5 MG Cap Take 2 capsules (15 mg total) by mouth nightly as needed.    varenicline (CHANTIX) 1 mg Tab Take 1 tablet (1 mg total) by mouth 2 (two) times daily.       Review of patient's allergies indicates:  No Known Allergies    Past Medical History:   Diagnosis Date    Anemia     Cancer      Past Surgical History:   Procedure Laterality Date    COLONOSCOPY N/A 6/6/2019    Procedure: COLONOSCOPY;   Surgeon: Anjelica Saavedra MD;  Location: [facility] ENDO;  Service: Endoscopy;  Laterality: N/A;    ESOPHAGOGASTRODUODENOSCOPY N/A 6/6/2019    Procedure: EGD (ESOPHAGOGASTRODUODENOSCOPY);  Surgeon: Anjelica Saavedra MD;  Location: [facility] ENDO;  Service: Endoscopy;  Laterality: N/A;    HERNIA REPAIR  1995    INSERTION OF TUNNELED CENTRAL VENOUS CATHETER (CVC) WITH SUBCUTANEOUS PORT N/A 10/8/2019    Procedure: [id number];  Surgeon: Jan New MD;  Location: [facility] OR;  Service: General;  Laterality: N/A;    TONSILLECTOMY       Family History     Problem Relation (Age of Onset)    Coronary artery disease Brother, Maternal Grandmother    Intestinal malrotation Mother    Leukemia Father    No Known Problems Sister    Stomach cancer Maternal Grandfather        Tobacco Use    Smoking status: Current Every Day Smoker     Packs/day: 1.00     Years: 35.00     Pack years: 35.00     Types: Cigarettes     Start date: 1/2/1984    Smokeless tobacco: Current User     Types: Chew   Substance and Sexual Activity    Alcohol use: Yes     Alcohol/week: 46.0 standard drinks     Types: 42 Cans of beer, 4 Shots of liquor per week     Frequency: 4 or more times a week     Drinks per session: 5 or 6     Binge frequency: Daily or almost daily     Comment: nancie 7, beer daily,  None 72 hours prior to surgery    Drug use: Never    Sexual activity: Yes     Partners: Female     Birth control/protection: None     Review of Systems   Constitutional: Negative for activity change, appetite change, chills, fatigue, fever and unexpected weight change.   HENT: Negative for congestion, ear pain, sore throat and trouble swallowing.    Eyes: Negative for pain, redness and itching.   Respiratory: Negative for cough, shortness of breath and wheezing.    Cardiovascular: Negative for chest pain, palpitations and leg swelling.   Gastrointestinal: Negative for abdominal distention, abdominal pain, anal bleeding, blood in stool, constipation,  diarrhea, nausea, rectal pain and vomiting.   Endocrine: Negative for cold intolerance, heat intolerance and polyuria.   Genitourinary: Negative for dysuria, flank pain, frequency and hematuria.   Musculoskeletal: Negative for gait problem, joint swelling and neck pain.   Skin: Negative for color change, rash and wound.   Allergic/Immunologic: Negative for environmental allergies and immunocompromised state.   Neurological: Negative for dizziness, speech difficulty, weakness and numbness.   Psychiatric/Behavioral: Negative for agitation, confusion and hallucinations.     Objective:     Vital Signs (Most Recent):  Temp: 98.1 °F (36.7 °C) (02/18/20 1125)  Pulse: 100 (02/18/20 1125)  Resp: 18 (02/18/20 1125)  BP: 128/85 (02/18/20 1125)  SpO2: 97 % (02/18/20 1125) Vital Signs (24h Range):  Temp:  [98.1 °F (36.7 °C)-99.2 °F (37.3 °C)] 98.1 °F (36.7 °C)  Pulse:  [] 100  Resp:  [18] 18  SpO2:  [97 %-98 %] 97 %  BP: (105-128)/(73-85) 128/85     Weight: 71.2 kg (157 lb 1.2 oz)  Body mass index is 21.3 kg/m².    Physical Exam   Constitutional: He is oriented to person, place, and time. He appears well-developed.   HENT:   Head: Normocephalic and atraumatic.   Eyes: Conjunctivae and EOM are normal.   Neck: Normal range of motion. No thyromegaly present.   Cardiovascular: Normal rate and regular rhythm.   Pulmonary/Chest: Effort normal. No respiratory distress.   Abdominal:   Soft, +mild distention, nontender   Musculoskeletal: Normal range of motion. He exhibits no edema or tenderness.   Neurological: He is alert and oriented to person, place, and time.   Skin: Skin is warm and dry. Capillary refill takes less than 2 seconds. No rash noted.   Psychiatric: He has a normal mood and affect.       Significant Labs:  CBC:   Recent Labs   Lab 02/17/20  0755   WBC 8.02   RBC 3.89*   HGB 11.5*   HCT 34.7*      MCV 89   MCH 29.6   MCHC 33.1     BMP:   Recent Labs   Lab 02/17/20  0755   *      K 4.1       CO2 27   BUN 12   CREATININE 0.8   CALCIUM 9.4       Significant Diagnostics:  I have reviewed all pertinent imaging results/findings within the past 24 hours.     CT:    FINDINGS:  CHEST    No infiltrates or pleural effusions are identified.  No discrete pulmonary nodules or masses are identified.    The aorta is unremarkable in appearance. There is no pericardial effusion.  No enlarged mediastinal, hilar or axillary lymph nodes are identified.  A left-sided MediPort catheter is noted.    ABDOMEN    Liver/gallbladder/biliary: The liver demonstrates no focal abnormality. The gallbladder is present and unremarkable.  No biliary ductal dilation.    Pancreas: The pancreas is unremarkable in appearance.    Spleen: The spleen is enlarged and demonstrates calcified granulomas.    Adrenals: Unremarkable    Kidneys: The kidneys are equally perfused and demonstrate no solid masses. Large nonobstructing lower pole left renal calculus measuring up to 15 mm.  Smaller adjacent 3-4 mm nonobstructing calculus also noted.    Bowel/Mesentery: There is no evidence of bowel obstruction. Normal appearing appendix noted.  There is continued interval improvement of the rectum with continued decrease in circumferential wall thickening when compared to the prior exam.  There is persistent stranding of the presacral fat and perirectal fat with some thickening of the mesorectal fascia.  Adjacent to the inferior left pelvic sidewall is a heterogeneous collection of air and possible fecal material and possibly a minimal amount of fluid that appears to demonstrate fistulous connection to the rectum.  This is suspicious for a focal contained rupture of the rectum.  There is some stranding seen adjacent to this heterogeneous collection.  The wall of this collection does not appear to be mature but measures at least 2.5 cm in the AP dimension and up to 1.9 cm in the transverse dimension.    Retroperitoneum: No adenopathy.  The aorta demonstrates  a normal caliber.    PELVIS    Genitourinary/Reproductive organs: Unremarkable    Adenopathy: None    Free Fluid: No free fluid    Osseus Structures/Soft tissues: No suspicious appearing osseus lesions. No significant soft tissue abnormality.      Impression       1. Continued interval decrease in circumferential wall thickening of the rectum consistent with continued treatment response.  No metastatic disease to the chest.  2. New heterogeneous collection of air, possible fecal material and fluid adjacent to the inferior aspect of the left pelvic sidewall and appearing to demonstrate a fistulous connection to the rectum that is suspicious for a contained rupture of the rectum.  There is some stranding of fat adjacent to this heterogeneous collection.  The wall of this collection is immature.  3. Remaining findings as discussed above and stable.     Assessment/Plan:     Rectal adenocarcinoma  54-year-old male with rectal adenocarcinoma who has completed neoadjuvant chemo radiation and is currently on neoadjuvant chemotherapy for a combined COLLINS approach who has evidence of rectal perforation at his tumor site into the sidewall on CT scan who presents for fecal diversion    - long discussion with the patient regarding his current status of his rectal tumor.  Discussed that as he is likely perforated at his tumor site on chemotherapy, I would recommend diversion of his stool via the loop colostomy.  Discussed that this would not resect his primary tumor but would divert his fecal stream away from his side perforation to prevent sepsis and continued infection.  Discussed that this may be a permanent colostomy given fact of his perforation as we may not be able to reanastomose him in the future, , but we can address this at a later time.  Patient voiced understanding of this and is agreeable to proceed.  - plan for laparoscopic versus open diverting colostomy today  - All risks, benefits and alternatives fully explained  to patient. Risks include, but are not limited to, bleeding, infection, anastomotic leak, damage to ureter, damage to other intra-abdominal organs such as colon, rectum, small bowel, stomach, liver, bladder, reproductive organs, sexual dysfunction, urinary dysfunction, postoperative abscess, conversion to open operation, perioperative MI, CVA and death.  All questions field and appropriately answered to patient's satisfaction.  Consent signed and placed on chart.  - wound care ostomy nurse consult for marking preoperatively  - NPO  - IV fluid  - IV antibiotics  - admit to hospital to surgery postoperatively for ostomy teaching and IV antibiotic therapy    Smoker  Discuss smoking cessation      VTE Risk Mitigation (From admission, onward)         Ordered     IP VTE HIGH RISK PATIENT  Once      02/18/20 1117     Place sequential compression device  Until discontinued      02/18/20 1117                Jan New MD  Colorectal Surgery  [facility] -

## 2020-02-18 NOTE — ANESTHESIA PREPROCEDURE EVALUATION
02/18/2020  Sukhjinder Presley is a 54 y.o., male.    Pre-op Assessment    I have reviewed the Patient Summary Reports.    I have reviewed the Nursing Notes.   I have reviewed the Medications.     Review of Systems  Anesthesia Hx:  No problems with previous Anesthesia  History of prior surgery of interest to airway management or planning: Previous anesthesia: General  Denies Personal Hx of Anesthesia complications.   Social:  Smoker    Hematology/Oncology:  Hematology Normal   Oncology Normal     EENT/Dental:EENT/Dental Normal   Cardiovascular:  Cardiovascular Normal Exercise tolerance: good  ECG has been reviewed.  Functional Capacity good / => 4 METS    Pulmonary:  Pulmonary Normal    Renal/:   renal calculi    Hepatic/GI:   Rectal Cancer   Musculoskeletal:  Musculoskeletal Normal    Neurological:  Neurology Normal    Endocrine:  Endocrine Normal    Dermatological:  Skin Normal    Psych:  Psychiatric Normal           Physical Exam  General:  Well nourished    Airway/Jaw/Neck:  Airway Findings: Mouth Opening: Normal Tongue: Normal  Mallampati: II      Dental:  Dental Findings: In tact   Chest/Lungs:  Chest/Lungs Clear    Heart/Vascular:  Heart Findings: Normal Heart murmur: negative       Mental Status:  Mental Status Findings:  Cooperative, Alert and Oriented         Anesthesia Plan  Type of Anesthesia, risks & benefits discussed:  Anesthesia Type:  general  Patient's Preference:   Intra-op Monitoring Plan: standard ASA monitors  Intra-op Monitoring Plan Comments:   Post Op Pain Control Plan: multimodal analgesia  Post Op Pain Control Plan Comments:   Induction:   IV  Beta Blocker:  Patient is not currently on a Beta-Blocker (No further documentation required).       Informed Consent: Patient understands risks and agrees with Anesthesia plan.  Questions answered. Anesthesia consent signed with patient.  ASA  Score: 2     Day of Surgery Review of History & Physical: I have interviewed and examined the patient. I have reviewed the patient's H&P dated:    H&P update referred to the surgeon.

## 2020-02-18 NOTE — PLAN OF CARE
LR @ 125mL/hr. Stoma rosebud red and moist. Ambulated to bathroom with standby assist. Urinated x2. PRN pain meds adm to manage pain level. Remains free from injury/incident. Call light in reach.

## 2020-02-18 NOTE — ASSESSMENT & PLAN NOTE
54-year-old male with rectal adenocarcinoma who has completed neoadjuvant chemo radiation and is currently on neoadjuvant chemotherapy for a combined COLLINS approach who has evidence of rectal perforation at his tumor site into the sidewall on CT scan who presents for fecal diversion    - long discussion with the patient regarding his current status of his rectal tumor.  Discussed that as he is likely perforated at his tumor site on chemotherapy, I would recommend diversion of his stool via the loop colostomy.  Discussed that this would not resect his primary tumor but would divert his fecal stream away from his side perforation to prevent sepsis and continued infection.  Discussed that this may be a permanent colostomy given fact of his perforation as we may not be able to reanastomose him in the future, , but we can address this at a later time.  Patient voiced understanding of this and is agreeable to proceed.  - plan for laparoscopic versus open diverting colostomy today  - All risks, benefits and alternatives fully explained to patient. Risks include, but are not limited to, bleeding, infection, anastomotic leak, damage to ureter, damage to other intra-abdominal organs such as colon, rectum, small bowel, stomach, liver, bladder, reproductive organs, sexual dysfunction, urinary dysfunction, postoperative abscess, conversion to open operation, perioperative MI, CVA and death.  All questions field and appropriately answered to patient's satisfaction.  Consent signed and placed on chart.  - wound care ostomy nurse consult for marking preoperatively  - NPO  - IV fluid  - IV antibiotics  - admit to hospital to surgery postoperatively for ostomy teaching and IV antibiotic therapy

## 2020-02-18 NOTE — PLAN OF CARE
Pre-op checklist done. Side effects of anesthesia and expectations during recovery discussed with pt, mother, and son. All verbalized understanding. Questions answered. Will cont to monitor.

## 2020-02-18 NOTE — TRANSFER OF CARE
Anesthesia Transfer of Care Note    Patient: Sukhjinder Presley    Procedure(s) Performed: Procedure(s) (LRB):  CREATION, COLOSTOMY, LAPAROSCOPIC (N/A)  BLOCK, TRANSVERSUS ABDOMINIS PLANE    Patient location: PACU    Anesthesia Type: general    Transport from OR: Transported from OR on room air with adequate spontaneous ventilation    Post pain: adequate analgesia    Post assessment: no apparent anesthetic complications    Post vital signs: stable    Level of consciousness: awake, alert and oriented    Nausea/Vomiting: no nausea/vomiting    Complications: none    Transfer of care protocol was followed      Last vitals:   Visit Vitals  /85   Pulse 100   Temp 36.7 °C (98.1 °F) (Oral)   Resp 18   Ht 6' (1.829 m)   Wt 71.2 kg (157 lb 1.2 oz)   SpO2 97%   BMI 21.30 kg/m²

## 2020-02-18 NOTE — SUBJECTIVE & OBJECTIVE
Current Facility-Administered Medications on File Prior to Encounter   Medication    [COMPLETED] iohexol (OMNIPAQUE 350) injection 30 mL    [COMPLETED] iohexol (OMNIPAQUE 350) injection 75 mL    [DISCONTINUED] lidocaine (PF) 10 mg/ml (1%) injection 10 mg     Current Outpatient Medications on File Prior to Encounter   Medication Sig    acetaminophen (TYLENOL) 325 MG tablet Take 1 tablet (325 mg total) by mouth every 6 (six) hours as needed for Pain.    dronabinol (MARINOL) 10 MG capsule Take 1 capsule (10 mg total) by mouth 2 (two) times daily before meals.    iron fum-B12-IF-C-folic acid (FOLTRIN) 110-0.5 mg capsule Take 1 capsule by mouth 2 (two) times daily.    ketorolac (TORADOL) 10 mg tablet Take 1 tablet (10 mg total) by mouth every 6 (six) hours as needed for Pain.    oxyCODONE (ROXICODONE) 10 mg Tab immediate release tablet Take 1 tablet (10 mg total) by mouth every 8 (eight) hours as needed for Pain (medically necessary).    traMADol (ULTRAM) 50 mg tablet Take 1 tablet (50 mg total) by mouth every 6 (six) hours as needed for Pain.    aspirin 81 MG Chew Take 81 mg by mouth once daily.    capecitabine (XELODA) 500 MG Tab Take 3 tablets (1500 mg) by mouth twice daily after breakfast and dinner on days of radiation only.    diphenoxylate-atropine 2.5-0.025 mg (LOMOTIL) 2.5-0.025 mg per tablet Take 1 to 2 tablets by mouth 4 (four) times daily as needed for Diarrhea.    lidocaine (XYLOCAINE) 5 % Oint ointment Apply topically as needed.    magic mouthwash diphen/antac/lidoc Swish and spit 15 ml by mouth every 4 hours as needed    megestrol (MEGACE) 40 MG Tab Take 1 tablet (40 mg total) by mouth 2 (two) times daily.    morphine (MS CONTIN) 15 MG 12 hr tablet Take 3 tablets (45 mg total) by mouth nightly.    nicotine (NICODERM CQ) 21 mg/24 hr Place 1 patch onto the skin once daily.    ondansetron (ZOFRAN-ODT) 8 MG TbDL Take 1 tablet (8 mg total) by mouth every 12 (twelve) hours as needed.     pramoxine (PROCTOFOAM) 1 % Foam Place rectally 3 (three) times daily as needed.    prochlorperazine (COMPAZINE) 10 MG tablet Take 1 tablet (10 mg total) by mouth every 6 (six) hours as needed.    promethazine (PHENERGAN) 25 MG tablet Take 1 tablet (25 mg total) by mouth every 4 (four) hours.    silver sulfADIAZINE 1% (SILVADENE) 1 % cream Apply to affected area daily    temazepam (RESTORIL) 7.5 MG Cap Take 2 capsules (15 mg total) by mouth nightly as needed.    varenicline (CHANTIX) 1 mg Tab Take 1 tablet (1 mg total) by mouth 2 (two) times daily.       Review of patient's allergies indicates:  No Known Allergies    Past Medical History:   Diagnosis Date    Anemia     Cancer      Past Surgical History:   Procedure Laterality Date    COLONOSCOPY N/A 6/6/2019    Procedure: COLONOSCOPY;  Surgeon: Anjelica Saavedra MD;  Location: [facility];  Service: Endoscopy;  Laterality: N/A;    ESOPHAGOGASTRODUODENOSCOPY N/A 6/6/2019    Procedure: EGD (ESOPHAGOGASTRODUODENOSCOPY);  Surgeon: Anjelica Saavedra MD;  Location: [facility];  Service: Endoscopy;  Laterality: N/A;    HERNIA REPAIR  1995    INSERTION OF TUNNELED CENTRAL VENOUS CATHETER (CVC) WITH SUBCUTANEOUS PORT N/A 10/8/2019    Procedure: [id number];  Surgeon: Jan New MD;  Location: [facility];  Service: General;  Laterality: N/A;    TONSILLECTOMY       Family History     Problem Relation (Age of Onset)    Coronary artery disease Brother, Maternal Grandmother    Intestinal malrotation Mother    Leukemia Father    No Known Problems Sister    Stomach cancer Maternal Grandfather        Tobacco Use    Smoking status: Current Every Day Smoker     Packs/day: 1.00     Years: 35.00     Pack years: 35.00     Types: Cigarettes     Start date: 1/2/1984    Smokeless tobacco: Current User     Types: Chew   Substance and Sexual Activity    Alcohol use: Yes     Alcohol/week: 46.0 standard drinks     Types: 42 Cans of beer, 4 Shots of liquor per week      Frequency: 4 or more times a week     Drinks per session: 5 or 6     Binge frequency: Daily or almost daily     Comment: segrams 7, beer daily,  None 72 hours prior to surgery    Drug use: Never    Sexual activity: Yes     Partners: Female     Birth control/protection: None     Review of Systems   Constitutional: Negative for activity change, appetite change, chills, fatigue, fever and unexpected weight change.   HENT: Negative for congestion, ear pain, sore throat and trouble swallowing.    Eyes: Negative for pain, redness and itching.   Respiratory: Negative for cough, shortness of breath and wheezing.    Cardiovascular: Negative for chest pain, palpitations and leg swelling.   Gastrointestinal: Negative for abdominal distention, abdominal pain, anal bleeding, blood in stool, constipation, diarrhea, nausea, rectal pain and vomiting.   Endocrine: Negative for cold intolerance, heat intolerance and polyuria.   Genitourinary: Negative for dysuria, flank pain, frequency and hematuria.   Musculoskeletal: Negative for gait problem, joint swelling and neck pain.   Skin: Negative for color change, rash and wound.   Allergic/Immunologic: Negative for environmental allergies and immunocompromised state.   Neurological: Negative for dizziness, speech difficulty, weakness and numbness.   Psychiatric/Behavioral: Negative for agitation, confusion and hallucinations.     Objective:     Vital Signs (Most Recent):  Temp: 98.1 °F (36.7 °C) (02/18/20 1125)  Pulse: 100 (02/18/20 1125)  Resp: 18 (02/18/20 1125)  BP: 128/85 (02/18/20 1125)  SpO2: 97 % (02/18/20 1125) Vital Signs (24h Range):  Temp:  [98.1 °F (36.7 °C)-99.2 °F (37.3 °C)] 98.1 °F (36.7 °C)  Pulse:  [] 100  Resp:  [18] 18  SpO2:  [97 %-98 %] 97 %  BP: (105-128)/(73-85) 128/85     Weight: 71.2 kg (157 lb 1.2 oz)  Body mass index is 21.3 kg/m².    Physical Exam   Constitutional: He is oriented to person, place, and time. He appears well-developed.   HENT:   Head:  Normocephalic and atraumatic.   Eyes: Conjunctivae and EOM are normal.   Neck: Normal range of motion. No thyromegaly present.   Cardiovascular: Normal rate and regular rhythm.   Pulmonary/Chest: Effort normal. No respiratory distress.   Abdominal:   Soft, +mild distention, nontender   Musculoskeletal: Normal range of motion. He exhibits no edema or tenderness.   Neurological: He is alert and oriented to person, place, and time.   Skin: Skin is warm and dry. Capillary refill takes less than 2 seconds. No rash noted.   Psychiatric: He has a normal mood and affect.       Significant Labs:  CBC:   Recent Labs   Lab 02/17/20  0755   WBC 8.02   RBC 3.89*   HGB 11.5*   HCT 34.7*      MCV 89   MCH 29.6   MCHC 33.1     BMP:   Recent Labs   Lab 02/17/20  0755   *      K 4.1      CO2 27   BUN 12   CREATININE 0.8   CALCIUM 9.4       Significant Diagnostics:  I have reviewed all pertinent imaging results/findings within the past 24 hours.     CT:    FINDINGS:  CHEST    No infiltrates or pleural effusions are identified.  No discrete pulmonary nodules or masses are identified.    The aorta is unremarkable in appearance. There is no pericardial effusion.  No enlarged mediastinal, hilar or axillary lymph nodes are identified.  A left-sided MediPort catheter is noted.    ABDOMEN    Liver/gallbladder/biliary: The liver demonstrates no focal abnormality. The gallbladder is present and unremarkable.  No biliary ductal dilation.    Pancreas: The pancreas is unremarkable in appearance.    Spleen: The spleen is enlarged and demonstrates calcified granulomas.    Adrenals: Unremarkable    Kidneys: The kidneys are equally perfused and demonstrate no solid masses. Large nonobstructing lower pole left renal calculus measuring up to 15 mm.  Smaller adjacent 3-4 mm nonobstructing calculus also noted.    Bowel/Mesentery: There is no evidence of bowel obstruction. Normal appearing appendix noted.  There is continued  interval improvement of the rectum with continued decrease in circumferential wall thickening when compared to the prior exam.  There is persistent stranding of the presacral fat and perirectal fat with some thickening of the mesorectal fascia.  Adjacent to the inferior left pelvic sidewall is a heterogeneous collection of air and possible fecal material and possibly a minimal amount of fluid that appears to demonstrate fistulous connection to the rectum.  This is suspicious for a focal contained rupture of the rectum.  There is some stranding seen adjacent to this heterogeneous collection.  The wall of this collection does not appear to be mature but measures at least 2.5 cm in the AP dimension and up to 1.9 cm in the transverse dimension.    Retroperitoneum: No adenopathy.  The aorta demonstrates a normal caliber.    PELVIS    Genitourinary/Reproductive organs: Unremarkable    Adenopathy: None    Free Fluid: No free fluid    Osseus Structures/Soft tissues: No suspicious appearing osseus lesions. No significant soft tissue abnormality.      Impression       1. Continued interval decrease in circumferential wall thickening of the rectum consistent with continued treatment response.  No metastatic disease to the chest.  2. New heterogeneous collection of air, possible fecal material and fluid adjacent to the inferior aspect of the left pelvic sidewall and appearing to demonstrate a fistulous connection to the rectum that is suspicious for a contained rupture of the rectum.  There is some stranding of fat adjacent to this heterogeneous collection.  The wall of this collection is immature.  3. Remaining findings as discussed above and stable.

## 2020-02-18 NOTE — HPI
54-year-old male with rectal adenocarcinoma who has completed neoadjuvant chemo radiation and his currently on neoadjuvant chemotherapy for his COLLINS approach who was seen by his oncologist over the last 2 days with increasing pelvic pain near his coccyx who underwent a CT scan concerning for likely rectal perforation near his tumor into the sidewall.  He presents to the hospital for discussion for diversion of his stool via colostomy to complete his treatment and to allow for future surgical planning and to prevent sepsis given his perforation.  He currently endorses only pelvic pain as well as deep suprapubic pain.  He denies any fever, chills, nausea, vomiting.

## 2020-02-18 NOTE — ANESTHESIA POSTPROCEDURE EVALUATION
Anesthesia Post Evaluation    Patient: Sukhjinder Presley    Procedure(s) Performed: Procedure(s) (LRB):  CREATION, COLOSTOMY, LAPAROSCOPIC (N/A)  BLOCK, TRANSVERSUS ABDOMINIS PLANE    Final Anesthesia Type: general    Patient location during evaluation: PACU  Patient participation: Yes- Able to Participate  Level of consciousness: awake and alert, oriented and awake  Post-procedure vital signs: reviewed and stable  Pain management: adequate  Airway patency: patent    PONV status at discharge: No PONV  Anesthetic complications: no      Cardiovascular status: blood pressure returned to baseline  Respiratory status: unassisted and spontaneous ventilation  Hydration status: euvolemic  Follow-up not needed.          Vitals Value Taken Time   /68 2/18/2020  3:30 PM   Temp 37.3 °C (99.2 °F) 2/18/2020  3:30 PM   Pulse 80 2/18/2020  3:33 PM   Resp 15 2/18/2020  3:33 PM   SpO2 96 % 2/18/2020  3:33 PM   Vitals shown include unvalidated device data.      Event Time     Out of Recovery 15:34:10          Pain/Stacy Score: Pain Rating Prior to Med Admin: 10 (2/18/2020 11:48 AM)  Stacy Score: 10 (2/18/2020  3:30 PM)

## 2020-02-18 NOTE — CONSULTS
Verbal consult received on Mr. Presley for pre-op Colostomy marking. Patient admitted with perforated rectum in setting of rectal adenocarcinoma on chemotherapy for Colostomy placement. He is awake and alert, denies abdominal pain at present. Abdomen assessed in flat supine, sitting up, and twisting positions. Abdomen distended. No obvious folds or creases noted. 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 He wears his pants low on his waist. Abdomen scrubbed with chloraprep and allowed to dry. LUQ and LLQ marked with sterile surgical skin marker and covered with sterile tegaderm. Made plans to visit with patient tomorrow for new colostomy education. Will follow.

## 2020-02-18 NOTE — OP NOTE
[facility]  Surgery Department  Operative Note    SUMMARY     Date of Procedure: 2/18/2020     Procedure: Procedure(s) (LRB):  CREATION, COLOSTOMY, LAPAROSCOPIC (N/A)  BLOCK, TRANSVERSUS ABDOMINIS PLANE     Surgeon(s) and Role:     * Jan New MD - Primary    Assisting Surgeon: None    Pre-Operative Diagnosis: Rectal adenocarcinoma [C20]    Post-Operative Diagnosis: Post-Op Diagnosis Codes:     * Rectal adenocarcinoma [C20]    Anesthesia: General    Technical Procedures Used:    1.  Laparoscopic loop sigmoid colostomy construction  2.  Laparoscopic transverse abdominis plane block    Indications for Procedure:  54-year-old male with rectal adenocarcinoma who is on neoadjuvant chemotherapy who had evidence of perforation his rectum on CT scan who presents for fecal diversion    Findings of the Procedure:  Redundant sigmoid colon without evidence of intra-abdominal contamination from perforation    Description of the Procedure:  Patient was brought to the operating placed supine on table. General tracheal anesthesia was then induced. Cooney catheter was then sterilely placed.  The abdomen was prepped and draped usual sterile fashion.  Preop surgical time-out was then performed confirming the correct patient, procedure and preop medications given.  A left upper quadrant stab incision was made and Veress needle was inserted to the abdominal cavity in usual fashion.  Confirmed good position with aspiration and saline drop test.  The abdomen was insufflated to pressure 15 mm of mercury.  An infraumbilical 5 mm incision was made in a curvilinear fashion and a 5 mm trocar was then inserted through this defect using Optiview technique in usual fashion.  Once in place, the abdomen was checked for any signs of metastatic disease which there was none.  There is abdomen was checked for any signs of fecal or intra-abdominal contamination from his known perforation which there was none.  Additional 5 mm  right lower quadrant trocar was placed in usual fashion.  Laparoscopic transverse abdominis plane block was then performed using a mixture of 20 cc of Exparel 30 cc of 0.25% bupivacaine plain and 10 cc of injectable saline. 12.5 cc injected into each abdominal quadrant under direct laparoscopic visualization as the transverse abdominis plane for a total of 50 cc given for the block.  The remaining 10 cc injected at the end of the case and local infiltration at the port sites. The sigmoid colon was grasped and found to be very redundant and reached easily to the anterior abdominal wall where the premarked colostomy site was chosen.  The colon was grasped and locked in place. The attention was turned to the exterior portion where the premarked colostomy site was identified and circular skin rim was excised.  Dissection was carried down to level the fascia using electrocautery which the fascia was opened longitudinally, the rectus muscle was spread bluntly and the posterior fascia and peritoneum were then opened longitudinally as well to accommodate 2 fingerbreadths in usual fashion.  There was no injury to underlying structures during this process.  The point chosen of the sigmoid colon was then brought up through this defect.  The abdomen was then re-insufflated and the abdomen was checked for signs of hemostasis as well as making sure the mesentery of the colon was straight which it was.  The abdomen was desufflated.  The ports were removed and the port sites were then closed after irrigation with 4-0 Monocryl suture. Skin glue was applied. Attention was then turned to the colostomy site.  The colon was then opened transversely and a standard Tomeka loop sigmoid colostomy was then constructed in usual fashion using 4 separate 3-0 Vicryl sutures at usual points.  Once this was constructed the mucocutaneous junction was then approximated circumferentially with interrupted 3-0 Vicryl sutures in usual fashion.  The  ostomy was hemostatic and a an appliance was applied.  The Cooney catheter was then removed.  The patient was then woken from general endotracheal anesthesia and transferred to the postanesthesia care in stable condition. He tolerated procedure well. All sponge, needle and instrument counts were correct in the case.    Significant Surgical Tasks Conducted by the Assistant(s), if Applicable: N/A    Complications: No    Estimated Blood Loss (EBL): 5 mL           Implants: * No implants in log *    Specimens:   Specimen (12h ago, onward)    None                  Condition: Good    Disposition: PACU - hemodynamically stable.    Attestation: I performed the procedure.

## 2020-02-19 LAB
ANION GAP SERPL CALC-SCNC: 8 MMOL/L (ref 8–16)
BASOPHILS # BLD AUTO: 0.04 K/UL (ref 0–0.2)
BASOPHILS NFR BLD: 0.7 % (ref 0–1.9)
BUN SERPL-MCNC: 9 MG/DL (ref 6–20)
CALCIUM SERPL-MCNC: 8.8 MG/DL (ref 8.7–10.5)
CHLORIDE SERPL-SCNC: 107 MMOL/L (ref 95–110)
CO2 SERPL-SCNC: 28 MMOL/L (ref 23–29)
CREAT SERPL-MCNC: 0.8 MG/DL (ref 0.5–1.4)
DIFFERENTIAL METHOD: ABNORMAL
EOSINOPHIL # BLD AUTO: 0.2 K/UL (ref 0–0.5)
EOSINOPHIL NFR BLD: 4 % (ref 0–8)
ERYTHROCYTE [DISTWIDTH] IN BLOOD BY AUTOMATED COUNT: 14.3 % (ref 11.5–14.5)
EST. GFR  (AFRICAN AMERICAN): >60 ML/MIN/1.73 M^2
EST. GFR  (NON AFRICAN AMERICAN): >60 ML/MIN/1.73 M^2
GLUCOSE SERPL-MCNC: 103 MG/DL (ref 70–110)
HCT VFR BLD AUTO: 30.7 % (ref 40–54)
HGB BLD-MCNC: 9.8 G/DL (ref 14–18)
IMM GRANULOCYTES # BLD AUTO: 0.01 K/UL (ref 0–0.04)
IMM GRANULOCYTES NFR BLD AUTO: 0.2 % (ref 0–0.5)
LYMPHOCYTES # BLD AUTO: 1.2 K/UL (ref 1–4.8)
LYMPHOCYTES NFR BLD: 19.4 % (ref 18–48)
MAGNESIUM SERPL-MCNC: 1.9 MG/DL (ref 1.6–2.6)
MCH RBC QN AUTO: 28.9 PG (ref 27–31)
MCHC RBC AUTO-ENTMCNC: 31.9 G/DL (ref 32–36)
MCV RBC AUTO: 91 FL (ref 82–98)
MONOCYTES # BLD AUTO: 0.7 K/UL (ref 0.3–1)
MONOCYTES NFR BLD: 12.2 % (ref 4–15)
NEUTROPHILS # BLD AUTO: 3.8 K/UL (ref 1.8–7.7)
NEUTROPHILS NFR BLD: 63.5 % (ref 38–73)
NRBC BLD-RTO: 0 /100 WBC
PHOSPHATE SERPL-MCNC: 3.5 MG/DL (ref 2.7–4.5)
PLATELET # BLD AUTO: 296 K/UL (ref 150–350)
PMV BLD AUTO: 9.4 FL (ref 9.2–12.9)
POTASSIUM SERPL-SCNC: 4.3 MMOL/L (ref 3.5–5.1)
RBC # BLD AUTO: 3.39 M/UL (ref 4.6–6.2)
SODIUM SERPL-SCNC: 143 MMOL/L (ref 136–145)
WBC # BLD AUTO: 5.97 K/UL (ref 3.9–12.7)

## 2020-02-19 PROCEDURE — 94799 UNLISTED PULMONARY SVC/PX: CPT

## 2020-02-19 PROCEDURE — 99900035 HC TECH TIME PER 15 MIN (STAT)

## 2020-02-19 PROCEDURE — 63600175 PHARM REV CODE 636 W HCPCS: Performed by: COLON & RECTAL SURGERY

## 2020-02-19 PROCEDURE — 85025 COMPLETE CBC W/AUTO DIFF WBC: CPT

## 2020-02-19 PROCEDURE — 83735 ASSAY OF MAGNESIUM: CPT

## 2020-02-19 PROCEDURE — 25000003 PHARM REV CODE 250: Performed by: COLON & RECTAL SURGERY

## 2020-02-19 PROCEDURE — 21400001 HC TELEMETRY ROOM

## 2020-02-19 PROCEDURE — 11000001 HC ACUTE MED/SURG PRIVATE ROOM

## 2020-02-19 PROCEDURE — 36415 COLL VENOUS BLD VENIPUNCTURE: CPT

## 2020-02-19 PROCEDURE — 80048 BASIC METABOLIC PNL TOTAL CA: CPT

## 2020-02-19 PROCEDURE — 84100 ASSAY OF PHOSPHORUS: CPT

## 2020-02-19 RX ORDER — ACETAMINOPHEN 325 MG/1
650 TABLET ORAL EVERY 6 HOURS
Status: DISCONTINUED | OUTPATIENT
Start: 2020-02-19 | End: 2020-02-20 | Stop reason: HOSPADM

## 2020-02-19 RX ADMIN — PIPERACILLIN AND TAZOBACTAM 4.5 G: 4; .5 INJECTION, POWDER, LYOPHILIZED, FOR SOLUTION INTRAVENOUS; PARENTERAL at 11:02

## 2020-02-19 RX ADMIN — PIPERACILLIN AND TAZOBACTAM 4.5 G: 4; .5 INJECTION, POWDER, LYOPHILIZED, FOR SOLUTION INTRAVENOUS; PARENTERAL at 08:02

## 2020-02-19 RX ADMIN — OXYCODONE HYDROCHLORIDE 10 MG: 5 TABLET ORAL at 06:02

## 2020-02-19 RX ADMIN — ENOXAPARIN SODIUM 40 MG: 100 INJECTION SUBCUTANEOUS at 04:02

## 2020-02-19 RX ADMIN — PIPERACILLIN AND TAZOBACTAM 4.5 G: 4; .5 INJECTION, POWDER, LYOPHILIZED, FOR SOLUTION INTRAVENOUS; PARENTERAL at 02:02

## 2020-02-19 RX ADMIN — ACETAMINOPHEN 1000 MG: 10 INJECTION, SOLUTION INTRAVENOUS at 06:02

## 2020-02-19 RX ADMIN — CHLORHEXIDINE GLUCONATE 0.12% ORAL RINSE 10 ML: 1.2 LIQUID ORAL at 08:02

## 2020-02-19 RX ADMIN — ACETAMINOPHEN 650 MG: 325 TABLET ORAL at 09:02

## 2020-02-19 RX ADMIN — CHLORHEXIDINE GLUCONATE 0.12% ORAL RINSE 10 ML: 1.2 LIQUID ORAL at 09:02

## 2020-02-19 RX ADMIN — PANTOPRAZOLE SODIUM 40 MG: 40 TABLET, DELAYED RELEASE ORAL at 06:02

## 2020-02-19 RX ADMIN — ACETAMINOPHEN 1000 MG: 10 INJECTION, SOLUTION INTRAVENOUS at 01:02

## 2020-02-19 RX ADMIN — MORPHINE SULFATE 45 MG: 30 TABLET, FILM COATED, EXTENDED RELEASE ORAL at 09:02

## 2020-02-19 NOTE — SUBJECTIVE & OBJECTIVE
Interval History: no complaints. +flatus through ostomy. No nausea. Tolerating diet. Pain well controlled.     Medications:  Continuous Infusions:   lactated ringers 125 mL/hr at 02/18/20 1704     Scheduled Meds:   acetaminophen  1,000 mg Intravenous Q8H    chlorhexidine  10 mL Mouth/Throat BID    enoxaparin  40 mg Subcutaneous Daily    morphine  45 mg Oral Nightly    nozaseptin   Each Nostril BID    pantoprazole  40 mg Oral Before breakfast    piperacillin-tazobactam (ZOSYN) IVPB  4.5 g Intravenous Q8H     PRN Meds:diphenhydrAMINE, ondansetron, oxyCODONE, oxyCODONE     Review of patient's allergies indicates:  No Known Allergies  Objective:     Vital Signs (Most Recent):  Temp: 97.9 °F (36.6 °C) (02/19/20 0754)  Pulse: 61 (02/19/20 0754)  Resp: 18 (02/19/20 0754)  BP: 111/70 (02/19/20 0754)  SpO2: 99 % (02/19/20 0754) Vital Signs (24h Range):  Temp:  [97.6 °F (36.4 °C)-100.1 °F (37.8 °C)] 97.9 °F (36.6 °C)  Pulse:  [] 61  Resp:  [13-18] 18  SpO2:  [94 %-100 %] 99 %  BP: (100-144)/(65-85) 111/70     Weight: 71.2 kg (157 lb 1.2 oz)  Body mass index is 21.3 kg/m².    Intake/Output - Last 3 Shifts       02/17 0700 - 02/18 0659 02/18 0700 - 02/19 0659 02/19 0700 - 02/20 0659    I.V. (mL/kg)  2720.8 (38.2)     IV Piggyback  450     Total Intake(mL/kg)  3170.8 (44.5)     Urine (mL/kg/hr)  300     Stool  10     Blood  5     Total Output  315     Net  +2855.8            Urine Occurrence  3 x     Stool Occurrence  0 x           Physical Exam   Constitutional: He appears well-developed and well-nourished.   HENT:   Head: Normocephalic and atraumatic.   Eyes: EOM are normal.   Cardiovascular: Normal rate and regular rhythm.   Pulmonary/Chest: Effort normal. No respiratory distress.   Abdominal: Soft. There is tenderness (incisional ttp).   Surgical incisions c/d/i. Ostomy pink, mild edema. Air in bag   Musculoskeletal: Normal range of motion.   Skin: Skin is warm and dry.   Psychiatric: He has a normal mood and  affect. Thought content normal.   Vitals reviewed.      Significant Labs:  CBC:   Recent Labs   Lab 02/19/20  0435   WBC 5.97   RBC 3.39*   HGB 9.8*   HCT 30.7*      MCV 91   MCH 28.9   MCHC 31.9*     CMP:   Recent Labs   Lab 02/17/20  0755 02/19/20 0435   * 103   CALCIUM 9.4 8.8   ALBUMIN 3.3*  --    PROT 7.5  --     143   K 4.1 4.3   CO2 27 28    107   BUN 12 9   CREATININE 0.8 0.8   ALKPHOS 81  --    ALT 8*  --    AST 12  --    BILITOT 0.3  --        Significant Diagnostics:  I have reviewed all pertinent imaging results/findings within the past 24 hours.

## 2020-02-19 NOTE — PROGRESS NOTES
Pt was referred to [facility] by Dr. Cosby for emotional support. It was determined that pt had a ruptured rectum and may need emergency surgery. SW met with pt as we both awaited for Dr. Cosby to be consulted by the surgeon. Pt stated that he is patiently waiting a response but he does have other things to do. SW inquired on the status of his wife surgery, pt stated that she was doing well and had been discharged the next day. Pt stated that he will have to call his wife and job to inform them of his status. SW stepped away and allowed pt to place his phone calls.

## 2020-02-19 NOTE — PLAN OF CARE
POC reviewed, including indications and possible side effects of administered medications. Patient verbalized understanding and teach back. No adverse reactions noted. PIV, Fluids discontinued. Patient c/o abdominal pain. Administered medications per order. Incisions remain CDI. Tolerating diet well. Denies n/v. NSR on heart monitor. VSS. Patient remains free of falls and injuries during shift. Will continue to monitor.

## 2020-02-19 NOTE — PLAN OF CARE
Met with patient and mother. Patient is independent with adls and iadls. He has no home health equipment.  Patient lives with his significant other, Adelaida and 3 dependent children.   Patient has a diagnosis of rectal carcinoma and was admitted for fecal diversion with colostomy.  Patient is aware that Dr. New has requested home health on discharge.  Names of covered agencies given to patient.  He wants to utilize the  agency that is currently visiting his s.o., Adelaida.  Patient was unable to contact Adelaida.  CM will follow up tomorrow.  Updated white board with 's name and number. Transitional Care Folder, Discharge Planning Begins on Admission pamphlet, [facility], Advance Directive information given to patient along with the contact information given.Instructed patient or family to call with any questions or concerns.       02/19/20 4820   Discharge Assessment   Assessment Type Discharge Planning Assessment   Confirmed/corrected address and phone number on facesheet? Yes   Assessment information obtained from? Patient   Expected Length of Stay (days)   (2)   Communicated expected length of stay with patient/caregiver yes   Prior to hospitilization cognitive status: Alert/Oriented   Prior to hospitalization functional status: Independent   Current cognitive status: Alert/Oriented   Current Functional Status: Independent   Lives With significant other;child(gustavo), dependent   Able to Return to Prior Arrangements yes   Is patient able to care for self after discharge? Yes   Who are your caregiver(s) and their phone number(s)? Adelaida Presley; significant other [phone]   Patient's perception of discharge disposition home health   Readmission Within the Last 30 Days no previous admission in last 30 days   Patient currently being followed by outpatient case management? No   Patient currently receives any other outside agency services? No   Equipment Currently Used at Home  none   Do you have any problems affording any of your prescribed medications? No   Is the patient taking medications as prescribed? yes   Does the patient have transportation home? Yes   Transportation Anticipated family or friend will provide   Does the patient receive services at the [facility]? No   Discharge Plan A Home Health   DME Needed Upon Discharge  none   Patient/Family in Agreement with Plan yes

## 2020-02-19 NOTE — PLAN OF CARE
Problem: Adult Inpatient Plan of Care  Goal: Plan of Care Review  Outcome: Ongoing, Progressing     POC reviewed, including indications and possible side effects of administered medications. Patient verbalized understanding and teach back. No adverse reactions noted. Patient c/o abdominal pain. Administered medications per order. Colostomy care performed. Incisions remain CDI. Tolerating diet well. Denies n/v. NSR on heart monitor. VSS. NADN. Patient remains free of falls and injuries during shift. Will continue to monitor.    Chart check complete.

## 2020-02-19 NOTE — PROGRESS NOTES
[facility] -   General Surgery  Progress Note    Subjective:     History of Present Illness:  54-year-old male with rectal adenocarcinoma who has completed neoadjuvant chemo radiation and his currently on neoadjuvant chemotherapy for his COLLINS approach who was seen by his oncologist over the last 2 days with increasing pelvic pain near his coccyx who underwent a CT scan concerning for likely rectal perforation near his tumor into the sidewall.  He presents to the hospital for discussion for diversion of his stool via colostomy to complete his treatment and to allow for future surgical planning and to prevent sepsis given his perforation.  He currently endorses only pelvic pain as well as deep suprapubic pain.  He denies any fever, chills, nausea, vomiting.    Post-Op Info:  Procedure(s) (LRB):  CREATION, COLOSTOMY, LAPAROSCOPIC (N/A)  BLOCK, TRANSVERSUS ABDOMINIS PLANE (N/A)   1 Day Post-Op     Interval History: no complaints. +flatus through ostomy. No nausea. Tolerating diet. Pain well controlled.     Medications:  Continuous Infusions:   lactated ringers 125 mL/hr at 02/18/20 1704     Scheduled Meds:   acetaminophen  1,000 mg Intravenous Q8H    chlorhexidine  10 mL Mouth/Throat BID    enoxaparin  40 mg Subcutaneous Daily    morphine  45 mg Oral Nightly    nozaseptin   Each Nostril BID    pantoprazole  40 mg Oral Before breakfast    piperacillin-tazobactam (ZOSYN) IVPB  4.5 g Intravenous Q8H     PRN Meds:diphenhydrAMINE, ondansetron, oxyCODONE, oxyCODONE     Review of patient's allergies indicates:  No Known Allergies  Objective:     Vital Signs (Most Recent):  Temp: 97.9 °F (36.6 °C) (02/19/20 0754)  Pulse: 61 (02/19/20 0754)  Resp: 18 (02/19/20 0754)  BP: 111/70 (02/19/20 0754)  SpO2: 99 % (02/19/20 0754) Vital Signs (24h Range):  Temp:  [97.6 °F (36.4 °C)-100.1 °F (37.8 °C)] 97.9 °F (36.6 °C)  Pulse:  [] 61  Resp:  [13-18] 18  SpO2:  [94 %-100 %] 99 %  BP: (100-144)/(65-85) 111/70      Weight: 71.2 kg (157 lb 1.2 oz)  Body mass index is 21.3 kg/m².    Intake/Output - Last 3 Shifts       02/17 0700 - 02/18 0659 02/18 0700 - 02/19 0659 02/19 0700 - 02/20 0659    I.V. (mL/kg)  2720.8 (38.2)     IV Piggyback  450     Total Intake(mL/kg)  3170.8 (44.5)     Urine (mL/kg/hr)  300     Stool  10     Blood  5     Total Output  315     Net  +2855.8            Urine Occurrence  3 x     Stool Occurrence  0 x           Physical Exam   Constitutional: He appears well-developed and well-nourished.   HENT:   Head: Normocephalic and atraumatic.   Eyes: EOM are normal.   Cardiovascular: Normal rate and regular rhythm.   Pulmonary/Chest: Effort normal. No respiratory distress.   Abdominal: Soft. There is tenderness (incisional ttp).   Surgical incisions c/d/i. Ostomy pink, mild edema. Air in bag   Musculoskeletal: Normal range of motion.   Skin: Skin is warm and dry.   Psychiatric: He has a normal mood and affect. Thought content normal.   Vitals reviewed.      Significant Labs:  CBC:   Recent Labs   Lab 02/19/20  0435   WBC 5.97   RBC 3.39*   HGB 9.8*   HCT 30.7*      MCV 91   MCH 28.9   MCHC 31.9*     CMP:   Recent Labs   Lab 02/17/20  0755 02/19/20  0435   * 103   CALCIUM 9.4 8.8   ALBUMIN 3.3*  --    PROT 7.5  --     143   K 4.1 4.3   CO2 27 28    107   BUN 12 9   CREATININE 0.8 0.8   ALKPHOS 81  --    ALT 8*  --    AST 12  --    BILITOT 0.3  --        Significant Diagnostics:  I have reviewed all pertinent imaging results/findings within the past 24 hours.    Assessment/Plan:     Rectal adenocarcinoma  54-year-old male with rectal adenocarcinoma who has completed neoadjuvant chemo radiation and is currently on neoadjuvant chemotherapy for a combined COLLINS approach who has evidence of rectal perforation at his tumor site into the sidewall on CT scan who presents for fecal diversion    POD1   S/p laparoscopic creation of colostomy   OOB, ambulate   Regular diet  IV antibiotics 2/2  perforation  wound care consult for ostomy teaching   consult      Smoker  Discuss smoking cessation        Meghan Ramachandran PA-C  General Surgery  [facility] [place]

## 2020-02-19 NOTE — ASSESSMENT & PLAN NOTE
54-year-old male with rectal adenocarcinoma who has completed neoadjuvant chemo radiation and is currently on neoadjuvant chemotherapy for a combined COLLINS approach who has evidence of rectal perforation at his tumor site into the sidewall on CT scan who presents for fecal diversion    POD1   S/p laparoscopic creation of colostomy   OOB, ambulate   Regular diet  IV antibiotics 2/2 perforation  wound care consult for ostomy teaching   consult

## 2020-02-19 NOTE — CONSULTS
"Consulted on Mr. Presley for new colostomy education and management. He is POD 1 diverting loop colostomy due to rectal perforation. He is awake and alert, denies pain. He reports ambulating in hallways this morning. He is anxious to go home. He is seen with s/o at bedside.  New colostomy education initiated including how/when to empty pouch, how/when to change pouch, dietary, hygiene, and activity guidelines. Printed educational material provided. Abdomen assessed. Surgical pouch intact to LLQ colostomy with small amount bowel sweat noted in pouch. Surgical pouch removed. Stoma and luiz stomal skin cleansed gently with water and gauze. Patted dry. Stoma is moist, red, mildly engorged. Measures 35mm. Luiz wound skin sprayed with cavilon barrier film and allowed to dry. One piece moldable ring applied around stoma. One piece flat nell pouch cut to size and applied to LLQ colostomy, pressing to seal. patient assisted with appliance change and educated during change. Plans made to re-visit tomorrow and patient to change his own pouch at that time. Received verbal consent to enroll x3 in sample programs. He will benefit from  for continued ostomy management after discharge.     Please review Yuliana's procedure "Pouching : Colostomy or Ileostomy" for instructions on ostomy.stoma care. Change ostomy appliance weekly or IMMEDIATELY IF LEAKING. All ostomy care supplies can be requested from materials management.    OSTOMY CARE SUPPLIES:  One Piece Flat Pierpont Pouch #[id number]   Brava Stoma Ring #[id number]   Cavilon Spray #[id number]            "

## 2020-02-20 VITALS
HEART RATE: 77 BPM | DIASTOLIC BLOOD PRESSURE: 85 MMHG | HEIGHT: 72 IN | BODY MASS INDEX: 21.27 KG/M2 | RESPIRATION RATE: 20 BRPM | OXYGEN SATURATION: 100 % | SYSTOLIC BLOOD PRESSURE: 128 MMHG | WEIGHT: 157.06 LBS | TEMPERATURE: 98 F

## 2020-02-20 LAB
ANION GAP SERPL CALC-SCNC: 10 MMOL/L (ref 8–16)
BASOPHILS # BLD AUTO: 0.04 K/UL (ref 0–0.2)
BASOPHILS NFR BLD: 0.9 % (ref 0–1.9)
BUN SERPL-MCNC: 7 MG/DL (ref 6–20)
CALCIUM SERPL-MCNC: 8.8 MG/DL (ref 8.7–10.5)
CHLORIDE SERPL-SCNC: 107 MMOL/L (ref 95–110)
CO2 SERPL-SCNC: 24 MMOL/L (ref 23–29)
CREAT SERPL-MCNC: 0.8 MG/DL (ref 0.5–1.4)
DIFFERENTIAL METHOD: ABNORMAL
EOSINOPHIL # BLD AUTO: 0.3 K/UL (ref 0–0.5)
EOSINOPHIL NFR BLD: 6.9 % (ref 0–8)
ERYTHROCYTE [DISTWIDTH] IN BLOOD BY AUTOMATED COUNT: 14.1 % (ref 11.5–14.5)
EST. GFR  (AFRICAN AMERICAN): >60 ML/MIN/1.73 M^2
EST. GFR  (NON AFRICAN AMERICAN): >60 ML/MIN/1.73 M^2
GLUCOSE SERPL-MCNC: 114 MG/DL (ref 70–110)
HCT VFR BLD AUTO: 30 % (ref 40–54)
HGB BLD-MCNC: 9.4 G/DL (ref 14–18)
IMM GRANULOCYTES # BLD AUTO: 0.01 K/UL (ref 0–0.04)
IMM GRANULOCYTES NFR BLD AUTO: 0.2 % (ref 0–0.5)
LYMPHOCYTES # BLD AUTO: 1.1 K/UL (ref 1–4.8)
LYMPHOCYTES NFR BLD: 24.4 % (ref 18–48)
MAGNESIUM SERPL-MCNC: 2.1 MG/DL (ref 1.6–2.6)
MCH RBC QN AUTO: 28.2 PG (ref 27–31)
MCHC RBC AUTO-ENTMCNC: 31.3 G/DL (ref 32–36)
MCV RBC AUTO: 90 FL (ref 82–98)
MONOCYTES # BLD AUTO: 0.6 K/UL (ref 0.3–1)
MONOCYTES NFR BLD: 13.8 % (ref 4–15)
NEUTROPHILS # BLD AUTO: 2.4 K/UL (ref 1.8–7.7)
NEUTROPHILS NFR BLD: 53.8 % (ref 38–73)
NRBC BLD-RTO: 0 /100 WBC
PHOSPHATE SERPL-MCNC: 3.3 MG/DL (ref 2.7–4.5)
PLATELET # BLD AUTO: 297 K/UL (ref 150–350)
PMV BLD AUTO: 9.2 FL (ref 9.2–12.9)
POTASSIUM SERPL-SCNC: 3.9 MMOL/L (ref 3.5–5.1)
RBC # BLD AUTO: 3.33 M/UL (ref 4.6–6.2)
SODIUM SERPL-SCNC: 141 MMOL/L (ref 136–145)
WBC # BLD AUTO: 4.5 K/UL (ref 3.9–12.7)

## 2020-02-20 PROCEDURE — 63600175 PHARM REV CODE 636 W HCPCS: Performed by: COLON & RECTAL SURGERY

## 2020-02-20 PROCEDURE — 83735 ASSAY OF MAGNESIUM: CPT

## 2020-02-20 PROCEDURE — 80048 BASIC METABOLIC PNL TOTAL CA: CPT

## 2020-02-20 PROCEDURE — 84100 ASSAY OF PHOSPHORUS: CPT

## 2020-02-20 PROCEDURE — 36415 COLL VENOUS BLD VENIPUNCTURE: CPT

## 2020-02-20 PROCEDURE — 25000003 PHARM REV CODE 250: Performed by: COLON & RECTAL SURGERY

## 2020-02-20 PROCEDURE — 99900035 HC TECH TIME PER 15 MIN (STAT)

## 2020-02-20 PROCEDURE — 85025 COMPLETE CBC W/AUTO DIFF WBC: CPT

## 2020-02-20 RX ORDER — AMOXICILLIN AND CLAVULANATE POTASSIUM 875; 125 MG/1; MG/1
1 TABLET, FILM COATED ORAL 2 TIMES DAILY
Qty: 20 TABLET | Refills: 0 | Status: ON HOLD | OUTPATIENT
Start: 2020-02-20 | End: 2020-03-04 | Stop reason: HOSPADM

## 2020-02-20 RX ADMIN — CHLORHEXIDINE GLUCONATE 0.12% ORAL RINSE 10 ML: 1.2 LIQUID ORAL at 08:02

## 2020-02-20 RX ADMIN — PIPERACILLIN AND TAZOBACTAM 4.5 G: 4; .5 INJECTION, POWDER, LYOPHILIZED, FOR SOLUTION INTRAVENOUS; PARENTERAL at 08:02

## 2020-02-20 RX ADMIN — PANTOPRAZOLE SODIUM 40 MG: 40 TABLET, DELAYED RELEASE ORAL at 05:02

## 2020-02-20 NOTE — DISCHARGE SUMMARY
[facility] -   General Surgery  Discharge Summary      Patient Name: Sukhjinder Presley  MRN: [id number]  Admission Date: 2/18/2020  Hospital Length of Stay: 2 days  Discharge Date and Time: 2/20/2020  1:09 PM  Attending Physician: No att. providers found   Discharging Provider: Meghan Ramachandran PA-C  Primary Care Provider: Drake Elizalde MD    HPI:   54-year-old male with rectal adenocarcinoma who has completed neoadjuvant chemo radiation and his currently on neoadjuvant chemotherapy for his COLLINS approach who was seen by his oncologist over the last 2 days with increasing pelvic pain near his coccyx who underwent a CT scan concerning for likely rectal perforation near his tumor into the sidewall.  He presents to the hospital for discussion for diversion of his stool via colostomy to complete his treatment and to allow for future surgical planning and to prevent sepsis given his perforation.  He currently endorses only pelvic pain as well as deep suprapubic pain.  He denies any fever, chills, nausea, vomiting.    Procedure(s) (LRB):  CREATION, COLOSTOMY, LAPAROSCOPIC (N/A)  BLOCK, TRANSVERSUS ABDOMINIS PLANE (N/A)      Indwelling Lines/Drains at time of discharge:   Lines/Drains/Airways     Central Venous Catheter Line                 Port A Cath Single Lumen 10/08/19 left subclavian 135 days          Drain                 Colostomy 02/18/20 1410 RLQ 2 days              Hospital Course: No notes on file   He underwent laparoscopic colostomyc reation. On POD1 he was tolerating a regular diet nad his pain was controlled. Ostomy Nurse was consulted for teaching. On POD2 he was having bowel function through ostomy. Pain was controlled. He was examined and found to be fit for discharge after ostomy teaching with home health care service.     Consults:   Consults (From admission, onward)        Status Ordering Provider     IP consult to case management  Once     Provider:  (Not yet assigned)    Acknowledged KIRSTEN,  LYNSEY WALLACE          Significant Diagnostic Studies: Labs:   BMP:   Recent Labs   Lab 02/19/20  0435 02/20/20  0534    114*    141   K 4.3 3.9    107   CO2 28 24   BUN 9 7   CREATININE 0.8 0.8   CALCIUM 8.8 8.8   MG 1.9 2.1    and CBC   Recent Labs   Lab 02/19/20  0435 02/20/20  0535   WBC 5.97 4.50   HGB 9.8* 9.4*   HCT 30.7* 30.0*    297       Pending Diagnostic Studies:     None        Final Active Diagnoses:    Diagnosis Date Noted POA    PRINCIPAL PROBLEM:  Rectal adenocarcinoma [C20] 02/18/2020 Yes    Smoker [F17.200] 05/27/2019 Yes      Problems Resolved During this Admission:      Discharged Condition: good    Disposition: Home Health Service    Follow Up:  Follow-up Information     Lynsey New MD In 2 weeks.    Specialty:  Colon and Rectal Surgery  Why:  post op  Contact information:  [id number] [place] [address] [address] [place] [address] [phone]             [facility].    Specialty:  Home Health Services  Why:  Home Health: Agency will call and set and appointment to visit.  Contact information:  [address] [facility]  [place] [address] [phone]                 Patient Instructions:      OSTOMY SUPPLIES FOR HOME USE   Order Comments: OSTOMY CARE SUPPLIES:  Coloplast  Sensura Paras One Piece Pouch #[id number]  Brava Moldable Ring #[id number]  Brava Skin Barrier Spray #[id number]  Ostomy Scissors #[id number]   Brava adhesive remover spray #[id number]  Brava lubricating deodorant #[id number]     Order Specific Question Answer Comments   Height: 6' (1.829 m)    Weight: 71.2 kg (157 lb 1.2 oz)    Length of need (1-99 months): 99    Diagnosis Colostomy    Does patient have medical equipment at home? none    Vendor: Other (use comments) [employer]    Expected Date of Delivery: 2/20/2020      Diet Adult Regular     Lifting restrictions   Order Comments: 20lb limit     No driving until:   Order Comments: No longer taking narcotic pain medication     Notify your health care provider if  you experience any of the following:  temperature >100.4     Notify your health care provider if you experience any of the following:  persistent nausea and vomiting or diarrhea     Notify your health care provider if you experience any of the following:  severe uncontrolled pain     Notify your health care provider if you experience any of the following:  redness, tenderness, or signs of infection (pain, swelling, redness, odor or green/yellow discharge around incision site)     Notify your health care provider if you experience any of the following:  difficulty breathing or increased cough     Activity as tolerated     Medications:  Reconciled Home Medications:      Medication List      START taking these medications    amoxicillin-clavulanate 875-125mg 875-125 mg per tablet  Commonly known as:  AUGMENTIN  Take 1 tablet by mouth 2 (two) times daily. for 12 days     nozaseptin  nasal   Commonly known as:  NOZIN  1 each by Each Nostril route 2 (two) times daily.        CONTINUE taking these medications    acetaminophen 325 MG tablet  Commonly known as:  TYLENOL  Take 1 tablet (325 mg total) by mouth every 6 (six) hours as needed for Pain.     aspirin 81 MG Chew  Take 81 mg by mouth once daily.     capecitabine 500 MG Tab  Commonly known as:  XELODA  Take 3 tablets (1500 mg) by mouth twice daily after breakfast and dinner on days of radiation only.     diphenoxylate-atropine 2.5-0.025 mg 2.5-0.025 mg per tablet  Commonly known as:  LOMOTIL  Take 1 to 2 tablets by mouth 4 (four) times daily as needed for Diarrhea.     dronabinol 10 MG capsule  Commonly known as:  MARINOL  Take 1 capsule (10 mg total) by mouth 2 (two) times daily before meals.     iron fum-B12-IF-C-folic acid 110-0.5 mg capsule  Commonly known as:  FOLTRIN  Take 1 capsule by mouth 2 (two) times daily.     ketorolac 10 mg tablet  Commonly known as:  TORADOL  Take 1 tablet (10 mg total) by mouth every 6 (six) hours as needed for Pain.      lidocaine 5 % Oint ointment  Commonly known as:  XYLOCAINE  Apply topically as needed.     magic mouthwash diphen/antac/lidoc  Swish and spit 15 ml by mouth every 4 hours as needed     megestrol 40 MG Tab  Commonly known as:  MEGACE  Take 1 tablet (40 mg total) by mouth 2 (two) times daily.     morphine 15 MG 12 hr tablet  Commonly known as:  MS CONTIN  Take 3 tablets (45 mg total) by mouth nightly.     nicotine 21 mg/24 hr  Commonly known as:  NICODERM CQ  Place 1 patch onto the skin once daily.     ondansetron 8 MG Tbdl  Commonly known as:  ZOFRAN-ODT  Take 1 tablet (8 mg total) by mouth every 12 (twelve) hours as needed.     oxyCODONE 10 mg Tab immediate release tablet  Commonly known as:  ROXICODONE  Take 1 tablet (10 mg total) by mouth every 8 (eight) hours as needed for Pain (medically necessary).     pramoxine 1 % Foam  Commonly known as:  Proctofoam  Place rectally 3 (three) times daily as needed.     prochlorperazine 10 MG tablet  Commonly known as:  COMPAZINE  Take 1 tablet (10 mg total) by mouth every 6 (six) hours as needed.     promethazine 25 MG tablet  Commonly known as:  PHENERGAN  Take 1 tablet (25 mg total) by mouth every 4 (four) hours.     silver sulfADIAZINE 1% 1 % cream  Commonly known as:  SILVADENE  Apply to affected area daily     temazepam 7.5 MG Cap  Commonly known as:  RESTORIL  Take 2 capsules (15 mg total) by mouth nightly as needed.     traMADol 50 mg tablet  Commonly known as:  ULTRAM  Take 1 tablet (50 mg total) by mouth every 6 (six) hours as needed for Pain.     varenicline 1 mg Tab  Commonly known as:  CHANTIX  Take 1 tablet (1 mg total) by mouth 2 (two) times daily.          Time spent on the discharge of patient: 20 minutes    Meghan Ramachandran PA-C  General Surgery  [facility]

## 2020-02-20 NOTE — PLAN OF CARE
Preference signed for [facility].  Referral and orders sent via [facility].       02/20/20 1156   Post-Acute Status   Post-Acute Authorization Placement   Post-Acute Placement Status Set-up Complete

## 2020-02-20 NOTE — NURSING
Pt refused tylenol dose    Waiting on augmentin rx to be delivered to [facility], called [facility] said it will be up shortly

## 2020-02-20 NOTE — PROGRESS NOTES
Follow up visit with Mr. Presley for continued new colostomy education and management. He is awaiting discharge today. He will be discharged to home with HH - set up by case management. Discussed with patient how to get home ostomy supplies once HH has discharged him. He is being discharged today with a box of one piece flat DESIREE pouches, box of moldable rings, bottle of cavilon spray. Reviewed all prior ostomy education using teach back method. Patient has independently emptied pouch. Pouch currently is intact with no leak. Moderate amount mushy brown stool noted. Discussed prevention of pancaking if stool becomes more solid. He is enrolled in sample programs x3.

## 2020-02-20 NOTE — NURSING
Received oprders to discharge pt, once home health is arranged he will be good to go!    D/c''ing iv per policy, vs wnl, ostomy supplies at [facility], independent with care, nad, remains injury free, chart check compelte will cont to monitor until discharge    Cardiac monitoring removed

## 2020-02-20 NOTE — PLAN OF CARE
Remains free from harm, pain controlled via PO pain meds, positions self, self care with ostomy, no acute distress noted. 24 hour chart check complete.

## 2020-02-21 ENCOUNTER — TUMOR BOARD CONFERENCE (OUTPATIENT)
Dept: HEMATOLOGY/ONCOLOGY | Facility: CLINIC | Age: 55
End: 2020-02-21

## 2020-02-21 PROCEDURE — G0180 MD CERTIFICATION HHA PATIENT: HCPCS | Mod: ,,, | Performed by: COLON & RECTAL SURGERY

## 2020-02-21 PROCEDURE — G0180 PR HOME HEALTH MD CERTIFICATION: ICD-10-PCS | Mod: ,,, | Performed by: COLON & RECTAL SURGERY

## 2020-02-21 NOTE — PROGRESS NOTES
Tumor Board Documentation      Sukhjinder Presley was presented by Dr. Cosby at our Tumor Board on 2/21/2020, which included representatives from (P) Medical Oncology, Radiation Oncology, Surgical Oncology, Pathology, Navigation, Research, Radiology, Gastrointestinal.    Sukhjinder currently presents as (P) a current patient with (P) Rectal cancer, with history of the following treatments: (P) Neoadjuvant Radiation, Neoadjuvant Chemotherapy, Surgical Intervention(s).    Additionally, we reviewed previous medical and familial history, history of present illness, and recent lab results along with all available histopathologic and imaging studies. The tumor board considered available treatment options and made the following recommendations:  3-4 weeks re-stage with MRI, CT >if no metastatic disease proceed with potential surgical resection  (P) Surgery       The following procedures/referrals were also placed: No orders of the defined types were placed in this encounter.      Clinical Trial Status: (P) Not discussed     National site-specific guidelines were discussed with respect to the case.    Tumor board is a meeting of clinicians from various specialty areas who evaluate and discuss patients for whom a multidisciplinary approach is being considered. Final determinations in the plan of care are those of the provider(s). The responsibility for follow up of recommendations given during tumor board is that of the provider.     Hailey Carbajal RN

## 2020-02-22 NOTE — PLAN OF CARE
02/21/20 1902   Final Note   Assessment Type Final Discharge Note   Anticipated Discharge Disposition Home-Health   Right Care Referral Info   Post Acute Recommendation Home-care   Facility Name [facility]

## 2020-02-24 ENCOUNTER — OFFICE VISIT (OUTPATIENT)
Dept: HEMATOLOGY/ONCOLOGY | Facility: CLINIC | Age: 55
End: 2020-02-24
Payer: COMMERCIAL

## 2020-02-24 ENCOUNTER — PATIENT MESSAGE (OUTPATIENT)
Dept: HEMATOLOGY/ONCOLOGY | Facility: CLINIC | Age: 55
End: 2020-02-24

## 2020-02-24 VITALS
TEMPERATURE: 99 F | RESPIRATION RATE: 18 BRPM | DIASTOLIC BLOOD PRESSURE: 71 MMHG | OXYGEN SATURATION: 99 % | BODY MASS INDEX: 20.84 KG/M2 | HEART RATE: 91 BPM | HEIGHT: 72 IN | WEIGHT: 153.88 LBS | SYSTOLIC BLOOD PRESSURE: 109 MMHG

## 2020-02-24 DIAGNOSIS — Z93.3 COLOSTOMY IN PLACE: ICD-10-CM

## 2020-02-24 DIAGNOSIS — C20 RECTAL CANCER: ICD-10-CM

## 2020-02-24 DIAGNOSIS — D49.0 COLORECTAL NEOPLASM: ICD-10-CM

## 2020-02-24 DIAGNOSIS — F17.200 SMOKER: ICD-10-CM

## 2020-02-24 DIAGNOSIS — R64 CACHEXIA: Primary | ICD-10-CM

## 2020-02-24 DIAGNOSIS — K62.5 RECTAL BLEEDING: ICD-10-CM

## 2020-02-24 PROCEDURE — 3008F BODY MASS INDEX DOCD: CPT | Mod: CPTII,S$GLB,, | Performed by: INTERNAL MEDICINE

## 2020-02-24 PROCEDURE — 3008F PR BODY MASS INDEX (BMI) DOCUMENTED: ICD-10-PCS | Mod: CPTII,S$GLB,, | Performed by: INTERNAL MEDICINE

## 2020-02-24 PROCEDURE — 99999 PR PBB SHADOW E&M-EST. PATIENT-LVL III: CPT | Mod: PBBFAC,,, | Performed by: INTERNAL MEDICINE

## 2020-02-24 PROCEDURE — 99999 PR PBB SHADOW E&M-EST. PATIENT-LVL III: ICD-10-PCS | Mod: PBBFAC,,, | Performed by: INTERNAL MEDICINE

## 2020-02-24 PROCEDURE — 99214 PR OFFICE/OUTPT VISIT, EST, LEVL IV, 30-39 MIN: ICD-10-PCS | Mod: S$GLB,,, | Performed by: INTERNAL MEDICINE

## 2020-02-24 PROCEDURE — 99214 OFFICE O/P EST MOD 30 MIN: CPT | Mod: S$GLB,,, | Performed by: INTERNAL MEDICINE

## 2020-02-24 RX ORDER — MORPHINE SULFATE 30 MG/1
30 TABLET, FILM COATED, EXTENDED RELEASE ORAL EVERY 8 HOURS
Qty: 90 TABLET | Refills: 0 | Status: SHIPPED | OUTPATIENT
Start: 2020-02-24 | End: 2020-03-16 | Stop reason: SDUPTHER

## 2020-02-24 RX ORDER — OXYCODONE HYDROCHLORIDE 10 MG/1
10 TABLET ORAL EVERY 4 HOURS PRN
Qty: 90 TABLET | Refills: 0 | Status: SHIPPED | OUTPATIENT
Start: 2020-02-24 | End: 2020-03-16 | Stop reason: SDUPTHER

## 2020-02-24 NOTE — PROGRESS NOTES
Subjective:       Patient ID: Sukhjinder Presley is a 54 y.o. male.    Chief Complaint: Follow-up; Results; Rectal Cancer; and Pain    HPI 54-year-old male recently admitted to the hospital for diverting colostomy after infection and pelvic region initially patient felt more comfortable but has increasing pelvic pain. Patient presents for further evaluation ECOG status 2    Past Medical History:   Diagnosis Date    Anemia     Cancer      Family History   Problem Relation Age of Onset    Intestinal malrotation Mother     Leukemia Father     No Known Problems Sister     Coronary artery disease Brother     Coronary artery disease Maternal Grandmother     Stomach cancer Maternal Grandfather      Social History     Socioeconomic History    Marital status: Significant Other     Spouse name: Not on file    Number of children: Not on file    Years of education: Not on file    Highest education level: Not on file   Occupational History    Not on file   Social Needs    Financial resource strain: Somewhat hard    Food insecurity:     Worry: Sometimes true     Inability: Sometimes true    Transportation needs:     Medical: No     Non-medical: No   Tobacco Use    Smoking status: Current Every Day Smoker     Packs/day: 1.00     Years: 35.00     Pack years: 35.00     Types: Cigarettes     Start date: 1/2/1984    Smokeless tobacco: Current User     Types: Chew   Substance and Sexual Activity    Alcohol use: Yes     Alcohol/week: 46.0 standard drinks     Types: 42 Cans of beer, 4 Shots of liquor per week     Frequency: 4 or more times a week     Drinks per session: 5 or 6     Binge frequency: Daily or almost daily     Comment: nancie 7, beer daily,  None 72 hours prior to surgery    Drug use: Never    Sexual activity: Yes     Partners: Female     Birth control/protection: None   Lifestyle    Physical activity:     Days per week: 1 day     Minutes per session: 60 min    Stress: Not on file   Relationships     Social connections:     Talks on phone: More than three times a week     Gets together: More than three times a week     Attends Yarsanism service: Not on file     Active member of club or organization: No     Attends meetings of clubs or organizations: Never     Relationship status: Living with partner   Other Topics Concern    Not on file   Social History Narrative    Not on file     Past Surgical History:   Procedure Laterality Date    COLONOSCOPY N/A 6/6/2019    Procedure: COLONOSCOPY;  Surgeon: Anjelica Saavedra MD;  Location: [facility] ENDO;  Service: Endoscopy;  Laterality: N/A;    ESOPHAGOGASTRODUODENOSCOPY N/A 6/6/2019    Procedure: EGD (ESOPHAGOGASTRODUODENOSCOPY);  Surgeon: Anjelica Saavedra MD;  Location: [facility] ENDO;  Service: Endoscopy;  Laterality: N/A;    HERNIA REPAIR  1995    INJECTION OF ANESTHETIC AGENT INTO TISSUE PLANE DEFINED BY TRANSVERSUS ABDOMINIS MUSCLE N/A 2/18/2020    Procedure: BLOCK, TRANSVERSUS ABDOMINIS PLANE;  Surgeon: Jan New MD;  Location: [facility] OR;  Service: General;  Laterality: N/A;    INSERTION OF TUNNELED CENTRAL VENOUS CATHETER (CVC) WITH SUBCUTANEOUS PORT N/A 10/8/2019    Procedure: [id number]-[id number];  Surgeon: Jan New MD;  Location: [facility] OR;  Service: General;  Laterality: N/A;    TONSILLECTOMY         Labs:  Lab Results   Component Value Date    WBC 4.50 02/20/2020    HGB 9.4 (L) 02/20/2020    HCT 30.0 (L) 02/20/2020    MCV 90 02/20/2020     02/20/2020     BMP  Lab Results   Component Value Date     02/20/2020    K 3.9 02/20/2020     02/20/2020    CO2 24 02/20/2020    BUN 7 02/20/2020    CREATININE 0.8 02/20/2020    CALCIUM 8.8 02/20/2020    ANIONGAP 10 02/20/2020    ESTGFRAFRICA >60 02/20/2020    EGFRNONAA >60 02/20/2020     Lab Results   Component Value Date    ALT 8 (L) 02/17/2020    AST 12 02/17/2020    ALKPHOS 81 02/17/2020    BILITOT 0.3 02/17/2020       Lab Results   Component Value Date    IRON 59 02/03/2020    TIBC 250  02/03/2020    FERRITIN 433 (H) 02/03/2020     Lab Results   Component Value Date    LJJMUKLC34 497 09/03/2019     Lab Results   Component Value Date    FOLATE 8.3 09/03/2019     Lab Results   Component Value Date    TSH 1.607 01/15/2019         Review of Systems   Constitutional: Negative for activity change, appetite change, chills, diaphoresis, fatigue, fever and unexpected weight change.   HENT: Negative for congestion, dental problem, drooling, ear discharge, ear pain, facial swelling, hearing loss, mouth sores, nosebleeds, postnasal drip, rhinorrhea, sinus pressure, sneezing, sore throat, tinnitus, trouble swallowing and voice change.    Eyes: Negative for photophobia, pain, discharge, redness, itching and visual disturbance.   Respiratory: Negative for apnea, cough, choking, chest tightness, shortness of breath, wheezing and stridor.    Cardiovascular: Negative for chest pain, palpitations and leg swelling.   Gastrointestinal: Positive for anal bleeding and rectal pain. Negative for abdominal distention, abdominal pain, blood in stool, constipation, diarrhea, nausea and vomiting.        Functioning  colostomy   Endocrine: Negative for cold intolerance, heat intolerance, polydipsia, polyphagia and polyuria.   Genitourinary: Negative for decreased urine volume, difficulty urinating, discharge, dysuria, enuresis, flank pain, frequency, genital sores, hematuria, penile pain, penile swelling, scrotal swelling, testicular pain and urgency.   Musculoskeletal: Negative for arthralgias, back pain, gait problem, joint swelling, myalgias, neck pain and neck stiffness.   Skin: Negative for color change, pallor, rash and wound.   Allergic/Immunologic: Negative for environmental allergies, food allergies and immunocompromised state.   Neurological: Positive for weakness. Negative for dizziness, tremors, seizures, syncope, facial asymmetry, speech difficulty, light-headedness, numbness and headaches.   Hematological:  Negative for adenopathy. Does not bruise/bleed easily.   Psychiatric/Behavioral: Positive for dysphoric mood. Negative for agitation, behavioral problems, confusion, decreased concentration, hallucinations, self-injury, sleep disturbance and suicidal ideas. The patient is nervous/anxious. The patient is not hyperactive.        Objective:      Physical Exam   Constitutional: He is oriented to person, place, and time. He appears cachectic. He has a sickly appearance. He appears ill. He appears distressed.   HENT:   Head: Normocephalic.   Right Ear: External ear normal.   Left Ear: External ear normal.   Nose: Nose normal. Right sinus exhibits no maxillary sinus tenderness and no frontal sinus tenderness. Left sinus exhibits no maxillary sinus tenderness and no frontal sinus tenderness.   Mouth/Throat: Oropharynx is clear and moist. No oropharyngeal exudate.   Eyes: Pupils are equal, round, and reactive to light. EOM and lids are normal. Right eye exhibits no discharge. Left eye exhibits no discharge. Right conjunctiva is not injected. Right conjunctiva has no hemorrhage. Left conjunctiva is not injected. Left conjunctiva has no hemorrhage. No scleral icterus. Right eye exhibits normal extraocular motion. Left eye exhibits normal extraocular motion.   Neck: Normal range of motion. Neck supple. No JVD present. No tracheal deviation present. No thyromegaly present.   Cardiovascular: Normal rate and regular rhythm.   Pulmonary/Chest: Effort normal. No stridor. No respiratory distress.   Abdominal: Soft. He exhibits no mass. There is no hepatosplenomegaly, splenomegaly or hepatomegaly. There is no tenderness.   Musculoskeletal: Normal range of motion. He exhibits no edema or tenderness.   Lymphadenopathy:        Head (right side): No posterior auricular and no occipital adenopathy present.        Head (left side): No posterior auricular and no occipital adenopathy present.     He has no cervical adenopathy.        Right  cervical: No superficial cervical, no deep cervical and no posterior cervical adenopathy present.       Left cervical: No superficial cervical, no deep cervical and no posterior cervical adenopathy present.     He has no axillary adenopathy.        Right: No supraclavicular adenopathy present.        Left: No supraclavicular adenopathy present.   Neurological: He is alert and oriented to person, place, and time. He has normal strength. No cranial nerve deficit. Coordination normal.   Skin: Skin is dry. No rash noted. He is not diaphoretic. No cyanosis or erythema. Nails show no clubbing.   Psychiatric: He has a normal mood and affect. His behavior is normal. Judgment and thought content normal. Cognition and memory are normal.   Vitals reviewed.          Assessment:      1. Cachexia    2. Rectal cancer    3. Colorectal neoplasm    4. Smoker    5. Rectal bleeding           Plan:     Extensive conversation spoke with operative surgeon at this point will escalate pain medicine.  And repeat CT abdomen pelvis to make sure that there is no evidence of more fluid collection initially he felt better after the diverting colostomy but now with increasing pain.  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

## 2020-02-27 ENCOUNTER — TELEPHONE (OUTPATIENT)
Dept: RADIOLOGY | Facility: HOSPITAL | Age: 55
End: 2020-02-27

## 2020-02-27 ENCOUNTER — DOCUMENTATION ONLY (OUTPATIENT)
Dept: HEMATOLOGY/ONCOLOGY | Facility: CLINIC | Age: 55
End: 2020-02-27

## 2020-02-27 NOTE — PROGRESS NOTES
Sw submitted assistance application on today with [employer] financial assistance. Sw will place printout in pt's folder. Sw will reapply in 30 days if patient is not awarded al.

## 2020-02-28 ENCOUNTER — TELEPHONE (OUTPATIENT)
Dept: HEMATOLOGY/ONCOLOGY | Facility: CLINIC | Age: 55
End: 2020-02-28

## 2020-02-28 ENCOUNTER — HOSPITAL ENCOUNTER (OUTPATIENT)
Dept: RADIOLOGY | Facility: HOSPITAL | Age: 55
Discharge: HOME | End: 2020-02-28
Attending: INTERNAL MEDICINE
Payer: COMMERCIAL

## 2020-02-28 ENCOUNTER — OFFICE VISIT (OUTPATIENT)
Dept: HEMATOLOGY/ONCOLOGY | Facility: CLINIC | Age: 55
End: 2020-02-28
Payer: COMMERCIAL

## 2020-02-28 ENCOUNTER — HOSPITAL ENCOUNTER (EMERGENCY)
Facility: HOSPITAL | Age: 55
Discharge: HOME | End: 2020-02-28
Attending: FAMILY MEDICINE
Payer: COMMERCIAL

## 2020-02-28 VITALS
OXYGEN SATURATION: 94 % | BODY MASS INDEX: 21.02 KG/M2 | SYSTOLIC BLOOD PRESSURE: 113 MMHG | HEART RATE: 103 BPM | TEMPERATURE: 102 F | DIASTOLIC BLOOD PRESSURE: 71 MMHG | HEIGHT: 72 IN | WEIGHT: 155.19 LBS | RESPIRATION RATE: 19 BRPM

## 2020-02-28 VITALS
BODY MASS INDEX: 20.88 KG/M2 | OXYGEN SATURATION: 98 % | TEMPERATURE: 100 F | HEART RATE: 93 BPM | HEIGHT: 72 IN | SYSTOLIC BLOOD PRESSURE: 106 MMHG | DIASTOLIC BLOOD PRESSURE: 65 MMHG | WEIGHT: 154.19 LBS | RESPIRATION RATE: 19 BRPM

## 2020-02-28 DIAGNOSIS — D49.0 COLORECTAL NEOPLASM: ICD-10-CM

## 2020-02-28 DIAGNOSIS — M25.552 PAIN OF LEFT HIP JOINT: ICD-10-CM

## 2020-02-28 DIAGNOSIS — R50.9 FEVER, UNSPECIFIED FEVER CAUSE: ICD-10-CM

## 2020-02-28 DIAGNOSIS — R50.9 FEVER: ICD-10-CM

## 2020-02-28 DIAGNOSIS — R64 CACHEXIA: ICD-10-CM

## 2020-02-28 DIAGNOSIS — L98.8 FISTULA: ICD-10-CM

## 2020-02-28 DIAGNOSIS — C20 RECTAL CANCER: Primary | ICD-10-CM

## 2020-02-28 LAB
ALBUMIN SERPL BCP-MCNC: 3 G/DL (ref 3.5–5.2)
ALP SERPL-CCNC: 74 U/L (ref 55–135)
ALT SERPL W/O P-5'-P-CCNC: <5 U/L (ref 10–44)
ANION GAP SERPL CALC-SCNC: 12 MMOL/L (ref 8–16)
AST SERPL-CCNC: 8 U/L (ref 10–40)
BASOPHILS # BLD AUTO: 0.06 K/UL (ref 0–0.2)
BASOPHILS NFR BLD: 0.5 % (ref 0–1.9)
BILIRUB SERPL-MCNC: 0.4 MG/DL (ref 0.1–1)
BILIRUB UR QL STRIP: NEGATIVE
BUN SERPL-MCNC: 11 MG/DL (ref 6–20)
CALCIUM SERPL-MCNC: 9.3 MG/DL (ref 8.7–10.5)
CHLORIDE SERPL-SCNC: 98 MMOL/L (ref 95–110)
CLARITY UR: CLEAR
CO2 SERPL-SCNC: 23 MMOL/L (ref 23–29)
COLOR UR: YELLOW
CREAT SERPL-MCNC: 0.8 MG/DL (ref 0.5–1.4)
DIFFERENTIAL METHOD: ABNORMAL
EOSINOPHIL # BLD AUTO: 0.1 K/UL (ref 0–0.5)
EOSINOPHIL NFR BLD: 0.9 % (ref 0–8)
ERYTHROCYTE [DISTWIDTH] IN BLOOD BY AUTOMATED COUNT: 13.9 % (ref 11.5–14.5)
EST. GFR  (AFRICAN AMERICAN): >60 ML/MIN/1.73 M^2
EST. GFR  (NON AFRICAN AMERICAN): >60 ML/MIN/1.73 M^2
GLUCOSE SERPL-MCNC: 126 MG/DL (ref 70–110)
GLUCOSE UR QL STRIP: NEGATIVE
HCT VFR BLD AUTO: 31.3 % (ref 40–54)
HGB BLD-MCNC: 10.4 G/DL (ref 14–18)
HGB UR QL STRIP: NEGATIVE
IMM GRANULOCYTES # BLD AUTO: 0.06 K/UL (ref 0–0.04)
IMM GRANULOCYTES NFR BLD AUTO: 0.5 % (ref 0–0.5)
INFLUENZA A, MOLECULAR: NEGATIVE
INFLUENZA B, MOLECULAR: NEGATIVE
KETONES UR QL STRIP: NEGATIVE
LACTATE SERPL-SCNC: 1 MMOL/L (ref 0.5–2.2)
LEUKOCYTE ESTERASE UR QL STRIP: NEGATIVE
LYMPHOCYTES # BLD AUTO: 1.4 K/UL (ref 1–4.8)
LYMPHOCYTES NFR BLD: 10.9 % (ref 18–48)
MCH RBC QN AUTO: 28.6 PG (ref 27–31)
MCHC RBC AUTO-ENTMCNC: 33.2 G/DL (ref 32–36)
MCV RBC AUTO: 86 FL (ref 82–98)
MONOCYTES # BLD AUTO: 1 K/UL (ref 0.3–1)
MONOCYTES NFR BLD: 7.9 % (ref 4–15)
NEUTROPHILS # BLD AUTO: 10.2 K/UL (ref 1.8–7.7)
NEUTROPHILS NFR BLD: 79.3 % (ref 38–73)
NITRITE UR QL STRIP: NEGATIVE
NRBC BLD-RTO: 0 /100 WBC
PH UR STRIP: 6 [PH] (ref 5–8)
PLATELET # BLD AUTO: 289 K/UL (ref 150–350)
PMV BLD AUTO: 9.1 FL (ref 9.2–12.9)
POTASSIUM SERPL-SCNC: 3.7 MMOL/L (ref 3.5–5.1)
PROCALCITONIN SERPL IA-MCNC: 0.05 NG/ML
PROT SERPL-MCNC: 7.6 G/DL (ref 6–8.4)
PROT UR QL STRIP: NEGATIVE
RBC # BLD AUTO: 3.64 M/UL (ref 4.6–6.2)
SODIUM SERPL-SCNC: 133 MMOL/L (ref 136–145)
SP GR UR STRIP: <=1.005 (ref 1–1.03)
SPECIMEN SOURCE: NORMAL
URN SPEC COLLECT METH UR: ABNORMAL
UROBILINOGEN UR STRIP-ACNC: NEGATIVE EU/DL
WBC # BLD AUTO: 12.89 K/UL (ref 3.9–12.7)

## 2020-02-28 PROCEDURE — 99999 PR PBB SHADOW E&M-EST. PATIENT-LVL III: ICD-10-PCS | Mod: PBBFAC,,, | Performed by: NURSE PRACTITIONER

## 2020-02-28 PROCEDURE — 93010 EKG 12-LEAD: ICD-10-PCS | Mod: ,,, | Performed by: INTERNAL MEDICINE

## 2020-02-28 PROCEDURE — 36415 COLL VENOUS BLD VENIPUNCTURE: CPT

## 2020-02-28 PROCEDURE — 80053 COMPREHEN METABOLIC PANEL: CPT

## 2020-02-28 PROCEDURE — 96360 HYDRATION IV INFUSION INIT: CPT

## 2020-02-28 PROCEDURE — 74177 CT ABD & PELVIS W/CONTRAST: CPT | Mod: TC

## 2020-02-28 PROCEDURE — 74177 CT ABD & PELVIS W/CONTRAST: CPT | Mod: 26,,, | Performed by: RADIOLOGY

## 2020-02-28 PROCEDURE — 3008F BODY MASS INDEX DOCD: CPT | Mod: CPTII,S$GLB,, | Performed by: NURSE PRACTITIONER

## 2020-02-28 PROCEDURE — 99214 PR OFFICE/OUTPT VISIT, EST, LEVL IV, 30-39 MIN: ICD-10-PCS | Mod: S$GLB,,, | Performed by: NURSE PRACTITIONER

## 2020-02-28 PROCEDURE — 87502 INFLUENZA DNA AMP PROBE: CPT

## 2020-02-28 PROCEDURE — 25000003 PHARM REV CODE 250: Performed by: FAMILY MEDICINE

## 2020-02-28 PROCEDURE — 63600175 PHARM REV CODE 636 W HCPCS: Performed by: FAMILY MEDICINE

## 2020-02-28 PROCEDURE — 99284 EMERGENCY DEPT VISIT MOD MDM: CPT | Mod: 25

## 2020-02-28 PROCEDURE — 93010 ELECTROCARDIOGRAM REPORT: CPT | Mod: ,,, | Performed by: INTERNAL MEDICINE

## 2020-02-28 PROCEDURE — 99999 PR PBB SHADOW E&M-EST. PATIENT-LVL III: CPT | Mod: PBBFAC,,, | Performed by: NURSE PRACTITIONER

## 2020-02-28 PROCEDURE — 93005 ELECTROCARDIOGRAM TRACING: CPT

## 2020-02-28 PROCEDURE — 87040 BLOOD CULTURE FOR BACTERIA: CPT | Mod: 59

## 2020-02-28 PROCEDURE — 74177 CT ABDOMEN PELVIS WITH CONTRAST: ICD-10-PCS | Mod: 26,,, | Performed by: RADIOLOGY

## 2020-02-28 PROCEDURE — 85025 COMPLETE CBC W/AUTO DIFF WBC: CPT

## 2020-02-28 PROCEDURE — 83605 ASSAY OF LACTIC ACID: CPT

## 2020-02-28 PROCEDURE — 84145 PROCALCITONIN (PCT): CPT

## 2020-02-28 PROCEDURE — 81003 URINALYSIS AUTO W/O SCOPE: CPT

## 2020-02-28 PROCEDURE — 25500020 PHARM REV CODE 255: Performed by: INTERNAL MEDICINE

## 2020-02-28 PROCEDURE — 99214 OFFICE O/P EST MOD 30 MIN: CPT | Mod: S$GLB,,, | Performed by: NURSE PRACTITIONER

## 2020-02-28 PROCEDURE — 3008F PR BODY MASS INDEX (BMI) DOCUMENTED: ICD-10-PCS | Mod: CPTII,S$GLB,, | Performed by: NURSE PRACTITIONER

## 2020-02-28 RX ORDER — IBUPROFEN 600 MG/1
600 TABLET ORAL
Status: COMPLETED | OUTPATIENT
Start: 2020-02-28 | End: 2020-02-28

## 2020-02-28 RX ADMIN — IOHEXOL 30 ML: 350 INJECTION, SOLUTION INTRAVENOUS at 02:02

## 2020-02-28 RX ADMIN — IOHEXOL 75 ML: 350 INJECTION, SOLUTION INTRAVENOUS at 03:02

## 2020-02-28 RX ADMIN — IBUPROFEN 600 MG: 600 TABLET, FILM COATED ORAL at 07:02

## 2020-02-28 RX ADMIN — SODIUM CHLORIDE 2100 ML: 0.9 INJECTION, SOLUTION INTRAVENOUS at 06:02

## 2020-02-28 NOTE — PROGRESS NOTES
Subjective:      Patient ID: Sukhjinder Presley is a 54 y.o. male.    Chief Complaint: pain to buttocks    HPI:  Patient is a 58 year old male with colon cancer who has been treated with FOLFOXIRI plus Avastin in the past.  Currently only receiving FOLFLOX.  Has had recent development of fistula formation found on CT of chest abdomen pelvis with contrast on 02/18/2020 = new heterogeneous collection of air, possible fecal material and fluid adjacent to the inferior aspect of the left pelvic sidewall and appearing to demonstrate a fistulous connection to the rectum that is suspicious for a contained rupture of the rectum thought to be due to Avastin.     Is here today with complaints of new tender area to left buttocks that started overnight.  Patient states that he has been using heating pad to area due to continued pain to left hip area; however this area of redness, firmness, and tenderness is new since last night.  He also has 102.3 temp.  Denies temp at home, but has not taken temperature.  States also has been on Augmentin daily and is almost finished taking the entire prescribed amount.  He also is here to complete CT scan of abdomen and pelvis with contrast and is now finishing his oral contrast.       Social History     Socioeconomic History    Marital status: Significant Other     Spouse name: Not on file    Number of children: Not on file    Years of education: Not on file    Highest education level: Not on file   Occupational History    Not on file   Social Needs    Financial resource strain: Somewhat hard    Food insecurity:     Worry: Sometimes true     Inability: Sometimes true    Transportation needs:     Medical: No     Non-medical: No   Tobacco Use    Smoking status: Current Every Day Smoker     Packs/day: 1.00     Years: 35.00     Pack years: 35.00     Types: Cigarettes     Start date: 1/2/1984    Smokeless tobacco: Current User     Types: Chew   Substance and Sexual Activity    Alcohol use:  Yes     Alcohol/week: 46.0 standard drinks     Types: 42 Cans of beer, 4 Shots of liquor per week     Frequency: 4 or more times a week     Drinks per session: 5 or 6     Binge frequency: Daily or almost daily     Comment: segrams 7, beer daily,  None 72 hours prior to surgery    Drug use: Never    Sexual activity: Yes     Partners: Female     Birth control/protection: None   Lifestyle    Physical activity:     Days per week: 1 day     Minutes per session: 60 min    Stress: Not on file   Relationships    Social connections:     Talks on phone: More than three times a week     Gets together: More than three times a week     Attends Alevism service: Not on file     Active member of club or organization: No     Attends meetings of clubs or organizations: Never     Relationship status: Living with partner   Other Topics Concern    Not on file   Social History Narrative    Not on file       Family History   Problem Relation Age of Onset    Intestinal malrotation Mother     Leukemia Father     No Known Problems Sister     Coronary artery disease Brother     Coronary artery disease Maternal Grandmother     Stomach cancer Maternal Grandfather        Past Surgical History:   Procedure Laterality Date    COLONOSCOPY N/A 6/6/2019    Procedure: COLONOSCOPY;  Surgeon: Anjelica Saavedra MD;  Location: [facility];  Service: Endoscopy;  Laterality: N/A;    ESOPHAGOGASTRODUODENOSCOPY N/A 6/6/2019    Procedure: EGD (ESOPHAGOGASTRODUODENOSCOPY);  Surgeon: Anjelica Saavedra MD;  Location: [facility];  Service: Endoscopy;  Laterality: N/A;    HERNIA REPAIR  1995    INJECTION OF ANESTHETIC AGENT INTO TISSUE PLANE DEFINED BY TRANSVERSUS ABDOMINIS MUSCLE N/A 2/18/2020    Procedure: BLOCK, TRANSVERSUS ABDOMINIS PLANE;  Surgeon: Jan New MD;  Location: [facility] OR;  Service: General;  Laterality: N/A;    INSERTION OF TUNNELED CENTRAL VENOUS CATHETER (CVC) WITH SUBCUTANEOUS PORT N/A 10/8/2019    Procedure:  [id number];  Surgeon: Jan New MD;  Location: [facility];  Service: General;  Laterality: N/A;    TONSILLECTOMY         Past Medical History:   Diagnosis Date    Anemia     Cancer        Review of Systems   Constitutional: Positive for fever.   HENT: Negative.    Eyes: Negative.    Respiratory: Negative.    Cardiovascular: Negative.    Gastrointestinal:        Colostomy present   Musculoskeletal: Positive for arthralgias.   Skin: Positive for color change.        Left buttocks - pain, tenderness, firmness since last night   Psychiatric/Behavioral: Positive for decreased concentration. The patient is nervous/anxious.           Medication List with Changes/Refills   Current Medications    ACETAMINOPHEN (TYLENOL) 325 MG TABLET    Take 1 tablet (325 mg total) by mouth every 6 (six) hours as needed for Pain.    AMOXICILLIN-CLAVULANATE 875-125MG (AUGMENTIN) 875-125 MG PER TABLET    Take 1 tablet by mouth 2 (two) times daily. for 12 days    ASPIRIN 81 MG CHEW    Take 81 mg by mouth once daily.    CAPECITABINE (XELODA) 500 MG TAB    Take 3 tablets (1500 mg) by mouth twice daily after breakfast and dinner on days of radiation only.    DIPHENOXYLATE-ATROPINE 2.5-0.025 MG (LOMOTIL) 2.5-0.025 MG PER TABLET    Take 1 to 2 tablets by mouth 4 (four) times daily as needed for Diarrhea.    DRONABINOL (MARINOL) 10 MG CAPSULE    Take 1 capsule (10 mg total) by mouth 2 (two) times daily before meals.    IRON FUM-B12-IF-C-FOLIC ACID (FOLTRIN) 110-0.5 MG CAPSULE    Take 1 capsule by mouth 2 (two) times daily.    KETOROLAC (TORADOL) 10 MG TABLET    Take 1 tablet (10 mg total) by mouth every 6 (six) hours as needed for Pain.    LIDOCAINE (XYLOCAINE) 5 % OINT OINTMENT    Apply topically as needed.    MAGIC MOUTHWASH DIPHEN/ANTAC/LIDOC    Swish and spit 15 ml by mouth every 4 hours as needed    MORPHINE (MS CONTIN) 30 MG 12 HR TABLET    Take 1 tablet (30 mg total) by mouth every 8 (eight) hours.    NICOTINE (NICODERM CQ)  21 MG/24 HR    Place 1 patch onto the skin once daily.    NOZASEPTIN (NOZIN) NASAL     1 each by Each Nostril route 2 (two) times daily.    ONDANSETRON (ZOFRAN-ODT) 8 MG TBDL    Take 1 tablet (8 mg total) by mouth every 12 (twelve) hours as needed.    OXYCODONE (ROXICODONE) 10 MG TAB IMMEDIATE RELEASE TABLET    Take 1 tablet (10 mg total) by mouth every 4 (four) hours as needed for Pain (medically necessary).    PRAMOXINE (PROCTOFOAM) 1 % FOAM    Place rectally 3 (three) times daily as needed.    PROCHLORPERAZINE (COMPAZINE) 10 MG TABLET    Take 1 tablet (10 mg total) by mouth every 6 (six) hours as needed.    PROMETHAZINE (PHENERGAN) 25 MG TABLET    Take 1 tablet (25 mg total) by mouth every 4 (four) hours.    SILVER SULFADIAZINE 1% (SILVADENE) 1 % CREAM    Apply to affected area daily    TEMAZEPAM (RESTORIL) 7.5 MG CAP    Take 2 capsules (15 mg total) by mouth nightly as needed.    TRAMADOL (ULTRAM) 50 MG TABLET    Take 1 tablet (50 mg total) by mouth every 6 (six) hours as needed for Pain.    VARENICLINE (CHANTIX) 1 MG TAB    Take 1 tablet (1 mg total) by mouth 2 (two) times daily.        Objective:     Vitals:    02/28/20 1445   BP: 113/71   Pulse: 103   Resp: 19   Temp: (!) 102.3 °F (39.1 °C)       Physical Exam   Constitutional: He appears cachectic. He appears ill. He appears distressed.   HENT:   Head: Normocephalic.   Neck: Normal range of motion.   Pulmonary/Chest: Effort normal. No respiratory distress.   Abdominal:   Colostomy present   Musculoskeletal: Normal range of motion.   Neurological: He is alert.   Skin:        Left buttocks - tender, firm to touch, with erythema   Psychiatric: His speech is normal and behavior is normal. Judgment and thought content normal. His mood appears anxious. He is not actively hallucinating. Cognition and memory are normal. He exhibits a depressed mood. He is attentive.   Vitals reviewed.      Assessment:     Problem List Items Addressed This Visit         Oncology    Rectal cancer - Primary       Other    Cachexia      Other Visit Diagnoses     Fever, unspecified fever cause        Pain of left hip joint        Fistula              Plan:   Rectal cancer    Fever, unspecified fever cause    Pain of left hip joint    Fistula    Cachexia    Recommend patient completing CT scan and then proceeding to the ED for further evaluation.  Recommend IV antibiotics, blood cultures,  and further assessment of buttocks with imaging- worried potential fistula or abscess formation.      Thank You,  Spring Manuel, CHILOP-C

## 2020-02-28 NOTE — TELEPHONE ENCOUNTER
Pt wife called today stating that pt has a new mass protruding on his buttocks and that it appears to be growing over night.  She requested someone see him today while he is at the clinic for his CT scan today.  Dr Aden made aware and requested Reema Manuel NP to see pt today . PT notified in CT waiting area to come to appt with Reema now and have CT today. Pt wife notified of plan

## 2020-02-29 NOTE — ED PROVIDER NOTES
SCRIBE #1 NOTE: I, Bailey Shukla, am scribing for, and in the presence of, Yanna Akins MD. I have scribed the entire note.       History     Chief Complaint   Patient presents with    Fever     Pt sent here from [facility] ([facility]) for abnormal CT and fever to receive IV abx. Currently on chemo biweekly.      Review of patient's allergies indicates:  No Known Allergies      History of Present Illness     HPI    2/28/2020, 6:41 PM  History obtained from the patient      History of Present Illness: Sukhjinder Presley is a 54 y.o. male patient with a PMHx of anemia and rectal adenocarcinoma (currently on neoadjuvant chemo therapy biweekly, completed neoadjuvant chemo radiation) who is s/p laparoscopic colostomy creation (2/18/2020) who presents to the Emergency Department for evaluation of fever which onset gradually today. Patient was referred to ED by Dr. Cosby ([facility]) for fever and abnormal CT scan today. Symptoms are constant and moderate in severity. No mitigating or exacerbating factors reported. Associated sxs include abscess to R gluteus which onset yesterday. Patient denies any chills, HA, dizziness, n/v, saddle anesthesia, extremity weakness/numbness, abdominal pain, and all other sxs at this time. No prior Tx. No further complaints or concerns at this time.       Arrival mode: Personal vehicle    PCP: Drake Elizalde MD        Past Medical History:  Past Medical History:   Diagnosis Date    Anemia     Cancer        Past Surgical History:  Past Surgical History:   Procedure Laterality Date    COLONOSCOPY N/A 6/6/2019    Procedure: COLONOSCOPY;  Surgeon: Anjelica Saavedra MD;  Location: [facility];  Service: Endoscopy;  Laterality: N/A;    ESOPHAGOGASTRODUODENOSCOPY N/A 6/6/2019    Procedure: EGD (ESOPHAGOGASTRODUODENOSCOPY);  Surgeon: Anjelica Saavedra MD;  Location: [facility];  Service: Endoscopy;  Laterality: N/A;    HERNIA REPAIR  1995    INJECTION OF ANESTHETIC AGENT INTO TISSUE PLANE DEFINED BY  TRANSVERSUS ABDOMINIS MUSCLE N/A 2/18/2020    Procedure: BLOCK, TRANSVERSUS ABDOMINIS PLANE;  Surgeon: Jan New MD;  Location: [facility] OR;  Service: General;  Laterality: N/A;    INSERTION OF TUNNELED CENTRAL VENOUS CATHETER (CVC) WITH SUBCUTANEOUS PORT N/A 10/8/2019    Procedure: [id number]-[id number];  Surgeon: Jan New MD;  Location: [facility] OR;  Service: General;  Laterality: N/A;    TONSILLECTOMY           Family History:  Family History   Problem Relation Age of Onset    Intestinal malrotation Mother     Leukemia Father     No Known Problems Sister     Coronary artery disease Brother     Coronary artery disease Maternal Grandmother     Stomach cancer Maternal Grandfather        Social History:  Social History     Tobacco Use    Smoking status: Current Every Day Smoker     Packs/day: 1.00     Years: 35.00     Pack years: 35.00     Types: Cigarettes     Start date: 1/2/1984    Smokeless tobacco: Current User     Types: Chew   Substance and Sexual Activity    Alcohol use: Yes     Alcohol/week: 46.0 standard drinks     Types: 42 Cans of beer, 4 Shots of liquor per week     Frequency: 4 or more times a week     Drinks per session: 5 or 6     Binge frequency: Daily or almost daily     Comment: segrams 7, beer daily,  None 72 hours prior to surgery    Drug use: Never    Sexual activity: Yes     Partners: Female     Birth control/protection: None        Review of Systems     Review of Systems   Constitutional: Positive for fever. Negative for activity change, chills and diaphoresis.   HENT: Negative for congestion, rhinorrhea, sneezing, sore throat and trouble swallowing.    Eyes: Negative for pain.   Respiratory: Negative for cough, chest tightness, shortness of breath, wheezing and stridor.    Cardiovascular: Negative for chest pain, palpitations and leg swelling.   Gastrointestinal: Negative for abdominal distention, abdominal pain, constipation, diarrhea, nausea and vomiting.    Genitourinary: Negative for difficulty urinating, dysuria, frequency and urgency.   Musculoskeletal: Negative for arthralgias, back pain, myalgias, neck pain and neck stiffness.   Skin: Positive for wound (abscess). Negative for pallor and rash.   Neurological: Negative for dizziness, syncope, weakness, light-headedness, numbness and headaches.   Hematological: Does not bruise/bleed easily.   All other systems reviewed and are negative.     Physical Exam     Initial Vitals [02/28/20 1743]   BP Pulse Resp Temp SpO2   117/74 107 17 (!) 101.7 °F (38.7 °C) 96 %      MAP       --          Physical Exam  Nursing Notes and Vital Signs Reviewed.  Constitutional: Patient is in no acute distress. Well-developed and well-nourished. Febrile.   Head: Atraumatic. Normocephalic.  Eyes: PERRL. EOM intact. Conjunctivae are not pale. No scleral icterus.  ENT: Mucous membranes are moist. Oropharynx is clear and symmetric.    Neck: Supple. Full ROM. No lymphadenopathy.  Cardiovascular: Regular rate. Regular rhythm. No murmurs, rubs, or gallops. Distal pulses are 2+ and symmetric.  Pulmonary/Chest: No respiratory distress. Clear to auscultation bilaterally. No wheezing or rales.  Abdominal: Soft and non-distended.  There is no tenderness.  No rebound, guarding, or rigidity. Good bowel sounds.  Genitourinary: No CVA tenderness  Musculoskeletal: Moves all extremities. No obvious deformities. No edema. No calf tenderness.  Skin: Erythematous and indurated area on R medial gluteus. No drainage. No open wound. Tenderness to palpation.   Neurological:  Alert, awake, and appropriate.  Normal speech.  No acute focal neurological deficits are appreciated.  Psychiatric: Normal affect. Good eye contact. Appropriate in content.     ED Course   Procedures  ED Vital Signs:  Vitals:    02/28/20 1743 02/28/20 1830 02/28/20 1931 02/28/20 1950   BP: 117/74  109/64    Pulse: 107 90 86    Resp: 17  20    Temp: (!) 101.7 °F (38.7 °C)   99.7 °F (37.6 °C)    TempSrc: Oral   Oral   SpO2: 96%  99%    Weight: 70 kg (154 lb 3.4 oz)      Height: 6' (1.829 m)       02/28/20 2002   BP: 106/65   Pulse: 93   Resp: 19   Temp:    TempSrc:    SpO2: 98%   Weight:    Height:        Abnormal Lab Results:  Labs Reviewed   CBC W/ AUTO DIFFERENTIAL - Abnormal; Notable for the following components:       Result Value    WBC 12.89 (*)     RBC 3.64 (*)     Hemoglobin 10.4 (*)     Hematocrit 31.3 (*)     MPV 9.1 (*)     Gran # (ANC) 10.2 (*)     Immature Grans (Abs) 0.06 (*)     Gran% 79.3 (*)     Lymph% 10.9 (*)     All other components within normal limits   COMPREHENSIVE METABOLIC PANEL - Abnormal; Notable for the following components:    Sodium 133 (*)     Glucose 126 (*)     Albumin 3.0 (*)     AST 8 (*)     ALT <5 (*)     All other components within normal limits   URINALYSIS, REFLEX TO URINE CULTURE - Abnormal; Notable for the following components:    Specific Gravity, UA <=1.005 (*)     All other components within normal limits    Narrative:     Preferred Collection Type->Urine, Clean Catch   CULTURE, BLOOD    Narrative:     Aerobic and anaerobic   CULTURE, BLOOD    Narrative:     Aerobic and anaerobic   INFLUENZA A & B BY MOLECULAR   LACTIC ACID, PLASMA   PROCALCITONIN        All Lab Results:  Results for orders placed or performed during the hospital encounter of 02/28/20   Blood culture x two cultures. Draw prior to antibiotics.   Result Value Ref Range    Blood Culture, Routine No Growth to date     Blood Culture, Routine No Growth to date    Blood culture x two cultures. Draw prior to antibiotics.   Result Value Ref Range    Blood Culture, Routine No Growth to date     Blood Culture, Routine No Growth to date    Influenza A & B by Molecular   Result Value Ref Range    Influenza A, Molecular Negative Negative    Influenza B, Molecular Negative Negative    Flu A & B Source NP    CBC auto differential   Result Value Ref Range    WBC 12.89 (H) 3.90 - 12.70 K/uL    RBC 3.64 (L)  4.60 - 6.20 M/uL    Hemoglobin 10.4 (L) 14.0 - 18.0 g/dL    Hematocrit 31.3 (L) 40.0 - 54.0 %    Mean Corpuscular Volume 86 82 - 98 fL    Mean Corpuscular Hemoglobin 28.6 27.0 - 31.0 pg    Mean Corpuscular Hemoglobin Conc 33.2 32.0 - 36.0 g/dL    RDW 13.9 11.5 - 14.5 %    Platelets 289 150 - 350 K/uL    MPV 9.1 (L) 9.2 - 12.9 fL    Immature Granulocytes 0.5 0.0 - 0.5 %    Gran # (ANC) 10.2 (H) 1.8 - 7.7 K/uL    Immature Grans (Abs) 0.06 (H) 0.00 - 0.04 K/uL    Lymph # 1.4 1.0 - 4.8 K/uL    Mono # 1.0 0.3 - 1.0 K/uL    Eos # 0.1 0.0 - 0.5 K/uL    Baso # 0.06 0.00 - 0.20 K/uL    nRBC 0 0 /100 WBC    Gran% 79.3 (H) 38.0 - 73.0 %    Lymph% 10.9 (L) 18.0 - 48.0 %    Mono% 7.9 4.0 - 15.0 %    Eosinophil% 0.9 0.0 - 8.0 %    Basophil% 0.5 0.0 - 1.9 %    Differential Method Automated    Comprehensive metabolic panel   Result Value Ref Range    Sodium 133 (L) 136 - 145 mmol/L    Potassium 3.7 3.5 - 5.1 mmol/L    Chloride 98 95 - 110 mmol/L    CO2 23 23 - 29 mmol/L    Glucose 126 (H) 70 - 110 mg/dL    BUN, Bld 11 6 - 20 mg/dL    Creatinine 0.8 0.5 - 1.4 mg/dL    Calcium 9.3 8.7 - 10.5 mg/dL    Total Protein 7.6 6.0 - 8.4 g/dL    Albumin 3.0 (L) 3.5 - 5.2 g/dL    Total Bilirubin 0.4 0.1 - 1.0 mg/dL    Alkaline Phosphatase 74 55 - 135 U/L    AST 8 (L) 10 - 40 U/L    ALT <5 (L) 10 - 44 U/L    Anion Gap 12 8 - 16 mmol/L    eGFR if African American >60 >60 mL/min/1.73 m^2    eGFR if non African American >60 >60 mL/min/1.73 m^2   Lactic acid, plasma #1   Result Value Ref Range    Lactate (Lactic Acid) 1.0 0.5 - 2.2 mmol/L   Urinalysis, Reflex to Urine Culture Urine, Clean Catch   Result Value Ref Range    Specimen UA Urine, Clean Catch     Color, UA Yellow Yellow, Straw, Reshma    Appearance, UA Clear Clear    pH, UA 6.0 5.0 - 8.0    Specific Gravity, UA <=1.005 (A) 1.005 - 1.030    Protein, UA Negative Negative    Glucose, UA Negative Negative    Ketones, UA Negative Negative    Bilirubin (UA) Negative Negative    Occult Blood UA  Negative Negative    Nitrite, UA Negative Negative    Urobilinogen, UA Negative <2.0 EU/dL    Leukocytes, UA Negative Negative   Procalcitonin   Result Value Ref Range    Procalcitonin 0.05 <0.25 ng/mL         Imaging Results:  Imaging Results          X-Ray Chest AP Portable (Final result)  Result time 02/28/20 18:19:51    Final result by Sukhjinder Jarquin Jr., MD (02/28/20 18:19:51)                 Impression:      No acute cardiopulmonary disease.      Electronically signed by: Sukhjinder Jarquin Jr., MD  Date:    02/28/2020  Time:    18:19             Narrative:    EXAMINATION:  XR CHEST AP PORTABLE    CLINICAL HISTORY:  Sepsis;    COMPARISON:  02/13/2020.    FINDINGS:  The lungs are clear. The cardiac silhouette and mediastinum are within normal limits. The remaining osseous structures and soft tissues are within normal limits.  Left chest wall subclavian MediPort distal tip in the SVC.  No effusion or pneumothorax.                               CT Abdomen Pelvis With Contrast   Order: [id number]   Status:  Final result   Visible to patient:  No (Not Released) Next appt:  03/02/2020 at 12:00 PM in Surgery (Yuriy Morrissey MD) Dx:  Colorectal neoplasm   Details     Reading Physician Reading Date Result Priority   Tom Esteban MD 2/28/2020 Routine      Narrative     EXAMINATION:  CT ABDOMEN PELVIS WITH CONTRAST    CLINICAL HISTORY:  Neoplasm: colorectal, suspected; Neoplasm of unspecified behavior of digestive system    TECHNIQUE:  Low dose axial images, sagittal and coronal reformations were obtained from the lung bases to the pubic symphysis following the IV administration of 100 mL of Omnipaque 350 and the oral administration of 30 mL of Omnipaque 350.    COMPARISON:  The CT from 10 days prior.    FINDINGS:  Interval left-sided colostomy changes are seen.  Rectum is similar in appearance to the recent study with mild wall thickening.  There is left mesorectal fat stranding with a locules of air seen tracking from the  left anterior aspect of the rectum to the left pelvic sidewall.  Small amount of air and fluid is seen along the left pelvic sidewall.  The collection currently measures on the order of 2.8 AP x 2.0 cm transverse.  Previously was measured at 2.5 AP by 1.9 cm transversely.  No well-defined rim enhancing wall is seen within this collection at this time. There are small locules of air noted along the anterior abdominal wall .  Small locules of air/gas are seen tracking into the inguinal canals.    There is asymmetric stranding the left perianal region which extends more posteriorly adjacent to the gluteus musculature.    Stomach and small bowel are normal without evidence to suggest bowel obstruction.  Sigmoid diverticulosis is noted.  No convincing evidence to suggest acute diverticulitis.  Urinary bladder is within normal limits.    No focal hepatic lesion.  No gallstones.  Calcified splenic granulomas are noted.  No focal pancreatic abnormality.  No biliary or pancreatic ductal dilation.  No adrenal gland abnormality.  Large calcification measuring 15 mm again seen in the inferior left kidney.  Kidneys enhance symmetrically.  No hydronephrosis.    Calcified plaque in the aorta.  IVC is unremarkable.  No bulky adenopathy.  Presacral soft tissue thickening/edema again noted, not substantially changed.    Limited imaging through the inferior thorax demonstrates no significant abnormality.  Review of bone windows demonstrates the osseous structures to be intact.  Degenerative changes of the spine are noted.      Impression       Interval postoperative changes with left-sided loop sigmoid colostomy.  Redemonstration of an air and fluid containing collection along the left pelvic sidewall with small locules of gas seen tracking to the rectum, with findings remaining suspicious for contained perforation.  The overall dimensions of the collection are similar to slightly increased since the comparison exam with slightly more  fluid visualized on today's study.  No well-defined rim enhancing wall to this collection is visualized..  Interval increasing stranding in the left perianal region and extending posteriorly about the left gluteus musculature suggestive of inflammation/cellulitis.  No well-formed collection/abscess is seen in this region at this time.      Electronically signed by: Tom Esteban MD  Date: 02/28/2020  Time: 16:16             Last Resulted: 02/28/20 16:16 Order Details View Encounter Lab and Collection Details Routing Result History               The EKG was ordered, reviewed, and independently interpreted by the ED provider.  Interpretation time: 18:01  Rate: 94 BPM  Rhythm: normal sinus rhythm  Interpretation: Incomplete RBBB. No STEMI.           The Emergency Provider reviewed the vital signs and test results, which are outlined above.     ED Discussion     8:15 PM: Discussed pt's case with Dr. Sanchez (general surgery) who recommends admit to medicine for neutropenic fever and she will consult.    8:15 PM: Discussed case with Sindy Geller NP (Hospital Medicine). Dr. Mensah agrees with current care and management of pt and accepts admission.   Admitting Service: hospital medicine  Admitting Physician: Dr. Mensah  Admit to: obs standard    8:17 PM: Pt is A&O x3, appropriate, and of capacity at this time. Pt voices desire to leave hospital. D/w pt in length need for further evaluation and treatment due to HPI and PEx. Pt declines any further evaluation or tx at this time. All risks, including worsening sx, permanent bodily harm and death, were discussed in length. Pt acknowledges all risk at this time and agrees to sign AMA form. Pt given RTER instructions. All questions and concerns addressed at this time. Pt leaving AMA.        Medical Decision Making:   Clinical Tests:   Lab Tests: Ordered and Reviewed  Radiological Study: Ordered and Reviewed  Medical Tests: Ordered and Reviewed           ED  Medication(s):  Medications   sodium chloride 0.9% bolus 2,100 mL (0 mL/kg × 70 kg Intravenous Stopped 2/28/20 1940)   ibuprofen tablet 600 mg (600 mg Oral Given 2/28/20 1943)     Scribe Attestation:   Scribe #1: I performed the above scribed service and the documentation accurately describes the services I performed. I attest to the accuracy of the note.     Attending:   Physician Attestation Statement for Scribe #1: I, Yanna Akins MD, personally performed the services described in this documentation, as scribed by Bailey Shukla, in my presence, and it is both accurate and complete.           Clinical Impression       ICD-10-CM ICD-9-CM   1. Fever R50.9 780.60       Disposition:   Disposition: AMA  Condition: Fair         Yanna Akins MD  03/01/20 1604

## 2020-03-02 ENCOUNTER — PATIENT OUTREACH (OUTPATIENT)
Dept: ADMINISTRATIVE | Facility: OTHER | Age: 55
End: 2020-03-02

## 2020-03-02 ENCOUNTER — HOSPITAL ENCOUNTER (INPATIENT)
Facility: HOSPITAL | Age: 55
LOS: 1 days | Discharge: HOME HEALTH | DRG: 393 | End: 2020-03-04
Attending: EMERGENCY MEDICINE | Admitting: INTERNAL MEDICINE
Payer: COMMERCIAL

## 2020-03-02 ENCOUNTER — OFFICE VISIT (OUTPATIENT)
Dept: SURGERY | Facility: CLINIC | Age: 55
DRG: 393 | End: 2020-03-02
Payer: COMMERCIAL

## 2020-03-02 VITALS
BODY MASS INDEX: 20.82 KG/M2 | HEIGHT: 72 IN | TEMPERATURE: 101 F | DIASTOLIC BLOOD PRESSURE: 76 MMHG | WEIGHT: 153.69 LBS | SYSTOLIC BLOOD PRESSURE: 125 MMHG | HEART RATE: 108 BPM

## 2020-03-02 DIAGNOSIS — R50.81 FEVER IN OTHER DISEASES: ICD-10-CM

## 2020-03-02 DIAGNOSIS — K60.4 RECTAL FISTULA: Primary | ICD-10-CM

## 2020-03-02 DIAGNOSIS — C20 RECTAL CANCER: Primary | ICD-10-CM

## 2020-03-02 DIAGNOSIS — K63.1 RECTAL PERFORATION: ICD-10-CM

## 2020-03-02 LAB
ALBUMIN SERPL BCP-MCNC: 2.7 G/DL (ref 3.5–5.2)
ALP SERPL-CCNC: 67 U/L (ref 55–135)
ALT SERPL W/O P-5'-P-CCNC: 7 U/L (ref 10–44)
ANION GAP SERPL CALC-SCNC: 11 MMOL/L (ref 8–16)
AST SERPL-CCNC: 10 U/L (ref 10–40)
BASOPHILS # BLD AUTO: 0.04 K/UL (ref 0–0.2)
BASOPHILS NFR BLD: 0.4 % (ref 0–1.9)
BILIRUB SERPL-MCNC: 0.3 MG/DL (ref 0.1–1)
BILIRUB UR QL STRIP: NEGATIVE
BUN SERPL-MCNC: 6 MG/DL (ref 6–20)
CALCIUM SERPL-MCNC: 8.4 MG/DL (ref 8.7–10.5)
CHLORIDE SERPL-SCNC: 102 MMOL/L (ref 95–110)
CLARITY UR: CLEAR
CO2 SERPL-SCNC: 25 MMOL/L (ref 23–29)
COLOR UR: YELLOW
CREAT SERPL-MCNC: 0.7 MG/DL (ref 0.5–1.4)
DIFFERENTIAL METHOD: ABNORMAL
EOSINOPHIL # BLD AUTO: 0.2 K/UL (ref 0–0.5)
EOSINOPHIL NFR BLD: 2.1 % (ref 0–8)
ERYTHROCYTE [DISTWIDTH] IN BLOOD BY AUTOMATED COUNT: 14.2 % (ref 11.5–14.5)
EST. GFR  (AFRICAN AMERICAN): >60 ML/MIN/1.73 M^2
EST. GFR  (NON AFRICAN AMERICAN): >60 ML/MIN/1.73 M^2
GLUCOSE SERPL-MCNC: 106 MG/DL (ref 70–110)
GLUCOSE UR QL STRIP: NEGATIVE
HCT VFR BLD AUTO: 28.3 % (ref 40–54)
HCV AB SERPL QL IA: NEGATIVE
HGB BLD-MCNC: 9.1 G/DL (ref 14–18)
HGB UR QL STRIP: NEGATIVE
HIV 1+2 AB+HIV1 P24 AG SERPL QL IA: NEGATIVE
IMM GRANULOCYTES # BLD AUTO: 0.04 K/UL (ref 0–0.04)
IMM GRANULOCYTES NFR BLD AUTO: 0.4 % (ref 0–0.5)
KETONES UR QL STRIP: NEGATIVE
LACTATE SERPL-SCNC: 1.8 MMOL/L (ref 0.5–2.2)
LEUKOCYTE ESTERASE UR QL STRIP: NEGATIVE
LYMPHOCYTES # BLD AUTO: 1.3 K/UL (ref 1–4.8)
LYMPHOCYTES NFR BLD: 13.6 % (ref 18–48)
MCH RBC QN AUTO: 27.9 PG (ref 27–31)
MCHC RBC AUTO-ENTMCNC: 32.2 G/DL (ref 32–36)
MCV RBC AUTO: 87 FL (ref 82–98)
MONOCYTES # BLD AUTO: 0.7 K/UL (ref 0.3–1)
MONOCYTES NFR BLD: 7.4 % (ref 4–15)
NEUTROPHILS # BLD AUTO: 7.3 K/UL (ref 1.8–7.7)
NEUTROPHILS NFR BLD: 76.1 % (ref 38–73)
NITRITE UR QL STRIP: NEGATIVE
NRBC BLD-RTO: 0 /100 WBC
PH UR STRIP: 7 [PH] (ref 5–8)
PLATELET # BLD AUTO: 241 K/UL (ref 150–350)
PMV BLD AUTO: 8.9 FL (ref 9.2–12.9)
POTASSIUM SERPL-SCNC: 3.4 MMOL/L (ref 3.5–5.1)
PROT SERPL-MCNC: 7.2 G/DL (ref 6–8.4)
PROT UR QL STRIP: NEGATIVE
RBC # BLD AUTO: 3.26 M/UL (ref 4.6–6.2)
SODIUM SERPL-SCNC: 138 MMOL/L (ref 136–145)
SP GR UR STRIP: <=1.005 (ref 1–1.03)
URN SPEC COLLECT METH UR: ABNORMAL
UROBILINOGEN UR STRIP-ACNC: NEGATIVE EU/DL
WBC # BLD AUTO: 9.61 K/UL (ref 3.9–12.7)

## 2020-03-02 PROCEDURE — 25000003 PHARM REV CODE 250: Performed by: NURSE PRACTITIONER

## 2020-03-02 PROCEDURE — 96365 THER/PROPH/DIAG IV INF INIT: CPT

## 2020-03-02 PROCEDURE — G0378 HOSPITAL OBSERVATION PER HR: HCPCS

## 2020-03-02 PROCEDURE — 96366 THER/PROPH/DIAG IV INF ADDON: CPT

## 2020-03-02 PROCEDURE — 25000003 PHARM REV CODE 250: Performed by: EMERGENCY MEDICINE

## 2020-03-02 PROCEDURE — 96361 HYDRATE IV INFUSION ADD-ON: CPT

## 2020-03-02 PROCEDURE — 87040 BLOOD CULTURE FOR BACTERIA: CPT | Mod: 59

## 2020-03-02 PROCEDURE — 83605 ASSAY OF LACTIC ACID: CPT

## 2020-03-02 PROCEDURE — 99999 PR PBB SHADOW E&M-EST. PATIENT-LVL III: CPT | Mod: PBBFAC,,, | Performed by: SURGERY

## 2020-03-02 PROCEDURE — 96367 TX/PROPH/DG ADDL SEQ IV INF: CPT

## 2020-03-02 PROCEDURE — 86803 HEPATITIS C AB TEST: CPT

## 2020-03-02 PROCEDURE — 99999 PR PBB SHADOW E&M-EST. PATIENT-LVL III: ICD-10-PCS | Mod: PBBFAC,,, | Performed by: SURGERY

## 2020-03-02 PROCEDURE — 25500020 PHARM REV CODE 255: Performed by: EMERGENCY MEDICINE

## 2020-03-02 PROCEDURE — 86703 HIV-1/HIV-2 1 RESULT ANTBDY: CPT

## 2020-03-02 PROCEDURE — 99285 EMERGENCY DEPT VISIT HI MDM: CPT | Mod: 25

## 2020-03-02 PROCEDURE — 96376 TX/PRO/DX INJ SAME DRUG ADON: CPT

## 2020-03-02 PROCEDURE — 3008F BODY MASS INDEX DOCD: CPT | Mod: CPTII,S$GLB,, | Performed by: SURGERY

## 2020-03-02 PROCEDURE — 36415 COLL VENOUS BLD VENIPUNCTURE: CPT

## 2020-03-02 PROCEDURE — 85025 COMPLETE CBC W/AUTO DIFF WBC: CPT

## 2020-03-02 PROCEDURE — 81003 URINALYSIS AUTO W/O SCOPE: CPT

## 2020-03-02 PROCEDURE — 99203 OFFICE O/P NEW LOW 30 MIN: CPT | Mod: S$GLB,,, | Performed by: SURGERY

## 2020-03-02 PROCEDURE — 63600175 PHARM REV CODE 636 W HCPCS: Performed by: EMERGENCY MEDICINE

## 2020-03-02 PROCEDURE — 80053 COMPREHEN METABOLIC PANEL: CPT

## 2020-03-02 PROCEDURE — 3008F PR BODY MASS INDEX (BMI) DOCUMENTED: ICD-10-PCS | Mod: CPTII,S$GLB,, | Performed by: SURGERY

## 2020-03-02 PROCEDURE — 99203 PR OFFICE/OUTPT VISIT, NEW, LEVL III, 30-44 MIN: ICD-10-PCS | Mod: S$GLB,,, | Performed by: SURGERY

## 2020-03-02 RX ORDER — POTASSIUM CHLORIDE 20 MEQ/1
40 TABLET, EXTENDED RELEASE ORAL ONCE
Status: COMPLETED | OUTPATIENT
Start: 2020-03-02 | End: 2020-03-02

## 2020-03-02 RX ORDER — IBUPROFEN 200 MG
1 TABLET ORAL DAILY
Status: DISCONTINUED | OUTPATIENT
Start: 2020-03-03 | End: 2020-03-04 | Stop reason: HOSPADM

## 2020-03-02 RX ORDER — OXYCODONE HYDROCHLORIDE 5 MG/1
10 TABLET ORAL EVERY 4 HOURS PRN
Status: DISCONTINUED | OUTPATIENT
Start: 2020-03-03 | End: 2020-03-04 | Stop reason: HOSPADM

## 2020-03-02 RX ORDER — ACETAMINOPHEN 325 MG/1
650 TABLET ORAL
Status: COMPLETED | OUTPATIENT
Start: 2020-03-02 | End: 2020-03-02

## 2020-03-02 RX ORDER — ACETAMINOPHEN 325 MG/1
650 TABLET ORAL EVERY 6 HOURS PRN
Status: DISCONTINUED | OUTPATIENT
Start: 2020-03-03 | End: 2020-03-04 | Stop reason: HOSPADM

## 2020-03-02 RX ORDER — ONDANSETRON 2 MG/ML
4 INJECTION INTRAMUSCULAR; INTRAVENOUS EVERY 6 HOURS PRN
Status: DISCONTINUED | OUTPATIENT
Start: 2020-03-03 | End: 2020-03-04 | Stop reason: HOSPADM

## 2020-03-02 RX ORDER — PANTOPRAZOLE SODIUM 40 MG/1
40 TABLET, DELAYED RELEASE ORAL DAILY
Status: DISCONTINUED | OUTPATIENT
Start: 2020-03-03 | End: 2020-03-04 | Stop reason: HOSPADM

## 2020-03-02 RX ORDER — MORPHINE SULFATE 30 MG/1
30 TABLET, FILM COATED, EXTENDED RELEASE ORAL EVERY 8 HOURS
Status: DISCONTINUED | OUTPATIENT
Start: 2020-03-03 | End: 2020-03-04 | Stop reason: HOSPADM

## 2020-03-02 RX ORDER — SODIUM CHLORIDE 9 MG/ML
INJECTION, SOLUTION INTRAVENOUS CONTINUOUS
Status: ACTIVE | OUTPATIENT
Start: 2020-03-03 | End: 2020-03-03

## 2020-03-02 RX ADMIN — SODIUM CHLORIDE 1000 ML: 0.9 INJECTION, SOLUTION INTRAVENOUS at 11:03

## 2020-03-02 RX ADMIN — IOHEXOL 75 ML: 350 INJECTION, SOLUTION INTRAVENOUS at 04:03

## 2020-03-02 RX ADMIN — VANCOMYCIN HYDROCHLORIDE 1750 MG: 750 INJECTION, POWDER, LYOPHILIZED, FOR SOLUTION INTRAVENOUS at 09:03

## 2020-03-02 RX ADMIN — ACETAMINOPHEN 650 MG: 325 TABLET ORAL at 03:03

## 2020-03-02 RX ADMIN — POTASSIUM CHLORIDE 40 MEQ: 20 TABLET, EXTENDED RELEASE ORAL at 09:03

## 2020-03-02 RX ADMIN — PIPERACILLIN AND TAZOBACTAM 4.5 G: 4; .5 INJECTION, POWDER, LYOPHILIZED, FOR SOLUTION INTRAVENOUS; PARENTERAL at 03:03

## 2020-03-02 RX ADMIN — PIPERACILLIN AND TAZOBACTAM 4.5 G: 4; .5 INJECTION, POWDER, LYOPHILIZED, FOR SOLUTION INTRAVENOUS; PARENTERAL at 11:03

## 2020-03-02 RX ADMIN — SODIUM CHLORIDE: 0.9 INJECTION, SOLUTION INTRAVENOUS at 11:03

## 2020-03-02 RX ADMIN — IOHEXOL 30 ML: 350 INJECTION, SOLUTION INTRAVENOUS at 03:03

## 2020-03-02 NOTE — ED TRIAGE NOTES
53 y/o M presents to ED reporting fevers s/p rectal surgery w/ colostomy placement. Completed postop antibiotics.

## 2020-03-02 NOTE — ED NOTES
Bed: 09  Expected date: 3/2/20  Expected time: 2:27 PM  Means of arrival: Personal Transportation  Comments:

## 2020-03-02 NOTE — ED PROVIDER NOTES
"SCRIBE #1 NOTE: I, Corinne Mack, am scribing for, and in the presence of, Miles Charles MD. I have scribed the HPI, ROS, and PEx.     SCRIBE #2 NOTE: I, Jan Sunshine, am scribing for, and in the presence of,  Petr Pinzon MD. I have scribed the remaining portions of the note not scribed by Scribe #1.     History      Chief Complaint   Patient presents with    Fever     Colostomy placed 2/18. Pt was seen Friday for fever but left. He has been running        Review of patient's allergies indicates:  No Known Allergies     HPI   HPI    3/2/2020, 3:00 PM   History obtained from the patient      History of Present Illness: Sukhjinder Presley is a 54 y.o. male patient with PMHx of anemia and CA who presents to the Emergency Department for evaluation. Pt was seen by Dr. Morrissey (General Surgery) today.    Per Dr. Morrissey's note, "Patient was in process of being admitted through ER on Friday however left due to wait  He has completed antibiotics but still having fevers  Instructed the patient he needs to return to the ER for antibiotics admitted and interval imaging to evaluate for worsening infection that may need intervention.  ER physician notified of patient to help expedite his care.  Patient is hesitant to return to ER.  We explained the risks of not getting appropriate care in a timely fashion".    Pt was referred to the ED for a CT scan and IV abx. Pt c/o fever. Symptoms are constant and moderate in severity. No mitigating or exacerbating factors reported. No associated sxs reported. Patient denies any chills, CP, SOB, N/V/D, abd pain, back pain, neck pain, HA, dizziness, and all other sxs at this time. No prior Tx reported. No further complaints or concerns at this time.         Arrival mode: Personal vehicle    PCP: Drake Elizalde MD       Past Medical History:  Past Medical History:   Diagnosis Date    Anemia     Cancer        Past Surgical History:  Past Surgical History:   Procedure Laterality Date    " COLONOSCOPY N/A 6/6/2019    Procedure: COLONOSCOPY;  Surgeon: Anjelica Saavedra MD;  Location: [facility] ENDO;  Service: Endoscopy;  Laterality: N/A;    ESOPHAGOGASTRODUODENOSCOPY N/A 6/6/2019    Procedure: EGD (ESOPHAGOGASTRODUODENOSCOPY);  Surgeon: Anjelica Saavedra MD;  Location: [facility] ENDO;  Service: Endoscopy;  Laterality: N/A;    HERNIA REPAIR  1995    INJECTION OF ANESTHETIC AGENT INTO TISSUE PLANE DEFINED BY TRANSVERSUS ABDOMINIS MUSCLE N/A 2/18/2020    Procedure: BLOCK, TRANSVERSUS ABDOMINIS PLANE;  Surgeon: Jan New MD;  Location: [facility] OR;  Service: General;  Laterality: N/A;    INSERTION OF TUNNELED CENTRAL VENOUS CATHETER (CVC) WITH SUBCUTANEOUS PORT N/A 10/8/2019    Procedure: [id number];  Surgeon: Jan New MD;  Location: [facility] OR;  Service: General;  Laterality: N/A;    TONSILLECTOMY           Family History:  Family History   Problem Relation Age of Onset    Intestinal malrotation Mother     Leukemia Father     No Known Problems Sister     Coronary artery disease Brother     Coronary artery disease Maternal Grandmother     Stomach cancer Maternal Grandfather        Social History:  Social History     Tobacco Use    Smoking status: Current Every Day Smoker     Packs/day: 1.00     Years: 35.00     Pack years: 35.00     Types: Cigarettes     Start date: 1/2/1984    Smokeless tobacco: Current User     Types: Chew   Substance and Sexual Activity    Alcohol use: Yes     Alcohol/week: 46.0 standard drinks     Types: 42 Cans of beer, 4 Shots of liquor per week     Frequency: 4 or more times a week     Drinks per session: 5 or 6     Binge frequency: Daily or almost daily     Comment: segrams 7, beer daily,  None 72 hours prior to surgery    Drug use: Never    Sexual activity: Yes     Partners: Female     Birth control/protection: None       ROS   Review of Systems   Constitutional: Positive for fever. Negative for chills.   Respiratory: Negative for cough and shortness of  breath.    Cardiovascular: Negative for chest pain and leg swelling.   Gastrointestinal: Negative for abdominal pain, diarrhea, nausea and vomiting.   Musculoskeletal: Negative for back pain, neck pain and neck stiffness.   Skin: Negative for rash and wound.   Neurological: Negative for dizziness, light-headedness, numbness and headaches.   All other systems reviewed and are negative.    Physical Exam      Initial Vitals [03/02/20 1442]   BP Pulse Resp Temp SpO2   (!) 147/67 (!) 111 20 (!) 102.4 °F (39.1 °C) 98 %      MAP       --          Physical Exam  Nursing Notes and Vital Signs Reviewed.  Constitutional: Patient is in no acute distress. Well-developed and well-nourished.  Head: Atraumatic. Normocephalic.  Eyes: PERRL. EOM intact. Conjunctivae are not pale. No scleral icterus.  ENT: Mucous membranes are moist. Oropharynx is clear and symmetric.    Neck: Supple. Full ROM. No lymphadenopathy.  Cardiovascular: Tachycardic. Regular rhythm. No murmurs, rubs, or gallops. Distal pulses are 2+ and symmetric.  Pulmonary/Chest: No respiratory distress. Clear to auscultation bilaterally. No wheezing or rales.  Abdominal: Soft and non-distended.  There is no tenderness.  No rebound, guarding, or rigidity. Ostomy to the lower abd.  Musculoskeletal: Moves all extremities. No obvious deformities. No edema.   Skin: Warm and dry.  Neurological:  Alert, awake, and appropriate.  Normal speech.  No acute focal neurological deficits are appreciated.  Psychiatric: Normal affect. Good eye contact. Appropriate in content.    ED Course    Procedures  ED Vital Signs:  Vitals:    03/02/20 1442 03/02/20 1536 03/02/20 1538 03/02/20 1602   BP: (!) 147/67  116/75 123/72   Pulse: (!) 111 101 101 96   Resp: 20  14 18   Temp: (!) 102.4 °F (39.1 °C)      TempSrc: Oral      SpO2: 98%  99% 97%   Weight: 70 kg (154 lb 3.4 oz)      Height: 6' (1.829 m)       03/02/20 1702 03/02/20 1731 03/02/20 1815   BP: (!) 109/56  121/74   Pulse: 89  85   Resp:  18  20   Temp:  99.6 °F (37.6 °C)    TempSrc:  Oral    SpO2: 99%  99%   Weight:      Height:          Abnormal Lab Results:  Labs Reviewed   CBC W/ AUTO DIFFERENTIAL - Abnormal; Notable for the following components:       Result Value    RBC 3.26 (*)     Hemoglobin 9.1 (*)     Hematocrit 28.3 (*)     MPV 8.9 (*)     Gran% 76.1 (*)     Lymph% 13.6 (*)     All other components within normal limits   COMPREHENSIVE METABOLIC PANEL - Abnormal; Notable for the following components:    Potassium 3.4 (*)     Calcium 8.4 (*)     Albumin 2.7 (*)     ALT 7 (*)     All other components within normal limits   URINALYSIS, REFLEX TO URINE CULTURE - Abnormal; Notable for the following components:    Specific Gravity, UA <=1.005 (*)     All other components within normal limits    Narrative:     Preferred Collection Type->Urine, Clean Catch   CULTURE, BLOOD   CULTURE, BLOOD   HIV 1 / 2 ANTIBODY   HEPATITIS C ANTIBODY   LACTIC ACID, PLASMA        All Lab Results:  Results for orders placed or performed during the hospital encounter of 03/02/20   HIV 1/2 Ag/Ab (4th Gen)   Result Value Ref Range    HIV 1/2 Ag/Ab Negative Negative   Hepatitis C antibody   Result Value Ref Range    Hepatitis C Ab Negative Negative   CBC auto differential   Result Value Ref Range    WBC 9.61 3.90 - 12.70 K/uL    RBC 3.26 (L) 4.60 - 6.20 M/uL    Hemoglobin 9.1 (L) 14.0 - 18.0 g/dL    Hematocrit 28.3 (L) 40.0 - 54.0 %    Mean Corpuscular Volume 87 82 - 98 fL    Mean Corpuscular Hemoglobin 27.9 27.0 - 31.0 pg    Mean Corpuscular Hemoglobin Conc 32.2 32.0 - 36.0 g/dL    RDW 14.2 11.5 - 14.5 %    Platelets 241 150 - 350 K/uL    MPV 8.9 (L) 9.2 - 12.9 fL    Immature Granulocytes 0.4 0.0 - 0.5 %    Gran # (ANC) 7.3 1.8 - 7.7 K/uL    Immature Grans (Abs) 0.04 0.00 - 0.04 K/uL    Lymph # 1.3 1.0 - 4.8 K/uL    Mono # 0.7 0.3 - 1.0 K/uL    Eos # 0.2 0.0 - 0.5 K/uL    Baso # 0.04 0.00 - 0.20 K/uL    nRBC 0 0 /100 WBC    Gran% 76.1 (H) 38.0 - 73.0 %    Lymph% 13.6 (L)  18.0 - 48.0 %    Mono% 7.4 4.0 - 15.0 %    Eosinophil% 2.1 0.0 - 8.0 %    Basophil% 0.4 0.0 - 1.9 %    Differential Method Automated    Comprehensive metabolic panel   Result Value Ref Range    Sodium 138 136 - 145 mmol/L    Potassium 3.4 (L) 3.5 - 5.1 mmol/L    Chloride 102 95 - 110 mmol/L    CO2 25 23 - 29 mmol/L    Glucose 106 70 - 110 mg/dL    BUN, Bld 6 6 - 20 mg/dL    Creatinine 0.7 0.5 - 1.4 mg/dL    Calcium 8.4 (L) 8.7 - 10.5 mg/dL    Total Protein 7.2 6.0 - 8.4 g/dL    Albumin 2.7 (L) 3.5 - 5.2 g/dL    Total Bilirubin 0.3 0.1 - 1.0 mg/dL    Alkaline Phosphatase 67 55 - 135 U/L    AST 10 10 - 40 U/L    ALT 7 (L) 10 - 44 U/L    Anion Gap 11 8 - 16 mmol/L    eGFR if African American >60 >60 mL/min/1.73 m^2    eGFR if non African American >60 >60 mL/min/1.73 m^2   Lactic acid, plasma #1   Result Value Ref Range    Lactate (Lactic Acid) 1.8 0.5 - 2.2 mmol/L   Urinalysis, Reflex to Urine Culture Urine, Clean Catch   Result Value Ref Range    Specimen UA Urine, Clean Catch     Color, UA Yellow Yellow, Straw, Reshma    Appearance, UA Clear Clear    pH, UA 7.0 5.0 - 8.0    Specific Gravity, UA <=1.005 (A) 1.005 - 1.030    Protein, UA Negative Negative    Glucose, UA Negative Negative    Ketones, UA Negative Negative    Bilirubin (UA) Negative Negative    Occult Blood UA Negative Negative    Nitrite, UA Negative Negative    Urobilinogen, UA Negative <2.0 EU/dL    Leukocytes, UA Negative Negative     Results for orders placed or performed during the hospital encounter of 03/02/20   HIV 1/2 Ag/Ab (4th Gen)   Result Value Ref Range    HIV 1/2 Ag/Ab Negative Negative   Hepatitis C antibody   Result Value Ref Range    Hepatitis C Ab Negative Negative   CBC auto differential   Result Value Ref Range    WBC 9.61 3.90 - 12.70 K/uL    RBC 3.26 (L) 4.60 - 6.20 M/uL    Hemoglobin 9.1 (L) 14.0 - 18.0 g/dL    Hematocrit 28.3 (L) 40.0 - 54.0 %    Mean Corpuscular Volume 87 82 - 98 fL    Mean Corpuscular Hemoglobin 27.9 27.0 - 31.0  pg    Mean Corpuscular Hemoglobin Conc 32.2 32.0 - 36.0 g/dL    RDW 14.2 11.5 - 14.5 %    Platelets 241 150 - 350 K/uL    MPV 8.9 (L) 9.2 - 12.9 fL    Immature Granulocytes 0.4 0.0 - 0.5 %    Gran # (ANC) 7.3 1.8 - 7.7 K/uL    Immature Grans (Abs) 0.04 0.00 - 0.04 K/uL    Lymph # 1.3 1.0 - 4.8 K/uL    Mono # 0.7 0.3 - 1.0 K/uL    Eos # 0.2 0.0 - 0.5 K/uL    Baso # 0.04 0.00 - 0.20 K/uL    nRBC 0 0 /100 WBC    Gran% 76.1 (H) 38.0 - 73.0 %    Lymph% 13.6 (L) 18.0 - 48.0 %    Mono% 7.4 4.0 - 15.0 %    Eosinophil% 2.1 0.0 - 8.0 %    Basophil% 0.4 0.0 - 1.9 %    Differential Method Automated    Comprehensive metabolic panel   Result Value Ref Range    Sodium 138 136 - 145 mmol/L    Potassium 3.4 (L) 3.5 - 5.1 mmol/L    Chloride 102 95 - 110 mmol/L    CO2 25 23 - 29 mmol/L    Glucose 106 70 - 110 mg/dL    BUN, Bld 6 6 - 20 mg/dL    Creatinine 0.7 0.5 - 1.4 mg/dL    Calcium 8.4 (L) 8.7 - 10.5 mg/dL    Total Protein 7.2 6.0 - 8.4 g/dL    Albumin 2.7 (L) 3.5 - 5.2 g/dL    Total Bilirubin 0.3 0.1 - 1.0 mg/dL    Alkaline Phosphatase 67 55 - 135 U/L    AST 10 10 - 40 U/L    ALT 7 (L) 10 - 44 U/L    Anion Gap 11 8 - 16 mmol/L    eGFR if African American >60 >60 mL/min/1.73 m^2    eGFR if non African American >60 >60 mL/min/1.73 m^2   Lactic acid, plasma #1   Result Value Ref Range    Lactate (Lactic Acid) 1.8 0.5 - 2.2 mmol/L   Urinalysis, Reflex to Urine Culture Urine, Clean Catch   Result Value Ref Range    Specimen UA Urine, Clean Catch     Color, UA Yellow Yellow, Straw, Reshma    Appearance, UA Clear Clear    pH, UA 7.0 5.0 - 8.0    Specific Gravity, UA <=1.005 (A) 1.005 - 1.030    Protein, UA Negative Negative    Glucose, UA Negative Negative    Ketones, UA Negative Negative    Bilirubin (UA) Negative Negative    Occult Blood UA Negative Negative    Nitrite, UA Negative Negative    Urobilinogen, UA Negative <2.0 EU/dL    Leukocytes, UA Negative Negative       Imaging Results:  Imaging Results          CT Abdomen Pelvis With  Contrast (Final result)  Result time 03/02/20 17:11:20    Final result by Bora Guzmán MD (03/02/20 17:11:20)                 Impression:      1.  Again seen is a fistulous connection versus localized perforation involving the left side of the rectum.  In this area measures approximately 3.9 x 2.0 cm currently, not significantly changed from the comparison study.  The amount of soft tissue swelling and amorphous edema surrounding the rectum is similar to the comparison study there has been interval development of some air bubbles in the medial posterior left buttock soft tissues in the same location is significant inflammatory changes without a fluid collection seen.  These findings are concerning for either extension of the localized perforation into the posteromedial buttock versus interval development of some necrosis.  Clinical correlation is advised.    2.  No other abnormal fluid collections are seen throughout the abdomen or pelvis.  There does remain to be some air in both inguinal canals although this is decreased in the interim and there is no definite evidence for intraperitoneal free air.    3. There are some new ground-glass opacities within the bilateral lower lobes.  Early infectious process must be considered.    4.  The appearance of the abdomen and pelvis is otherwise unchanged.  Negative for dilated loops of large or small bowel with oral contrast flowing through the bowel and into the left lower quadrant ostomy.    5.  Numerous stable findings as noted above.    6.  A call report was made to the emergency room at 17:10 hours on March 2, 2020.  I spoke with Dr. Pinzon.    All CT scans at this facility are performed  using dose modulation techniques as appropriate to performed exam including the following:  automated exposure control; adjustment of mA and/or kV according to the patients size (this includes techniques or standardized protocols for targeted exams where dose is matched to  indication/reason for exam: i.e. extremities or head);  iterative reconstruction technique.      Electronically signed by: Bora Guzmán MD  Date:    03/02/2020  Time:    17:11             Narrative:    EXAMINATION:  CT ABDOMEN PELVIS WITH CONTRAST    CLINICAL HISTORY:  ABD pain, fever, abscess suspected;    TECHNIQUE:  Axial images through the abdomen and pelvis were obtained with the use of IV contrast.  Sagittal and coronal reconstructions are provided for review.  Oral contrast was utilized.    COMPARISON:  February 28, 2020    FINDINGS:  LUNG BASES: Medial right lower lobe and inferior left lower lobe ground-glass opacities have developed in the interim.  There is some atelectasis in the right lower lobe laterally.  Stable medial middle lobe scarring.  Lung bases are otherwise clear.  Negative for pleural or pericardial effusions. The distal esophagus is normal.    ABDOMEN: Stable splenomegaly.  Stable hepatic and splenic calcified granulomas.  Stable inferior pole left renal stones measuring up to 1.1 cm, without hydronephrosis.  The liver, spleen and gallbladder otherwise appear normal.  The pancreas is unremarkable.  Kidneys and adrenal glands are otherwise normal.  Biliary tree is normal.    Subcentimeter mesenteric and retroperitoneal lymph nodes again seen.    Negative for adenopathy, free fluid or inflammatory change noted within the abdomen or pelvis.  Vascular calcifications are present without aneurysmal changes. Portal vein is patent.    There is oral contrast within the stomach and small bowel.  There is a distal colonic ostomy in the left lower quadrant that is filled with oral contrast, and there is contrast within most of the colon as well.  There is contrast in a normal appearing appendix.  Negative for dilated loops of large or small bowel.  The ostomy appears to be a side ostomy, with the distal small bowel in the way from the ostomy toward the rectum.    There remains to be an air and fluid  collection to the left of the rectum, appearing to communicate with the rectum, most consistent with a fistula that measures 3.9 cm in length currently.  The amount of surrounding inflammatory changes is stable.  Again seen is significant induration of the medial inferior left buttock soft tissues without air collection although there are some new air bubbles seen (reference images 145 through 150 of series 2).    The rest of the sigmoid colon and rectum are unchanged.    PELVIS: The urinary bladder is unremarkable.    The male pelvic organs are normal. There are pelvic phleboliths.    Again seen are some air collections within both spermatic cords.  These air collections are smaller than on the most recent comparison study.  No other changes of intraperitoneal free air is seen.    No significant osseous abnormality is identified.  Negative for significant spinal canal stenosis. Stable degenerative changes of the spine.  Stable bilateral L5 pars interarticularis defects with minimal grade 1 anterior spondylolisthesis of L5 on S1.    Negative for groin adenopathy.    The abdominal wall is intact.                                        The Emergency Provider reviewed the vital signs and test results, which are outlined above.    ED Discussion     4:00 PM: Dr. Charles transfers care of pt to Dr. Pinzon, pending imaging results.    8:03 PM: Discussed case with Izaiah Baca MD ([facility]). Dr. Baca agrees with current care and management of pt and accepts admission.   Admitting Service: Hospital Medicine  Admitting Physician: Dr. Baca  Admit to: Observation    8:05 PM: Re-evaluated pt. I have discussed test results, shared treatment plan, and the need for admission with patient and family at bedside. Pt and family express understanding at this time and agree with all information. All questions answered. Pt and family have no further questions or concerns at this time. Pt is ready for admit.    ED  Medication(s):  Medications   vancomycin 1.75 g in 5 % dextrose 500 mL IVPB (has no administration in time range)   vancomycin - pharmacy to dose (has no administration in time range)   piperacillin-tazobactam 4.5 g in dextrose 5 % 100 mL IVPB (ready to mix system) (0 g Intravenous Stopped 3/2/20 1627)   acetaminophen tablet 650 mg (650 mg Oral Given 3/2/20 1557)   iohexol (OMNIPAQUE 350) injection 100 mL (75 mLs Intravenous Given 3/2/20 1648)   iohexol (OMNIPAQUE 350) injection 50 mL (30 mLs Oral Given 3/2/20 1540)          Medication List      ASK your doctor about these medications    amoxicillin-clavulanate 875-125mg 875-125 mg per tablet  Commonly known as:  AUGMENTIN  Take 1 tablet by mouth 2 (two) times daily. for 12 days     capecitabine 500 MG Tab  Commonly known as:  XELODA  Take 3 tablets (1500 mg) by mouth twice daily after breakfast and dinner on days of radiation only.     diphenoxylate-atropine 2.5-0.025 mg 2.5-0.025 mg per tablet  Commonly known as:  LOMOTIL  Take 1 to 2 tablets by mouth 4 (four) times daily as needed for Diarrhea.     dronabinol 10 MG capsule  Commonly known as:  MARINOL  Take 1 capsule (10 mg total) by mouth 2 (two) times daily before meals.     iron fum-B12-IF-C-folic acid 110-0.5 mg capsule  Commonly known as:  FOLTRIN  Take 1 capsule by mouth 2 (two) times daily.     lidocaine 5 % Oint ointment  Commonly known as:  XYLOCAINE  Apply topically as needed.     magic mouthwash diphen/antac/lidoc  Swish and spit 15 ml by mouth every 4 hours as needed     morphine 30 MG 12 hr tablet  Commonly known as:  MS CONTIN  Take 1 tablet (30 mg total) by mouth every 8 (eight) hours.     oxyCODONE 10 mg Tab immediate release tablet  Commonly known as:  ROXICODONE  Take 1 tablet (10 mg total) by mouth every 4 (four) hours as needed for Pain (medically necessary).     pramoxine 1 % Foam  Commonly known as:  Proctofoam  Place rectally 3 (three) times daily as needed.     promethazine 25 MG  tablet  Commonly known as:  PHENERGAN  Take 1 tablet (25 mg total) by mouth every 4 (four) hours.                  Medical Decision Making           Medical Decision Makin-year-old male who was sent to the emergency department by General surgery, Dr. Morrissey; Dr. Morrissey requested that patient be admitted to Medicine for IV antibiotics; care handed off to me by Dr. Charles; on exam, patient has an area of induration with mild tenderness to palpation of the left buttock; no crepitus or physical exam findings of necrotizing fasciitis; patient given broad-spectrum antibiotics and admitted to Medicine           Scribe Attestation:   Scribe #1: I performed the above scribed service and the documentation accurately describes the services I performed. I attest to the accuracy of the note 2020.    Attending:   Physician Attestation Statement for Scribe #1: I, Miles Charles MD, personally performed the services described in this documentation, as scribed by Corinne Mack, in my presence, and it is both accurate and complete.       Scribe Attestation:   Scribe #2: I performed the above scribed service and the documentation accurately describes the services I performed. I attest to the accuracy of the note.    Attending Attestation:           Physician Attestation for Scribe:    Physician Attestation Statement for Scribe #2: I, Petr Pinzon MD, reviewed documentation, as scribed by Jan Sunshine in my presence, and it is both accurate and complete. I also acknowledge and confirm the content of the note done by Scribe #1.          Clinical Impression       ICD-10-CM ICD-9-CM   1. 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 Rectal perforation K63.1 569.49   3. Fever in other diseases R50.81 780.61       Disposition:   Disposition: Placed in Observation  Condition: Fair           Petr Pinzon MD  20 [id number]

## 2020-03-02 NOTE — ED NOTES
Pt provided a urine specimen cup at bedside with instructions that a clean catch urine sample is needed for a urinalysis; pt verbalizes understanding.  Will continue to monitor.

## 2020-03-02 NOTE — PROGRESS NOTES
History & Physical    SUBJECTIVE:     History of Present Illness:  Patient is a 54 y.o. male referred for new buttock mass.  Patient reports pain, swelling, redness and fever coming from a spot on his left buttock.  He was recently seen in the ER on Friday and reports leaving due to length of time it took him to be admitted.  He recently underwent diverting colostomy and port placement by  for a perforated rectal cancer.  He has completed his oral antibiotics and having intermittent fevers.    Chief Complaint   Patient presents with    Mass     Left-Buttocks       Review of patient's allergies indicates:  No Known Allergies    Current Outpatient Medications   Medication Sig Dispense Refill    amoxicillin-clavulanate 875-125mg (AUGMENTIN) 875-125 mg per tablet Take 1 tablet by mouth 2 (two) times daily. for 12 days 20 tablet 0    capecitabine (XELODA) 500 MG Tab Take 3 tablets (1500 mg) by mouth twice daily after breakfast and dinner on days of radiation only. 168 tablet 0    diphenoxylate-atropine 2.5-0.025 mg (LOMOTIL) 2.5-0.025 mg per tablet Take 1 to 2 tablets by mouth 4 (four) times daily as needed for Diarrhea. 120 tablet 1    dronabinol (MARINOL) 10 MG capsule Take 1 capsule (10 mg total) by mouth 2 (two) times daily before meals. 60 capsule 0    iron fum-B12-IF-C-folic acid (FOLTRIN) 110-0.5 mg capsule Take 1 capsule by mouth 2 (two) times daily. 60 capsule 1    lidocaine (XYLOCAINE) 5 % Oint ointment Apply topically as needed. 30 g 3    magic mouthwash diphen/antac/lidoc Swish and spit 15 ml by mouth every 4 hours as needed 500 mL 2    morphine (MS CONTIN) 30 MG 12 hr tablet Take 1 tablet (30 mg total) by mouth every 8 (eight) hours. 90 tablet 0    oxyCODONE (ROXICODONE) 10 mg Tab immediate release tablet Take 1 tablet (10 mg total) by mouth every 4 (four) hours as needed for Pain (medically necessary). 90 tablet 0    pramoxine (PROCTOFOAM) 1 % Foam Place rectally 3 (three) times daily as  needed. 1 Can 1    promethazine (PHENERGAN) 25 MG tablet Take 1 tablet (25 mg total) by mouth every 4 (four) hours. 60 tablet 4     No current facility-administered medications for this visit.        Past Medical History:   Diagnosis Date    Anemia     Cancer      Past Surgical History:   Procedure Laterality Date    COLONOSCOPY N/A 6/6/2019    Procedure: COLONOSCOPY;  Surgeon: Anjelica Saavedra MD;  Location: [facility] ENDO;  Service: Endoscopy;  Laterality: N/A;    ESOPHAGOGASTRODUODENOSCOPY N/A 6/6/2019    Procedure: EGD (ESOPHAGOGASTRODUODENOSCOPY);  Surgeon: Anjelica Saavedra MD;  Location: [facility] ENDO;  Service: Endoscopy;  Laterality: N/A;    HERNIA REPAIR  1995    INJECTION OF ANESTHETIC AGENT INTO TISSUE PLANE DEFINED BY TRANSVERSUS ABDOMINIS MUSCLE N/A 2/18/2020    Procedure: BLOCK, TRANSVERSUS ABDOMINIS PLANE;  Surgeon: Jan New MD;  Location: [facility] OR;  Service: General;  Laterality: N/A;    INSERTION OF TUNNELED CENTRAL VENOUS CATHETER (CVC) WITH SUBCUTANEOUS PORT N/A 10/8/2019    Procedure: [id number];  Surgeon: Jan New MD;  Location: [facility] OR;  Service: General;  Laterality: N/A;    TONSILLECTOMY       Family History   Problem Relation Age of Onset    Intestinal malrotation Mother     Leukemia Father     No Known Problems Sister     Coronary artery disease Brother     Coronary artery disease Maternal Grandmother     Stomach cancer Maternal Grandfather      Social History     Tobacco Use    Smoking status: Current Every Day Smoker     Packs/day: 1.00     Years: 35.00     Pack years: 35.00     Types: Cigarettes     Start date: 1/2/1984    Smokeless tobacco: Current User     Types: Chew   Substance Use Topics    Alcohol use: Yes     Alcohol/week: 46.0 standard drinks     Types: 42 Cans of beer, 4 Shots of liquor per week     Frequency: 4 or more times a week     Drinks per session: 5 or 6     Binge frequency: Daily or almost daily     Comment: nancie 7, beer daily,   None 72 hours prior to surgery    Drug use: Never        Review of Systems:  Review of Systems   Constitutional: Positive for fever. Negative for chills and unexpected weight change.   HENT: Negative for congestion.    Eyes: Negative for visual disturbance.   Respiratory: Negative for shortness of breath.    Cardiovascular: Negative for chest pain.   Gastrointestinal: Negative for abdominal distention, abdominal pain, constipation, nausea, rectal pain and vomiting.   Genitourinary: Negative for dysuria.   Musculoskeletal: Negative for arthralgias.   Skin: Positive for color change. Negative for rash.   Neurological: Negative for light-headedness.   Hematological: Negative for adenopathy.       OBJECTIVE:     Vital Signs (Most Recent)  Temp: (!) 101 °F (38.3 °C) (03/02/20 1225)  Pulse: 108 (03/02/20 1225)  BP: 125/76 (03/02/20 1225)  6' (1.829 m)  69.7 kg (153 lb 10.6 oz)     Physical Exam:  Physical Exam   Constitutional: He is oriented to person, place, and time. He appears well-developed and well-nourished.   Febrile   HENT:   Head: Normocephalic and atraumatic.   Eyes: EOM are normal.   Neck: Normal range of motion. Neck supple.   Cardiovascular: Regular rhythm.   Tachycardic   Pulmonary/Chest: Effort normal and breath sounds normal.   Abdominal: Soft. Bowel sounds are normal. He exhibits no distension. There is no tenderness.   Ostomy in place   Neurological: He is alert and oriented to person, place, and time.   Skin: Skin is warm and dry.   Vitals reviewed.      Diagnostic Results:  CT:  Interval postoperative changes with left-sided loop sigmoid colostomy.  Redemonstration of an air and fluid containing collection along the left pelvic sidewall with small locules of gas seen tracking to the rectum, with findings remaining suspicious for contained perforation.  The overall dimensions of the collection are similar to slightly increased since the comparison exam with slightly more fluid visualized on today's  study.  No well-defined rim enhancing wall to this collection is visualized..  Interval increasing stranding in the left perianal region and extending posteriorly about the left gluteus musculature suggestive of inflammation/cellulitis.  No well-formed collection/abscess is seen in this region at this time.    ASSESSMENT/PLAN:     54-year-old male with continued fever/tachycardia with concern for pelvic abscess and area of cellulitis/induration on buttock concern for developing abscess    PLAN:Plan     Patient was in process of being admitted through ER on Friday however left due to weight  He has completed antibiotics but still having fevers  Instructed the patient he needs to return to the ER for antibiotics admitted and interval imaging to evaluate for worsening infection that may need intervention.  ER physician notified of patient to help expedite his care.  Patient is hesitant to return to ER.  We explained the risks of not getting appropriate care in a timely fashion.

## 2020-03-03 PROBLEM — D50.0 IRON DEFICIENCY ANEMIA DUE TO CHRONIC BLOOD LOSS: Chronic | Status: ACTIVE | Noted: 2019-09-04

## 2020-03-03 PROBLEM — K60.40 RECTAL FISTULA: Status: ACTIVE | Noted: 2020-03-03

## 2020-03-03 PROBLEM — K60.4 RECTAL FISTULA: Status: ACTIVE | Noted: 2020-03-03

## 2020-03-03 LAB
ALBUMIN SERPL BCP-MCNC: 2.5 G/DL (ref 3.5–5.2)
ALP SERPL-CCNC: 65 U/L (ref 55–135)
ALT SERPL W/O P-5'-P-CCNC: 5 U/L (ref 10–44)
ANION GAP SERPL CALC-SCNC: 8 MMOL/L (ref 8–16)
AST SERPL-CCNC: 10 U/L (ref 10–40)
BASOPHILS # BLD AUTO: 0.04 K/UL (ref 0–0.2)
BASOPHILS NFR BLD: 0.4 % (ref 0–1.9)
BILIRUB SERPL-MCNC: 0.5 MG/DL (ref 0.1–1)
BUN SERPL-MCNC: 4 MG/DL (ref 6–20)
CALCIUM SERPL-MCNC: 8.5 MG/DL (ref 8.7–10.5)
CHLORIDE SERPL-SCNC: 107 MMOL/L (ref 95–110)
CO2 SERPL-SCNC: 24 MMOL/L (ref 23–29)
CREAT SERPL-MCNC: 0.7 MG/DL (ref 0.5–1.4)
DIFFERENTIAL METHOD: ABNORMAL
EOSINOPHIL # BLD AUTO: 0.3 K/UL (ref 0–0.5)
EOSINOPHIL NFR BLD: 3.3 % (ref 0–8)
ERYTHROCYTE [DISTWIDTH] IN BLOOD BY AUTOMATED COUNT: 14.2 % (ref 11.5–14.5)
EST. GFR  (AFRICAN AMERICAN): >60 ML/MIN/1.73 M^2
EST. GFR  (NON AFRICAN AMERICAN): >60 ML/MIN/1.73 M^2
GLUCOSE SERPL-MCNC: 97 MG/DL (ref 70–110)
HCT VFR BLD AUTO: 29.3 % (ref 40–54)
HGB BLD-MCNC: 9.1 G/DL (ref 14–18)
IMM GRANULOCYTES # BLD AUTO: 0.04 K/UL (ref 0–0.04)
IMM GRANULOCYTES NFR BLD AUTO: 0.4 % (ref 0–0.5)
LYMPHOCYTES # BLD AUTO: 1.2 K/UL (ref 1–4.8)
LYMPHOCYTES NFR BLD: 12.9 % (ref 18–48)
MAGNESIUM SERPL-MCNC: 1.8 MG/DL (ref 1.6–2.6)
MCH RBC QN AUTO: 27.5 PG (ref 27–31)
MCHC RBC AUTO-ENTMCNC: 31.1 G/DL (ref 32–36)
MCV RBC AUTO: 89 FL (ref 82–98)
MONOCYTES # BLD AUTO: 0.7 K/UL (ref 0.3–1)
MONOCYTES NFR BLD: 7.4 % (ref 4–15)
NEUTROPHILS # BLD AUTO: 6.9 K/UL (ref 1.8–7.7)
NEUTROPHILS NFR BLD: 75.6 % (ref 38–73)
NRBC BLD-RTO: 0 /100 WBC
PHOSPHATE SERPL-MCNC: 2.6 MG/DL (ref 2.7–4.5)
PLATELET # BLD AUTO: 244 K/UL (ref 150–350)
PMV BLD AUTO: 9 FL (ref 9.2–12.9)
POTASSIUM SERPL-SCNC: 4 MMOL/L (ref 3.5–5.1)
PROT SERPL-MCNC: 6.7 G/DL (ref 6–8.4)
RBC # BLD AUTO: 3.31 M/UL (ref 4.6–6.2)
SODIUM SERPL-SCNC: 139 MMOL/L (ref 136–145)
WBC # BLD AUTO: 9.07 K/UL (ref 3.9–12.7)

## 2020-03-03 PROCEDURE — 63600175 PHARM REV CODE 636 W HCPCS: Performed by: INTERNAL MEDICINE

## 2020-03-03 PROCEDURE — 99024 POSTOP FOLLOW-UP VISIT: CPT | Mod: ,,, | Performed by: COLON & RECTAL SURGERY

## 2020-03-03 PROCEDURE — 83735 ASSAY OF MAGNESIUM: CPT

## 2020-03-03 PROCEDURE — 25000003 PHARM REV CODE 250: Performed by: INTERNAL MEDICINE

## 2020-03-03 PROCEDURE — 96376 TX/PRO/DX INJ SAME DRUG ADON: CPT

## 2020-03-03 PROCEDURE — G0378 HOSPITAL OBSERVATION PER HR: HCPCS

## 2020-03-03 PROCEDURE — 84100 ASSAY OF PHOSPHORUS: CPT

## 2020-03-03 PROCEDURE — 80053 COMPREHEN METABOLIC PANEL: CPT

## 2020-03-03 PROCEDURE — 99024 PR POST-OP FOLLOW-UP VISIT: ICD-10-PCS | Mod: ,,, | Performed by: COLON & RECTAL SURGERY

## 2020-03-03 PROCEDURE — 25000003 PHARM REV CODE 250: Performed by: NURSE PRACTITIONER

## 2020-03-03 PROCEDURE — 85025 COMPLETE CBC W/AUTO DIFF WBC: CPT

## 2020-03-03 PROCEDURE — 36415 COLL VENOUS BLD VENIPUNCTURE: CPT

## 2020-03-03 PROCEDURE — 96361 HYDRATE IV INFUSION ADD-ON: CPT

## 2020-03-03 RX ADMIN — OXYCODONE HYDROCHLORIDE 10 MG: 5 TABLET ORAL at 01:03

## 2020-03-03 RX ADMIN — MORPHINE SULFATE 30 MG: 30 TABLET, FILM COATED, EXTENDED RELEASE ORAL at 03:03

## 2020-03-03 RX ADMIN — PIPERACILLIN AND TAZOBACTAM 4.5 G: 4; .5 INJECTION, POWDER, LYOPHILIZED, FOR SOLUTION INTRAVENOUS; PARENTERAL at 08:03

## 2020-03-03 RX ADMIN — MORPHINE SULFATE 30 MG: 30 TABLET, FILM COATED, EXTENDED RELEASE ORAL at 09:03

## 2020-03-03 RX ADMIN — PIPERACILLIN AND TAZOBACTAM 4.5 G: 4; .5 INJECTION, POWDER, LYOPHILIZED, FOR SOLUTION INTRAVENOUS; PARENTERAL at 03:03

## 2020-03-03 RX ADMIN — SODIUM CHLORIDE: 0.9 INJECTION, SOLUTION INTRAVENOUS at 08:03

## 2020-03-03 RX ADMIN — VANCOMYCIN HYDROCHLORIDE 1250 MG: 100 INJECTION, POWDER, LYOPHILIZED, FOR SOLUTION INTRAVENOUS at 10:03

## 2020-03-03 RX ADMIN — MORPHINE SULFATE 30 MG: 30 TABLET, FILM COATED, EXTENDED RELEASE ORAL at 05:03

## 2020-03-03 RX ADMIN — VANCOMYCIN HYDROCHLORIDE 1250 MG: 100 INJECTION, POWDER, LYOPHILIZED, FOR SOLUTION INTRAVENOUS at 08:03

## 2020-03-03 RX ADMIN — PIPERACILLIN AND TAZOBACTAM 4.5 G: 4; .5 INJECTION, POWDER, LYOPHILIZED, FOR SOLUTION INTRAVENOUS; PARENTERAL at 11:03

## 2020-03-03 RX ADMIN — ACETAMINOPHEN 650 MG: 325 TABLET ORAL at 05:03

## 2020-03-03 RX ADMIN — PANTOPRAZOLE SODIUM 40 MG: 40 TABLET, DELAYED RELEASE ORAL at 08:03

## 2020-03-03 NOTE — PROGRESS NOTES
[facility] Hospital Medicine  Progress Note    Patient Name: Sukhjinder Presley  MRN: [id number]  Patient Class: OP- Observation   Admission Date: 3/2/2020  Length of Stay: 0 days  Attending Physician: Joe Tovar MD  Primary Care Provider: Drake Elizalde MD        Subjective:     Principal Problem:Rectal perforation        HPI:  Mr. Presley is a 53 yo male with a PMHx of rectal adenocarcinoma currently receiving chemo and radiation, and recently diagnosed rectal perforation near his tumor.  In order to complete his chemo/radiation treatment and prevent sepsis, he underwent laparoscopic colostomy creation per Dr. New on 2/18/20.  Tonight, patient presented to the ED with c/o fever x 2 days.  Associated redness, swelling, and pain to left buttock.  Denies any ABD pain or distention, N/V/D, melena, hematochezia, hematemesis, dysuria, hematuria, CP, SOB, back pain, pelvic pain, HA, lightheadedness, dizziness, or chills.  Initial work-up resulted WBC 9.61, 0% bands, lactic 1.8.  CT of ABD/pelvis showed unchanged fistulous connection versus localized perforation involving the left side of the rectum, soft tissue swelling and amorphous edema surrounding the rectum is similar to the comparison study, interval development of air bubbles in the medial posterior left buttock soft tissues in the same location as significant inflammatory changes without a fluid collection seen which is concerning for either extension of the localized perforation into the posteromedial buttock versus interval development of some necrosis.  Blood cultures were obtained in the ED, and IV vanc + Zosyn given.  Case discussed with General Surgery, who agrees with IV antibiotics and will see patient in consult.  Hospital Medicine was called for admission.         Overview/Hospital Course:  Patient is a 54 year old male PMHx of rectal adenocarcinoma receiving chemotherapy who presented to [facility] Emergency Room with complaint of fever  for  x 2 days associated left buttock mass and pain. Patient was evaluated by General Surgery. Patient had cellulitis/induration on buttock concerning for developing abscess. He continue to have buttock pain today. Vital signs stable.        Interval History: Pt seen and examined, continues to have left buttock area pain. Continue pain meds and IV antibiotics     Review of Systems   Constitutional: Negative for chills, diaphoresis, fatigue and fever.   HENT: Negative for congestion, ear discharge, rhinorrhea, sinus pressure, sore throat and trouble swallowing.    Eyes: Negative for discharge and visual disturbance.   Respiratory: Negative for apnea, cough, choking, chest tightness, shortness of breath, wheezing and stridor.    Cardiovascular: Negative for chest pain, palpitations and leg swelling.   Gastrointestinal: Negative for abdominal distention, abdominal pain, diarrhea, nausea and vomiting.   Endocrine: Negative for cold intolerance and heat intolerance.   Genitourinary: Negative for dysuria, frequency and hematuria.   Musculoskeletal: Negative for arthralgias, back pain, myalgias and neck pain.        Left buttock area pain    Skin: Negative for pallor and rash.   Neurological: Negative for dizziness, seizures, syncope, weakness and headaches.   Psychiatric/Behavioral: Negative for agitation, confusion and sleep disturbance.     Objective:     Vital Signs (Most Recent):  Temp: 100.2 °F (37.9 °C) (03/03/20 1627)  Pulse: 86 (03/03/20 1627)  Resp: 18 (03/03/20 1627)  BP: 113/71 (03/03/20 1627)  SpO2: 95 % (03/03/20 1627) Vital Signs (24h Range):  Temp:  [98.7 °F (37.1 °C)-100.2 °F (37.9 °C)] 100.2 °F (37.9 °C)  Pulse:  [66-96] 86  Resp:  [13-32] 18  SpO2:  [95 %-99 %] 95 %  BP: ()/(50-74) 113/71     Weight: 70 kg (154 lb 3.4 oz)  Body mass index is 20.91 kg/m².    Intake/Output Summary (Last 24 hours) at 3/3/2020 1647  Last data filed at 3/3/2020 1400  Gross per 24 hour   Intake 4475.83 ml   Output 600 ml    Net 3875.83 ml      Physical Exam   Constitutional: He is oriented to person, place, and time. No distress.   HENT:   Mouth/Throat: No oropharyngeal exudate.   Eyes: Right eye exhibits no discharge. Left eye exhibits no discharge.   Neck: No JVD present.   Cardiovascular: Exam reveals no gallop and no friction rub.   No murmur heard.  Pulmonary/Chest: No stridor. No respiratory distress. He has no wheezes. He has no rales. He exhibits no tenderness.   Abdominal: He exhibits no distension and no mass. There is no tenderness. There is no rebound and no guarding. No hernia.   Left side colonoscopy   Musculoskeletal: He exhibits no edema.   Neurological: He is alert and oriented to person, place, and time.   Skin: Skin is warm and dry. He is not diaphoretic.   Nursing note and vitals reviewed.      Significant Labs:   CBC:   Recent Labs   Lab 03/02/20  1540 03/03/20  0827   WBC 9.61 9.07   HGB 9.1* 9.1*   HCT 28.3* 29.3*    244     CMP:   Recent Labs   Lab 03/02/20  1540 03/03/20  0827    139   K 3.4* 4.0    107   CO2 25 24    97   BUN 6 4*   CREATININE 0.7 0.7   CALCIUM 8.4* 8.5*   PROT 7.2 6.7   ALBUMIN 2.7* 2.5*   BILITOT 0.3 0.5   ALKPHOS 67 65   AST 10 10   ALT 7* 5*   ANIONGAP 11 8   EGFRNONAA >60 >60       Significant Imaging:     Imaging Results          CT Abdomen Pelvis With Contrast (Final result)  Result time 03/02/20 17:11:20    Final result by Bora Guzmán MD (03/02/20 17:11:20)                 Impression:      1.  Again seen is a fistulous connection versus localized perforation involving the left side of the rectum.  In this area measures approximately 3.9 x 2.0 cm currently, not significantly changed from the comparison study.  The amount of soft tissue swelling and amorphous edema surrounding the rectum is similar to the comparison study there has been interval development of some air bubbles in the medial posterior left buttock soft tissues in the same location is  significant inflammatory changes without a fluid collection seen.  These findings are concerning for either extension of the localized perforation into the posteromedial buttock versus interval development of some necrosis.  Clinical correlation is advised.    2.  No other abnormal fluid collections are seen throughout the abdomen or pelvis.  There does remain to be some air in both inguinal canals although this is decreased in the interim and there is no definite evidence for intraperitoneal free air.    3. There are some new ground-glass opacities within the bilateral lower lobes.  Early infectious process must be considered.    4.  The appearance of the abdomen and pelvis is otherwise unchanged.  Negative for dilated loops of large or small bowel with oral contrast flowing through the bowel and into the left lower quadrant ostomy.    5.  Numerous stable findings as noted above.    6.  A call report was made to the emergency room at 17:10 hours on March 2, 2020.  I spoke with Dr. Pinzon.    All CT scans at this facility are performed  using dose modulation techniques as appropriate to performed exam including the following:  automated exposure control; adjustment of mA and/or kV according to the patients size (this includes techniques or standardized protocols for targeted exams where dose is matched to indication/reason for exam: i.e. extremities or head);  iterative reconstruction technique.      Electronically signed by: Bora Guzmán MD  Date:    03/02/2020  Time:    17:11             Narrative:    EXAMINATION:  CT ABDOMEN PELVIS WITH CONTRAST    CLINICAL HISTORY:  ABD pain, fever, abscess suspected;    TECHNIQUE:  Axial images through the abdomen and pelvis were obtained with the use of IV contrast.  Sagittal and coronal reconstructions are provided for review.  Oral contrast was utilized.    COMPARISON:  February 28, 2020    FINDINGS:  LUNG BASES: Medial right lower lobe and inferior left lower lobe  ground-glass opacities have developed in the interim.  There is some atelectasis in the right lower lobe laterally.  Stable medial middle lobe scarring.  Lung bases are otherwise clear.  Negative for pleural or pericardial effusions. The distal esophagus is normal.    ABDOMEN: Stable splenomegaly.  Stable hepatic and splenic calcified granulomas.  Stable inferior pole left renal stones measuring up to 1.1 cm, without hydronephrosis.  The liver, spleen and gallbladder otherwise appear normal.  The pancreas is unremarkable.  Kidneys and adrenal glands are otherwise normal.  Biliary tree is normal.    Subcentimeter mesenteric and retroperitoneal lymph nodes again seen.    Negative for adenopathy, free fluid or inflammatory change noted within the abdomen or pelvis.  Vascular calcifications are present without aneurysmal changes. Portal vein is patent.    There is oral contrast within the stomach and small bowel.  There is a distal colonic ostomy in the left lower quadrant that is filled with oral contrast, and there is contrast within most of the colon as well.  There is contrast in a normal appearing appendix.  Negative for dilated loops of large or small bowel.  The ostomy appears to be a side ostomy, with the distal small bowel in the way from the ostomy toward the rectum.    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  The amount of surrounding inflammatory changes is stable.  Again seen is significant induration of the medial inferior left buttock soft tissues without air collection although there are some new air bubbles seen (reference images 145 through 150 of series 2).    The rest of the sigmoid colon and rectum are unchanged.    PELVIS: The urinary bladder is unremarkable.    The male pelvic organs are normal. There are pelvic phleboliths.    Again seen are some  air collections within both spermatic cords.  These air collections are smaller than on the most recent comparison study.  No other changes of intraperitoneal free air is seen.    No significant osseous abnormality is identified.  Negative for significant spinal canal stenosis. Stable degenerative changes of the spine.  Stable bilateral L5 pars interarticularis defects with minimal grade 1 anterior spondylolisthesis of L5 on S1.    Negative for groin adenopathy.    The abdominal wall is intact.                                Assessment/Plan:      * Rectal fistula with localized perforation  - s/p colostomy creation on 2/18.  General Surgery consult, awaiting recs.  - Blood cultures obtained in the ED.  - Continue IV vanc and Zosyn.  - Analgesics as needed.  - Will keep NPO after MN in case surgical intervention is warranted.  - Follow labs.    3/3  Case review with Dr New, no plans for surgery today   Ok regular diet     Iron deficiency anemia due to chronic blood loss  - H&H stable.  Follow CBC and transfuse as needed.    Rectal cancer  - Currently on neoadjuvant chemotherapy biweekly.  Completed neoadjuvant chemo radiation.  - Followed by Dr. Cosby.      VTE Risk Mitigation (From admission, onward)         Ordered     IP VTE HIGH RISK PATIENT  Once      03/02/20 2306     Reason for No Pharmacological VTE Prophylaxis  Once     Question:  Reasons:  Answer:  Risk of Bleeding    03/02/20 2306     Place sequential compression device  Until discontinued      03/02/20 2306                      Carter Castillo NP  Department of Hospital Medicine   [facility]

## 2020-03-03 NOTE — ASSESSMENT & PLAN NOTE
- Currently on neoadjuvant chemotherapy biweekly.  Completed neoadjuvant chemo radiation.  - Followed by Dr. Cosby.

## 2020-03-03 NOTE — PLAN OF CARE
Guerita met with a patient and his mother at bedside to assess for discharge planning.  Patient was alert and oriented.  Patient denied the use of any medical/respiratory assistive equipment before coming to the hospital, but did report that he was receiving Home Health services with [facility].  Patient reported that he has cancer, and is under the supervision of his cancer doctors with [facility].  Patient identified his help at home as his mother, spouse, and children.  Patient reported that his wife and mother help him to manage his healthcare.  Patient denied the need for any assistive equipment, home health services, and all other community resources at this time.  Patient anticipates discharging with no needs, however, discharge needs are undetermined at this time.  GUERITA provided a transitional care folder, information on advanced directives, information on pharmacy bedside delivery, and discharge planning begins on admission with contact information for any needs/questions.     D/C Plan:  Svetlana  PCP:  Dr. Drake Elizalde  Preferred Pharmacy:    [facility] - [facility] - [address]  [facility] [place] [address]  Phone: [phone] Fax: [phone]    Discharge transportation:  Family  My [facility]:  Active  Pharmacy Bedside Delivery:  Yes       03/03/20 1428   Discharge Assessment   Assessment Type Discharge Planning Assessment   Confirmed/corrected address and phone number on facesheet? Yes   Assessment information obtained from? Patient   Expected Length of Stay (days)   (TBD)   Communicated expected length of stay with patient/caregiver yes   Prior to hospitilization cognitive status: Alert/Oriented   Prior to hospitalization functional status: Independent   Current cognitive status: Alert/Oriented   Current Functional Status: Independent   Facility Arrived From: Home   Lives With child(gustavo), dependent;spouse;parent(s)   Able to Return to Prior Arrangements yes   Is patient able  to care for self after discharge? Yes   Who are your caregiver(s) and their phone number(s)? Adelaida Presley (spouse) [phone]   Patient's perception of discharge disposition home or selfcare   Readmission Within the Last 30 Days no previous admission in last 30 days   Patient currently being followed by outpatient case management? No   Patient currently receives any other outside agency services? No   Is it the patient/care giver preference to resume care with the current outside agency? No   Equipment Currently Used at Home none   Do you have any problems affording any of your prescribed medications? No   Is the patient taking medications as prescribed? yes   Does the patient have transportation home? No   Dialysis Name and Scheduled days N/A   Does the patient receive services at the [facility]? No   Discharge Plan A Home with family;Home Health   DME Needed Upon Discharge  none   Patient/Family in Agreement with Plan yes

## 2020-03-03 NOTE — H&P
[facility] Hospital Medicine  History & Physical    Patient Name: Sukhjinder Presley  MRN: [id number]  Admission Date: 3/2/2020  Attending Physician: Joe Tovar MD   Primary Care Provider: Drake Elizalde MD         Patient information was obtained from patient, past medical records and ER records.     Subjective:     Principal Problem:Rectal perforation    Chief Complaint:   Chief Complaint   Patient presents with    Fever     Colostomy placed 2/18. Pt was seen Friday for fever but left. He has been running         HPI: Mr. Presley is a 53 yo male with a PMHx of rectal adenocarcinoma currently receiving chemo and radiation, and recently diagnosed rectal perforation near his tumor.  In order to complete his chemo/radiation treatment and prevent sepsis, he underwent laparoscopic colostomy creation per Dr. New on 2/18/20.  Tonight, patient presented to the ED with c/o fever x 2 days.  Associated redness, swelling, and pain to left buttock.  Denies any ABD pain or distention, N/V/D, melena, hematochezia, hematemesis, dysuria, hematuria, CP, SOB, back pain, pelvic pain, HA, lightheadedness, dizziness, or chills.  Initial work-up resulted WBC 9.61, 0% bands, lactic 1.8.  CT of ABD/pelvis showed unchanged fistulous connection versus localized perforation involving the left side of the rectum, soft tissue swelling and amorphous edema surrounding the rectum is similar to the comparison study, interval development of air bubbles in the medial posterior left buttock soft tissues in the same location as significant inflammatory changes without a fluid collection seen which is concerning for either extension of the localized perforation into the posteromedial buttock versus interval development of some necrosis.  Blood cultures were obtained in the ED, and IV vanc + Zosyn given.  Case discussed with General Surgery, who agrees with IV antibiotics and will see patient in consult.  Hospital Medicine was called  for admission.         Past Medical History:   Diagnosis Date    Anemia     Cancer        Past Surgical History:   Procedure Laterality Date    COLONOSCOPY N/A 6/6/2019    Procedure: COLONOSCOPY;  Surgeon: Anjelica Saavedra MD;  Location: [facility] ENDO;  Service: Endoscopy;  Laterality: N/A;    ESOPHAGOGASTRODUODENOSCOPY N/A 6/6/2019    Procedure: EGD (ESOPHAGOGASTRODUODENOSCOPY);  Surgeon: Anjelica Saavedra MD;  Location: [facility] ENDO;  Service: Endoscopy;  Laterality: N/A;    HERNIA REPAIR  1995    INJECTION OF ANESTHETIC AGENT INTO TISSUE PLANE DEFINED BY TRANSVERSUS ABDOMINIS MUSCLE N/A 2/18/2020    Procedure: BLOCK, TRANSVERSUS ABDOMINIS PLANE;  Surgeon: Jan New MD;  Location: [facility] OR;  Service: General;  Laterality: N/A;    INSERTION OF TUNNELED CENTRAL VENOUS CATHETER (CVC) WITH SUBCUTANEOUS PORT N/A 10/8/2019    Procedure: [id number];  Surgeon: Jan New MD;  Location: [facility] OR;  Service: General;  Laterality: N/A;    TONSILLECTOMY         Review of patient's allergies indicates:  No Known Allergies    No current facility-administered medications on file prior to encounter.      Current Outpatient Medications on File Prior to Encounter   Medication Sig    amoxicillin-clavulanate 875-125mg (AUGMENTIN) 875-125 mg per tablet Take 1 tablet by mouth 2 (two) times daily. for 12 days    capecitabine (XELODA) 500 MG Tab Take 3 tablets (1500 mg) by mouth twice daily after breakfast and dinner on days of radiation only.    diphenoxylate-atropine 2.5-0.025 mg (LOMOTIL) 2.5-0.025 mg per tablet Take 1 to 2 tablets by mouth 4 (four) times daily as needed for Diarrhea.    dronabinol (MARINOL) 10 MG capsule Take 1 capsule (10 mg total) by mouth 2 (two) times daily before meals.    iron fum-B12-IF-C-folic acid (FOLTRIN) 110-0.5 mg capsule Take 1 capsule by mouth 2 (two) times daily.    lidocaine (XYLOCAINE) 5 % Oint ointment Apply topically as needed.    magic mouthwash diphen/antac/lidoc  Swish and spit 15 ml by mouth every 4 hours as needed    morphine (MS CONTIN) 30 MG 12 hr tablet Take 1 tablet (30 mg total) by mouth every 8 (eight) hours.    oxyCODONE (ROXICODONE) 10 mg Tab immediate release tablet Take 1 tablet (10 mg total) by mouth every 4 (four) hours as needed for Pain (medically necessary).    pramoxine (PROCTOFOAM) 1 % Foam Place rectally 3 (three) times daily as needed.    promethazine (PHENERGAN) 25 MG tablet Take 1 tablet (25 mg total) by mouth every 4 (four) hours.     Family History     Problem Relation (Age of Onset)    Coronary artery disease Brother, Maternal Grandmother    Intestinal malrotation Mother    Leukemia Father    No Known Problems Sister    Stomach cancer Maternal Grandfather        Tobacco Use    Smoking status: Current Every Day Smoker     Packs/day: 1.00     Years: 35.00     Pack years: 35.00     Types: Cigarettes     Start date: 1/2/1984    Smokeless tobacco: Current User     Types: Chew   Substance and Sexual Activity    Alcohol use: Yes     Alcohol/week: 46.0 standard drinks     Types: 42 Cans of beer, 4 Shots of liquor per week     Frequency: 4 or more times a week     Drinks per session: 5 or 6     Binge frequency: Daily or almost daily     Comment: nancie 7, beer daily,  None 72 hours prior to surgery    Drug use: Never    Sexual activity: Yes     Partners: Female     Birth control/protection: None     Review of Systems   Constitutional: Positive for fever. Negative for chills, diaphoresis and fatigue.   Respiratory: Negative for cough, shortness of breath and wheezing.    Cardiovascular: Negative for chest pain, palpitations and leg swelling.   Gastrointestinal: Negative for abdominal pain, diarrhea, nausea and vomiting.   Genitourinary: Negative for difficulty urinating, dysuria, frequency, hematuria and urgency.   Musculoskeletal: Negative for arthralgias, back pain and myalgias.   Skin: Negative for pallor and rash.        (+) Redness, swelling,  and pain to left buttock.   Neurological: Negative for dizziness, light-headedness, numbness and headaches.   All other systems reviewed and are negative.    Objective:     Vital Signs (Most Recent):  Temp: 99 °F (37.2 °C) (03/02/20 2100)  Pulse: 84 (03/02/20 2100)  Resp: 20 (03/02/20 2100)  BP: (!) 99/58 (03/02/20 2100)  SpO2: 98 % (03/02/20 2100) Vital Signs (24h Range):  Temp:  [99 °F (37.2 °C)-102.4 °F (39.1 °C)] 99 °F (37.2 °C)  Pulse:  [] 84  Resp:  [14-21] 20  SpO2:  [97 %-99 %] 98 %  BP: ()/(56-76) 99/58     Weight: 70 kg (154 lb 3.4 oz)  Body mass index is 20.91 kg/m².    Physical Exam   Constitutional: He is oriented to person, place, and time. He appears well-developed and well-nourished. No distress.   HENT:   Head: Normocephalic and atraumatic.   Eyes: Conjunctivae are normal.   PERRL; EOM intact.   Neck: Normal range of motion. Neck supple.   Cardiovascular: Normal rate, regular rhythm, S1 normal and S2 normal.  No extrasystoles are present. Exam reveals no gallop.   No murmur heard.  Pulmonary/Chest: Effort normal and breath sounds normal. No accessory muscle usage. No tachypnea. No respiratory distress. He has no wheezes. He has no rhonchi. He has no rales.   Abdominal: Soft. Bowel sounds are normal. He exhibits no distension. There is no tenderness. There is no rebound, no guarding and no CVA tenderness.   Ostomy in place to lower abdomen.   Musculoskeletal: Normal range of motion. He exhibits no edema, tenderness or deformity.   Neurological: He is alert and oriented to person, place, and time.   Skin: Skin is warm, dry and intact. Capillary refill takes less than 2 seconds. No rash noted. He is not diaphoretic. No cyanosis or erythema.   Psychiatric: He has a normal mood and affect. His behavior is normal.   Nursing note and vitals reviewed.          Significant Labs:  Results for orders placed or performed during the hospital encounter of 03/02/20   HIV 1/2 Ag/Ab (4th Gen)   Result  Value Ref Range    HIV 1/2 Ag/Ab Negative Negative   Hepatitis C antibody   Result Value Ref Range    Hepatitis C Ab Negative Negative   CBC auto differential   Result Value Ref Range    WBC 9.61 3.90 - 12.70 K/uL    RBC 3.26 (L) 4.60 - 6.20 M/uL    Hemoglobin 9.1 (L) 14.0 - 18.0 g/dL    Hematocrit 28.3 (L) 40.0 - 54.0 %    Mean Corpuscular Volume 87 82 - 98 fL    Mean Corpuscular Hemoglobin 27.9 27.0 - 31.0 pg    Mean Corpuscular Hemoglobin Conc 32.2 32.0 - 36.0 g/dL    RDW 14.2 11.5 - 14.5 %    Platelets 241 150 - 350 K/uL    MPV 8.9 (L) 9.2 - 12.9 fL    Immature Granulocytes 0.4 0.0 - 0.5 %    Gran # (ANC) 7.3 1.8 - 7.7 K/uL    Immature Grans (Abs) 0.04 0.00 - 0.04 K/uL    Lymph # 1.3 1.0 - 4.8 K/uL    Mono # 0.7 0.3 - 1.0 K/uL    Eos # 0.2 0.0 - 0.5 K/uL    Baso # 0.04 0.00 - 0.20 K/uL    nRBC 0 0 /100 WBC    Gran% 76.1 (H) 38.0 - 73.0 %    Lymph% 13.6 (L) 18.0 - 48.0 %    Mono% 7.4 4.0 - 15.0 %    Eosinophil% 2.1 0.0 - 8.0 %    Basophil% 0.4 0.0 - 1.9 %    Differential Method Automated    Comprehensive metabolic panel   Result Value Ref Range    Sodium 138 136 - 145 mmol/L    Potassium 3.4 (L) 3.5 - 5.1 mmol/L    Chloride 102 95 - 110 mmol/L    CO2 25 23 - 29 mmol/L    Glucose 106 70 - 110 mg/dL    BUN, Bld 6 6 - 20 mg/dL    Creatinine 0.7 0.5 - 1.4 mg/dL    Calcium 8.4 (L) 8.7 - 10.5 mg/dL    Total Protein 7.2 6.0 - 8.4 g/dL    Albumin 2.7 (L) 3.5 - 5.2 g/dL    Total Bilirubin 0.3 0.1 - 1.0 mg/dL    Alkaline Phosphatase 67 55 - 135 U/L    AST 10 10 - 40 U/L    ALT 7 (L) 10 - 44 U/L    Anion Gap 11 8 - 16 mmol/L    eGFR if African American >60 >60 mL/min/1.73 m^2    eGFR if non African American >60 >60 mL/min/1.73 m^2   Lactic acid, plasma #1   Result Value Ref Range    Lactate (Lactic Acid) 1.8 0.5 - 2.2 mmol/L   Urinalysis, Reflex to Urine Culture Urine, Clean Catch   Result Value Ref Range    Specimen UA Urine, Clean Catch     Color, UA Yellow Yellow, Straw, Reshma    Appearance, UA Clear Clear    pH, UA 7.0  5.0 - 8.0    Specific Gravity, UA <=1.005 (A) 1.005 - 1.030    Protein, UA Negative Negative    Glucose, UA Negative Negative    Ketones, UA Negative Negative    Bilirubin (UA) Negative Negative    Occult Blood UA Negative Negative    Nitrite, UA Negative Negative    Urobilinogen, UA Negative <2.0 EU/dL    Leukocytes, UA Negative Negative      All pertinent labs within the past 24 hours have been reviewed.    Significant Imaging:  Imaging Results          CT Abdomen Pelvis With Contrast (Final result)  Result time 03/02/20 17:11:20    Final result by Bora Guzmán MD (03/02/20 17:11:20)                 Impression:      1.  Again seen is a fistulous connection versus localized perforation involving the left side of the rectum.  In this area measures approximately 3.9 x 2.0 cm currently, not significantly changed from the comparison study.  The amount of soft tissue swelling and amorphous edema surrounding the rectum is similar to the comparison study there has been interval development of some air bubbles in the medial posterior left buttock soft tissues in the same location is significant inflammatory changes without a fluid collection seen.  These findings are concerning for either extension of the localized perforation into the posteromedial buttock versus interval development of some necrosis.  Clinical correlation is advised.    2.  No other abnormal fluid collections are seen throughout the abdomen or pelvis.  There does remain to be some air in both inguinal canals although this is decreased in the interim and there is no definite evidence for intraperitoneal free air.    3. There are some new ground-glass opacities within the bilateral lower lobes.  Early infectious process must be considered.    4.  The appearance of the abdomen and pelvis is otherwise unchanged.  Negative for dilated loops of large or small bowel with oral contrast flowing through the bowel and into the left lower quadrant ostomy.    5.   Numerous stable findings as noted above.    6.  A call report was made to the emergency room at 17:10 hours on March 2, 2020.  I spoke with Dr. Pinzon.    All CT scans at this facility are performed  using dose modulation techniques as appropriate to performed exam including the following:  automated exposure control; adjustment of mA and/or kV according to the patients size (this includes techniques or standardized protocols for targeted exams where dose is matched to indication/reason for exam: i.e. extremities or head);  iterative reconstruction technique.      Electronically signed by: Bora Guzmán MD  Date:    03/02/2020  Time:    17:11             Narrative:    EXAMINATION:  CT ABDOMEN PELVIS WITH CONTRAST    CLINICAL HISTORY:  ABD pain, fever, abscess suspected;    TECHNIQUE:  Axial images through the abdomen and pelvis were obtained with the use of IV contrast.  Sagittal and coronal reconstructions are provided for review.  Oral contrast was utilized.    COMPARISON:  February 28, 2020    FINDINGS:  LUNG BASES: Medial right lower lobe and inferior left lower lobe ground-glass opacities have developed in the interim.  There is some atelectasis in the right lower lobe laterally.  Stable medial middle lobe scarring.  Lung bases are otherwise clear.  Negative for pleural or pericardial effusions. The distal esophagus is normal.    ABDOMEN: Stable splenomegaly.  Stable hepatic and splenic calcified granulomas.  Stable inferior pole left renal stones measuring up to 1.1 cm, without hydronephrosis.  The liver, spleen and gallbladder otherwise appear normal.  The pancreas is unremarkable.  Kidneys and adrenal glands are otherwise normal.  Biliary tree is normal.    Subcentimeter mesenteric and retroperitoneal lymph nodes again seen.    Negative for adenopathy, free fluid or inflammatory change noted within the abdomen or pelvis.  Vascular calcifications are present without aneurysmal changes. Portal vein is  patent.    There is oral contrast within the stomach and small bowel.  There is a distal colonic ostomy in the left lower quadrant that is filled with oral contrast, and there is contrast within most of the colon as well.  There is contrast in a normal appearing appendix.  Negative for dilated loops of large or small bowel.  The ostomy appears to be a side ostomy, with the distal small bowel in the way from the ostomy toward the rectum.    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  The amount of surrounding inflammatory changes is stable.  Again seen is significant induration of the medial inferior left buttock soft tissues without air collection although there are some new air bubbles seen (reference images 145 through 150 of series 2).    The rest of the sigmoid colon and rectum are unchanged.    PELVIS: The urinary bladder is unremarkable.    The male pelvic organs are normal. There are pelvic phleboliths.    Again seen are some air collections within both spermatic cords.  These air collections are smaller than on the most recent comparison study.  No other changes of intraperitoneal free air is seen.    No significant osseous abnormality is identified.  Negative for significant spinal canal stenosis. Stable degenerative changes of the spine.  Stable bilateral L5 pars interarticularis defects with minimal grade 1 anterior spondylolisthesis of L5 on S1.    Negative for groin adenopathy.    The abdominal wall is intact.                               I have reviewed all pertinent imaging results/findings within the past 24 hours.           Assessment/Plan:     * Rectal fistula with localized perforation  - s/p colostomy creation on 2/18.  General Surgery consult, awaiting recs.  - Blood cultures obtained in the ED.  - Continue IV vanc and Zosyn.  - Analgesics as  needed.  - Will keep NPO after MN in case surgical intervention is warranted.  - Follow labs.    Iron deficiency anemia due to chronic blood loss  - H&H stable.  Follow CBC and transfuse as needed.    Rectal cancer  - Currently on neoadjuvant chemotherapy biweekly.  Completed neoadjuvant chemo radiation.  - Followed by Dr. Cosby.      VTE Risk Mitigation (From admission, onward)         Ordered     IP VTE HIGH RISK PATIENT  Once      03/02/20 2306     Reason for No Pharmacological VTE Prophylaxis  Once     Question:  Reasons:  Answer:  Risk of Bleeding    03/02/20 2306     Place sequential compression device  Until discontinued      03/02/20 2306                   Sindy Geller NP  Department of Hospital Medicine   [facility]

## 2020-03-03 NOTE — SUBJECTIVE & OBJECTIVE
Interval History: Pt seen and examined, continues to have left buttock area pain. Continue pain meds and IV antibiotics     Review of Systems   Constitutional: Negative for chills, diaphoresis, fatigue and fever.   HENT: Negative for congestion, ear discharge, rhinorrhea, sinus pressure, sore throat and trouble swallowing.    Eyes: Negative for discharge and visual disturbance.   Respiratory: Negative for apnea, cough, choking, chest tightness, shortness of breath, wheezing and stridor.    Cardiovascular: Negative for chest pain, palpitations and leg swelling.   Gastrointestinal: Negative for abdominal distention, abdominal pain, diarrhea, nausea and vomiting.   Endocrine: Negative for cold intolerance and heat intolerance.   Genitourinary: Negative for dysuria, frequency and hematuria.   Musculoskeletal: Negative for arthralgias, back pain, myalgias and neck pain.        Left buttock area pain    Skin: Negative for pallor and rash.   Neurological: Negative for dizziness, seizures, syncope, weakness and headaches.   Psychiatric/Behavioral: Negative for agitation, confusion and sleep disturbance.     Objective:     Vital Signs (Most Recent):  Temp: 100.2 °F (37.9 °C) (03/03/20 1627)  Pulse: 86 (03/03/20 1627)  Resp: 18 (03/03/20 1627)  BP: 113/71 (03/03/20 1627)  SpO2: 95 % (03/03/20 1627) Vital Signs (24h Range):  Temp:  [98.7 °F (37.1 °C)-100.2 °F (37.9 °C)] 100.2 °F (37.9 °C)  Pulse:  [66-96] 86  Resp:  [13-32] 18  SpO2:  [95 %-99 %] 95 %  BP: ()/(50-74) 113/71     Weight: 70 kg (154 lb 3.4 oz)  Body mass index is 20.91 kg/m².    Intake/Output Summary (Last 24 hours) at 3/3/2020 1647  Last data filed at 3/3/2020 1400  Gross per 24 hour   Intake 4475.83 ml   Output 600 ml   Net 3875.83 ml      Physical Exam   Constitutional: He is oriented to person, place, and time. No distress.   HENT:   Mouth/Throat: No oropharyngeal exudate.   Eyes: Right eye exhibits no discharge. Left eye exhibits no discharge.   Neck:  No JVD present.   Cardiovascular: Exam reveals no gallop and no friction rub.   No murmur heard.  Pulmonary/Chest: No stridor. No respiratory distress. He has no wheezes. He has no rales. He exhibits no tenderness.   Abdominal: He exhibits no distension and no mass. There is no tenderness. There is no rebound and no guarding. No hernia.   Left side colonoscopy   Musculoskeletal: He exhibits no edema.   Neurological: He is alert and oriented to person, place, and time.   Skin: Skin is warm and dry. He is not diaphoretic.   Nursing note and vitals reviewed.      Significant Labs:   CBC:   Recent Labs   Lab 03/02/20  1540 03/03/20  0827   WBC 9.61 9.07   HGB 9.1* 9.1*   HCT 28.3* 29.3*    244     CMP:   Recent Labs   Lab 03/02/20  1540 03/03/20  0827    139   K 3.4* 4.0    107   CO2 25 24    97   BUN 6 4*   CREATININE 0.7 0.7   CALCIUM 8.4* 8.5*   PROT 7.2 6.7   ALBUMIN 2.7* 2.5*   BILITOT 0.3 0.5   ALKPHOS 67 65   AST 10 10   ALT 7* 5*   ANIONGAP 11 8   EGFRNONAA >60 >60       Significant Imaging:     Imaging Results          CT Abdomen Pelvis With Contrast (Final result)  Result time 03/02/20 17:11:20    Final result by Bora Guzmán MD (03/02/20 17:11:20)                 Impression:      1.  Again seen is a fistulous connection versus localized perforation involving the left side of the rectum.  In this area measures approximately 3.9 x 2.0 cm currently, not significantly changed from the comparison study.  The amount of soft tissue swelling and amorphous edema surrounding the rectum is similar to the comparison study there has been interval development of some air bubbles in the medial posterior left buttock soft tissues in the same location is significant inflammatory changes without a fluid collection seen.  These findings are concerning for either extension of the localized perforation into the posteromedial buttock versus interval development of some necrosis.  Clinical correlation  is advised.    2.  No other abnormal fluid collections are seen throughout the abdomen or pelvis.  There does remain to be some air in both inguinal canals although this is decreased in the interim and there is no definite evidence for intraperitoneal free air.    3. There are some new ground-glass opacities within the bilateral lower lobes.  Early infectious process must be considered.    4.  The appearance of the abdomen and pelvis is otherwise unchanged.  Negative for dilated loops of large or small bowel with oral contrast flowing through the bowel and into the left lower quadrant ostomy.    5.  Numerous stable findings as noted above.    6.  A call report was made to the emergency room at 17:10 hours on March 2, 2020.  I spoke with Dr. Pinzon.    All CT scans at this facility are performed  using dose modulation techniques as appropriate to performed exam including the following:  automated exposure control; adjustment of mA and/or kV according to the patients size (this includes techniques or standardized protocols for targeted exams where dose is matched to indication/reason for exam: i.e. extremities or head);  iterative reconstruction technique.      Electronically signed by: Bora Guzmán MD  Date:    03/02/2020  Time:    17:11             Narrative:    EXAMINATION:  CT ABDOMEN PELVIS WITH CONTRAST    CLINICAL HISTORY:  ABD pain, fever, abscess suspected;    TECHNIQUE:  Axial images through the abdomen and pelvis were obtained with the use of IV contrast.  Sagittal and coronal reconstructions are provided for review.  Oral contrast was utilized.    COMPARISON:  February 28, 2020    FINDINGS:  LUNG BASES: Medial right lower lobe and inferior left lower lobe ground-glass opacities have developed in the interim.  There is some atelectasis in the right lower lobe laterally.  Stable medial middle lobe scarring.  Lung bases are otherwise clear.  Negative for pleural or pericardial effusions. The distal esophagus  is normal.    ABDOMEN: Stable splenomegaly.  Stable hepatic and splenic calcified granulomas.  Stable inferior pole left renal stones measuring up to 1.1 cm, without hydronephrosis.  The liver, spleen and gallbladder otherwise appear normal.  The pancreas is unremarkable.  Kidneys and adrenal glands are otherwise normal.  Biliary tree is normal.    Subcentimeter mesenteric and retroperitoneal lymph nodes again seen.    Negative for adenopathy, free fluid or inflammatory change noted within the abdomen or pelvis.  Vascular calcifications are present without aneurysmal changes. Portal vein is patent.    There is oral contrast within the stomach and small bowel.  There is a distal colonic ostomy in the left lower quadrant that is filled with oral contrast, and there is contrast within most of the colon as well.  There is contrast in a normal appearing appendix.  Negative for dilated loops of large or small bowel.  The ostomy appears to be a side ostomy, with the distal small bowel in the way from the ostomy toward the rectum.    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  The amount of surrounding inflammatory changes is stable.  Again seen is significant induration of the medial inferior left buttock soft tissues without air collection although there are some new air bubbles seen (reference images 145 through 150 of series 2).    The rest of the sigmoid colon and rectum are unchanged.    PELVIS: The urinary bladder is unremarkable.    The male pelvic organs are normal. There are pelvic phleboliths.    Again seen are some air collections within both spermatic cords.  These air collections are smaller than on the most recent comparison study.  No other changes of intraperitoneal free air is seen.    No significant osseous abnormality is identified.  Negative for significant  spinal canal stenosis. Stable degenerative changes of the spine.  Stable bilateral L5 pars interarticularis defects with minimal grade 1 anterior spondylolisthesis of L5 on S1.    Negative for groin adenopathy.    The abdominal wall is intact.

## 2020-03-03 NOTE — PROGRESS NOTES
Mindy grant RN spoke to patient and spouse about lack of available rooms in hospital to be admitted to.  VU.  Will get a hospital bed for patient and give stretcher to wife d/t her discomfort r/t wound vac.

## 2020-03-03 NOTE — PROGRESS NOTES
AAO x 4. Vitals stable. SO at bedside.   MD Pinzon in room assessing patient.    C/o pain to rectal area/buttock area.  L buttock with slight swelling area, rounded approx size of baseball. Tender.    Has LL quad colostomy - site wnl.  Has emptied it himself in restroom.    MD discussed admission for IV antibx - Patient and SO agrees with POC.

## 2020-03-03 NOTE — PROGRESS NOTES
Pharmacokinetic Initial Assessment: IV Vancomycin    Assessment/Plan:    Initiate intravenous vancomycin with loading dose of 1750 mg once followed by a maintenance dose of vancomycin 1250 mg IV every 12 hours  Desired empiric serum trough concentration is 10 to 15 mcg/mL  Draw vancomycin trough level 30 min prior to fourth dose on 3/4 at approximately 08:30   Pharmacy will continue to follow and monitor vancomycin.      Please contact pharmacy at extension [phone] with any questions regarding this assessment.     Thank you for the consult,   Bora Pal       Patient brief summary:  Sukhjinder Presley is a 54 y.o. male initiated on antimicrobial therapy with IV Vancomycin for treatment of suspected skin & soft tissue infection    Drug Allergies:   Review of patient's allergies indicates:  No Known Allergies    Actual Body Weight:   70kg    Renal Function:   Estimated Creatinine Clearance: 120.8 mL/min (based on SCr of 0.7 mg/dL).,     Dialysis Method (if applicable):  N/A    CBC (last 72 hours):  Recent Labs   Lab Result Units 03/02/20  1540   WBC K/uL 9.61   Hemoglobin g/dL 9.1*   Hematocrit % 28.3*   Platelets K/uL 241   Gran% % 76.1*   Lymph% % 13.6*   Mono% % 7.4   Eosinophil% % 2.1   Basophil% % 0.4   Differential Method  Automated       Metabolic Panel (last 72 hours):  Recent Labs   Lab Result Units 03/02/20  1540 03/02/20  1732   Sodium mmol/L 138  --    Potassium mmol/L 3.4*  --    Chloride mmol/L 102  --    CO2 mmol/L 25  --    Glucose mg/dL 106  --    Glucose, UA   --  Negative   BUN, Bld mg/dL 6  --    Creatinine mg/dL 0.7  --    Albumin g/dL 2.7*  --    Total Bilirubin mg/dL 0.3  --    Alkaline Phosphatase U/L 67  --    AST U/L 10  --    ALT U/L 7*  --        Drug levels (last 3 results):  No results for input(s): VANCOMYCINRA, VANCOMYCINPE, VANCOMYCINTR in the last 72 hours.    Microbiologic Results:  Microbiology Results (last 7 days)       Procedure Component Value Units Date/Time    Blood culture x  two cultures. Draw prior to antibiotics. [[id number]] Collected:  03/02/20 1540    Order Status:  Sent Specimen:  Blood from Peripheral, Forearm, Right Updated:  03/02/20 2043    Blood culture x two cultures. Draw prior to antibiotics. [[id number]] Collected:  03/02/20 1556    Order Status:  Sent Specimen:  Blood from Peripheral, Antecubital, Left Updated:  03/02/20 2043

## 2020-03-03 NOTE — SUBJECTIVE & OBJECTIVE
Past Medical History:   Diagnosis Date    Anemia     Cancer        Past Surgical History:   Procedure Laterality Date    COLONOSCOPY N/A 6/6/2019    Procedure: COLONOSCOPY;  Surgeon: Anjelica Saavedra MD;  Location: [facility] ENDO;  Service: Endoscopy;  Laterality: N/A;    ESOPHAGOGASTRODUODENOSCOPY N/A 6/6/2019    Procedure: EGD (ESOPHAGOGASTRODUODENOSCOPY);  Surgeon: Anjelica Saavedra MD;  Location: [facility] ENDO;  Service: Endoscopy;  Laterality: N/A;    HERNIA REPAIR  1995    INJECTION OF ANESTHETIC AGENT INTO TISSUE PLANE DEFINED BY TRANSVERSUS ABDOMINIS MUSCLE N/A 2/18/2020    Procedure: BLOCK, TRANSVERSUS ABDOMINIS PLANE;  Surgeon: Jan New MD;  Location: [facility] OR;  Service: General;  Laterality: N/A;    INSERTION OF TUNNELED CENTRAL VENOUS CATHETER (CVC) WITH SUBCUTANEOUS PORT N/A 10/8/2019    Procedure: [id number];  Surgeon: Jan New MD;  Location: [facility] OR;  Service: General;  Laterality: N/A;    TONSILLECTOMY         Review of patient's allergies indicates:  No Known Allergies    No current facility-administered medications on file prior to encounter.      Current Outpatient Medications on File Prior to Encounter   Medication Sig    amoxicillin-clavulanate 875-125mg (AUGMENTIN) 875-125 mg per tablet Take 1 tablet by mouth 2 (two) times daily. for 12 days    capecitabine (XELODA) 500 MG Tab Take 3 tablets (1500 mg) by mouth twice daily after breakfast and dinner on days of radiation only.    diphenoxylate-atropine 2.5-0.025 mg (LOMOTIL) 2.5-0.025 mg per tablet Take 1 to 2 tablets by mouth 4 (four) times daily as needed for Diarrhea.    dronabinol (MARINOL) 10 MG capsule Take 1 capsule (10 mg total) by mouth 2 (two) times daily before meals.    iron fum-B12-IF-C-folic acid (FOLTRIN) 110-0.5 mg capsule Take 1 capsule by mouth 2 (two) times daily.    lidocaine (XYLOCAINE) 5 % Oint ointment Apply topically as needed.    magic mouthwash diphen/antac/lidoc Swish and spit 15 ml by  mouth every 4 hours as needed    morphine (MS CONTIN) 30 MG 12 hr tablet Take 1 tablet (30 mg total) by mouth every 8 (eight) hours.    oxyCODONE (ROXICODONE) 10 mg Tab immediate release tablet Take 1 tablet (10 mg total) by mouth every 4 (four) hours as needed for Pain (medically necessary).    pramoxine (PROCTOFOAM) 1 % Foam Place rectally 3 (three) times daily as needed.    promethazine (PHENERGAN) 25 MG tablet Take 1 tablet (25 mg total) by mouth every 4 (four) hours.     Family History     Problem Relation (Age of Onset)    Coronary artery disease Brother, Maternal Grandmother    Intestinal malrotation Mother    Leukemia Father    No Known Problems Sister    Stomach cancer Maternal Grandfather        Tobacco Use    Smoking status: Current Every Day Smoker     Packs/day: 1.00     Years: 35.00     Pack years: 35.00     Types: Cigarettes     Start date: 1/2/1984    Smokeless tobacco: Current User     Types: Chew   Substance and Sexual Activity    Alcohol use: Yes     Alcohol/week: 46.0 standard drinks     Types: 42 Cans of beer, 4 Shots of liquor per week     Frequency: 4 or more times a week     Drinks per session: 5 or 6     Binge frequency: Daily or almost daily     Comment: segrams 7, beer daily,  None 72 hours prior to surgery    Drug use: Never    Sexual activity: Yes     Partners: Female     Birth control/protection: None     Review of Systems   Constitutional: Positive for fever. Negative for chills, diaphoresis and fatigue.   Respiratory: Negative for cough, shortness of breath and wheezing.    Cardiovascular: Negative for chest pain, palpitations and leg swelling.   Gastrointestinal: Negative for abdominal pain, diarrhea, nausea and vomiting.   Genitourinary: Negative for difficulty urinating, dysuria, frequency, hematuria and urgency.   Musculoskeletal: Negative for arthralgias, back pain and myalgias.   Skin: Negative for pallor and rash.        (+) Redness, swelling, and pain to left buttock.    Neurological: Negative for dizziness, light-headedness, numbness and headaches.   All other systems reviewed and are negative.    Objective:     Vital Signs (Most Recent):  Temp: 99 °F (37.2 °C) (03/02/20 2100)  Pulse: 84 (03/02/20 2100)  Resp: 20 (03/02/20 2100)  BP: (!) 99/58 (03/02/20 2100)  SpO2: 98 % (03/02/20 2100) Vital Signs (24h Range):  Temp:  [99 °F (37.2 °C)-102.4 °F (39.1 °C)] 99 °F (37.2 °C)  Pulse:  [] 84  Resp:  [14-21] 20  SpO2:  [97 %-99 %] 98 %  BP: ()/(56-76) 99/58     Weight: 70 kg (154 lb 3.4 oz)  Body mass index is 20.91 kg/m².    Physical Exam   Constitutional: He is oriented to person, place, and time. He appears well-developed and well-nourished. No distress.   HENT:   Head: Normocephalic and atraumatic.   Eyes: Conjunctivae are normal.   PERRL; EOM intact.   Neck: Normal range of motion. Neck supple.   Cardiovascular: Normal rate, regular rhythm, S1 normal and S2 normal.  No extrasystoles are present. Exam reveals no gallop.   No murmur heard.  Pulmonary/Chest: Effort normal and breath sounds normal. No accessory muscle usage. No tachypnea. No respiratory distress. He has no wheezes. He has no rhonchi. He has no rales.   Abdominal: Soft. Bowel sounds are normal. He exhibits no distension. There is no tenderness. There is no rebound, no guarding and no CVA tenderness.   Ostomy in place to lower abdomen.   Musculoskeletal: Normal range of motion. He exhibits no edema, tenderness or deformity.   Neurological: He is alert and oriented to person, place, and time.   Skin: Skin is warm, dry and intact. Capillary refill takes less than 2 seconds. No rash noted. He is not diaphoretic. No cyanosis or erythema.   Psychiatric: He has a normal mood and affect. His behavior is normal.   Nursing note and vitals reviewed.          Significant Labs:  Results for orders placed or performed during the hospital encounter of 03/02/20   HIV 1/2 Ag/Ab (4th Gen)   Result Value Ref Range    HIV 1/2  Ag/Ab Negative Negative   Hepatitis C antibody   Result Value Ref Range    Hepatitis C Ab Negative Negative   CBC auto differential   Result Value Ref Range    WBC 9.61 3.90 - 12.70 K/uL    RBC 3.26 (L) 4.60 - 6.20 M/uL    Hemoglobin 9.1 (L) 14.0 - 18.0 g/dL    Hematocrit 28.3 (L) 40.0 - 54.0 %    Mean Corpuscular Volume 87 82 - 98 fL    Mean Corpuscular Hemoglobin 27.9 27.0 - 31.0 pg    Mean Corpuscular Hemoglobin Conc 32.2 32.0 - 36.0 g/dL    RDW 14.2 11.5 - 14.5 %    Platelets 241 150 - 350 K/uL    MPV 8.9 (L) 9.2 - 12.9 fL    Immature Granulocytes 0.4 0.0 - 0.5 %    Gran # (ANC) 7.3 1.8 - 7.7 K/uL    Immature Grans (Abs) 0.04 0.00 - 0.04 K/uL    Lymph # 1.3 1.0 - 4.8 K/uL    Mono # 0.7 0.3 - 1.0 K/uL    Eos # 0.2 0.0 - 0.5 K/uL    Baso # 0.04 0.00 - 0.20 K/uL    nRBC 0 0 /100 WBC    Gran% 76.1 (H) 38.0 - 73.0 %    Lymph% 13.6 (L) 18.0 - 48.0 %    Mono% 7.4 4.0 - 15.0 %    Eosinophil% 2.1 0.0 - 8.0 %    Basophil% 0.4 0.0 - 1.9 %    Differential Method Automated    Comprehensive metabolic panel   Result Value Ref Range    Sodium 138 136 - 145 mmol/L    Potassium 3.4 (L) 3.5 - 5.1 mmol/L    Chloride 102 95 - 110 mmol/L    CO2 25 23 - 29 mmol/L    Glucose 106 70 - 110 mg/dL    BUN, Bld 6 6 - 20 mg/dL    Creatinine 0.7 0.5 - 1.4 mg/dL    Calcium 8.4 (L) 8.7 - 10.5 mg/dL    Total Protein 7.2 6.0 - 8.4 g/dL    Albumin 2.7 (L) 3.5 - 5.2 g/dL    Total Bilirubin 0.3 0.1 - 1.0 mg/dL    Alkaline Phosphatase 67 55 - 135 U/L    AST 10 10 - 40 U/L    ALT 7 (L) 10 - 44 U/L    Anion Gap 11 8 - 16 mmol/L    eGFR if African American >60 >60 mL/min/1.73 m^2    eGFR if non African American >60 >60 mL/min/1.73 m^2   Lactic acid, plasma #1   Result Value Ref Range    Lactate (Lactic Acid) 1.8 0.5 - 2.2 mmol/L   Urinalysis, Reflex to Urine Culture Urine, Clean Catch   Result Value Ref Range    Specimen UA Urine, Clean Catch     Color, UA Yellow Yellow, Straw, Reshma    Appearance, UA Clear Clear    pH, UA 7.0 5.0 - 8.0    Specific  Gravity, UA <=1.005 (A) 1.005 - 1.030    Protein, UA Negative Negative    Glucose, UA Negative Negative    Ketones, UA Negative Negative    Bilirubin (UA) Negative Negative    Occult Blood UA Negative Negative    Nitrite, UA Negative Negative    Urobilinogen, UA Negative <2.0 EU/dL    Leukocytes, UA Negative Negative      All pertinent labs within the past 24 hours have been reviewed.    Significant Imaging:  Imaging Results          CT Abdomen Pelvis With Contrast (Final result)  Result time 03/02/20 17:11:20    Final result by Bora Guzmán MD (03/02/20 17:11:20)                 Impression:      1.  Again seen is a fistulous connection versus localized perforation involving the left side of the rectum.  In this area measures approximately 3.9 x 2.0 cm currently, not significantly changed from the comparison study.  The amount of soft tissue swelling and amorphous edema surrounding the rectum is similar to the comparison study there has been interval development of some air bubbles in the medial posterior left buttock soft tissues in the same location is significant inflammatory changes without a fluid collection seen.  These findings are concerning for either extension of the localized perforation into the posteromedial buttock versus interval development of some necrosis.  Clinical correlation is advised.    2.  No other abnormal fluid collections are seen throughout the abdomen or pelvis.  There does remain to be some air in both inguinal canals although this is decreased in the interim and there is no definite evidence for intraperitoneal free air.    3. There are some new ground-glass opacities within the bilateral lower lobes.  Early infectious process must be considered.    4.  The appearance of the abdomen and pelvis is otherwise unchanged.  Negative for dilated loops of large or small bowel with oral contrast flowing through the bowel and into the left lower quadrant ostomy.    5.  Numerous stable  findings as noted above.    6.  A call report was made to the emergency room at 17:10 hours on March 2, 2020.  I spoke with Dr. Pinzon.    All CT scans at this facility are performed  using dose modulation techniques as appropriate to performed exam including the following:  automated exposure control; adjustment of mA and/or kV according to the patients size (this includes techniques or standardized protocols for targeted exams where dose is matched to indication/reason for exam: i.e. extremities or head);  iterative reconstruction technique.      Electronically signed by: Bora Guzmán MD  Date:    03/02/2020  Time:    17:11             Narrative:    EXAMINATION:  CT ABDOMEN PELVIS WITH CONTRAST    CLINICAL HISTORY:  ABD pain, fever, abscess suspected;    TECHNIQUE:  Axial images through the abdomen and pelvis were obtained with the use of IV contrast.  Sagittal and coronal reconstructions are provided for review.  Oral contrast was utilized.    COMPARISON:  February 28, 2020    FINDINGS:  LUNG BASES: Medial right lower lobe and inferior left lower lobe ground-glass opacities have developed in the interim.  There is some atelectasis in the right lower lobe laterally.  Stable medial middle lobe scarring.  Lung bases are otherwise clear.  Negative for pleural or pericardial effusions. The distal esophagus is normal.    ABDOMEN: Stable splenomegaly.  Stable hepatic and splenic calcified granulomas.  Stable inferior pole left renal stones measuring up to 1.1 cm, without hydronephrosis.  The liver, spleen and gallbladder otherwise appear normal.  The pancreas is unremarkable.  Kidneys and adrenal glands are otherwise normal.  Biliary tree is normal.    Subcentimeter mesenteric and retroperitoneal lymph nodes again seen.    Negative for adenopathy, free fluid or inflammatory change noted within the abdomen or pelvis.  Vascular calcifications are present without aneurysmal changes. Portal vein is patent.    There is oral  contrast within the stomach and small bowel.  There is a distal colonic ostomy in the left lower quadrant that is filled with oral contrast, and there is contrast within most of the colon as well.  There is contrast in a normal appearing appendix.  Negative for dilated loops of large or small bowel.  The ostomy appears to be a side ostomy, with the distal small bowel in the way from the ostomy toward the rectum.    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  The amount of surrounding inflammatory changes is stable.  Again seen is significant induration of the medial inferior left buttock soft tissues without air collection although there are some new air bubbles seen (reference images 145 through 150 of series 2).    The rest of the sigmoid colon and rectum are unchanged.    PELVIS: The urinary bladder is unremarkable.    The male pelvic organs are normal. There are pelvic phleboliths.    Again seen are some air collections within both spermatic cords.  These air collections are smaller than on the most recent comparison study.  No other changes of intraperitoneal free air is seen.    No significant osseous abnormality is identified.  Negative for significant spinal canal stenosis. Stable degenerative changes of the spine.  Stable bilateral L5 pars interarticularis defects with minimal grade 1 anterior spondylolisthesis of L5 on S1.    Negative for groin adenopathy.    The abdominal wall is intact.                               I have reviewed all pertinent imaging results/findings within the past 24 hours.

## 2020-03-03 NOTE — HOSPITAL COURSE
Patient is a 54 year old male PMHx of rectal adenocarcinoma receiving chemotherapy who presented to [facility] with complaint of fever for  x 2 days associated left buttock mass and pain. CT of the abdomen/pelvis showed an again seen fistulous connection versus localized perforation involving the left side of the rectum.  In this area measures approximately 3.9 x 2.0 cm currently, not significantly changed from the comparison study, soft tissue swelling and amorphous edema surrounding the rectum is similar to the comparison study there has been interval development of some air bubbles in the medial posterior left buttock soft tissues in the same location is significant inflammatory changes without a fluid collection seen. General Surgery was consulted. Patient had cellulitis/induration on buttock concerning for developing abscess. General Surgery recommend on surgery at this time and continue IV antibiotic. The day of discharge, patient feeling well, left buttock pain and redness resolved. General Surgery order PICC line and IV antibiotic for home use. Social service arranged home IV antibiotic through [facility]. Patient anxious to be discharged, does not want Home Health. [facility] will management PICC line care. He will follow up with General Surgery and Hematology/Oncology in clinic. He will follow up with ID in clinic due to home IV antibotic. Patient was seen, examined and suitable for discharge.

## 2020-03-03 NOTE — ASSESSMENT & PLAN NOTE
- s/p colostomy creation on 2/18.  General Surgery consult, awaiting recs.  - Blood cultures obtained in the ED.  - Continue IV vanc and Zosyn.  - Analgesics as needed.  - Will keep NPO after MN in case surgical intervention is warranted.  - Follow labs.

## 2020-03-03 NOTE — HPI
Mr. Presley is a 53 yo male with a PMHx of rectal adenocarcinoma currently receiving chemo and radiation, and recently diagnosed rectal perforation near his tumor.  In order to complete his chemo/radiation treatment and prevent sepsis, he underwent laparoscopic colostomy creation per Dr. New on 2/18/20.  Tonight, patient presented to the ED with c/o fever x 2 days.  Associated redness, swelling, and pain to left buttock.  Denies any ABD pain or distention, N/V/D, melena, hematochezia, hematemesis, dysuria, hematuria, CP, SOB, back pain, pelvic pain, HA, lightheadedness, dizziness, or chills.  Initial work-up resulted WBC 9.61, 0% bands, lactic 1.8.  CT of ABD/pelvis showed unchanged fistulous connection versus localized perforation involving the left side of the rectum, soft tissue swelling and amorphous edema surrounding the rectum is similar to the comparison study, interval development of air bubbles in the medial posterior left buttock soft tissues in the same location as significant inflammatory changes without a fluid collection seen which is concerning for either extension of the localized perforation into the posteromedial buttock versus interval development of some necrosis.  Blood cultures were obtained in the ED, and IV vanc + Zosyn given.  Case discussed with General Surgery, who agrees with IV antibiotics and will see patient in consult.  Hospital Medicine was called for admission.

## 2020-03-03 NOTE — ASSESSMENT & PLAN NOTE
- s/p colostomy creation on 2/18.  General Surgery consult, awaiting recs.  - Blood cultures obtained in the ED.  - Continue IV vanc and Zosyn.  - Analgesics as needed.  - Will keep NPO after MN in case surgical intervention is warranted.  - Follow labs.    3/3  Case review with Dr New, no plans for surgery today   Ok regular diet

## 2020-03-04 VITALS
RESPIRATION RATE: 19 BRPM | DIASTOLIC BLOOD PRESSURE: 64 MMHG | WEIGHT: 154.19 LBS | TEMPERATURE: 99 F | HEART RATE: 72 BPM | BODY MASS INDEX: 20.88 KG/M2 | SYSTOLIC BLOOD PRESSURE: 101 MMHG | OXYGEN SATURATION: 98 % | HEIGHT: 72 IN

## 2020-03-04 LAB
ALBUMIN SERPL BCP-MCNC: 2.4 G/DL (ref 3.5–5.2)
ALP SERPL-CCNC: 58 U/L (ref 55–135)
ALT SERPL W/O P-5'-P-CCNC: <5 U/L (ref 10–44)
ANION GAP SERPL CALC-SCNC: 10 MMOL/L (ref 8–16)
AST SERPL-CCNC: 9 U/L (ref 10–40)
BASOPHILS # BLD AUTO: 0.04 K/UL (ref 0–0.2)
BASOPHILS NFR BLD: 0.5 % (ref 0–1.9)
BILIRUB SERPL-MCNC: 0.4 MG/DL (ref 0.1–1)
BUN SERPL-MCNC: 5 MG/DL (ref 6–20)
CALCIUM SERPL-MCNC: 8.5 MG/DL (ref 8.7–10.5)
CHLORIDE SERPL-SCNC: 106 MMOL/L (ref 95–110)
CO2 SERPL-SCNC: 24 MMOL/L (ref 23–29)
CREAT SERPL-MCNC: 1 MG/DL (ref 0.5–1.4)
DIFFERENTIAL METHOD: ABNORMAL
EOSINOPHIL # BLD AUTO: 0.3 K/UL (ref 0–0.5)
EOSINOPHIL NFR BLD: 3.5 % (ref 0–8)
ERYTHROCYTE [DISTWIDTH] IN BLOOD BY AUTOMATED COUNT: 14.2 % (ref 11.5–14.5)
EST. GFR  (AFRICAN AMERICAN): >60 ML/MIN/1.73 M^2
EST. GFR  (NON AFRICAN AMERICAN): >60 ML/MIN/1.73 M^2
GLUCOSE SERPL-MCNC: 99 MG/DL (ref 70–110)
HCT VFR BLD AUTO: 27.2 % (ref 40–54)
HGB BLD-MCNC: 8.7 G/DL (ref 14–18)
IMM GRANULOCYTES # BLD AUTO: 0.03 K/UL (ref 0–0.04)
IMM GRANULOCYTES NFR BLD AUTO: 0.4 % (ref 0–0.5)
LYMPHOCYTES # BLD AUTO: 1.3 K/UL (ref 1–4.8)
LYMPHOCYTES NFR BLD: 14.6 % (ref 18–48)
MCH RBC QN AUTO: 28 PG (ref 27–31)
MCHC RBC AUTO-ENTMCNC: 32 G/DL (ref 32–36)
MCV RBC AUTO: 88 FL (ref 82–98)
MONOCYTES # BLD AUTO: 0.7 K/UL (ref 0.3–1)
MONOCYTES NFR BLD: 8.2 % (ref 4–15)
NEUTROPHILS # BLD AUTO: 6.2 K/UL (ref 1.8–7.7)
NEUTROPHILS NFR BLD: 72.8 % (ref 38–73)
NRBC BLD-RTO: 0 /100 WBC
PLATELET # BLD AUTO: 249 K/UL (ref 150–350)
PMV BLD AUTO: 9.1 FL (ref 9.2–12.9)
POTASSIUM SERPL-SCNC: 3.6 MMOL/L (ref 3.5–5.1)
PROT SERPL-MCNC: 6.3 G/DL (ref 6–8.4)
RBC # BLD AUTO: 3.11 M/UL (ref 4.6–6.2)
SODIUM SERPL-SCNC: 140 MMOL/L (ref 136–145)
VANCOMYCIN TROUGH SERPL-MCNC: 13 UG/ML (ref 10–22)
WBC # BLD AUTO: 8.56 K/UL (ref 3.9–12.7)

## 2020-03-04 PROCEDURE — 63600175 PHARM REV CODE 636 W HCPCS: Performed by: INTERNAL MEDICINE

## 2020-03-04 PROCEDURE — 80053 COMPREHEN METABOLIC PANEL: CPT

## 2020-03-04 PROCEDURE — 25000003 PHARM REV CODE 250: Performed by: NURSE PRACTITIONER

## 2020-03-04 PROCEDURE — 80202 ASSAY OF VANCOMYCIN: CPT

## 2020-03-04 PROCEDURE — 11000001 HC ACUTE MED/SURG PRIVATE ROOM

## 2020-03-04 PROCEDURE — 96376 TX/PRO/DX INJ SAME DRUG ADON: CPT

## 2020-03-04 PROCEDURE — 25000003 PHARM REV CODE 250: Performed by: INTERNAL MEDICINE

## 2020-03-04 PROCEDURE — 36415 COLL VENOUS BLD VENIPUNCTURE: CPT

## 2020-03-04 PROCEDURE — 85025 COMPLETE CBC W/AUTO DIFF WBC: CPT

## 2020-03-04 RX ADMIN — PIPERACILLIN AND TAZOBACTAM 4.5 G: 4; .5 INJECTION, POWDER, LYOPHILIZED, FOR SOLUTION INTRAVENOUS; PARENTERAL at 08:03

## 2020-03-04 RX ADMIN — MORPHINE SULFATE 30 MG: 30 TABLET, FILM COATED, EXTENDED RELEASE ORAL at 05:03

## 2020-03-04 RX ADMIN — PIPERACILLIN AND TAZOBACTAM 4.5 G: 4; .5 INJECTION, POWDER, LYOPHILIZED, FOR SOLUTION INTRAVENOUS; PARENTERAL at 03:03

## 2020-03-04 RX ADMIN — VANCOMYCIN HYDROCHLORIDE 1250 MG: 100 INJECTION, POWDER, LYOPHILIZED, FOR SOLUTION INTRAVENOUS at 11:03

## 2020-03-04 RX ADMIN — PANTOPRAZOLE SODIUM 40 MG: 40 TABLET, DELAYED RELEASE ORAL at 08:03

## 2020-03-04 RX ADMIN — MORPHINE SULFATE 30 MG: 30 TABLET, FILM COATED, EXTENDED RELEASE ORAL at 03:03

## 2020-03-04 NOTE — PLAN OF CARE
Per recommendation by hospital staff to coordinate services for IV antibiotics for the patient upon discharge home, SW coordinates services  With [facility].

## 2020-03-04 NOTE — PROGRESS NOTES
[facility] -   General Surgery  Progress Note    Subjective:     History of Present Illness:  54-year-old male with rectal adenocarcinoma who has completed neoadjuvant chemo radiation and was on neoadjuvant chemotherapy for his COLLINS approach who had a course complicated by rectal perforation on chemotx requiring laparoscopic loop sigmoid colostomy construction on 2/18/2020 who is readmitted to the hospital with increasing left buttock pain and swelling. CT scan in ED worrisome for increased induration and air in the left buttock, possible fistula connection developing to the left buttock. Surgery consulted for evaluation. Febrile upon presentation but reports afebrile at home. Denies nausea, vomiting, diarrhea or constipation. Is having good colostomy function and having no stool per rectum since surgery.    Post-Op Info:  * No surgery found *         Interval History: pain and swelling significantly improved    Medications:  Continuous Infusions:  Scheduled Meds:   morphine  30 mg Oral Q8H    nicotine  1 patch Transdermal Daily    pantoprazole  40 mg Oral Daily    piperacillin-tazobactam (ZOSYN) IVPB  4.5 g Intravenous Q8H    vancomycin (VANCOCIN) IVPB  1,250 mg Intravenous Q12H     PRN Meds:acetaminophen, ondansetron, oxyCODONE, promethazine (PHENERGAN) IVPB     Review of patient's allergies indicates:  No Known Allergies  Objective:     Vital Signs (Most Recent):  Temp: 98.8 °F (37.1 °C) (03/04/20 0714)  Pulse: 72 (03/04/20 0714)  Resp: 19 (03/04/20 0714)  BP: 101/64 (03/04/20 0714)  SpO2: 98 % (03/04/20 0714) Vital Signs (24h Range):  Temp:  [98.8 °F (37.1 °C)-100.2 °F (37.9 °C)] 98.8 °F (37.1 °C)  Pulse:  [72-86] 72  Resp:  [18-19] 19  SpO2:  [95 %-98 %] 98 %  BP: ()/(52-71) 101/64     Weight: 70 kg (154 lb 3.4 oz)  Body mass index is 20.91 kg/m².    Intake/Output - Last 3 Shifts       03/02 0700 - 03/03 0659 03/03 0700 - 03/04 0659 03/04 0700 - 03/05 0659    P.O.  770     I.V. (mL/kg)  1000 (14.3) 2125 (30.4)     IV Piggyback 1700 800     Total Intake(mL/kg) 2700 (38.6) 3695 (52.9)     Urine (mL/kg/hr) 600      Stool 0      Total Output 600      Net +2100 +3695            Urine Occurrence  15 x     Stool Occurrence 2 x 0 x 0 x          Physical Exam   Constitutional: He is oriented to person, place, and time. He appears well-developed and well-nourished.   HENT:   Head: Normocephalic and atraumatic.   Eyes: EOM are normal.   Cardiovascular: Normal rate and regular rhythm.   Pulmonary/Chest: Effort normal. No respiratory distress.   Abdominal: Soft. He exhibits no distension.   Ostomy in place    Musculoskeletal: Normal range of motion.   Neurological: He is alert and oriented to person, place, and time.   Skin: Skin is warm and dry.   Psychiatric: He has a normal mood and affect. Thought content normal.   Vitals reviewed.      Significant Labs:  CBC:   Recent Labs   Lab 03/04/20  0451   WBC 8.56   RBC 3.11*   HGB 8.7*   HCT 27.2*      MCV 88   MCH 28.0   MCHC 32.0     CMP:   Recent Labs   Lab 03/04/20  0451   GLU 99   CALCIUM 8.5*   ALBUMIN 2.4*   PROT 6.3      K 3.6   CO2 24      BUN 5*   CREATININE 1.0   ALKPHOS 58   ALT <5*   AST 9*   BILITOT 0.4       Significant Diagnostics:  I have reviewed all pertinent imaging results/findings within the past 24 hours.    Assessment/Plan:     Rectal cancer  53yo M with perforated rectal cancer    - Continue local wound care to ostomy  - No emergent surgical intervention required at this time for L buttock erythema and induration. No drainable fluid collection on CT or on exam  - cont IV abx  - Okay for diet today  - PICC line for Home IV abx        Meghan Ramachandran PA-C  General Surgery  [facility]

## 2020-03-04 NOTE — NURSING
Pt empties colostomy bag on his own but has not been measuring. I advised pt to measure if possible next time he empties so we can count in his I&O. Gave pt measurement cup.

## 2020-03-04 NOTE — ASSESSMENT & PLAN NOTE
53yo M with perforated rectal cancer    - Continue local wound care to ostomy  - No emergent surgical intervention required at this time for L buttock erythema and induration. No drainable fluid collection on CT or on exam  - cont IV abx  - Okay for diet today  - PICC line for Home IV abx

## 2020-03-04 NOTE — SUBJECTIVE & OBJECTIVE
No current facility-administered medications on file prior to encounter.      Current Outpatient Medications on File Prior to Encounter   Medication Sig    amoxicillin-clavulanate 875-125mg (AUGMENTIN) 875-125 mg per tablet Take 1 tablet by mouth 2 (two) times daily. for 12 days    capecitabine (XELODA) 500 MG Tab Take 3 tablets (1500 mg) by mouth twice daily after breakfast and dinner on days of radiation only.    diphenoxylate-atropine 2.5-0.025 mg (LOMOTIL) 2.5-0.025 mg per tablet Take 1 to 2 tablets by mouth 4 (four) times daily as needed for Diarrhea.    dronabinol (MARINOL) 10 MG capsule Take 1 capsule (10 mg total) by mouth 2 (two) times daily before meals.    iron fum-B12-IF-C-folic acid (FOLTRIN) 110-0.5 mg capsule Take 1 capsule by mouth 2 (two) times daily.    lidocaine (XYLOCAINE) 5 % Oint ointment Apply topically as needed.    magic mouthwash diphen/antac/lidoc Swish and spit 15 ml by mouth every 4 hours as needed    morphine (MS CONTIN) 30 MG 12 hr tablet Take 1 tablet (30 mg total) by mouth every 8 (eight) hours.    oxyCODONE (ROXICODONE) 10 mg Tab immediate release tablet Take 1 tablet (10 mg total) by mouth every 4 (four) hours as needed for Pain (medically necessary).    pramoxine (PROCTOFOAM) 1 % Foam Place rectally 3 (three) times daily as needed.    promethazine (PHENERGAN) 25 MG tablet Take 1 tablet (25 mg total) by mouth every 4 (four) hours.       Review of patient's allergies indicates:  No Known Allergies    Past Medical History:   Diagnosis Date    Anemia     Cancer      Past Surgical History:   Procedure Laterality Date    COLONOSCOPY N/A 6/6/2019    Procedure: COLONOSCOPY;  Surgeon: Anjelica Saavedra MD;  Location: [facility];  Service: Endoscopy;  Laterality: N/A;    ESOPHAGOGASTRODUODENOSCOPY N/A 6/6/2019    Procedure: EGD (ESOPHAGOGASTRODUODENOSCOPY);  Surgeon: Anjelica Saavedra MD;  Location: [facility];  Service: Endoscopy;  Laterality: N/A;    HERNIA REPAIR  1995     INJECTION OF ANESTHETIC AGENT INTO TISSUE PLANE DEFINED BY TRANSVERSUS ABDOMINIS MUSCLE N/A 2/18/2020    Procedure: BLOCK, TRANSVERSUS ABDOMINIS PLANE;  Surgeon: Jan New MD;  Location: [facility] OR;  Service: General;  Laterality: N/A;    INSERTION OF TUNNELED CENTRAL VENOUS CATHETER (CVC) WITH SUBCUTANEOUS PORT N/A 10/8/2019    Procedure: [id number];  Surgeon: Jan New MD;  Location: [facility] OR;  Service: General;  Laterality: N/A;    TONSILLECTOMY       Family History     Problem Relation (Age of Onset)    Coronary artery disease Brother, Maternal Grandmother    Intestinal malrotation Mother    Leukemia Father    No Known Problems Sister    Stomach cancer Maternal Grandfather        Tobacco Use    Smoking status: Current Every Day Smoker     Packs/day: 1.00     Years: 35.00     Pack years: 35.00     Types: Cigarettes     Start date: 1/2/1984    Smokeless tobacco: Current User     Types: Chew   Substance and Sexual Activity    Alcohol use: Yes     Alcohol/week: 46.0 standard drinks     Types: 42 Cans of beer, 4 Shots of liquor per week     Frequency: 4 or more times a week     Drinks per session: 5 or 6     Binge frequency: Daily or almost daily     Comment: segrams 7, beer daily,  None 72 hours prior to surgery    Drug use: Never    Sexual activity: Yes     Partners: Female     Birth control/protection: None     Review of Systems   Constitutional: Positive for fever. Negative for activity change, appetite change, chills, fatigue and unexpected weight change.   HENT: Negative for congestion, ear pain, sore throat and trouble swallowing.    Eyes: Negative for pain, redness and itching.   Respiratory: Negative for cough, shortness of breath and wheezing.    Cardiovascular: Negative for chest pain, palpitations and leg swelling.   Gastrointestinal: Positive for rectal pain (buttock pain). Negative for abdominal distention, abdominal pain, constipation, diarrhea, nausea and vomiting.    Endocrine: Negative for cold intolerance, heat intolerance and polyuria.   Genitourinary: Negative for dysuria, flank pain, frequency and hematuria.   Musculoskeletal: Negative for gait problem, joint swelling and neck pain.   Skin: Negative for color change, rash and wound.   Allergic/Immunologic: Negative for environmental allergies and immunocompromised state.   Neurological: Negative for dizziness, speech difficulty, weakness and numbness.   Psychiatric/Behavioral: Negative for agitation, confusion and hallucinations.     Objective:     Vital Signs (Most Recent):  Temp: 99.1 °F (37.3 °C) (03/03/20 1927)  Pulse: 72 (03/03/20 1927)  Resp: 18 (03/03/20 1927)  BP: 106/67 (03/03/20 1927)  SpO2: 95 % (03/03/20 1927) Vital Signs (24h Range):  Temp:  [98.7 °F (37.1 °C)-100.2 °F (37.9 °C)] 99.1 °F (37.3 °C)  Pulse:  [66-96] 72  Resp:  [13-32] 18  SpO2:  [95 %-99 %] 95 %  BP: ()/(50-72) 106/67     Weight: 70 kg (154 lb 3.4 oz)  Body mass index is 20.91 kg/m².    Physical Exam   Constitutional: He is oriented to person, place, and time. He appears well-developed.   HENT:   Head: Normocephalic and atraumatic.   Eyes: Conjunctivae and EOM are normal.   Neck: Normal range of motion. No thyromegaly present.   Cardiovascular: Normal rate and regular rhythm.   Pulmonary/Chest: Effort normal. No respiratory distress.   Abdominal: Soft. He exhibits no distension and no mass. There is no tenderness.   Lap port sites well-healing; ostomy pink and viable with stool in bag   Genitourinary:   Genitourinary Comments: L buttock with some induration and erythema but no fluctuance or obvious necrosis/drainable fluid collection   Musculoskeletal: Normal range of motion. He exhibits no edema or tenderness.   Neurological: He is alert and oriented to person, place, and time.   Skin: Skin is warm and dry. Capillary refill takes less than 2 seconds. No rash noted.   Psychiatric: He has a normal mood and affect.       Significant  Labs:  CBC:   Recent Labs   Lab 03/03/20 0827   WBC 9.07   RBC 3.31*   HGB 9.1*   HCT 29.3*      MCV 89   MCH 27.5   MCHC 31.1*     BMP:   Recent Labs   Lab 03/03/20 0827   GLU 97      K 4.0      CO2 24   BUN 4*   CREATININE 0.7   CALCIUM 8.5*   MG 1.8     CMP:   Recent Labs   Lab 03/03/20  0827   GLU 97   CALCIUM 8.5*   ALBUMIN 2.5*   PROT 6.7      K 4.0   CO2 24      BUN 4*   CREATININE 0.7   ALKPHOS 65   ALT 5*   AST 10   BILITOT 0.5     LFTs:   Recent Labs   Lab 03/03/20 0827   ALT 5*   AST 10   ALKPHOS 65   BILITOT 0.5   PROT 6.7   ALBUMIN 2.5*       Significant Diagnostics:  I have reviewed all pertinent imaging results/findings within the past 24 hours.     CT:    FINDINGS:  LUNG BASES: Medial right lower lobe and inferior left lower lobe ground-glass opacities have developed in the interim.  There is some atelectasis in the right lower lobe laterally.  Stable medial middle lobe scarring.  Lung bases are otherwise clear.  Negative for pleural or pericardial effusions. The distal esophagus is normal.    ABDOMEN: Stable splenomegaly.  Stable hepatic and splenic calcified granulomas.  Stable inferior pole left renal stones measuring up to 1.1 cm, without hydronephrosis.  The liver, spleen and gallbladder otherwise appear normal.  The pancreas is unremarkable.  Kidneys and adrenal glands are otherwise normal.  Biliary tree is normal.    Subcentimeter mesenteric and retroperitoneal lymph nodes again seen.    Negative for adenopathy, free fluid or inflammatory change noted within the abdomen or pelvis.  Vascular calcifications are present without aneurysmal changes. Portal vein is patent.    There is oral contrast within the stomach and small bowel.  There is a distal colonic ostomy in the left lower quadrant that is filled with oral contrast, and there is contrast within most of the colon as well.  There is contrast in a normal appearing appendix.  Negative for dilated loops of  large or small bowel.  The ostomy appears to be a side ostomy, with the distal small bowel in the way from the ostomy toward the rectum.    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  The amount of surrounding inflammatory changes is stable.  Again seen is significant induration of the medial inferior left buttock soft tissues without air collection although there are some new air bubbles seen (reference images 145 through 150 of series 2).    The rest of the sigmoid colon and rectum are unchanged.    PELVIS: The urinary bladder is unremarkable.    The male pelvic organs are normal. There are pelvic phleboliths.    Again seen are some air collections within both spermatic cords.  These air collections are smaller than on the most recent comparison study.  No other changes of intraperitoneal free air is seen.    No significant osseous abnormality is identified.  Negative for significant spinal canal stenosis. Stable degenerative changes of the spine.  Stable bilateral L5 pars interarticularis defects with minimal grade 1 anterior spondylolisthesis of L5 on S1.    Negative for groin adenopathy.    The abdominal wall is intact.   Impression       1.  Again seen is a fistulous connection versus localized perforation involving the left side of the rectum.  In this area measures approximately 3.9 x 2.0 cm currently, not significantly changed from the comparison study.  The amount of soft tissue swelling and amorphous edema surrounding the rectum is similar to the comparison study there has been interval development of some air bubbles in the medial posterior left buttock soft tissues in the same location is significant inflammatory changes without a fluid collection seen.  These findings are concerning for either extension of the localized perforation into the posteromedial  buttock versus interval development of some necrosis.  Clinical correlation is advised.    2.  No other abnormal fluid collections are seen throughout the abdomen or pelvis.  There does remain to be some air in both inguinal canals although this is decreased in the interim and there is no definite evidence for intraperitoneal free air.    3. There are some new ground-glass opacities within the bilateral lower lobes.  Early infectious process must be considered.    4.  The appearance of the abdomen and pelvis is otherwise unchanged.  Negative for dilated loops of large or small bowel with oral contrast flowing through the bowel and into the left lower quadrant ostomy.    5.  Numerous stable findings as noted above.

## 2020-03-04 NOTE — PLAN OF CARE
Chart reviewed, pt resting in bed with call light and personal belongings within reach. Vitals stable. Iv antibiotics given as ordered. Pain controlled with ordered meds in MAR. Rectal wound open to air, no drainage noted. Vanc trough due 3/4/2020 @ 0830am, will pass on to day nurse. Pt colostomy intact, pt emptying by self. Reg diet, tolerating well. Pt absent of injury throughout shift, will continue to monitor.

## 2020-03-04 NOTE — PROGRESS NOTES
Therapy with vancomycin complete and/or consult discontinued by provider.      Pharmacy will sign off, please re-consult as needed.    Michaela Hauser [facility] 3/4/2020 12:09 PM

## 2020-03-04 NOTE — CONSULTS
[facility] -   Colorectal Surgery  Consult Note    Patient Name: Sukhjinder Presley  MRN: [id number]  Code Status: Full Code  Admission Date: 3/2/2020  Hospital Length of Stay: 0 days  Attending Physician: Joe Tovar MD  Primary Care Provider: Drake Elizalde MD    Patient information was obtained from patient, spouse/SO, past medical records and ER records.     Inpatient consult to General Surgery  Consult performed by: Jan New MD  Consult ordered by: Sindy Geller NP        Subjective:     Principal Problem: Rectal perforation    History of Present Illness: 54-year-old male with rectal adenocarcinoma who has completed neoadjuvant chemo radiation and was on neoadjuvant chemotherapy for his COLLINS approach who had a course complicated by rectal perforation on chemotx requiring laparoscopic loop sigmoid colostomy construction on 2/18/2020 who is readmitted to the hospital with increasing left buttock pain and swelling. CT scan in ED worrisome for increased induration and air in the left buttock, possible fistula connection developing to the left buttock. Surgery consulted for evaluation. Febrile upon presentation but reports afebrile at home. Denies nausea, vomiting, diarrhea or constipation. Is having good colostomy function and having no stool per rectum since surgery.    No current facility-administered medications on file prior to encounter.      Current Outpatient Medications on File Prior to Encounter   Medication Sig    amoxicillin-clavulanate 875-125mg (AUGMENTIN) 875-125 mg per tablet Take 1 tablet by mouth 2 (two) times daily. for 12 days    capecitabine (XELODA) 500 MG Tab Take 3 tablets (1500 mg) by mouth twice daily after breakfast and dinner on days of radiation only.    diphenoxylate-atropine 2.5-0.025 mg (LOMOTIL) 2.5-0.025 mg per tablet Take 1 to 2 tablets by mouth 4 (four) times daily as needed for Diarrhea.    dronabinol (MARINOL) 10 MG capsule Take 1 capsule (10 mg  total) by mouth 2 (two) times daily before meals.    iron fum-B12-IF-C-folic acid (FOLTRIN) 110-0.5 mg capsule Take 1 capsule by mouth 2 (two) times daily.    lidocaine (XYLOCAINE) 5 % Oint ointment Apply topically as needed.    magic mouthwash diphen/antac/lidoc Swish and spit 15 ml by mouth every 4 hours as needed    morphine (MS CONTIN) 30 MG 12 hr tablet Take 1 tablet (30 mg total) by mouth every 8 (eight) hours.    oxyCODONE (ROXICODONE) 10 mg Tab immediate release tablet Take 1 tablet (10 mg total) by mouth every 4 (four) hours as needed for Pain (medically necessary).    pramoxine (PROCTOFOAM) 1 % Foam Place rectally 3 (three) times daily as needed.    promethazine (PHENERGAN) 25 MG tablet Take 1 tablet (25 mg total) by mouth every 4 (four) hours.       Review of patient's allergies indicates:  No Known Allergies    Past Medical History:   Diagnosis Date    Anemia     Cancer      Past Surgical History:   Procedure Laterality Date    COLONOSCOPY N/A 6/6/2019    Procedure: COLONOSCOPY;  Surgeon: Anjelica Saavedra MD;  Location: [facility];  Service: Endoscopy;  Laterality: N/A;    ESOPHAGOGASTRODUODENOSCOPY N/A 6/6/2019    Procedure: EGD (ESOPHAGOGASTRODUODENOSCOPY);  Surgeon: Anjelica Saavedra MD;  Location: [facility];  Service: Endoscopy;  Laterality: N/A;    HERNIA REPAIR  1995    INJECTION OF ANESTHETIC AGENT INTO TISSUE PLANE DEFINED BY TRANSVERSUS ABDOMINIS MUSCLE N/A 2/18/2020    Procedure: BLOCK, TRANSVERSUS ABDOMINIS PLANE;  Surgeon: Jan New MD;  Location: [facility];  Service: General;  Laterality: N/A;    INSERTION OF TUNNELED CENTRAL VENOUS CATHETER (CVC) WITH SUBCUTANEOUS PORT N/A 10/8/2019    Procedure: [id number];  Surgeon: Jan New MD;  Location: [facility];  Service: General;  Laterality: N/A;    TONSILLECTOMY       Family History     Problem Relation (Age of Onset)    Coronary artery disease Brother, Maternal Grandmother    Intestinal malrotation Mother     Leukemia Father    No Known Problems Sister    Stomach cancer Maternal Grandfather        Tobacco Use    Smoking status: Current Every Day Smoker     Packs/day: 1.00     Years: 35.00     Pack years: 35.00     Types: Cigarettes     Start date: 1/2/1984    Smokeless tobacco: Current User     Types: Chew   Substance and Sexual Activity    Alcohol use: Yes     Alcohol/week: 46.0 standard drinks     Types: 42 Cans of beer, 4 Shots of liquor per week     Frequency: 4 or more times a week     Drinks per session: 5 or 6     Binge frequency: Daily or almost daily     Comment: nancie 7, beer daily,  None 72 hours prior to surgery    Drug use: Never    Sexual activity: Yes     Partners: Female     Birth control/protection: None     Review of Systems   Constitutional: Positive for fever. Negative for activity change, appetite change, chills, fatigue and unexpected weight change.   HENT: Negative for congestion, ear pain, sore throat and trouble swallowing.    Eyes: Negative for pain, redness and itching.   Respiratory: Negative for cough, shortness of breath and wheezing.    Cardiovascular: Negative for chest pain, palpitations and leg swelling.   Gastrointestinal: Positive for rectal pain (buttock pain). Negative for abdominal distention, abdominal pain, constipation, diarrhea, nausea and vomiting.   Endocrine: Negative for cold intolerance, heat intolerance and polyuria.   Genitourinary: Negative for dysuria, flank pain, frequency and hematuria.   Musculoskeletal: Negative for gait problem, joint swelling and neck pain.   Skin: Negative for color change, rash and wound.   Allergic/Immunologic: Negative for environmental allergies and immunocompromised state.   Neurological: Negative for dizziness, speech difficulty, weakness and numbness.   Psychiatric/Behavioral: Negative for agitation, confusion and hallucinations.     Objective:     Vital Signs (Most Recent):  Temp: 99.1 °F (37.3 °C) (03/03/20 1927)  Pulse: 72  (03/03/20 1927)  Resp: 18 (03/03/20 1927)  BP: 106/67 (03/03/20 1927)  SpO2: 95 % (03/03/20 1927) Vital Signs (24h Range):  Temp:  [98.7 °F (37.1 °C)-100.2 °F (37.9 °C)] 99.1 °F (37.3 °C)  Pulse:  [66-96] 72  Resp:  [13-32] 18  SpO2:  [95 %-99 %] 95 %  BP: ()/(50-72) 106/67     Weight: 70 kg (154 lb 3.4 oz)  Body mass index is 20.91 kg/m².    Physical Exam   Constitutional: He is oriented to person, place, and time. He appears well-developed.   HENT:   Head: Normocephalic and atraumatic.   Eyes: Conjunctivae and EOM are normal.   Neck: Normal range of motion. No thyromegaly present.   Cardiovascular: Normal rate and regular rhythm.   Pulmonary/Chest: Effort normal. No respiratory distress.   Abdominal: Soft. He exhibits no distension and no mass. There is no tenderness.   Lap port sites well-healing; ostomy pink and viable with stool in bag   Genitourinary:   Genitourinary Comments: L buttock with some induration and erythema but no fluctuance or obvious necrosis/drainable fluid collection   Musculoskeletal: Normal range of motion. He exhibits no edema or tenderness.   Neurological: He is alert and oriented to person, place, and time.   Skin: Skin is warm and dry. Capillary refill takes less than 2 seconds. No rash noted.   Psychiatric: He has a normal mood and affect.       Significant Labs:  CBC:   Recent Labs   Lab 03/03/20 0827   WBC 9.07   RBC 3.31*   HGB 9.1*   HCT 29.3*      MCV 89   MCH 27.5   MCHC 31.1*     BMP:   Recent Labs   Lab 03/03/20 0827   GLU 97      K 4.0      CO2 24   BUN 4*   CREATININE 0.7   CALCIUM 8.5*   MG 1.8     CMP:   Recent Labs   Lab 03/03/20 0827   GLU 97   CALCIUM 8.5*   ALBUMIN 2.5*   PROT 6.7      K 4.0   CO2 24      BUN 4*   CREATININE 0.7   ALKPHOS 65   ALT 5*   AST 10   BILITOT 0.5     LFTs:   Recent Labs   Lab 03/03/20  0827   ALT 5*   AST 10   ALKPHOS 65   BILITOT 0.5   PROT 6.7   ALBUMIN 2.5*       Significant Diagnostics:  I have  reviewed all pertinent imaging results/findings within the past 24 hours.     CT:    FINDINGS:  LUNG BASES: Medial right lower lobe and inferior left lower lobe ground-glass opacities have developed in the interim.  There is some atelectasis in the right lower lobe laterally.  Stable medial middle lobe scarring.  Lung bases are otherwise clear.  Negative for pleural or pericardial effusions. The distal esophagus is normal.    ABDOMEN: Stable splenomegaly.  Stable hepatic and splenic calcified granulomas.  Stable inferior pole left renal stones measuring up to 1.1 cm, without hydronephrosis.  The liver, spleen and gallbladder otherwise appear normal.  The pancreas is unremarkable.  Kidneys and adrenal glands are otherwise normal.  Biliary tree is normal.    Subcentimeter mesenteric and retroperitoneal lymph nodes again seen.    Negative for adenopathy, free fluid or inflammatory change noted within the abdomen or pelvis.  Vascular calcifications are present without aneurysmal changes. Portal vein is patent.    There is oral contrast within the stomach and small bowel.  There is a distal colonic ostomy in the left lower quadrant that is filled with oral contrast, and there is contrast within most of the colon as well.  There is contrast in a normal appearing appendix.  Negative for dilated loops of large or small bowel.  The ostomy appears to be a side ostomy, with the distal small bowel in the way from the ostomy toward the rectum.    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  The amount of surrounding inflammatory changes is stable.  Again seen is significant induration of the medial inferior left buttock soft tissues without air collection although there are some new air bubbles seen (reference images 145 through 150 of series 2).    The rest of the sigmoid colon and  rectum are unchanged.    PELVIS: The urinary bladder is unremarkable.    The male pelvic organs are normal. There are pelvic phleboliths.    Again seen are some air collections within both spermatic cords.  These air collections are smaller than on the most recent comparison study.  No other changes of intraperitoneal free air is seen.    No significant osseous abnormality is identified.  Negative for significant spinal canal stenosis. Stable degenerative changes of the spine.  Stable bilateral L5 pars interarticularis defects with minimal grade 1 anterior spondylolisthesis of L5 on S1.    Negative for groin adenopathy.    The abdominal wall is intact.   Impression       1.  Again seen is a fistulous connection versus localized perforation involving the left side of the rectum.  In this area measures approximately 3.9 x 2.0 cm currently, not significantly changed from the comparison study.  The amount of soft tissue swelling and amorphous edema surrounding the rectum is similar to the comparison study there has been interval development of some air bubbles in the medial posterior left buttock soft tissues in the same location is significant inflammatory changes without a fluid collection seen.  These findings are concerning for either extension of the localized perforation into the posteromedial buttock versus interval development of some necrosis.  Clinical correlation is advised.    2.  No other abnormal fluid collections are seen throughout the abdomen or pelvis.  There does remain to be some air in both inguinal canals although this is decreased in the interim and there is no definite evidence for intraperitoneal free air.    3. There are some new ground-glass opacities within the bilateral lower lobes.  Early infectious process must be considered.    4.  The appearance of the abdomen and pelvis is otherwise unchanged.  Negative for dilated loops of large or small bowel with oral contrast flowing through the  bowel and into the left lower quadrant ostomy.    5.  Numerous stable findings as noted above.     Assessment/Plan:     Rectal cancer  55yo M with perforated rectal cancer    - Continue local wound care to ostomy  - No emergent surgical intervention required at this time for L buttock erythema and induration. No drainable fluid collection on CT or on exam  - IV abx  - IVF  - Okay for diet today  - may need PICC and outpatient IV abx therapy upon discharge      VTE Risk Mitigation (From admission, onward)         Ordered     IP VTE HIGH RISK PATIENT  Once      03/02/20 2306     Reason for No Pharmacological VTE Prophylaxis  Once     Question:  Reasons:  Answer:  Risk of Bleeding    03/02/20 2306     Place sequential compression device  Until discontinued      03/02/20 2306                Thank you for your consult. I will follow-up with patient. Please contact us if you have any additional questions.    Jan New MD  Colorectal Surgery  [facility] -

## 2020-03-04 NOTE — HPI
54-year-old male with rectal adenocarcinoma who has completed neoadjuvant chemo radiation and was on neoadjuvant chemotherapy for his COLLINS approach who had a course complicated by rectal perforation on chemotx requiring laparoscopic loop sigmoid colostomy construction on 2/18/2020 who is readmitted to the hospital with increasing left buttock pain and swelling. CT scan in ED worrisome for increased induration and air in the left buttock, possible fistula connection developing to the left buttock. Surgery consulted for evaluation. Febrile upon presentation but reports afebrile at home. Denies nausea, vomiting, diarrhea or constipation. Is having good colostomy function and having no stool per rectum since surgery.

## 2020-03-04 NOTE — SUBJECTIVE & OBJECTIVE
Interval History: pain and swelling significantly improved    Medications:  Continuous Infusions:  Scheduled Meds:   morphine  30 mg Oral Q8H    nicotine  1 patch Transdermal Daily    pantoprazole  40 mg Oral Daily    piperacillin-tazobactam (ZOSYN) IVPB  4.5 g Intravenous Q8H    vancomycin (VANCOCIN) IVPB  1,250 mg Intravenous Q12H     PRN Meds:acetaminophen, ondansetron, oxyCODONE, promethazine (PHENERGAN) IVPB     Review of patient's allergies indicates:  No Known Allergies  Objective:     Vital Signs (Most Recent):  Temp: 98.8 °F (37.1 °C) (03/04/20 0714)  Pulse: 72 (03/04/20 0714)  Resp: 19 (03/04/20 0714)  BP: 101/64 (03/04/20 0714)  SpO2: 98 % (03/04/20 0714) Vital Signs (24h Range):  Temp:  [98.8 °F (37.1 °C)-100.2 °F (37.9 °C)] 98.8 °F (37.1 °C)  Pulse:  [72-86] 72  Resp:  [18-19] 19  SpO2:  [95 %-98 %] 98 %  BP: ()/(52-71) 101/64     Weight: 70 kg (154 lb 3.4 oz)  Body mass index is 20.91 kg/m².    Intake/Output - Last 3 Shifts       03/02 0700 - 03/03 0659 03/03 0700 - 03/04 0659 03/04 0700 - 03/05 0659    P.O.  770     I.V. (mL/kg) 1000 (14.3) 2125 (30.4)     IV Piggyback 1700 800     Total Intake(mL/kg) 2700 (38.6) 3695 (52.9)     Urine (mL/kg/hr) 600      Stool 0      Total Output 600      Net +2100 +3695            Urine Occurrence  15 x     Stool Occurrence 2 x 0 x 0 x          Physical Exam   Constitutional: He is oriented to person, place, and time. He appears well-developed and well-nourished.   HENT:   Head: Normocephalic and atraumatic.   Eyes: EOM are normal.   Cardiovascular: Normal rate and regular rhythm.   Pulmonary/Chest: Effort normal. No respiratory distress.   Abdominal: Soft. He exhibits no distension.   Ostomy in place    Musculoskeletal: Normal range of motion.   Neurological: He is alert and oriented to person, place, and time.   Skin: Skin is warm and dry.   Psychiatric: He has a normal mood and affect. Thought content normal.   Vitals reviewed.      Significant  Labs:  CBC:   Recent Labs   Lab 03/04/20  0451   WBC 8.56   RBC 3.11*   HGB 8.7*   HCT 27.2*      MCV 88   MCH 28.0   MCHC 32.0     CMP:   Recent Labs   Lab 03/04/20  0451   GLU 99   CALCIUM 8.5*   ALBUMIN 2.4*   PROT 6.3      K 3.6   CO2 24      BUN 5*   CREATININE 1.0   ALKPHOS 58   ALT <5*   AST 9*   BILITOT 0.4       Significant Diagnostics:  I have reviewed all pertinent imaging results/findings within the past 24 hours.

## 2020-03-04 NOTE — OP NOTE
Pre Op Diagnosis: Adenocarcinoma     Post Op Diagnosis: same     Procedure:  RUE picc      Procedure performed by: Lindsey LAROSE, Maryam WEINSTEIN     Written Informed Consent Obtained: Yes     Specimen Removed:  no     Estimated Blood Loss:  minimal     Findings: Local anesthesia.     The patient tolerated the procedure well and there were no complications.      The RUE was sterilely prepped and draped.  Lidocaine was used as a local anesthetic.  Using u/s guidance a 5 Cameroonian 37 cm picc was placed into the basilic vein into the SVC/right atrium junction.  Placement was verified using X Ray.  PICC was secured in place with attachment device and is ready to use.  Pt tolerated proc well without immediate complications.

## 2020-03-04 NOTE — ASSESSMENT & PLAN NOTE
53yo M with perforated rectal cancer    - Continue local wound care to ostomy  - No emergent surgical intervention required at this time for L buttock erythema and induration. No drainable fluid collection on CT or on exam  - IV abx  - IVF  - Okay for diet today  - may need PICC and outpatient IV abx therapy upon discharge

## 2020-03-05 DIAGNOSIS — K63.1 RECTAL PERFORATION: Primary | ICD-10-CM

## 2020-03-05 LAB
BACTERIA BLD CULT: NORMAL
BACTERIA BLD CULT: NORMAL

## 2020-03-05 NOTE — DISCHARGE SUMMARY
[facility]  Hospital Medicine  Discharge Summary      Patient Name: Sukhjinder Presley  MRN: [id number]  Admission Date: 3/2/2020  Hospital Length of Stay: 1 days  Discharge Date and Time: 3/4/2020  4:39 PM  Attending Physician: Joe Tovar MD   Discharging Provider: Carter Castillo NP  Primary Care Provider: Drake Elizalde MD      HPI:   Mr. Presley is a 55 yo male with a PMHx of rectal adenocarcinoma currently receiving chemo and radiation, and recently diagnosed rectal perforation near his tumor.  In order to complete his chemo/radiation treatment and prevent sepsis, he underwent laparoscopic colostomy creation per Dr. New on 2/18/20.  Tonight, patient presented to the ED with c/o fever x 2 days.  Associated redness, swelling, and pain to left buttock.  Denies any ABD pain or distention, N/V/D, melena, hematochezia, hematemesis, dysuria, hematuria, CP, SOB, back pain, pelvic pain, HA, lightheadedness, dizziness, or chills.  Initial work-up resulted WBC 9.61, 0% bands, lactic 1.8.  CT of ABD/pelvis showed unchanged fistulous connection versus localized perforation involving the left side of the rectum, soft tissue swelling and amorphous edema surrounding the rectum is similar to the comparison study, interval development of air bubbles in the medial posterior left buttock soft tissues in the same location as significant inflammatory changes without a fluid collection seen which is concerning for either extension of the localized perforation into the posteromedial buttock versus interval development of some necrosis.  Blood cultures were obtained in the ED, and IV vanc + Zosyn given.  Case discussed with General Surgery, who agrees with IV antibiotics and will see patient in consult.  Hospital Medicine was called for admission.         * No surgery found *      Hospital Course:   Patient is a 54 year old male PMHx of rectal adenocarcinoma receiving chemotherapy who presented to [facility] with complaint of fever for  x 2 days associated left buttock mass and pain. CT of the abdomen/pelvis showed an again seen fistulous connection versus localized perforation involving the left side of the rectum.  In this area measures approximately 3.9 x 2.0 cm currently, not significantly changed from the comparison study, soft tissue swelling and amorphous edema surrounding the rectum is similar to the comparison study there has been interval development of some air bubbles in the medial posterior left buttock soft tissues in the same location is significant inflammatory changes without a fluid collection seen. General Surgery was consulted. Patient had cellulitis/induration on buttock concerning for developing abscess. General Surgery recommend on surgery at this time and continue IV antibiotic. The day of discharge, patient feeling well, left buttock pain and redness resolved. General Surgery order PICC line and IV antibiotic for home use. Social service arranged home IV antibiotic through [facility]. Patient anxious to be discharged, does not want Home Health. [facility] will management PICC line care. He will follow up with General Surgery and Hematology/Oncology in clinic. He will follow up with ID in clinic due to home IV antibotic. Patient was seen, examined and suitable for discharge.          Consults:   Consults (From admission, onward)        Status Ordering Provider     Inpatient consult to General Surgery  Once     Provider:  Jan New MD    Completed LEONCIO LOUIE          No new Assessment & Plan notes have been filed under this hospital service since the last note was generated.  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 Go on 3/6/2020.    Specialty:  General Surgery  Why:  hospital follow up   Contact information:  [address] [place] [address]   [place] [address]  [phone]             Ken Cosby MD. Go on 3/17/2020.    Specialty:  Hematology and Oncology  Why:  scheduled follow up   Contact information:  [address]  [place] [address]  [phone]             Sukhjinder Mensah MD. Schedule an appointment as soon as possible for a visit in 1 week.    Specialty:  Infectious Diseases  Why:  started on Home IV antibotics by General Surgery   Contact information:  [address] [place] [address] [place]  [place] [address]  [phone]                 Patient Instructions:      Diet Adult Regular     Notify your health care provider if you experience any of the following:  persistent nausea and vomiting or diarrhea     Notify your health care provider if you experience any of the following:  severe uncontrolled pain     Activity as tolerated       Significant Diagnostic Studies: Labs:   CMP   Recent Labs   Lab 03/04/20  0451      K 3.6      CO2 24   GLU 99   BUN 5*   CREATININE 1.0   CALCIUM 8.5*   PROT 6.3   ALBUMIN 2.4*   BILITOT 0.4   ALKPHOS 58   AST 9*   ALT <5*   ANIONGAP 10   ESTGFRAFRICA >60   EGFRNONAA >60    and CBC   Recent Labs   Lab 03/04/20  0451   WBC 8.56   HGB 8.7*   HCT 27.2*          Pending Diagnostic Studies:     None         Medications:  Reconciled Home Medications:      Medication List      START taking these medications    PIPERACILLIN-TAZOBACTAM 4.5G/100ML D5W IVPB (READY TO MIX)  Inject 100 mLs (4.5 g total) into the vein every 8 (eight) hours.        CONTINUE taking these medications    capecitabine 500 MG Tab  Commonly known as:  XELODA  Take 3 tablets (1500 mg) by mouth twice  daily after breakfast and dinner on days of radiation only.     diphenoxylate-atropine 2.5-0.025 mg 2.5-0.025 mg per tablet  Commonly known as:  LOMOTIL  Take 1 to 2 tablets by mouth 4 (four) times daily as needed for Diarrhea.     dronabinol 10 MG capsule  Commonly known as:  MARINOL  Take 1 capsule (10 mg total) by mouth 2 (two) times daily before meals.     iron fum-B12-IF-C-folic acid 110-0.5 mg capsule  Commonly known as:  FOLTRIN  Take 1 capsule by mouth 2 (two) times daily.     lidocaine 5 % Oint ointment  Commonly known as:  XYLOCAINE  Apply topically as needed.     magic mouthwash diphen/antac/lidoc  Swish and spit 15 ml by mouth every 4 hours as needed     morphine 30 MG 12 hr tablet  Commonly known as:  MS CONTIN  Take 1 tablet (30 mg total) by mouth every 8 (eight) hours.     oxyCODONE 10 mg Tab immediate release tablet  Commonly known as:  ROXICODONE  Take 1 tablet (10 mg total) by mouth every 4 (four) hours as needed for Pain (medically necessary).     pramoxine 1 % Foam  Commonly known as:  Proctofoam  Place rectally 3 (three) times daily as needed.     promethazine 25 MG tablet  Commonly known as:  PHENERGAN  Take 1 tablet (25 mg total) by mouth every 4 (four) hours.        STOP taking these medications    amoxicillin-clavulanate 875-125mg 875-125 mg per tablet  Commonly known as:  AUGMENTIN            Indwelling Lines/Drains at time of discharge:   Lines/Drains/Airways     Peripherally Inserted Central Catheter Line            PICC Double Lumen 03/04/20 1420 right basilic less than 1 day          Central Venous Catheter Line                 Port A Cath Single Lumen 10/08/19 left subclavian 149 days          Drain                 Colostomy 02/18/20 1410 RLQ 15 days                Time spent on the discharge of patient: 45 minutes  Patient was seen and examined on the date of discharge and determined to be suitable for discharge.         Carter Castillo NP  Department of Hospital Medicine  [facility]

## 2020-03-07 LAB
BACTERIA BLD CULT: NORMAL
BACTERIA BLD CULT: NORMAL

## 2020-03-10 ENCOUNTER — TELEPHONE (OUTPATIENT)
Dept: INFECTIOUS DISEASES | Facility: CLINIC | Age: 55
End: 2020-03-10

## 2020-03-10 NOTE — TELEPHONE ENCOUNTER
----- Message from Sukhjinder Mensah MD sent at 3/9/2020  7:54 PM CDT -----  Contact: Wife Ms [phone]  Let us see this patient next week  ----- Message -----  From: Alondra Young  Sent: 3/9/2020   1:29 PM CDT  To: Sukhjinder Mensah MD    Would like to consult with the nurse,  Patient would like to schedule an Appt to be seen in the office, patient will be a new patient, please call back at  [phone], Thanks sj

## 2020-03-12 ENCOUNTER — EXTERNAL HOME HEALTH (OUTPATIENT)
Dept: HOME HEALTH SERVICES | Facility: HOSPITAL | Age: 55
End: 2020-03-12
Payer: COMMERCIAL

## 2020-03-16 ENCOUNTER — OFFICE VISIT (OUTPATIENT)
Dept: HEMATOLOGY/ONCOLOGY | Facility: CLINIC | Age: 55
End: 2020-03-16
Payer: COMMERCIAL

## 2020-03-16 VITALS
BODY MASS INDEX: 21.62 KG/M2 | OXYGEN SATURATION: 98 % | HEIGHT: 72 IN | TEMPERATURE: 100 F | SYSTOLIC BLOOD PRESSURE: 122 MMHG | WEIGHT: 159.63 LBS | RESPIRATION RATE: 18 BRPM | DIASTOLIC BLOOD PRESSURE: 81 MMHG | HEART RATE: 93 BPM

## 2020-03-16 DIAGNOSIS — C20 RECTAL CANCER: Primary | ICD-10-CM

## 2020-03-16 DIAGNOSIS — F17.200 SMOKER: ICD-10-CM

## 2020-03-16 DIAGNOSIS — Z93.3 COLOSTOMY IN PLACE: ICD-10-CM

## 2020-03-16 DIAGNOSIS — K63.1 RECTAL PERFORATION: ICD-10-CM

## 2020-03-16 PROCEDURE — 99214 PR OFFICE/OUTPT VISIT, EST, LEVL IV, 30-39 MIN: ICD-10-PCS | Mod: S$GLB,,, | Performed by: INTERNAL MEDICINE

## 2020-03-16 PROCEDURE — 3008F PR BODY MASS INDEX (BMI) DOCUMENTED: ICD-10-PCS | Mod: CPTII,S$GLB,, | Performed by: INTERNAL MEDICINE

## 2020-03-16 PROCEDURE — 99999 PR PBB SHADOW E&M-EST. PATIENT-LVL III: CPT | Mod: PBBFAC,,, | Performed by: INTERNAL MEDICINE

## 2020-03-16 PROCEDURE — 99214 OFFICE O/P EST MOD 30 MIN: CPT | Mod: S$GLB,,, | Performed by: INTERNAL MEDICINE

## 2020-03-16 PROCEDURE — 3008F BODY MASS INDEX DOCD: CPT | Mod: CPTII,S$GLB,, | Performed by: INTERNAL MEDICINE

## 2020-03-16 PROCEDURE — 99999 PR PBB SHADOW E&M-EST. PATIENT-LVL III: ICD-10-PCS | Mod: PBBFAC,,, | Performed by: INTERNAL MEDICINE

## 2020-03-16 RX ORDER — OXYCODONE HYDROCHLORIDE 10 MG/1
10 TABLET ORAL EVERY 4 HOURS PRN
Qty: 90 TABLET | Refills: 0 | Status: SHIPPED | OUTPATIENT
Start: 2020-03-16 | End: 2020-05-26 | Stop reason: SDUPTHER

## 2020-03-16 RX ORDER — MORPHINE SULFATE 30 MG/1
30 TABLET, FILM COATED, EXTENDED RELEASE ORAL EVERY 8 HOURS
Qty: 90 TABLET | Refills: 0 | Status: SHIPPED | OUTPATIENT
Start: 2020-03-16 | End: 2020-10-13

## 2020-03-16 NOTE — PROGRESS NOTES
Subjective:       Patient ID: Sukhjinder Presley is a 54 y.o. male.    Chief Complaint: Follow-up; Results; Pain; and Rectal Cancer    HPI 54-year-old male returns recent rectal perforation and abscess currently on IV antibiotics in preparation for AP resection diverting colostomy completed wife recently diagnosed with soft tissue sarcoma completion of surgery no evidence of metastatic disease ECOG status 2    Past Medical History:   Diagnosis Date    Anemia     Cancer      Family History   Problem Relation Age of Onset    Intestinal malrotation Mother     Leukemia Father     No Known Problems Sister     Coronary artery disease Brother     Coronary artery disease Maternal Grandmother     Stomach cancer Maternal Grandfather      Social History     Socioeconomic History    Marital status: Significant Other     Spouse name: Not on file    Number of children: Not on file    Years of education: Not on file    Highest education level: Not on file   Occupational History    Not on file   Social Needs    Financial resource strain: Somewhat hard    Food insecurity:     Worry: Sometimes true     Inability: Sometimes true    Transportation needs:     Medical: No     Non-medical: No   Tobacco Use    Smoking status: Current Every Day Smoker     Packs/day: 1.00     Years: 35.00     Pack years: 35.00     Types: Cigarettes     Start date: 1/2/1984    Smokeless tobacco: Current User     Types: Chew   Substance and Sexual Activity    Alcohol use: Yes     Alcohol/week: 46.0 standard drinks     Types: 42 Cans of beer, 4 Shots of liquor per week     Frequency: 4 or more times a week     Drinks per session: 5 or 6     Binge frequency: Daily or almost daily     Comment: nancie 7, beer daily,  None 72 hours prior to surgery    Drug use: Never    Sexual activity: Yes     Partners: Female     Birth control/protection: None   Lifestyle    Physical activity:     Days per week: 1 day     Minutes per session: 60 min    Stress:  Not on file   Relationships    Social connections:     Talks on phone: More than three times a week     Gets together: More than three times a week     Attends Anabaptism service: Not on file     Active member of club or organization: No     Attends meetings of clubs or organizations: Never     Relationship status: Living with partner   Other Topics Concern    Not on file   Social History Narrative    Not on file     Past Surgical History:   Procedure Laterality Date    COLONOSCOPY N/A 6/6/2019    Procedure: COLONOSCOPY;  Surgeon: Anjelica Saavedra MD;  Location: [facility] ENDO;  Service: Endoscopy;  Laterality: N/A;    ESOPHAGOGASTRODUODENOSCOPY N/A 6/6/2019    Procedure: EGD (ESOPHAGOGASTRODUODENOSCOPY);  Surgeon: Anjelica Saavedra MD;  Location: [facility] ENDO;  Service: Endoscopy;  Laterality: N/A;    HERNIA REPAIR  1995    INJECTION OF ANESTHETIC AGENT INTO TISSUE PLANE DEFINED BY TRANSVERSUS ABDOMINIS MUSCLE N/A 2/18/2020    Procedure: BLOCK, TRANSVERSUS ABDOMINIS PLANE;  Surgeon: Jan New MD;  Location: [facility] OR;  Service: General;  Laterality: N/A;    INSERTION OF TUNNELED CENTRAL VENOUS CATHETER (CVC) WITH SUBCUTANEOUS PORT N/A 10/8/2019    Procedure: GXANVSEYS-ANJT-H-CATH;  Surgeon: Jan New MD;  Location: [facility] OR;  Service: General;  Laterality: N/A;    TONSILLECTOMY         Labs:  Lab Results   Component Value Date    WBC 8.56 03/04/2020    HGB 8.7 (L) 03/04/2020    HCT 27.2 (L) 03/04/2020    MCV 88 03/04/2020     03/04/2020     BMP  Lab Results   Component Value Date     03/04/2020    K 3.6 03/04/2020     03/04/2020    CO2 24 03/04/2020    BUN 5 (L) 03/04/2020    CREATININE 1.0 03/04/2020    CALCIUM 8.5 (L) 03/04/2020    ANIONGAP 10 03/04/2020    ESTGFRAFRICA >60 03/04/2020    EGFRNONAA >60 03/04/2020     Lab Results   Component Value Date    ALT <5 (L) 03/04/2020    AST 9 (L) 03/04/2020    ALKPHOS 58 03/04/2020    BILITOT 0.4 03/04/2020       Lab Results   Component  Value Date    IRON 59 02/03/2020    TIBC 250 02/03/2020    FERRITIN 433 (H) 02/03/2020     Lab Results   Component Value Date    AXLQEUJQ40 497 09/03/2019     Lab Results   Component Value Date    FOLATE 8.3 09/03/2019     Lab Results   Component Value Date    TSH 1.607 01/15/2019         Review of Systems   Constitutional: Positive for activity change, appetite change and fatigue. Negative for chills, diaphoresis, fever and unexpected weight change.   HENT: Negative for congestion, dental problem, drooling, ear discharge, ear pain, facial swelling, hearing loss, mouth sores, nosebleeds, postnasal drip, rhinorrhea, sinus pressure, sneezing, sore throat, tinnitus, trouble swallowing and voice change.    Eyes: Negative for photophobia, pain, discharge, redness, itching and visual disturbance.   Respiratory: Negative for apnea, cough, choking, chest tightness, shortness of breath, wheezing and stridor.    Cardiovascular: Negative for chest pain, palpitations and leg swelling.   Gastrointestinal: Negative for abdominal distention, abdominal pain, anal bleeding, blood in stool, constipation, diarrhea, nausea, rectal pain and vomiting.   Endocrine: Negative for cold intolerance, heat intolerance, polydipsia, polyphagia and polyuria.   Genitourinary: Negative for decreased urine volume, difficulty urinating, discharge, dysuria, enuresis, flank pain, frequency, genital sores, hematuria, penile pain, penile swelling, scrotal swelling, testicular pain and urgency.   Musculoskeletal: Negative for arthralgias, back pain, gait problem, joint swelling, myalgias, neck pain and neck stiffness.   Skin: Negative for color change, pallor, rash and wound.   Allergic/Immunologic: Negative for environmental allergies, food allergies and immunocompromised state.   Neurological: Positive for weakness. Negative for dizziness, tremors, seizures, syncope, facial asymmetry, speech difficulty, light-headedness, numbness and headaches.    Hematological: Negative for adenopathy. Does not bruise/bleed easily.   Psychiatric/Behavioral: Positive for dysphoric mood. Negative for agitation, behavioral problems, confusion, decreased concentration, hallucinations, self-injury, sleep disturbance and suicidal ideas. The patient is nervous/anxious. The patient is not hyperactive.        Objective:      Physical Exam   Constitutional: He is oriented to person, place, and time. He appears well-developed and well-nourished. He appears distressed.   HENT:   Head: Normocephalic.   Right Ear: External ear normal.   Left Ear: External ear normal.   Nose: Nose normal. Right sinus exhibits no maxillary sinus tenderness and no frontal sinus tenderness. Left sinus exhibits no maxillary sinus tenderness and no frontal sinus tenderness.   Mouth/Throat: Oropharynx is clear and moist. No oropharyngeal exudate.   Eyes: Pupils are equal, round, and reactive to light. EOM and lids are normal. Right eye exhibits no discharge. Left eye exhibits no discharge. Right conjunctiva is not injected. Right conjunctiva has no hemorrhage. Left conjunctiva is not injected. Left conjunctiva has no hemorrhage. No scleral icterus. Right eye exhibits normal extraocular motion. Left eye exhibits normal extraocular motion.   Neck: Normal range of motion. Neck supple. No JVD present. No tracheal deviation present. No thyromegaly present.   Cardiovascular: Normal rate and regular rhythm.   Pulmonary/Chest: Effort normal. No stridor. No respiratory distress.   Abdominal: Soft. He exhibits no mass. There is no hepatosplenomegaly, splenomegaly or hepatomegaly. There is no tenderness.   Diverting colostomy present   Musculoskeletal: Normal range of motion. He exhibits no edema or tenderness.   Lymphadenopathy:        Head (right side): No posterior auricular and no occipital adenopathy present.        Head (left side): No posterior auricular and no occipital adenopathy present.     He has no cervical  adenopathy.        Right cervical: No superficial cervical, no deep cervical and no posterior cervical adenopathy present.       Left cervical: No superficial cervical, no deep cervical and no posterior cervical adenopathy present.     He has no axillary adenopathy.        Right: No supraclavicular adenopathy present.        Left: No supraclavicular adenopathy present.   Neurological: He is alert and oriented to person, place, and time. He has normal strength. No cranial nerve deficit. Coordination abnormal.   Skin: Skin is dry. No rash noted. He is not diaphoretic. No cyanosis or erythema. Nails show no clubbing.   Psychiatric: He has a normal mood and affect. His behavior is normal. Judgment and thought content normal. Cognition and memory are normal.   Vitals reviewed.          Assessment:      1. Rectal cancer    2. Rectal fistula with localized perforation    3. Colostomy in place    4. Smoker           Plan:     Reviewed information patient has diverting colostomy.  At this time is scheduled to be seen later this week by Infectious Disease and hopefully will undergo clearance for surgical resection.  Continue with chronic stable narcotic use his wife has recent resection of sarcoma over buttocks of strongly urged him do not mix there pain medicines.  25 min face-to-face time coordination care reviewed state  M all prescriptions in [facility]        Ken Cosby Jr, MD FACP

## 2020-03-18 ENCOUNTER — OFFICE VISIT (OUTPATIENT)
Dept: INFECTIOUS DISEASES | Facility: CLINIC | Age: 55
End: 2020-03-18
Payer: COMMERCIAL

## 2020-03-18 ENCOUNTER — PATIENT MESSAGE (OUTPATIENT)
Dept: SURGERY | Facility: CLINIC | Age: 55
End: 2020-03-18

## 2020-03-18 VITALS
WEIGHT: 159.38 LBS | TEMPERATURE: 99 F | SYSTOLIC BLOOD PRESSURE: 154 MMHG | DIASTOLIC BLOOD PRESSURE: 84 MMHG | BODY MASS INDEX: 21.62 KG/M2 | HEART RATE: 80 BPM

## 2020-03-18 DIAGNOSIS — K63.1 RECTAL PERFORATION: ICD-10-CM

## 2020-03-18 DIAGNOSIS — C20 RECTAL CANCER: ICD-10-CM

## 2020-03-18 PROCEDURE — 3008F BODY MASS INDEX DOCD: CPT | Mod: CPTII,S$GLB,, | Performed by: INTERNAL MEDICINE

## 2020-03-18 PROCEDURE — 3008F PR BODY MASS INDEX (BMI) DOCUMENTED: ICD-10-PCS | Mod: CPTII,S$GLB,, | Performed by: INTERNAL MEDICINE

## 2020-03-18 PROCEDURE — 99999 PR PBB SHADOW E&M-EST. PATIENT-LVL III: CPT | Mod: PBBFAC,,, | Performed by: INTERNAL MEDICINE

## 2020-03-18 PROCEDURE — 99999 PR PBB SHADOW E&M-EST. PATIENT-LVL III: ICD-10-PCS | Mod: PBBFAC,,, | Performed by: INTERNAL MEDICINE

## 2020-03-18 PROCEDURE — 99204 PR OFFICE/OUTPT VISIT, NEW, LEVL IV, 45-59 MIN: ICD-10-PCS | Mod: S$GLB,,, | Performed by: INTERNAL MEDICINE

## 2020-03-18 PROCEDURE — 99204 OFFICE O/P NEW MOD 45 MIN: CPT | Mod: S$GLB,,, | Performed by: INTERNAL MEDICINE

## 2020-03-18 NOTE — PROGRESS NOTES
Subjective:       Patient ID: Sukhjinder Presley is a 54 y.o. male.    Chief Complaint: Follow-up     HPI     54 year old man - with PMHx of rectal adenocarcinoma currently receiving chemo and radiation, and recently diagnosed rectal perforation near his tumor. He is s/p   laparoscopic colostomy creation per Dr. New on 2/18/20.He was recently seen in the Hospital and discharged on 3/5 and CT scan was done -03/02 and it showed  fistulous connection versus localized perforation involving the left side of the rectum.  In this area measures approximately 3.9 x 2.0 cm currently, not significantly changed from the comparison study.    He was discharged on Zosyn and has completed 2 weeks of therapy. He is afebrile.   Past Medical History:   Diagnosis Date    Anemia     Cancer      Past Surgical History:   Procedure Laterality Date    COLONOSCOPY N/A 6/6/2019    Procedure: COLONOSCOPY;  Surgeon: Anjelica Saavedra MD;  Location: [facility];  Service: Endoscopy;  Laterality: N/A;    ESOPHAGOGASTRODUODENOSCOPY N/A 6/6/2019    Procedure: EGD (ESOPHAGOGASTRODUODENOSCOPY);  Surgeon: Anjelica Saavedra MD;  Location: [facility];  Service: Endoscopy;  Laterality: N/A;    HERNIA REPAIR  1995    INJECTION OF ANESTHETIC AGENT INTO TISSUE PLANE DEFINED BY TRANSVERSUS ABDOMINIS MUSCLE N/A 2/18/2020    Procedure: BLOCK, TRANSVERSUS ABDOMINIS PLANE;  Surgeon: Jan New MD;  Location: [facility] OR;  Service: General;  Laterality: N/A;    INSERTION OF TUNNELED CENTRAL VENOUS CATHETER (CVC) WITH SUBCUTANEOUS PORT N/A 10/8/2019    Procedure: [id number];  Surgeon: Jan New MD;  Location: [facility] OR;  Service: General;  Laterality: N/A;    TONSILLECTOMY       Family History   Problem Relation Age of Onset    Intestinal malrotation Mother     Leukemia Father     No Known Problems Sister     Coronary artery disease Brother     Coronary artery disease Maternal Grandmother     Stomach cancer Maternal Grandfather       Social History     Socioeconomic History    Marital status: Significant Other     Spouse name: Not on file    Number of children: Not on file    Years of education: Not on file    Highest education level: Not on file   Occupational History    Not on file   Social Needs    Financial resource strain: Somewhat hard    Food insecurity:     Worry: Sometimes true     Inability: Sometimes true    Transportation needs:     Medical: No     Non-medical: No   Tobacco Use    Smoking status: Current Every Day Smoker     Packs/day: 1.00     Years: 35.00     Pack years: 35.00     Types: Cigarettes     Start date: 1/2/1984    Smokeless tobacco: Current User     Types: Chew   Substance and Sexual Activity    Alcohol use: Yes     Alcohol/week: 46.0 standard drinks     Types: 42 Cans of beer, 4 Shots of liquor per week     Frequency: 4 or more times a week     Drinks per session: 5 or 6     Binge frequency: Daily or almost daily     Comment: segrams 7, beer daily,  None 72 hours prior to surgery    Drug use: Never    Sexual activity: Yes     Partners: Female     Birth control/protection: None   Lifestyle    Physical activity:     Days per week: 1 day     Minutes per session: 60 min    Stress: Not on file   Relationships    Social connections:     Talks on phone: More than three times a week     Gets together: More than three times a week     Attends [employer] service: Not on file     Active member of club or organization: No     Attends meetings of clubs or organizations: Never     Relationship status: Living with partner   Other Topics Concern    Not on file   Social History Narrative    Not on file     Review of Systems   Constitutional: Negative for activity change, appetite change, chills, diaphoresis and fatigue.   HENT: Negative for congestion, dental problem, ear discharge, ear pain and facial swelling.    Eyes: Negative for pain, discharge and itching.   Respiratory: Negative for apnea, cough, chest  tightness and shortness of breath.    Cardiovascular: Negative for chest pain and leg swelling.   Gastrointestinal: Negative for abdominal distention and abdominal pain.   Endocrine: Negative for cold intolerance, heat intolerance and polydipsia.   Genitourinary: Negative for difficulty urinating, dysuria and enuresis.   Musculoskeletal: Negative for arthralgias and back pain.   Skin: Negative for color change and pallor.   Allergic/Immunologic: Negative for environmental allergies and food allergies.   Neurological: Negative for dizziness, facial asymmetry, light-headedness and headaches.   Hematological: Negative for adenopathy. Does not bruise/bleed easily.   Psychiatric/Behavioral: Negative for agitation and behavioral problems.       Objective:      Physical Exam   Constitutional: He is oriented to person, place, and time. He appears well-developed and well-nourished.   HENT:   Head: Normocephalic and atraumatic.   Nose: Nose normal.   Eyes: EOM are normal.   PERRL   Neck: Normal range of motion. Neck supple.   Cardiovascular: Normal rate, regular rhythm and normal heart sounds.   Pulmonary/Chest: Effort normal and breath sounds normal. No respiratory distress. He has no wheezes. He has no rales.   Abdominal: There is no tenderness.   Colotomy bag   Musculoskeletal: Normal range of motion. He exhibits no edema.   Neurological: He is alert and oriented to person, place, and time.   Skin: Skin is dry.   Nursing note and vitals reviewed.      Assessment:       1. Rectal cancer     2. Rectal fistula with localized perforation         Plan:             Problem List Items Addressed This Visit     Rectal cancer     Follow oncology /Gen surgery          Rectal fistula with localized perforation     He has completed 2 weeks of Zosyn .  Will need to discuss with Gen surgery about plans for surgery and PICC line removal  Messages sent to Dr New ,awating response.  Will find out from [facility] abou his blood work-last  blood work on the system -3/4

## 2020-03-18 NOTE — ASSESSMENT & PLAN NOTE
He has completed 2 weeks of Zosyn .  Will need to discuss with Gen surgery about plans for surgery and PICC line removal  Messages sent to Dr New ,awating response.  Will find out from [facility] abou his blood work-last blood work on the system -3/4

## 2020-03-19 ENCOUNTER — PATIENT MESSAGE (OUTPATIENT)
Dept: SURGERY | Facility: CLINIC | Age: 55
End: 2020-03-19

## 2020-03-23 DIAGNOSIS — C20 RECTAL CANCER: Primary | ICD-10-CM

## 2020-03-24 ENCOUNTER — HOSPITAL ENCOUNTER (OUTPATIENT)
Dept: RADIOLOGY | Facility: HOSPITAL | Age: 55
Discharge: HOME | End: 2020-03-24
Attending: COLON & RECTAL SURGERY
Payer: COMMERCIAL

## 2020-03-24 ENCOUNTER — LAB VISIT (OUTPATIENT)
Dept: LAB | Facility: HOSPITAL | Age: 55
End: 2020-03-24
Attending: COLON & RECTAL SURGERY
Payer: COMMERCIAL

## 2020-03-24 DIAGNOSIS — C20 RECTAL CANCER: ICD-10-CM

## 2020-03-24 LAB
ALBUMIN SERPL BCP-MCNC: 3.5 G/DL (ref 3.5–5.2)
ALP SERPL-CCNC: 70 U/L (ref 55–135)
ALT SERPL W/O P-5'-P-CCNC: 15 U/L (ref 10–44)
ANION GAP SERPL CALC-SCNC: 10 MMOL/L (ref 8–16)
AST SERPL-CCNC: 19 U/L (ref 10–40)
BASOPHILS # BLD AUTO: 0.06 K/UL (ref 0–0.2)
BASOPHILS NFR BLD: 0.9 % (ref 0–1.9)
BILIRUB SERPL-MCNC: 0.5 MG/DL (ref 0.1–1)
BUN SERPL-MCNC: 11 MG/DL (ref 6–20)
CALCIUM SERPL-MCNC: 10 MG/DL (ref 8.7–10.5)
CEA SERPL-MCNC: 1.7 NG/ML (ref 0–5)
CHLORIDE SERPL-SCNC: 107 MMOL/L (ref 95–110)
CO2 SERPL-SCNC: 25 MMOL/L (ref 23–29)
CREAT SERPL-MCNC: 0.8 MG/DL (ref 0.5–1.4)
DIFFERENTIAL METHOD: ABNORMAL
EOSINOPHIL # BLD AUTO: 0.4 K/UL (ref 0–0.5)
EOSINOPHIL NFR BLD: 5.8 % (ref 0–8)
ERYTHROCYTE [DISTWIDTH] IN BLOOD BY AUTOMATED COUNT: 15.9 % (ref 11.5–14.5)
EST. GFR  (AFRICAN AMERICAN): >60 ML/MIN/1.73 M^2
EST. GFR  (NON AFRICAN AMERICAN): >60 ML/MIN/1.73 M^2
GLUCOSE SERPL-MCNC: 87 MG/DL (ref 70–110)
HCT VFR BLD AUTO: 41.2 % (ref 40–54)
HGB BLD-MCNC: 12.2 G/DL (ref 14–18)
IMM GRANULOCYTES # BLD AUTO: 0.02 K/UL (ref 0–0.04)
IMM GRANULOCYTES NFR BLD AUTO: 0.3 % (ref 0–0.5)
LYMPHOCYTES # BLD AUTO: 1.9 K/UL (ref 1–4.8)
LYMPHOCYTES NFR BLD: 30.1 % (ref 18–48)
MCH RBC QN AUTO: 27.4 PG (ref 27–31)
MCHC RBC AUTO-ENTMCNC: 29.6 G/DL (ref 32–36)
MCV RBC AUTO: 93 FL (ref 82–98)
MONOCYTES # BLD AUTO: 0.4 K/UL (ref 0.3–1)
MONOCYTES NFR BLD: 6.2 % (ref 4–15)
NEUTROPHILS # BLD AUTO: 3.6 K/UL (ref 1.8–7.7)
NEUTROPHILS NFR BLD: 56.7 % (ref 38–73)
NRBC BLD-RTO: 0 /100 WBC
PLATELET # BLD AUTO: 301 K/UL (ref 150–350)
PMV BLD AUTO: 9.3 FL (ref 9.2–12.9)
POTASSIUM SERPL-SCNC: 4.5 MMOL/L (ref 3.5–5.1)
PREALB SERPL-MCNC: 32 MG/DL (ref 20–43)
PROT SERPL-MCNC: 8.6 G/DL (ref 6–8.4)
RBC # BLD AUTO: 4.45 M/UL (ref 4.6–6.2)
SODIUM SERPL-SCNC: 142 MMOL/L (ref 136–145)
WBC # BLD AUTO: 6.34 K/UL (ref 3.9–12.7)

## 2020-03-24 PROCEDURE — 82378 CARCINOEMBRYONIC ANTIGEN: CPT

## 2020-03-24 PROCEDURE — 74177 CT ABD & PELVIS W/CONTRAST: CPT | Mod: TC

## 2020-03-24 PROCEDURE — 74177 CT CHEST ABDOMEN PELVIS WITH CONTRAST (XPD): ICD-10-PCS | Mod: 26,,, | Performed by: RADIOLOGY

## 2020-03-24 PROCEDURE — 71260 CT CHEST ABDOMEN PELVIS WITH CONTRAST (XPD): ICD-10-PCS | Mod: 26,,, | Performed by: RADIOLOGY

## 2020-03-24 PROCEDURE — A9585 GADOBUTROL INJECTION: HCPCS | Performed by: COLON & RECTAL SURGERY

## 2020-03-24 PROCEDURE — 36415 COLL VENOUS BLD VENIPUNCTURE: CPT

## 2020-03-24 PROCEDURE — 74177 CT ABD & PELVIS W/CONTRAST: CPT | Mod: 26,,, | Performed by: RADIOLOGY

## 2020-03-24 PROCEDURE — 80053 COMPREHEN METABOLIC PANEL: CPT

## 2020-03-24 PROCEDURE — 72197 MRI PELVIS W/O & W/DYE: CPT | Mod: TC

## 2020-03-24 PROCEDURE — 25500020 PHARM REV CODE 255: Performed by: COLON & RECTAL SURGERY

## 2020-03-24 PROCEDURE — 72197 MRI RECTAL CANCER W W/O CONTRAST: ICD-10-PCS | Mod: 26,,, | Performed by: RADIOLOGY

## 2020-03-24 PROCEDURE — 85025 COMPLETE CBC W/AUTO DIFF WBC: CPT

## 2020-03-24 PROCEDURE — 71260 CT THORAX DX C+: CPT | Mod: 26,,, | Performed by: RADIOLOGY

## 2020-03-24 PROCEDURE — 84134 ASSAY OF PREALBUMIN: CPT

## 2020-03-24 PROCEDURE — 72197 MRI PELVIS W/O & W/DYE: CPT | Mod: 26,,, | Performed by: RADIOLOGY

## 2020-03-24 RX ORDER — GADOBUTROL 604.72 MG/ML
7 INJECTION INTRAVENOUS
Status: COMPLETED | OUTPATIENT
Start: 2020-03-24 | End: 2020-03-24

## 2020-03-24 RX ADMIN — GADOBUTROL 7 ML: 604.72 INJECTION INTRAVENOUS at 09:03

## 2020-03-24 RX ADMIN — IOHEXOL 30 ML: 350 INJECTION, SOLUTION INTRAVENOUS at 10:03

## 2020-03-24 RX ADMIN — IOHEXOL 75 ML: 350 INJECTION, SOLUTION INTRAVENOUS at 11:03

## 2020-03-27 ENCOUNTER — PATIENT OUTREACH (OUTPATIENT)
Dept: ADMINISTRATIVE | Facility: OTHER | Age: 55
End: 2020-03-27

## 2020-03-27 ENCOUNTER — PATIENT MESSAGE (OUTPATIENT)
Dept: SURGERY | Facility: CLINIC | Age: 55
End: 2020-03-27

## 2020-03-30 ENCOUNTER — PATIENT MESSAGE (OUTPATIENT)
Dept: SURGERY | Facility: CLINIC | Age: 55
End: 2020-03-30

## 2020-03-30 ENCOUNTER — OFFICE VISIT (OUTPATIENT)
Dept: SURGERY | Facility: CLINIC | Age: 55
End: 2020-03-30
Payer: COMMERCIAL

## 2020-03-30 VITALS
BODY MASS INDEX: 21.35 KG/M2 | TEMPERATURE: 98 F | DIASTOLIC BLOOD PRESSURE: 90 MMHG | HEART RATE: 79 BPM | WEIGHT: 157.44 LBS | SYSTOLIC BLOOD PRESSURE: 154 MMHG

## 2020-03-30 DIAGNOSIS — C20 RECTAL CANCER: Primary | ICD-10-CM

## 2020-03-30 PROCEDURE — 99999 PR PBB SHADOW E&M-EST. PATIENT-LVL IV: CPT | Mod: PBBFAC,,, | Performed by: COLON & RECTAL SURGERY

## 2020-03-30 PROCEDURE — 99024 POSTOP FOLLOW-UP VISIT: CPT | Mod: S$GLB,,, | Performed by: COLON & RECTAL SURGERY

## 2020-03-30 PROCEDURE — 99999 PR PBB SHADOW E&M-EST. PATIENT-LVL IV: ICD-10-PCS | Mod: PBBFAC,,, | Performed by: COLON & RECTAL SURGERY

## 2020-03-30 PROCEDURE — 99024 PR POST-OP FOLLOW-UP VISIT: ICD-10-PCS | Mod: S$GLB,,, | Performed by: COLON & RECTAL SURGERY

## 2020-03-30 RX ORDER — GABAPENTIN 100 MG/1
800 CAPSULE ORAL
Status: CANCELLED | OUTPATIENT
Start: 2020-03-30 | End: 2020-03-30

## 2020-03-30 RX ORDER — ACETAMINOPHEN 325 MG/1
1000 TABLET ORAL ONCE
Status: CANCELLED | OUTPATIENT
Start: 2020-03-30 | End: 2020-03-30

## 2020-03-30 RX ORDER — ONDANSETRON 2 MG/ML
4 INJECTION INTRAMUSCULAR; INTRAVENOUS EVERY 12 HOURS PRN
Status: CANCELLED | OUTPATIENT
Start: 2020-03-30

## 2020-03-30 RX ORDER — CELECOXIB 100 MG/1
400 CAPSULE ORAL
Status: CANCELLED | OUTPATIENT
Start: 2020-03-30 | End: 2020-03-30

## 2020-03-30 RX ORDER — METRONIDAZOLE 500 MG/100ML
500 INJECTION, SOLUTION INTRAVENOUS
Status: CANCELLED | OUTPATIENT
Start: 2020-03-30

## 2020-03-30 RX ORDER — HEPARIN SODIUM 5000 [USP'U]/ML
5000 INJECTION, SOLUTION INTRAVENOUS; SUBCUTANEOUS
Status: CANCELLED | OUTPATIENT
Start: 2020-03-30 | End: 2020-03-30

## 2020-03-30 RX ORDER — SODIUM CHLORIDE, SODIUM LACTATE, POTASSIUM CHLORIDE, CALCIUM CHLORIDE 600; 310; 30; 20 MG/100ML; MG/100ML; MG/100ML; MG/100ML
INJECTION, SOLUTION INTRAVENOUS CONTINUOUS
Status: CANCELLED | OUTPATIENT
Start: 2020-03-30

## 2020-03-30 RX ORDER — METRONIDAZOLE 500 MG/1
500 TABLET ORAL 3 TIMES DAILY
Qty: 3 TABLET | Refills: 0 | Status: ON HOLD | OUTPATIENT
Start: 2020-03-30 | End: 2020-04-09

## 2020-03-30 RX ORDER — NEOMYCIN SULFATE 500 MG/1
1000 TABLET ORAL 3 TIMES DAILY
Qty: 6 TABLET | Refills: 0 | Status: ON HOLD | OUTPATIENT
Start: 2020-03-30 | End: 2020-04-09

## 2020-03-30 RX ORDER — LIDOCAINE HYDROCHLORIDE 10 MG/ML
1 INJECTION, SOLUTION EPIDURAL; INFILTRATION; INTRACAUDAL; PERINEURAL ONCE
Status: DISCONTINUED | OUTPATIENT
Start: 2020-03-30 | End: 2020-04-09

## 2020-03-30 NOTE — PROGRESS NOTES
History & Physical    SUBJECTIVE:     Chief Complaint   Patient presents with    Follow-up     Follow Up    CC: rectal adenocarcinoma  Ref: Anjelica Saavedra MD    History of Present Illness:  Patient is a 54 y.o. male presents for evaluation of rectal cancer.  Patient reports he has had increasing pelvic/rectal pain along with decreased bowel movements over the last 6 weeks.  Reports an uncomfortable feeling in his pelvis associated with mucous discharge from his rectum as well as bloody bowel movements that are thin and less than normal. He reports associated chills, fatigue and 16 lb weight loss over the past 5 months.  He has also noticed a decrease in appetite as well. He underwent a colonoscopy on 06/06/2019 where a nonobstructing rectal mass was found with biopsy showing well-differentiated adenocarcinoma.  He was then referred to Colorectal surgery Clinic for evaluation.  He has no family history of colorectal cancer or IBD.    8/13/19: Completed neoadj chemoXRT  2/18/2020: Laparoscopic loop sigmoid colostomy 2/2 rectal perforation on chemotx    Interval history:  Since last clinic visit, the patient received 11 of 12 cycles of chemotherapy but unfortunately developed a rectal perforation in February of 2020 requiring a laparoscopic loop sigmoid colostomy on 02/18/2020.  Since that time, the patient has had decreasing levels of pain and drainage from his rectum.  He still feels a knot near his buttock on the left side.  His repeat CT scan and MRI should continue fluid collection tracing down to the level of the gluteus on the left side with a continue perforation in the mid rectum.  He continues to have good ostomy output.  He denies any fever, chills, nausea, vomiting.  He continues to smoke despite trying to quit.    PMHx:  Nephrolithiasis  PSHx:  Left inguinal hernia repair, lithotripsy  All: NKDA  FamHx: negative for CRC or IBD; +gastric cancer in maternal grandfather  SocHx: +tobacco (1ppd); +etoh (6  beers/day); no illicits      Review of patient's allergies indicates:  No Known Allergies    Current Outpatient Medications   Medication Sig Dispense Refill    capecitabine (XELODA) 500 MG Tab Take 3 tablets (1500 mg) by mouth twice daily after breakfast and dinner on days of radiation only. 168 tablet 0    diphenoxylate-atropine 2.5-0.025 mg (LOMOTIL) 2.5-0.025 mg per tablet Take 1 to 2 tablets by mouth 4 (four) times daily as needed for Diarrhea. 120 tablet 1    dronabinol (MARINOL) 10 MG capsule Take 1 capsule (10 mg total) by mouth 2 (two) times daily before meals. 60 capsule 0    iron fum-B12-IF-C-folic acid (FOLTRIN) 110-0.5 mg capsule Take 1 capsule by mouth 2 (two) times daily. 60 capsule 1    lidocaine (XYLOCAINE) 5 % Oint ointment Apply topically as needed. 30 g 3    magic mouthwash diphen/antac/lidoc Swish and spit 15 ml by mouth every 4 hours as needed 500 mL 2    morphine (MS CONTIN) 30 MG 12 hr tablet Take 1 tablet (30 mg total) by mouth every 8 (eight) hours. 90 tablet 0    oxyCODONE (ROXICODONE) 10 mg Tab immediate release tablet Take 1 tablet (10 mg total) by mouth every 4 (four) hours as needed for Pain (medically necessary). 90 tablet 0    piperacillin sodium/tazobactam (PIPERACILLIN-TAZOBACTAM 4.5G/100ML D5W IVPB, READY TO MIX,) Inject 100 mLs (4.5 g total) into the vein every 8 (eight) hours.      pramoxine (PROCTOFOAM) 1 % Foam Place rectally 3 (three) times daily as needed. 1 Can 1    promethazine (PHENERGAN) 25 MG tablet Take 1 tablet (25 mg total) by mouth every 4 (four) hours. 60 tablet 4    metroNIDAZOLE (FLAGYL) 500 MG tablet Take 1 tablet (500 mg total) by mouth 3 (three) times daily. Take initial dose@2pm, second dose@3pm and final dose@10pm day before surgery 3 tablet 0    neomycin (MYCIFRADIN) 500 mg Tab Take 2 tablets (1,000 mg total) by mouth 3 (three) times daily. Take 1st dose@2pm,take 2nd dose@3pm, take last dose@10pm day before surgery 6 tablet 0     Current  Facility-Administered Medications   Medication Dose Route Frequency Provider Last Rate Last Dose    lidocaine (PF) 10 mg/ml (1%) injection 10 mg  1 mL Intradermal Once Jan New MD           Past Medical History:   Diagnosis Date    Anemia     Cancer      Past Surgical History:   Procedure Laterality Date    COLONOSCOPY N/A 6/6/2019    Procedure: COLONOSCOPY;  Surgeon: Anjelica Saavedra MD;  Location: [facility] ENDO;  Service: Endoscopy;  Laterality: N/A;    ESOPHAGOGASTRODUODENOSCOPY N/A 6/6/2019    Procedure: EGD (ESOPHAGOGASTRODUODENOSCOPY);  Surgeon: Anjelica Saavedra MD;  Location: [facility] ENDO;  Service: Endoscopy;  Laterality: N/A;    HERNIA REPAIR  1995    INJECTION OF ANESTHETIC AGENT INTO TISSUE PLANE DEFINED BY TRANSVERSUS ABDOMINIS MUSCLE N/A 2/18/2020    Procedure: BLOCK, TRANSVERSUS ABDOMINIS PLANE;  Surgeon: Jan New MD;  Location: [facility] OR;  Service: General;  Laterality: N/A;    INSERTION OF TUNNELED CENTRAL VENOUS CATHETER (CVC) WITH SUBCUTANEOUS PORT N/A 10/8/2019    Procedure: [id number];  Surgeon: Jan New MD;  Location: [facility] OR;  Service: General;  Laterality: N/A;    TONSILLECTOMY       Family History   Problem Relation Age of Onset    Intestinal malrotation Mother     Leukemia Father     No Known Problems Sister     Coronary artery disease Brother     Coronary artery disease Maternal Grandmother     Stomach cancer Maternal Grandfather      Social History     Tobacco Use    Smoking status: Current Every Day Smoker     Packs/day: 1.00     Years: 35.00     Pack years: 35.00     Types: Cigarettes     Start date: 1/2/1984    Smokeless tobacco: Current User     Types: Chew   Substance Use Topics    Alcohol use: Yes     Alcohol/week: 46.0 standard drinks     Types: 42 Cans of beer, 4 Shots of liquor per week     Frequency: 4 or more times a week     Drinks per session: 5 or 6     Binge frequency: Daily or almost daily     Comment: nancie 7, beer daily,   None 72 hours prior to surgery    Drug use: Never        Review of Systems:  Review of Systems   Constitutional: Negative for activity change, appetite change, chills, fatigue, fever and unexpected weight change.   HENT: Negative for congestion, ear pain, sore throat and trouble swallowing.    Eyes: Negative for pain, redness and itching.   Respiratory: Negative for cough, shortness of breath and wheezing.    Cardiovascular: Negative for chest pain, palpitations and leg swelling.   Gastrointestinal: Negative for abdominal distention, abdominal pain, anal bleeding, blood in stool, constipation, diarrhea, nausea, rectal pain and vomiting.   Endocrine: Negative for cold intolerance, heat intolerance and polyuria.   Genitourinary: Negative for dysuria, flank pain, frequency and hematuria.   Musculoskeletal: Negative for gait problem, joint swelling and neck pain.   Skin: Negative for color change, rash and wound.   Allergic/Immunologic: Negative for environmental allergies and immunocompromised state.   Neurological: Negative for dizziness, speech difficulty, weakness and numbness.   Psychiatric/Behavioral: Negative for agitation, confusion and hallucinations.       OBJECTIVE:     Vital Signs (Most Recent)  Temp: 98.1 °F (36.7 °C) (03/30/20 0923)  Pulse: 79 (03/30/20 0923)  BP: (!) 154/90 (03/30/20 0923)     71.4 kg (157 lb 6.5 oz)     Physical Exam:  Physical Exam   Constitutional: He is oriented to person, place, and time. He appears well-developed.   HENT:   Head: Normocephalic and atraumatic.   Eyes: Conjunctivae and EOM are normal.   Neck: Normal range of motion. No thyromegaly present.   Cardiovascular: Normal rate and regular rhythm.   Pulmonary/Chest: Effort normal. No respiratory distress.   Abdominal: Soft. He exhibits no distension and no mass. There is no tenderness.   Ostomy pink and viable with stool in bag; well-healed port site   Genitourinary:   Genitourinary Comments: Anorectal:  L buttock with  firmness and induration upon palpation around 5cm from anal verge, no skin changes or drainage   Musculoskeletal: Normal range of motion. He exhibits no edema or tenderness.   Neurological: He is alert and oriented to person, place, and time.   Skin: Skin is warm and dry. Capillary refill takes less than 2 seconds. No rash noted.   Psychiatric: He has a normal mood and affect.     Laboratory  Lab Results   Component Value Date    WBC 6.34 03/24/2020    HGB 12.2 (L) 03/24/2020    HCT 41.2 03/24/2020     03/24/2020    CHOL 158 06/01/2019    TRIG 194 (H) 06/01/2019    HDL 30 (L) 06/01/2019    ALT 15 03/24/2020    AST 19 03/24/2020     03/24/2020    K 4.5 03/24/2020     03/24/2020    CREATININE 0.8 03/24/2020    BUN 11 03/24/2020    CO2 25 03/24/2020    TSH 1.607 01/15/2019    PSA 0.37 06/01/2019    INR 1.0 06/11/2019       Diagnostic Results:  Colonoscopy images and report reviewed which shows a rectal mass that malignant     MRI March 2020:  FINDINGS:  Tumor Location: Tumor location (from anal verge):    Distal aspect of the mass is 7-8 cm from the anal verge.    Relationship to anterior peritoneal reflection: Straddles    Tumor Characteristics/extent:    Circumferential extent/location: Significant interval decrease in size of the lesion with accurate measurements difficult.  Tumor length is estimated at 4-5 cm.  There is persistent left eccentric wall thickening/exophytic component also significantly improved.  There is persistent involvement of the left mesorectal fat/fascia.    Left-sided fistula present communicating with rim enhancing 5 x 2 cm fluid collection which extends posterior inferiorly to involve subcutaneous tissues overlying the medial left gluteus amelia musculature.  No other concerning for or new fluid collection.    Lymph Nodes:    No suspicious lymph nodes currently.      Impression       Continued interval decrease in size of the rectal mass, significantly decreased in volume  from June 2019 MRI exam.    Fistula/perforation findings present with pelvic fluid collection concerning for abscess as above as noted on recent CT exam.       CT March 2020:  FINDINGS:  Thoracic aorta, great vessels and heart are unchanged.  MediPort present.  No pathologically enlarged axillary, mediastinal or hilar lymph nodes present.    Lungs demonstrate no acute opacity.  Stable bilateral tiny nodules noted, largest within the right lower lobe measuring 3.7 mm.  Nodules are annotated on the images.    Liver and spleen are unchanged.  No biliary dilatation.  Gallbladder unremarkable.  Pancreas unchanged.  Adrenal glands stable.  Kidneys are unchanged.    Stomach unremarkable.  No small bowel dilatation.  Left lower quadrant colostomy noted.  Degree of colonic constipation present.    Known rectal mass present at the better described on concurrent MRI.  Known perforation/fistula formation findings involving the left aspect of the rectum/mass persists with left pelvic fluid collection extending inferior-posteriorly to involve the subcutaneous tissues overlying the medial left gluteus musculature.  Largest component within the pelvis measures approximately 4 x 2 cm.  Previously noted air within the collection has resolved.  Soft tissue air overlying the gluteus muscle jugular as resolved as well with persistent fat stranding/phlegmonous changes.    No pathologically enlarged mesenteric or retroperitoneal lymph nodes appreciated.  No significant free fluid.    No acute osseous abnormality.      Impression       1. No detrimental change of the chest.  Stable tiny sub 4 mm nodules.  2. Persistent pelvic fluid collection consistent with known rectal mass perforation/fistulous change.  Fluid/inflammation again noted extending inferiorly to 2 overlie the medial left gluteus amelia musculature.  Previously noted air components have resolved.  3. Correlate with concurrent MRI rectal report for rectal mass  characterization.       ASSESSMENT/PLAN:     54-year-old male with rectal adenocarcinoma with likely T3N1 disease based upon preop imaging without evidence of metastatic disease now s/p neoadj chemoXRT which completed on 8/13/19 but with post-tx MRI showing continued threatened mesorectal fascia who developed rectal perforation and abscess/fistula on cycle 11/12 of neoadj chemotx in Feb 2020 requiring lap diverting sigmoid colostomy with continued fluid collection/perforation/fistula on recent CT/MRI    - discussed with the patient that given his rectal perforation along with long sinus tract/contained perforation area extending down to the gluteus, I would recommend proceeding with surgical resection after discussing his case with my Colorectal surgery colleagues at [facility].  Discussed that I would recommend an abdominoperineal resection with permanent colostomy as the resection would likely require extra anatomical dissection to the gluteus amelia with a higher risk of recurrent cancer.  Discussed that I would not recommend reanastomosis as his likelihood of pelvic recurrence is much higher given his perforation, as well as that I believe reconnection and saving tissue would inhibit our ability to achieve a proper oncologic resection.  He and his wife voiced understanding of this and agreed will proceed.  - discussed that we would need plastic surgery during the time the operation for likely soft tissue coverage given the defect that would be necessary for this operation.  Will refer him to see Dr. Clarence Dan in the near future for plastic surgery evaluation/covered  - will plan for APR with soft tissue coverage by plastics in the next 1-2 weeks given the fact that he is over a month now off chemotherapy has had time to resolve the inflammation from the perforation  - All risks, benefits and alternatives fully explained to patient. Risks include, but are not limited to, bleeding, infection, delayed  wound healing, damage to ureter, damage to other intra-abdominal organs such as colon, rectum, small bowel, stomach, liver, bladder, reproductive organs, sexual dysfunction, urinary dysfunction, postoperative abscess, perioperative MI, CVA and death.  All questions field and appropriately answered to patient's satisfaction.  Consent signed and placed on chart.  - antibiotic bowel prep only, no mechanical necessary as pt is already diverted  - encourage the patient to quit smoking again as I emphasize that this could delay his wound healing and cause perioperative complications  - RTC postop    Jan New MD  Colon and Rectal Surgery  [facility]

## 2020-03-30 NOTE — H&P (VIEW-ONLY)
History & Physical    SUBJECTIVE:     Chief Complaint   Patient presents with    Follow-up     Follow Up    CC: rectal adenocarcinoma  Ref: Anjelica Saavedra MD    History of Present Illness:  Patient is a 54 y.o. male presents for evaluation of rectal cancer.  Patient reports he has had increasing pelvic/rectal pain along with decreased bowel movements over the last 6 weeks.  Reports an uncomfortable feeling in his pelvis associated with mucous discharge from his rectum as well as bloody bowel movements that are thin and less than normal. He reports associated chills, fatigue and 16 lb weight loss over the past 5 months.  He has also noticed a decrease in appetite as well. He underwent a colonoscopy on 06/06/2019 where a nonobstructing rectal mass was found with biopsy showing well-differentiated adenocarcinoma.  He was then referred to Colorectal surgery Clinic for evaluation.  He has no family history of colorectal cancer or IBD.    8/13/19: Completed neoadj chemoXRT  2/18/2020: Laparoscopic loop sigmoid colostomy 2/2 rectal perforation on chemotx    Interval history:  Since last clinic visit, the patient received 11 of 12 cycles of chemotherapy but unfortunately developed a rectal perforation in February of 2020 requiring a laparoscopic loop sigmoid colostomy on 02/18/2020.  Since that time, the patient has had decreasing levels of pain and drainage from his rectum.  He still feels a knot near his buttock on the left side.  His repeat CT scan and MRI should continue fluid collection tracing down to the level of the gluteus on the left side with a continue perforation in the mid rectum.  He continues to have good ostomy output.  He denies any fever, chills, nausea, vomiting.  He continues to smoke despite trying to quit.    PMHx:  Nephrolithiasis  PSHx:  Left inguinal hernia repair, lithotripsy  All: NKDA  FamHx: negative for CRC or IBD; +gastric cancer in maternal grandfather  SocHx: +tobacco (1ppd); +etoh (6  beers/day); no illicits      Review of patient's allergies indicates:  No Known Allergies    Current Outpatient Medications   Medication Sig Dispense Refill    capecitabine (XELODA) 500 MG Tab Take 3 tablets (1500 mg) by mouth twice daily after breakfast and dinner on days of radiation only. 168 tablet 0    diphenoxylate-atropine 2.5-0.025 mg (LOMOTIL) 2.5-0.025 mg per tablet Take 1 to 2 tablets by mouth 4 (four) times daily as needed for Diarrhea. 120 tablet 1    dronabinol (MARINOL) 10 MG capsule Take 1 capsule (10 mg total) by mouth 2 (two) times daily before meals. 60 capsule 0    iron fum-B12-IF-C-folic acid (FOLTRIN) 110-0.5 mg capsule Take 1 capsule by mouth 2 (two) times daily. 60 capsule 1    lidocaine (XYLOCAINE) 5 % Oint ointment Apply topically as needed. 30 g 3    magic mouthwash diphen/antac/lidoc Swish and spit 15 ml by mouth every 4 hours as needed 500 mL 2    morphine (MS CONTIN) 30 MG 12 hr tablet Take 1 tablet (30 mg total) by mouth every 8 (eight) hours. 90 tablet 0    oxyCODONE (ROXICODONE) 10 mg Tab immediate release tablet Take 1 tablet (10 mg total) by mouth every 4 (four) hours as needed for Pain (medically necessary). 90 tablet 0    piperacillin sodium/tazobactam (PIPERACILLIN-TAZOBACTAM 4.5G/100ML D5W IVPB, READY TO MIX,) Inject 100 mLs (4.5 g total) into the vein every 8 (eight) hours.      pramoxine (PROCTOFOAM) 1 % Foam Place rectally 3 (three) times daily as needed. 1 Can 1    promethazine (PHENERGAN) 25 MG tablet Take 1 tablet (25 mg total) by mouth every 4 (four) hours. 60 tablet 4    metroNIDAZOLE (FLAGYL) 500 MG tablet Take 1 tablet (500 mg total) by mouth 3 (three) times daily. Take initial dose@2pm, second dose@3pm and final dose@10pm day before surgery 3 tablet 0    neomycin (MYCIFRADIN) 500 mg Tab Take 2 tablets (1,000 mg total) by mouth 3 (three) times daily. Take 1st dose@2pm,take 2nd dose@3pm, take last dose@10pm day before surgery 6 tablet 0     Current  Facility-Administered Medications   Medication Dose Route Frequency Provider Last Rate Last Dose    lidocaine (PF) 10 mg/ml (1%) injection 10 mg  1 mL Intradermal Once Jan New MD           Past Medical History:   Diagnosis Date    Anemia     Cancer      Past Surgical History:   Procedure Laterality Date    COLONOSCOPY N/A 6/6/2019    Procedure: COLONOSCOPY;  Surgeon: Anjelica Saavedra MD;  Location: [facility] ENDO;  Service: Endoscopy;  Laterality: N/A;    ESOPHAGOGASTRODUODENOSCOPY N/A 6/6/2019    Procedure: EGD (ESOPHAGOGASTRODUODENOSCOPY);  Surgeon: Anjelica Saavedra MD;  Location: [facility] ENDO;  Service: Endoscopy;  Laterality: N/A;    HERNIA REPAIR  1995    INJECTION OF ANESTHETIC AGENT INTO TISSUE PLANE DEFINED BY TRANSVERSUS ABDOMINIS MUSCLE N/A 2/18/2020    Procedure: BLOCK, TRANSVERSUS ABDOMINIS PLANE;  Surgeon: Jan New MD;  Location: [facility] OR;  Service: General;  Laterality: N/A;    INSERTION OF TUNNELED CENTRAL VENOUS CATHETER (CVC) WITH SUBCUTANEOUS PORT N/A 10/8/2019    Procedure: [id number];  Surgeon: Jan New MD;  Location: [facility] OR;  Service: General;  Laterality: N/A;    TONSILLECTOMY       Family History   Problem Relation Age of Onset    Intestinal malrotation Mother     Leukemia Father     No Known Problems Sister     Coronary artery disease Brother     Coronary artery disease Maternal Grandmother     Stomach cancer Maternal Grandfather      Social History     Tobacco Use    Smoking status: Current Every Day Smoker     Packs/day: 1.00     Years: 35.00     Pack years: 35.00     Types: Cigarettes     Start date: 1/2/1984    Smokeless tobacco: Current User     Types: Chew   Substance Use Topics    Alcohol use: Yes     Alcohol/week: 46.0 standard drinks     Types: 42 Cans of beer, 4 Shots of liquor per week     Frequency: 4 or more times a week     Drinks per session: 5 or 6     Binge frequency: Daily or almost daily     Comment: nancie 7, beer daily,   None 72 hours prior to surgery    Drug use: Never        Review of Systems:  Review of Systems   Constitutional: Negative for activity change, appetite change, chills, fatigue, fever and unexpected weight change.   HENT: Negative for congestion, ear pain, sore throat and trouble swallowing.    Eyes: Negative for pain, redness and itching.   Respiratory: Negative for cough, shortness of breath and wheezing.    Cardiovascular: Negative for chest pain, palpitations and leg swelling.   Gastrointestinal: Negative for abdominal distention, abdominal pain, anal bleeding, blood in stool, constipation, diarrhea, nausea, rectal pain and vomiting.   Endocrine: Negative for cold intolerance, heat intolerance and polyuria.   Genitourinary: Negative for dysuria, flank pain, frequency and hematuria.   Musculoskeletal: Negative for gait problem, joint swelling and neck pain.   Skin: Negative for color change, rash and wound.   Allergic/Immunologic: Negative for environmental allergies and immunocompromised state.   Neurological: Negative for dizziness, speech difficulty, weakness and numbness.   Psychiatric/Behavioral: Negative for agitation, confusion and hallucinations.       OBJECTIVE:     Vital Signs (Most Recent)  Temp: 98.1 °F (36.7 °C) (03/30/20 0923)  Pulse: 79 (03/30/20 0923)  BP: (!) 154/90 (03/30/20 0923)     71.4 kg (157 lb 6.5 oz)     Physical Exam:  Physical Exam   Constitutional: He is oriented to person, place, and time. He appears well-developed.   HENT:   Head: Normocephalic and atraumatic.   Eyes: Conjunctivae and EOM are normal.   Neck: Normal range of motion. No thyromegaly present.   Cardiovascular: Normal rate and regular rhythm.   Pulmonary/Chest: Effort normal. No respiratory distress.   Abdominal: Soft. He exhibits no distension and no mass. There is no tenderness.   Ostomy pink and viable with stool in bag; well-healed port site   Genitourinary:   Genitourinary Comments: Anorectal:  L buttock with  firmness and induration upon palpation around 5cm from anal verge, no skin changes or drainage   Musculoskeletal: Normal range of motion. He exhibits no edema or tenderness.   Neurological: He is alert and oriented to person, place, and time.   Skin: Skin is warm and dry. Capillary refill takes less than 2 seconds. No rash noted.   Psychiatric: He has a normal mood and affect.     Laboratory  Lab Results   Component Value Date    WBC 6.34 03/24/2020    HGB 12.2 (L) 03/24/2020    HCT 41.2 03/24/2020     03/24/2020    CHOL 158 06/01/2019    TRIG 194 (H) 06/01/2019    HDL 30 (L) 06/01/2019    ALT 15 03/24/2020    AST 19 03/24/2020     03/24/2020    K 4.5 03/24/2020     03/24/2020    CREATININE 0.8 03/24/2020    BUN 11 03/24/2020    CO2 25 03/24/2020    TSH 1.607 01/15/2019    PSA 0.37 06/01/2019    INR 1.0 06/11/2019       Diagnostic Results:  Colonoscopy images and report reviewed which shows a rectal mass that malignant     MRI March 2020:  FINDINGS:  Tumor Location: Tumor location (from anal verge):    Distal aspect of the mass is 7-8 cm from the anal verge.    Relationship to anterior peritoneal reflection: Straddles    Tumor Characteristics/extent:    Circumferential extent/location: Significant interval decrease in size of the lesion with accurate measurements difficult.  Tumor length is estimated at 4-5 cm.  There is persistent left eccentric wall thickening/exophytic component also significantly improved.  There is persistent involvement of the left mesorectal fat/fascia.    Left-sided fistula present communicating with rim enhancing 5 x 2 cm fluid collection which extends posterior inferiorly to involve subcutaneous tissues overlying the medial left gluteus amelia musculature.  No other concerning for or new fluid collection.    Lymph Nodes:    No suspicious lymph nodes currently.      Impression       Continued interval decrease in size of the rectal mass, significantly decreased in volume  from June 2019 MRI exam.    Fistula/perforation findings present with pelvic fluid collection concerning for abscess as above as noted on recent CT exam.       CT March 2020:  FINDINGS:  Thoracic aorta, great vessels and heart are unchanged.  MediPort present.  No pathologically enlarged axillary, mediastinal or hilar lymph nodes present.    Lungs demonstrate no acute opacity.  Stable bilateral tiny nodules noted, largest within the right lower lobe measuring 3.7 mm.  Nodules are annotated on the images.    Liver and spleen are unchanged.  No biliary dilatation.  Gallbladder unremarkable.  Pancreas unchanged.  Adrenal glands stable.  Kidneys are unchanged.    Stomach unremarkable.  No small bowel dilatation.  Left lower quadrant colostomy noted.  Degree of colonic constipation present.    Known rectal mass present at the better described on concurrent MRI.  Known perforation/fistula formation findings involving the left aspect of the rectum/mass persists with left pelvic fluid collection extending inferior-posteriorly to involve the subcutaneous tissues overlying the medial left gluteus musculature.  Largest component within the pelvis measures approximately 4 x 2 cm.  Previously noted air within the collection has resolved.  Soft tissue air overlying the gluteus muscle jugular as resolved as well with persistent fat stranding/phlegmonous changes.    No pathologically enlarged mesenteric or retroperitoneal lymph nodes appreciated.  No significant free fluid.    No acute osseous abnormality.      Impression       1. No detrimental change of the chest.  Stable tiny sub 4 mm nodules.  2. Persistent pelvic fluid collection consistent with known rectal mass perforation/fistulous change.  Fluid/inflammation again noted extending inferiorly to 2 overlie the medial left gluteus amelia musculature.  Previously noted air components have resolved.  3. Correlate with concurrent MRI rectal report for rectal mass  characterization.       ASSESSMENT/PLAN:     54-year-old male with rectal adenocarcinoma with likely T3N1 disease based upon preop imaging without evidence of metastatic disease now s/p neoadj chemoXRT which completed on 8/13/19 but with post-tx MRI showing continued threatened mesorectal fascia who developed rectal perforation and abscess/fistula on cycle 11/12 of neoadj chemotx in Feb 2020 requiring lap diverting sigmoid colostomy with continued fluid collection/perforation/fistula on recent CT/MRI    - discussed with the patient that given his rectal perforation along with long sinus tract/contained perforation area extending down to the gluteus, I would recommend proceeding with surgical resection after discussing his case with my Colorectal surgery colleagues at [facility].  Discussed that I would recommend an abdominoperineal resection with permanent colostomy as the resection would likely require extra anatomical dissection to the gluteus amelia with a higher risk of recurrent cancer.  Discussed that I would not recommend reanastomosis as his likelihood of pelvic recurrence is much higher given his perforation, as well as that I believe reconnection and saving tissue would inhibit our ability to achieve a proper oncologic resection.  He and his wife voiced understanding of this and agreed will proceed.  - discussed that we would need plastic surgery during the time the operation for likely soft tissue coverage given the defect that would be necessary for this operation.  Will refer him to see Dr. Clarence Dan in the near future for plastic surgery evaluation/covered  - will plan for APR with soft tissue coverage by plastics in the next 1-2 weeks given the fact that he is over a month now off chemotherapy has had time to resolve the inflammation from the perforation  - All risks, benefits and alternatives fully explained to patient. Risks include, but are not limited to, bleeding, infection, delayed  wound healing, damage to ureter, damage to other intra-abdominal organs such as colon, rectum, small bowel, stomach, liver, bladder, reproductive organs, sexual dysfunction, urinary dysfunction, postoperative abscess, perioperative MI, CVA and death.  All questions field and appropriately answered to patient's satisfaction.  Consent signed and placed on chart.  - antibiotic bowel prep only, no mechanical necessary as pt is already diverted  - encourage the patient to quit smoking again as I emphasize that this could delay his wound healing and cause perioperative complications  - RTC postop    Jan New MD  Colon and Rectal Surgery  [facility]

## 2020-03-31 NOTE — PRE ADMISSION SCREENING
Pre op instructions reviewed with patient per phone:    To confirm, Your surgeon has instructed you:  Surgery is scheduled 04/09/20at 0700.      [facility] 1[facility] Emergency Department entrance by 0530.   Pre admit office to call afternoon prior to surgery with final arrival time      INSTRUCTIONS IMPORTANT!!!  ¨ Do not eat, drink, or smoke after 12 midnight prior to surgery, may have water and apple juice until 3 hours prior to surgery. OK to brush teeth, no gum, candy or mints!    ¨ Take only these medicines with a small swallow of water-morning of surgery.  N/A    04/07/20 a.m  Begin clear liquid diet (nothing purple or red)  04/07/20  Take Flagyl and Neomycin @ 1400, 1500, & 2200          -------  No visitors allowed d/t Covid-19 concerns with limited exceptions defined by the state.  Family/caregivers will be updated re: patient status via text/cell phone  ____  Do not wear makeup, including mascara.  ____  No powder, lotions or creams to surgical area.  ____  Please remove all jewelry, including piercings and leave at home.  ____  No money or valuables needed. Please leave at home.  ____  Please bring identification and insurance information to hospital.  ____  If going home the same day, arrange for a ride home. You will not be able to   drive if Anesthesia was used.  ____  Children, under 12 years old, must remain in the waiting room with an adult.  They are not allowed in patient areas.  ____  Wear loose fitting clothing. Allow for dressings, bandages.  ____  Stop Aspirin, Ibuprofen, Motrin and Aleve at least 5-7 days before surgery, unless otherwise instructed by your doctor, or the nurse.   You MAY use Tylenol/acetaminophen until day of surgery.  ____  If you take diabetic medication, do not take am of surgery unless instructed by   Doctor.  ____ Stop taking any Fish Oil supplement or any Vitamins that contain Vitamin E at least 5 days prior to surgery.           Bathing Instructions-- The night before surgery and the morning prior to coming to the hospital:   -Do not shave the surgical area.   -Shower and wash your hair and body as usual with anti-bacterial  soap and shampoo.   -Rinse your hair and body completely.   -Use one packet of hibiclens to wash the surgical site (using your hand) gently for 5 minutes.  Do not scrub you skin too hard.   -Do not use hibiclens on your head, face, or genitals.   -Do not wash with anti-bacterial soap after you use the hibiclens.   -Rinse your body thoroughly.   -Dry with clean, soft towel.  Do not use lotion, cream, deodorant, or powders on   the surgical site.    Use antibacterial soap in place of hibiclens if your surgery is on the head, face or genitals.         Surgical Site Infection    Prevention of surgical site infections:     -Keep incisions clean and dry.   -Do not soak/submerge incisions in water until completely healed.   -Do not apply lotions, powders, creams, or deodorants to site.   -Always make sure hands are cleaned with antibacterial soap/ alcohol-based   prior to touching the surgical site.  (This includes doctors, nurses, staff, and yourself.)    Signs and symptoms:   -Redness and pain around the area where you had surgery   -Drainage of cloudy fluid from your surgical wound   -Fever over 100.4  I have read or had read and explained to me, and understand the above information.

## 2020-04-01 ENCOUNTER — PATIENT MESSAGE (OUTPATIENT)
Dept: HEMATOLOGY/ONCOLOGY | Facility: CLINIC | Age: 55
End: 2020-04-01

## 2020-04-01 ENCOUNTER — OFFICE VISIT (OUTPATIENT)
Dept: HEMATOLOGY/ONCOLOGY | Facility: CLINIC | Age: 55
End: 2020-04-01
Payer: COMMERCIAL

## 2020-04-01 DIAGNOSIS — C20 RECTAL CANCER: Primary | ICD-10-CM

## 2020-04-01 PROCEDURE — 99213 PR OFFICE/OUTPT VISIT, EST, LEVL III, 20-29 MIN: ICD-10-PCS | Mod: 95,,, | Performed by: INTERNAL MEDICINE

## 2020-04-01 PROCEDURE — 99213 OFFICE O/P EST LOW 20 MIN: CPT | Mod: 95,,, | Performed by: INTERNAL MEDICINE

## 2020-04-01 NOTE — PROGRESS NOTES
Subjective:       Patient ID: Sukhjinder Presley is a 54 y.o. male.    Chief Complaint: Rectal cancer [C20]  HPI: We have an opportunity to see Mr. Sukhjinder Presley in Hematology Oncology clinic at [facility] on 03/31/2020.  Mr. Sukhjinder Presley is a 54 y.o. gentleman with rectal cancer completed neoadjuvant chemoradiation with concurrent xeloda.  Reported symptoms have improved with better pain control and appetite.  Restaging MRI rectum showed      Impression         1. Large rectal mass with invasion of the muscularis and involvement of the mesorectal fat and fascia along the left and posterior aspect of the mass.  Single lymph node within the mesorectal fat posteriorly measuring approximately 6 mm.  Additional subcentimeter obturator and external iliac chain lymph nodes also seen bilaterally.  When compared to the study prior to neoadjuvant therapy there has been significant decrease in wall thickening.      Has been evaluated by Dr. New, given fascia involvement, likely resection would be R1.  Recommended total neoadjuvant chemotherapy prior to surgery.     Currently on chemotherapy with Avastin FOLFOXIRI s/p 4 cycles.     CT chest abdomen pelvis showed               Impression         1. Significant decrease in soft tissue prominence of the previously noted rectal mass with some persistent circumferential wall thickening still present on the current study.  There is also some persistent stranding within the perirectal fat and thickening of the mesial rectal fascia along with thickening/fluid density noted within the presacral soft tissues.  No metastatic lesions to the chest.  2. Remaining findings as discussed above and stable.      Had cycles 5-6 avastin folfoxiri, cycle 7 with FOLFOX.      Developed contained perforation of distal rectum. Underwent loop sigmoid colostomy.       Rectal cancer    6/24/2019 Initial Diagnosis     Rectal cancer      6/26/2019 Cancer Staged     Staging form: Colon and Rectum,  AJCC 8th Edition  - Clinical stage from 6/26/2019: Stage IIIB (cT3, cN2a, cM0) - Signed by Artem Mishra II, MD on 6/26/2019      7/10/2019 - 8/13/2019 Radiation Therapy     Treatment Site Ref. ID Energy Dose/Fx (Gy) #Fx Dose Correction (Gy) Total Dose (Gy) Start Date End Date Elapsed Days   RECTUM Pelvis 6X 2 25 / 25 0 50 7/10/2019 8/13/2019 34           7/10/2019 - 7/10/2019 Chemotherapy     Treatment Summary   Plan Name: OP CAPECITABINE 5 DAYS + RADIOTHERAPY  Treatment Goal: Curative  Status: Inactive  Start Date: [No treatment day found]  End Date: [No treatment day found]  Provider: Song Aden MD  Chemotherapy: [No matching medication found in this treatment plan]      10/9/2019 Tumor Conference       Multidisciplinary Rectal Cancer Conference - Evaluation and Recommendation Summary    10/9/2019  Sukhjinder Presley  [id number]  53 y.o. male    1. Evaluation    C scope on 6/6/19 - non obstructing mass    MRI date: 6/17/19    Tumor Location in Rectum: middle third    Indication of Sphincter Involvement:  Uninvolved    Pretreatment Circumferential Resection Margin (CRM) status:  Threatened Tumor abuts CRM on the left.     Pretreatment (clinical) AJCC Stage: IIIB  Stage I  [] I: T1N0M0  [] I: T2N0M0  Stage II  [] IIA: T3N0M0  [] IIB K1dU4B3  [] IIC: P3bC5X8  Stage III  [] IIIA: T1-2N1M0  [] IIIA: I6E4zY3  [x] IIIB: T3-D4iJ2S8  [] IIIB: T2-3N2aM0  [] IIIB: T1-2N2bM0  [] IIIC: J5xC0pV0  [] IIIC: T3-5kR5tO5  [] IIIC: N4dK7-4Y6   Stage IV  [] IV: S6-8Z7-9Y4a-b    CEA level:   Lab Results   Component Value Date    CEA 1.4 09/30/2019       2. Treatment     Neoadjuvant Therapy Recommendation: Long Course Chemoradiotherapy - completed on 8/13    CT scan 7/30 during treatment: Unchanged appearance of large rectal mass, mild curcumferential bladder wall thickening.     MRI rectum 9/24: Large mass with invasion of muscularis and involvement of the mesorectal fat and fascia along the left and posterior aspect of the mass.    Single LN within the mesorectal fat posteriorly measuring 6 mm.   Subcentimeter obturator and external iliac changes LNDs seen bilaterally.     Recommendations:     Complete COLLINS with initiation of FOLFOX.     After follow by DUNCAN + MIRZA.       10/9/2019 -  Chemotherapy     Treatment Summary   Plan Name: OP FOLFOXIRI Q2W  Treatment Goal: Curative  Status: Active  Start Date: 10/9/2019  End Date: 3/19/2020 (Planned)  Provider: Song Aden MD  Chemotherapy: fluorouracil 3,200 mg/m2 = 6,305 mg in sodium chloride 0.9% 253 mL chemo infusion, 3,200 mg/m2 = 6,305 mg, Intravenous, Over 48 hours, 7 of 8 cycles  Administration: 6,305 mg (10/9/2019), 6,270 mg (10/23/2019), 6,210 mg (11/6/2019), 6,305 mg (11/20/2019), 6,145 mg (1/6/2020), 6,145 mg (1/20/2020), 6,240 mg (2/3/2020)  bevacizumab (AVASTIN) 5 mg/kg = 380 mg in sodium chloride 0.9% 100 mL chemo infusion, 5 mg/kg = 380 mg (100 % of original dose 5 mg/kg), Intravenous, Clinic/HOD 1 time, 6 of 6 cycles  Dose modification: 5 mg/kg (original dose 5 mg/kg, Cycle 1, Reason: MD Discretion), 5 mg/kg (original dose 5 mg/kg, Cycle 2)  Administration: 380 mg (10/9/2019), 380 mg (10/23/2019), 370 mg (11/6/2019), 380 mg (11/20/2019), 365 mg (1/6/2020), 360 mg (1/20/2020)  irinotecan (CAMPTOSAR) 165 mg/m2 = 326 mg in sodium chloride 0.9% 266.3 mL chemo infusion, 165 mg/m2 = 326 mg, Intravenous, Clinic/HOD 1 time, 6 of 6 cycles  Administration: 326 mg (10/9/2019), 324 mg (10/23/2019), 320 mg (11/6/2019), 326 mg (11/20/2019), 316 mg (1/6/2020), 316 mg (1/20/2020)  leucovorin calcium 200 mg/m2 = 395 mg in dextrose 5 % 269.75 mL infusion, 200 mg/m2 = 395 mg, Intravenous, Clinic/HOD 1 time, 7 of 8 cycles  Administration: 395 mg (10/9/2019), 390 mg (10/23/2019), 390 mg (11/6/2019), 395 mg (11/20/2019), 385 mg (1/6/2020), 385 mg (1/20/2020), 390 mg (2/3/2020)  oxaliplatin (ELOXATIN) 85 mg/m2 = 167 mg in dextrose 5 % 533.4 mL chemo infusion, 85 mg/m2 = 167 mg, Intravenous, Clinic/HOD 1  time, 7 of 8 cycles  Administration: 167 mg (10/9/2019), 167 mg (10/23/2019), 165 mg (11/6/2019), 167 mg (11/20/2019), 163 mg (1/6/2020), 163 mg (1/20/2020), 166 mg (2/3/2020)       Past Medical History:   Diagnosis Date    Anemia     Cancer      Family History   Problem Relation Age of Onset    Intestinal malrotation Mother     Leukemia Father     No Known Problems Sister     Coronary artery disease Brother     Coronary artery disease Maternal Grandmother     Stomach cancer Maternal Grandfather      Social History     Socioeconomic History    Marital status: Significant Other     Spouse name: Not on file    Number of children: Not on file    Years of education: Not on file    Highest education level: Not on file   Occupational History    Not on file   Social Needs    Financial resource strain: Somewhat hard    Food insecurity:     Worry: Sometimes true     Inability: Sometimes true    Transportation needs:     Medical: No     Non-medical: No   Tobacco Use    Smoking status: Current Every Day Smoker     Packs/day: 1.00     Years: 35.00     Pack years: 35.00     Types: Cigarettes     Start date: 1/2/1984    Smokeless tobacco: Former User     Types: Chew    Tobacco comment: no smoking after m.n prior to sx   Substance and Sexual Activity    Alcohol use: Yes     Alcohol/week: 46.0 standard drinks     Types: 42 Cans of beer, 4 Shots of liquor per week     Frequency: 4 or more times a week     Drinks per session: 5 or 6     Binge frequency: Daily or almost daily     Comment: gwen hutson,  no alcohol 72 hours prior to surgery    Drug use: Never    Sexual activity: Yes     Partners: Female     Birth control/protection: None   Lifestyle    Physical activity:     Days per week: 1 day     Minutes per session: 60 min    Stress: Not on file   Relationships    Social connections:     Talks on phone: More than three times a week     Gets together: More than three times a week     Attends  Synagogue service: Not on file     Active member of club or organization: No     Attends meetings of clubs or organizations: Never     Relationship status: Living with partner   Other Topics Concern    Not on file   Social History Narrative    Not on file     Past Surgical History:   Procedure Laterality Date    COLON SURGERY      COLONOSCOPY N/A 6/6/2019    Procedure: COLONOSCOPY;  Surgeon: Anjelica Saavedra MD;  Location: [facility] ENDO;  Service: Endoscopy;  Laterality: N/A;    ESOPHAGOGASTRODUODENOSCOPY N/A 6/6/2019    Procedure: EGD (ESOPHAGOGASTRODUODENOSCOPY);  Surgeon: Anjelica Saavedra MD;  Location: [facility] ENDO;  Service: Endoscopy;  Laterality: N/A;    HERNIA REPAIR  1995    INJECTION OF ANESTHETIC AGENT INTO TISSUE PLANE DEFINED BY TRANSVERSUS ABDOMINIS MUSCLE N/A 2/18/2020    Procedure: BLOCK, TRANSVERSUS ABDOMINIS PLANE;  Surgeon: Jan New MD;  Location: [facility] OR;  Service: General;  Laterality: N/A;    INSERTION OF TUNNELED CENTRAL VENOUS CATHETER (CVC) WITH SUBCUTANEOUS PORT N/A 10/8/2019    Procedure: [id number]-O-CATH;  Surgeon: Jan New MD;  Location: [facility] OR;  Service: General;  Laterality: N/A;    LAPAROSCOPIC COLOSTOMY      TONSILLECTOMY       Current Outpatient Medications   Medication Sig Dispense Refill    capecitabine (XELODA) 500 MG Tab Take 3 tablets (1500 mg) by mouth twice daily after breakfast and dinner on days of radiation only. 168 tablet 0    diphenoxylate-atropine 2.5-0.025 mg (LOMOTIL) 2.5-0.025 mg per tablet Take 1 to 2 tablets by mouth 4 (four) times daily as needed for Diarrhea. 120 tablet 1    dronabinol (MARINOL) 10 MG capsule Take 1 capsule (10 mg total) by mouth 2 (two) times daily before meals. 60 capsule 0    iron fum-B12-IF-C-folic acid (FOLTRIN) 110-0.5 mg capsule Take 1 capsule by mouth 2 (two) times daily. 60 capsule 1    lidocaine (XYLOCAINE) 5 % Oint ointment Apply topically as needed. 30 g 3    magic mouthwash diphen/antac/lidoc Swish  and spit 15 ml by mouth every 4 hours as needed 500 mL 2    metroNIDAZOLE (FLAGYL) 500 MG tablet Take 1 tablet (500 mg total) by mouth 3 (three) times daily. Take initial dose@2pm, second dose@3pm and final dose@10pm day before surgery 3 tablet 0    morphine (MS CONTIN) 30 MG 12 hr tablet Take 1 tablet (30 mg total) by mouth every 8 (eight) hours. 90 tablet 0    neomycin (MYCIFRADIN) 500 mg Tab Take 2 tablets (1,000 mg total) by mouth 3 (three) times daily. Take 1st dose@2pm,take 2nd dose@3pm, take last dose@10pm day before surgery 6 tablet 0    oxyCODONE (ROXICODONE) 10 mg Tab immediate release tablet Take 1 tablet (10 mg total) by mouth every 4 (four) hours as needed for Pain (medically necessary). 90 tablet 0    piperacillin sodium/tazobactam (PIPERACILLIN-TAZOBACTAM 4.5G/100ML D5W IVPB, READY TO MIX,) Inject 100 mLs (4.5 g total) into the vein every 8 (eight) hours.      pramoxine (PROCTOFOAM) 1 % Foam Place rectally 3 (three) times daily as needed. 1 Can 1    promethazine (PHENERGAN) 25 MG tablet Take 1 tablet (25 mg total) by mouth every 4 (four) hours. 60 tablet 4     Current Facility-Administered Medications   Medication Dose Route Frequency Provider Last Rate Last Dose    lidocaine (PF) 10 mg/ml (1%) injection 10 mg  1 mL Intradermal Once Jan New MD           Labs:  Lab Results   Component Value Date    WBC 6.34 03/24/2020    HGB 12.2 (L) 03/24/2020    HCT 41.2 03/24/2020    MCV 93 03/24/2020     03/24/2020     BMP  Lab Results   Component Value Date     03/24/2020    K 4.5 03/24/2020     03/24/2020    CO2 25 03/24/2020    BUN 11 03/24/2020    CREATININE 0.8 03/24/2020    CALCIUM 10.0 03/24/2020    ANIONGAP 10 03/24/2020    ESTGFRAFRICA >60.0 03/24/2020    EGFRNONAA >60.0 03/24/2020     Lab Results   Component Value Date    ALT 15 03/24/2020    AST 19 03/24/2020    ALKPHOS 70 03/24/2020    BILITOT 0.5 03/24/2020       Lab Results   Component Value Date    IRON 59  02/03/2020    TIBC 250 02/03/2020    FERRITIN 433 (H) 02/03/2020     Lab Results   Component Value Date    SARHCKRO92 497 09/03/2019     Lab Results   Component Value Date    FOLATE 8.3 09/03/2019     Lab Results   Component Value Date    TSH 1.607 01/15/2019       I have reviewed the radiology reports and examined the scan/xray images.    Review of Systems   Constitutional: Negative.    HENT: Negative.    Eyes: Negative.    Respiratory: Negative.    Cardiovascular: Negative.    Gastrointestinal: Negative.    Endocrine: Negative.    Genitourinary: Negative.    Musculoskeletal: Negative.    Skin: Negative.    Allergic/Immunologic: Negative.    Neurological: Negative.    Hematological: Negative.    Psychiatric/Behavioral: Negative.      ECOG SCORE              Objective:   There were no vitals filed for this visit.There is no height or weight on file to calculate BMI.  Physical Exam      Assessment:      1. Rectal cancer           Plan:     Rectal cancer    Mr. Presley will undergo APR on 4/9.  If CR, will just monitor.    Consult Start Time: 04/01/2020 14:00  Consult End Time: 04/01/2020 14:15        The patient location is: home  The chief complaint leading to consultation is: rectal cancer  Visit type: Virtual visit with synchronous audio and video  Total time spent with patient: 15 ninutes  Each patient to whom he or she provides medical services by telemedicine is:  (1) informed of the relationship between the physician and patient and the respective role of any other health care provider with respect to management of the patient; and (2) notified that he or she may decline to receive medical services by telemedicine and may withdraw from such care at any time.    Notes: as above

## 2020-04-08 NOTE — H&P
[facility] -   History & Physical  Plastic Surgery    SUBJECTIVE:     Chief Complaint/Reason for Admission: Rectal Cancer    History of Present Illness:  Patient is a 54 y.o. male presents with history of rectal cancer s/p neoadjuvant chemoradiation.  At this point, he is ready to proceed with his oncologic resection which will consist of an abdominoperoneal resection along with a wide local excision of his left buttock.  He will need reconstructive surgery to reconstruct his buttock wound and rotate a flap in for his pelvic wound.    No medications prior to admission.       Review of patient's allergies indicates:  No Known Allergies    Past Medical History:   Diagnosis Date    Anemia     Cancer      Past Surgical History:   Procedure Laterality Date    COLON SURGERY      COLONOSCOPY N/A 6/6/2019    Procedure: COLONOSCOPY;  Surgeon: Anjelica Saavedra MD;  Location: [facility];  Service: Endoscopy;  Laterality: N/A;    ESOPHAGOGASTRODUODENOSCOPY N/A 6/6/2019    Procedure: EGD (ESOPHAGOGASTRODUODENOSCOPY);  Surgeon: Anjelica Saavedra MD;  Location: [facility];  Service: Endoscopy;  Laterality: N/A;    HERNIA REPAIR  1995    INJECTION OF ANESTHETIC AGENT INTO TISSUE PLANE DEFINED BY TRANSVERSUS ABDOMINIS MUSCLE N/A 2/18/2020    Procedure: BLOCK, TRANSVERSUS ABDOMINIS PLANE;  Surgeon: Jan New MD;  Location: [facility];  Service: General;  Laterality: N/A;    INSERTION OF TUNNELED CENTRAL VENOUS CATHETER (CVC) WITH SUBCUTANEOUS PORT N/A 10/8/2019    Procedure: [id number];  Surgeon: Jan New MD;  Location: [facility];  Service: General;  Laterality: N/A;    LAPAROSCOPIC COLOSTOMY      TONSILLECTOMY       Family History   Problem Relation Age of Onset    Intestinal malrotation Mother     Leukemia Father     No Known Problems Sister     Coronary artery disease Brother     Coronary artery disease Maternal Grandmother     Stomach cancer Maternal Grandfather      Social  History     Tobacco Use    Smoking status: Current Every Day Smoker     Packs/day: 1.00     Years: 35.00     Pack years: 35.00     Types: Cigarettes     Start date: 1/2/1984    Smokeless tobacco: Former User     Types: Chew    Tobacco comment: no smoking after m.n prior to sx   Substance Use Topics    Alcohol use: Yes     Alcohol/week: 46.0 standard drinks     Types: 42 Cans of beer, 4 Shots of liquor per week     Frequency: 4 or more times a week     Drinks per session: 5 or 6     Binge frequency: Daily or almost daily     Comment: nancie 7, beer socialy,  no alcohol 72 hours prior to surgery    Drug use: Never        Review of Systems:  No CP, no SOB, no H/A, no N/V    OBJECTIVE:     Vital Signs (Most Recent):  VSS AF    Physical Exam:  Gen - AO x 3, NAD  Neck - supple  Chest - non labored respirations  Ht - RRR  Abd - soft, non tender, well healed loop ostomy in place LLQ, protuberant abdomen  Ext - no CCE  Anus - typical radiation changes around anus, indurated left buttock area approximately 5 x 2 cm, no drainage, no sign of active infection    Laboratory:  noted    Diagnostic Results:  Path reviewed    ASSESSMENT/PLAN:   55 y/o with rectal cancer  1. VRAM flap for APR coverage of perineum  2. V-Y advancement flap for coverage of left buttock  3. Possible alloderm for pelvic floor reinforcement  4. I discussed with the patient at length risks, benefits, alternatives, and possible complications of surgery.  I discussed his increased risk of wound healing problems and flap failure secondary to his continued smoking.  I discussed the risk of coronavirus with the current pandemic, but surgery needs to be done at this time for a known cancer.  We discussed expected results in detail.  All questions were answered.  He understands and wants to proceed with surgery.

## 2020-04-09 ENCOUNTER — ANESTHESIA (OUTPATIENT)
Dept: SURGERY | Facility: HOSPITAL | Age: 55
DRG: 331 | End: 2020-04-09
Payer: COMMERCIAL

## 2020-04-09 ENCOUNTER — ANESTHESIA EVENT (OUTPATIENT)
Dept: SURGERY | Facility: HOSPITAL | Age: 55
DRG: 331 | End: 2020-04-09
Payer: COMMERCIAL

## 2020-04-09 ENCOUNTER — HOSPITAL ENCOUNTER (INPATIENT)
Facility: HOSPITAL | Age: 55
LOS: 6 days | Discharge: HOME HEALTH | DRG: 331 | End: 2020-04-15
Attending: COLON & RECTAL SURGERY | Admitting: COLON & RECTAL SURGERY
Payer: COMMERCIAL

## 2020-04-09 DIAGNOSIS — K63.1 RECTAL PERFORATION: Primary | ICD-10-CM

## 2020-04-09 DIAGNOSIS — C20 RECTAL CANCER: ICD-10-CM

## 2020-04-09 LAB
ABO + RH BLD: NORMAL
ANION GAP SERPL CALC-SCNC: 7 MMOL/L (ref 8–16)
BLD GP AB SCN CELLS X3 SERPL QL: NORMAL
BLD PROD TYP BPU: NORMAL
BLD PROD TYP BPU: NORMAL
BLOOD UNIT EXPIRATION DATE: NORMAL
BLOOD UNIT EXPIRATION DATE: NORMAL
BLOOD UNIT TYPE CODE: 6200
BLOOD UNIT TYPE CODE: 6200
BLOOD UNIT TYPE: NORMAL
BLOOD UNIT TYPE: NORMAL
BUN SERPL-MCNC: 13 MG/DL (ref 6–20)
CALCIUM SERPL-MCNC: 7.3 MG/DL (ref 8.7–10.5)
CHLORIDE SERPL-SCNC: 111 MMOL/L (ref 95–110)
CO2 SERPL-SCNC: 22 MMOL/L (ref 23–29)
CODING SYSTEM: NORMAL
CODING SYSTEM: NORMAL
CREAT SERPL-MCNC: 0.8 MG/DL (ref 0.5–1.4)
DISPENSE STATUS: NORMAL
DISPENSE STATUS: NORMAL
ERYTHROCYTE [DISTWIDTH] IN BLOOD BY AUTOMATED COUNT: 15.6 % (ref 11.5–14.5)
EST. GFR  (AFRICAN AMERICAN): >60 ML/MIN/1.73 M^2
EST. GFR  (NON AFRICAN AMERICAN): >60 ML/MIN/1.73 M^2
GLUCOSE SERPL-MCNC: 154 MG/DL (ref 70–110)
HCT VFR BLD AUTO: 39.4 % (ref 40–54)
HGB BLD-MCNC: 12.7 G/DL (ref 14–18)
MCH RBC QN AUTO: 28.3 PG (ref 27–31)
MCHC RBC AUTO-ENTMCNC: 32.2 G/DL (ref 32–36)
MCV RBC AUTO: 88 FL (ref 82–98)
NUM UNITS TRANS PACKED RBC: NORMAL
NUM UNITS TRANS PACKED RBC: NORMAL
PLATELET # BLD AUTO: 226 K/UL (ref 150–350)
PMV BLD AUTO: 8.9 FL (ref 9.2–12.9)
POTASSIUM SERPL-SCNC: 4.7 MMOL/L (ref 3.5–5.1)
RBC # BLD AUTO: 4.48 M/UL (ref 4.6–6.2)
SODIUM SERPL-SCNC: 140 MMOL/L (ref 136–145)
WBC # BLD AUTO: 11.96 K/UL (ref 3.9–12.7)

## 2020-04-09 PROCEDURE — 88342 CHG IMMUNOCYTOCHEMISTRY: ICD-10-PCS | Mod: 26,,, | Performed by: PATHOLOGY

## 2020-04-09 PROCEDURE — 25000003 PHARM REV CODE 250: Performed by: NURSE ANESTHETIST, CERTIFIED REGISTERED

## 2020-04-09 PROCEDURE — 49905 OMENTAL FLAP INTRA-ABDOM: CPT | Mod: ,,, | Performed by: COLON & RECTAL SURGERY

## 2020-04-09 PROCEDURE — 11000001 HC ACUTE MED/SURG PRIVATE ROOM

## 2020-04-09 PROCEDURE — S0030 INJECTION, METRONIDAZOLE: HCPCS | Performed by: COLON & RECTAL SURGERY

## 2020-04-09 PROCEDURE — 27201423 OPTIME MED/SURG SUP & DEVICES STERILE SUPPLY: Performed by: COLON & RECTAL SURGERY

## 2020-04-09 PROCEDURE — 86920 COMPATIBILITY TEST SPIN: CPT

## 2020-04-09 PROCEDURE — 88342 IMHCHEM/IMCYTCHM 1ST ANTB: CPT | Mod: 59 | Performed by: PATHOLOGY

## 2020-04-09 PROCEDURE — 88342 IMHCHEM/IMCYTCHM 1ST ANTB: CPT | Mod: 26,,, | Performed by: PATHOLOGY

## 2020-04-09 PROCEDURE — 71000039 HC RECOVERY, EACH ADD'L HOUR: Performed by: COLON & RECTAL SURGERY

## 2020-04-09 PROCEDURE — 63600175 PHARM REV CODE 636 W HCPCS: Performed by: ANESTHESIOLOGY

## 2020-04-09 PROCEDURE — 45110 PR PROCTECTOMY,AP RESECT+OSTOMY: ICD-10-PCS | Mod: 52,78,, | Performed by: COLON & RECTAL SURGERY

## 2020-04-09 PROCEDURE — 88305 TISSUE EXAM BY PATHOLOGIST: CPT | Mod: 26,,, | Performed by: PATHOLOGY

## 2020-04-09 PROCEDURE — 85027 COMPLETE CBC AUTOMATED: CPT

## 2020-04-09 PROCEDURE — U0002 COVID-19 LAB TEST NON-CDC: HCPCS

## 2020-04-09 PROCEDURE — 88309 PR  SURG PATH,LEVEL VI: ICD-10-PCS | Mod: 26,,, | Performed by: PATHOLOGY

## 2020-04-09 PROCEDURE — 36000709 HC OR TIME LEV III EA ADD 15 MIN: Performed by: COLON & RECTAL SURGERY

## 2020-04-09 PROCEDURE — 45110 REMOVAL OF RECTUM: CPT | Mod: 52,78,, | Performed by: COLON & RECTAL SURGERY

## 2020-04-09 PROCEDURE — 88341 IMHCHEM/IMCYTCHM EA ADD ANTB: CPT | Mod: 26,,, | Performed by: PATHOLOGY

## 2020-04-09 PROCEDURE — 63600175 PHARM REV CODE 636 W HCPCS: Performed by: COLON & RECTAL SURGERY

## 2020-04-09 PROCEDURE — 88305 TISSUE EXAM BY PATHOLOGIST: CPT | Performed by: PATHOLOGY

## 2020-04-09 PROCEDURE — 99900035 HC TECH TIME PER 15 MIN (STAT)

## 2020-04-09 PROCEDURE — 71000033 HC RECOVERY, INTIAL HOUR: Performed by: COLON & RECTAL SURGERY

## 2020-04-09 PROCEDURE — 36000708 HC OR TIME LEV III 1ST 15 MIN: Performed by: COLON & RECTAL SURGERY

## 2020-04-09 PROCEDURE — 49905 PR OMENTAL FLAP,INTRA-ABDOMINAL: ICD-10-PCS | Mod: ,,, | Performed by: COLON & RECTAL SURGERY

## 2020-04-09 PROCEDURE — 63600175 PHARM REV CODE 636 W HCPCS: Performed by: NURSE ANESTHETIST, CERTIFIED REGISTERED

## 2020-04-09 PROCEDURE — 80048 BASIC METABOLIC PNL TOTAL CA: CPT

## 2020-04-09 PROCEDURE — 37000009 HC ANESTHESIA EA ADD 15 MINS: Performed by: COLON & RECTAL SURGERY

## 2020-04-09 PROCEDURE — 25000003 PHARM REV CODE 250: Performed by: COLON & RECTAL SURGERY

## 2020-04-09 PROCEDURE — 86901 BLOOD TYPING SEROLOGIC RH(D): CPT

## 2020-04-09 PROCEDURE — 88341 PR IHC OR ICC EACH ADD'L SINGLE ANTIBODY  STAINPR: ICD-10-PCS | Mod: 26,,, | Performed by: PATHOLOGY

## 2020-04-09 PROCEDURE — 88305 TISSUE EXAM BY PATHOLOGIST: ICD-10-PCS | Mod: 26,,, | Performed by: PATHOLOGY

## 2020-04-09 PROCEDURE — 37000008 HC ANESTHESIA 1ST 15 MINUTES: Performed by: COLON & RECTAL SURGERY

## 2020-04-09 PROCEDURE — P9016 RBC LEUKOCYTES REDUCED: HCPCS

## 2020-04-09 PROCEDURE — C9290 INJ, BUPIVACAINE LIPOSOME: HCPCS | Performed by: COLON & RECTAL SURGERY

## 2020-04-09 PROCEDURE — 94799 UNLISTED PULMONARY SVC/PX: CPT

## 2020-04-09 PROCEDURE — 88309 TISSUE EXAM BY PATHOLOGIST: CPT | Mod: 26,,, | Performed by: PATHOLOGY

## 2020-04-09 PROCEDURE — 88309 TISSUE EXAM BY PATHOLOGIST: CPT | Performed by: PATHOLOGY

## 2020-04-09 PROCEDURE — 88341 IMHCHEM/IMCYTCHM EA ADD ANTB: CPT | Mod: 59 | Performed by: PATHOLOGY

## 2020-04-09 PROCEDURE — C1729 CATH, DRAINAGE: HCPCS | Performed by: COLON & RECTAL SURGERY

## 2020-04-09 RX ORDER — HYDROMORPHONE HYDROCHLORIDE 2 MG/ML
0.2 INJECTION, SOLUTION INTRAMUSCULAR; INTRAVENOUS; SUBCUTANEOUS EVERY 5 MIN PRN
Status: DISCONTINUED | OUTPATIENT
Start: 2020-04-09 | End: 2020-04-09 | Stop reason: HOSPADM

## 2020-04-09 RX ORDER — HEPARIN SODIUM 5000 [USP'U]/ML
5000 INJECTION, SOLUTION INTRAVENOUS; SUBCUTANEOUS
Status: COMPLETED | OUTPATIENT
Start: 2020-04-09 | End: 2020-04-09

## 2020-04-09 RX ORDER — CYCLOBENZAPRINE HCL 10 MG
10 TABLET ORAL 3 TIMES DAILY
Status: DISCONTINUED | OUTPATIENT
Start: 2020-04-09 | End: 2020-04-15 | Stop reason: HOSPADM

## 2020-04-09 RX ORDER — FENTANYL CITRATE 50 UG/ML
INJECTION, SOLUTION INTRAMUSCULAR; INTRAVENOUS
Status: DISCONTINUED | OUTPATIENT
Start: 2020-04-09 | End: 2020-04-09

## 2020-04-09 RX ORDER — OXYCODONE AND ACETAMINOPHEN 5; 325 MG/1; MG/1
1 TABLET ORAL
Status: DISCONTINUED | OUTPATIENT
Start: 2020-04-09 | End: 2020-04-09 | Stop reason: HOSPADM

## 2020-04-09 RX ORDER — ONDANSETRON 2 MG/ML
4 INJECTION INTRAMUSCULAR; INTRAVENOUS EVERY 12 HOURS PRN
Status: DISCONTINUED | OUTPATIENT
Start: 2020-04-09 | End: 2020-04-09 | Stop reason: HOSPADM

## 2020-04-09 RX ORDER — PANTOPRAZOLE SODIUM 40 MG/1
40 TABLET, DELAYED RELEASE ORAL
Status: DISCONTINUED | OUTPATIENT
Start: 2020-04-10 | End: 2020-04-13

## 2020-04-09 RX ORDER — ACETAMINOPHEN 10 MG/ML
1000 INJECTION, SOLUTION INTRAVENOUS EVERY 8 HOURS
Status: DISPENSED | OUTPATIENT
Start: 2020-04-09 | End: 2020-04-10

## 2020-04-09 RX ORDER — MIDAZOLAM HYDROCHLORIDE 1 MG/ML
INJECTION, SOLUTION INTRAMUSCULAR; INTRAVENOUS
Status: DISCONTINUED | OUTPATIENT
Start: 2020-04-09 | End: 2020-04-09

## 2020-04-09 RX ORDER — PROPOFOL 10 MG/ML
VIAL (ML) INTRAVENOUS
Status: DISCONTINUED | OUTPATIENT
Start: 2020-04-09 | End: 2020-04-09

## 2020-04-09 RX ORDER — METRONIDAZOLE 500 MG/100ML
500 INJECTION, SOLUTION INTRAVENOUS
Status: COMPLETED | OUTPATIENT
Start: 2020-04-09 | End: 2020-04-09

## 2020-04-09 RX ORDER — LIDOCAINE HYDROCHLORIDE 10 MG/ML
INJECTION, SOLUTION EPIDURAL; INFILTRATION; INTRACAUDAL; PERINEURAL
Status: DISCONTINUED | OUTPATIENT
Start: 2020-04-09 | End: 2020-04-09

## 2020-04-09 RX ORDER — SODIUM CHLORIDE, SODIUM LACTATE, POTASSIUM CHLORIDE, CALCIUM CHLORIDE 600; 310; 30; 20 MG/100ML; MG/100ML; MG/100ML; MG/100ML
INJECTION, SOLUTION INTRAVENOUS CONTINUOUS
Status: DISCONTINUED | OUTPATIENT
Start: 2020-04-09 | End: 2020-04-09

## 2020-04-09 RX ORDER — SODIUM CHLORIDE, SODIUM LACTATE, POTASSIUM CHLORIDE, CALCIUM CHLORIDE 600; 310; 30; 20 MG/100ML; MG/100ML; MG/100ML; MG/100ML
INJECTION, SOLUTION INTRAVENOUS CONTINUOUS
Status: DISCONTINUED | OUTPATIENT
Start: 2020-04-09 | End: 2020-04-10

## 2020-04-09 RX ORDER — GLYCOPYRROLATE 0.2 MG/ML
INJECTION INTRAMUSCULAR; INTRAVENOUS
Status: DISCONTINUED | OUTPATIENT
Start: 2020-04-09 | End: 2020-04-09

## 2020-04-09 RX ORDER — ONDANSETRON 2 MG/ML
4 INJECTION INTRAMUSCULAR; INTRAVENOUS EVERY 6 HOURS PRN
Status: DISCONTINUED | OUTPATIENT
Start: 2020-04-09 | End: 2020-04-15 | Stop reason: HOSPADM

## 2020-04-09 RX ORDER — KETOROLAC TROMETHAMINE 30 MG/ML
15 INJECTION, SOLUTION INTRAMUSCULAR; INTRAVENOUS EVERY 8 HOURS PRN
Status: DISCONTINUED | OUTPATIENT
Start: 2020-04-09 | End: 2020-04-09 | Stop reason: HOSPADM

## 2020-04-09 RX ORDER — ROCURONIUM BROMIDE 10 MG/ML
INJECTION, SOLUTION INTRAVENOUS
Status: DISCONTINUED | OUTPATIENT
Start: 2020-04-09 | End: 2020-04-09

## 2020-04-09 RX ORDER — CHLORHEXIDINE GLUCONATE ORAL RINSE 1.2 MG/ML
10 SOLUTION DENTAL 2 TIMES DAILY
Status: DISPENSED | OUTPATIENT
Start: 2020-04-09 | End: 2020-04-14

## 2020-04-09 RX ORDER — MORPHINE SULFATE 1 MG/ML
INJECTION INTRAVENOUS CONTINUOUS
Status: DISCONTINUED | OUTPATIENT
Start: 2020-04-09 | End: 2020-04-11

## 2020-04-09 RX ORDER — NALOXONE HCL 0.4 MG/ML
0.02 VIAL (ML) INJECTION
Status: DISCONTINUED | OUTPATIENT
Start: 2020-04-09 | End: 2020-04-15 | Stop reason: HOSPADM

## 2020-04-09 RX ORDER — BUPIVACAINE HYDROCHLORIDE 2.5 MG/ML
INJECTION, SOLUTION EPIDURAL; INFILTRATION; INTRACAUDAL
Status: DISCONTINUED | OUTPATIENT
Start: 2020-04-09 | End: 2020-04-09 | Stop reason: HOSPADM

## 2020-04-09 RX ORDER — NEOSTIGMINE METHYLSULFATE 1 MG/ML
INJECTION, SOLUTION INTRAVENOUS
Status: DISCONTINUED | OUTPATIENT
Start: 2020-04-09 | End: 2020-04-09

## 2020-04-09 RX ORDER — ONDANSETRON 2 MG/ML
INJECTION INTRAMUSCULAR; INTRAVENOUS
Status: DISCONTINUED | OUTPATIENT
Start: 2020-04-09 | End: 2020-04-09

## 2020-04-09 RX ORDER — GABAPENTIN 300 MG/1
300 CAPSULE ORAL 3 TIMES DAILY
Status: DISCONTINUED | OUTPATIENT
Start: 2020-04-09 | End: 2020-04-15 | Stop reason: HOSPADM

## 2020-04-09 RX ORDER — ONDANSETRON 2 MG/ML
4 INJECTION INTRAMUSCULAR; INTRAVENOUS DAILY PRN
Status: DISCONTINUED | OUTPATIENT
Start: 2020-04-09 | End: 2020-04-09 | Stop reason: HOSPADM

## 2020-04-09 RX ORDER — ENOXAPARIN SODIUM 100 MG/ML
40 INJECTION SUBCUTANEOUS EVERY 24 HOURS
Status: DISCONTINUED | OUTPATIENT
Start: 2020-04-10 | End: 2020-04-15 | Stop reason: HOSPADM

## 2020-04-09 RX ORDER — CELECOXIB 100 MG/1
400 CAPSULE ORAL
Status: COMPLETED | OUTPATIENT
Start: 2020-04-09 | End: 2020-04-09

## 2020-04-09 RX ORDER — CELECOXIB 100 MG/1
400 CAPSULE ORAL
Status: DISCONTINUED | OUTPATIENT
Start: 2020-04-09 | End: 2020-04-09

## 2020-04-09 RX ORDER — ACETAMINOPHEN 500 MG
1000 TABLET ORAL ONCE
Status: COMPLETED | OUTPATIENT
Start: 2020-04-09 | End: 2020-04-09

## 2020-04-09 RX ORDER — DIPHENHYDRAMINE HYDROCHLORIDE 50 MG/ML
12.5 INJECTION INTRAMUSCULAR; INTRAVENOUS EVERY 6 HOURS PRN
Status: DISCONTINUED | OUTPATIENT
Start: 2020-04-09 | End: 2020-04-15 | Stop reason: HOSPADM

## 2020-04-09 RX ORDER — PHENYLEPHRINE HYDROCHLORIDE 10 MG/ML
INJECTION INTRAVENOUS
Status: DISCONTINUED | OUTPATIENT
Start: 2020-04-09 | End: 2020-04-09

## 2020-04-09 RX ORDER — AMOXICILLIN 250 MG
1 CAPSULE ORAL 2 TIMES DAILY
Status: DISCONTINUED | OUTPATIENT
Start: 2020-04-09 | End: 2020-04-15 | Stop reason: HOSPADM

## 2020-04-09 RX ORDER — GABAPENTIN 400 MG/1
800 CAPSULE ORAL
Status: COMPLETED | OUTPATIENT
Start: 2020-04-09 | End: 2020-04-09

## 2020-04-09 RX ADMIN — FENTANYL CITRATE 100 MCG: 50 INJECTION, SOLUTION INTRAMUSCULAR; INTRAVENOUS at 11:04

## 2020-04-09 RX ADMIN — GABAPENTIN 800 MG: 400 CAPSULE ORAL at 06:04

## 2020-04-09 RX ADMIN — PROPOFOL 150 MG: 10 INJECTION, EMULSION INTRAVENOUS at 07:04

## 2020-04-09 RX ADMIN — SENNOSIDES AND DOCUSATE SODIUM 1 TABLET: 8.6; 5 TABLET ORAL at 09:04

## 2020-04-09 RX ADMIN — SODIUM CHLORIDE, SODIUM LACTATE, POTASSIUM CHLORIDE, AND CALCIUM CHLORIDE: 600; 310; 30; 20 INJECTION, SOLUTION INTRAVENOUS at 10:04

## 2020-04-09 RX ADMIN — FENTANYL CITRATE 100 MCG: 50 INJECTION, SOLUTION INTRAMUSCULAR; INTRAVENOUS at 07:04

## 2020-04-09 RX ADMIN — ACETAMINOPHEN 1000 MG: 500 TABLET ORAL at 06:04

## 2020-04-09 RX ADMIN — ROCURONIUM BROMIDE 10 MG: 10 INJECTION, SOLUTION INTRAVENOUS at 11:04

## 2020-04-09 RX ADMIN — PHENYLEPHRINE HYDROCHLORIDE 200 MCG: 10 INJECTION INTRAVENOUS at 12:04

## 2020-04-09 RX ADMIN — ROCURONIUM BROMIDE 20 MG: 10 INJECTION, SOLUTION INTRAVENOUS at 01:04

## 2020-04-09 RX ADMIN — NEOSTIGMINE METHYLSULFATE 4 MG: 1 INJECTION INTRAVENOUS at 02:04

## 2020-04-09 RX ADMIN — SODIUM CHLORIDE, SODIUM LACTATE, POTASSIUM CHLORIDE, AND CALCIUM CHLORIDE: 600; 310; 30; 20 INJECTION, SOLUTION INTRAVENOUS at 08:04

## 2020-04-09 RX ADMIN — MORPHINE SULFATE: 1 INJECTION INTRAVENOUS at 03:04

## 2020-04-09 RX ADMIN — ROBINUL 0.4 MG: 0.2 INJECTION INTRAMUSCULAR; INTRAVENOUS at 02:04

## 2020-04-09 RX ADMIN — ACETAMINOPHEN 1000 MG: 10 INJECTION, SOLUTION INTRAVENOUS at 09:04

## 2020-04-09 RX ADMIN — ROCURONIUM BROMIDE 50 MG: 10 INJECTION, SOLUTION INTRAVENOUS at 07:04

## 2020-04-09 RX ADMIN — CELECOXIB 400 MG: 100 CAPSULE ORAL at 06:04

## 2020-04-09 RX ADMIN — HYDROMORPHONE HYDROCHLORIDE 0.2 MG: 2 INJECTION INTRAMUSCULAR; INTRAVENOUS; SUBCUTANEOUS at 03:04

## 2020-04-09 RX ADMIN — ROCURONIUM BROMIDE 20 MG: 10 INJECTION, SOLUTION INTRAVENOUS at 08:04

## 2020-04-09 RX ADMIN — SODIUM CHLORIDE, SODIUM LACTATE, POTASSIUM CHLORIDE, AND CALCIUM CHLORIDE: .6; .31; .03; .02 INJECTION, SOLUTION INTRAVENOUS at 03:04

## 2020-04-09 RX ADMIN — CHLORHEXIDINE GLUCONATE 0.12% ORAL RINSE 10 ML: 1.2 LIQUID ORAL at 09:04

## 2020-04-09 RX ADMIN — LIDOCAINE HYDROCHLORIDE 50 MG: 10 INJECTION, SOLUTION EPIDURAL; INFILTRATION; INTRACAUDAL; PERINEURAL at 07:04

## 2020-04-09 RX ADMIN — MIDAZOLAM 2 MG: 1 INJECTION INTRAMUSCULAR; INTRAVENOUS at 07:04

## 2020-04-09 RX ADMIN — METRONIDAZOLE 500 MG: 500 SOLUTION INTRAVENOUS at 07:04

## 2020-04-09 RX ADMIN — GABAPENTIN 300 MG: 300 CAPSULE ORAL at 09:04

## 2020-04-09 RX ADMIN — FENTANYL CITRATE 100 MCG: 50 INJECTION, SOLUTION INTRAMUSCULAR; INTRAVENOUS at 08:04

## 2020-04-09 RX ADMIN — BUPIVACAINE 266 MG: 13.3 INJECTION, SUSPENSION, LIPOSOMAL INFILTRATION at 01:04

## 2020-04-09 RX ADMIN — SODIUM CHLORIDE, SODIUM LACTATE, POTASSIUM CHLORIDE, AND CALCIUM CHLORIDE: 600; 310; 30; 20 INJECTION, SOLUTION INTRAVENOUS at 07:04

## 2020-04-09 RX ADMIN — ONDANSETRON 4 MG: 2 INJECTION, SOLUTION INTRAMUSCULAR; INTRAVENOUS at 02:04

## 2020-04-09 RX ADMIN — KETOROLAC TROMETHAMINE 15 MG: 30 INJECTION, SOLUTION INTRAMUSCULAR at 03:04

## 2020-04-09 RX ADMIN — HEPARIN SODIUM 5000 UNITS: 5000 INJECTION INTRAVENOUS; SUBCUTANEOUS at 06:04

## 2020-04-09 RX ADMIN — CYCLOBENZAPRINE 10 MG: 10 TABLET, FILM COATED ORAL at 09:04

## 2020-04-09 RX ADMIN — ROCURONIUM BROMIDE 10 MG: 10 INJECTION, SOLUTION INTRAVENOUS at 01:04

## 2020-04-09 RX ADMIN — CEFTRIAXONE SODIUM 2 G: 2 INJECTION, SOLUTION INTRAVENOUS at 07:04

## 2020-04-09 RX ADMIN — ACETAMINOPHEN 1000 MG: 10 INJECTION, SOLUTION INTRAVENOUS at 03:04

## 2020-04-09 NOTE — ANESTHESIA POSTPROCEDURE EVALUATION
Anesthesia Post Evaluation    Patient: Sukhjinder Presley    Procedure(s) Performed: Procedure(s) (LRB):  PROCTECTOMY, ABDOMINOPERINEAL (N/A)  CREATION, FLAP, MUSCLE ROTATION (Left)  FLAP GRAFT (N/A)    Final Anesthesia Type: general    Patient location during evaluation: PACU  Patient participation: Yes- Able to Participate  Level of consciousness: awake and alert  Post-procedure vital signs: reviewed and stable  Pain management: adequate  Airway patency: patent  GABRIEL mitigation strategies: Extubation while patient is awake  PONV status at discharge: No PONV  Anesthetic complications: no      Cardiovascular status: hemodynamically stable  Respiratory status: spontaneous ventilation  Hydration status: euvolemic  Follow-up not needed.          Vitals Value Taken Time   /83 4/9/2020  2:57 PM   Temp 36.7 °C (98.1 °F) 4/9/2020  6:17 AM   Pulse 83 4/9/2020  3:00 PM   Resp 13 4/9/2020  3:00 PM   SpO2 99 % 4/9/2020  3:00 PM   Vitals shown include unvalidated device data.      No case tracking events are documented in the log.      Pain/Stacy Score: Pain Rating Prior to Med Admin: 0 (4/9/2020  6:06 AM)

## 2020-04-09 NOTE — INTERVAL H&P NOTE
The patient has been examined and the H&P has been reviewed:    No changes.  Proceed with surgery.    Anesthesia/Surgery risks, benefits and alternative options discussed and understood by patient/family.          Active Hospital Problems    Diagnosis  POA    Rectal cancer [C20]  Yes      Resolved Hospital Problems   No resolved problems to display.

## 2020-04-09 NOTE — OP NOTE
[facility]  Surgery Department  Operative Note    SUMMARY     Date of Procedure: 4/9/2020     Procedure:  1.  Abdominoperineal Resection (APR) with extra anatomic resection of fistula tract and left gluteus muscle  2. Omental pedicle flap  3.  Transversus abdominis plane block    Surgeon(s) and Role:  Panel 1:     * Jan New MD - Primary  Panel 2:     * Sebastian Dan MD - Primary    Assisting Surgeon: None    Pre-Operative Diagnosis: Rectal cancer [C20]    Post-Operative Diagnosis: Post-Op Diagnosis Codes:     * Rectal cancer [C20]     * Malignant neoplasm of rectosigmoid junction [C19]    Anesthesia: General    Technical Procedures Used:   1.  Abdominoperineal Resection (APR) with extra anatomic resection of fistula tract and left gluteus muscle  2. Omental pedicle flap  3.  Transversus abdominis plane block    Indications for Procedure:  54-year-old male with rectal adenocarcinoma who developed a rectal perforation while on chemotherapy during his neoadjuvant treatment who presents for definitive surgical resection    Findings of the Procedure:  Rectal tumor found at the expected location in the mid to low rectum with fistula tract and cavity found along the left sidewall and then the left gluteus muscle as expected.  No evidence of metastatic disease within the abdomen    Description of the Procedure:  Patient was brought to the operating room.  General tracheal anesthesia was then induced.  Cooney catheter was then sterilely placed.  Patient then moved to lithotomy position.  The anus, perineum and abdomen were then prepped and draped usual sterile fashion.  A preop surgical time-out was then performed confirming the correct patient, procedure and preop medications given.  Dr. Dan a plastic surgery then performed a laparotomy.  Please see his separately dictated operative report for details of the abdominal exposure.     Once his entrance into the abdomen was completed, the  incision was then extended inferiorly all the way to the pubic bone.  The bladder was mobilized off of the left side of the anterior abdominal wall and sidewall in usual fashion.  The abdomen was checked for any signs of metastatic disease which there was none.  Attention was turned to the colostomy which appeared viable.  The colostomy was protected out of the way from the field during the entire process by a Ray-Edith covered with a Tegaderm.  The sigmoid colon just beneath the colostomy on the distal portion was evaluated and just beneath the fascia a mesenteric window was made beneath the sigmoid colon and this was transected using a MINERVA 75 blue load.  Attention was then turned to the MAGDALENO pedicle which was grasped and elevated.  The ureter was found via dissection both medially and laterally protecting it during this process.  The MAGDALENO pedicle was then circumferentially dissected and taken via high ligation using the EnSeal device.  The remaining mesentery between the MAGDALENO pedicle and the cut end of the sigmoid colon was then taken with the EnSeal device.  Care was taken to preserve the ureter during this process.  There was some small adhesions of the colon to the left lateral sidewall where his previous inguinal hernia repair occurred and these were sharply lysed.      Attention was then turned and the retrorectal space was entered.  Dissection was carried down along the retrorectal space to the level of the tumor posteriorly 1st.  The dissection was then carried around laterally to the right side of the rectum to the level of the peritoneal reflection.  The tumor was found to be at the peritoneal flexion and just below it with obvious attachment to the left lateral sidewall where the perforation occurred.  Dissection was then carried around posteriorly all the way down to the level of the pelvic floor and along the right side as far distally as possible.  Attention was then turned to the left lateral pelvic  sidewall where there was obvious attachments to the pelvic sidewall from the tumor and where the perforation occurred as well as the fistula tract and cavity.  At this point, an extra-anatomic plane was entered outside of this location to fully excise what could be felt as tumor and cavity.  This was carried down around the cavity and tumor down to the level of the pelvic floor along the left lateral sidewall.  Attention was then turned anteriorly where the rectum was dissected off of the anterior structures in usual fashion taking care to not injure the prostate, seminal vesicles or urethra.  Once this was taken down as far as could be safely seen, attention was then turned to the perineal portion of the operation.      There was a large area of induration along the left posterior buttock abutting the left anal verge extending into the left gluteus muscle.  A premarked area was chosen for the induration and the skin was incised in this location and then the skin was brought up to the level of the anal verge in the posterior position as well as along the left lateral position.  A traditional perineal incision was then made in the intersphincteric space just outside the anal verge along the right circumference of the anal verge as well as anteriorly.  Dissection was carried down posteriorly to the anal coccygeal ligament which was sharply incised.  The peritoneal cavity was then entered posteriorly above the level of the coccyx in usual fashion.  This dissection was carried around laterally up the side attachments of the rectum and anus to the pelvis and perineum.  Attention was then turned to the in duration/fistula tract to the gluteus.  The skin was incised completely and the soft tissue was fully excised of what was indurated field and likely fistula tract with carcinoma in it.  This was carried around posteriorly all the way to the fat beneath the muscle where there was normal tissue.  This was carried around  laterally to the gluteus muscle was there was no longer induration and gluteus muscle was partially excised using electrocautery in this fashion.  Once this was completed all the way to the level more superiorly where there was normal tissue, the fistula tract and induration with the skin intact was completely excised to the level of the anal verge which was premarked.  Dissection was then carried around laterally along the left lateral border of the anal verge to the level of the previously superior dissection where the fistula tract and cavity were encountered.  Once this was encountered, the 2 ends were completed from posterior and lateral on the left side to complete the excision of the tumor and fistula tract and cavity.  Attention was then turned to the anterior portion where the anus was then dissected off of the superior structures including the prostate with care taken to avoid injury to this.  The specimen was then delivered into the perineal portion and the dissection was completed by taking the remaining attachments of the anterior fascia to the prostate and reflection.  With this completed, the specimen was passed off the field for final pathology.  The pelvis was then copiously irrigated and found to be hemostatic and nature of this with electrocautery.  Additional rim of tissue was taken along the left lateral sidewall where the perforation occurred and the fistula tract previously was and sent for frozen section, which came back as negative for residual adenocarcinoma.      At this point, the operation was turned back over to Dr. Dan for completion of the VRAM flap closure of the pelvis.  Please see separately dictated operative report for this portion.  Once the operation was turned back over to me, the omentum was then mobilized off of the transverse colon fully using a combination of electrocautery and the EnSeal device.  This omentum reached easily into the pelvis for an omental pedicle flap to  help fill the pelvis.  This was then placed within the pelvis and sutured in place with 3-0 Vicryl sutures circumferentially to create a pelvic exclusion with the omentum.  The previously mobilized the bladder and peritoneal reflection were then used to flap over top of the omentum into the muscle to completely exclude all the pelvis with tissue to prevent small bowel from protruding into a perineal hernia.  The bladder/peritoneum were then sutured in place interrupted with 3-0 Vicryl suture.  Once this was completed the abdomen was then copiously irrigated and the bowel was replaced and found to be hemostatic and was straight mesentery.  The transverse abdominis plane block was then performed using a mixture of 20 cc of Exparel and 30 cc of 0.25% bupivacaine plain and 10 cc of injectable saline.  30 cc injected bilaterally into the transversus abdominis plain into the skin for local infiltration for total of 60 cc given for the block under direct visualization.  Once this was completed, the fascia was then closed in both layers using running 0 PDS suture.  Once this was completed, interrupted 2-0 Vicryl sutures were used to reapproximate the subcutaneous tissues after a subcutaneous drain was placed using a 15 Scottish ROBERT drain.  This drain was sutured in place with 3-0 silk suture.  The skin was then reapproximated over top of this closure with a skin stapler.  Surgical dressings were applied.  Ostomy bag was then placed over the ostomy after this removed.  The patient was then moved back into the supine position.  He was woken from general endotracheal anesthesia and taken to the postanesthesia care in stable condition.  He tolerated procedure well.  All sponge, needle instrument counts were correct at the end of the case.    Significant Surgical Tasks Conducted by the Assistant(s), if Applicable: N/A    Complications: No    Estimated Blood Loss (EBL): 250 mL           Implants: * No implants in log *    Specimens:    Specimen (12h ago, onward)    None                  Condition: Good    Disposition: PACU - hemodynamically stable.    Attestation: I performed the procedure.

## 2020-04-09 NOTE — ANESTHESIA RELEASE NOTE
Anesthesia Release from PACU Note    Patient: Sukhjinder Presley    Procedure(s) Performed: Procedure(s) (LRB):  PROCTECTOMY, ABDOMINOPERINEAL (N/A)  CREATION, FLAP, MUSCLE ROTATION (Left)  FLAP GRAFT (N/A)    Anesthesia type: general    Post pain: Adequate analgesia    Post assessment: no apparent anesthetic complications    Last Vitals:   Visit Vitals  /88 (BP Location: Left arm, Patient Position: Sitting)   Pulse 86   Temp 36.7 °C (98.1 °F) (Temporal)   Resp 20   Ht 6' (1.829 m)   Wt 72.6 kg (160 lb 0.9 oz)   SpO2 99%   BMI 21.71 kg/m²       Post vital signs: stable    Level of consciousness: awake    Nausea/Vomiting: no nausea/no vomiting    Complications: none    Airway Patency: patent    Respiratory: unassisted    Cardiovascular: stable and blood pressure at baseline    Hydration: euvolemic

## 2020-04-09 NOTE — BRIEF OP NOTE
[facility] -   Brief Operative Note    SUMMARY     Surgery Date: 4/9/2020     Surgeon(s) and Role:  Panel 1:     * Jan New MD - Primary  Panel 2:     * Sebastian Dan MD - Primary    Assisting Surgeon: None    Pre-op Diagnosis:  Rectal cancer [C20]    Post-op Diagnosis:  Post-Op Diagnosis Codes:     * Rectal cancer [C20]     * Malignant neoplasm of rectosigmoid junction [C19]    Procedure(s) (LRB):  PROCTECTOMY, ABDOMINOPERINEAL (N/A)  CREATION, FLAP, MUSCLE ROTATION (Left)  FLAP GRAFT (N/A)    Anesthesia: General    Description of Procedure: myocutaneous VRAM flap    Description of the findings of the procedure: Myocutaneous VRAM flap with skin paddle for buttock defect    Estimated Blood Loss: 250 mL         Specimens:   Specimen (12h ago, onward)    None

## 2020-04-09 NOTE — TREATMENT PLAN
Patient was transported to the floor. After receiving pain medication in PACU, patient is very sleepy. Vitals in PACU were WNL. Vitals were taken to again on the 5th floor, all were WNL. Patient is sleeping comfortably on 3L nasal cannula. Patient was placed with a wedge on his right side and a pillow was placed in between his legs. Floor nurse was shown the flap in between his legs. The color looked good, pink and healthy. RN was informed to look at flap often to ensure color continued to look good.

## 2020-04-09 NOTE — INTERVAL H&P NOTE
The patient has been examined and the H&P has been reviewed:    I concur with the findings and no changes have occurred since H&P was written.     - Pt has seen plastics who will be present for VRAM +/- skin paddle, as well as possible buttock advancement flap.  - Okay to proceed to OR for APR    Anesthesia/Surgery risks, benefits and alternative options discussed and understood by patient/family.          Active Hospital Problems    Diagnosis  POA    Rectal cancer [C20]  Yes      Resolved Hospital Problems   No resolved problems to display.

## 2020-04-09 NOTE — TRANSFER OF CARE
Anesthesia Transfer of Care Note    Patient: Sukhjinder Presley    Procedure(s) Performed: Procedure(s) (LRB):  PROCTECTOMY, ABDOMINOPERINEAL (N/A)  CREATION, FLAP, MUSCLE ROTATION (Left)  FLAP GRAFT (N/A)    Patient location: PACU    Anesthesia Type: general    Transport from OR: Transported from OR on room air with adequate spontaneous ventilation    Post pain: adequate analgesia    Post assessment: no apparent anesthetic complications    Post vital signs: stable    Level of consciousness: awake    Nausea/Vomiting: no nausea/vomiting    Complications: none    Transfer of care protocol was followed      Last vitals:   Visit Vitals  /88 (BP Location: Left arm, Patient Position: Sitting)   Pulse 86   Temp 36.7 °C (98.1 °F) (Temporal)   Resp 20   Ht 6' (1.829 m)   Wt 72.6 kg (160 lb 0.9 oz)   SpO2 99%   BMI 21.71 kg/m²

## 2020-04-09 NOTE — ANESTHESIA PROCEDURE NOTES
Central Line    Diagnosis: Surgery  Patient location during procedure: done in OR  Procedure start time: 4/9/2020 7:44 AM  Timeout: 4/9/2020 7:43 AM  Procedure end time: 4/9/2020 7:55 AM    Staffing  Authorizing Provider: Terrence Solorzano II, MD  Performing Provider: Terrence Solorzaon II, MD    Staffing  Anesthesiologist: Terrence Solorzano II, MD  Performed: anesthesiologist   Anesthesiologist was present at the time of the procedure.  Preanesthetic Checklist  Completed: patient identified, site marked, surgical consent, pre-op evaluation, timeout performed, IV checked, risks and benefits discussed, monitors and equipment checked and anesthesia consent given  Indication   Indication: hemodynamic monitoring, vascular access, med administration     Anesthesia   general anesthesia    Central Line   Skin Prep: skin prepped with ChloraPrep, skin prep agent completely dried prior to procedure  maximum sterile barriers used during central venous catheter insertion  hand hygiene performed prior to central venous catheter insertion  Location: right, internal jugular.   Catheter type: triple lumen  Catheter Size: 7 Fr  Inserted Catheter Length: 16 cm  Ultrasound: vascular probe with ultrasound  Vessel Caliber: large, patent  Vascular Doppler:  not done, compressibility normal  Needle advanced into vessel with real time Ultrasound guidance.  Guidewire confirmed in vessel.  Image recorded and saved.  Manometry: none  Insertion Attempts: 1   Securement:line sutured    Post-Procedure   Adverse Events:none    Guidewire Guidewire removed intact.

## 2020-04-09 NOTE — ANESTHESIA PREPROCEDURE EVALUATION
04/09/2020  Sukhjinder Presley is a 54 y.o., male.    Anesthesia Evaluation    I have reviewed the Patient Summary Reports.    I have reviewed the Nursing Notes.   I have reviewed the Medications.     Review of Systems  Anesthesia Hx:  No problems with previous Anesthesia    Social:  Smoker    Hematology/Oncology:  Hematology Normal      Oncology Comments: Rectal cancer s/p neoadjuvant chemoradiation    Cardiovascular:  Cardiovascular Normal     Pulmonary:  Pulmonary Normal    Renal/:   renal calculi    Hepatic/GI:  Hepatic/GI Normal    Neurological:  Neurology Normal    Endocrine:  Endocrine Normal        Physical Exam  General:  Well nourished    Airway/Jaw/Neck:  Airway Findings: Mouth Opening: Normal Tongue: Normal  General Airway Assessment: Adult  Mallampati: II  TM Distance: 4 - 6 cm  Jaw/Neck Findings:  Neck ROM: Normal ROM      Dental:  Dental Findings: In tact   Chest/Lungs:  Chest/Lungs Findings: Clear to auscultation, Normal Respiratory Rate     Heart/Vascular:  Heart Findings: Rate: Normal  Rhythm: Regular Rhythm  Sounds: Normal        Mental Status:  Mental Status Findings:  Cooperative, Alert and Oriented         Anesthesia Plan  Type of Anesthesia, risks & benefits discussed:  Anesthesia Type:  general  Patient's Preference:   Intra-op Monitoring Plan: standard ASA monitors, central line and arterial line  Intra-op Monitoring Plan Comments:   Post Op Pain Control Plan: multimodal analgesia and per primary service following discharge from PACU  Post Op Pain Control Plan Comments:   Induction:   IV  Beta Blocker:  Patient is not currently on a Beta-Blocker (No further documentation required).       Informed Consent: Patient understands risks and agrees with Anesthesia plan.  Questions answered. Anesthesia consent signed with patient.  ASA Score: 2     Day of Surgery Review of History & Physical:   There are no significant changes.          Ready For Surgery From Anesthesia Perspective.     Patient Active Problem List   Diagnosis    Smoker    Polycythemia    Rectal bleeding    At risk for coronary artery disease    Rectal cancer    Kidney stone    Iron deficiency anemia due to chronic blood loss    Cachexia    Chemotherapy management, encounter for    Colostomy in place    Rectal fistula with localized perforation       Chemistry        Component Value Date/Time     03/24/2020 0759    K 4.5 03/24/2020 0759     03/24/2020 0759    CO2 25 03/24/2020 0759    BUN 11 03/24/2020 0759    CREATININE 0.8 03/24/2020 0759    GLU 87 03/24/2020 0759        Component Value Date/Time    CALCIUM 10.0 03/24/2020 0759    ALKPHOS 70 03/24/2020 0759    AST 19 03/24/2020 0759    ALT 15 03/24/2020 0759    BILITOT 0.5 03/24/2020 0759    ESTGFRAFRICA >60.0 03/24/2020 0759    EGFRNONAA >60.0 03/24/2020 0759        Lab Results   Component Value Date    WBC 6.34 03/24/2020    HGB 12.2 (L) 03/24/2020    HCT 41.2 03/24/2020    MCV 93 03/24/2020     03/24/2020

## 2020-04-09 NOTE — PROGRESS NOTES
Attempted to initiate Incentive Spirometry, patient sleeping and refused instruction at this time. Spirometer placed at patient's bedside for future instruct. No signs of distress at this time.

## 2020-04-09 NOTE — OP NOTE
[facility]  Surgery Department  Operative Note    SUMMARY     Date of Procedure: 4/9/2020     Procedure: Procedure(s) (LRB):  PROCTECTOMY, ABDOMINOPERINEAL (N/A)  CREATION, FLAP, MUSCLE ROTATION (Left)  FLAP GRAFT (N/A)     Surgeon(s) and Role:  Panel 1:     * Jan New MD - Primary  Panel 2:     * Sebastian Dan MD - Primary    Assisting Surgeon: None    Pre-Operative Diagnosis: Rectal cancer [C20]    Post-Operative Diagnosis: Post-Op Diagnosis Codes:     * Rectal cancer [C20]     * Malignant neoplasm of rectosigmoid junction [C19]    Anesthesia: General    Technical Procedures Used: VRAM flap for APR reconstruction    Description of the Findings of the Procedure: Mr. Presley is a pleasant 53 y/o man with rectal cancer s/p neoadjuvant chemoradiation.  At this point, he needs an APR along with left buttock debridement. He will need a flap for skin coverage following resection.  Options were discussed with the patient and Dr. New, and patient agreed to a VRAM along with a possible V-y buttock advancement flap if needed. RIsks, benefits, alternatives, and possible complications were discussed with the patient who agreed to proceed with surgery. We also discussed the current COVID situation, and at this point he needs to proceed with surgery as opposed to wait.  He understands the risks.    Description of operative report.    Morning of surgery, patient was seen examined and marked. Operative plan was reviewed with the patient along with markings.  Antibiotics and SCDS were begun in holding.  Consent was obtained again this morning.  Patient was then brought to the operating room and placed supine on the operating room table.  General endotracheal anesthesia was administered without difficulty.  Patient was then placed in lithotomy and prepped and draped steriley.  First, abdominal access was obtained.  A midline incision was performed and carried down through soft tissue to the linea  alba. Next, dissection was carried suprafascial to the medial row perforators along the planned flap design.  Flap was a VRAM flap measuring 6 x 20 cm along the right upper hemiabdomen.  Next cautery was used to open the anterior rectus sheath preserving perforators. Muscle was then carefully retracted laterally with combination of blunt dissection and cautery.  Next posterior fascia sheath was identified and a paramedian incision was made to enter the abdomen.  Dissection was carried down to the arcuate ligament and then across to the midline linea alba down to the pubic tubercle.  Lateral posterior sheath was sutured to medial border of the VRAM flap to prevent damage to the muscle perforators.  At this point Dr. New proceeded with the APR.  See his operative report for details.  While he was working at the anus, I elevated vertical rectus abdominis myocutaneous flap.  Using preoperative identified doppler perforators, a vertical elliptical skin island was centered over the rectus abdominus over the upper abdomen measuring approximately 20 x 6 cm.  The skin island was created with a 10 blade scalpel and dissection was carried down to the anterior rectus fascia using Bovie electric cautery.  As much fascia as possible was preserved on the abdominal wall and only the necessary fascia to encompass both the medial and lateral row perforators was resected with the flap.  The lateral border of the anterior rectus fascia was then incised, and the fascia was elevated off the muscle laterally.  At this point circumferential muscle was isolated superior to the flap.  At The subcostal margin, superior portion of the rectus abdominis was completely ligated with Bovie electrocautery.  Meticulous hemostasis was achieved with cautery.  Inferiorly to the skin island the fascia was split along the midline of the rectus sheath and the muscle was then carefully elevated from the rectus sheath preserving healthy skin island.   Posteriorly the muscle was elevated out of the sheath with electrocautery.  Inferiorly the fascia and muscle were elevated down all the way to the level of the deep inferior epigastric vessels.  These were identified posteriorly.  There are identified also laterally coming into the flap, however care was taken not to skeletonize the vessels.  Once the oncologic resection was complete, the flap was inspected.  Skin island appear to be healthy and well perfused.  Hemostasis was achieved of the flap and also the pelvis.  Posterior incision was then made in the posterior fascia inferiorly to allow for rotation of the flap down into the pelvis.  Flap was then rotated inferiorly without tension on the pedicle.  Skin island was easily brought in to fill in the defect and the defect was noted to be closed without additional gluteal fasciocutaneous flaps.  There is noted to be no tension on the pedicle on the flap appeared to be well perfused once inset.  Muscle was then sutured to the perineal floor to help stabilize the flap in the pelvis.  Skin island was debrided back to fill in the defect without difficulty.  Meticulous hemostasis was achieved with electrocautery.  Two 0 Vicryl was reused to reapproximate the VRAM muscle to the deep tissue both left and right perineum.  Additional 2-0 Vicryl was used to reapproximate the skin island flap to the medial left and right perineum.  Interrupted 3-0 Vicryl was used to reapproximate deep dermis loosely.  3-0 nylon was used and horizontal mattress stitch to inset the flap.  Flap appeared healthy well-perfused.  Prior to closing a 15 Congolese drain was placed in the pelvis and sutured in place with a 2-0 silk.  See Dr. New report for closure of the abdomen.  The skin island was large enough to bridge the defect and there was no need for a V-Y gluteal fasciocutaneous flap.  Patient tolerated procedure well and was brought to recovery in good condiiton.    Significant Surgical  Tasks Conducted by the Assistant(s), if Applicable: None    Complications: None    Estimated Blood Loss (EBL): 250 mL           Implants: None    Specimens:   Specimen (12h ago, onward)    None                  Condition: Good    Disposition: PACU - to floor    Attestation: I was present and scrubbed throughout the entire portion of my procedure.

## 2020-04-10 LAB
ANION GAP SERPL CALC-SCNC: 8 MMOL/L (ref 8–16)
BASOPHILS # BLD AUTO: 0.03 K/UL (ref 0–0.2)
BASOPHILS NFR BLD: 0.4 % (ref 0–1.9)
BUN SERPL-MCNC: 17 MG/DL (ref 6–20)
CALCIUM SERPL-MCNC: 7.6 MG/DL (ref 8.7–10.5)
CHLORIDE SERPL-SCNC: 107 MMOL/L (ref 95–110)
CO2 SERPL-SCNC: 23 MMOL/L (ref 23–29)
CREAT SERPL-MCNC: 0.9 MG/DL (ref 0.5–1.4)
DIFFERENTIAL METHOD: ABNORMAL
EOSINOPHIL # BLD AUTO: 0.1 K/UL (ref 0–0.5)
EOSINOPHIL NFR BLD: 0.6 % (ref 0–8)
ERYTHROCYTE [DISTWIDTH] IN BLOOD BY AUTOMATED COUNT: 15.9 % (ref 11.5–14.5)
EST. GFR  (AFRICAN AMERICAN): >60 ML/MIN/1.73 M^2
EST. GFR  (NON AFRICAN AMERICAN): >60 ML/MIN/1.73 M^2
GLUCOSE SERPL-MCNC: 113 MG/DL (ref 70–110)
HCT VFR BLD AUTO: 33.9 % (ref 40–54)
HGB BLD-MCNC: 10.7 G/DL (ref 14–18)
IMM GRANULOCYTES # BLD AUTO: 0.03 K/UL (ref 0–0.04)
IMM GRANULOCYTES NFR BLD AUTO: 0.4 % (ref 0–0.5)
LYMPHOCYTES # BLD AUTO: 1.3 K/UL (ref 1–4.8)
LYMPHOCYTES NFR BLD: 15.8 % (ref 18–48)
MAGNESIUM SERPL-MCNC: 1.4 MG/DL (ref 1.6–2.6)
MCH RBC QN AUTO: 28.1 PG (ref 27–31)
MCHC RBC AUTO-ENTMCNC: 31.6 G/DL (ref 32–36)
MCV RBC AUTO: 89 FL (ref 82–98)
MONOCYTES # BLD AUTO: 0.5 K/UL (ref 0.3–1)
MONOCYTES NFR BLD: 5.5 % (ref 4–15)
NEUTROPHILS # BLD AUTO: 6.4 K/UL (ref 1.8–7.7)
NEUTROPHILS NFR BLD: 77.3 % (ref 38–73)
NRBC BLD-RTO: 0 /100 WBC
PHOSPHATE SERPL-MCNC: 3.5 MG/DL (ref 2.7–4.5)
PLATELET # BLD AUTO: 192 K/UL (ref 150–350)
PMV BLD AUTO: 9.3 FL (ref 9.2–12.9)
POTASSIUM SERPL-SCNC: 4 MMOL/L (ref 3.5–5.1)
RBC # BLD AUTO: 3.81 M/UL (ref 4.6–6.2)
SODIUM SERPL-SCNC: 138 MMOL/L (ref 136–145)
WBC # BLD AUTO: 8.21 K/UL (ref 3.9–12.7)

## 2020-04-10 PROCEDURE — 84100 ASSAY OF PHOSPHORUS: CPT

## 2020-04-10 PROCEDURE — 36415 COLL VENOUS BLD VENIPUNCTURE: CPT

## 2020-04-10 PROCEDURE — 94760 N-INVAS EAR/PLS OXIMETRY 1: CPT

## 2020-04-10 PROCEDURE — 63600175 PHARM REV CODE 636 W HCPCS: Performed by: COLON & RECTAL SURGERY

## 2020-04-10 PROCEDURE — 94770 HC EXHALED C02 TEST: CPT

## 2020-04-10 PROCEDURE — 85025 COMPLETE CBC W/AUTO DIFF WBC: CPT

## 2020-04-10 PROCEDURE — 25000003 PHARM REV CODE 250: Performed by: COLON & RECTAL SURGERY

## 2020-04-10 PROCEDURE — 83735 ASSAY OF MAGNESIUM: CPT

## 2020-04-10 PROCEDURE — 80048 BASIC METABOLIC PNL TOTAL CA: CPT

## 2020-04-10 PROCEDURE — 99900035 HC TECH TIME PER 15 MIN (STAT)

## 2020-04-10 PROCEDURE — 99024 POSTOP FOLLOW-UP VISIT: CPT | Mod: ,,, | Performed by: COLON & RECTAL SURGERY

## 2020-04-10 PROCEDURE — 11000001 HC ACUTE MED/SURG PRIVATE ROOM

## 2020-04-10 PROCEDURE — 99024 PR POST-OP FOLLOW-UP VISIT: ICD-10-PCS | Mod: ,,, | Performed by: COLON & RECTAL SURGERY

## 2020-04-10 PROCEDURE — 94799 UNLISTED PULMONARY SVC/PX: CPT

## 2020-04-10 PROCEDURE — 63600175 PHARM REV CODE 636 W HCPCS: Performed by: SURGERY

## 2020-04-10 PROCEDURE — 25000003 PHARM REV CODE 250: Performed by: SURGERY

## 2020-04-10 RX ORDER — SODIUM CHLORIDE 450 MG/100ML
INJECTION, SOLUTION INTRAVENOUS CONTINUOUS
Status: DISCONTINUED | OUTPATIENT
Start: 2020-04-10 | End: 2020-04-11

## 2020-04-10 RX ORDER — ACETAMINOPHEN 325 MG/1
650 TABLET ORAL EVERY 6 HOURS PRN
Status: DISCONTINUED | OUTPATIENT
Start: 2020-04-10 | End: 2020-04-15 | Stop reason: HOSPADM

## 2020-04-10 RX ORDER — LANOLIN ALCOHOL/MO/W.PET/CERES
400 CREAM (GRAM) TOPICAL 2 TIMES DAILY
Status: DISCONTINUED | OUTPATIENT
Start: 2020-04-10 | End: 2020-04-15 | Stop reason: HOSPADM

## 2020-04-10 RX ADMIN — MORPHINE SULFATE: 1 INJECTION INTRAVENOUS at 10:04

## 2020-04-10 RX ADMIN — CYCLOBENZAPRINE 10 MG: 10 TABLET, FILM COATED ORAL at 09:04

## 2020-04-10 RX ADMIN — SENNOSIDES AND DOCUSATE SODIUM 1 TABLET: 8.6; 5 TABLET ORAL at 09:04

## 2020-04-10 RX ADMIN — SODIUM CHLORIDE: 0.45 INJECTION, SOLUTION INTRAVENOUS at 10:04

## 2020-04-10 RX ADMIN — GABAPENTIN 300 MG: 300 CAPSULE ORAL at 04:04

## 2020-04-10 RX ADMIN — ONDANSETRON 4 MG: 2 INJECTION INTRAMUSCULAR; INTRAVENOUS at 09:04

## 2020-04-10 RX ADMIN — GABAPENTIN 300 MG: 300 CAPSULE ORAL at 09:04

## 2020-04-10 RX ADMIN — CHLORHEXIDINE GLUCONATE 0.12% ORAL RINSE 10 ML: 1.2 LIQUID ORAL at 09:04

## 2020-04-10 RX ADMIN — ENOXAPARIN SODIUM 40 MG: 100 INJECTION SUBCUTANEOUS at 01:04

## 2020-04-10 RX ADMIN — CYCLOBENZAPRINE 10 MG: 10 TABLET, FILM COATED ORAL at 04:04

## 2020-04-10 RX ADMIN — Medication 400 MG: at 09:04

## 2020-04-10 RX ADMIN — PANTOPRAZOLE SODIUM 40 MG: 40 TABLET, DELAYED RELEASE ORAL at 05:04

## 2020-04-10 RX ADMIN — SODIUM CHLORIDE, SODIUM LACTATE, POTASSIUM CHLORIDE, AND CALCIUM CHLORIDE 1000 ML: .6; .31; .03; .02 INJECTION, SOLUTION INTRAVENOUS at 07:04

## 2020-04-10 RX ADMIN — ACETAMINOPHEN 650 MG: 325 TABLET ORAL at 07:04

## 2020-04-10 RX ADMIN — Medication 400 MG: at 10:04

## 2020-04-10 NOTE — PROGRESS NOTES
[facility]  Colorectal Surgery  Progress Note    Subjective:     History of Present Illness:  54-year-old male with rectal adenocarcinoma who developed a rectal perforation while on chemotherapy during his neoadjuvant treatment who presents for definitive surgical resection    Post-Op Info:  Procedure(s) (LRB):  PROCTECTOMY, ABDOMINOPERINEAL (N/A)  CREATION, FLAP, MUSCLE ROTATION (Left)  FLAP GRAFT (N/A)   1 Day Post-Op     Interval History: HD stable overnight. Pain well controlled. Tolerating clears without nausea or vomiting. Remains on bedrest per plastics instructions.    Medications:  Continuous Infusions:   lactated ringers 100 mL/hr at 04/09/20 1501    morphine       Scheduled Meds:   acetaminophen  1,000 mg Intravenous Q8H    chlorhexidine  10 mL Mouth/Throat BID    cyclobenzaprine  10 mg Oral TID    enoxaparin  40 mg Subcutaneous Daily    gabapentin  300 mg Oral TID    nozaseptin   Each Nostril BID    pantoprazole  40 mg Oral Before breakfast    senna-docusate 8.6-50 mg  1 tablet Oral BID     PRN Meds:diphenhydrAMINE, naloxone, ondansetron, promethazine (PHENERGAN) IVPB     Review of patient's allergies indicates:  No Known Allergies  Objective:     Vital Signs (Most Recent):  Temp: 99.2 °F (37.3 °C) (04/10/20 0731)  Pulse: 103 (04/10/20 0826)  Resp: 11 (04/10/20 0826)  BP: 111/66 (04/10/20 0731)  SpO2: 96 % (04/10/20 0826) Vital Signs (24h Range):  Temp:  [97 °F (36.1 °C)-99.2 °F (37.3 °C)] 99.2 °F (37.3 °C)  Pulse:  [] 103  Resp:  [11-20] 11  SpO2:  [91 %-100 %] 96 %  BP: ()/(55-83) 111/66  Arterial Line BP: (101-148)/(57-78) 123/67     Weight: 72.6 kg (160 lb 0.9 oz)  Body mass index is 21.71 kg/m².    Intake/Output - Last 3 Shifts       04/08 0700 - 04/09 0659 04/09 0700 - 04/10 0659 04/10 0700 - 04/11 0659    P.O.  80     I.V. (mL/kg)  3349.4 (46.1)     Blood  500     IV Piggyback  200     Total Intake(mL/kg)  4129.4 (56.9)     Urine (mL/kg/hr)  650 (0.4)      Drains  330     Stool  0     Blood  250     Total Output  1230     Net  +2899.4                  Physical Exam   Constitutional: He is oriented to person, place, and time. He appears well-developed.   HENT:   Head: Normocephalic and atraumatic.   Eyes: Conjunctivae and EOM are normal.   Neck: Normal range of motion. No thyromegaly present.   Cardiovascular: Normal rate and regular rhythm.   Pulmonary/Chest: Effort normal. No respiratory distress.   Abdominal:   Soft, nondistended, appropriately TTP; incision c/d/i without erythema; ROBERT drains with SS output in bulbs   Genitourinary:   Genitourinary Comments: Perineal flap perfused and viable with healthy appearing skin paddle/edges   Musculoskeletal: Normal range of motion. He exhibits no edema or tenderness.   Neurological: He is alert and oriented to person, place, and time.   Skin: Skin is warm and dry. Capillary refill takes less than 2 seconds. No rash noted.   Psychiatric: He has a normal mood and affect.       Significant Labs:  CBC:   Recent Labs   Lab 04/10/20  0423   WBC 8.21   RBC 3.81*   HGB 10.7*   HCT 33.9*      MCV 89   MCH 28.1   MCHC 31.6*     BMP:   Recent Labs   Lab 04/10/20  0423   *      K 4.0      CO2 23   BUN 17   CREATININE 0.9   CALCIUM 7.6*   MG 1.4*     Assessment/Plan:     * Rectal cancer  55yo M with perforated rectal adenocarcinoma who is now s/p APR with extra-anatomic resection of fistula tract and partial left gluteus muscle and VRAM by plastics on 4/9/2020    - Cont CLD for now until more ostomy output  - Bedrest per plastics x 1 more day. Ambulation starting tomorrow  - Serial flap checks  - Cont IVF  - Cont PCA for pain control, as well as tylenol and gabapentin  - Cont ROBERT drains to bulb sxn  - IS 10xs/hr while awake  - PPx: PPI and LVNX    Smoker  Encouraged smoking cessation. Will attempt no nicotine patch given flap        Jan New MD  Colorectal Surgery  [facility]

## 2020-04-10 NOTE — HOSPITAL COURSE
04/09/2020: Underwent APR with extra-anatomic resection of fistula tract/partial left gluteus muscle and VRAM by plastics    04/10/2020: POD1. HD stable overnight. Pain well controlled. Tolerating clears without nausea or vomiting. Remains on bedrest per plastics instructions.    04/11/2020 POD2.  Febrile and tachycardic yesterday.  Reports some emesis overnight, plan for ambulation today    04/12/2020 POD3. Tachycardia improving, afebrile, emesis yesterday improving    04/13/2020 POD4. Still with some nausea/vomiting. Ambulating well. Pain well controlled. Minimal stoma output.    04/14/2020 POD5. Nausea and vomiting resolved. Tolerating CLD well. Increased stoma output. Pain well controlled. Ambulating well.    04/15/2020 POD6. Tolerating regular diet. Stoma output normalized. Ambulating well. Pain well controlled. Flap remains viable and wellperfused. Ready for discharge home.

## 2020-04-10 NOTE — SUBJECTIVE & OBJECTIVE
Interval History: HD stable overnight. Pain well controlled. Tolerating clears without nausea or vomiting. Remains on bedrest per plastics instructions.    Medications:  Continuous Infusions:   lactated ringers 100 mL/hr at 04/09/20 1501    morphine       Scheduled Meds:   acetaminophen  1,000 mg Intravenous Q8H    chlorhexidine  10 mL Mouth/Throat BID    cyclobenzaprine  10 mg Oral TID    enoxaparin  40 mg Subcutaneous Daily    gabapentin  300 mg Oral TID    nozaseptin   Each Nostril BID    pantoprazole  40 mg Oral Before breakfast    senna-docusate 8.6-50 mg  1 tablet Oral BID     PRN Meds:diphenhydrAMINE, naloxone, ondansetron, promethazine (PHENERGAN) IVPB     Review of patient's allergies indicates:  No Known Allergies  Objective:     Vital Signs (Most Recent):  Temp: 99.2 °F (37.3 °C) (04/10/20 0731)  Pulse: 103 (04/10/20 0826)  Resp: 11 (04/10/20 0826)  BP: 111/66 (04/10/20 0731)  SpO2: 96 % (04/10/20 0826) Vital Signs (24h Range):  Temp:  [97 °F (36.1 °C)-99.2 °F (37.3 °C)] 99.2 °F (37.3 °C)  Pulse:  [] 103  Resp:  [11-20] 11  SpO2:  [91 %-100 %] 96 %  BP: ()/(55-83) 111/66  Arterial Line BP: (101-148)/(57-78) 123/67     Weight: 72.6 kg (160 lb 0.9 oz)  Body mass index is 21.71 kg/m².    Intake/Output - Last 3 Shifts       04/08 0700 - 04/09 0659 04/09 0700 - 04/10 0659 04/10 0700 - 04/11 0659    P.O.  80     I.V. (mL/kg)  3349.4 (46.1)     Blood  500     IV Piggyback  200     Total Intake(mL/kg)  4129.4 (56.9)     Urine (mL/kg/hr)  650 (0.4)     Drains  330     Stool  0     Blood  250     Total Output  1230     Net  +2899.4                  Physical Exam   Constitutional: He is oriented to person, place, and time. He appears well-developed.   HENT:   Head: Normocephalic and atraumatic.   Eyes: Conjunctivae and EOM are normal.   Neck: Normal range of motion. No thyromegaly present.   Cardiovascular: Normal rate and regular rhythm.   Pulmonary/Chest: Effort normal. No respiratory  distress.   Abdominal:   Soft, nondistended, appropriately TTP; incision c/d/i without erythema; ROBERT drains with SS output in bulbs   Genitourinary:   Genitourinary Comments: Perineal flap perfused and viable with healthy appearing skin paddle/edges   Musculoskeletal: Normal range of motion. He exhibits no edema or tenderness.   Neurological: He is alert and oriented to person, place, and time.   Skin: Skin is warm and dry. Capillary refill takes less than 2 seconds. No rash noted.   Psychiatric: He has a normal mood and affect.       Significant Labs:  CBC:   Recent Labs   Lab 04/10/20  0423   WBC 8.21   RBC 3.81*   HGB 10.7*   HCT 33.9*      MCV 89   MCH 28.1   MCHC 31.6*     BMP:   Recent Labs   Lab 04/10/20  0423   *      K 4.0      CO2 23   BUN 17   CREATININE 0.9   CALCIUM 7.6*   MG 1.4*

## 2020-04-10 NOTE — HPI
54-year-old male with rectal adenocarcinoma who developed a rectal perforation while on chemotherapy during his neoadjuvant treatment who presents for definitive surgical resection

## 2020-04-10 NOTE — PLAN OF CARE
Remains free from injury. PCA in use for pain control. Monitoring drains and incision dressings. No output from colostomy. Fluids running as ordered. Vital signs stable. Chart reviewed. Will continue to monitor.

## 2020-04-10 NOTE — PROGRESS NOTES
[facility]   General Surgery  Progress Note    Subjective:     Interval History: No problems overnight.  Patient currently on PCA. On bed rest.  Cooney in place.    Post-Op Info:  Procedure(s) (LRB):  PROCTECTOMY, ABDOMINOPERINEAL (N/A)  CREATION, FLAP, MUSCLE ROTATION (Left)  FLAP GRAFT (N/A)   1 Day Post-Op      Medications:  Continuous Infusions:   sodium chloride 0.45%      morphine       Scheduled Meds:   acetaminophen  1,000 mg Intravenous Q8H    chlorhexidine  10 mL Mouth/Throat BID    cyclobenzaprine  10 mg Oral TID    enoxaparin  40 mg Subcutaneous Daily    gabapentin  300 mg Oral TID    magnesium oxide  400 mg Oral BID    nozaseptin   Each Nostril BID    pantoprazole  40 mg Oral Before breakfast    senna-docusate 8.6-50 mg  1 tablet Oral BID     PRN Meds:diphenhydrAMINE, naloxone, ondansetron, promethazine (PHENERGAN) IVPB     Objective:     Vital Signs (Most Recent):  Temp: 99.2 °F (37.3 °C) (04/10/20 0731)  Pulse: 103 (04/10/20 0826)  Resp: 11 (04/10/20 0826)  BP: 111/66 (04/10/20 0731)  SpO2: 96 % (04/10/20 0826) Vital Signs (24h Range):  Temp:  [97 °F (36.1 °C)-99.2 °F (37.3 °C)] 99.2 °F (37.3 °C)  Pulse:  [] 103  Resp:  [11-20] 11  SpO2:  [91 %-100 %] 96 %  BP: ()/(55-83) 111/66  Arterial Line BP: (101-148)/(57-78) 123/67       Intake/Output Summary (Last 24 hours) at 4/10/2020 0936  Last data filed at 4/10/2020 0917  Gross per 24 hour   Intake 3129.37 ml   Output 1260 ml   Net 1869.37 ml       Gen - AO x 3, NAD  Abd - soft, NT, dressing in place  Pelvis - flap well perfused, soft, no sign of infection or fluid collection    Significant Labs:  H/H noted.  Appropriate drop following surgery.  Continue to monitor    Significant Diagnostics:  none    Assessment/Plan:     Active Diagnoses:    Diagnosis Date Noted POA    PRINCIPAL PROBLEM:  Rectal cancer [C20] 06/24/2019 Yes    Smoker [F17.200] 05/27/2019 Yes      Problems Resolved During this Admission:     S/p APR  with VRAM flap  1. Continue bed rest today with turning q 2 hours  2. Abdominal binder today  3. Tomorrow, will begin ambulation  4. Flap looks great at this time, continue to monitor    Sebastian Dan MD  General Surgery  [facility]

## 2020-04-10 NOTE — ASSESSMENT & PLAN NOTE
53yo M with perforated rectal adenocarcinoma who is now s/p APR with extra-anatomic resection of fistula tract and partial left gluteus muscle and VRAM by plastics on 4/9/2020    - Cont CLD for now until more ostomy output  - Bedrest per plastics x 1 more day. Ambulation starting tomorrow  - Serial flap checks  - Cont IVF  - Cont PCA for pain control, as well as tylenol and gabapentin  - Cont ROBERT drains to bulb sxn  - IS 10xs/hr while awake  - PPx: PPI and LVNX

## 2020-04-10 NOTE — PLAN OF CARE
Spoke with patient's wife, Adelaida Presley as patient did not answer his phone.  Patient lives with wife and 3 dependent children.  He was independent with adls before hospital admit.  Patient has no home health equiment currently.  Patient's wife states she had the same procedure a month ago and she feels he will need a walker to go home with.  Patient has used [facility] in the past and would like to resume if required.  Preference signed.  Patient does not have an AD/LW    D/c plan: home  D/c transportation:  Wife  Preferred Pharmacy:  [facility]  Bedside Pharmacy Delivery: Yes  [facility]: Portal Active  PCP: Drake Elizalde MD       04/10/20 8568   Discharge Assessment   Assessment Type Discharge Planning Assessment   Confirmed/corrected address and phone number on facesheet? Yes   Assessment information obtained from? Caregiver   Expected Length of Stay (days)   (tbd)   Communicated expected length of stay with patient/caregiver yes   Prior to hospitilization cognitive status: Alert/Oriented   Prior to hospitalization functional status: Independent   Current cognitive status: Alert/Oriented   Current Functional Status: Independent   Lives With child(gustavo), dependent;spouse   Is patient able to care for self after discharge? Unable to determine at this time (comments)   Who are your caregiver(s) and their phone number(s)? Adelaida Presley, spouse [phone]   Patient's perception of discharge disposition home or selfcare   Readmission Within the Last 30 Days no previous admission in last 30 days   Patient currently being followed by outpatient case management? No   Patient currently receives any other outside agency services? No   Equipment Currently Used at Home none   Do you have any problems affording any of your prescribed medications? No   Is the patient taking medications as prescribed? yes   Does the patient have transportation home? Yes   Transportation Anticipated family or friend will  provide   Does the patient receive services at the [facility]? No   Discharge Plan A Home with family   Discharge Plan B Home Health   DME Needed Upon Discharge  walker, rolling   Patient/Family in Agreement with Plan yes

## 2020-04-11 LAB
ANION GAP SERPL CALC-SCNC: 13 MMOL/L (ref 8–16)
BASOPHILS # BLD AUTO: 0.03 K/UL (ref 0–0.2)
BASOPHILS NFR BLD: 0.3 % (ref 0–1.9)
BUN SERPL-MCNC: 11 MG/DL (ref 6–20)
CALCIUM SERPL-MCNC: 9.4 MG/DL (ref 8.7–10.5)
CHLORIDE SERPL-SCNC: 98 MMOL/L (ref 95–110)
CO2 SERPL-SCNC: 26 MMOL/L (ref 23–29)
CREAT SERPL-MCNC: 0.8 MG/DL (ref 0.5–1.4)
DIFFERENTIAL METHOD: ABNORMAL
EOSINOPHIL # BLD AUTO: 0.1 K/UL (ref 0–0.5)
EOSINOPHIL NFR BLD: 0.6 % (ref 0–8)
ERYTHROCYTE [DISTWIDTH] IN BLOOD BY AUTOMATED COUNT: 15.6 % (ref 11.5–14.5)
EST. GFR  (AFRICAN AMERICAN): >60 ML/MIN/1.73 M^2
EST. GFR  (NON AFRICAN AMERICAN): >60 ML/MIN/1.73 M^2
GLUCOSE SERPL-MCNC: 141 MG/DL (ref 70–110)
HCT VFR BLD AUTO: 38.4 % (ref 40–54)
HGB BLD-MCNC: 12.3 G/DL (ref 14–18)
IMM GRANULOCYTES # BLD AUTO: 0.03 K/UL (ref 0–0.04)
IMM GRANULOCYTES NFR BLD AUTO: 0.3 % (ref 0–0.5)
LYMPHOCYTES # BLD AUTO: 0.9 K/UL (ref 1–4.8)
LYMPHOCYTES NFR BLD: 8.9 % (ref 18–48)
MAGNESIUM SERPL-MCNC: 1.7 MG/DL (ref 1.6–2.6)
MCH RBC QN AUTO: 28 PG (ref 27–31)
MCHC RBC AUTO-ENTMCNC: 32 G/DL (ref 32–36)
MCV RBC AUTO: 88 FL (ref 82–98)
MONOCYTES # BLD AUTO: 0.6 K/UL (ref 0.3–1)
MONOCYTES NFR BLD: 5.3 % (ref 4–15)
NEUTROPHILS # BLD AUTO: 8.9 K/UL (ref 1.8–7.7)
NEUTROPHILS NFR BLD: 84.6 % (ref 38–73)
NRBC BLD-RTO: 0 /100 WBC
PHOSPHATE SERPL-MCNC: 3.1 MG/DL (ref 2.7–4.5)
PLATELET # BLD AUTO: 204 K/UL (ref 150–350)
PMV BLD AUTO: 8.9 FL (ref 9.2–12.9)
POTASSIUM SERPL-SCNC: 3.8 MMOL/L (ref 3.5–5.1)
RBC # BLD AUTO: 4.39 M/UL (ref 4.6–6.2)
SODIUM SERPL-SCNC: 137 MMOL/L (ref 136–145)
WBC # BLD AUTO: 10.55 K/UL (ref 3.9–12.7)

## 2020-04-11 PROCEDURE — 63600175 PHARM REV CODE 636 W HCPCS: Performed by: COLON & RECTAL SURGERY

## 2020-04-11 PROCEDURE — 84100 ASSAY OF PHOSPHORUS: CPT

## 2020-04-11 PROCEDURE — 83735 ASSAY OF MAGNESIUM: CPT

## 2020-04-11 PROCEDURE — 25000003 PHARM REV CODE 250: Performed by: COLON & RECTAL SURGERY

## 2020-04-11 PROCEDURE — 94761 N-INVAS EAR/PLS OXIMETRY MLT: CPT

## 2020-04-11 PROCEDURE — 36415 COLL VENOUS BLD VENIPUNCTURE: CPT

## 2020-04-11 PROCEDURE — 25000003 PHARM REV CODE 250: Performed by: SURGERY

## 2020-04-11 PROCEDURE — 11000001 HC ACUTE MED/SURG PRIVATE ROOM

## 2020-04-11 PROCEDURE — 85025 COMPLETE CBC W/AUTO DIFF WBC: CPT

## 2020-04-11 PROCEDURE — 80048 BASIC METABOLIC PNL TOTAL CA: CPT

## 2020-04-11 PROCEDURE — 99900035 HC TECH TIME PER 15 MIN (STAT)

## 2020-04-11 PROCEDURE — 94799 UNLISTED PULMONARY SVC/PX: CPT

## 2020-04-11 PROCEDURE — 27000221 HC OXYGEN, UP TO 24 HOURS

## 2020-04-11 RX ORDER — MORPHINE SULFATE 4 MG/ML
4 INJECTION, SOLUTION INTRAMUSCULAR; INTRAVENOUS EVERY 4 HOURS PRN
Status: DISCONTINUED | OUTPATIENT
Start: 2020-04-11 | End: 2020-04-14

## 2020-04-11 RX ORDER — DEXTROSE MONOHYDRATE, SODIUM CHLORIDE, AND POTASSIUM CHLORIDE 50; 1.49; 4.5 G/1000ML; G/1000ML; G/1000ML
INJECTION, SOLUTION INTRAVENOUS CONTINUOUS
Status: DISCONTINUED | OUTPATIENT
Start: 2020-04-11 | End: 2020-04-14

## 2020-04-11 RX ORDER — MORPHINE SULFATE 2 MG/ML
2 INJECTION, SOLUTION INTRAMUSCULAR; INTRAVENOUS
Status: DISCONTINUED | OUTPATIENT
Start: 2020-04-11 | End: 2020-04-14

## 2020-04-11 RX ORDER — TAMSULOSIN HYDROCHLORIDE 0.4 MG/1
0.4 CAPSULE ORAL DAILY
Status: DISCONTINUED | OUTPATIENT
Start: 2020-04-12 | End: 2020-04-15 | Stop reason: HOSPADM

## 2020-04-11 RX ADMIN — DEXTROSE MONOHYDRATE, SODIUM CHLORIDE, AND POTASSIUM CHLORIDE: 50; 4.5; 1.49 INJECTION, SOLUTION INTRAVENOUS at 07:04

## 2020-04-11 RX ADMIN — PROMETHAZINE HYDROCHLORIDE 6.25 MG: 25 INJECTION INTRAMUSCULAR; INTRAVENOUS at 12:04

## 2020-04-11 RX ADMIN — SODIUM CHLORIDE: 0.45 INJECTION, SOLUTION INTRAVENOUS at 12:04

## 2020-04-11 RX ADMIN — PROMETHAZINE HYDROCHLORIDE 6.25 MG: 25 INJECTION INTRAMUSCULAR; INTRAVENOUS at 09:04

## 2020-04-11 RX ADMIN — PANTOPRAZOLE SODIUM 40 MG: 40 TABLET, DELAYED RELEASE ORAL at 05:04

## 2020-04-11 RX ADMIN — ONDANSETRON 4 MG: 2 INJECTION INTRAMUSCULAR; INTRAVENOUS at 05:04

## 2020-04-11 RX ADMIN — DEXTROSE MONOHYDRATE, SODIUM CHLORIDE, AND POTASSIUM CHLORIDE: 50; 4.5; 1.49 INJECTION, SOLUTION INTRAVENOUS at 11:04

## 2020-04-11 RX ADMIN — ENOXAPARIN SODIUM 40 MG: 100 INJECTION SUBCUTANEOUS at 05:04

## 2020-04-11 NOTE — NURSING
Patient unable to void. Informed Dr. Morrissey. States to replace liz and start Flomax in AM. Orders placed.

## 2020-04-11 NOTE — SUBJECTIVE & OBJECTIVE
Interval History:  Febrile and tachycardic yesterday.  Reports some emesis overnight, plan for ambulation today    Medications:  Continuous Infusions:   sodium chloride 0.45% 50 mL/hr at 04/11/20 0059    morphine       Scheduled Meds:   chlorhexidine  10 mL Mouth/Throat BID    cyclobenzaprine  10 mg Oral TID    enoxaparin  40 mg Subcutaneous Daily    gabapentin  300 mg Oral TID    magnesium oxide  400 mg Oral BID    nozaseptin   Each Nostril BID    pantoprazole  40 mg Oral Before breakfast    senna-docusate 8.6-50 mg  1 tablet Oral BID     PRN Meds:acetaminophen, diphenhydrAMINE, naloxone, ondansetron, promethazine (PHENERGAN) IVPB     Review of patient's allergies indicates:  No Known Allergies  Objective:     Vital Signs (Most Recent):  Temp: 98.3 °F (36.8 °C) (04/11/20 0737)  Pulse: 100 (04/11/20 0800)  Resp: 20 (04/11/20 0800)  BP: (!) 144/86 (04/11/20 0737)  SpO2: 96 % (04/11/20 0800) Vital Signs (24h Range):  Temp:  [98.3 °F (36.8 °C)-102.3 °F (39.1 °C)] 98.3 °F (36.8 °C)  Pulse:  [100-117] 100  Resp:  [13-20] 20  SpO2:  [94 %-98 %] 96 %  BP: (116-162)/() 144/86     Weight: 72.6 kg (160 lb 0.9 oz)  Body mass index is 21.71 kg/m².    Intake/Output - Last 3 Shifts       04/09 0700 - 04/10 0659 04/10 0700 - 04/11 0659 04/11 0700 - 04/12 0659    P.O. 80 530 360    I.V. (mL/kg) 3349.4 (46.1) 975.7 (13.4)     Blood 500      IV Piggyback 200 50     Total Intake(mL/kg) 4129.4 (56.9) 1555.7 (21.4) 360 (5)    Urine (mL/kg/hr) 650 (0.4) 2250 (1.3)     Emesis/NG output  0     Drains 330 230     Stool 0 0     Blood 250      Total Output 1230 2480     Net +2899.4 -924.3 +360           Emesis Occurrence  2 x           Physical Exam   Constitutional: He is oriented to person, place, and time. He appears well-developed.   HENT:   Head: Normocephalic and atraumatic.   Eyes: Conjunctivae and EOM are normal.   Neck: Normal range of motion. No thyromegaly present.   Cardiovascular: Normal rate and regular rhythm.    Pulmonary/Chest: Effort normal. No respiratory distress.   Abdominal:   Soft, nondistended, appropriately TTP; incision c/d/i without erythema; ROBERT drains with SS output in bulbs  Ostomy pain/viable no output   Genitourinary:   Genitourinary Comments: Perineal flap perfused and viable with healthy appearing skin paddle/edges   Musculoskeletal: Normal range of motion. He exhibits no edema or tenderness.   Neurological: He is alert and oriented to person, place, and time.   Skin: Skin is warm and dry. Capillary refill takes less than 2 seconds. No rash noted.   Psychiatric: He has a normal mood and affect.       Significant Labs:  CBC:   Recent Labs   Lab 04/11/20  0433   WBC 10.55   RBC 4.39*   HGB 12.3*   HCT 38.4*      MCV 88   MCH 28.0   MCHC 32.0     BMP:   Recent Labs   Lab 04/11/20  0433   *      K 3.8   CL 98   CO2 26   BUN 11   CREATININE 0.8   CALCIUM 9.4   MG 1.7

## 2020-04-11 NOTE — PROGRESS NOTES
[facility] -   General Surgery  Progress Note    Subjective:     History of Present Illness:  54-year-old male with rectal adenocarcinoma who developed a rectal perforation while on chemotherapy during his neoadjuvant treatment who presents for definitive surgical resection    Post-Op Info:  Procedure(s) (LRB):  PROCTECTOMY, ABDOMINOPERINEAL (N/A)  CREATION, FLAP, MUSCLE ROTATION (Left)  FLAP GRAFT (N/A)   2 Days Post-Op     Interval History:  Febrile and tachycardic yesterday.  Reports some emesis overnight, plan for ambulation today    Medications:  Continuous Infusions:   sodium chloride 0.45% 50 mL/hr at 04/11/20 0059    morphine       Scheduled Meds:   chlorhexidine  10 mL Mouth/Throat BID    cyclobenzaprine  10 mg Oral TID    enoxaparin  40 mg Subcutaneous Daily    gabapentin  300 mg Oral TID    magnesium oxide  400 mg Oral BID    nozaseptin   Each Nostril BID    pantoprazole  40 mg Oral Before breakfast    senna-docusate 8.6-50 mg  1 tablet Oral BID     PRN Meds:acetaminophen, diphenhydrAMINE, naloxone, ondansetron, promethazine (PHENERGAN) IVPB     Review of patient's allergies indicates:  No Known Allergies  Objective:     Vital Signs (Most Recent):  Temp: 98.3 °F (36.8 °C) (04/11/20 0737)  Pulse: 100 (04/11/20 0800)  Resp: 20 (04/11/20 0800)  BP: (!) 144/86 (04/11/20 0737)  SpO2: 96 % (04/11/20 0800) Vital Signs (24h Range):  Temp:  [98.3 °F (36.8 °C)-102.3 °F (39.1 °C)] 98.3 °F (36.8 °C)  Pulse:  [100-117] 100  Resp:  [13-20] 20  SpO2:  [94 %-98 %] 96 %  BP: (116-162)/() 144/86     Weight: 72.6 kg (160 lb 0.9 oz)  Body mass index is 21.71 kg/m².    Intake/Output - Last 3 Shifts       04/09 0700 - 04/10 0659 04/10 0700 - 04/11 0659 04/11 0700 - 04/12 0659    P.O. 80 530 360    I.V. (mL/kg) 3349.4 (46.1) 975.7 (13.4)     Blood 500      IV Piggyback 200 50     Total Intake(mL/kg) 4129.4 (56.9) 1555.7 (21.4) 360 (5)    Urine (mL/kg/hr) 650 (0.4) 2250 (1.3)     Emesis/NG output  0      Drains 330 230     Stool 0 0     Blood 250      Total Output 1230 2480     Net +2899.4 -924.3 +360           Emesis Occurrence  2 x           Physical Exam   Constitutional: He is oriented to person, place, and time. He appears well-developed.   HENT:   Head: Normocephalic and atraumatic.   Eyes: Conjunctivae and EOM are normal.   Neck: Normal range of motion. No thyromegaly present.   Cardiovascular: Normal rate and regular rhythm.   Pulmonary/Chest: Effort normal. No respiratory distress.   Abdominal:   Soft, nondistended, appropriately TTP; incision c/d/i without erythema; ROBERT drains with SS output in bulbs  Ostomy pain/viable no output   Genitourinary:   Genitourinary Comments: Perineal flap perfused and viable with healthy appearing skin paddle/edges   Musculoskeletal: Normal range of motion. He exhibits no edema or tenderness.   Neurological: He is alert and oriented to person, place, and time.   Skin: Skin is warm and dry. Capillary refill takes less than 2 seconds. No rash noted.   Psychiatric: He has a normal mood and affect.       Significant Labs:  CBC:   Recent Labs   Lab 04/11/20  0433   WBC 10.55   RBC 4.39*   HGB 12.3*   HCT 38.4*      MCV 88   MCH 28.0   MCHC 32.0     BMP:   Recent Labs   Lab 04/11/20  0433   *      K 3.8   CL 98   CO2 26   BUN 11   CREATININE 0.8   CALCIUM 9.4   MG 1.7     Assessment/Plan:     * Rectal cancer  55yo M with perforated rectal adenocarcinoma who is now s/p APR with extra-anatomic resection of fistula tract and partial left gluteus muscle and VRAM by plastics on 4/9/2020    - Cont CLD for now until more ostomy output and emesis resolved  - Ambulation starting today  - Serial flap checks  - Cont IVF  - Cont PCA for pain control, as well as tylenol and gabapentin  - Cont ROBERT drains to bulb sxn  - IS 10xs/hr while awake; pulmonary toileting  - PPx: PPI and LVNX  - monitor fevers/WBC, if continues to have will further evaluate/ABX however this is likely  due to atelectasis/immobilization as it was shortly after surgery postop day 1    Smoker  Encouraged smoking cessation. Will attempt no nicotine patch given flap  Pulmonary toileting        Yuriy Morrissey MD  General Surgery  [facility]

## 2020-04-11 NOTE — NURSING
"Educated patient about catheter placement. 16 Fr liz placed per orders. CAUTI measures in place. Patient tolerated well.     Continuing to vomit green liquid emesis. Informed Dr. Morrissey and suggested NGT. Dr. Morrissey stated if patient will allow an NGT to be placed to decompress, then place one. If not, will monitor, patient has ileus. Informed patient of Dr. Morrissey's response. Patient states "I do not want it right now, I think it is calming down. I will see how tonight goes and if it gets worse, I will want an NGT."      "

## 2020-04-11 NOTE — ASSESSMENT & PLAN NOTE
53yo M with perforated rectal adenocarcinoma who is now s/p APR with extra-anatomic resection of fistula tract and partial left gluteus muscle and VRAM by plastics on 4/9/2020    - Cont CLD for now until more ostomy output and emesis resolved  - Ambulation starting today  - Serial flap checks  - Cont IVF  - Cont PCA for pain control, as well as tylenol and gabapentin  - Cont ROBERT drains to bulb sxn  - IS 10xs/hr while awake; pulmonary toileting  - PPx: PPI and LVNX  - monitor fevers/WBC, if continues to have will further evaluate/ABX however this is likely due to atelectasis/immobilization as it was shortly after surgery postop day 1

## 2020-04-11 NOTE — PLAN OF CARE
No adverse events during shift.  Remained free of falls. Call light in reach. Side rails x 2. Pain well tolerated w/ prn meds. Pt repositions with assistance. IVF administered as ordered. Incisions CDI, approximated with staples. ABD pad changed per order. Cooney initiated d/t urinary retention. CAUTI measures initiated and maintained. VSS. Chart reviewed, will continue to monitor.

## 2020-04-11 NOTE — PROGRESS NOTES
[facility] -   General Surgery  Progress Note    Subjective:     Interval History: Patient with emesis overnight and fever.  Still on bed rest. Cooney removed this am.  Patient did not receive Abdominal binder as ordered.  Feels ok otherwise.    Post-Op Info:  Procedure(s) (LRB):  PROCTECTOMY, ABDOMINOPERINEAL (N/A)  CREATION, FLAP, MUSCLE ROTATION (Left)  FLAP GRAFT (N/A)   2 Days Post-Op      Medications:  Continuous Infusions:   sodium chloride 0.45% 50 mL/hr at 04/11/20 0059    morphine       Scheduled Meds:   chlorhexidine  10 mL Mouth/Throat BID    cyclobenzaprine  10 mg Oral TID    enoxaparin  40 mg Subcutaneous Daily    gabapentin  300 mg Oral TID    magnesium oxide  400 mg Oral BID    nozaseptin   Each Nostril BID    pantoprazole  40 mg Oral Before breakfast    senna-docusate 8.6-50 mg  1 tablet Oral BID     PRN Meds:acetaminophen, diphenhydrAMINE, naloxone, ondansetron, promethazine (PHENERGAN) IVPB     Objective:     Vital Signs (Most Recent):  Temp: 98.3 °F (36.8 °C) (04/11/20 0737)  Pulse: 100 (04/11/20 0800)  Resp: 20 (04/11/20 0800)  BP: (!) 144/86 (04/11/20 0737)  SpO2: 96 % (04/11/20 0800) Vital Signs (24h Range):  Temp:  [98.3 °F (36.8 °C)-102.3 °F (39.1 °C)] 98.3 °F (36.8 °C)  Pulse:  [100-117] 100  Resp:  [13-20] 20  SpO2:  [94 %-98 %] 96 %  BP: (116-162)/() 144/86       Intake/Output Summary (Last 24 hours) at 4/11/2020 1105  Last data filed at 4/11/2020 1034  Gross per 24 hour   Intake 1907.67 ml   Output 2450 ml   Net -542.33 ml       Gen - AO x 3, NAD, in bed on side  Abd - incision - C/D/I, no sign of infection or fluid collection  Perineum - flap warm, well perfused, no sign of infection or fluid collection  ROBERT drains with appropriate serosanguinoud drainage    Significant Labs:  Noted. H/H stable, WBC slghtly elevated from yesterday    Significant Diagnostics:  none    Assessment/Plan:     Active Diagnoses:    Diagnosis Date Noted POA    PRINCIPAL PROBLEM:   Rectal cancer [C20] 06/24/2019 Yes    Smoker [F17.200] 05/27/2019 Yes      Problems Resolved During this Admission:     S/p APR with VRAM, fever overnight  1. Encouraged aggressive pulmonary toilet  2. Flap looks great at this time  3. Encourage ambulation with assistance, must wear abdominal binder when ambulating  4. Continue to monitor vitals    Sebastian Dan MD  General Surgery  [facility]

## 2020-04-11 NOTE — PLAN OF CARE
Remains free from injury. PCA in use for pain control. Turning q2hr, avoiding supine position. Drains and dressings maintained. Fluids running as ordered. Vital signs stable. Chart reviewed. Will continue to monitor.

## 2020-04-11 NOTE — NURSING
Patient vomited 500cc of dark brown liquid emesis. Patient states he has vomited twice since 0700. Informed Dr. Morrissey. States to make patient NPO. Flat and erect XRAY will be ordered. Informed patient verbalized understanding.

## 2020-04-12 ENCOUNTER — PATIENT MESSAGE (OUTPATIENT)
Dept: SURGERY | Facility: CLINIC | Age: 55
End: 2020-04-12

## 2020-04-12 LAB
ANION GAP SERPL CALC-SCNC: 11 MMOL/L (ref 8–16)
BASOPHILS # BLD AUTO: 0.01 K/UL (ref 0–0.2)
BASOPHILS NFR BLD: 0.1 % (ref 0–1.9)
BUN SERPL-MCNC: 14 MG/DL (ref 6–20)
CALCIUM SERPL-MCNC: 8.8 MG/DL (ref 8.7–10.5)
CHLORIDE SERPL-SCNC: 96 MMOL/L (ref 95–110)
CO2 SERPL-SCNC: 31 MMOL/L (ref 23–29)
CREAT SERPL-MCNC: 0.8 MG/DL (ref 0.5–1.4)
DIFFERENTIAL METHOD: ABNORMAL
EOSINOPHIL # BLD AUTO: 0.1 K/UL (ref 0–0.5)
EOSINOPHIL NFR BLD: 0.6 % (ref 0–8)
ERYTHROCYTE [DISTWIDTH] IN BLOOD BY AUTOMATED COUNT: 15.4 % (ref 11.5–14.5)
EST. GFR  (AFRICAN AMERICAN): >60 ML/MIN/1.73 M^2
EST. GFR  (NON AFRICAN AMERICAN): >60 ML/MIN/1.73 M^2
GLUCOSE SERPL-MCNC: 147 MG/DL (ref 70–110)
HCT VFR BLD AUTO: 32.8 % (ref 40–54)
HGB BLD-MCNC: 10.9 G/DL (ref 14–18)
IMM GRANULOCYTES # BLD AUTO: 0.05 K/UL (ref 0–0.04)
IMM GRANULOCYTES NFR BLD AUTO: 0.5 % (ref 0–0.5)
LYMPHOCYTES # BLD AUTO: 0.9 K/UL (ref 1–4.8)
LYMPHOCYTES NFR BLD: 8.8 % (ref 18–48)
MAGNESIUM SERPL-MCNC: 1.9 MG/DL (ref 1.6–2.6)
MCH RBC QN AUTO: 28.4 PG (ref 27–31)
MCHC RBC AUTO-ENTMCNC: 33.2 G/DL (ref 32–36)
MCV RBC AUTO: 85 FL (ref 82–98)
MONOCYTES # BLD AUTO: 0.5 K/UL (ref 0.3–1)
MONOCYTES NFR BLD: 5.4 % (ref 4–15)
NEUTROPHILS # BLD AUTO: 8.4 K/UL (ref 1.8–7.7)
NEUTROPHILS NFR BLD: 84.6 % (ref 38–73)
NRBC BLD-RTO: 0 /100 WBC
PHOSPHATE SERPL-MCNC: 2.2 MG/DL (ref 2.7–4.5)
PLATELET # BLD AUTO: 248 K/UL (ref 150–350)
PMV BLD AUTO: 9.2 FL (ref 9.2–12.9)
POTASSIUM SERPL-SCNC: 3.5 MMOL/L (ref 3.5–5.1)
RBC # BLD AUTO: 3.84 M/UL (ref 4.6–6.2)
SODIUM SERPL-SCNC: 138 MMOL/L (ref 136–145)
WBC # BLD AUTO: 9.95 K/UL (ref 3.9–12.7)

## 2020-04-12 PROCEDURE — 99900035 HC TECH TIME PER 15 MIN (STAT)

## 2020-04-12 PROCEDURE — 94761 N-INVAS EAR/PLS OXIMETRY MLT: CPT

## 2020-04-12 PROCEDURE — 63600175 PHARM REV CODE 636 W HCPCS: Performed by: COLON & RECTAL SURGERY

## 2020-04-12 PROCEDURE — 85025 COMPLETE CBC W/AUTO DIFF WBC: CPT

## 2020-04-12 PROCEDURE — 83735 ASSAY OF MAGNESIUM: CPT

## 2020-04-12 PROCEDURE — 27000221 HC OXYGEN, UP TO 24 HOURS

## 2020-04-12 PROCEDURE — 11000001 HC ACUTE MED/SURG PRIVATE ROOM

## 2020-04-12 PROCEDURE — 94799 UNLISTED PULMONARY SVC/PX: CPT

## 2020-04-12 PROCEDURE — 36415 COLL VENOUS BLD VENIPUNCTURE: CPT

## 2020-04-12 PROCEDURE — 25000003 PHARM REV CODE 250: Performed by: SURGERY

## 2020-04-12 PROCEDURE — 80048 BASIC METABOLIC PNL TOTAL CA: CPT

## 2020-04-12 PROCEDURE — 84100 ASSAY OF PHOSPHORUS: CPT

## 2020-04-12 RX ORDER — SODIUM,POTASSIUM PHOSPHATES 280-250MG
2 POWDER IN PACKET (EA) ORAL ONCE
Status: DISCONTINUED | OUTPATIENT
Start: 2020-04-12 | End: 2020-04-15 | Stop reason: HOSPADM

## 2020-04-12 RX ADMIN — DEXTROSE MONOHYDRATE, SODIUM CHLORIDE, AND POTASSIUM CHLORIDE: 50; 4.5; 1.49 INJECTION, SOLUTION INTRAVENOUS at 03:04

## 2020-04-12 RX ADMIN — DEXTROSE MONOHYDRATE, SODIUM CHLORIDE, AND POTASSIUM CHLORIDE: 50; 4.5; 1.49 INJECTION, SOLUTION INTRAVENOUS at 05:04

## 2020-04-12 RX ADMIN — TAMSULOSIN HYDROCHLORIDE 0.4 MG: 0.4 CAPSULE ORAL at 08:04

## 2020-04-12 RX ADMIN — ENOXAPARIN SODIUM 40 MG: 100 INJECTION SUBCUTANEOUS at 05:04

## 2020-04-12 NOTE — PLAN OF CARE
Remains free from injury. States relief of pain with available prn meds. Fluids running as ordered. Dressing maintained. Ambulation encouraged. NPO status maintained. Vital signs stable. Chart reviewed. Will continue to monitor.

## 2020-04-12 NOTE — ASSESSMENT & PLAN NOTE
53yo M with perforated rectal adenocarcinoma who is now s/p APR with extra-anatomic resection of fistula tract and partial left gluteus muscle and VRAM by plastics on 4/9/2020    - NPO until nausea improved/bowel function improving for now until more ostomy output and emesis resolved  - Ambulation starting today  - Serial flap checks  - Cont IVF  - iv push, as well as tylenol and gabapentin  - Cont ROBERT drains to bulb sxn  - IS 10xs/hr while awake; pulmonary toileting  - PPx: PPI and LVNX  - monitor fevers/WBC, if continues to have will further evaluate/ABX however this is likely due to atelectasis/immobilization as it was shortly after surgery postop day 1

## 2020-04-12 NOTE — SUBJECTIVE & OBJECTIVE
Interval History: Tachycardia improving, afebrile, emesis yesterday improving    Medications:  Continuous Infusions:   dextrose 5 % and 0.45 % NaCl with KCl 20 mEq 100 mL/hr at 04/12/20 0539     Scheduled Meds:   chlorhexidine  10 mL Mouth/Throat BID    cyclobenzaprine  10 mg Oral TID    enoxaparin  40 mg Subcutaneous Daily    gabapentin  300 mg Oral TID    magnesium oxide  400 mg Oral BID    nozaseptin   Each Nostril BID    pantoprazole  40 mg Oral Before breakfast    potassium, sodium phosphates  2 packet Oral Once    senna-docusate 8.6-50 mg  1 tablet Oral BID    tamsulosin  0.4 mg Oral Daily     PRN Meds:acetaminophen, diphenhydrAMINE, morphine, morphine, naloxone, ondansetron, promethazine (PHENERGAN) IVPB     Review of patient's allergies indicates:  No Known Allergies  Objective:     Vital Signs (Most Recent):  Temp: 98.5 °F (36.9 °C) (04/12/20 0728)  Pulse: 101 (04/12/20 0728)  Resp: 14 (04/12/20 0728)  BP: (!) 133/92 (04/12/20 0728)  SpO2: 96 % (04/12/20 0728) Vital Signs (24h Range):  Temp:  [98 °F (36.7 °C)-99.1 °F (37.3 °C)] 98.5 °F (36.9 °C)  Pulse:  [101-116] 101  Resp:  [14-18] 14  SpO2:  [94 %-96 %] 96 %  BP: (133-155)/(82-99) 133/92     Weight: 72.6 kg (160 lb 0.9 oz)  Body mass index is 21.71 kg/m².    Intake/Output - Last 3 Shifts       04/10 0700 - 04/11 0659 04/11 0700 - 04/12 0659 04/12 0700 - 04/13 0659    P.O. 530 360     I.V. (mL/kg) 975.7 (13.4) 2063.3 (28.4)     Blood       IV Piggyback 50 50     Total Intake(mL/kg) 1555.7 (21.4) 2473.3 (34.1)     Urine (mL/kg/hr) 2250 (1.3) 700 (0.4)     Emesis/NG output 0 1200     Drains 230 80     Stool 0 0     Blood       Total Output 2480 1980     Net -924.3 +493.3            Emesis Occurrence 2 x 2 x           Physical Exam   Constitutional: He is oriented to person, place, and time. He appears well-developed.   HENT:   Head: Normocephalic and atraumatic.   Eyes: Conjunctivae and EOM are normal.   Neck: Normal range of motion. No  thyromegaly present.   Cardiovascular: Normal rate and regular rhythm.   Pulmonary/Chest: Effort normal. No respiratory distress.   Abdominal:   Soft, nondistended, appropriately TTP; incision c/d/i without erythema; ROBERT drains with SS output in bulbs  Ostomy pain/viable no output   Genitourinary:   Genitourinary Comments: Perineal flap perfused and viable with healthy appearing skin paddle/edges   Musculoskeletal: Normal range of motion. He exhibits no edema or tenderness.   Neurological: He is alert and oriented to person, place, and time.   Skin: Skin is warm and dry. Capillary refill takes less than 2 seconds. No rash noted.   Psychiatric: He has a normal mood and affect.       Significant Labs:  CBC:   Recent Labs   Lab 04/12/20  0418   WBC 9.95   RBC 3.84*   HGB 10.9*   HCT 32.8*      MCV 85   MCH 28.4   MCHC 33.2     BMP:   Recent Labs   Lab 04/12/20 0418   *      K 3.5   CL 96   CO2 31*   BUN 14   CREATININE 0.8   CALCIUM 8.8   MG 1.9

## 2020-04-12 NOTE — PROGRESS NOTES
[facility] -   General Surgery  Progress Note    Subjective:     History of Present Illness:  54-year-old male with rectal adenocarcinoma who developed a rectal perforation while on chemotherapy during his neoadjuvant treatment who presents for definitive surgical resection    Post-Op Info:  Procedure(s) (LRB):  PROCTECTOMY, ABDOMINOPERINEAL (N/A)  CREATION, FLAP, MUSCLE ROTATION (Left)  FLAP GRAFT (N/A)   3 Days Post-Op     Interval History: Tachycardia improving, afebrile, emesis yesterday improving    Medications:  Continuous Infusions:   dextrose 5 % and 0.45 % NaCl with KCl 20 mEq 100 mL/hr at 04/12/20 0539     Scheduled Meds:   chlorhexidine  10 mL Mouth/Throat BID    cyclobenzaprine  10 mg Oral TID    enoxaparin  40 mg Subcutaneous Daily    gabapentin  300 mg Oral TID    magnesium oxide  400 mg Oral BID    nozaseptin   Each Nostril BID    pantoprazole  40 mg Oral Before breakfast    potassium, sodium phosphates  2 packet Oral Once    senna-docusate 8.6-50 mg  1 tablet Oral BID    tamsulosin  0.4 mg Oral Daily     PRN Meds:acetaminophen, diphenhydrAMINE, morphine, morphine, naloxone, ondansetron, promethazine (PHENERGAN) IVPB     Review of patient's allergies indicates:  No Known Allergies  Objective:     Vital Signs (Most Recent):  Temp: 98.5 °F (36.9 °C) (04/12/20 0728)  Pulse: 101 (04/12/20 0728)  Resp: 14 (04/12/20 0728)  BP: (!) 133/92 (04/12/20 0728)  SpO2: 96 % (04/12/20 0728) Vital Signs (24h Range):  Temp:  [98 °F (36.7 °C)-99.1 °F (37.3 °C)] 98.5 °F (36.9 °C)  Pulse:  [101-116] 101  Resp:  [14-18] 14  SpO2:  [94 %-96 %] 96 %  BP: (133-155)/(82-99) 133/92     Weight: 72.6 kg (160 lb 0.9 oz)  Body mass index is 21.71 kg/m².    Intake/Output - Last 3 Shifts       04/10 0700 - 04/11 0659 04/11 0700 - 04/12 0659 04/12 0700 - 04/13 0659    P.O. 530 360     I.V. (mL/kg) 975.7 (13.4) 2063.3 (28.4)     Blood       IV Piggyback 50 50     Total Intake(mL/kg) 1555.7 (21.4) 2473.3 (34.1)      Urine (mL/kg/hr) 2250 (1.3) 700 (0.4)     Emesis/NG output 0 1200     Drains 230 80     Stool 0 0     Blood       Total Output 2480 1980     Net -924.3 +493.3            Emesis Occurrence 2 x 2 x           Physical Exam   Constitutional: He is oriented to person, place, and time. He appears well-developed.   HENT:   Head: Normocephalic and atraumatic.   Eyes: Conjunctivae and EOM are normal.   Neck: Normal range of motion. No thyromegaly present.   Cardiovascular: Normal rate and regular rhythm.   Pulmonary/Chest: Effort normal. No respiratory distress.   Abdominal:   Soft, nondistended, appropriately TTP; incision c/d/i without erythema; ROBERT drains with SS output in bulbs  Ostomy pain/viable no output   Genitourinary:   Genitourinary Comments: Perineal flap perfused and viable with healthy appearing skin paddle/edges   Musculoskeletal: Normal range of motion. He exhibits no edema or tenderness.   Neurological: He is alert and oriented to person, place, and time.   Skin: Skin is warm and dry. Capillary refill takes less than 2 seconds. No rash noted.   Psychiatric: He has a normal mood and affect.       Significant Labs:  CBC:   Recent Labs   Lab 04/12/20  0418   WBC 9.95   RBC 3.84*   HGB 10.9*   HCT 32.8*      MCV 85   MCH 28.4   MCHC 33.2     BMP:   Recent Labs   Lab 04/12/20 0418   *      K 3.5   CL 96   CO2 31*   BUN 14   CREATININE 0.8   CALCIUM 8.8   MG 1.9     Assessment/Plan:     * Rectal cancer  53yo M with perforated rectal adenocarcinoma who is now s/p APR with extra-anatomic resection of fistula tract and partial left gluteus muscle and VRAM by plastics on 4/9/2020    - NPO until nausea improved/bowel function improving for now until more ostomy output and emesis resolved  - Ambulation starting today  - Serial flap checks  - Cont IVF  - iv push, as well as tylenol and gabapentin  - Cont ROBERT drains to bulb sxn  - IS 10xs/hr while awake; pulmonary toileting  - PPx: PPI and LVNX  -  monitor fevers/WBC, if continues to have will further evaluate/ABX however this is likely due to atelectasis/immobilization as it was shortly after surgery postop day 1    Smoker  Encouraged smoking cessation. Will attempt no nicotine patch given flap  Pulmonary toileting        Yuriy Morrissey MD  General Surgery  [facility]

## 2020-04-12 NOTE — PLAN OF CARE
Patient resting bed, no signs of distress, VSS, encouraged activity with patient, assisted patient in standing by side of the bed and taking a couple steps. Pt tolerated well. Plan discussed with patient, patient verbalized understanding. Continue plan of care.

## 2020-04-13 LAB
ANION GAP SERPL CALC-SCNC: 8 MMOL/L (ref 8–16)
BASOPHILS # BLD AUTO: 0.02 K/UL (ref 0–0.2)
BASOPHILS NFR BLD: 0.3 % (ref 0–1.9)
BLD PROD TYP BPU: NORMAL
BLD PROD TYP BPU: NORMAL
BLOOD UNIT EXPIRATION DATE: NORMAL
BLOOD UNIT EXPIRATION DATE: NORMAL
BLOOD UNIT TYPE CODE: 6200
BLOOD UNIT TYPE CODE: 6200
BLOOD UNIT TYPE: NORMAL
BLOOD UNIT TYPE: NORMAL
BUN SERPL-MCNC: 12 MG/DL (ref 6–20)
CALCIUM SERPL-MCNC: 8.5 MG/DL (ref 8.7–10.5)
CHLORIDE SERPL-SCNC: 102 MMOL/L (ref 95–110)
CO2 SERPL-SCNC: 26 MMOL/L (ref 23–29)
CODING SYSTEM: NORMAL
CODING SYSTEM: NORMAL
CREAT SERPL-MCNC: 0.7 MG/DL (ref 0.5–1.4)
DIFFERENTIAL METHOD: ABNORMAL
DISPENSE STATUS: NORMAL
DISPENSE STATUS: NORMAL
EOSINOPHIL # BLD AUTO: 0.1 K/UL (ref 0–0.5)
EOSINOPHIL NFR BLD: 2 % (ref 0–8)
ERYTHROCYTE [DISTWIDTH] IN BLOOD BY AUTOMATED COUNT: 14.9 % (ref 11.5–14.5)
EST. GFR  (AFRICAN AMERICAN): >60 ML/MIN/1.73 M^2
EST. GFR  (NON AFRICAN AMERICAN): >60 ML/MIN/1.73 M^2
GLUCOSE SERPL-MCNC: 127 MG/DL (ref 70–110)
HCT VFR BLD AUTO: 32.1 % (ref 40–54)
HGB BLD-MCNC: 10.5 G/DL (ref 14–18)
IMM GRANULOCYTES # BLD AUTO: 0.03 K/UL (ref 0–0.04)
IMM GRANULOCYTES NFR BLD AUTO: 0.5 % (ref 0–0.5)
LYMPHOCYTES # BLD AUTO: 1 K/UL (ref 1–4.8)
LYMPHOCYTES NFR BLD: 16.6 % (ref 18–48)
MAGNESIUM SERPL-MCNC: 2 MG/DL (ref 1.6–2.6)
MCH RBC QN AUTO: 28.1 PG (ref 27–31)
MCHC RBC AUTO-ENTMCNC: 32.7 G/DL (ref 32–36)
MCV RBC AUTO: 86 FL (ref 82–98)
MONOCYTES # BLD AUTO: 0.4 K/UL (ref 0.3–1)
MONOCYTES NFR BLD: 6.5 % (ref 4–15)
NEUTROPHILS # BLD AUTO: 4.5 K/UL (ref 1.8–7.7)
NEUTROPHILS NFR BLD: 74.1 % (ref 38–73)
NRBC BLD-RTO: 0 /100 WBC
NUM UNITS TRANS PACKED RBC: NORMAL
NUM UNITS TRANS PACKED RBC: NORMAL
PHOSPHATE SERPL-MCNC: 2.2 MG/DL (ref 2.7–4.5)
PLATELET # BLD AUTO: 261 K/UL (ref 150–350)
PMV BLD AUTO: 9 FL (ref 9.2–12.9)
POTASSIUM SERPL-SCNC: 3.8 MMOL/L (ref 3.5–5.1)
RBC # BLD AUTO: 3.74 M/UL (ref 4.6–6.2)
SODIUM SERPL-SCNC: 136 MMOL/L (ref 136–145)
WBC # BLD AUTO: 6.13 K/UL (ref 3.9–12.7)

## 2020-04-13 PROCEDURE — 99024 POSTOP FOLLOW-UP VISIT: CPT | Mod: ,,, | Performed by: COLON & RECTAL SURGERY

## 2020-04-13 PROCEDURE — 94761 N-INVAS EAR/PLS OXIMETRY MLT: CPT

## 2020-04-13 PROCEDURE — 25000003 PHARM REV CODE 250: Performed by: SURGERY

## 2020-04-13 PROCEDURE — 85025 COMPLETE CBC W/AUTO DIFF WBC: CPT

## 2020-04-13 PROCEDURE — 99024 PR POST-OP FOLLOW-UP VISIT: ICD-10-PCS | Mod: ,,, | Performed by: COLON & RECTAL SURGERY

## 2020-04-13 PROCEDURE — 83735 ASSAY OF MAGNESIUM: CPT

## 2020-04-13 PROCEDURE — 11000001 HC ACUTE MED/SURG PRIVATE ROOM

## 2020-04-13 PROCEDURE — 84100 ASSAY OF PHOSPHORUS: CPT

## 2020-04-13 PROCEDURE — 36415 COLL VENOUS BLD VENIPUNCTURE: CPT

## 2020-04-13 PROCEDURE — 25000003 PHARM REV CODE 250: Performed by: COLON & RECTAL SURGERY

## 2020-04-13 PROCEDURE — C9113 INJ PANTOPRAZOLE SODIUM, VIA: HCPCS | Performed by: COLON & RECTAL SURGERY

## 2020-04-13 PROCEDURE — 80048 BASIC METABOLIC PNL TOTAL CA: CPT

## 2020-04-13 PROCEDURE — 63600175 PHARM REV CODE 636 W HCPCS: Performed by: COLON & RECTAL SURGERY

## 2020-04-13 RX ORDER — PANTOPRAZOLE SODIUM 40 MG/10ML
40 INJECTION, POWDER, LYOPHILIZED, FOR SOLUTION INTRAVENOUS DAILY
Status: DISCONTINUED | OUTPATIENT
Start: 2020-04-13 | End: 2020-04-14

## 2020-04-13 RX ORDER — SODIUM,POTASSIUM PHOSPHATES 280-250MG
1 POWDER IN PACKET (EA) ORAL 3 TIMES DAILY
Status: DISCONTINUED | OUTPATIENT
Start: 2020-04-13 | End: 2020-04-15 | Stop reason: HOSPADM

## 2020-04-13 RX ADMIN — SENNOSIDES AND DOCUSATE SODIUM 1 TABLET: 8.6; 5 TABLET ORAL at 08:04

## 2020-04-13 RX ADMIN — TAMSULOSIN HYDROCHLORIDE 0.4 MG: 0.4 CAPSULE ORAL at 08:04

## 2020-04-13 RX ADMIN — CHLORHEXIDINE GLUCONATE 0.12% ORAL RINSE 10 ML: 1.2 LIQUID ORAL at 08:04

## 2020-04-13 RX ADMIN — POTASSIUM & SODIUM PHOSPHATES POWDER PACK 280-160-250 MG 1 PACKET: 280-160-250 PACK at 09:04

## 2020-04-13 RX ADMIN — DEXTROSE MONOHYDRATE, SODIUM CHLORIDE, AND POTASSIUM CHLORIDE: 50; 4.5; 1.49 INJECTION, SOLUTION INTRAVENOUS at 09:04

## 2020-04-13 RX ADMIN — CHLORHEXIDINE GLUCONATE 0.12% ORAL RINSE 10 ML: 1.2 LIQUID ORAL at 09:04

## 2020-04-13 RX ADMIN — PROMETHAZINE HYDROCHLORIDE 6.25 MG: 25 INJECTION INTRAMUSCULAR; INTRAVENOUS at 11:04

## 2020-04-13 RX ADMIN — SENNOSIDES AND DOCUSATE SODIUM 1 TABLET: 8.6; 5 TABLET ORAL at 09:04

## 2020-04-13 RX ADMIN — DEXTROSE MONOHYDRATE, SODIUM CHLORIDE, AND POTASSIUM CHLORIDE: 50; 4.5; 1.49 INJECTION, SOLUTION INTRAVENOUS at 12:04

## 2020-04-13 RX ADMIN — POTASSIUM & SODIUM PHOSPHATES POWDER PACK 280-160-250 MG 1 PACKET: 280-160-250 PACK at 03:04

## 2020-04-13 RX ADMIN — ENOXAPARIN SODIUM 40 MG: 100 INJECTION SUBCUTANEOUS at 05:04

## 2020-04-13 RX ADMIN — DEXTROSE MONOHYDRATE, SODIUM CHLORIDE, AND POTASSIUM CHLORIDE: 50; 4.5; 1.49 INJECTION, SOLUTION INTRAVENOUS at 08:04

## 2020-04-13 RX ADMIN — GABAPENTIN 300 MG: 300 CAPSULE ORAL at 09:04

## 2020-04-13 RX ADMIN — DEXTROSE MONOHYDRATE, SODIUM CHLORIDE, AND POTASSIUM CHLORIDE: 50; 4.5; 1.49 INJECTION, SOLUTION INTRAVENOUS at 11:04

## 2020-04-13 RX ADMIN — PANTOPRAZOLE SODIUM 40 MG: 40 INJECTION, POWDER, LYOPHILIZED, FOR SOLUTION INTRAVENOUS at 09:04

## 2020-04-13 RX ADMIN — Medication 400 MG: at 09:04

## 2020-04-13 RX ADMIN — CYCLOBENZAPRINE 10 MG: 10 TABLET, FILM COATED ORAL at 09:04

## 2020-04-13 RX ADMIN — ONDANSETRON 4 MG: 2 INJECTION INTRAMUSCULAR; INTRAVENOUS at 08:04

## 2020-04-13 RX ADMIN — PROMETHAZINE HYDROCHLORIDE 6.25 MG: 25 INJECTION INTRAMUSCULAR; INTRAVENOUS at 05:04

## 2020-04-13 NOTE — PLAN OF CARE
Remains free from injury. States relief of pain with available prn meds. Fluids running as ordered. Incision CDI, dressing maintained. NPO status maintained. Vital signs stable. Chart reviewed. Will continue to monitor.

## 2020-04-13 NOTE — PLAN OF CARE
Discussed poc with pt, pt verbalized understanding    Strict no visitor policy per CDC guidelines     VS wnl  Fall precautions in place, remains injury free  Pt appears to be in pain and discomfort but refuses narcotics   nausea under control with PRN meds  IVFs and electrolyte replacement therapy in use  Accurate I&Os  Abx given as prescribed  Bed locked at lowest position  Call light within reach    Abd binder while in bed and ambulating, colostomy has output after ambulating, grisel drains wnl, wound care dressing change performed    Cooney care done    Chart check complete  Will cont to monitor

## 2020-04-13 NOTE — CHAPLAIN
Initial visit with patient.  Provided support through listening, presence, and prayer.  Took time to assess for any further spiritual needs and he currently did not have any.  I will follow up while pt is here.    Chaplain Peter Dahl M.Div., [facility]

## 2020-04-13 NOTE — PROGRESS NOTES
[facility]  Colorectal Surgery  Progress Note    Subjective:     History of Present Illness:  54-year-old male with rectal adenocarcinoma who developed a rectal perforation while on chemotherapy during his neoadjuvant treatment who presents for definitive surgical resection    Post-Op Info:  Procedure(s) (LRB):  PROCTECTOMY, ABDOMINOPERINEAL (N/A)  CREATION, FLAP, MUSCLE ROTATION (Left)  FLAP GRAFT (N/A)   4 Days Post-Op     Interval History: Still with some nausea/vomiting. Ambulating well. Pain well controlled. Minimal stoma output.    Medications:  Continuous Infusions:   dextrose 5 % and 0.45 % NaCl with KCl 20 mEq 100 mL/hr at 04/13/20 0045     Scheduled Meds:   chlorhexidine  10 mL Mouth/Throat BID    cyclobenzaprine  10 mg Oral TID    enoxaparin  40 mg Subcutaneous Daily    gabapentin  300 mg Oral TID    magnesium oxide  400 mg Oral BID    nozaseptin   Each Nostril BID    pantoprazole  40 mg Oral Before breakfast    potassium, sodium phosphates  2 packet Oral Once    senna-docusate 8.6-50 mg  1 tablet Oral BID    tamsulosin  0.4 mg Oral Daily     PRN Meds:acetaminophen, diphenhydrAMINE, morphine, morphine, naloxone, ondansetron, promethazine (PHENERGAN) IVPB     Review of patient's allergies indicates:  No Known Allergies  Objective:     Vital Signs (Most Recent):  Temp: 98.3 °F (36.8 °C) (04/13/20 0736)  Pulse: 91 (04/13/20 0736)  Resp: 20 (04/13/20 0736)  BP: (!) 142/91 (04/13/20 0736)  SpO2: 97 % (04/13/20 0736) Vital Signs (24h Range):  Temp:  [98.3 °F (36.8 °C)-98.6 °F (37 °C)] 98.3 °F (36.8 °C)  Pulse:  [] 91  Resp:  [17-20] 20  SpO2:  [93 %-97 %] 97 %  BP: (120-158)/(83-93) 142/91     Weight: 72.6 kg (160 lb 0.9 oz)  Body mass index is 21.71 kg/m².    Intake/Output - Last 3 Shifts       04/11 0700 - 04/12 0659 04/12 0700 - 04/13 0659 04/13 0700 - 04/14 0659    P.O. 360 0     I.V. (mL/kg) 2063.3 (28.4) 2428.3 (33.4)     IV Piggyback 50      Total Intake(mL/kg) 2473.3  (34.1) 2428.3 (33.4)     Urine (mL/kg/hr) 700 (0.4) 1000 (0.6)     Emesis/NG output 1200      Drains 80 25     Stool 0 0     Total Output 1980 1025     Net +493.3 +1403.3            Emesis Occurrence 2 x            Physical Exam   Constitutional: He is oriented to person, place, and time. He appears well-developed.   HENT:   Head: Normocephalic and atraumatic.   Eyes: Conjunctivae and EOM are normal.   Neck: Normal range of motion. No thyromegaly present.   Cardiovascular: Normal rate and regular rhythm.   Pulmonary/Chest: Effort normal. No respiratory distress.   Abdominal:   Soft, mild distention with +tympany, appropriately TTP; incision c/d/i without erythema; ROBERT drains with SS output in bulbs; ostomy pink/viable with minimal semisolid stool in bag   Genitourinary:   Genitourinary Comments: Perineal flap perfused and viable with healthy appearing skin paddle/edges   Musculoskeletal: Normal range of motion. He exhibits no edema or tenderness.   Neurological: He is alert and oriented to person, place, and time.   Skin: Skin is warm and dry. Capillary refill takes less than 2 seconds. No rash noted.   Psychiatric: He has a normal mood and affect.       Significant Labs:  CBC:   Recent Labs   Lab 04/13/20  0408   WBC 6.13   RBC 3.74*   HGB 10.5*   HCT 32.1*      MCV 86   MCH 28.1   MCHC 32.7     BMP:   Recent Labs   Lab 04/13/20  0408   *      K 3.8      CO2 26   BUN 12   CREATININE 0.7   CALCIUM 8.5*   MG 2.0     CMP:   Recent Labs   Lab 04/13/20  0408   *   CALCIUM 8.5*      K 3.8   CO2 26      BUN 12   CREATININE 0.7     Assessment/Plan:     * Rectal cancer  53yo M with perforated rectal adenocarcinoma who is now s/p APR with extra-anatomic resection of fistula tract and partial left gluteus muscle and VRAM by plastics on 4/9/2020    - Continue NPO for now until less distended/tympanic  - OOB, ambulation encouraged  - Serial flap checks  - Cont IVF  - PO pain meds  -  Cont ROBERT drains to bulb sxn  - IS 10xs/hr while awake; pulmonary toileting  - PPx: PPI and LVNX    Smoker  Encouraged smoking cessation. Will attempt no nicotine patch given flap  Pulmonary toilet        Jan New MD  Colorectal Surgery  [facility]

## 2020-04-13 NOTE — CONSULTS
Consulted to this 54 year old male patient with colostomy, admitted s/p abdominoperineal proctectomy, flap creation. He is awake and alert. Midline abdominal surgical incision noted, well approximated with staples. LLQ colostomy noted with scant amount of stool in pouch. Patient reports he is independent with colostomy care (surgery was 2/2020), has no educational needs. Patient with home supplies, however informed him that we have supplies should the need arise. Additional supplies may be obtained from materials management. Will sign off, please re consult if needed.

## 2020-04-13 NOTE — SUBJECTIVE & OBJECTIVE
Interval History: Still with some nausea/vomiting. Ambulating well. Pain well controlled. Minimal stoma output.    Medications:  Continuous Infusions:   dextrose 5 % and 0.45 % NaCl with KCl 20 mEq 100 mL/hr at 04/13/20 0045     Scheduled Meds:   chlorhexidine  10 mL Mouth/Throat BID    cyclobenzaprine  10 mg Oral TID    enoxaparin  40 mg Subcutaneous Daily    gabapentin  300 mg Oral TID    magnesium oxide  400 mg Oral BID    nozaseptin   Each Nostril BID    pantoprazole  40 mg Oral Before breakfast    potassium, sodium phosphates  2 packet Oral Once    senna-docusate 8.6-50 mg  1 tablet Oral BID    tamsulosin  0.4 mg Oral Daily     PRN Meds:acetaminophen, diphenhydrAMINE, morphine, morphine, naloxone, ondansetron, promethazine (PHENERGAN) IVPB     Review of patient's allergies indicates:  No Known Allergies  Objective:     Vital Signs (Most Recent):  Temp: 98.3 °F (36.8 °C) (04/13/20 0736)  Pulse: 91 (04/13/20 0736)  Resp: 20 (04/13/20 0736)  BP: (!) 142/91 (04/13/20 0736)  SpO2: 97 % (04/13/20 0736) Vital Signs (24h Range):  Temp:  [98.3 °F (36.8 °C)-98.6 °F (37 °C)] 98.3 °F (36.8 °C)  Pulse:  [] 91  Resp:  [17-20] 20  SpO2:  [93 %-97 %] 97 %  BP: (120-158)/(83-93) 142/91     Weight: 72.6 kg (160 lb 0.9 oz)  Body mass index is 21.71 kg/m².    Intake/Output - Last 3 Shifts       04/11 0700 - 04/12 0659 04/12 0700 - 04/13 0659 04/13 0700 - 04/14 0659    P.O. 360 0     I.V. (mL/kg) 2063.3 (28.4) 2428.3 (33.4)     IV Piggyback 50      Total Intake(mL/kg) 2473.3 (34.1) 2428.3 (33.4)     Urine (mL/kg/hr) 700 (0.4) 1000 (0.6)     Emesis/NG output 1200      Drains 80 25     Stool 0 0     Total Output 1980 1025     Net +493.3 +1403.3            Emesis Occurrence 2 x            Physical Exam   Constitutional: He is oriented to person, place, and time. He appears well-developed.   HENT:   Head: Normocephalic and atraumatic.   Eyes: Conjunctivae and EOM are normal.   Neck: Normal range of motion. No  thyromegaly present.   Cardiovascular: Normal rate and regular rhythm.   Pulmonary/Chest: Effort normal. No respiratory distress.   Abdominal:   Soft, mild distention with +tympany, appropriately TTP; incision c/d/i without erythema; ROBERT drains with SS output in bulbs; ostomy pink/viable with minimal semisolid stool in bag   Genitourinary:   Genitourinary Comments: Perineal flap perfused and viable with healthy appearing skin paddle/edges   Musculoskeletal: Normal range of motion. He exhibits no edema or tenderness.   Neurological: He is alert and oriented to person, place, and time.   Skin: Skin is warm and dry. Capillary refill takes less than 2 seconds. No rash noted.   Psychiatric: He has a normal mood and affect.       Significant Labs:  CBC:   Recent Labs   Lab 04/13/20 0408   WBC 6.13   RBC 3.74*   HGB 10.5*   HCT 32.1*      MCV 86   MCH 28.1   MCHC 32.7     BMP:   Recent Labs   Lab 04/13/20 0408   *      K 3.8      CO2 26   BUN 12   CREATININE 0.7   CALCIUM 8.5*   MG 2.0     CMP:   Recent Labs   Lab 04/13/20 0408   *   CALCIUM 8.5*      K 3.8   CO2 26      BUN 12   CREATININE 0.7

## 2020-04-13 NOTE — ASSESSMENT & PLAN NOTE
53yo M with perforated rectal adenocarcinoma who is now s/p APR with extra-anatomic resection of fistula tract and partial left gluteus muscle and VRAM by plastics on 4/9/2020    - Continue NPO for now until less distended/tympanic  - OOB, ambulation encouraged  - Serial flap checks  - Cont IVF  - PO pain meds  - Cont ROBERT drains to bulb sxn  - IS 10xs/hr while awake; pulmonary toileting  - PPx: PPI and LVNX

## 2020-04-13 NOTE — PROGRESS NOTES
[facility] -   General Surgery  Progress Note    Subjective:     Interval History: Ambulating yesterday and this am per patient. No emesis.  Still with minimal ostomy output per the patient.  Pain well controlled.  No fevers. Cooney in place.    Post-Op Info:  Procedure(s) (LRB):  PROCTECTOMY, ABDOMINOPERINEAL (N/A)  CREATION, FLAP, MUSCLE ROTATION (Left)  FLAP GRAFT (N/A)   4 Days Post-Op      Medications:  Continuous Infusions:   dextrose 5 % and 0.45 % NaCl with KCl 20 mEq 100 mL/hr at 04/13/20 0045     Scheduled Meds:   chlorhexidine  10 mL Mouth/Throat BID    cyclobenzaprine  10 mg Oral TID    enoxaparin  40 mg Subcutaneous Daily    gabapentin  300 mg Oral TID    magnesium oxide  400 mg Oral BID    nozaseptin   Each Nostril BID    pantoprazole  40 mg Oral Before breakfast    potassium, sodium phosphates  2 packet Oral Once    senna-docusate 8.6-50 mg  1 tablet Oral BID    tamsulosin  0.4 mg Oral Daily     PRN Meds:acetaminophen, diphenhydrAMINE, morphine, morphine, naloxone, ondansetron, promethazine (PHENERGAN) IVPB     Objective:     Vital Signs (Most Recent):  Temp: 98.3 °F (36.8 °C) (04/13/20 0736)  Pulse: 91 (04/13/20 0736)  Resp: 20 (04/13/20 0736)  BP: (!) 142/91 (04/13/20 0736)  SpO2: 97 % (04/13/20 0736) Vital Signs (24h Range):  Temp:  [98.3 °F (36.8 °C)-98.6 °F (37 °C)] 98.3 °F (36.8 °C)  Pulse:  [] 91  Resp:  [17-20] 20  SpO2:  [93 %-97 %] 97 %  BP: (120-158)/(83-93) 142/91       Intake/Output Summary (Last 24 hours) at 4/13/2020 0812  Last data filed at 4/13/2020 0600  Gross per 24 hour   Intake 2428.33 ml   Output 1025 ml   Net 1403.33 ml       Gen - AO x 3, NAD  Abd - soft, flaps healing well  Incision - C/D/I  Perineum - flap soft, warm, well perfused, no sign of infection or fluid collection  ROBERT - appropriate serosanguinous drainage    Significant Labs:  H/H stable, WBC stable    Significant Diagnostics:  none    Assessment/Plan:     Active Diagnoses:    Diagnosis  Date Noted POA    PRINCIPAL PROBLEM:  Rectal cancer [C20] 06/24/2019 Yes    Smoker [F17.200] 05/27/2019 Yes      Problems Resolved During this Admission:     S/p APR with VRAM flap  1. Flap healing well. Continue to ambulate with assistance.  OK to sit in chair. No heavy lifting x 6 weeks.  OK to shower but no tub baths for three weeks.  2. Continue abdominal binder for 6 full weeks.  3. OK to D/C to home once ileus resolved - patient to call and schedule postop follow up in my office in one week  4. Please notify me if anything else needed. Thanks.    Sebastian Dan MD  General Surgery  [facility]

## 2020-04-14 LAB
ANION GAP SERPL CALC-SCNC: 8 MMOL/L (ref 8–16)
BASOPHILS # BLD AUTO: 0.02 K/UL (ref 0–0.2)
BASOPHILS NFR BLD: 0.4 % (ref 0–1.9)
BUN SERPL-MCNC: 10 MG/DL (ref 6–20)
CALCIUM SERPL-MCNC: 8.2 MG/DL (ref 8.7–10.5)
CHLORIDE SERPL-SCNC: 106 MMOL/L (ref 95–110)
CO2 SERPL-SCNC: 24 MMOL/L (ref 23–29)
CREAT SERPL-MCNC: 0.7 MG/DL (ref 0.5–1.4)
DIFFERENTIAL METHOD: ABNORMAL
EOSINOPHIL # BLD AUTO: 0.2 K/UL (ref 0–0.5)
EOSINOPHIL NFR BLD: 4.2 % (ref 0–8)
ERYTHROCYTE [DISTWIDTH] IN BLOOD BY AUTOMATED COUNT: 14.9 % (ref 11.5–14.5)
EST. GFR  (AFRICAN AMERICAN): >60 ML/MIN/1.73 M^2
EST. GFR  (NON AFRICAN AMERICAN): >60 ML/MIN/1.73 M^2
GLUCOSE SERPL-MCNC: 104 MG/DL (ref 70–110)
HCT VFR BLD AUTO: 30.5 % (ref 40–54)
HGB BLD-MCNC: 9.7 G/DL (ref 14–18)
IMM GRANULOCYTES # BLD AUTO: 0.02 K/UL (ref 0–0.04)
IMM GRANULOCYTES NFR BLD AUTO: 0.4 % (ref 0–0.5)
LYMPHOCYTES # BLD AUTO: 1.1 K/UL (ref 1–4.8)
LYMPHOCYTES NFR BLD: 22.6 % (ref 18–48)
MAGNESIUM SERPL-MCNC: 1.8 MG/DL (ref 1.6–2.6)
MCH RBC QN AUTO: 27.8 PG (ref 27–31)
MCHC RBC AUTO-ENTMCNC: 31.8 G/DL (ref 32–36)
MCV RBC AUTO: 87 FL (ref 82–98)
MONOCYTES # BLD AUTO: 0.4 K/UL (ref 0.3–1)
MONOCYTES NFR BLD: 8.4 % (ref 4–15)
NEUTROPHILS # BLD AUTO: 3.2 K/UL (ref 1.8–7.7)
NEUTROPHILS NFR BLD: 64 % (ref 38–73)
NRBC BLD-RTO: 0 /100 WBC
PHOSPHATE SERPL-MCNC: 3.1 MG/DL (ref 2.7–4.5)
PLATELET # BLD AUTO: 252 K/UL (ref 150–350)
PMV BLD AUTO: 9 FL (ref 9.2–12.9)
POTASSIUM SERPL-SCNC: 3.7 MMOL/L (ref 3.5–5.1)
RBC # BLD AUTO: 3.49 M/UL (ref 4.6–6.2)
SODIUM SERPL-SCNC: 138 MMOL/L (ref 136–145)
WBC # BLD AUTO: 5 K/UL (ref 3.9–12.7)

## 2020-04-14 PROCEDURE — 25000003 PHARM REV CODE 250: Performed by: SURGERY

## 2020-04-14 PROCEDURE — 25000003 PHARM REV CODE 250: Performed by: COLON & RECTAL SURGERY

## 2020-04-14 PROCEDURE — 80048 BASIC METABOLIC PNL TOTAL CA: CPT

## 2020-04-14 PROCEDURE — 94760 N-INVAS EAR/PLS OXIMETRY 1: CPT

## 2020-04-14 PROCEDURE — 99024 PR POST-OP FOLLOW-UP VISIT: ICD-10-PCS | Mod: ,,, | Performed by: COLON & RECTAL SURGERY

## 2020-04-14 PROCEDURE — 83735 ASSAY OF MAGNESIUM: CPT

## 2020-04-14 PROCEDURE — 11000001 HC ACUTE MED/SURG PRIVATE ROOM

## 2020-04-14 PROCEDURE — 85025 COMPLETE CBC W/AUTO DIFF WBC: CPT

## 2020-04-14 PROCEDURE — 63600175 PHARM REV CODE 636 W HCPCS: Performed by: COLON & RECTAL SURGERY

## 2020-04-14 PROCEDURE — 99024 POSTOP FOLLOW-UP VISIT: CPT | Mod: ,,, | Performed by: COLON & RECTAL SURGERY

## 2020-04-14 PROCEDURE — 84100 ASSAY OF PHOSPHORUS: CPT

## 2020-04-14 RX ORDER — PANTOPRAZOLE SODIUM 40 MG/1
40 TABLET, DELAYED RELEASE ORAL DAILY
Status: DISCONTINUED | OUTPATIENT
Start: 2020-04-14 | End: 2020-04-15 | Stop reason: HOSPADM

## 2020-04-14 RX ADMIN — GABAPENTIN 300 MG: 300 CAPSULE ORAL at 03:04

## 2020-04-14 RX ADMIN — ACETAMINOPHEN 650 MG: 325 TABLET ORAL at 11:04

## 2020-04-14 RX ADMIN — CYCLOBENZAPRINE 10 MG: 10 TABLET, FILM COATED ORAL at 08:04

## 2020-04-14 RX ADMIN — CHLORHEXIDINE GLUCONATE 0.12% ORAL RINSE 10 ML: 1.2 LIQUID ORAL at 08:04

## 2020-04-14 RX ADMIN — CYCLOBENZAPRINE 10 MG: 10 TABLET, FILM COATED ORAL at 03:04

## 2020-04-14 RX ADMIN — ACETAMINOPHEN 650 MG: 325 TABLET ORAL at 04:04

## 2020-04-14 RX ADMIN — GABAPENTIN 300 MG: 300 CAPSULE ORAL at 08:04

## 2020-04-14 RX ADMIN — POTASSIUM & SODIUM PHOSPHATES POWDER PACK 280-160-250 MG 1 PACKET: 280-160-250 PACK at 08:04

## 2020-04-14 RX ADMIN — Medication 400 MG: at 08:04

## 2020-04-14 RX ADMIN — TAMSULOSIN HYDROCHLORIDE 0.4 MG: 0.4 CAPSULE ORAL at 08:04

## 2020-04-14 RX ADMIN — POTASSIUM & SODIUM PHOSPHATES POWDER PACK 280-160-250 MG 1 PACKET: 280-160-250 PACK at 03:04

## 2020-04-14 RX ADMIN — ENOXAPARIN SODIUM 40 MG: 100 INJECTION SUBCUTANEOUS at 04:04

## 2020-04-14 RX ADMIN — SENNOSIDES AND DOCUSATE SODIUM 1 TABLET: 8.6; 5 TABLET ORAL at 08:04

## 2020-04-14 RX ADMIN — PANTOPRAZOLE SODIUM 40 MG: 40 TABLET, DELAYED RELEASE ORAL at 08:04

## 2020-04-14 NOTE — PLAN OF CARE
POC discussed with patient, verbalized understanding.   VSS. AAO X 4.   Voids per liz cath.   Turns independently in bed.   Ambulated half the length of hallway during shift. Tolerated well.  Fall precautions in place.   Side rails up X2.    Call bell on, working and within reach.   Bed locked in low position.   Remains free from falls/injuries.   Denies needs at this time.   Will continue to monitor.

## 2020-04-14 NOTE — PROGRESS NOTES
[facility]  Colorectal Surgery  Progress Note    Subjective:     History of Present Illness:  54-year-old male with rectal adenocarcinoma who developed a rectal perforation while on chemotherapy during his neoadjuvant treatment who presents for definitive surgical resection    Post-Op Info:  Procedure(s) (LRB):  PROCTECTOMY, ABDOMINOPERINEAL (N/A)  CREATION, FLAP, MUSCLE ROTATION (Left)  FLAP GRAFT (N/A)   5 Days Post-Op     Interval History: Nausea and vomiting resolved. Tolerating CLD well. Increased stoma output. Pain well controlled. Ambulating well.    Medications:  Continuous Infusions:   dextrose 5 % and 0.45 % NaCl with KCl 20 mEq 100 mL/hr at 04/13/20 2128     Scheduled Meds:   chlorhexidine  10 mL Mouth/Throat BID    cyclobenzaprine  10 mg Oral TID    enoxaparin  40 mg Subcutaneous Daily    gabapentin  300 mg Oral TID    magnesium oxide  400 mg Oral BID    nozaseptin   Each Nostril BID    pantoprazole  40 mg Intravenous Daily    potassium, sodium phosphates  1 packet Oral TID    potassium, sodium phosphates  2 packet Oral Once    senna-docusate 8.6-50 mg  1 tablet Oral BID    tamsulosin  0.4 mg Oral Daily     PRN Meds:acetaminophen, diphenhydrAMINE, morphine, morphine, naloxone, ondansetron, promethazine (PHENERGAN) IVPB     Review of patient's allergies indicates:  No Known Allergies  Objective:     Vital Signs (Most Recent):  Temp: 98.7 °F (37.1 °C) (04/14/20 0417)  Pulse: 80 (04/14/20 0729)  Resp: 14 (04/14/20 0417)  BP: 120/75 (04/14/20 0417)  SpO2: 97 % (04/14/20 0729) Vital Signs (24h Range):  Temp:  [98.7 °F (37.1 °C)-99.4 °F (37.4 °C)] 98.7 °F (37.1 °C)  Pulse:  [80-93] 80  Resp:  [14-20] 14  SpO2:  [95 %-99 %] 97 %  BP: (120-154)/(75-87) 120/75     Weight: 70.9 kg (156 lb 6.7 oz)  Body mass index is 21.21 kg/m².    Intake/Output - Last 3 Shifts       04/12 0700 - 04/13 0659 04/13 0700 - 04/14 0659 04/14 0700 - 04/15 0659    P.O. 0 220     I.V. (mL/kg) 2428.3 (33.4)  1200 (16.9)     IV Piggyback  50     Total Intake(mL/kg) 2428.3 (33.4) 1470 (20.7)     Urine (mL/kg/hr) 1000 (0.6) 2250 (1.3)     Emesis/NG output       Drains 25 55     Stool 0      Total Output 1025 2305     Net +1403.3 -835                  Physical Exam   Constitutional: He is oriented to person, place, and time. He appears well-developed.   HENT:   Head: Normocephalic and atraumatic.   Eyes: Conjunctivae and EOM are normal.   Neck: Normal range of motion. No thyromegaly present.   Cardiovascular: Normal rate and regular rhythm.   Pulmonary/Chest: Effort normal. No respiratory distress.   Abdominal:   Soft, mild distention (improved), appropriately TTP; incision c/d/i without erythema; ROBETR drains with SS output in bulbs; ostomy pink/viable with minimal semisolid stool in bag   Genitourinary:   Genitourinary Comments: Perineal flap perfused and viable with healthy appearing skin paddle/edges   Musculoskeletal: Normal range of motion. He exhibits no edema or tenderness.   Neurological: He is alert and oriented to person, place, and time.   Skin: Skin is warm and dry. Capillary refill takes less than 2 seconds. No rash noted.   Psychiatric: He has a normal mood and affect.       Significant Labs:  CBC:   Recent Labs   Lab 04/14/20  0409   WBC 5.00   RBC 3.49*   HGB 9.7*   HCT 30.5*      MCV 87   MCH 27.8   MCHC 31.8*     BMP:   Recent Labs   Lab 04/14/20  0409         K 3.7      CO2 24   BUN 10   CREATININE 0.7   CALCIUM 8.2*   MG 1.8     Assessment/Plan:     * Rectal cancer  53yo M with perforated rectal adenocarcinoma who is now s/p APR with extra-anatomic resection of fistula tract and partial left gluteus muscle and VRAM by plastics on 4/9/2020    - Advance to reg diet  - HLIV  - OOB, ambulation encouraged  - Serial flap checks  - PO pain meds  - Cont ROBERT drains to bulb sxn  - IS 10xs/hr while awake; pulmonary toileting  - PPx: PPI and LVNX    Smoker  Encouraged smoking cessation. Will  attempt no nicotine patch given flap  Pulmonary toilet        Jan New MD  Colorectal Surgery  [facility]

## 2020-04-14 NOTE — PLAN OF CARE
Patient free from injury or fall this shift.  NAD.  VSS.  Denies pain/nausea.  Cooney catheter removed this shift  IJ line removed this shift.  ROBERT drainx2 functioning appropriately.  Abdominal binder in place.    Chart check completed. Will monitor.

## 2020-04-14 NOTE — SUBJECTIVE & OBJECTIVE
Interval History: Nausea and vomiting resolved. Tolerating CLD well. Increased stoma output. Pain well controlled. Ambulating well.    Medications:  Continuous Infusions:   dextrose 5 % and 0.45 % NaCl with KCl 20 mEq 100 mL/hr at 04/13/20 2128     Scheduled Meds:   chlorhexidine  10 mL Mouth/Throat BID    cyclobenzaprine  10 mg Oral TID    enoxaparin  40 mg Subcutaneous Daily    gabapentin  300 mg Oral TID    magnesium oxide  400 mg Oral BID    nozaseptin   Each Nostril BID    pantoprazole  40 mg Intravenous Daily    potassium, sodium phosphates  1 packet Oral TID    potassium, sodium phosphates  2 packet Oral Once    senna-docusate 8.6-50 mg  1 tablet Oral BID    tamsulosin  0.4 mg Oral Daily     PRN Meds:acetaminophen, diphenhydrAMINE, morphine, morphine, naloxone, ondansetron, promethazine (PHENERGAN) IVPB     Review of patient's allergies indicates:  No Known Allergies  Objective:     Vital Signs (Most Recent):  Temp: 98.7 °F (37.1 °C) (04/14/20 0417)  Pulse: 80 (04/14/20 0729)  Resp: 14 (04/14/20 0417)  BP: 120/75 (04/14/20 0417)  SpO2: 97 % (04/14/20 0729) Vital Signs (24h Range):  Temp:  [98.7 °F (37.1 °C)-99.4 °F (37.4 °C)] 98.7 °F (37.1 °C)  Pulse:  [80-93] 80  Resp:  [14-20] 14  SpO2:  [95 %-99 %] 97 %  BP: (120-154)/(75-87) 120/75     Weight: 70.9 kg (156 lb 6.7 oz)  Body mass index is 21.21 kg/m².    Intake/Output - Last 3 Shifts       04/12 0700 - 04/13 0659 04/13 0700 - 04/14 0659 04/14 0700 - 04/15 0659    P.O. 0 220     I.V. (mL/kg) 2428.3 (33.4) 1200 (16.9)     IV Piggyback  50     Total Intake(mL/kg) 2428.3 (33.4) 1470 (20.7)     Urine (mL/kg/hr) 1000 (0.6) 2250 (1.3)     Emesis/NG output       Drains 25 55     Stool 0      Total Output 1025 2305     Net +1403.3 -835                  Physical Exam   Constitutional: He is oriented to person, place, and time. He appears well-developed.   HENT:   Head: Normocephalic and atraumatic.   Eyes: Conjunctivae and EOM are normal.   Neck: Normal  range of motion. No thyromegaly present.   Cardiovascular: Normal rate and regular rhythm.   Pulmonary/Chest: Effort normal. No respiratory distress.   Abdominal:   Soft, mild distention (improved), appropriately TTP; incision c/d/i without erythema; ROBERT drains with SS output in bulbs; ostomy pink/viable with minimal semisolid stool in bag   Genitourinary:   Genitourinary Comments: Perineal flap perfused and viable with healthy appearing skin paddle/edges   Musculoskeletal: Normal range of motion. He exhibits no edema or tenderness.   Neurological: He is alert and oriented to person, place, and time.   Skin: Skin is warm and dry. Capillary refill takes less than 2 seconds. No rash noted.   Psychiatric: He has a normal mood and affect.       Significant Labs:  CBC:   Recent Labs   Lab 04/14/20  0409   WBC 5.00   RBC 3.49*   HGB 9.7*   HCT 30.5*      MCV 87   MCH 27.8   MCHC 31.8*     BMP:   Recent Labs   Lab 04/14/20  0409         K 3.7      CO2 24   BUN 10   CREATININE 0.7   CALCIUM 8.2*   MG 1.8

## 2020-04-14 NOTE — ASSESSMENT & PLAN NOTE
55yo M with perforated rectal adenocarcinoma who is now s/p APR with extra-anatomic resection of fistula tract and partial left gluteus muscle and VRAM by plastics on 4/9/2020    - Advance to reg diet  - HLIV  - OOB, ambulation encouraged  - Serial flap checks  - PO pain meds  - Cont ROBERT drains to bulb sxn  - IS 10xs/hr while awake; pulmonary toileting  - PPx: PPI and LVNX

## 2020-04-14 NOTE — NURSING
Patient ambulated the entire length of the hallway. Tolerated well. States does not have much pain, but mainly stiffness.

## 2020-04-15 VITALS
HEIGHT: 72 IN | TEMPERATURE: 99 F | DIASTOLIC BLOOD PRESSURE: 98 MMHG | OXYGEN SATURATION: 99 % | WEIGHT: 156.44 LBS | RESPIRATION RATE: 16 BRPM | SYSTOLIC BLOOD PRESSURE: 170 MMHG | BODY MASS INDEX: 21.19 KG/M2 | HEART RATE: 89 BPM

## 2020-04-15 PROCEDURE — 25000003 PHARM REV CODE 250: Performed by: SURGERY

## 2020-04-15 PROCEDURE — 25000003 PHARM REV CODE 250: Performed by: COLON & RECTAL SURGERY

## 2020-04-15 PROCEDURE — 99024 PR POST-OP FOLLOW-UP VISIT: ICD-10-PCS | Mod: ,,, | Performed by: COLON & RECTAL SURGERY

## 2020-04-15 PROCEDURE — 99024 POSTOP FOLLOW-UP VISIT: CPT | Mod: ,,, | Performed by: COLON & RECTAL SURGERY

## 2020-04-15 PROCEDURE — 94761 N-INVAS EAR/PLS OXIMETRY MLT: CPT

## 2020-04-15 RX ORDER — ACETAMINOPHEN 325 MG/1
650 TABLET ORAL EVERY 6 HOURS PRN
Refills: 0
Start: 2020-04-15

## 2020-04-15 RX ADMIN — CYCLOBENZAPRINE 10 MG: 10 TABLET, FILM COATED ORAL at 08:04

## 2020-04-15 RX ADMIN — ACETAMINOPHEN 650 MG: 325 TABLET ORAL at 08:04

## 2020-04-15 RX ADMIN — GABAPENTIN 300 MG: 300 CAPSULE ORAL at 08:04

## 2020-04-15 RX ADMIN — POTASSIUM & SODIUM PHOSPHATES POWDER PACK 280-160-250 MG 1 PACKET: 280-160-250 PACK at 08:04

## 2020-04-15 RX ADMIN — TAMSULOSIN HYDROCHLORIDE 0.4 MG: 0.4 CAPSULE ORAL at 08:04

## 2020-04-15 RX ADMIN — SENNOSIDES AND DOCUSATE SODIUM 1 TABLET: 8.6; 5 TABLET ORAL at 08:04

## 2020-04-15 RX ADMIN — PANTOPRAZOLE SODIUM 40 MG: 40 TABLET, DELAYED RELEASE ORAL at 08:04

## 2020-04-15 RX ADMIN — Medication 400 MG: at 08:04

## 2020-04-15 NOTE — PLAN OF CARE
Discharge instructions reviewed. IV d/c'd, tip intact. Patient ride awaiting. PCT walked out with patient with walker.

## 2020-04-15 NOTE — DISCHARGE SUMMARY
[facility] -   Colorectal Surgery  Discharge Summary      Patient Name: Sukhjinder Presley  MRN: [id number]  Admission Date: 4/9/2020  Hospital Length of Stay: 6 days  Discharge Date and Time:  04/15/2020 9:52 AM  Attending Physician: Jan New MD   Discharging Provider: Jan New MD  Primary Care Provider: Drake Elizalde MD    HPI:   54-year-old male with rectal adenocarcinoma who developed a rectal perforation while on chemotherapy during his neoadjuvant treatment who presents for definitive surgical resection    Procedure(s) (LRB):  PROCTECTOMY, ABDOMINOPERINEAL (N/A)  CREATION, FLAP, MUSCLE ROTATION (Left)  FLAP GRAFT (N/A)      Indwelling Lines/Drains at time of discharge:   Lines/Drains/Airways     Drain                 Colostomy 02/18/20 1410 LLQ 56 days         Closed/Suction Drain 04/09/20 1352 Right Buttock Bulb 15 Fr. 5 days         Closed/Suction Drain 04/09/20 1420 Abdomen Bulb 15 Fr. 5 days              Hospital Course: 04/09/2020: Underwent APR with extra-anatomic resection of fistula tract/partial left gluteus muscle and VRAM by plastics    04/10/2020: POD1. HD stable overnight. Pain well controlled. Tolerating clears without nausea or vomiting. Remains on bedrest per plastics instructions.    04/11/2020 POD2.  Febrile and tachycardic yesterday.  Reports some emesis overnight, plan for ambulation today    04/12/2020 POD3. Tachycardia improving, afebrile, emesis yesterday improving    04/13/2020 POD4. Still with some nausea/vomiting. Ambulating well. Pain well controlled. Minimal stoma output.    04/14/2020 POD5. Nausea and vomiting resolved. Tolerating CLD well. Increased stoma output. Pain well controlled. Ambulating well.    04/15/2020 POD6. Tolerating regular diet. Stoma output normalized. Ambulating well. Pain well controlled. Flap remains viable and wellperfused. Ready for discharge home.    Consults:     Significant Diagnostic Studies: Labs:   BMP:   Recent Labs   Lab  04/14/20  0409         K 3.7      CO2 24   BUN 10   CREATININE 0.7   CALCIUM 8.2*   MG 1.8   , CMP   Recent Labs   Lab 04/14/20  0409      K 3.7      CO2 24      BUN 10   CREATININE 0.7   CALCIUM 8.2*   ANIONGAP 8   ESTGFRAFRICA >60   EGFRNONAA >60    and CBC   Recent Labs   Lab 04/14/20 0409   WBC 5.00   HGB 9.7*   HCT 30.5*          Pending Diagnostic Studies:     Procedure Component Value Units Date/Time    Specimen to Pathology, Surgery General Surgery [[id number]] Collected:  04/09/20 1426    Order Status:  Sent Lab Status:  In process Updated:  04/13/20 0838        Final Active Diagnoses:    Diagnosis Date Noted POA    PRINCIPAL PROBLEM:  Rectal cancer [C20] 06/24/2019 Yes    Smoker [F17.200] 05/27/2019 Yes      Problems Resolved During this Admission:      Discharged Condition: good    Disposition: Home-Health Care [facility]    Follow Up:  Follow-up Information     Jan New MD On 4/27/2020.    Specialty:  Colon and Rectal Surgery  Why:  For inperson wound check  Contact information:  [address] [place] [address] [address] [place] [address]  [phone]             Sebastian Dan MD On 4/22/2020.    Specialty:  Plastic Surgery  Why:  Postop wound check, drain and staple removal  Contact information:  [address] [facility]  [place] [address]  [phone]                 Patient Instructions:      Ambulatory referral/consult to Home Health   Standing Status: Future   Referral Priority: Routine Referral Type: Home Health Care   Referral Reason: Specialty Services Required   Requested Specialty: Home Health Services   Number of Visits Requested: 1     Diet Adult Regular     Lifting restrictions   Order Comments: No lifting >10lbs for 6 weeks after surgery     No driving until:   Order Comments: Off narcotic pain medication and able to safely react to other drivers     Other restrictions (specify):   Order Comments: Showers only for three weeks. No submerging  incisions/flap under water including baths, hot tubs, pools or other bodies of water     Notify your health care provider if you experience any of the following:  increased confusion or weakness     Notify your health care provider if you experience any of the following:  persistent dizziness, light-headedness, or visual disturbances     Notify your health care provider if you experience any of the following:  worsening rash     Notify your health care provider if you experience any of the following:  severe persistent headache     Notify your health care provider if you experience any of the following:  difficulty breathing or increased cough     Notify your health care provider if you experience any of the following:  redness, tenderness, or signs of infection (pain, swelling, redness, odor or green/yellow discharge around incision site)     Notify your health care provider if you experience any of the following:  severe uncontrolled pain     Notify your health care provider if you experience any of the following:  persistent nausea and vomiting or diarrhea     Notify your health care provider if you experience any of the following:  temperature >100.4     Activity as tolerated     Medications:  Reconciled Home Medications:      Medication List      START taking these medications    acetaminophen 325 MG tablet  Commonly known as:  TYLENOL  Take 2 tablets (650 mg total) by mouth every 6 (six) hours as needed.        CONTINUE taking these medications    capecitabine 500 MG Tab  Commonly known as:  XELODA  Take 3 tablets (1500 mg) by mouth twice daily after breakfast and dinner on days of radiation only.     dronabinoL 10 MG capsule  Commonly known as:  MARINOL  Take 1 capsule (10 mg total) by mouth 2 (two) times daily before meals.     iron fum-B12-IF-C-folic acid 110-0.5 mg capsule  Commonly known as:  FOLTRIN  Take 1 capsule by mouth 2 (two) times daily.     magic mouthwash diphen/antac/lidoc  Swish and spit 15 ml  by mouth every 4 hours as needed     morphine 30 MG 12 hr tablet  Commonly known as:  MS CONTIN  Take 1 tablet (30 mg total) by mouth every 8 (eight) hours.     oxyCODONE 10 mg Tab immediate release tablet  Commonly known as:  ROXICODONE  Take 1 tablet (10 mg total) by mouth every 4 (four) hours as needed for Pain (medically necessary).     promethazine 25 MG tablet  Commonly known as:  PHENERGAN  Take 1 tablet (25 mg total) by mouth every 4 (four) hours.        STOP taking these medications    diphenoxylate-atropine 2.5-0.025 mg 2.5-0.025 mg per tablet  Commonly known as:  LOMOTIL     lidocaine 5 % Oint ointment  Commonly known as:  XYLOCAINE     metroNIDAZOLE 500 MG tablet  Commonly known as:  FLAGYL     neomycin 500 mg Tab  Commonly known as:  MYCIFRADIN     PIPERACILLIN-TAZOBACTAM 4.5G/100ML D5W IVPB (READY TO MIX)     pramoxine 1 % Foam  Commonly known as:  PROCTOFOAM          Time spent on the discharge of patient: 35 minutes    Jan New MD  Colorectal Surgery  [facility]

## 2020-04-15 NOTE — PLAN OF CARE
Referral sent to [facility] via [facility].     04/15/20 0402   Post-Acute Status   Post-Acute Authorization [facility]   Home Health Status Referrals Sent

## 2020-04-15 NOTE — PLAN OF CARE
Apr22 Follow up with Sebastian Dan MD   Wednesday Apr 22, 2020   Postop wound check, drain and staple removal  [address] [facility]   [place] [address]   [phone]    Apr27 Follow up with Jan New MD   Monday Apr 27, 2020   For inperson wound check  [address] [place] [address]   [phone]    Patient accepted by [facility]     04/15/20 1329   Final Note   Assessment Type Final Discharge Note   Anticipated Discharge Disposition [facility]   Hospital Follow Up  Appt(s) scheduled? Yes   Right Care Referral Info   Post Acute Recommendation Home-care   Facility Name [facility]

## 2020-04-16 ENCOUNTER — TELEPHONE (OUTPATIENT)
Dept: UROLOGY | Facility: CLINIC | Age: 55
End: 2020-04-16

## 2020-04-16 NOTE — TELEPHONE ENCOUNTER
This is a patient of Dr. New; pt had a proctectomy on 4/9/20 and is totally incontinent now.  Hernan wants to know if you can see this pt and possible insert liz.  I would advise pt to go to ER due to the fact that his surgery was so recent;  I definitely would not feel comfortable inserting a liz at this time; please advise.

## 2020-04-17 ENCOUNTER — PATIENT MESSAGE (OUTPATIENT)
Dept: SURGERY | Facility: CLINIC | Age: 55
End: 2020-04-17

## 2020-04-20 ENCOUNTER — PATIENT OUTREACH (OUTPATIENT)
Dept: ADMINISTRATIVE | Facility: OTHER | Age: 55
End: 2020-04-20

## 2020-04-20 ENCOUNTER — PATIENT MESSAGE (OUTPATIENT)
Dept: UROLOGY | Facility: CLINIC | Age: 55
End: 2020-04-20

## 2020-04-20 ENCOUNTER — TELEPHONE (OUTPATIENT)
Dept: SURGERY | Facility: CLINIC | Age: 55
End: 2020-04-20

## 2020-04-20 ENCOUNTER — OFFICE VISIT (OUTPATIENT)
Dept: UROLOGY | Facility: CLINIC | Age: 55
End: 2020-04-20
Payer: COMMERCIAL

## 2020-04-20 ENCOUNTER — TELEPHONE (OUTPATIENT)
Dept: UROLOGY | Facility: CLINIC | Age: 55
End: 2020-04-20

## 2020-04-20 VITALS
TEMPERATURE: 99 F | SYSTOLIC BLOOD PRESSURE: 130 MMHG | WEIGHT: 153.25 LBS | DIASTOLIC BLOOD PRESSURE: 80 MMHG | BODY MASS INDEX: 20.78 KG/M2

## 2020-04-20 DIAGNOSIS — R33.9 URINARY RETENTION: Primary | ICD-10-CM

## 2020-04-20 LAB
FINAL PATHOLOGIC DIAGNOSIS: NORMAL
FROZEN SECTION DIAGNOSIS: NORMAL
FROZEN SECTION FOOTNOTE: NORMAL
GROSS: NORMAL
POC RESIDUAL URINE VOLUME: 703 ML (ref 0–100)

## 2020-04-20 PROCEDURE — 51798 POCT BLADDER SCAN: ICD-10-PCS | Mod: S$GLB,,, | Performed by: UROLOGY

## 2020-04-20 PROCEDURE — 99244 PR OFFICE CONSULTATION,LEVEL IV: ICD-10-PCS | Mod: S$GLB,,, | Performed by: UROLOGY

## 2020-04-20 PROCEDURE — 99244 OFF/OP CNSLTJ NEW/EST MOD 40: CPT | Mod: S$GLB,,, | Performed by: UROLOGY

## 2020-04-20 PROCEDURE — 99999 PR PBB SHADOW E&M-EST. PATIENT-LVL III: CPT | Mod: PBBFAC,,, | Performed by: UROLOGY

## 2020-04-20 PROCEDURE — 99999 PR PBB SHADOW E&M-EST. PATIENT-LVL III: ICD-10-PCS | Mod: PBBFAC,,, | Performed by: UROLOGY

## 2020-04-20 PROCEDURE — 51798 US URINE CAPACITY MEASURE: CPT | Mod: S$GLB,,, | Performed by: UROLOGY

## 2020-04-20 RX ORDER — TAMSULOSIN HYDROCHLORIDE 0.4 MG/1
0.4 CAPSULE ORAL DAILY
Qty: 30 CAPSULE | Refills: 11 | Status: SHIPPED | OUTPATIENT
Start: 2020-04-20 | End: 2020-05-27 | Stop reason: SDUPTHER

## 2020-04-20 NOTE — PROGRESS NOTES
Chief Complaint: Urinary Retention    HPI:   4/20/20: 53 yo man had colon cancer with resection last week, referred by Dr. New.  Is in retention postoperatively.  No unusual abd/pelvic pain and no exac/rel factors.  No hematuria.  No urolithiasis.  PVR >700 ml.  No  history.      Allergies:  Patient has no known allergies.    Medications:  has a current medication list which includes the following prescription(s): acetaminophen, hydrocodone/acetaminophen, morphine, oxycodone, promethazine, capecitabine, dronabinol, iron fum-b12-if-c-folic acid, and magic mouthwash diphen/antac/lidoc.    Review of Systems:  General: No fever, chills, fatigability, or weight loss.  Skin: No rashes, itching, or changes in color or texture of skin.  Chest: Denies MARTIN, cyanosis, wheezing, cough, and sputum production.  Abdomen: Appetite fine. No weight loss.   Musculoskeletal: No joint stiffness or swelling. Denies back pain.  : As above.  All other review of systems negative.    PMH:   has a past medical history of Anemia and Cancer.    PSH:   has a past surgical history that includes Hernia repair (1995); Tonsillectomy; Colonoscopy (N/A, 6/6/2019); Esophagogastroduodenoscopy (N/A, 6/6/2019); Insertion of tunneled central venous catheter (CVC) with subcutaneous port (N/A, 10/8/2019); Injection of anesthetic agent into tissue plane defined by transversus abdominis muscle (N/A, 2/18/2020); Colon surgery; Laparoscopic colostomy; Abdominoperineal resection of rectum (N/A, 4/9/2020); Creation of muscle rotational flap (Left, 4/9/2020); and Flap graft surgery (N/A, 4/9/2020).    FamHx: family history includes Coronary artery disease in his brother and maternal grandmother; Intestinal malrotation in his mother; Leukemia in his father; No Known Problems in his sister; Stomach cancer in his maternal grandfather.    SocHx:  reports that he has been smoking cigarettes. He started smoking about 36 years ago. He has a 35.00 pack-year smoking  history. He has quit using smokeless tobacco.  His smokeless tobacco use included chew. He reports that he drinks about 46.0 standard drinks of alcohol per week. He reports that he does not use drugs.      Physical Exam:  Vitals:    04/20/20 0954   BP: 130/80   Temp: 98.6 °F (37 °C)     General: A&Ox3, no apparent distress, no deformities  Neck: No masses, normal thyroid  Lungs: normal inspiration, no use of accessory muscles  Heart: normal pulse, no arrhythmias  Abdomen: Soft, NT, ND, no masses, no hernias, no hepatosplenomegaly.  Midline wound intact with staples; colostomy  Lymphatic: Neck and groin nodes negative  Skin: The skin is warm and dry. No jaundice.  Ext: No c/c/e.  : Test desc tim, no abnormalities of epididymus. Penis normal, with normal penile and scrotal skin. Meatus normal.     Labs/Studies:     Impression/Plan:   1. Cooney today.  Start flomax.  Cysto/uroflow in 2 weeks.

## 2020-04-20 NOTE — TELEPHONE ENCOUNTER
"The following message was sent to Dr New via In Basket after speaking with pt:  "Pt states he is still having urinary incontinence. States it isn't as bad as it was but is still happening. Says the throwing up stated in this message has stopped and really only happened on Friday."    "

## 2020-04-20 NOTE — TELEPHONE ENCOUNTER
Returned call to pt; stated he would not be able to make his 10am appt today; he asked if he could be seen on tomorrow and I told him that Dr. Altamirano was not in clinic on tomorrow; pt then said he would try to find a ride for the 10am appt this morning.

## 2020-04-20 NOTE — LETTER
April 20, 2020        Jan New MD  [address] [place] [address] Dr [place] [id number]             [facility]  [address]  [place] [address]  Phone: [phone]  Fax: [phone]   Patient: Sukhjinder Presley   MR Number: [id number]   YOB: 1965   Date of Visit: 4/20/2020       Dear Dr. New:    Thank you for referring Sukhjinder Presley to me for evaluation. Below are the relevant portions of my assessment and plan of care.            If you have questions, please do not hesitate to call me. I look forward to following Sukhjinder along with you.    Sincerely,      Artem Altamirano IV, MD           CC  No Recipients

## 2020-04-20 NOTE — PROGRESS NOTES
..Patient was informed that MD ordered that a liz catheter needed to placed.  The procedure was explained to pt and was told what to expect to help him relax and avoid anxiety.  Pt was then asked to get on the exam table and lie on his back.  Equipment was gathered and hands washed.  Patient's private area was cleansed with betadine to remove any debris. Lubricant was applied to to tip of the catheter (size #18 Fr).  The shaft of the penis was held and the tip of the liz catheter was gently inserted until urine began to flow.  10cc of sterile water was inserted to inflate balloon.  Catheter tubing was then secured to pts left upper thigh .750cc urine collected in  bag.  Patient was instructed on how to care for the catheter as well as how to drain urine from the bag.  Patient verbalized instructions.

## 2020-04-20 NOTE — Clinical Note
April 20, 2020      Provider Ben Chapman - Urology  [address] [place] [address]  [facility] [address]  Phone: [phone]  Fax: [phone]          Patient: Sukhjinder Presley   MR Number: [id number]   YOB: 1965   Date of Visit: 4/20/2020       Dear Provider Ben:    Thank you for referring Sukhjinder Presley to me for evaluation. Attached you will find relevant portions of my assessment and plan of care.    If you have questions, please do not hesitate to call me. I look forward to following Sukhjinder Presley along with you.    Sincerely,    Artem Altamirano IV, MD    Enclosure  CC:  No Recipients    If you would like to receive this communication electronically, please contact [email or web address] or [phone] to request more information on [employer] access.    For providers and/or their staff who would like to refer a patient to [facility], please contact us through our one-stop-shop provider referral line, [facility], at [phone].    If you feel you have received this communication in error or would no longer like to receive these types of communications, please e-mail [email or web address]

## 2020-04-21 ENCOUNTER — PATIENT MESSAGE (OUTPATIENT)
Dept: UROLOGY | Facility: CLINIC | Age: 55
End: 2020-04-21

## 2020-04-21 ENCOUNTER — EXTERNAL HOME HEALTH (OUTPATIENT)
Dept: HOME HEALTH SERVICES | Facility: HOSPITAL | Age: 55
End: 2020-04-21
Payer: COMMERCIAL

## 2020-04-21 ENCOUNTER — DOCUMENTATION ONLY (OUTPATIENT)
Dept: HEMATOLOGY/ONCOLOGY | Facility: CLINIC | Age: 55
End: 2020-04-21

## 2020-04-21 NOTE — PROGRESS NOTES
Sw applied patient for [employer] al on today. Sw printed a copy for records and will be placed in pt's folder.

## 2020-04-27 ENCOUNTER — OFFICE VISIT (OUTPATIENT)
Dept: SURGERY | Facility: CLINIC | Age: 55
End: 2020-04-27
Payer: COMMERCIAL

## 2020-04-27 VITALS
WEIGHT: 151 LBS | BODY MASS INDEX: 20.48 KG/M2 | TEMPERATURE: 99 F | SYSTOLIC BLOOD PRESSURE: 126 MMHG | HEART RATE: 112 BPM | DIASTOLIC BLOOD PRESSURE: 82 MMHG

## 2020-04-27 DIAGNOSIS — C20 RECTAL CANCER: Primary | ICD-10-CM

## 2020-04-27 PROCEDURE — 99999 PR PBB SHADOW E&M-EST. PATIENT-LVL III: CPT | Mod: PBBFAC,,, | Performed by: COLON & RECTAL SURGERY

## 2020-04-27 PROCEDURE — 99999 PR PBB SHADOW E&M-EST. PATIENT-LVL III: ICD-10-PCS | Mod: PBBFAC,,, | Performed by: COLON & RECTAL SURGERY

## 2020-04-27 PROCEDURE — 99024 POSTOP FOLLOW-UP VISIT: CPT | Mod: S$GLB,,, | Performed by: COLON & RECTAL SURGERY

## 2020-04-27 PROCEDURE — 99024 PR POST-OP FOLLOW-UP VISIT: ICD-10-PCS | Mod: S$GLB,,, | Performed by: COLON & RECTAL SURGERY

## 2020-04-27 NOTE — PROGRESS NOTES
History & Physical    SUBJECTIVE:     Chief Complaint   Patient presents with    Post-op Evaluation     Post Op   CC: rectal adenocarcinoma  Ref: Anjelica Saavedra MD    History of Present Illness:  Patient is a 54 y.o. male presents for evaluation of rectal cancer.  Patient reports he has had increasing pelvic/rectal pain along with decreased bowel movements over the last 6 weeks.  Reports an uncomfortable feeling in his pelvis associated with mucous discharge from his rectum as well as bloody bowel movements that are thin and less than normal. He reports associated chills, fatigue and 16 lb weight loss over the past 5 months.  He has also noticed a decrease in appetite as well. He underwent a colonoscopy on 06/06/2019 where a nonobstructing rectal mass was found with biopsy showing well-differentiated adenocarcinoma.  He was then referred to Colorectal surgery Clinic for evaluation.  He has no family history of colorectal cancer or IBD.    8/13/19: Completed neoadj chemoXRT  2/18/2020: Laparoscopic loop sigmoid colostomy 2/2 rectal perforation on chemotx  4/9/2020: APR with extra anatomic resection of fistula tract and left gluteus muscle, omental pedicle flap and VRAM with skin paddle by plastics    Interval history:  Since last clinic visit, the patient underwent APR with extra anatomic resection of fistula tract and left gluteus muscle with omental pedicle flap and VRAM with skin paddle reconstruction with plastic surgery.  After discharge, the patient did suffer from recurrent urinary retention requiring Cooney catheter placement.  He still has Cooney catheter in place.  Since that placement, he has done well by tolerating regular diet and having good colostomy function.  He still has intermittent pain near his tailbone and flap site.  His drains have been removed by Plastic surgery.  He denies any current fever, chills, nausea, vomiting.    Review of patient's allergies indicates:  No Known Allergies    Current  Outpatient Medications   Medication Sig Dispense Refill    acetaminophen (TYLENOL) 325 MG tablet Take 2 tablets (650 mg total) by mouth every 6 (six) hours as needed.  0    HYDROCODONE-ACETAMINOPHEN ORAL Take by mouth as needed.      oxyCODONE (ROXICODONE) 10 mg Tab immediate release tablet Take 1 tablet (10 mg total) by mouth every 4 (four) hours as needed for Pain (medically necessary). 90 tablet 0    tamsulosin (FLOMAX) 0.4 mg Cap Take 1 capsule (0.4 mg total) by mouth once daily. 30 capsule 11    capecitabine (XELODA) 500 MG Tab Take 3 tablets (1500 mg) by mouth twice daily after breakfast and dinner on days of radiation only. (Patient not taking: Reported on 4/27/2020.) 168 tablet 0    dronabinol (MARINOL) 10 MG capsule Take 1 capsule (10 mg total) by mouth 2 (two) times daily before meals. (Patient not taking: Reported on 4/27/2020) 60 capsule 0    iron fum-B12-IF-C-folic acid (FOLTRIN) 110-0.5 mg capsule Take 1 capsule by mouth 2 (two) times daily. (Patient not taking: Reported on 4/27/2020) 60 capsule 1    magic mouthwash diphen/antac/lidoc Swish and spit 15 ml by mouth every 4 hours as needed (Patient not taking: Reported on 4/27/2020) 500 mL 2    morphine (MS CONTIN) 30 MG 12 hr tablet Take 1 tablet (30 mg total) by mouth every 8 (eight) hours. (Patient not taking: Reported on 4/27/2020) 90 tablet 0    promethazine (PHENERGAN) 25 MG tablet Take 1 tablet (25 mg total) by mouth every 4 (four) hours. (Patient not taking: Reported on 4/27/2020) 60 tablet 4     No current facility-administered medications for this visit.        Past Medical History:   Diagnosis Date    Anemia     Cancer      Past Surgical History:   Procedure Laterality Date    ABDOMINOPERINEAL RESECTION OF RECTUM N/A 4/9/2020    Procedure: PROCTECTOMY, ABDOMINOPERINEAL;  Surgeon: Jan New MD;  Location: [facility];  Service: General;  Laterality: N/A;    COLON SURGERY      COLONOSCOPY N/A 6/6/2019    Procedure:  COLONOSCOPY;  Surgeon: Anjelica Saavedra MD;  Location: [facility];  Service: Endoscopy;  Laterality: N/A;    CREATION OF MUSCLE ROTATIONAL FLAP Left 4/9/2020    Procedure: CREATION, FLAP, MUSCLE ROTATION;  Surgeon: Sebastian Dan MD;  Location: [facility] OR;  Service: Plastics;  Laterality: Left;   VRAM    ESOPHAGOGASTRODUODENOSCOPY N/A 6/6/2019    Procedure: EGD (ESOPHAGOGASTRODUODENOSCOPY);  Surgeon: Anjelica Saavedra MD;  Location: [facility];  Service: Endoscopy;  Laterality: N/A;    FLAP GRAFT SURGERY N/A 4/9/2020    Procedure: FLAP GRAFT;  Surgeon: Sebastian Dan MD;  Location: [facility] OR;  Service: Plastics;  Laterality: N/A;  left fasciocutaneous buttocks flap    HERNIA REPAIR  1995    INJECTION OF ANESTHETIC AGENT INTO TISSUE PLANE DEFINED BY TRANSVERSUS ABDOMINIS MUSCLE N/A 2/18/2020    Procedure: BLOCK, TRANSVERSUS ABDOMINIS PLANE;  Surgeon: Jan New MD;  Location: [facility];  Service: General;  Laterality: N/A;    INSERTION OF TUNNELED CENTRAL VENOUS CATHETER (CVC) WITH SUBCUTANEOUS PORT N/A 10/8/2019    Procedure: [id number]-CATH;  Surgeon: Jan New MD;  Location: [facility];  Service: General;  Laterality: N/A;    LAPAROSCOPIC COLOSTOMY      TONSILLECTOMY       Family History   Problem Relation Age of Onset    Intestinal malrotation Mother     Leukemia Father     No Known Problems Sister     Coronary artery disease Brother     Coronary artery disease Maternal Grandmother     Stomach cancer Maternal Grandfather      Social History     Tobacco Use    Smoking status: Current Every Day Smoker     Packs/day: 1.00     Years: 35.00     Pack years: 35.00     Types: Cigarettes     Start date: 1/2/1984    Smokeless tobacco: Former User     Types: Chew    Tobacco comment: no smoking after m.n prior to sx   Substance Use Topics    Alcohol use: Yes     Alcohol/week: 46.0 standard drinks     Types: 42 Cans of beer, 4 Shots of liquor per week     Frequency: 4 or more times a week      Drinks per session: 5 or 6     Binge frequency: Daily or almost daily     Comment: nancie 7, beer socialy,  no alcohol 72 hours prior to surgery    Drug use: Never        Review of Systems:  Review of Systems   Constitutional: Negative for activity change, appetite change, chills, fatigue, fever and unexpected weight change.   HENT: Negative for congestion, ear pain, sore throat and trouble swallowing.    Eyes: Negative for pain, redness and itching.   Respiratory: Negative for cough, shortness of breath and wheezing.    Cardiovascular: Negative for chest pain, palpitations and leg swelling.   Gastrointestinal: Positive for rectal pain. Negative for abdominal distention, abdominal pain, anal bleeding, blood in stool, constipation, diarrhea, nausea and vomiting.   Endocrine: Negative for cold intolerance, heat intolerance and polyuria.   Genitourinary: Negative for dysuria, flank pain, frequency and hematuria.   Musculoskeletal: Negative for gait problem, joint swelling and neck pain.   Skin: Positive for wound. Negative for color change and rash.   Allergic/Immunologic: Negative for environmental allergies and immunocompromised state.   Neurological: Negative for dizziness, speech difficulty, weakness and numbness.   Psychiatric/Behavioral: Negative for agitation, confusion and hallucinations.       OBJECTIVE:     Vital Signs (Most Recent)  Temp: 98.8 °F (37.1 °C) (04/27/20 0949)  Pulse: (!) 112 (04/27/20 0949)  BP: 126/82 (04/27/20 0949)     68.5 kg (151 lb 0.2 oz)     Physical Exam:  Physical Exam   Constitutional: He is oriented to person, place, and time. He appears well-developed.   HENT:   Head: Normocephalic and atraumatic.   Eyes: Conjunctivae and EOM are normal.   Neck: Normal range of motion. No thyromegaly present.   Cardiovascular: Regular rhythm.   Pulmonary/Chest: Effort normal. No respiratory distress.   Abdominal:   Soft, nondistended, midline incision c/d/i without erythema or drainage with  staples in place (removed at bedside today); ostomy pink and viable with stool in bag   Genitourinary:   Genitourinary Comments: Flap well-perfused with small 1-2 cm area of superficial separation at the superior portion of the flap near gluteus with small amount of fat necrosis present, no erythema or purulent drainage   Musculoskeletal: Normal range of motion. He exhibits no edema or tenderness.   Neurological: He is alert and oriented to person, place, and time.   Skin: Skin is warm and dry. Capillary refill takes less than 2 seconds. No rash noted.   Psychiatric: He has a normal mood and affect.     Laboratory  Lab Results   Component Value Date    WBC 5.00 04/14/2020    HGB 9.7 (L) 04/14/2020    HCT 30.5 (L) 04/14/2020     04/14/2020    CHOL 158 06/01/2019    TRIG 194 (H) 06/01/2019    HDL 30 (L) 06/01/2019    ALT 15 03/24/2020    AST 19 03/24/2020     04/14/2020    K 3.7 04/14/2020     04/14/2020    CREATININE 0.7 04/14/2020    BUN 10 04/14/2020    CO2 24 04/14/2020    TSH 1.607 01/15/2019    PSA 0.37 06/01/2019    INR 1.0 06/11/2019       Diagnostic Results:  Colonoscopy images and report reviewed which shows a rectal mass that malignant     MRI March 2020:  FINDINGS:  Tumor Location: Tumor location (from anal verge):    Distal aspect of the mass is 7-8 cm from the anal verge.    Relationship to anterior peritoneal reflection: Straddles    Tumor Characteristics/extent:    Circumferential extent/location: Significant interval decrease in size of the lesion with accurate measurements difficult.  Tumor length is estimated at 4-5 cm.  There is persistent left eccentric wall thickening/exophytic component also significantly improved.  There is persistent involvement of the left mesorectal fat/fascia.    Left-sided fistula present communicating with rim enhancing 5 x 2 cm fluid collection which extends posterior inferiorly to involve subcutaneous tissues overlying the medial left gluteus amelia  musculature.  No other concerning for or new fluid collection.    Lymph Nodes:    No suspicious lymph nodes currently.      Impression       Continued interval decrease in size of the rectal mass, significantly decreased in volume from June 2019 MRI exam.    Fistula/perforation findings present with pelvic fluid collection concerning for abscess as above as noted on recent CT exam.       CT March 2020:  FINDINGS:  Thoracic aorta, great vessels and heart are unchanged.  MediPort present.  No pathologically enlarged axillary, mediastinal or hilar lymph nodes present.    Lungs demonstrate no acute opacity.  Stable bilateral tiny nodules noted, largest within the right lower lobe measuring 3.7 mm.  Nodules are annotated on the images.    Liver and spleen are unchanged.  No biliary dilatation.  Gallbladder unremarkable.  Pancreas unchanged.  Adrenal glands stable.  Kidneys are unchanged.    Stomach unremarkable.  No small bowel dilatation.  Left lower quadrant colostomy noted.  Degree of colonic constipation present.    Known rectal mass present at the better described on concurrent MRI.  Known perforation/fistula formation findings involving the left aspect of the rectum/mass persists with left pelvic fluid collection extending inferior-posteriorly to involve the subcutaneous tissues overlying the medial left gluteus musculature.  Largest component within the pelvis measures approximately 4 x 2 cm.  Previously noted air within the collection has resolved.  Soft tissue air overlying the gluteus muscle jugular as resolved as well with persistent fat stranding/phlegmonous changes.    No pathologically enlarged mesenteric or retroperitoneal lymph nodes appreciated.  No significant free fluid.    No acute osseous abnormality.      Impression       1. No detrimental change of the chest.  Stable tiny sub 4 mm nodules.  2. Persistent pelvic fluid collection consistent with known rectal mass perforation/fistulous change.   Fluid/inflammation again noted extending inferiorly to 2 overlie the medial left gluteus amelia musculature.  Previously noted air components have resolved.  3. Correlate with concurrent MRI rectal report for rectal mass characterization.       PATHOLOGY:  1. Soft tissue, left lateral sidewall margin (biopsy):  - Benign fibro muscular tissue, negative for carcinoma  - Confirmed with negative CDX2 immunostain and rare background; CDX2 immunostain with appropriate  control  2. Rectum, anus with left buttock skin (abdominal peritoneal resection with extra anatomic resection of  fistula tract and the left gluteus muscle):  - Residual invasive adenocarcinoma, moderately differentiated, extending to pericolorectal tissue, with  perforation (see synoptic report)  - Carcinoma extends to inked resection margin at perforation site; final resection margin maybe negative  (also see part 1);  - Pathologic Stage ypT3, N0  - Background colon with hemosiderin-laden macrophages, fibrosis and thickened wall vessels, consistent  with neoadjuvant therapy effect  - Diverticulosis  - Anus with two skin tags and reactive stromal cells, viewed by Dr. Servin who concurs this consistent with  chemoradiation therapy efect  - Eighteen lymph nodes, negative for metastatic carcinoma (0/18)    COLON AND RECTUM: Resection, Including Transanal Disk Excision of Rectal Neoplasms  Procedure Abdominoperineal resection, extra anatomic resection of fistula tract and the left gluteus  muscle  Tumor Site Rectum  Tumor Location Straddles the anterior peritoneal reflection  Tumor Size  Greatest dimension (centimeters): 4 cm  Macroscopic Tumor Perforation Present  Macroscopic Evaluation of Mesorectum Complete  Histologic Type Adenocarcinoma  Histologic Grade G2: Moderately differentiated  Tumor Extension Tumor invades through the muscularis propria into pericolorectal tissue  Margins (Note F)  All margins are uninvolved by invasive carcinoma, high grade  dysplasia / intramucosal carcinoma, and low  grade dysplasia  Margins examined: Proximal, distal and radial resection margins (see also part 1)  Treatment Effect  Present  Residual cancer with evident tumor regression, but more than single cells or rare small groups of cancer  cells (partial response, score 2)  Lymphovascular Invasion Not identified  Perineural Invasion Not identified  Tumor Deposits Not identified  Regional Lymph Nodes  Lymph Node Examination (required only if lymph nodes present in specimen)  Number of Lymph Nodes Involved: 0  Number of Lymph Nodes Examined: 18  Pathologic Stage Classification (pTNM, AJCC 8th Edition)  y (posttreatment)  Primary Tumor (pT)  pT3: Tumor invades through the muscularis propria into pericolorectal tissues  Regional Lymph Nodes (pN)  pN0: No regional lymph node metastasis  Additional Pathologic Findings: Diverticulosis, tatoo identified  Ancillary Studies  NORMAL FOR COLORECTAL/ENDOMETRAIL CARCINOMA  Immunohistochemistry (IHC) Testing for Mismatch Repair (MMR) Proteins:  MLH1 - Intact nuclear expression  MSH2 - Intact nuclear expression  MSH6 - Intact nuclear expression  PMS2 - Intact nuclear expression  Background nonneoplastic tissue/internal control with intact nuclear expression  IHC Interpretation  No loss of nuclear expression of MMR proteins: low probability of microsatellite instability  There are exceptions to the above IHC interpretations. These results should not be considered in isolation,  and clinical correlation with genetic counseling is recommended to assess the need for germline testing.  Comment: CDX2 immunostain performed block 2K shows tumor cells are positive; in comparison, CDX2  and AE1/AE3 stain on 2U shows lymph node with abundant mucin, negative for metastatic carcinoma.  CDX2 and AE1/AE3 stains with appropriate controls. Dr. Goel viewed selected slides, and concurs the  staging of carcinoma.  ASSESSMENT/PLAN:     54-year-old male with  rectal adenocarcinoma with likely T3N1 disease based upon preop imaging without evidence of metastatic disease now s/p neoadj chemoXRT which completed on 8/13/19 but with post-tx MRI showing continued threatened mesorectal fascia who developed rectal perforation and abscess/fistula on cycle 11/12 of neoadj chemotx in Feb 2020 requiring lap diverting sigmoid colostomy with continued fluid collection/perforation/fistula on CT/MRI now s/p APR with extra-anatomic resection of fistula tract and left gluteus muscle, omental pedicle flap and VRAM with skin paddle by plastics on 4/9/2020    - urinary tension management per Urology.  - staples removed from midline incision today.  - flap was some small superficial separation at the superior portion.  Discussed directly with plastic surgery/Dr. Dan who noted the same thing last week during his visit and will continue to monitor.  No acute intervention required at this time.  Flap appears well perfused and no evidence of infection or purulent drainage  - encourage the patient to taken a high protein diet to allow for full wound healing  - encourage patient to continue ambulation at home and attempt smoking cessation  - RTC 2 weeks for re-evaluation    Jan New MD  Colon and Rectal Surgery  [facility]

## 2020-05-05 ENCOUNTER — PATIENT OUTREACH (OUTPATIENT)
Dept: ADMINISTRATIVE | Facility: OTHER | Age: 55
End: 2020-05-05

## 2020-05-06 ENCOUNTER — OFFICE VISIT (OUTPATIENT)
Dept: UROLOGY | Facility: CLINIC | Age: 55
End: 2020-05-06
Payer: COMMERCIAL

## 2020-05-06 VITALS
HEIGHT: 72 IN | DIASTOLIC BLOOD PRESSURE: 82 MMHG | SYSTOLIC BLOOD PRESSURE: 122 MMHG | TEMPERATURE: 98 F | WEIGHT: 151 LBS | BODY MASS INDEX: 20.45 KG/M2 | HEART RATE: 112 BPM

## 2020-05-06 DIAGNOSIS — R33.9 URINARY RETENTION: Primary | ICD-10-CM

## 2020-05-06 PROCEDURE — 52000 CYSTOURETHROSCOPY: CPT | Mod: S$GLB,,, | Performed by: UROLOGY

## 2020-05-06 PROCEDURE — 99999 PR PBB SHADOW E&M-EST. PATIENT-LVL III: ICD-10-PCS | Mod: PBBFAC,,, | Performed by: UROLOGY

## 2020-05-06 PROCEDURE — 52000 PR CYSTOURETHROSCOPY: ICD-10-PCS | Mod: S$GLB,,, | Performed by: UROLOGY

## 2020-05-06 PROCEDURE — 96372 THER/PROPH/DIAG INJ SC/IM: CPT | Mod: 59,S$GLB,, | Performed by: UROLOGY

## 2020-05-06 PROCEDURE — 96372 PR INJECTION,THERAP/PROPH/DIAG2ST, IM OR SUBCUT: ICD-10-PCS | Mod: 59,S$GLB,, | Performed by: UROLOGY

## 2020-05-06 PROCEDURE — 99999 PR PBB SHADOW E&M-EST. PATIENT-LVL III: CPT | Mod: PBBFAC,,, | Performed by: UROLOGY

## 2020-05-06 PROCEDURE — 99499 UNLISTED E&M SERVICE: CPT | Mod: S$GLB,,, | Performed by: UROLOGY

## 2020-05-06 PROCEDURE — 99499 NO LOS: ICD-10-PCS | Mod: S$GLB,,, | Performed by: UROLOGY

## 2020-05-06 RX ORDER — CEFTRIAXONE 1 G/1
1 INJECTION, POWDER, FOR SOLUTION INTRAMUSCULAR; INTRAVENOUS
Status: COMPLETED | OUTPATIENT
Start: 2020-05-06 | End: 2020-05-06

## 2020-05-06 RX ADMIN — CEFTRIAXONE 1 G: 1 INJECTION, POWDER, FOR SOLUTION INTRAMUSCULAR; INTRAVENOUS at 11:05

## 2020-05-06 NOTE — PROGRESS NOTES
Chief Complaint: Urinary Retention    HPI:   5/6/20: Been on flomax since last visit.  Cysto/uroflow normal today.    4/20/20: 53 yo man had colon cancer with resection last week, referred by Dr. New.  Is in retention postoperatively.  No unusual abd/pelvic pain and no exac/rel factors.  No hematuria.  No urolithiasis.  PVR >700 ml.  No  history.      Allergies:  Patient has no known allergies.    Medications:  has a current medication list which includes the following prescription(s): acetaminophen, capecitabine, dronabinol, hydrocodone/acetaminophen, iron fum-b12-if-c-folic acid, magic mouthwash diphen/antac/lidoc, morphine, oxycodone, promethazine, and tamsulosin.    Review of Systems:  General: No fever, chills, fatigability, or weight loss.  Skin: No rashes, itching, or changes in color or texture of skin.  Chest: Denies MARTIN, cyanosis, wheezing, cough, and sputum production.  Abdomen: Appetite fine. No weight loss.   Musculoskeletal: No joint stiffness or swelling. Denies back pain.  : As above.  All other review of systems negative.    PMH:   has a past medical history of Anemia and Cancer.    PSH:   has a past surgical history that includes Hernia repair (1995); Tonsillectomy; Colonoscopy (N/A, 6/6/2019); Esophagogastroduodenoscopy (N/A, 6/6/2019); Insertion of tunneled central venous catheter (CVC) with subcutaneous port (N/A, 10/8/2019); Injection of anesthetic agent into tissue plane defined by transversus abdominis muscle (N/A, 2/18/2020); Colon surgery; Laparoscopic colostomy; Abdominoperineal resection of rectum (N/A, 4/9/2020); Creation of muscle rotational flap (Left, 4/9/2020); and Flap graft surgery (N/A, 4/9/2020).    FamHx: family history includes Coronary artery disease in his brother and maternal grandmother; Intestinal malrotation in his mother; Leukemia in his father; No Known Problems in his sister; Stomach cancer in his maternal grandfather.    SocHx:  reports that he has been smoking  cigarettes. He started smoking about 36 years ago. He has a 35.00 pack-year smoking history. He has quit using smokeless tobacco.  His smokeless tobacco use included chew. He reports that he drinks about 46.0 standard drinks of alcohol per week. He reports that he does not use drugs.      Physical Exam:  Vitals:    05/06/20 0920   BP: 122/82   Pulse: (!) 112   Temp: 98.4 °F (36.9 °C)     General: A&Ox3, no apparent distress, no deformities  Neck: No masses, normal thyroid  Lungs: normal inspiration, no use of accessory muscles  Heart: normal pulse, no arrhythmias  Abdomen: Soft, NT, ND, no masses, no hernias, no hepatosplenomegaly.  Midline wound intact with staples; colostomy  Lymphatic: Neck and groin nodes negative  Skin: The skin is warm and dry. No jaundice.  Ext: No c/c/e.  : Test desc tim, no abnormalities of epididymus. Penis normal, with normal penile and scrotal skin. Meatus normal.     Labs/Studies:     Procedure: Diagnostic Cystoscopy    Procedure in Detail: After proper consents were obtained, the patient was prepped and draped in normal sterile fashion for diagnostic cystoscopy. 5 ml of lidocaine jelly was instilled in the urethra. The flexible cystoscope was then introduced into the urethra, and advanced into the bladder under direct vision. The urethral mucosa appeared normal, and no strictures were noted. The sphincter appeared to be normal, and the veru montanum was unremarkable. The prostatic mucosa and the lateral lobes of the prostate were mildly opposed. The bladder neck was normal. Inspection of the interior of the bladder was then carried out. The trigone was unremarkable, with no mucosal lesions. The ureteral orifices were normal in position and configuration. Systematic inspection of the mucosa of the bladder it was then carried out, rotating the cystoscope so that all areas of the left and right lateral walls, the dome of the bladder, and the posterior wall were all visualized. The  cystoscope was then advanced further into the bladder, and maximum deflection of the scope was performed so that the bladder neck could be inspected. No mucosal lesions were noted there. The cystoscope was then removed, and the procedure terminated.     Findings: mild BPH    ABx: Rocephin 1 gm IM      Impression/Plan:   1. Flomax and leave liz out.  PVR tomorrow then RTC 1 mo to recheck.

## 2020-05-06 NOTE — PROGRESS NOTES
Pt here for cystoscope.  18f liz with a 10cc balloon removed before cysto.  Tolerated well.  Rocephin gm 1 ordered per Dr Altamirano. Using aseptic technique it was given in the rt ventragluteal.  Asked to remain in the room for 15 minutes.  No adverse reaction noted.  Pt tolerated well

## 2020-05-07 ENCOUNTER — CLINICAL SUPPORT (OUTPATIENT)
Dept: UROLOGY | Facility: CLINIC | Age: 55
End: 2020-05-07
Payer: COMMERCIAL

## 2020-05-07 VITALS — BODY MASS INDEX: 20.45 KG/M2 | WEIGHT: 151 LBS | HEIGHT: 72 IN

## 2020-05-07 DIAGNOSIS — R33.9 URINARY RETENTION: Primary | ICD-10-CM

## 2020-05-07 LAB — POC RESIDUAL URINE VOLUME: 635 ML (ref 0–100)

## 2020-05-07 PROCEDURE — 51798 POCT BLADDER SCAN: ICD-10-PCS | Mod: S$GLB,,, | Performed by: UROLOGY

## 2020-05-07 PROCEDURE — 99999 PR PBB SHADOW E&M-EST. PATIENT-LVL III: CPT | Mod: PBBFAC,,,

## 2020-05-07 PROCEDURE — 99999 PR PBB SHADOW E&M-EST. PATIENT-LVL III: ICD-10-PCS | Mod: PBBFAC,,,

## 2020-05-07 PROCEDURE — 51798 US URINE CAPACITY MEASURE: CPT | Mod: S$GLB,,, | Performed by: UROLOGY

## 2020-05-07 NOTE — PROGRESS NOTES
Pt here for PVR, 635, informed Dr. Altamiarno, instructions given to teach self catheterization. Per Dr. Altamirano, patient to self cath once nightly. Will check on him in 1 month.

## 2020-05-11 ENCOUNTER — OFFICE VISIT (OUTPATIENT)
Dept: SURGERY | Facility: CLINIC | Age: 55
End: 2020-05-11
Payer: COMMERCIAL

## 2020-05-11 VITALS
WEIGHT: 151.25 LBS | TEMPERATURE: 98 F | HEART RATE: 107 BPM | BODY MASS INDEX: 20.51 KG/M2 | DIASTOLIC BLOOD PRESSURE: 73 MMHG | SYSTOLIC BLOOD PRESSURE: 114 MMHG

## 2020-05-11 DIAGNOSIS — C20 RECTAL CANCER: Primary | ICD-10-CM

## 2020-05-11 PROCEDURE — 99999 PR PBB SHADOW E&M-EST. PATIENT-LVL III: CPT | Mod: PBBFAC,,, | Performed by: COLON & RECTAL SURGERY

## 2020-05-11 PROCEDURE — 99999 PR PBB SHADOW E&M-EST. PATIENT-LVL III: ICD-10-PCS | Mod: PBBFAC,,, | Performed by: COLON & RECTAL SURGERY

## 2020-05-11 PROCEDURE — 99024 PR POST-OP FOLLOW-UP VISIT: ICD-10-PCS | Mod: S$GLB,,, | Performed by: COLON & RECTAL SURGERY

## 2020-05-11 PROCEDURE — 99024 POSTOP FOLLOW-UP VISIT: CPT | Mod: S$GLB,,, | Performed by: COLON & RECTAL SURGERY

## 2020-05-11 NOTE — PROGRESS NOTES
History & Physical    SUBJECTIVE:     Chief Complaint   Patient presents with    Post-op Evaluation     2 Week Post Op    CC: rectal adenocarcinoma  Ref: Anjelica Saavedra MD    History of Present Illness:  Patient is a 54 y.o. male presents for evaluation of rectal cancer.  Patient reports he has had increasing pelvic/rectal pain along with decreased bowel movements over the last 6 weeks.  Reports an uncomfortable feeling in his pelvis associated with mucous discharge from his rectum as well as bloody bowel movements that are thin and less than normal. He reports associated chills, fatigue and 16 lb weight loss over the past 5 months.  He has also noticed a decrease in appetite as well. He underwent a colonoscopy on 06/06/2019 where a nonobstructing rectal mass was found with biopsy showing well-differentiated adenocarcinoma.  He was then referred to [facility]orectal [facility] for evaluation.  He has no family history of colorectal cancer or IBD.    8/13/19: Completed neoadj chemoXRT  2/18/2020: Laparoscopic loop sigmoid colostomy 2/2 rectal perforation on chemotx  4/9/2020: APR with extra anatomic resection of fistula tract and left gluteus muscle, omental pedicle flap and VRAM with skin paddle by plastics    Interval history:  Since last clinic visit, the patient had his Cooney removed and is now performing self catheterization at home once per day.  He continues to tolerate a regular diet have good ostomy function.  He continues to follow-up with plastic surgery and his flap continues to heal well.  He denies any fever, chills, nausea, vomiting.  Minimal drainage from perineal area where the flap is.  Reports dramatic improvement in the pain in his perineum.      Review of patient's allergies indicates:  No Known Allergies    Current Outpatient Medications   Medication Sig Dispense Refill    acetaminophen (TYLENOL) 325 MG tablet Take 2 tablets (650 mg total) by mouth every 6 (six) hours as needed.  0    capecitabine  (XELODA) 500 MG Tab Take 3 tablets (1500 mg) by mouth twice daily after breakfast and dinner on days of radiation only. 168 tablet 0    dronabinol (MARINOL) 10 MG capsule Take 1 capsule (10 mg total) by mouth 2 (two) times daily before meals. 60 capsule 0    HYDROCODONE-ACETAMINOPHEN ORAL Take by mouth as needed.      iron fum-B12-IF-C-folic acid (FOLTRIN) 110-0.5 mg capsule Take 1 capsule by mouth 2 (two) times daily. 60 capsule 1    magic mouthwash diphen/antac/lidoc Swish and spit 15 ml by mouth every 4 hours as needed 500 mL 2    morphine (MS CONTIN) 30 MG 12 hr tablet Take 1 tablet (30 mg total) by mouth every 8 (eight) hours. 90 tablet 0    oxyCODONE (ROXICODONE) 10 mg Tab immediate release tablet Take 1 tablet (10 mg total) by mouth every 4 (four) hours as needed for Pain (medically necessary). 90 tablet 0    promethazine (PHENERGAN) 25 MG tablet Take 1 tablet (25 mg total) by mouth every 4 (four) hours. 60 tablet 4    tamsulosin (FLOMAX) 0.4 mg Cap Take 1 capsule (0.4 mg total) by mouth once daily. 30 capsule 11     No current facility-administered medications for this visit.        Past Medical History:   Diagnosis Date    Anemia     Cancer      Past Surgical History:   Procedure Laterality Date    ABDOMINOPERINEAL RESECTION OF RECTUM N/A 4/9/2020    Procedure: PROCTECTOMY, ABDOMINOPERINEAL;  Surgeon: Jan New MD;  Location: [facility];  Service: General;  Laterality: N/A;    COLON SURGERY      COLONOSCOPY N/A 6/6/2019    Procedure: COLONOSCOPY;  Surgeon: Anjelica Saavedra MD;  Location: [facility];  Service: Endoscopy;  Laterality: N/A;    CREATION OF MUSCLE ROTATIONAL FLAP Left 4/9/2020    Procedure: CREATION, FLAP, MUSCLE ROTATION;  Surgeon: Sebastian Dan MD;  Location: [facility];  Service: Plastics;  Laterality: Left;   VRAM    ESOPHAGOGASTRODUODENOSCOPY N/A 6/6/2019    Procedure: EGD (ESOPHAGOGASTRODUODENOSCOPY);  Surgeon: Anjelica Saavedra MD;  Location: [facility];  Service:  Endoscopy;  Laterality: N/A;    FLAP GRAFT SURGERY N/A 4/9/2020    Procedure: FLAP GRAFT;  Surgeon: Sebastian Dan MD;  Location: [facility] OR;  Service: Plastics;  Laterality: N/A;  left fasciocutaneous buttocks flap    HERNIA REPAIR  1995    INJECTION OF ANESTHETIC AGENT INTO TISSUE PLANE DEFINED BY TRANSVERSUS ABDOMINIS MUSCLE N/A 2/18/2020    Procedure: BLOCK, TRANSVERSUS ABDOMINIS PLANE;  Surgeon: Jan New MD;  Location: [facility] OR;  Service: General;  Laterality: N/A;    INSERTION OF TUNNELED CENTRAL VENOUS CATHETER (CVC) WITH SUBCUTANEOUS PORT N/A 10/8/2019    Procedure: [id number];  Surgeon: Jan New MD;  Location: [facility] OR;  Service: General;  Laterality: N/A;    LAPAROSCOPIC COLOSTOMY      TONSILLECTOMY       Family History   Problem Relation Age of Onset    Intestinal malrotation Mother     Leukemia Father     No Known Problems Sister     Coronary artery disease Brother     Coronary artery disease Maternal Grandmother     Stomach cancer Maternal Grandfather      Social History     Tobacco Use    Smoking status: Current Every Day Smoker     Packs/day: 1.00     Years: 35.00     Pack years: 35.00     Types: Cigarettes     Start date: 1/2/1984    Smokeless tobacco: Former User     Types: Chew    Tobacco comment: no smoking after m.n prior to sx   Substance Use Topics    Alcohol use: Yes     Alcohol/week: 46.0 standard drinks     Types: 42 Cans of beer, 4 Shots of liquor per week     Frequency: 4 or more times a week     Drinks per session: 5 or 6     Binge frequency: Daily or almost daily     Comment: nancie 7, beer socialy,  no alcohol 72 hours prior to surgery    Drug use: Never        Review of Systems:  Review of Systems   Constitutional: Negative for activity change, appetite change, chills, fatigue, fever and unexpected weight change.   HENT: Negative for congestion, ear pain, sore throat and trouble swallowing.    Eyes: Negative for pain, redness and itching.    Respiratory: Negative for cough, shortness of breath and wheezing.    Cardiovascular: Negative for chest pain, palpitations and leg swelling.   Gastrointestinal: Positive for rectal pain. Negative for abdominal distention, abdominal pain, anal bleeding, blood in stool, constipation, diarrhea, nausea and vomiting.   Endocrine: Negative for cold intolerance, heat intolerance and polyuria.   Genitourinary: Negative for dysuria, flank pain, frequency and hematuria.   Musculoskeletal: Negative for gait problem, joint swelling and neck pain.   Skin: Positive for wound. Negative for color change and rash.   Allergic/Immunologic: Negative for environmental allergies and immunocompromised state.   Neurological: Negative for dizziness, speech difficulty, weakness and numbness.   Psychiatric/Behavioral: Negative for agitation, confusion and hallucinations.       OBJECTIVE:     Vital Signs (Most Recent)  Temp: 98.4 °F (36.9 °C) (05/11/20 1005)  Pulse: 107 (05/11/20 1005)  BP: 114/73 (05/11/20 1005)     68.6 kg (151 lb 3.8 oz)     Physical Exam:  Physical Exam   Constitutional: He is oriented to person, place, and time. He appears well-developed.   HENT:   Head: Normocephalic and atraumatic.   Eyes: Conjunctivae and EOM are normal.   Neck: Normal range of motion. No thyromegaly present.   Cardiovascular: Regular rhythm.   Pulmonary/Chest: Effort normal. No respiratory distress.   Abdominal:   Soft, nondistended, midline incision c/d/i; ostomy pink and viable with stool in bag   Genitourinary:   Genitourinary Comments: Flap well-perfused with small 1 cm area of superficial separation at the superior portion of the flap near gluteus, as well as at inferior portion near base of scrotum, no erythema or purulent drainage   Musculoskeletal: Normal range of motion. He exhibits no edema or tenderness.   Neurological: He is alert and oriented to person, place, and time.   Skin: Skin is warm and dry. Capillary refill takes less than 2  seconds. No rash noted.   Psychiatric: He has a normal mood and affect.     Laboratory  Lab Results   Component Value Date    WBC 5.00 04/14/2020    HGB 9.7 (L) 04/14/2020    HCT 30.5 (L) 04/14/2020     04/14/2020    CHOL 158 06/01/2019    TRIG 194 (H) 06/01/2019    HDL 30 (L) 06/01/2019    ALT 15 03/24/2020    AST 19 03/24/2020     04/14/2020    K 3.7 04/14/2020     04/14/2020    CREATININE 0.7 04/14/2020    BUN 10 04/14/2020    CO2 24 04/14/2020    TSH 1.607 01/15/2019    PSA 0.37 06/01/2019    INR 1.0 06/11/2019       Diagnostic Results:  Colonoscopy images and report reviewed which shows a rectal mass that malignant     MRI March 2020:  FINDINGS:  Tumor Location: Tumor location (from anal verge):    Distal aspect of the mass is 7-8 cm from the anal verge.    Relationship to anterior peritoneal reflection: Straddles    Tumor Characteristics/extent:    Circumferential extent/location: Significant interval decrease in size of the lesion with accurate measurements difficult.  Tumor length is estimated at 4-5 cm.  There is persistent left eccentric wall thickening/exophytic component also significantly improved.  There is persistent involvement of the left mesorectal fat/fascia.    Left-sided fistula present communicating with rim enhancing 5 x 2 cm fluid collection which extends posterior inferiorly to involve subcutaneous tissues overlying the medial left gluteus amelia musculature.  No other concerning for or new fluid collection.    Lymph Nodes:    No suspicious lymph nodes currently.      Impression       Continued interval decrease in size of the rectal mass, significantly decreased in volume from June 2019 MRI exam.    Fistula/perforation findings present with pelvic fluid collection concerning for abscess as above as noted on recent CT exam.       CT March 2020:  FINDINGS:  Thoracic aorta, great vessels and heart are unchanged.  MediPort present.  No pathologically enlarged axillary,  mediastinal or hilar lymph nodes present.    Lungs demonstrate no acute opacity.  Stable bilateral tiny nodules noted, largest within the right lower lobe measuring 3.7 mm.  Nodules are annotated on the images.    Liver and spleen are unchanged.  No biliary dilatation.  Gallbladder unremarkable.  Pancreas unchanged.  Adrenal glands stable.  Kidneys are unchanged.    Stomach unremarkable.  No small bowel dilatation.  Left lower quadrant colostomy noted.  Degree of colonic constipation present.    Known rectal mass present at the better described on concurrent MRI.  Known perforation/fistula formation findings involving the left aspect of the rectum/mass persists with left pelvic fluid collection extending inferior-posteriorly to involve the subcutaneous tissues overlying the medial left gluteus musculature.  Largest component within the pelvis measures approximately 4 x 2 cm.  Previously noted air within the collection has resolved.  Soft tissue air overlying the gluteus muscle jugular as resolved as well with persistent fat stranding/phlegmonous changes.    No pathologically enlarged mesenteric or retroperitoneal lymph nodes appreciated.  No significant free fluid.    No acute osseous abnormality.      Impression       1. No detrimental change of the chest.  Stable tiny sub 4 mm nodules.  2. Persistent pelvic fluid collection consistent with known rectal mass perforation/fistulous change.  Fluid/inflammation again noted extending inferiorly to 2 overlie the medial left gluteus amelia musculature.  Previously noted air components have resolved.  3. Correlate with concurrent MRI rectal report for rectal mass characterization.       PATHOLOGY:  1. Soft tissue, left lateral sidewall margin (biopsy):  - Benign fibro muscular tissue, negative for carcinoma  - Confirmed with negative CDX2 immunostain and rare background; CDX2 immunostain with appropriate  control  2. Rectum, anus with left buttock skin (abdominal  peritoneal resection with extra anatomic resection of  fistula tract and the left gluteus muscle):  - Residual invasive adenocarcinoma, moderately differentiated, extending to pericolorectal tissue, with  perforation (see synoptic report)  - Carcinoma extends to inked resection margin at perforation site; final resection margin maybe negative  (also see part 1);  - Pathologic Stage ypT3, N0  - Background colon with hemosiderin-laden macrophages, fibrosis and thickened wall vessels, consistent  with neoadjuvant therapy effect  - Diverticulosis  - Anus with two skin tags and reactive stromal cells, viewed by Dr. Servin who concurs this consistent with  chemoradiation therapy efect  - Eighteen lymph nodes, negative for metastatic carcinoma (0/18)    COLON AND RECTUM: Resection, Including Transanal Disk Excision of Rectal Neoplasms  Procedure Abdominoperineal resection, extra anatomic resection of fistula tract and the left gluteus  muscle  Tumor Site Rectum  Tumor Location Straddles the anterior peritoneal reflection  Tumor Size  Greatest dimension (centimeters): 4 cm  Macroscopic Tumor Perforation Present  Macroscopic Evaluation of Mesorectum Complete  Histologic Type Adenocarcinoma  Histologic Grade G2: Moderately differentiated  Tumor Extension Tumor invades through the muscularis propria into pericolorectal tissue  Margins (Note F)  All margins are uninvolved by invasive carcinoma, high grade dysplasia / intramucosal carcinoma, and low  grade dysplasia  Margins examined: Proximal, distal and radial resection margins (see also part 1)  Treatment Effect  Present  Residual cancer with evident tumor regression, but more than single cells or rare small groups of cancer  cells (partial response, score 2)  Lymphovascular Invasion Not identified  Perineural Invasion Not identified  Tumor Deposits Not identified  Regional Lymph Nodes  Lymph Node Examination (required only if lymph nodes present in specimen)  Number of  Lymph Nodes Involved: 0  Number of Lymph Nodes Examined: 18  Pathologic Stage Classification (pTNM, AJCC 8th Edition)  y (posttreatment)  Primary Tumor (pT)  pT3: Tumor invades through the muscularis propria into pericolorectal tissues  Regional Lymph Nodes (pN)  pN0: No regional lymph node metastasis  Additional Pathologic Findings: Diverticulosis, tatoo identified  Ancillary Studies  NORMAL FOR COLORECTAL/ENDOMETRAIL CARCINOMA  Immunohistochemistry (IHC) Testing for Mismatch Repair (MMR) Proteins:  MLH1 - Intact nuclear expression  MSH2 - Intact nuclear expression  MSH6 - Intact nuclear expression  PMS2 - Intact nuclear expression  Background nonneoplastic tissue/internal control with intact nuclear expression  IHC Interpretation  No loss of nuclear expression of MMR proteins: low probability of microsatellite instability  There are exceptions to the above IHC interpretations. These results should not be considered in isolation,  and clinical correlation with genetic counseling is recommended to assess the need for germline testing.  Comment: CDX2 immunostain performed block 2K shows tumor cells are positive; in comparison, CDX2  and AE1/AE3 stain on 2U shows lymph node with abundant mucin, negative for metastatic carcinoma.  CDX2 and AE1/AE3 stains with appropriate controls. Dr. Goel viewed selected slides, and concurs the  staging of carcinoma.  ASSESSMENT/PLAN:     54-year-old male with rectal adenocarcinoma with likely T3N1 disease based upon preop imaging without evidence of metastatic disease now s/p neoadj chemoXRT which completed on 8/13/19 but with post-tx MRI showing continued threatened mesorectal fascia who developed rectal perforation and abscess/fistula on cycle 11/12 of neoadj chemotx in Feb 2020 requiring lap diverting sigmoid colostomy with continued fluid collection/perforation/fistula on CT/MRI now s/p APR with extra-anatomic resection of fistula tract and left gluteus muscle, omental pedicle  flap and VRAM with skin paddle by plastics on 4/9/2020    - urinary retention management per Urology.  - Flap appears well perfused and no evidence of infection or purulent drainage  - encourage the patient to taken a high protein diet to allow for full wound healing  - encourage patient to continue ambulation at home and attempt smoking cessation  - Will have patient followup with Dr. Aden of [facility] in future to discuss any further treatment and for surveillance  - RTC 3 weeks for re-evaluation    Jan New MD  Colon and Rectal Surgery  [facility]

## 2020-05-26 ENCOUNTER — PATIENT MESSAGE (OUTPATIENT)
Dept: SURGERY | Facility: CLINIC | Age: 55
End: 2020-05-26

## 2020-05-26 DIAGNOSIS — C20 RECTAL CANCER: ICD-10-CM

## 2020-05-26 RX ORDER — TAMSULOSIN HYDROCHLORIDE 0.4 MG/1
0.4 CAPSULE ORAL DAILY
Qty: 30 CAPSULE | Refills: 11 | Status: CANCELLED | OUTPATIENT
Start: 2020-05-26 | End: 2021-05-26

## 2020-05-27 ENCOUNTER — PATIENT MESSAGE (OUTPATIENT)
Dept: SURGERY | Facility: CLINIC | Age: 55
End: 2020-05-27

## 2020-05-27 RX ORDER — TAMSULOSIN HYDROCHLORIDE 0.4 MG/1
0.4 CAPSULE ORAL DAILY
Qty: 30 CAPSULE | Refills: 11 | Status: SHIPPED | OUTPATIENT
Start: 2020-05-27 | End: 2020-10-22 | Stop reason: SDUPTHER

## 2020-05-27 RX ORDER — OXYCODONE HYDROCHLORIDE 10 MG/1
10 TABLET ORAL EVERY 4 HOURS PRN
Qty: 90 TABLET | Refills: 0 | Status: SHIPPED | OUTPATIENT
Start: 2020-05-27 | End: 2020-06-23 | Stop reason: SDUPTHER

## 2020-06-01 ENCOUNTER — OFFICE VISIT (OUTPATIENT)
Dept: SURGERY | Facility: CLINIC | Age: 55
End: 2020-06-01
Payer: COMMERCIAL

## 2020-06-01 VITALS
HEART RATE: 99 BPM | SYSTOLIC BLOOD PRESSURE: 125 MMHG | DIASTOLIC BLOOD PRESSURE: 79 MMHG | BODY MASS INDEX: 20.57 KG/M2 | WEIGHT: 151.69 LBS | TEMPERATURE: 99 F

## 2020-06-01 DIAGNOSIS — C20 RECTAL CANCER: Primary | ICD-10-CM

## 2020-06-01 PROCEDURE — 99999 PR PBB SHADOW E&M-EST. PATIENT-LVL III: CPT | Mod: PBBFAC,,, | Performed by: COLON & RECTAL SURGERY

## 2020-06-01 PROCEDURE — 99024 PR POST-OP FOLLOW-UP VISIT: ICD-10-PCS | Mod: S$GLB,,, | Performed by: COLON & RECTAL SURGERY

## 2020-06-01 PROCEDURE — 99999 PR PBB SHADOW E&M-EST. PATIENT-LVL III: ICD-10-PCS | Mod: PBBFAC,,, | Performed by: COLON & RECTAL SURGERY

## 2020-06-01 PROCEDURE — 99024 POSTOP FOLLOW-UP VISIT: CPT | Mod: S$GLB,,, | Performed by: COLON & RECTAL SURGERY

## 2020-06-01 NOTE — PROGRESS NOTES
History & Physical    SUBJECTIVE:     Chief Complaint   Patient presents with    Follow-up     Follow Up    CC: rectal adenocarcinoma  Ref: Anjelica Saavedra MD    History of Present Illness:  Patient is a 54 y.o. male presents for evaluation of rectal cancer.  Patient reports he has had increasing pelvic/rectal pain along with decreased bowel movements over the last 6 weeks.  Reports an uncomfortable feeling in his pelvis associated with mucous discharge from his rectum as well as bloody bowel movements that are thin and less than normal. He reports associated chills, fatigue and 16 lb weight loss over the past 5 months.  He has also noticed a decrease in appetite as well. He underwent a colonoscopy on 06/06/2019 where a nonobstructing rectal mass was found with biopsy showing well-differentiated adenocarcinoma.  He was then referred to Colorectal surgery Clinic for evaluation.  He has no family history of colorectal cancer or IBD.    8/13/19: Completed neoadj chemoXRT  2/18/2020: Laparoscopic loop sigmoid colostomy 2/2 rectal perforation on chemotx  4/9/2020: APR with extra anatomic resection of fistula tract and left gluteus muscle, omental pedicle flap and VRAM with skin paddle by plastics    Interval history:  Since last clinic visit, the patient continues to improve.  He is no longer requiring catheterization for his bladder.  He continues tolerate a regular diet and has good ostomy function.  His flap continues to heal very well in the 2 small areas that had previously been open have now formed granulation tissue.  He denies any fever, chills, nausea, vomiting.  He still has intermittent drainage from his perineal area where the flap is.  He is able to sit for periods of time now and the pain in his perineum and tailbone have dramatically improved.      Review of patient's allergies indicates:  No Known Allergies    Current Outpatient Medications   Medication Sig Dispense Refill    acetaminophen (TYLENOL) 325 MG  tablet Take 2 tablets (650 mg total) by mouth every 6 (six) hours as needed.  0    capecitabine (XELODA) 500 MG Tab Take 3 tablets (1500 mg) by mouth twice daily after breakfast and dinner on days of radiation only. 168 tablet 0    dronabinol (MARINOL) 10 MG capsule Take 1 capsule (10 mg total) by mouth 2 (two) times daily before meals. 60 capsule 0    HYDROCODONE-ACETAMINOPHEN ORAL Take by mouth as needed.      iron fum-B12-IF-C-folic acid (FOLTRIN) 110-0.5 mg capsule Take 1 capsule by mouth 2 (two) times daily. 60 capsule 1    magic mouthwash diphen/antac/lidoc Swish and spit 15 ml by mouth every 4 hours as needed 500 mL 2    morphine (MS CONTIN) 30 MG 12 hr tablet Take 1 tablet (30 mg total) by mouth every 8 (eight) hours. 90 tablet 0    oxyCODONE (ROXICODONE) 10 mg Tab immediate release tablet Take 1 tablet (10 mg total) by mouth every 4 (four) hours as needed for Pain (medically necessary). 90 tablet 0    promethazine (PHENERGAN) 25 MG tablet Take 1 tablet (25 mg total) by mouth every 4 (four) hours. 60 tablet 4    tamsulosin (FLOMAX) 0.4 mg Cap Take 1 capsule (0.4 mg total) by mouth once daily. 30 capsule 11     No current facility-administered medications for this visit.        Past Medical History:   Diagnosis Date    Anemia     Cancer      Past Surgical History:   Procedure Laterality Date    ABDOMINOPERINEAL RESECTION OF RECTUM N/A 4/9/2020    Procedure: PROCTECTOMY, ABDOMINOPERINEAL;  Surgeon: Jan New MD;  Location: [facility] OR;  Service: General;  Laterality: N/A;    COLON SURGERY      COLONOSCOPY N/A 6/6/2019    Procedure: COLONOSCOPY;  Surgeon: Anjelica Saavedra MD;  Location: [facility] ENDO;  Service: Endoscopy;  Laterality: N/A;    CREATION OF MUSCLE ROTATIONAL FLAP Left 4/9/2020    Procedure: CREATION, FLAP, MUSCLE ROTATION;  Surgeon: Sebastian Dan MD;  Location: [facility] OR;  Service: Plastics;  Laterality: Left;   VRAM    ESOPHAGOGASTRODUODENOSCOPY N/A 6/6/2019    Procedure: EGD  (ESOPHAGOGASTRODUODENOSCOPY);  Surgeon: Anjelica Saavedra MD;  Location: [facility] ENDO;  Service: Endoscopy;  Laterality: N/A;    FLAP GRAFT SURGERY N/A 4/9/2020    Procedure: FLAP GRAFT;  Surgeon: Sebastian Dan MD;  Location: [facility] OR;  Service: Plastics;  Laterality: N/A;  left fasciocutaneous buttocks flap    HERNIA REPAIR  1995    INJECTION OF ANESTHETIC AGENT INTO TISSUE PLANE DEFINED BY TRANSVERSUS ABDOMINIS MUSCLE N/A 2/18/2020    Procedure: BLOCK, TRANSVERSUS ABDOMINIS PLANE;  Surgeon: Jan New MD;  Location: [facility] OR;  Service: General;  Laterality: N/A;    INSERTION OF TUNNELED CENTRAL VENOUS CATHETER (CVC) WITH SUBCUTANEOUS PORT N/A 10/8/2019    Procedure: [id number];  Surgeon: Jan New MD;  Location: [facility] OR;  Service: General;  Laterality: N/A;    LAPAROSCOPIC COLOSTOMY      TONSILLECTOMY       Family History   Problem Relation Age of Onset    Intestinal malrotation Mother     Leukemia Father     No Known Problems Sister     Coronary artery disease Brother     Coronary artery disease Maternal Grandmother     Stomach cancer Maternal Grandfather      Social History     Tobacco Use    Smoking status: Current Every Day Smoker     Packs/day: 1.00     Years: 35.00     Pack years: 35.00     Types: Cigarettes     Start date: 1/2/1984    Smokeless tobacco: Former User     Types: Chew    Tobacco comment: no smoking after m.n prior to sx   Substance Use Topics    Alcohol use: Yes     Alcohol/week: 46.0 standard drinks     Types: 42 Cans of beer, 4 Shots of liquor per week     Frequency: 4 or more times a week     Drinks per session: 5 or 6     Binge frequency: Daily or almost daily     Comment: nancie 7, beer socialy,  no alcohol 72 hours prior to surgery    Drug use: Never        Review of Systems:  Review of Systems   Constitutional: Negative for activity change, appetite change, chills, fatigue, fever and unexpected weight change.   HENT: Negative for congestion, ear  pain, sore throat and trouble swallowing.    Eyes: Negative for pain, redness and itching.   Respiratory: Negative for cough, shortness of breath and wheezing.    Cardiovascular: Negative for chest pain, palpitations and leg swelling.   Gastrointestinal: Positive for rectal pain. Negative for abdominal distention, abdominal pain, anal bleeding, blood in stool, constipation, diarrhea, nausea and vomiting.   Endocrine: Negative for cold intolerance, heat intolerance and polyuria.   Genitourinary: Negative for dysuria, flank pain, frequency and hematuria.   Musculoskeletal: Negative for gait problem, joint swelling and neck pain.   Skin: Positive for wound. Negative for color change and rash.   Allergic/Immunologic: Negative for environmental allergies and immunocompromised state.   Neurological: Negative for dizziness, speech difficulty, weakness and numbness.   Psychiatric/Behavioral: Negative for agitation, confusion and hallucinations.       OBJECTIVE:     Vital Signs (Most Recent)  Temp: 98.7 °F (37.1 °C) (06/01/20 0845)  Pulse: 99 (06/01/20 0845)  BP: 125/79 (06/01/20 0845)     68.8 kg (151 lb 10.8 oz)     Physical Exam:  Physical Exam   Constitutional: He is oriented to person, place, and time. He appears well-developed.   HENT:   Head: Normocephalic and atraumatic.   Eyes: Conjunctivae and EOM are normal.   Neck: Normal range of motion. No thyromegaly present.   Cardiovascular: Regular rhythm.   Pulmonary/Chest: Effort normal. No respiratory distress.   Abdominal:   Soft, nondistended, midline incision c/d/i; ostomy pink and viable with stool in bag   Genitourinary:   Genitourinary Comments: Flap well-perfused with small 1 cm area of granulation tissue at the superior portion of the flap near gluteus, as well as at inferior portion near base of scrotum, no erythema or purulent drainage   Musculoskeletal: Normal range of motion. He exhibits no edema or tenderness.   Neurological: He is alert and oriented to  person, place, and time.   Skin: Skin is warm and dry. Capillary refill takes less than 2 seconds. No rash noted.   Psychiatric: He has a normal mood and affect.     Laboratory  Lab Results   Component Value Date    WBC 5.00 04/14/2020    HGB 9.7 (L) 04/14/2020    HCT 30.5 (L) 04/14/2020     04/14/2020    CHOL 158 06/01/2019    TRIG 194 (H) 06/01/2019    HDL 30 (L) 06/01/2019    ALT 15 03/24/2020    AST 19 03/24/2020     04/14/2020    K 3.7 04/14/2020     04/14/2020    CREATININE 0.7 04/14/2020    BUN 10 04/14/2020    CO2 24 04/14/2020    TSH 1.607 01/15/2019    PSA 0.37 06/01/2019    INR 1.0 06/11/2019       Diagnostic Results:  Colonoscopy images and report reviewed which shows a rectal mass that malignant     MRI March 2020:  FINDINGS:  Tumor Location: Tumor location (from anal verge):    Distal aspect of the mass is 7-8 cm from the anal verge.    Relationship to anterior peritoneal reflection: Straddles    Tumor Characteristics/extent:    Circumferential extent/location: Significant interval decrease in size of the lesion with accurate measurements difficult.  Tumor length is estimated at 4-5 cm.  There is persistent left eccentric wall thickening/exophytic component also significantly improved.  There is persistent involvement of the left mesorectal fat/fascia.    Left-sided fistula present communicating with rim enhancing 5 x 2 cm fluid collection which extends posterior inferiorly to involve subcutaneous tissues overlying the medial left gluteus amelia musculature.  No other concerning for or new fluid collection.    Lymph Nodes:    No suspicious lymph nodes currently.      Impression       Continued interval decrease in size of the rectal mass, significantly decreased in volume from June 2019 MRI exam.    Fistula/perforation findings present with pelvic fluid collection concerning for abscess as above as noted on recent CT exam.       CT March 2020:  FINDINGS:  Thoracic aorta, great  vessels and heart are unchanged.  MediPort present.  No pathologically enlarged axillary, mediastinal or hilar lymph nodes present.    Lungs demonstrate no acute opacity.  Stable bilateral tiny nodules noted, largest within the right lower lobe measuring 3.7 mm.  Nodules are annotated on the images.    Liver and spleen are unchanged.  No biliary dilatation.  Gallbladder unremarkable.  Pancreas unchanged.  Adrenal glands stable.  Kidneys are unchanged.    Stomach unremarkable.  No small bowel dilatation.  Left lower quadrant colostomy noted.  Degree of colonic constipation present.    Known rectal mass present at the better described on concurrent MRI.  Known perforation/fistula formation findings involving the left aspect of the rectum/mass persists with left pelvic fluid collection extending inferior-posteriorly to involve the subcutaneous tissues overlying the medial left gluteus musculature.  Largest component within the pelvis measures approximately 4 x 2 cm.  Previously noted air within the collection has resolved.  Soft tissue air overlying the gluteus muscle jugular as resolved as well with persistent fat stranding/phlegmonous changes.    No pathologically enlarged mesenteric or retroperitoneal lymph nodes appreciated.  No significant free fluid.    No acute osseous abnormality.      Impression       1. No detrimental change of the chest.  Stable tiny sub 4 mm nodules.  2. Persistent pelvic fluid collection consistent with known rectal mass perforation/fistulous change.  Fluid/inflammation again noted extending inferiorly to 2 overlie the medial left gluteus amelia musculature.  Previously noted air components have resolved.  3. Correlate with concurrent MRI rectal report for rectal mass characterization.       PATHOLOGY:  1. Soft tissue, left lateral sidewall margin (biopsy):  - Benign fibro muscular tissue, negative for carcinoma  - Confirmed with negative CDX2 immunostain and rare background; CDX2  immunostain with appropriate  control  2. Rectum, anus with left buttock skin (abdominal peritoneal resection with extra anatomic resection of  fistula tract and the left gluteus muscle):  - Residual invasive adenocarcinoma, moderately differentiated, extending to pericolorectal tissue, with  perforation (see synoptic report)  - Carcinoma extends to inked resection margin at perforation site; final resection margin maybe negative  (also see part 1);  - Pathologic Stage ypT3, N0  - Background colon with hemosiderin-laden macrophages, fibrosis and thickened wall vessels, consistent  with neoadjuvant therapy effect  - Diverticulosis  - Anus with two skin tags and reactive stromal cells, viewed by Dr. Servin who concurs this consistent with  chemoradiation therapy efect  - Eighteen lymph nodes, negative for metastatic carcinoma (0/18)    COLON AND RECTUM: Resection, Including Transanal Disk Excision of Rectal Neoplasms  Procedure Abdominoperineal resection, extra anatomic resection of fistula tract and the left gluteus  muscle  Tumor Site Rectum  Tumor Location Straddles the anterior peritoneal reflection  Tumor Size  Greatest dimension (centimeters): 4 cm  Macroscopic Tumor Perforation Present  Macroscopic Evaluation of Mesorectum Complete  Histologic Type Adenocarcinoma  Histologic Grade G2: Moderately differentiated  Tumor Extension Tumor invades through the muscularis propria into pericolorectal tissue  Margins (Note F)  All margins are uninvolved by invasive carcinoma, high grade dysplasia / intramucosal carcinoma, and low  grade dysplasia  Margins examined: Proximal, distal and radial resection margins (see also part 1)  Treatment Effect  Present  Residual cancer with evident tumor regression, but more than single cells or rare small groups of cancer  cells (partial response, score 2)  Lymphovascular Invasion Not identified  Perineural Invasion Not identified  Tumor Deposits Not identified  Regional Lymph  Nodes  Lymph Node Examination (required only if lymph nodes present in specimen)  Number of Lymph Nodes Involved: 0  Number of Lymph Nodes Examined: 18  Pathologic Stage Classification (pTNM, AJCC 8th Edition)  y (posttreatment)  Primary Tumor (pT)  pT3: Tumor invades through the muscularis propria into pericolorectal tissues  Regional Lymph Nodes (pN)  pN0: No regional lymph node metastasis  Additional Pathologic Findings: Diverticulosis, tatoo identified  Ancillary Studies  NORMAL FOR COLORECTAL/ENDOMETRAIL CARCINOMA  Immunohistochemistry (IHC) Testing for Mismatch Repair (MMR) Proteins:  MLH1 - Intact nuclear expression  MSH2 - Intact nuclear expression  MSH6 - Intact nuclear expression  PMS2 - Intact nuclear expression  Background nonneoplastic tissue/internal control with intact nuclear expression  IHC Interpretation  No loss of nuclear expression of MMR proteins: low probability of microsatellite instability  There are exceptions to the above IHC interpretations. These results should not be considered in isolation,  and clinical correlation with genetic counseling is recommended to assess the need for germline testing.  Comment: CDX2 immunostain performed block 2K shows tumor cells are positive; in comparison, CDX2  and AE1/AE3 stain on 2U shows lymph node with abundant mucin, negative for metastatic carcinoma.  CDX2 and AE1/AE3 stains with appropriate controls. Dr. Goel viewed selected slides, and concurs the  staging of carcinoma.  ASSESSMENT/PLAN:     54-year-old male with rectal adenocarcinoma with likely T3N1 disease based upon preop imaging without evidence of metastatic disease now s/p neoadj chemoXRT which completed on 8/13/19 but with post-tx MRI showing continued threatened mesorectal fascia who developed rectal perforation and abscess/fistula on cycle 11/12 of neoadj chemotx in Feb 2020 requiring lap diverting sigmoid colostomy with continued fluid collection/perforation/fistula on CT/MRI now s/p  APR with extra-anatomic resection of fistula tract and left gluteus muscle, omental pedicle flap and VRAM with skin paddle by plastics on 4/9/2020    - urinary retention management per Urology  - Flap appears well perfused and no evidence of infection or purulent drainage. May eventually require silver nitrate to area of granulation tissue  - encourage the patient to taken a high protein diet to allow for full wound healing  - encourage patient to continue ambulation at home and attempt smoking cessation  - Will have patient followup with Dr. Aden of [facility] in future to discuss any further treatment and for surveillance  - RTC 1 month for re-evaluation    Jan New MD  Colon and Rectal Surgery  [facility]

## 2020-06-02 ENCOUNTER — PATIENT OUTREACH (OUTPATIENT)
Dept: ADMINISTRATIVE | Facility: OTHER | Age: 55
End: 2020-06-02

## 2020-06-04 ENCOUNTER — OFFICE VISIT (OUTPATIENT)
Dept: UROLOGY | Facility: CLINIC | Age: 55
End: 2020-06-04
Payer: COMMERCIAL

## 2020-06-04 VITALS
DIASTOLIC BLOOD PRESSURE: 80 MMHG | SYSTOLIC BLOOD PRESSURE: 130 MMHG | TEMPERATURE: 98 F | WEIGHT: 149.5 LBS | BODY MASS INDEX: 20.27 KG/M2

## 2020-06-04 DIAGNOSIS — R31.9 HEMATURIA, UNSPECIFIED TYPE: ICD-10-CM

## 2020-06-04 DIAGNOSIS — N30.01 ACUTE CYSTITIS WITH HEMATURIA: ICD-10-CM

## 2020-06-04 DIAGNOSIS — Z12.5 PROSTATE CANCER SCREENING: ICD-10-CM

## 2020-06-04 DIAGNOSIS — N39.0 URINARY TRACT INFECTION WITHOUT HEMATURIA, SITE UNSPECIFIED: Primary | ICD-10-CM

## 2020-06-04 DIAGNOSIS — N40.0 BENIGN PROSTATIC HYPERPLASIA, UNSPECIFIED WHETHER LOWER URINARY TRACT SYMPTOMS PRESENT: ICD-10-CM

## 2020-06-04 LAB
AMORPH CRY UR QL COMP ASSIST: ABNORMAL
BACTERIA #/AREA URNS AUTO: ABNORMAL /HPF
BILIRUB SERPL-MCNC: ABNORMAL MG/DL
BLOOD URINE, POC: 50
CLARITY, POC UA: ABNORMAL
COLOR, POC UA: YELLOW
GLUCOSE UR QL STRIP: ABNORMAL
KETONES UR QL STRIP: ABNORMAL
LEUKOCYTE ESTERASE URINE, POC: ABNORMAL
MICROSCOPIC COMMENT: ABNORMAL
NITRITE, POC UA: ABNORMAL
PH, POC UA: 8
PROTEIN, POC: ABNORMAL
RBC #/AREA URNS AUTO: 7 /HPF (ref 0–4)
SPECIFIC GRAVITY, POC UA: 1
UROBILINOGEN, POC UA: ABNORMAL
WBC #/AREA URNS AUTO: 15 /HPF (ref 0–5)

## 2020-06-04 PROCEDURE — 81001 URINALYSIS AUTO W/SCOPE: CPT

## 2020-06-04 PROCEDURE — 3008F BODY MASS INDEX DOCD: CPT | Mod: CPTII,S$GLB,, | Performed by: UROLOGY

## 2020-06-04 PROCEDURE — 87088 URINE BACTERIA CULTURE: CPT

## 2020-06-04 PROCEDURE — 3008F PR BODY MASS INDEX (BMI) DOCUMENTED: ICD-10-PCS | Mod: CPTII,S$GLB,, | Performed by: UROLOGY

## 2020-06-04 PROCEDURE — 99214 OFFICE O/P EST MOD 30 MIN: CPT | Mod: 25,S$GLB,, | Performed by: UROLOGY

## 2020-06-04 PROCEDURE — 87186 SC STD MICRODIL/AGAR DIL: CPT

## 2020-06-04 PROCEDURE — 81002 POCT URINE DIPSTICK WITHOUT MICROSCOPE: ICD-10-PCS | Mod: S$GLB,,, | Performed by: UROLOGY

## 2020-06-04 PROCEDURE — 99999 PR PBB SHADOW E&M-EST. PATIENT-LVL III: CPT | Mod: PBBFAC,,, | Performed by: UROLOGY

## 2020-06-04 PROCEDURE — 87086 URINE CULTURE/COLONY COUNT: CPT

## 2020-06-04 PROCEDURE — 81002 URINALYSIS NONAUTO W/O SCOPE: CPT | Mod: S$GLB,,, | Performed by: UROLOGY

## 2020-06-04 PROCEDURE — 87077 CULTURE AEROBIC IDENTIFY: CPT

## 2020-06-04 PROCEDURE — 99999 PR PBB SHADOW E&M-EST. PATIENT-LVL III: ICD-10-PCS | Mod: PBBFAC,,, | Performed by: UROLOGY

## 2020-06-04 PROCEDURE — 99214 PR OFFICE/OUTPT VISIT, EST, LEVL IV, 30-39 MIN: ICD-10-PCS | Mod: 25,S$GLB,, | Performed by: UROLOGY

## 2020-06-04 RX ORDER — NITROFURANTOIN (MACROCRYSTALS) 100 MG/1
100 CAPSULE ORAL EVERY 12 HOURS
Qty: 14 CAPSULE | Refills: 0 | Status: SHIPPED | OUTPATIENT
Start: 2020-06-04 | End: 2020-06-11

## 2020-06-04 RX ORDER — FINASTERIDE 5 MG/1
5 TABLET, FILM COATED ORAL DAILY
Qty: 30 TABLET | Refills: 11 | Status: SHIPPED | OUTPATIENT
Start: 2020-06-04 | End: 2020-09-08 | Stop reason: SDUPTHER

## 2020-06-04 NOTE — PROGRESS NOTES
Chief Complaint: Urinary Retention    HPI:   6/4/20: PVR was 635 after our last visit and he was instructed in CIC.  Voiding normally he feels and hasn't done CIC in two weeks.  PVR today 105 ml.   5/6/20: Been on flomax since last visit.  Cysto/uroflow normal today.    4/20/20: 53 yo man had colon cancer with resection last week, referred by Dr. New.  Is in retention postoperatively.  No unusual abd/pelvic pain and no exac/rel factors.  No hematuria.  No urolithiasis.  PVR >700 ml.  No  history.      Allergies:  Patient has no known allergies.    Medications:  has a current medication list which includes the following prescription(s): acetaminophen, hydrocodone/acetaminophen, morphine, oxycodone, tamsulosin, capecitabine, dronabinol, iron fum-b12-if-c-folic acid, magic mouthwash diphen/antac/lidoc, and promethazine.    Review of Systems:  General: No fever, chills, fatigability, or weight loss.  Skin: No rashes, itching, or changes in color or texture of skin.  Chest: Denies MARTIN, cyanosis, wheezing, cough, and sputum production.  Abdomen: Appetite fine. No weight loss.   Musculoskeletal: No joint stiffness or swelling. Denies back pain.  : As above.  All other review of systems negative.    PMH:   has a past medical history of Anemia and Cancer.    PSH:   has a past surgical history that includes Hernia repair (1995); Tonsillectomy; Colonoscopy (N/A, 6/6/2019); Esophagogastroduodenoscopy (N/A, 6/6/2019); Insertion of tunneled central venous catheter (CVC) with subcutaneous port (N/A, 10/8/2019); Injection of anesthetic agent into tissue plane defined by transversus abdominis muscle (N/A, 2/18/2020); Colon surgery; Laparoscopic colostomy; Abdominoperineal resection of rectum (N/A, 4/9/2020); Creation of muscle rotational flap (Left, 4/9/2020); and Flap graft surgery (N/A, 4/9/2020).    FamHx: family history includes Coronary artery disease in his brother and maternal grandmother; Intestinal malrotation in his  mother; Leukemia in his father; No Known Problems in his sister; Stomach cancer in his maternal grandfather.    SocHx:  reports that he has been smoking cigarettes. He started smoking about 36 years ago. He has a 35.00 pack-year smoking history. He has quit using smokeless tobacco.  His smokeless tobacco use included chew. He reports that he drinks about 46.0 standard drinks of alcohol per week. He reports that he does not use drugs.      Physical Exam:  Vitals:    06/04/20 0945   BP: 130/80   Temp: 97.9 °F (36.6 °C)     General: A&Ox3, no apparent distress, no deformities  Neck: No masses, normal thyroid  Lungs: normal inspiration, no use of accessory muscles  Heart: normal pulse, no arrhythmias  Abdomen: Soft, NT, ND  Skin: The skin is warm and dry. No jaundice.  Ext: No c/c/e.  :   5/6/20: Test desc tim, no abnormalities of epididymus. Penis normal, with normal penile and scrotal skin. Meatus normal.     Labs/Studies:   Bladder Scan performed in office:     5/7/20: 600ml    6/4/20:  ml.  PSA    6/19: 0.37    Impression/Plan:   1. Continue flomax.  Add finasteride.  2. Macrobid for UTI, UA/UCx to lab  3. Low risk of prostate cancer.  PSA next visit

## 2020-06-07 LAB — BACTERIA UR CULT: ABNORMAL

## 2020-06-23 DIAGNOSIS — C20 RECTAL CANCER: ICD-10-CM

## 2020-06-23 RX ORDER — OXYCODONE HYDROCHLORIDE 10 MG/1
10 TABLET ORAL EVERY 4 HOURS PRN
Qty: 90 TABLET | Refills: 0 | Status: SHIPPED | OUTPATIENT
Start: 2020-06-23 | End: 2020-07-06 | Stop reason: SDUPTHER

## 2020-06-24 ENCOUNTER — PATIENT MESSAGE (OUTPATIENT)
Dept: UROLOGY | Facility: CLINIC | Age: 55
End: 2020-06-24

## 2020-06-24 DIAGNOSIS — N39.0 URINARY TRACT INFECTION WITHOUT HEMATURIA, SITE UNSPECIFIED: Primary | ICD-10-CM

## 2020-06-25 ENCOUNTER — LAB VISIT (OUTPATIENT)
Dept: LAB | Facility: HOSPITAL | Age: 55
End: 2020-06-25
Attending: UROLOGY
Payer: COMMERCIAL

## 2020-06-25 ENCOUNTER — OFFICE VISIT (OUTPATIENT)
Dept: SURGERY | Facility: CLINIC | Age: 55
End: 2020-06-25
Payer: COMMERCIAL

## 2020-06-25 VITALS
WEIGHT: 163.38 LBS | DIASTOLIC BLOOD PRESSURE: 83 MMHG | SYSTOLIC BLOOD PRESSURE: 124 MMHG | TEMPERATURE: 98 F | HEART RATE: 91 BPM | BODY MASS INDEX: 22.16 KG/M2

## 2020-06-25 DIAGNOSIS — N39.0 URINARY TRACT INFECTION WITHOUT HEMATURIA, SITE UNSPECIFIED: ICD-10-CM

## 2020-06-25 DIAGNOSIS — C20 RECTAL CANCER: Primary | ICD-10-CM

## 2020-06-25 LAB
BACTERIA #/AREA URNS AUTO: ABNORMAL /HPF
MICROSCOPIC COMMENT: ABNORMAL
RBC #/AREA URNS AUTO: 9 /HPF (ref 0–4)
WBC #/AREA URNS AUTO: 1 /HPF (ref 0–5)

## 2020-06-25 PROCEDURE — 99999 PR PBB SHADOW E&M-EST. PATIENT-LVL III: ICD-10-PCS | Mod: PBBFAC,,, | Performed by: COLON & RECTAL SURGERY

## 2020-06-25 PROCEDURE — 99024 PR POST-OP FOLLOW-UP VISIT: ICD-10-PCS | Mod: S$GLB,,, | Performed by: COLON & RECTAL SURGERY

## 2020-06-25 PROCEDURE — 87077 CULTURE AEROBIC IDENTIFY: CPT

## 2020-06-25 PROCEDURE — 99024 POSTOP FOLLOW-UP VISIT: CPT | Mod: S$GLB,,, | Performed by: COLON & RECTAL SURGERY

## 2020-06-25 PROCEDURE — 87088 URINE BACTERIA CULTURE: CPT

## 2020-06-25 PROCEDURE — 87186 SC STD MICRODIL/AGAR DIL: CPT

## 2020-06-25 PROCEDURE — 99999 PR PBB SHADOW E&M-EST. PATIENT-LVL III: CPT | Mod: PBBFAC,,, | Performed by: COLON & RECTAL SURGERY

## 2020-06-25 PROCEDURE — 81001 URINALYSIS AUTO W/SCOPE: CPT

## 2020-06-25 PROCEDURE — 87086 URINE CULTURE/COLONY COUNT: CPT

## 2020-06-25 NOTE — PROGRESS NOTES
History & Physical    SUBJECTIVE:     Chief Complaint   Patient presents with    Post-op Evaluation     Post Op   CC: rectal adenocarcinoma  Ref: Anjelica Saavedra MD    History of Present Illness:  Patient is a 54 y.o. male presents for evaluation of rectal cancer.  Patient reports he has had increasing pelvic/rectal pain along with decreased bowel movements over the last 6 weeks.  Reports an uncomfortable feeling in his pelvis associated with mucous discharge from his rectum as well as bloody bowel movements that are thin and less than normal. He reports associated chills, fatigue and 16 lb weight loss over the past 5 months.  He has also noticed a decrease in appetite as well. He underwent a colonoscopy on 06/06/2019 where a nonobstructing rectal mass was found with biopsy showing well-differentiated adenocarcinoma.  He was then referred to [facility]orectal [facility] for evaluation.  He has no family history of colorectal cancer or IBD.    8/13/19: Completed neoadj chemoXRT  2/18/2020: Laparoscopic loop sigmoid colostomy 2/2 rectal perforation on chemotx  4/9/2020: APR with extra anatomic resection of fistula tract and left gluteus muscle, omental pedicle flap and VRAM with skin paddle by plastics    Interval history:  Since last clinic visit, the patient continues to do well.  He is tolerating regular diet having good ostomy function.  He does have some new swelling near his ostomy.  The flap continues to heal well and has a small amount of drainage but less than before.  He denies any fever, chills, nausea, vomiting.  Does have some erectile dysfunction.      Review of patient's allergies indicates:  No Known Allergies    Current Outpatient Medications   Medication Sig Dispense Refill    acetaminophen (TYLENOL) 325 MG tablet Take 2 tablets (650 mg total) by mouth every 6 (six) hours as needed.  0    capecitabine (XELODA) 500 MG Tab Take 3 tablets (1500 mg) by mouth twice daily after breakfast and dinner on days of  radiation only. 168 tablet 0    dronabinol (MARINOL) 10 MG capsule Take 1 capsule (10 mg total) by mouth 2 (two) times daily before meals. 60 capsule 0    finasteride (PROSCAR) 5 mg tablet Take 1 tablet (5 mg total) by mouth once daily. 30 tablet 11    HYDROCODONE-ACETAMINOPHEN ORAL Take by mouth as needed.      iron fum-B12-IF-C-folic acid (FOLTRIN) 110-0.5 mg capsule Take 1 capsule by mouth 2 (two) times daily. 60 capsule 1    magic mouthwash diphen/antac/lidoc Swish and spit 15 ml by mouth every 4 hours as needed 500 mL 2    morphine (MS CONTIN) 30 MG 12 hr tablet Take 1 tablet (30 mg total) by mouth every 8 (eight) hours. 90 tablet 0    oxyCODONE (ROXICODONE) 10 mg Tab immediate release tablet Take 1 tablet (10 mg total) by mouth every 4 (four) hours as needed for Pain (medically necessary). 90 tablet 0    promethazine (PHENERGAN) 25 MG tablet Take 1 tablet (25 mg total) by mouth every 4 (four) hours. 60 tablet 4    tamsulosin (FLOMAX) 0.4 mg Cap Take 1 capsule (0.4 mg total) by mouth once daily. 30 capsule 11     No current facility-administered medications for this visit.        Past Medical History:   Diagnosis Date    Anemia     Cancer      Past Surgical History:   Procedure Laterality Date    ABDOMINOPERINEAL RESECTION OF RECTUM N/A 4/9/2020    Procedure: PROCTECTOMY, ABDOMINOPERINEAL;  Surgeon: Jan New MD;  Location: [facility];  Service: General;  Laterality: N/A;    COLON SURGERY      COLONOSCOPY N/A 6/6/2019    Procedure: COLONOSCOPY;  Surgeon: Anjelica Saavedra MD;  Location: [facility];  Service: Endoscopy;  Laterality: N/A;    CREATION OF MUSCLE ROTATIONAL FLAP Left 4/9/2020    Procedure: CREATION, FLAP, MUSCLE ROTATION;  Surgeon: Sebastian Dan MD;  Location: [facility];  Service: Plastics;  Laterality: Left;   VRAM    ESOPHAGOGASTRODUODENOSCOPY N/A 6/6/2019    Procedure: EGD (ESOPHAGOGASTRODUODENOSCOPY);  Surgeon: Anjelica Saavedra MD;  Location: [facility];  Service: Endoscopy;   Laterality: N/A;    FLAP GRAFT SURGERY N/A 4/9/2020    Procedure: FLAP GRAFT;  Surgeon: Sebastian Dan MD;  Location: [facility] OR;  Service: Plastics;  Laterality: N/A;  left fasciocutaneous buttocks flap    HERNIA REPAIR  1995    INJECTION OF ANESTHETIC AGENT INTO TISSUE PLANE DEFINED BY TRANSVERSUS ABDOMINIS MUSCLE N/A 2/18/2020    Procedure: BLOCK, TRANSVERSUS ABDOMINIS PLANE;  Surgeon: Jan New MD;  Location: [facility] OR;  Service: General;  Laterality: N/A;    INSERTION OF TUNNELED CENTRAL VENOUS CATHETER (CVC) WITH SUBCUTANEOUS PORT N/A 10/8/2019    Procedure: [id number];  Surgeon: Jan New MD;  Location: [facility] OR;  Service: General;  Laterality: N/A;    LAPAROSCOPIC COLOSTOMY      TONSILLECTOMY       Family History   Problem Relation Age of Onset    Intestinal malrotation Mother     Leukemia Father     No Known Problems Sister     Coronary artery disease Brother     Coronary artery disease Maternal Grandmother     Stomach cancer Maternal Grandfather      Social History     Tobacco Use    Smoking status: Current Every Day Smoker     Packs/day: 1.00     Years: 35.00     Pack years: 35.00     Types: Cigarettes     Start date: 1/2/1984    Smokeless tobacco: Former User     Types: Chew    Tobacco comment: no smoking after m.n prior to sx   Substance Use Topics    Alcohol use: Yes     Alcohol/week: 46.0 standard drinks     Types: 42 Cans of beer, 4 Shots of liquor per week     Frequency: 4 or more times a week     Drinks per session: 5 or 6     Binge frequency: Daily or almost daily     Comment: nancie 7, beer socialy,  no alcohol 72 hours prior to surgery    Drug use: Never        Review of Systems:  Review of Systems   Constitutional: Negative for activity change, appetite change, chills, fatigue, fever and unexpected weight change.   HENT: Negative for congestion, ear pain, sore throat and trouble swallowing.    Eyes: Negative for pain, redness and itching.    Respiratory: Negative for cough, shortness of breath and wheezing.    Cardiovascular: Negative for chest pain, palpitations and leg swelling.   Gastrointestinal: Negative for abdominal distention, abdominal pain, anal bleeding, blood in stool, constipation, diarrhea, nausea, rectal pain and vomiting.   Endocrine: Negative for cold intolerance, heat intolerance and polyuria.   Genitourinary: Negative for dysuria, flank pain, frequency and hematuria.        +erectile dysfunction   Musculoskeletal: Negative for gait problem, joint swelling and neck pain.   Skin: Positive for wound. Negative for color change and rash.   Allergic/Immunologic: Negative for environmental allergies and immunocompromised state.   Neurological: Negative for dizziness, speech difficulty, weakness and numbness.   Psychiatric/Behavioral: Negative for agitation, confusion and hallucinations.       OBJECTIVE:     Vital Signs (Most Recent)  Temp: 98.4 °F (36.9 °C) (06/25/20 1136)  Pulse: 91 (06/25/20 1136)  BP: 124/83 (06/25/20 1136)     74.1 kg (163 lb 5.8 oz)     Physical Exam:  Physical Exam  Constitutional:       Appearance: He is well-developed.   HENT:      Head: Normocephalic and atraumatic.   Eyes:      Conjunctiva/sclera: Conjunctivae normal.   Neck:      Musculoskeletal: Normal range of motion.      Thyroid: No thyromegaly.   Cardiovascular:      Rate and Rhythm: Regular rhythm.   Pulmonary:      Effort: Pulmonary effort is normal. No respiratory distress.   Abdominal:      Comments: Soft, nondistended, midline incision c/d/i; ostomy pink and viable with stool in bag; likely parastomal hernia   Genitourinary:     Comments: Flap well-perfused with small 1 cm area of granulation tissue at the superior portion of the flap near gluteus, no erythema or purulent drainage  Musculoskeletal: Normal range of motion.         General: No tenderness.   Skin:     General: Skin is warm and dry.      Capillary Refill: Capillary refill takes less than 2  seconds.      Findings: No rash.   Neurological:      Mental Status: He is alert and oriented to person, place, and time.       Laboratory  Lab Results   Component Value Date    WBC 5.00 04/14/2020    HGB 9.7 (L) 04/14/2020    HCT 30.5 (L) 04/14/2020     04/14/2020    CHOL 158 06/01/2019    TRIG 194 (H) 06/01/2019    HDL 30 (L) 06/01/2019    ALT 15 03/24/2020    AST 19 03/24/2020     04/14/2020    K 3.7 04/14/2020     04/14/2020    CREATININE 0.7 04/14/2020    BUN 10 04/14/2020    CO2 24 04/14/2020    TSH 1.607 01/15/2019    PSA 0.37 06/01/2019    INR 1.0 06/11/2019       Diagnostic Results:  Colonoscopy images and report reviewed which shows a rectal mass that malignant     MRI March 2020:  FINDINGS:  Tumor Location: Tumor location (from anal verge):    Distal aspect of the mass is 7-8 cm from the anal verge.    Relationship to anterior peritoneal reflection: Straddles    Tumor Characteristics/extent:    Circumferential extent/location: Significant interval decrease in size of the lesion with accurate measurements difficult.  Tumor length is estimated at 4-5 cm.  There is persistent left eccentric wall thickening/exophytic component also significantly improved.  There is persistent involvement of the left mesorectal fat/fascia.    Left-sided fistula present communicating with rim enhancing 5 x 2 cm fluid collection which extends posterior inferiorly to involve subcutaneous tissues overlying the medial left gluteus amelia musculature.  No other concerning for or new fluid collection.    Lymph Nodes:    No suspicious lymph nodes currently.      Impression       Continued interval decrease in size of the rectal mass, significantly decreased in volume from June 2019 MRI exam.    Fistula/perforation findings present with pelvic fluid collection concerning for abscess as above as noted on recent CT exam.       CT March 2020:  FINDINGS:  Thoracic aorta, great vessels and heart are unchanged.  MediPort  present.  No pathologically enlarged axillary, mediastinal or hilar lymph nodes present.    Lungs demonstrate no acute opacity.  Stable bilateral tiny nodules noted, largest within the right lower lobe measuring 3.7 mm.  Nodules are annotated on the images.    Liver and spleen are unchanged.  No biliary dilatation.  Gallbladder unremarkable.  Pancreas unchanged.  Adrenal glands stable.  Kidneys are unchanged.    Stomach unremarkable.  No small bowel dilatation.  Left lower quadrant colostomy noted.  Degree of colonic constipation present.    Known rectal mass present at the better described on concurrent MRI.  Known perforation/fistula formation findings involving the left aspect of the rectum/mass persists with left pelvic fluid collection extending inferior-posteriorly to involve the subcutaneous tissues overlying the medial left gluteus musculature.  Largest component within the pelvis measures approximately 4 x 2 cm.  Previously noted air within the collection has resolved.  Soft tissue air overlying the gluteus muscle jugular as resolved as well with persistent fat stranding/phlegmonous changes.    No pathologically enlarged mesenteric or retroperitoneal lymph nodes appreciated.  No significant free fluid.    No acute osseous abnormality.      Impression       1. No detrimental change of the chest.  Stable tiny sub 4 mm nodules.  2. Persistent pelvic fluid collection consistent with known rectal mass perforation/fistulous change.  Fluid/inflammation again noted extending inferiorly to 2 overlie the medial left gluteus amelia musculature.  Previously noted air components have resolved.  3. Correlate with concurrent MRI rectal report for rectal mass characterization.       PATHOLOGY:  1. Soft tissue, left lateral sidewall margin (biopsy):  - Benign fibro muscular tissue, negative for carcinoma  - Confirmed with negative CDX2 immunostain and rare background; CDX2 immunostain with appropriate  control  2. Rectum,  anus with left buttock skin (abdominal peritoneal resection with extra anatomic resection of  fistula tract and the left gluteus muscle):  - Residual invasive adenocarcinoma, moderately differentiated, extending to pericolorectal tissue, with  perforation (see synoptic report)  - Carcinoma extends to inked resection margin at perforation site; final resection margin maybe negative  (also see part 1);  - Pathologic Stage ypT3, N0  - Background colon with hemosiderin-laden macrophages, fibrosis and thickened wall vessels, consistent  with neoadjuvant therapy effect  - Diverticulosis  - Anus with two skin tags and reactive stromal cells, viewed by Dr. Servin who concurs this consistent with  chemoradiation therapy efect  - Eighteen lymph nodes, negative for metastatic carcinoma (0/18)    COLON AND RECTUM: Resection, Including Transanal Disk Excision of Rectal Neoplasms  Procedure Abdominoperineal resection, extra anatomic resection of fistula tract and the left gluteus  muscle  Tumor Site Rectum  Tumor Location Straddles the anterior peritoneal reflection  Tumor Size  Greatest dimension (centimeters): 4 cm  Macroscopic Tumor Perforation Present  Macroscopic Evaluation of Mesorectum Complete  Histologic Type Adenocarcinoma  Histologic Grade G2: Moderately differentiated  Tumor Extension Tumor invades through the muscularis propria into pericolorectal tissue  Margins (Note F)  All margins are uninvolved by invasive carcinoma, high grade dysplasia / intramucosal carcinoma, and low  grade dysplasia  Margins examined: Proximal, distal and radial resection margins (see also part 1)  Treatment Effect  Present  Residual cancer with evident tumor regression, but more than single cells or rare small groups of cancer  cells (partial response, score 2)  Lymphovascular Invasion Not identified  Perineural Invasion Not identified  Tumor Deposits Not identified  Regional Lymph Nodes  Lymph Node Examination (required only if lymph  nodes present in specimen)  Number of Lymph Nodes Involved: 0  Number of Lymph Nodes Examined: 18  Pathologic Stage Classification (pTNM, AJCC 8th Edition)  y (posttreatment)  Primary Tumor (pT)  pT3: Tumor invades through the muscularis propria into pericolorectal tissues  Regional Lymph Nodes (pN)  pN0: No regional lymph node metastasis  Additional Pathologic Findings: Diverticulosis, tatoo identified  Ancillary Studies  NORMAL FOR COLORECTAL/ENDOMETRAIL CARCINOMA  Immunohistochemistry (IHC) Testing for Mismatch Repair (MMR) Proteins:  MLH1 - Intact nuclear expression  MSH2 - Intact nuclear expression  MSH6 - Intact nuclear expression  PMS2 - Intact nuclear expression  Background nonneoplastic tissue/internal control with intact nuclear expression  IHC Interpretation  No loss of nuclear expression of MMR proteins: low probability of microsatellite instability  There are exceptions to the above IHC interpretations. These results should not be considered in isolation,  and clinical correlation with genetic counseling is recommended to assess the need for germline testing.  Comment: CDX2 immunostain performed block 2K shows tumor cells are positive; in comparison, CDX2  and AE1/AE3 stain on 2U shows lymph node with abundant mucin, negative for metastatic carcinoma.  CDX2 and AE1/AE3 stains with appropriate controls. Dr. Goel viewed selected slides, and concurs the  staging of carcinoma.  ASSESSMENT/PLAN:     54-year-old male with rectal adenocarcinoma with likely T3N1 disease based upon preop imaging without evidence of metastatic disease now s/p neoadj chemoXRT which completed on 8/13/19 but with post-tx MRI showing continued threatened mesorectal fascia who developed rectal perforation and abscess/fistula on cycle 11/12 of neoadj chemotx in Feb 2020 requiring lap diverting sigmoid colostomy with continued fluid collection/perforation/fistula on CT/MRI now s/p APR with extra-anatomic resection of fistula tract and  left gluteus muscle, omental pedicle flap and VRAM with skin paddle by plastics on 4/9/2020    - urinary retention management per Urology. Will also send back for evaluation of erectile dysfunction  - Flap appears well perfused and no evidence of infection or purulent drainage. May require silver nitrate to area of granulation tissue. Will discuss with Dr Dan  - encourage patient to attempt smoking cessation  - Will have patient followup with Dr. Aden of [facility] in future to discuss any further treatment and for surveillance  - RTC 3 month for re-evaluation    Jan New MD  Colon and Rectal Surgery  [facility]

## 2020-06-27 LAB — BACTERIA UR CULT: ABNORMAL

## 2020-06-28 ENCOUNTER — TELEPHONE (OUTPATIENT)
Dept: UROLOGY | Facility: CLINIC | Age: 55
End: 2020-06-28

## 2020-06-28 RX ORDER — SULFAMETHOXAZOLE AND TRIMETHOPRIM 800; 160 MG/1; MG/1
1 TABLET ORAL 2 TIMES DAILY
Qty: 14 TABLET | Refills: 0 | Status: SHIPPED | OUTPATIENT
Start: 2020-06-28 | End: 2020-07-05

## 2020-06-30 ENCOUNTER — PATIENT OUTREACH (OUTPATIENT)
Dept: ADMINISTRATIVE | Facility: OTHER | Age: 55
End: 2020-06-30

## 2020-06-30 NOTE — PROGRESS NOTES
Requested updates within Care Everywhere.  Patient's chart was reviewed for overdue GRETCHEN topics.  Immunizations reconciled.

## 2020-07-01 ENCOUNTER — OFFICE VISIT (OUTPATIENT)
Dept: UROLOGY | Facility: CLINIC | Age: 55
End: 2020-07-01
Payer: COMMERCIAL

## 2020-07-01 VITALS
BODY MASS INDEX: 22.03 KG/M2 | TEMPERATURE: 98 F | WEIGHT: 162.69 LBS | HEIGHT: 72 IN | HEART RATE: 85 BPM | SYSTOLIC BLOOD PRESSURE: 138 MMHG | DIASTOLIC BLOOD PRESSURE: 72 MMHG

## 2020-07-01 DIAGNOSIS — N40.0 BENIGN PROSTATIC HYPERPLASIA, UNSPECIFIED WHETHER LOWER URINARY TRACT SYMPTOMS PRESENT: ICD-10-CM

## 2020-07-01 DIAGNOSIS — N52.39 OTHER POST-PROCEDURAL ERECTILE DYSFUNCTION: Primary | ICD-10-CM

## 2020-07-01 DIAGNOSIS — N39.0 URINARY TRACT INFECTION WITHOUT HEMATURIA, SITE UNSPECIFIED: ICD-10-CM

## 2020-07-01 PROCEDURE — 99999 PR PBB SHADOW E&M-EST. PATIENT-LVL III: ICD-10-PCS | Mod: PBBFAC,,, | Performed by: UROLOGY

## 2020-07-01 PROCEDURE — 99999 PR PBB SHADOW E&M-EST. PATIENT-LVL III: CPT | Mod: PBBFAC,,, | Performed by: UROLOGY

## 2020-07-01 PROCEDURE — 3008F BODY MASS INDEX DOCD: CPT | Mod: CPTII,S$GLB,, | Performed by: UROLOGY

## 2020-07-01 PROCEDURE — 99214 PR OFFICE/OUTPT VISIT, EST, LEVL IV, 30-39 MIN: ICD-10-PCS | Mod: S$GLB,,, | Performed by: UROLOGY

## 2020-07-01 PROCEDURE — 3008F PR BODY MASS INDEX (BMI) DOCUMENTED: ICD-10-PCS | Mod: CPTII,S$GLB,, | Performed by: UROLOGY

## 2020-07-01 PROCEDURE — 99214 OFFICE O/P EST MOD 30 MIN: CPT | Mod: S$GLB,,, | Performed by: UROLOGY

## 2020-07-01 RX ORDER — SILDENAFIL 100 MG/1
100 TABLET, FILM COATED ORAL DAILY PRN
Qty: 20 TABLET | Refills: 11 | Status: SHIPPED | OUTPATIENT
Start: 2020-07-01 | End: 2020-10-22 | Stop reason: SDUPTHER

## 2020-07-01 NOTE — PROGRESS NOTES
Chief Complaint: Erectile dysfunction    HPI:   7/1/20: patient normally goes to Dr. Altamirano.  Apparently he has been having ED since his rectal surgery and would like to discuss options.  Recent dx with UTI and started abx today, otherwise voiding well.  Patient states he gets no erections after surgery, he has not tried any forms of medical therapy.  Libido and energy are still present.  6/4/20: PVR was 635 after our last visit and he was instructed in CIC.  Voiding normally he feels and hasn't done CIC in two weeks.  PVR today 105 ml.   5/6/20: Been on flomax since last visit.  Cysto/uroflow normal today.    4/20/20: 53 yo man had colon cancer with resection last week, referred by Dr. New.  Is in retention postoperatively.  No unusual abd/pelvic pain and no exac/rel factors.  No hematuria.  No urolithiasis.  PVR >700 ml.  No  history.      Allergies:  Patient has no known allergies.    Medications:  has a current medication list which includes the following prescription(s): acetaminophen, capecitabine, dronabinol, finasteride, hydrocodone/acetaminophen, iron fum-b12-if-c-folic acid, magic mouthwash diphen/antac/lidoc, morphine, oxycodone, promethazine, sulfamethoxazole-trimethoprim 800-160mg, tamsulosin, and sildenafil.    Review of Systems:  General: No fever, chills, fatigability, or weight loss.  Skin: No rashes, itching, or changes in color or texture of skin.  Chest: Denies MARTIN, cyanosis, wheezing, cough, and sputum production.  Abdomen: Appetite fine. No weight loss.   Musculoskeletal: No joint stiffness or swelling. Denies back pain.  : As above.  All other review of systems negative.    PMH:   has a past medical history of Anemia and Cancer.    PSH:   has a past surgical history that includes Hernia repair (1995); Tonsillectomy; Colonoscopy (N/A, 6/6/2019); Esophagogastroduodenoscopy (N/A, 6/6/2019); Insertion of tunneled central venous catheter (CVC) with subcutaneous port (N/A, 10/8/2019);  Injection of anesthetic agent into tissue plane defined by transversus abdominis muscle (N/A, 2/18/2020); Colon surgery; Laparoscopic colostomy; Abdominoperineal resection of rectum (N/A, 4/9/2020); Creation of muscle rotational flap (Left, 4/9/2020); and Flap graft surgery (N/A, 4/9/2020).    FamHx: family history includes Coronary artery disease in his brother and maternal grandmother; Intestinal malrotation in his mother; Leukemia in his father; No Known Problems in his sister; Stomach cancer in his maternal grandfather.    SocHx:  reports that he has been smoking cigarettes. He started smoking about 36 years ago. He has a 35.00 pack-year smoking history. He has quit using smokeless tobacco.  His smokeless tobacco use included chew. He reports current alcohol use of about 46.0 standard drinks of alcohol per week. He reports that he does not use drugs.      Physical Exam:  Vitals:    07/01/20 1433   BP: 138/72   Pulse: 85   Temp: 98.4 °F (36.9 °C)     General: A&Ox3, no apparent distress, no deformities  Neck: No masses, normal thyroid  Lungs: normal inspiration, no use of accessory muscles  Heart: normal pulse, no arrhythmias  Abdomen: Soft, NT, ND  Skin: The skin is warm and dry. No jaundice.  Ext: No c/c/e.  :   5/6/20: Test desc tim, no abnormalities of epididymus. Penis normal, with normal penile and scrotal skin. Meatus normal.     Labs/Studies:   Bladder Scan performed in office:     5/7/20: 600ml    6/4/20:  ml.  PSA    6/19: 0.37    Impression/Plan:   1. ED- relatively new finding for the patient, occurred after his rectal surgery.  Patient is currently getting no erections.  He would like to start first with medical therapy, we have also discussed vacuum device, Tri Mix and surgery.  Therefore we will initiate sildenafil 100 mg, have him return in 1 month and re-evaluate symptoms and the need for further therapeutic options.    He knows that this may cause blue-green vision changes, reflux,  flushing, headaches, and to not take this with nitro pills.  He understands this may cause chest pain and if so to report to the emergency department immediately.  Patient understands that this medication can cause death of taken with nitro pills for his heart.  He is understanding of all the side effects, and would still like to proceed.    2. BPH- continue flomax and finasteride, states he is voiding well.  3. UTI- complete abx and call if symptoms recur  4. Low risk of prostate cancer.  PSA next visit

## 2020-07-05 NOTE — PROGRESS NOTES
Subjective:       Patient ID: Sukhjinder Presley is a 54 y.o. male.    Chief Complaint: Rectal cancer [C20]  HPI: We have an opportunity to see Mr. Sukhjinder Presley in Hematology Oncology clinic at [facility] on 07/05/2020.  Mr. Sukhjinder Presley is a 54 y.o. gentleman with rectal cancer completed neoadjuvant chemoradiation with concurrent xeloda.  Reported symptoms have improved with better pain control and appetite.  Restaging MRI rectum showed      Impression         1. Large rectal mass with invasion of the muscularis and involvement of the mesorectal fat and fascia along the left and posterior aspect of the mass.  Single lymph node within the mesorectal fat posteriorly measuring approximately 6 mm.  Additional subcentimeter obturator and external iliac chain lymph nodes also seen bilaterally.  When compared to the study prior to neoadjuvant therapy there has been significant decrease in wall thickening.      Has been evaluated by Dr. New, given fascia involvement, likely resection would be R1.  Recommended total neoadjuvant chemotherapy prior to surgery.     Currently on chemotherapy with Avastin FOLFOXIRI s/p 4 cycles.     CT chest abdomen pelvis showed               Impression         1. Significant decrease in soft tissue prominence of the previously noted rectal mass with some persistent circumferential wall thickening still present on the current study.  There is also some persistent stranding within the perirectal fat and thickening of the mesial rectal fascia along with thickening/fluid density noted within the presacral soft tissues.  No metastatic lesions to the chest.  2. Remaining findings as discussed above and stable.      Had cycles 5-6 avastin folfoxiri, cycle 7 with FOLFOX.       Developed contained perforation of distal rectum. Underwent loop sigmoid colostomy. Underwent APR on 4/20/2020.  Pathology showed 1. Soft tissue, left lateral sidewall margin (biopsy):  - Benign fibro muscular  tissue, negative for carcinoma  - Confirmed with negative CDX2 immunostain and rare background; CDX2 immunostain with appropriate  control  2. Rectum, anus with left buttock skin (abdominal peritoneal resection with extra anatomic resection of  fistula tract and the left gluteus muscle):  - Residual invasive adenocarcinoma, moderately differentiated, extending to pericolorectal tissue, with  perforation (see synoptic report)  - Carcinoma extends to inked resection margin at perforation site; final resection margin maybe negative  (also see part 1);  - Pathologic Stage ypT3, N0  - Background colon with hemosiderin-laden macrophages, fibrosis and thickened wall vessels, consistent  with neoadjuvant therapy effect  - Diverticulosis  - Anus with two skin tags and reactive stromal cells, viewed by Dr. Servin who concurs this consistent with  chemoradiation therapy efect  - Eighteen lymph nodes, negative for metastatic carcinoma (0/18)      Oncology History   Rectal cancer   6/24/2019 Initial Diagnosis    Rectal cancer     6/26/2019 Cancer Staged    Staging form: Colon and Rectum, AJCC 8th Edition  - Clinical stage from 6/26/2019: Stage IIIB (cT3, cN2a, cM0) - Signed by Artem Mishra II, MD on 6/26/2019     7/10/2019 - 8/13/2019 Radiation Therapy    Treatment Site Ref. ID Energy Dose/Fx (Gy) #Fx Dose Correction (Gy) Total Dose (Gy) Start Date End Date Elapsed Days   RECTUM Pelvis 6X 2 25 / 25 0 50 7/10/2019 8/13/2019 34          7/10/2019 - 7/10/2019 Chemotherapy    Treatment Summary   Plan Name: OP CAPECITABINE 5 DAYS + RADIOTHERAPY  Treatment Goal: Curative  Status: Inactive  Start Date: [No treatment day found]  End Date: [No treatment day found]  Provider: Song Aden MD  Chemotherapy: [No matching medication found in this treatment plan]     10/9/2019 Tumor Conference      Multidisciplinary Rectal Cancer Conference - Evaluation and Recommendation Summary    10/9/2019  Sukhjinder Presley  [id number]  53 y.o. male    1.  Evaluation    C scope on 6/6/19 - non obstructing mass    MRI date: 6/17/19    Tumor Location in Rectum: middle third    Indication of Sphincter Involvement:  Uninvolved    Pretreatment Circumferential Resection Margin (CRM) status:  Threatened Tumor abuts CRM on the left.     Pretreatment (clinical) AJCC Stage: IIIB  Stage I  [] I: T1N0M0  [] I: T2N0M0  Stage II  [] IIA: T3N0M0  [] IIB C8tD9D9  [] IIC: E2nD9T2  Stage III  [] IIIA: T1-2N1M0  [] IIIA: T7U2jO2  [x] IIIB: T3-B7rI9R6  [] IIIB: T2-3N2aM0  [] IIIB: T1-2N2bM0  [] IIIC: E5aS5gR6  [] IIIC: T3-6eK3iU1  [] IIIC: D5iO9-1X7   Stage IV  [] IV: A0-9P0-4E9b-b    CEA level:   Lab Results   Component Value Date    CEA 1.4 09/30/2019       2. Treatment     Neoadjuvant Therapy Recommendation: Long Course Chemoradiotherapy - completed on 8/13    CT scan 7/30 during treatment: Unchanged appearance of large rectal mass, mild curcumferential bladder wall thickening.     MRI rectum 9/24: Large mass with invasion of muscularis and involvement of the mesorectal fat and fascia along the left and posterior aspect of the mass.   Single LN within the mesorectal fat posteriorly measuring 6 mm.   Subcentimeter obturator and external iliac changes LNDs seen bilaterally.     Recommendations:     Complete COLLINS with initiation of FOLFOX.     After follow by LAR + DI.      10/9/2019 -  Chemotherapy    Treatment Summary   Plan Name: OP FOLFOXIRI Q2W  Treatment Goal: Curative  Status: Active  Start Date: 10/9/2019  End Date: 3/19/2020 (Planned)  Provider: Song Aden MD  Chemotherapy: fluorouracil 3,200 mg/m2 = 6,305 mg in sodium chloride 0.9% 253 mL chemo infusion, 3,200 mg/m2 = 6,305 mg, Intravenous, Over 48 hours, 7 of 8 cycles  Administration: 6,305 mg (10/9/2019), 6,270 mg (10/23/2019), 6,210 mg (11/6/2019), 6,305 mg (11/20/2019), 6,145 mg (1/6/2020), 6,145 mg (1/20/2020), 6,240 mg (2/3/2020)  bevacizumab (AVASTIN) 5 mg/kg = 380 mg in sodium chloride 0.9% 100 mL chemo infusion, 5  mg/kg = 380 mg (100 % of original dose 5 mg/kg), Intravenous, Clinic/HOD 1 time, 6 of 6 cycles  Dose modification: 5 mg/kg (original dose 5 mg/kg, Cycle 1, Reason: MD Discretion), 5 mg/kg (original dose 5 mg/kg, Cycle 2)  Administration: 380 mg (10/9/2019), 380 mg (10/23/2019), 370 mg (11/6/2019), 380 mg (11/20/2019), 365 mg (1/6/2020), 360 mg (1/20/2020)  irinotecan (CAMPTOSAR) 165 mg/m2 = 326 mg in sodium chloride 0.9% 266.3 mL chemo infusion, 165 mg/m2 = 326 mg, Intravenous, Clinic/HOD 1 time, 6 of 6 cycles  Administration: 326 mg (10/9/2019), 324 mg (10/23/2019), 320 mg (11/6/2019), 326 mg (11/20/2019), 316 mg (1/6/2020), 316 mg (1/20/2020)  leucovorin calcium 200 mg/m2 = 395 mg in dextrose 5 % 269.75 mL infusion, 200 mg/m2 = 395 mg, Intravenous, Clinic/HOD 1 time, 7 of 8 cycles  Administration: 395 mg (10/9/2019), 390 mg (10/23/2019), 390 mg (11/6/2019), 395 mg (11/20/2019), 385 mg (1/6/2020), 385 mg (1/20/2020), 390 mg (2/3/2020)  oxaliplatin (ELOXATIN) 85 mg/m2 = 167 mg in dextrose 5 % 533.4 mL chemo infusion, 85 mg/m2 = 167 mg, Intravenous, Clinic/HOD 1 time, 7 of 8 cycles  Administration: 167 mg (10/9/2019), 167 mg (10/23/2019), 165 mg (11/6/2019), 167 mg (11/20/2019), 163 mg (1/6/2020), 163 mg (1/20/2020), 166 mg (2/3/2020)       Past Medical History:   Diagnosis Date    Anemia     Cancer      Family History   Problem Relation Age of Onset    Intestinal malrotation Mother     Leukemia Father     No Known Problems Sister     Coronary artery disease Brother     Coronary artery disease Maternal Grandmother     Stomach cancer Maternal Grandfather      Social History     Socioeconomic History    Marital status: Significant Other     Spouse name: Not on file    Number of children: Not on file    Years of education: Not on file    Highest education level: Not on file   Occupational History    Not on file   Social Needs    Financial resource strain: Somewhat hard    Food insecurity     Worry:  Sometimes true     Inability: Sometimes true    Transportation needs     Medical: No     Non-medical: No   Tobacco Use    Smoking status: Current Every Day Smoker     Packs/day: 1.00     Years: 35.00     Pack years: 35.00     Types: Cigarettes     Start date: 1/2/1984    Smokeless tobacco: Former User     Types: Chew    Tobacco comment: no smoking after m.n prior to sx   Substance and Sexual Activity    Alcohol use: Yes     Alcohol/week: 46.0 standard drinks     Types: 42 Cans of beer, 4 Shots of liquor per week     Frequency: 4 or more times a week     Drinks per session: 5 or 6     Binge frequency: Daily or almost daily     Comment: nancie 7, beer socialy,  no alcohol 72 hours prior to surgery    Drug use: Never    Sexual activity: Yes     Partners: Female     Birth control/protection: None   Lifestyle    Physical activity     Days per week: 1 day     Minutes per session: 60 min    Stress: Not on file   Relationships    Social connections     Talks on phone: More than three times a week     Gets together: More than three times a week     Attends Je[employer] witness service: Not on file     Active member of club or organization: No     Attends meetings of clubs or organizations: Never     Relationship status: Living with partner   Other Topics Concern    Not on file   Social History Narrative    Not on file     Past Surgical History:   Procedure Laterality Date    ABDOMINOPERINEAL RESECTION OF RECTUM N/A 4/9/2020    Procedure: PROCTECTOMY, ABDOMINOPERINEAL;  Surgeon: Jan New MD;  [facility]  Service: General;  Laterality: N/A;    COLON SURGERY      COLONOSCOPY N/A 6/6/2019    Procedure: COLONOSCOPY;  Surgeon: Anjelica Saavedra MD;  Location: [facility];  Service: Endoscopy;  Laterality: N/A;    CREATION OF MUSCLE ROTATIONAL FLAP Left 4/9/2020    Procedure: CREATION, FLAP, MUSCLE ROTATION;  Surgeon: Sebastian Dan MD;  Location[facility];  Service: Plastics;  Laterality: Left;   AM     ESOPHAGOGASTRODUODENOSCOPY N/A 6/6/2019    Procedure: EGD (ESOPHAGOGASTRODUODENOSCOPY);  Surgeon: Anjelica Saavedra MD;  Location: [facility] ENDO;  Service: Endoscopy;  Laterality: N/A;    FLAP GRAFT SURGERY N/A 4/9/2020    Procedure: FLAP GRAFT;  Surgeon: Sebastian Dan MD;  Location: [facility] OR;  Service: Plastics;  Laterality: N/A;  left fasciocutaneous buttocks flap    HERNIA REPAIR  1995    INJECTION OF ANESTHETIC AGENT INTO TISSUE PLANE DEFINED BY TRANSVERSUS ABDOMINIS MUSCLE N/A 2/18/2020    Procedure: BLOCK, TRANSVERSUS ABDOMINIS PLANE;  Surgeon: Jan New MD;  Location: [facility] OR;  Service: General;  Laterality: N/A;    INSERTION OF TUNNELED CENTRAL VENOUS CATHETER (CVC) WITH SUBCUTANEOUS PORT N/A 10/8/2019    Procedure: EUSAFTYUN-SSDN-U-CATH;  Surgeon: Jan New MD;  Location: [facility] OR;  Service: General;  Laterality: N/A;    LAPAROSCOPIC COLOSTOMY      TONSILLECTOMY       Current Outpatient Medications   Medication Sig Dispense Refill    acetaminophen (TYLENOL) 325 MG tablet Take 2 tablets (650 mg total) by mouth every 6 (six) hours as needed.  0    capecitabine (XELODA) 500 MG Tab Take 3 tablets (1500 mg) by mouth twice daily after breakfast and dinner on days of radiation only. 168 tablet 0    dronabinol (MARINOL) 10 MG capsule Take 1 capsule (10 mg total) by mouth 2 (two) times daily before meals. 60 capsule 0    finasteride (PROSCAR) 5 mg tablet Take 1 tablet (5 mg total) by mouth once daily. 30 tablet 11    HYDROCODONE-ACETAMINOPHEN ORAL Take by mouth as needed.      iron fum-B12-IF-C-folic acid (FOLTRIN) 110-0.5 mg capsule Take 1 capsule by mouth 2 (two) times daily. 60 capsule 1    magic mouthwash diphen/antac/lidoc Swish and spit 15 ml by mouth every 4 hours as needed 500 mL 2    morphine (MS CONTIN) 30 MG 12 hr tablet Take 1 tablet (30 mg total) by mouth every 8 (eight) hours. 90 tablet 0    oxyCODONE (ROXICODONE) 10 mg Tab immediate release tablet Take 1 tablet (10 mg  total) by mouth every 4 (four) hours as needed for Pain (medically necessary). 90 tablet 0    promethazine (PHENERGAN) 25 MG tablet Take 1 tablet (25 mg total) by mouth every 4 (four) hours. 60 tablet 4    sildenafiL (VIAGRA) 100 MG tablet Take 1 tablet (100 mg total) by mouth daily as needed for Erectile Dysfunction. 20 tablet 11    sulfamethoxazole-trimethoprim 800-160mg (BACTRIM DS) 800-160 mg Tab Take 1 tablet by mouth 2 (two) times daily. for 7 days 14 tablet 0    tamsulosin (FLOMAX) 0.4 mg Cap Take 1 capsule (0.4 mg total) by mouth once daily. 30 capsule 11     No current facility-administered medications for this visit.        Labs:  Lab Results   Component Value Date    WBC 5.00 04/14/2020    HGB 9.7 (L) 04/14/2020    HCT 30.5 (L) 04/14/2020    MCV 87 04/14/2020     04/14/2020     BMP  Lab Results   Component Value Date     04/14/2020    K 3.7 04/14/2020     04/14/2020    CO2 24 04/14/2020    BUN 10 04/14/2020    CREATININE 0.7 04/14/2020    CALCIUM 8.2 (L) 04/14/2020    ANIONGAP 8 04/14/2020    ESTGFRAFRICA >60 04/14/2020    EGFRNONAA >60 04/14/2020     Lab Results   Component Value Date    ALT 15 03/24/2020    AST 19 03/24/2020    ALKPHOS 70 03/24/2020    BILITOT 0.5 03/24/2020       Lab Results   Component Value Date    IRON 59 02/03/2020    TIBC 250 02/03/2020    FERRITIN 433 (H) 02/03/2020     Lab Results   Component Value Date    GLSLAVSS34 497 09/03/2019     Lab Results   Component Value Date    FOLATE 8.3 09/03/2019     Lab Results   Component Value Date    TSH 1.607 01/15/2019       I have reviewed the radiology reports and examined the scan/xray images.    Review of Systems   Constitutional: Negative.    HENT: Negative.    Eyes: Negative.    Respiratory: Negative.    Cardiovascular: Negative.    Gastrointestinal: Negative.    Endocrine: Negative.    Genitourinary: Negative.    Musculoskeletal: Negative.    Skin: Negative.    Allergic/Immunologic: Negative.    Neurological:  Negative.    Hematological: Negative.    Psychiatric/Behavioral: Negative.      ECOG SCORE    0 - Fully active-able to carry on all pre-disease performance without restriction            Objective:     Vitals:    07/06/20 1129   BP: 123/77   Pulse: 71   Temp: 98.6 °F (37 °C)   There is no height or weight on file to calculate BMI.  Physical Exam  Vitals signs and nursing note reviewed.   Constitutional:       Appearance: He is well-developed.   HENT:      Head: Normocephalic and atraumatic.   Eyes:      Conjunctiva/sclera: Conjunctivae normal.   Neck:      Musculoskeletal: Normal range of motion and neck supple.   Cardiovascular:      Rate and Rhythm: Normal rate and regular rhythm.   Pulmonary:      Effort: Pulmonary effort is normal.      Breath sounds: Normal breath sounds.   Abdominal:      General: Bowel sounds are normal.      Palpations: Abdomen is soft.   Musculoskeletal: Normal range of motion.   Skin:     General: Skin is warm and dry.   Neurological:      Mental Status: He is alert and oriented to person, place, and time.   Psychiatric:         Behavior: Behavior normal.         Thought Content: Thought content normal.         Judgment: Judgment normal.           Assessment:      1. Rectal cancer    2. Nutritional anemia           Plan:     Rectal cancer  We discussed no further adjuvant chemo is indicated.  Will restage.  If AMRIT, will ask Dr. New to remove port.  -     CBC auto differential; Future; Expected date: 07/06/2020  -     Comprehensive metabolic panel; Future; Expected date: 07/06/2020  -     Ferritin; Future; Expected date: 07/06/2020  -     Iron and TIBC; Future; Expected date: 07/06/2020  -     Folate; Future; Expected date: 07/06/2020  -     Vitamin B12; Future; Expected date: 07/06/2020  -     TSH; Future; Expected date: 07/06/2020  -     CT Chest Abdomen Pelvis With Contrast; Future; Expected date: 07/06/2020  -     oxyCODONE (ROXICODONE) 10 mg Tab immediate release tablet; Take 1  tablet (10 mg total) by mouth every 4 (four) hours as needed for Pain (medically necessary).  Dispense: 90 tablet; Refill: 0    Nutritional anemia  -     CBC auto differential; Future; Expected date: 07/06/2020  -     Comprehensive metabolic panel; Future; Expected date: 07/06/2020  -     Ferritin; Future; Expected date: 07/06/2020  -     Iron and TIBC; Future; Expected date: 07/06/2020  -     Folate; Future; Expected date: 07/06/2020  -     Vitamin B12; Future; Expected date: 07/06/2020  -     TSH; Future; Expected date: 07/06/2020

## 2020-07-06 ENCOUNTER — OFFICE VISIT (OUTPATIENT)
Dept: HEMATOLOGY/ONCOLOGY | Facility: CLINIC | Age: 55
End: 2020-07-06
Payer: COMMERCIAL

## 2020-07-06 ENCOUNTER — INFUSION (OUTPATIENT)
Dept: INFUSION THERAPY | Facility: HOSPITAL | Age: 55
End: 2020-07-06
Attending: INTERNAL MEDICINE
Payer: COMMERCIAL

## 2020-07-06 VITALS
OXYGEN SATURATION: 97 % | TEMPERATURE: 99 F | WEIGHT: 158.75 LBS | SYSTOLIC BLOOD PRESSURE: 123 MMHG | HEART RATE: 71 BPM | BODY MASS INDEX: 21.53 KG/M2 | DIASTOLIC BLOOD PRESSURE: 77 MMHG

## 2020-07-06 DIAGNOSIS — Z51.11 CHEMOTHERAPY MANAGEMENT, ENCOUNTER FOR: ICD-10-CM

## 2020-07-06 DIAGNOSIS — D53.9 NUTRITIONAL ANEMIA: ICD-10-CM

## 2020-07-06 DIAGNOSIS — C20 RECTAL CANCER: Primary | ICD-10-CM

## 2020-07-06 PROCEDURE — 63600175 PHARM REV CODE 636 W HCPCS: Performed by: INTERNAL MEDICINE

## 2020-07-06 PROCEDURE — 99213 PR OFFICE/OUTPT VISIT, EST, LEVL III, 20-29 MIN: ICD-10-PCS | Mod: S$GLB,,, | Performed by: INTERNAL MEDICINE

## 2020-07-06 PROCEDURE — 25000003 PHARM REV CODE 250: Performed by: INTERNAL MEDICINE

## 2020-07-06 PROCEDURE — 3008F PR BODY MASS INDEX (BMI) DOCUMENTED: ICD-10-PCS | Mod: CPTII,S$GLB,, | Performed by: INTERNAL MEDICINE

## 2020-07-06 PROCEDURE — A4216 STERILE WATER/SALINE, 10 ML: HCPCS | Performed by: INTERNAL MEDICINE

## 2020-07-06 PROCEDURE — 99999 PR PBB SHADOW E&M-EST. PATIENT-LVL III: CPT | Mod: PBBFAC,,, | Performed by: INTERNAL MEDICINE

## 2020-07-06 PROCEDURE — 99213 OFFICE O/P EST LOW 20 MIN: CPT | Mod: S$GLB,,, | Performed by: INTERNAL MEDICINE

## 2020-07-06 PROCEDURE — 96523 IRRIG DRUG DELIVERY DEVICE: CPT

## 2020-07-06 PROCEDURE — 3008F BODY MASS INDEX DOCD: CPT | Mod: CPTII,S$GLB,, | Performed by: INTERNAL MEDICINE

## 2020-07-06 PROCEDURE — 99999 PR PBB SHADOW E&M-EST. PATIENT-LVL III: ICD-10-PCS | Mod: PBBFAC,,, | Performed by: INTERNAL MEDICINE

## 2020-07-06 RX ORDER — SODIUM CHLORIDE 0.9 % (FLUSH) 0.9 %
10 SYRINGE (ML) INJECTION
Status: CANCELLED | OUTPATIENT
Start: 2020-07-06

## 2020-07-06 RX ORDER — OXYCODONE HYDROCHLORIDE 10 MG/1
10 TABLET ORAL EVERY 4 HOURS PRN
Qty: 90 TABLET | Refills: 0 | Status: SHIPPED | OUTPATIENT
Start: 2020-07-06 | End: 2020-08-23 | Stop reason: SDUPTHER

## 2020-07-06 RX ORDER — SODIUM CHLORIDE 0.9 % (FLUSH) 0.9 %
10 SYRINGE (ML) INJECTION
Status: COMPLETED | OUTPATIENT
Start: 2020-07-06 | End: 2020-07-06

## 2020-07-06 RX ORDER — HYDROCODONE BITARTRATE AND ACETAMINOPHEN 7.5; 325 MG/1; MG/1
TABLET ORAL
COMMUNITY
Start: 2020-04-03 | End: 2020-10-13

## 2020-07-06 RX ORDER — HEPARIN 100 UNIT/ML
500 SYRINGE INTRAVENOUS
Status: CANCELLED | OUTPATIENT
Start: 2020-07-06

## 2020-07-06 RX ORDER — ONDANSETRON 4 MG/1
4 TABLET, ORALLY DISINTEGRATING ORAL EVERY 6 HOURS PRN
Status: ON HOLD | COMMUNITY
Start: 2020-04-03 | End: 2022-02-24

## 2020-07-06 RX ORDER — HEPARIN 100 UNIT/ML
500 SYRINGE INTRAVENOUS
Status: COMPLETED | OUTPATIENT
Start: 2020-07-06 | End: 2020-07-06

## 2020-07-06 RX ADMIN — Medication 10 ML: at 12:07

## 2020-07-06 RX ADMIN — HEPARIN 500 UNITS: 100 SYRINGE at 12:07

## 2020-07-06 NOTE — NURSING
"Pt here for Mediport Flush. Left chestwall mediport accessed with a 20g 1" licea via sterile technique. Excellent blood return noted. Flushed with 10ml NS and 5 ml heparin solution. Needle D/C, site without redness, swelling, or drainage noted. Dressing applied. Patient tolerated well. Patient to return to clinic tomorrow.    "

## 2020-07-06 NOTE — DISCHARGE INSTRUCTIONS
[facility]  [address]  [address]  [phone] phone     [phone] fax  Hours of Operation: Monday- Friday 8:00am- 5:00pm  After hours phone  [phone]  Hematology / Oncology Physicians on call      LUCIA Laurent Dr., Dr., Dr., Dr., NP Sydney Prescott, NP Tyesha Taylor, NP    Please call with any concerns regarding your appointment today.

## 2020-07-07 ENCOUNTER — INFUSION (OUTPATIENT)
Dept: INFUSION THERAPY | Facility: HOSPITAL | Age: 55
End: 2020-07-07
Attending: INTERNAL MEDICINE
Payer: COMMERCIAL

## 2020-07-07 ENCOUNTER — DOCUMENTATION ONLY (OUTPATIENT)
Dept: HEMATOLOGY/ONCOLOGY | Facility: CLINIC | Age: 55
End: 2020-07-07

## 2020-07-07 VITALS — WEIGHT: 158.75 LBS | BODY MASS INDEX: 21.53 KG/M2

## 2020-07-07 DIAGNOSIS — Z51.11 CHEMOTHERAPY MANAGEMENT, ENCOUNTER FOR: ICD-10-CM

## 2020-07-07 DIAGNOSIS — C20 RECTAL CANCER: Primary | ICD-10-CM

## 2020-07-07 LAB
ALBUMIN SERPL BCP-MCNC: 3.7 G/DL (ref 3.5–5.2)
ALP SERPL-CCNC: 69 U/L (ref 55–135)
ALT SERPL W/O P-5'-P-CCNC: 8 U/L (ref 10–44)
ANION GAP SERPL CALC-SCNC: 11 MMOL/L (ref 8–16)
AST SERPL-CCNC: 12 U/L (ref 10–40)
BASOPHILS # BLD AUTO: 0.05 K/UL (ref 0–0.2)
BASOPHILS NFR BLD: 0.8 % (ref 0–1.9)
BILIRUB SERPL-MCNC: 0.3 MG/DL (ref 0.1–1)
BUN SERPL-MCNC: 7 MG/DL (ref 6–20)
CALCIUM SERPL-MCNC: 9.1 MG/DL (ref 8.7–10.5)
CHLORIDE SERPL-SCNC: 103 MMOL/L (ref 95–110)
CO2 SERPL-SCNC: 26 MMOL/L (ref 23–29)
CREAT SERPL-MCNC: 0.9 MG/DL (ref 0.5–1.4)
DIFFERENTIAL METHOD: ABNORMAL
EOSINOPHIL # BLD AUTO: 0.2 K/UL (ref 0–0.5)
EOSINOPHIL NFR BLD: 3.5 % (ref 0–8)
ERYTHROCYTE [DISTWIDTH] IN BLOOD BY AUTOMATED COUNT: 16.9 % (ref 11.5–14.5)
EST. GFR  (AFRICAN AMERICAN): >60 ML/MIN/1.73 M^2
EST. GFR  (NON AFRICAN AMERICAN): >60 ML/MIN/1.73 M^2
GLUCOSE SERPL-MCNC: 101 MG/DL (ref 70–110)
HCT VFR BLD AUTO: 44.2 % (ref 40–54)
HGB BLD-MCNC: 14.1 G/DL (ref 14–18)
IMM GRANULOCYTES # BLD AUTO: 0.01 K/UL (ref 0–0.04)
IMM GRANULOCYTES NFR BLD AUTO: 0.2 % (ref 0–0.5)
LYMPHOCYTES # BLD AUTO: 1.9 K/UL (ref 1–4.8)
LYMPHOCYTES NFR BLD: 32.3 % (ref 18–48)
MCH RBC QN AUTO: 27.7 PG (ref 27–31)
MCHC RBC AUTO-ENTMCNC: 31.9 G/DL (ref 32–36)
MCV RBC AUTO: 87 FL (ref 82–98)
MONOCYTES # BLD AUTO: 0.3 K/UL (ref 0.3–1)
MONOCYTES NFR BLD: 5.7 % (ref 4–15)
NEUTROPHILS # BLD AUTO: 3.4 K/UL (ref 1.8–7.7)
NEUTROPHILS NFR BLD: 57.7 % (ref 38–73)
NRBC BLD-RTO: 0 /100 WBC
PLATELET # BLD AUTO: 261 K/UL (ref 150–350)
PMV BLD AUTO: 8.8 FL (ref 9.2–12.9)
POTASSIUM SERPL-SCNC: 3.5 MMOL/L (ref 3.5–5.1)
PROT SERPL-MCNC: 8.1 G/DL (ref 6–8.4)
RBC # BLD AUTO: 5.09 M/UL (ref 4.6–6.2)
SODIUM SERPL-SCNC: 140 MMOL/L (ref 136–145)
WBC # BLD AUTO: 5.97 K/UL (ref 3.9–12.7)

## 2020-07-07 PROCEDURE — 25000003 PHARM REV CODE 250: Performed by: INTERNAL MEDICINE

## 2020-07-07 PROCEDURE — 96523 IRRIG DRUG DELIVERY DEVICE: CPT

## 2020-07-07 PROCEDURE — A4216 STERILE WATER/SALINE, 10 ML: HCPCS | Performed by: INTERNAL MEDICINE

## 2020-07-07 PROCEDURE — 83540 ASSAY OF IRON: CPT

## 2020-07-07 PROCEDURE — 82728 ASSAY OF FERRITIN: CPT

## 2020-07-07 PROCEDURE — 82607 VITAMIN B-12: CPT

## 2020-07-07 PROCEDURE — 85025 COMPLETE CBC W/AUTO DIFF WBC: CPT

## 2020-07-07 PROCEDURE — 36415 COLL VENOUS BLD VENIPUNCTURE: CPT

## 2020-07-07 PROCEDURE — 63600175 PHARM REV CODE 636 W HCPCS: Performed by: INTERNAL MEDICINE

## 2020-07-07 PROCEDURE — 84443 ASSAY THYROID STIM HORMONE: CPT

## 2020-07-07 PROCEDURE — 82746 ASSAY OF FOLIC ACID SERUM: CPT

## 2020-07-07 PROCEDURE — 80053 COMPREHEN METABOLIC PANEL: CPT

## 2020-07-07 RX ORDER — SODIUM CHLORIDE 0.9 % (FLUSH) 0.9 %
10 SYRINGE (ML) INJECTION
Status: COMPLETED | OUTPATIENT
Start: 2020-07-07 | End: 2020-07-07

## 2020-07-07 RX ORDER — HEPARIN 100 UNIT/ML
500 SYRINGE INTRAVENOUS
Status: COMPLETED | OUTPATIENT
Start: 2020-07-07 | End: 2020-07-07

## 2020-07-07 RX ORDER — SODIUM CHLORIDE 0.9 % (FLUSH) 0.9 %
10 SYRINGE (ML) INJECTION
Status: CANCELLED | OUTPATIENT
Start: 2020-07-07

## 2020-07-07 RX ORDER — HEPARIN 100 UNIT/ML
500 SYRINGE INTRAVENOUS
Status: CANCELLED | OUTPATIENT
Start: 2020-07-07

## 2020-07-07 RX ADMIN — HEPARIN SODIUM (PORCINE) LOCK FLUSH IV SOLN 100 UNIT/ML 500 UNITS: 100 SOLUTION at 03:07

## 2020-07-07 RX ADMIN — SODIUM CHLORIDE, PRESERVATIVE FREE 10 ML: 5 INJECTION INTRAVENOUS at 03:07

## 2020-07-07 NOTE — DISCHARGE INSTRUCTIONS
[facility]  [address]  [address]  [phone] phone     [phone] fax  Hours of Operation: Monday- Friday 8:00am- 5:00pm  After hours phone  [phone]  Hematology / Oncology Physicians on call      LUCIA Laurent Dr., Dr., Dr., Dr., NP Sydney Prescott, NP Tyesha Taylor, NP    Please call with any concerns regarding your appointment today.

## 2020-07-07 NOTE — PROGRESS NOTES
Met with pt to f/u per SW colleague Meghan. He is well-groomed, smiling and in good spirits. Says he has been back to work and he is feeling well. Provided pt with $50 gas card to assist with cost of traveling to appointments. Fortunately, recently they have been much fewer. Reviewed recent distress screening with pt. His wife is also doing well. He has no new needs for SW today. Encouraged him to f/u with SW as needed. Will remain available to assist with ongoing needs.

## 2020-07-08 LAB
FERRITIN SERPL-MCNC: 96 NG/ML (ref 20–300)
FOLATE SERPL-MCNC: 6 NG/ML (ref 4–24)
IRON SERPL-MCNC: 50 UG/DL (ref 45–160)
SATURATED IRON: 13 % (ref 20–50)
TOTAL IRON BINDING CAPACITY: 377 UG/DL (ref 250–450)
TRANSFERRIN SERPL-MCNC: 255 MG/DL (ref 200–375)
TSH SERPL DL<=0.005 MIU/L-ACNC: 0.91 UIU/ML (ref 0.4–4)
VIT B12 SERPL-MCNC: 245 PG/ML (ref 210–950)

## 2020-07-11 RX ORDER — HEPARIN 100 UNIT/ML
500 SYRINGE INTRAVENOUS
Status: CANCELLED | OUTPATIENT
Start: 2020-07-20

## 2020-07-11 RX ORDER — SODIUM CHLORIDE 0.9 % (FLUSH) 0.9 %
10 SYRINGE (ML) INJECTION
Status: CANCELLED | OUTPATIENT
Start: 2020-07-20

## 2020-07-21 ENCOUNTER — INFUSION (OUTPATIENT)
Dept: INFUSION THERAPY | Facility: HOSPITAL | Age: 55
End: 2020-07-21
Attending: INTERNAL MEDICINE
Payer: COMMERCIAL

## 2020-07-21 VITALS
SYSTOLIC BLOOD PRESSURE: 119 MMHG | RESPIRATION RATE: 16 BRPM | BODY MASS INDEX: 21.53 KG/M2 | DIASTOLIC BLOOD PRESSURE: 79 MMHG | TEMPERATURE: 99 F | HEART RATE: 71 BPM | OXYGEN SATURATION: 98 % | WEIGHT: 158.75 LBS

## 2020-07-21 DIAGNOSIS — D50.0 IRON DEFICIENCY ANEMIA DUE TO CHRONIC BLOOD LOSS: Primary | ICD-10-CM

## 2020-07-21 PROCEDURE — 96365 THER/PROPH/DIAG IV INF INIT: CPT

## 2020-07-21 PROCEDURE — A4216 STERILE WATER/SALINE, 10 ML: HCPCS | Performed by: INTERNAL MEDICINE

## 2020-07-21 PROCEDURE — 63600175 PHARM REV CODE 636 W HCPCS: Performed by: INTERNAL MEDICINE

## 2020-07-21 PROCEDURE — 25000003 PHARM REV CODE 250: Performed by: INTERNAL MEDICINE

## 2020-07-21 RX ORDER — HEPARIN 100 UNIT/ML
500 SYRINGE INTRAVENOUS
Status: DISCONTINUED | OUTPATIENT
Start: 2020-07-21 | End: 2020-07-21 | Stop reason: HOSPADM

## 2020-07-21 RX ORDER — SODIUM CHLORIDE 0.9 % (FLUSH) 0.9 %
10 SYRINGE (ML) INJECTION
Status: DISCONTINUED | OUTPATIENT
Start: 2020-07-21 | End: 2020-07-21 | Stop reason: HOSPADM

## 2020-07-21 RX ADMIN — Medication 10 ML: at 01:07

## 2020-07-21 RX ADMIN — HEPARIN 500 UNITS: 100 SYRINGE at 02:07

## 2020-07-21 RX ADMIN — FERRIC CARBOXYMALTOSE INJECTION 750 MG: 50 INJECTION, SOLUTION INTRAVENOUS at 01:07

## 2020-07-21 NOTE — DISCHARGE INSTRUCTIONS
[facility]  [address]  [address]  [phone] phone     [phone] fax  Hours of Operation: Monday- Friday 8:00am- 5:00pm  After hours phone  [phone]  Hematology / Oncology Physicians on call      LUCIA Laurent Dr., Dr., Dr., Dr., NP Sydney Prescott, NP Tyesha Taylor, NP    Please call with any concerns regarding your appointment today.

## 2020-07-23 RX ORDER — TAMSULOSIN HYDROCHLORIDE 0.4 MG/1
0.4 CAPSULE ORAL DAILY
Qty: 30 CAPSULE | Refills: 11 | Status: CANCELLED | OUTPATIENT
Start: 2020-07-23 | End: 2021-07-23

## 2020-07-23 RX ORDER — FINASTERIDE 5 MG/1
5 TABLET, FILM COATED ORAL DAILY
Qty: 30 TABLET | Refills: 11 | Status: CANCELLED | OUTPATIENT
Start: 2020-07-23 | End: 2021-07-23

## 2020-07-24 NOTE — TELEPHONE ENCOUNTER
Notified pt the medications were sent with 11 refills. He will need to call pharmacy to have them get it ready. Pt verbalized understanding.

## 2020-07-27 ENCOUNTER — TELEPHONE (OUTPATIENT)
Dept: RADIOLOGY | Facility: HOSPITAL | Age: 55
End: 2020-07-27

## 2020-07-28 ENCOUNTER — HOSPITAL ENCOUNTER (OUTPATIENT)
Dept: RADIOLOGY | Facility: HOSPITAL | Age: 55
Discharge: HOME | End: 2020-07-28
Attending: INTERNAL MEDICINE
Payer: COMMERCIAL

## 2020-07-28 DIAGNOSIS — C20 RECTAL CANCER: ICD-10-CM

## 2020-07-28 PROCEDURE — 71260 CT THORAX DX C+: CPT | Mod: 26,,, | Performed by: RADIOLOGY

## 2020-07-28 PROCEDURE — 74177 CT ABD & PELVIS W/CONTRAST: CPT | Mod: TC

## 2020-07-28 PROCEDURE — 71260 CT CHEST ABDOMEN PELVIS WITH CONTRAST (XPD): ICD-10-PCS | Mod: 26,,, | Performed by: RADIOLOGY

## 2020-07-28 PROCEDURE — 74177 CT ABD & PELVIS W/CONTRAST: CPT | Mod: 26,,, | Performed by: RADIOLOGY

## 2020-07-28 PROCEDURE — 74177 CT CHEST ABDOMEN PELVIS WITH CONTRAST (XPD): ICD-10-PCS | Mod: 26,,, | Performed by: RADIOLOGY

## 2020-07-28 PROCEDURE — 25500020 PHARM REV CODE 255: Performed by: INTERNAL MEDICINE

## 2020-07-28 RX ADMIN — IOHEXOL 30 ML: 350 INJECTION, SOLUTION INTRAVENOUS at 01:07

## 2020-07-28 RX ADMIN — IOHEXOL 75 ML: 350 INJECTION, SOLUTION INTRAVENOUS at 02:07

## 2020-07-30 DIAGNOSIS — C20 RECTAL CANCER: Primary | ICD-10-CM

## 2020-08-03 ENCOUNTER — TELEPHONE (OUTPATIENT)
Dept: SURGERY | Facility: CLINIC | Age: 55
End: 2020-08-03

## 2020-08-03 NOTE — TELEPHONE ENCOUNTER
Left voicemail message advising pt we have scheduled his Procedure for Port Removal on Monday 8/17 @ [address] [facility] - Left my name and call back number [phone]    ----- Message from Jan New MD sent at 7/31/2020  1:38 PM CDT -----  Can we call Mr Presley and see when he wants his port out in the office and get him scheduled?    Thanks,  SHANTA  ----- Message -----  From: Song Aden MD  Sent: 7/30/2020  11:45 AM CDT  To: Jan New MD    [facility],    Mr. Presley's scan looks great.    I think we can remove port.     FINDINGS:  Multiple bilateral small pulmonary nodules are again seen, not significantly changed.  The largest nodule measures 3.5 mm, stable since 03/24/2020.  A miniscule lung Jewel right upper lung image 109 of series 5 is also stable.  A 2 mm nodule left upper lung image 106 of series 5 is also stable.  A 4 mm right lung nodule image 218 of series 5 is also unchanged.  Right lung nodule image 229 series 5 is unchanged as well.  Other smaller scattered nodules are also stable in appearance.  No new nodule.     Heart size is normal.  No pericardial effusion.  Trachea and mainstem bronchi remain patent.  Small scattered mediastinal lymph nodes.  No bulky adenopathy.  Thyroid gland demonstrates a normal appearance.  A left-sided MediPort catheter terminates within the SVC.     No focal hepatic lesion.  Calcified splenic granulomas.  No gallstones.  Pancreas is normal without ductal dilation.  No biliary ductal dilation.  Adrenal glands are within normal limits.  Stable left nephrolithiasis.  No hydronephrosis.  There is layering hyper or stones in the urinary bladder, new from prior.     Calcified plaque in the aorta extending into the iliac vessels.  IVC is within normal limits.  No bulky adenopathy.     Patient is status post APR with postsurgical changes in the surgical bed with fluid and ill-defined soft tissue attenuation.  Ill-defined soft tissue attenuation is seen extending from the  posterior left perianal region into the left gluteus musculature.  A very small findings are likely related to postsurgical change although continued follow-up can be performed.  Left-sided ostomy changes are noted.  No evidence of obstruction.     Review of bone windows demonstrate the osseous structures to be intact.     Impression:     Interval postsurgical changes of an APR as described with fluid and soft tissue density in the surgical bed.  Stable pulmonary nodules.  Other incidental findings as discussed above.

## 2020-08-03 NOTE — TELEPHONE ENCOUNTER
Spoke to pt, Advised he is scheduled for Monday 8/17 [facility] @ 1300 for his Port Removal - Pt correctly repeated back all appt information    ----- Message from Zee Garza sent at 8/3/2020  9:32 AM CDT -----  Contact: patient [phone]  Patient is calling for nurse Nichelle . Please call him. He is trying to schedule an appt to remove his port. He saw  who sent a message and Nichelle called him this morning.

## 2020-08-04 ENCOUNTER — PATIENT OUTREACH (OUTPATIENT)
Dept: ADMINISTRATIVE | Facility: OTHER | Age: 55
End: 2020-08-04

## 2020-08-05 ENCOUNTER — OFFICE VISIT (OUTPATIENT)
Dept: UROLOGY | Facility: CLINIC | Age: 55
End: 2020-08-05
Payer: COMMERCIAL

## 2020-08-05 VITALS
BODY MASS INDEX: 22.99 KG/M2 | SYSTOLIC BLOOD PRESSURE: 114 MMHG | WEIGHT: 169.75 LBS | HEART RATE: 86 BPM | TEMPERATURE: 99 F | HEIGHT: 72 IN | DIASTOLIC BLOOD PRESSURE: 72 MMHG

## 2020-08-05 DIAGNOSIS — Z03.818 ENCOUNTER FOR OBSERVATION FOR SUSPECTED EXPOSURE TO OTHER BIOLOGICAL AGENTS RULED OUT: ICD-10-CM

## 2020-08-05 DIAGNOSIS — N52.39 OTHER POST-PROCEDURAL ERECTILE DYSFUNCTION: Primary | ICD-10-CM

## 2020-08-05 DIAGNOSIS — N40.0 BENIGN PROSTATIC HYPERPLASIA, UNSPECIFIED WHETHER LOWER URINARY TRACT SYMPTOMS PRESENT: ICD-10-CM

## 2020-08-05 DIAGNOSIS — Z12.5 PROSTATE CANCER SCREENING: ICD-10-CM

## 2020-08-05 DIAGNOSIS — N39.0 URINARY TRACT INFECTION WITHOUT HEMATURIA, SITE UNSPECIFIED: ICD-10-CM

## 2020-08-05 PROCEDURE — 99999 PR PBB SHADOW E&M-EST. PATIENT-LVL III: CPT | Mod: PBBFAC,,, | Performed by: UROLOGY

## 2020-08-05 PROCEDURE — 3008F PR BODY MASS INDEX (BMI) DOCUMENTED: ICD-10-PCS | Mod: CPTII,S$GLB,, | Performed by: UROLOGY

## 2020-08-05 PROCEDURE — 99213 PR OFFICE/OUTPT VISIT, EST, LEVL III, 20-29 MIN: ICD-10-PCS | Mod: S$GLB,,, | Performed by: UROLOGY

## 2020-08-05 PROCEDURE — 99213 OFFICE O/P EST LOW 20 MIN: CPT | Mod: S$GLB,,, | Performed by: UROLOGY

## 2020-08-05 PROCEDURE — 3008F BODY MASS INDEX DOCD: CPT | Mod: CPTII,S$GLB,, | Performed by: UROLOGY

## 2020-08-05 PROCEDURE — 99999 PR PBB SHADOW E&M-EST. PATIENT-LVL III: ICD-10-PCS | Mod: PBBFAC,,, | Performed by: UROLOGY

## 2020-08-05 NOTE — PROGRESS NOTES
Chief Complaint: Erectile dysfunction    HPI:   8/5/20- sildenafil 100mg is working well.  7/1/20: patient normally goes to Dr. Altamirano.  Apparently he has been having ED since his rectal surgery and would like to discuss options.  Recent dx with UTI and started abx today, otherwise voiding well.  Patient states he gets no erections after surgery, he has not tried any forms of medical therapy.  Libido and energy are still present.  6/4/20: PVR was 635 after our last visit and he was instructed in CIC.  Voiding normally he feels and hasn't done CIC in two weeks.  PVR today 105 ml.   5/6/20: Been on flomax since last visit.  Cysto/uroflow normal today.    4/20/20: 53 yo man had colon cancer with resection last week, referred by Dr. New.  Is in retention postoperatively.  No unusual abd/pelvic pain and no exac/rel factors.  No hematuria.  No urolithiasis.  PVR >700 ml.  No  history.      Allergies:  Patient has no known allergies.    Medications:  has a current medication list which includes the following prescription(s): acetaminophen, capecitabine, dronabinol, finasteride, hydrocodone-acetaminophen, iron fum-b12-if-c-folic acid, magic mouthwash diphen/antac/lidoc, morphine, ondansetron, oxycodone, promethazine, sildenafil, and tamsulosin.    Review of Systems:  General: No fever, chills, fatigability, or weight loss.  Skin: No rashes, itching, or changes in color or texture of skin.  Chest: Denies MARTIN, cyanosis, wheezing, cough, and sputum production.  Abdomen: Appetite fine. No weight loss.   Musculoskeletal: No joint stiffness or swelling. Denies back pain.  : As above.  All other review of systems negative.    PMH:   has a past medical history of Anemia and Cancer.    PSH:   has a past surgical history that includes Hernia repair (1995); Tonsillectomy; Colonoscopy (N/A, 6/6/2019); Esophagogastroduodenoscopy (N/A, 6/6/2019); Insertion of tunneled central venous catheter (CVC) with subcutaneous port (N/A,  10/8/2019); Injection of anesthetic agent into tissue plane defined by transversus abdominis muscle (N/A, 2/18/2020); Colon surgery; Laparoscopic colostomy; Abdominoperineal resection of rectum (N/A, 4/9/2020); Creation of muscle rotational flap (Left, 4/9/2020); and Flap graft surgery (N/A, 4/9/2020).    FamHx: family history includes Coronary artery disease in his brother and maternal grandmother; Intestinal malrotation in his mother; Leukemia in his father; No Known Problems in his sister; Stomach cancer in his maternal grandfather.    SocHx:  reports that he has been smoking cigarettes. He started smoking about 36 years ago. He has a 35.00 pack-year smoking history. He has quit using smokeless tobacco.  His smokeless tobacco use included chew. He reports current alcohol use of about 46.0 standard drinks of alcohol per week. He reports that he does not use drugs.      Physical Exam:  Vitals:    08/05/20 0926   BP: 114/72   Pulse: 86   Temp: 98.7 °F (37.1 °C)     General: A&Ox3, no apparent distress, no deformities  Neck: No masses, normal thyroid  Lungs: normal inspiration, no use of accessory muscles  Heart: normal pulse, no arrhythmias  Abdomen: Soft, NT, ND  Skin: The skin is warm and dry. No jaundice.  Ext: No c/c/e.  :   5/6/20: Test desc itm, no abnormalities of epididymus. Penis normal, with normal penile and scrotal skin. Meatus normal.     Labs/Studies:   Bladder Scan performed in office:     5/7/20: 600ml    6/4/20:  ml.  PSA    6/19: 0.37    Impression/Plan:   1. ED- sildenafil 100mg is working well, having retrograde ejaculation, but this is not an issue.  Call if he needs a refill, otherwise keep his routine f/u with Dr. Altamirano in December.    2. BPH- continue flomax and finasteride, states he is voiding well.  3. UTI- complete abx and call if symptoms recur  4. Low risk of prostate cancer.  PSA next visit

## 2020-08-10 ENCOUNTER — LAB VISIT (OUTPATIENT)
Dept: URGENT CARE | Facility: CLINIC | Age: 55
End: 2020-08-10
Payer: COMMERCIAL

## 2020-08-10 VITALS — TEMPERATURE: 99 F | OXYGEN SATURATION: 98 % | HEART RATE: 88 BPM

## 2020-08-10 DIAGNOSIS — Z03.818 ENCOUNTER FOR OBSERVATION FOR SUSPECTED EXPOSURE TO OTHER BIOLOGICAL AGENTS RULED OUT: ICD-10-CM

## 2020-08-10 PROCEDURE — U0003 INFECTIOUS AGENT DETECTION BY NUCLEIC ACID (DNA OR RNA); SEVERE ACUTE RESPIRATORY SYNDROME CORONAVIRUS 2 (SARS-COV-2) (CORONAVIRUS DISEASE [COVID-19]), AMPLIFIED PROBE TECHNIQUE, MAKING USE OF HIGH THROUGHPUT TECHNOLOGIES AS DESCRIBED BY CMS-2020-01-R: HCPCS

## 2020-08-11 LAB — SARS-COV-2 RNA RESP QL NAA+PROBE: NOT DETECTED

## 2020-08-12 ENCOUNTER — ANESTHESIA EVENT (OUTPATIENT)
Dept: SURGERY | Facility: HOSPITAL | Age: 55
End: 2020-08-12
Payer: COMMERCIAL

## 2020-08-13 ENCOUNTER — ANESTHESIA (OUTPATIENT)
Dept: SURGERY | Facility: HOSPITAL | Age: 55
End: 2020-08-13
Payer: COMMERCIAL

## 2020-08-13 ENCOUNTER — HOSPITAL ENCOUNTER (OUTPATIENT)
Facility: HOSPITAL | Age: 55
Discharge: HOME | End: 2020-08-13
Attending: SURGERY | Admitting: SURGERY
Payer: COMMERCIAL

## 2020-08-13 DIAGNOSIS — C20 RECTAL CANCER: Primary | ICD-10-CM

## 2020-08-13 PROCEDURE — 25000003 PHARM REV CODE 250: Performed by: NURSE ANESTHETIST, CERTIFIED REGISTERED

## 2020-08-13 PROCEDURE — 63600175 PHARM REV CODE 636 W HCPCS: Performed by: SURGERY

## 2020-08-13 PROCEDURE — 37000009 HC ANESTHESIA EA ADD 15 MINS: Performed by: SURGERY

## 2020-08-13 PROCEDURE — 71000033 HC RECOVERY, INTIAL HOUR: Performed by: SURGERY

## 2020-08-13 PROCEDURE — 88304 TISSUE EXAM BY PATHOLOGIST: CPT | Mod: 26,,, | Performed by: PATHOLOGY

## 2020-08-13 PROCEDURE — 88304 TISSUE EXAM BY PATHOLOGIST: CPT | Performed by: PATHOLOGY

## 2020-08-13 PROCEDURE — 63600175 PHARM REV CODE 636 W HCPCS: Performed by: ANESTHESIOLOGY

## 2020-08-13 PROCEDURE — 25000003 PHARM REV CODE 250: Performed by: SURGERY

## 2020-08-13 PROCEDURE — 36000706: Performed by: SURGERY

## 2020-08-13 PROCEDURE — 88304 PR  SURG PATH,LEVEL III: ICD-10-PCS | Mod: 26,,, | Performed by: PATHOLOGY

## 2020-08-13 PROCEDURE — 71000015 HC POSTOP RECOV 1ST HR: Performed by: SURGERY

## 2020-08-13 PROCEDURE — 37000008 HC ANESTHESIA 1ST 15 MINUTES: Performed by: SURGERY

## 2020-08-13 PROCEDURE — 63600175 PHARM REV CODE 636 W HCPCS: Performed by: NURSE ANESTHETIST, CERTIFIED REGISTERED

## 2020-08-13 PROCEDURE — 36000707: Performed by: SURGERY

## 2020-08-13 RX ORDER — SODIUM CHLORIDE 0.9 % (FLUSH) 0.9 %
10 SYRINGE (ML) INJECTION
Status: DISCONTINUED | OUTPATIENT
Start: 2020-08-13 | End: 2020-08-13 | Stop reason: HOSPADM

## 2020-08-13 RX ORDER — ONDANSETRON 8 MG/1
8 TABLET, ORALLY DISINTEGRATING ORAL EVERY 8 HOURS PRN
Status: DISCONTINUED | OUTPATIENT
Start: 2020-08-13 | End: 2020-08-13 | Stop reason: HOSPADM

## 2020-08-13 RX ORDER — SUCCINYLCHOLINE CHLORIDE 20 MG/ML
INJECTION INTRAMUSCULAR; INTRAVENOUS
Status: DISCONTINUED | OUTPATIENT
Start: 2020-08-13 | End: 2020-08-13

## 2020-08-13 RX ORDER — KETOROLAC TROMETHAMINE 30 MG/ML
15 INJECTION, SOLUTION INTRAMUSCULAR; INTRAVENOUS EVERY 8 HOURS PRN
Status: DISCONTINUED | OUTPATIENT
Start: 2020-08-13 | End: 2020-08-13 | Stop reason: HOSPADM

## 2020-08-13 RX ORDER — CEFAZOLIN SODIUM 2 G/50ML
2 SOLUTION INTRAVENOUS
Status: COMPLETED | OUTPATIENT
Start: 2020-08-13 | End: 2020-08-13

## 2020-08-13 RX ORDER — GLYCOPYRROLATE 0.2 MG/ML
INJECTION INTRAMUSCULAR; INTRAVENOUS
Status: DISCONTINUED | OUTPATIENT
Start: 2020-08-13 | End: 2020-08-13

## 2020-08-13 RX ORDER — OXYCODONE AND ACETAMINOPHEN 5; 325 MG/1; MG/1
1 TABLET ORAL
Status: DISCONTINUED | OUTPATIENT
Start: 2020-08-13 | End: 2020-08-13 | Stop reason: HOSPADM

## 2020-08-13 RX ORDER — HYDROMORPHONE HYDROCHLORIDE 2 MG/ML
0.2 INJECTION, SOLUTION INTRAMUSCULAR; INTRAVENOUS; SUBCUTANEOUS EVERY 5 MIN PRN
Status: DISCONTINUED | OUTPATIENT
Start: 2020-08-13 | End: 2020-08-13 | Stop reason: HOSPADM

## 2020-08-13 RX ORDER — PROPOFOL 10 MG/ML
VIAL (ML) INTRAVENOUS
Status: DISCONTINUED | OUTPATIENT
Start: 2020-08-13 | End: 2020-08-13

## 2020-08-13 RX ORDER — LIDOCAINE HYDROCHLORIDE 10 MG/ML
INJECTION, SOLUTION EPIDURAL; INFILTRATION; INTRACAUDAL; PERINEURAL
Status: DISCONTINUED | OUTPATIENT
Start: 2020-08-13 | End: 2020-08-13

## 2020-08-13 RX ORDER — FENTANYL CITRATE 50 UG/ML
INJECTION, SOLUTION INTRAMUSCULAR; INTRAVENOUS
Status: DISCONTINUED | OUTPATIENT
Start: 2020-08-13 | End: 2020-08-13

## 2020-08-13 RX ORDER — LIDOCAINE HYDROCHLORIDE AND EPINEPHRINE 10; 10 MG/ML; UG/ML
INJECTION, SOLUTION INFILTRATION; PERINEURAL
Status: DISCONTINUED | OUTPATIENT
Start: 2020-08-13 | End: 2020-08-13 | Stop reason: HOSPADM

## 2020-08-13 RX ORDER — ONDANSETRON 2 MG/ML
INJECTION INTRAMUSCULAR; INTRAVENOUS
Status: DISCONTINUED | OUTPATIENT
Start: 2020-08-13 | End: 2020-08-13

## 2020-08-13 RX ORDER — DEXAMETHASONE SODIUM PHOSPHATE 4 MG/ML
INJECTION, SOLUTION INTRA-ARTICULAR; INTRALESIONAL; INTRAMUSCULAR; INTRAVENOUS; SOFT TISSUE
Status: DISCONTINUED | OUTPATIENT
Start: 2020-08-13 | End: 2020-08-13

## 2020-08-13 RX ORDER — ACETAMINOPHEN 325 MG/1
650 TABLET ORAL EVERY 4 HOURS PRN
Status: DISCONTINUED | OUTPATIENT
Start: 2020-08-13 | End: 2020-08-13 | Stop reason: HOSPADM

## 2020-08-13 RX ORDER — NEOSTIGMINE METHYLSULFATE 1 MG/ML
INJECTION, SOLUTION INTRAVENOUS
Status: DISCONTINUED | OUTPATIENT
Start: 2020-08-13 | End: 2020-08-13

## 2020-08-13 RX ORDER — HYDROCODONE BITARTRATE AND ACETAMINOPHEN 5; 325 MG/1; MG/1
1 TABLET ORAL EVERY 4 HOURS PRN
Status: DISCONTINUED | OUTPATIENT
Start: 2020-08-13 | End: 2020-08-13 | Stop reason: HOSPADM

## 2020-08-13 RX ORDER — SODIUM CHLORIDE, SODIUM LACTATE, POTASSIUM CHLORIDE, CALCIUM CHLORIDE 600; 310; 30; 20 MG/100ML; MG/100ML; MG/100ML; MG/100ML
INJECTION, SOLUTION INTRAVENOUS CONTINUOUS PRN
Status: DISCONTINUED | OUTPATIENT
Start: 2020-08-13 | End: 2020-08-13

## 2020-08-13 RX ORDER — ROCURONIUM BROMIDE 10 MG/ML
INJECTION, SOLUTION INTRAVENOUS
Status: DISCONTINUED | OUTPATIENT
Start: 2020-08-13 | End: 2020-08-13

## 2020-08-13 RX ORDER — LIDOCAINE HYDROCHLORIDE 10 MG/ML
1 INJECTION, SOLUTION EPIDURAL; INFILTRATION; INTRACAUDAL; PERINEURAL ONCE
Status: DISCONTINUED | OUTPATIENT
Start: 2020-08-13 | End: 2020-08-13 | Stop reason: HOSPADM

## 2020-08-13 RX ORDER — CHLORHEXIDINE GLUCONATE ORAL RINSE 1.2 MG/ML
10 SOLUTION DENTAL
Status: DISCONTINUED | OUTPATIENT
Start: 2020-08-13 | End: 2020-08-13 | Stop reason: HOSPADM

## 2020-08-13 RX ORDER — MIDAZOLAM HYDROCHLORIDE 1 MG/ML
INJECTION, SOLUTION INTRAMUSCULAR; INTRAVENOUS
Status: DISCONTINUED | OUTPATIENT
Start: 2020-08-13 | End: 2020-08-13

## 2020-08-13 RX ORDER — ONDANSETRON 2 MG/ML
4 INJECTION INTRAMUSCULAR; INTRAVENOUS DAILY PRN
Status: DISCONTINUED | OUTPATIENT
Start: 2020-08-13 | End: 2020-08-13 | Stop reason: HOSPADM

## 2020-08-13 RX ORDER — CHLORHEXIDINE GLUCONATE ORAL RINSE 1.2 MG/ML
10 SOLUTION DENTAL 2 TIMES DAILY
Status: DISCONTINUED | OUTPATIENT
Start: 2020-08-13 | End: 2020-08-13 | Stop reason: HOSPADM

## 2020-08-13 RX ADMIN — HYDROCODONE BITARTRATE AND ACETAMINOPHEN 1 TABLET: 5; 325 TABLET ORAL at 09:08

## 2020-08-13 RX ADMIN — SUCCINYLCHOLINE CHLORIDE 160 MG: 20 INJECTION, SOLUTION INTRAMUSCULAR; INTRAVENOUS at 07:08

## 2020-08-13 RX ADMIN — DEXAMETHASONE SODIUM PHOSPHATE 4 MG: 4 INJECTION, SOLUTION INTRA-ARTICULAR; INTRALESIONAL; INTRAMUSCULAR; INTRAVENOUS; SOFT TISSUE at 07:08

## 2020-08-13 RX ADMIN — ROCURONIUM BROMIDE 5 MG: 10 INJECTION, SOLUTION INTRAVENOUS at 07:08

## 2020-08-13 RX ADMIN — SODIUM CHLORIDE, SODIUM LACTATE, POTASSIUM CHLORIDE, AND CALCIUM CHLORIDE: 600; 310; 30; 20 INJECTION, SOLUTION INTRAVENOUS at 06:08

## 2020-08-13 RX ADMIN — PROPOFOL 160 MG: 10 INJECTION, EMULSION INTRAVENOUS at 07:08

## 2020-08-13 RX ADMIN — ROBINUL 0.8 MG: 0.2 INJECTION INTRAMUSCULAR; INTRAVENOUS at 07:08

## 2020-08-13 RX ADMIN — FENTANYL CITRATE 100 MCG: 50 INJECTION, SOLUTION INTRAMUSCULAR; INTRAVENOUS at 07:08

## 2020-08-13 RX ADMIN — CEFAZOLIN SODIUM 2 G: 2 SOLUTION INTRAVENOUS at 07:08

## 2020-08-13 RX ADMIN — HYDROMORPHONE HYDROCHLORIDE 0.2 MG: 2 INJECTION INTRAMUSCULAR; INTRAVENOUS; SUBCUTANEOUS at 08:08

## 2020-08-13 RX ADMIN — NEOSTIGMINE METHYLSULFATE 5 MG: 1 INJECTION INTRAVENOUS at 07:08

## 2020-08-13 RX ADMIN — FENTANYL CITRATE 50 MCG: 50 INJECTION, SOLUTION INTRAMUSCULAR; INTRAVENOUS at 07:08

## 2020-08-13 RX ADMIN — ROBINUL 0.2 MG: 0.2 INJECTION INTRAMUSCULAR; INTRAVENOUS at 07:08

## 2020-08-13 RX ADMIN — ONDANSETRON 4 MG: 2 INJECTION, SOLUTION INTRAMUSCULAR; INTRAVENOUS at 07:08

## 2020-08-13 RX ADMIN — LIDOCAINE HYDROCHLORIDE 30 MG: 10 INJECTION, SOLUTION EPIDURAL; INFILTRATION; INTRACAUDAL; PERINEURAL at 08:08

## 2020-08-13 RX ADMIN — LIDOCAINE HYDROCHLORIDE 100 MG: 10 INJECTION, SOLUTION EPIDURAL; INFILTRATION; INTRACAUDAL; PERINEURAL at 07:08

## 2020-08-13 RX ADMIN — PROPOFOL 40 MG: 10 INJECTION, EMULSION INTRAVENOUS at 08:08

## 2020-08-13 RX ADMIN — MIDAZOLAM 2 MG: 1 INJECTION INTRAMUSCULAR; INTRAVENOUS at 06:08

## 2020-08-13 RX ADMIN — ROCURONIUM BROMIDE 30 MG: 10 INJECTION, SOLUTION INTRAVENOUS at 07:08

## 2020-08-13 NOTE — ANESTHESIA POSTPROCEDURE EVALUATION
Anesthesia Post Evaluation    Patient: Sukhjinder Presley    Procedure(s) Performed: Procedure(s) (LRB):  DEBRIDEMENT, WOUND (N/A)  REMOVAL (N/A)    Final Anesthesia Type: general    Patient location during evaluation: PACU  Patient participation: Yes- Able to Participate  Level of consciousness: awake and alert  Post-procedure vital signs: reviewed and stable  Pain management: adequate  Airway patency: patent  GABRIEL mitigation strategies: Extubation while patient is awake  PONV status at discharge: No PONV  Anesthetic complications: no      Cardiovascular status: hemodynamically stable  Respiratory status: spontaneous ventilation  Hydration status: euvolemic  Follow-up not needed.          Vitals Value Taken Time   /70 08/13/20 0840   Temp 36.3 °C (97.3 °F) 08/13/20 0823   Pulse 65 08/13/20 0842   Resp 7 08/13/20 0842   SpO2 100 % 08/13/20 0842   Vitals shown include unvalidated device data.      No case tracking events are documented in the log.      Pain/Stacy Score: Stacy Score: 4 (8/13/2020  8:23 AM)

## 2020-08-13 NOTE — H&P
[facility]  History & Physical  Plastic Surgery    SUBJECTIVE:     Chief Complaint/Reason for Admission: Draining wound    History of Present Illness:  Patient is a 54 y.o. male presents with history of APR with VRAM flap in May. Still has a draining area near his flap. He would like it fixed and also his mediport removed.     No medications prior to admission.       Review of patient's allergies indicates:  No Known Allergies    Past Medical History:   Diagnosis Date    Anemia     Cancer     rectal     Past Surgical History:   Procedure Laterality Date    ABDOMINOPERINEAL RESECTION OF RECTUM N/A 4/9/2020    Procedure: PROCTECTOMY, ABDOMINOPERINEAL;  Surgeon: Jan New MD;  Location: [facility];  Service: General;  Laterality: N/A;    COLON SURGERY      COLONOSCOPY N/A 6/6/2019    Procedure: COLONOSCOPY;  Surgeon: Anjelica Saavedra MD;  Location: [facility];  Service: Endoscopy;  Laterality: N/A;    CREATION OF MUSCLE ROTATIONAL FLAP Left 4/9/2020    Procedure: CREATION, FLAP, MUSCLE ROTATION;  Surgeon: Sebastian Dan MD;  Location: [facility];  Service: Plastics;  Laterality: Left;   VRAM    ESOPHAGOGASTRODUODENOSCOPY N/A 6/6/2019    Procedure: EGD (ESOPHAGOGASTRODUODENOSCOPY);  Surgeon: Anjelica Saavedra MD;  Location: [facility];  Service: Endoscopy;  Laterality: N/A;    FLAP GRAFT SURGERY N/A 4/9/2020    Procedure: FLAP GRAFT;  Surgeon: Sebastian Dan MD;  Location: [facility];  Service: Plastics;  Laterality: N/A;  left fasciocutaneous buttocks flap    HERNIA REPAIR  1995    INJECTION OF ANESTHETIC AGENT INTO TISSUE PLANE DEFINED BY TRANSVERSUS ABDOMINIS MUSCLE N/A 2/18/2020    Procedure: BLOCK, TRANSVERSUS ABDOMINIS PLANE;  Surgeon: Jan New MD;  Location: [facility];  Service: General;  Laterality: N/A;    INSERTION OF TUNNELED CENTRAL VENOUS CATHETER (CVC) WITH SUBCUTANEOUS PORT N/A 10/8/2019    Procedure: [id number];  Surgeon: Jan New MD;  Location:  [facility] OR;  Service: General;  Laterality: N/A;    LAPAROSCOPIC COLOSTOMY      TONSILLECTOMY       Family History   Problem Relation Age of Onset    Intestinal malrotation Mother     Leukemia Father     No Known Problems Sister     Coronary artery disease Brother     Coronary artery disease Maternal Grandmother     Stomach cancer Maternal Grandfather      Social History     Tobacco Use    Smoking status: Current Every Day Smoker     Packs/day: 1.00     Years: 35.00     Pack years: 35.00     Types: Cigarettes     Start date: 1/2/1984    Smokeless tobacco: Former User     Types: Chew    Tobacco comment: no smoking after m.n prior to sx   Substance Use Topics    Alcohol use: Yes     Alcohol/week: 46.0 standard drinks     Types: 42 Cans of beer, 4 Shots of liquor per week     Frequency: 4 or more times a week     Drinks per session: 5 or 6     Binge frequency: Daily or almost daily     Comment: nancie 7, beer socialy,  no alcohol 72 hours prior to surgery    Drug use: Never        Review of Systems:  Denies fevers, chills, N/V or any other complaints    OBJECTIVE:     Vital Signs (Most Recent):  VSS AF    Physical Exam:  Gen - AO x 3, NAD  Neck - supple  Chest - non labored respirations  Ht - RRR  Abd - soft, NT, ND  Ext - no CCE  Perineum - no masses, draining sinus tract at 11 oclock with tunneling for 3 cm    Laboratory:  noted    Diagnostic Results:  Recent imaging reviewed, no abdominal fluid collections noted    ASSESSMENT/PLAN:   53 y/o s/p VRAM with draining wound, need for mediport removal    1. To OR for exploration and debridement of sinus tract and removal of mediport

## 2020-08-13 NOTE — PLAN OF CARE
Pt resting on stretcher still asleep with oral airway in place. VSS. Will cont to mon tior. See flow sheet for detailed assessment.

## 2020-08-13 NOTE — OP NOTE
[facility]  Surgery Department  Operative Note    SUMMARY     Date of Procedure: 8/13/2020     Procedure: Procedure(s) (LRB):  DEBRIDEMENT, WOUND (N/A)  REMOVAL (N/A)     Surgeon(s) and Role:     * Sebastian Dan MD - Primary    Assisting Surgeon: None    Pre-Operative Diagnosis: Dehiscence of operative wound, initial encounter [T81.31XA]    Post-Operative Diagnosis: Post-Op Diagnosis Codes:     * Dehiscence of operative wound, initial encounter [T81.31XA]    Anesthesia: General    Technical Procedures Used: mediport removal, perineum debridement with advancement of VRAM    Description of the Findings of the Procedure: Mr. Presley is a pleasant 55 y/o with a history of rectal cancer s/p chemo, XRT and APR with VRAM flap approximately 3 months ago.  At this point, he is doing well except for a tiny area of drainage around his VRAM flap in his pelvis. At this point, I would like to explore and try to close his sinus tract. He also needs his mediport removed.  We talked about risks, benefits, alternatives, and possible complications of this procedure including recurrence. All questions were answered. We will proceed to surgery.    Description of operative report:  Informed consent was obtained from the patient at the preoperative appointment.  Morning of surgery, patient was seen and examined.  Markings were performed and confirmed with the patient along with the procedure. Antibiotics and SCDs were begun in holding.  Patient was brought to the OR and general endotracheal anesthesia was administered without difficulty.  Patient was then flipped into the prone position and appropriate padding was placed over pressure points.  Perineum was prepped and draped in the standard sterile fashion.  First the flap was inspected.  It was noted to be soft and in excellent position.  There was a small pinhole area at 1200. It was probed with a lacrimal probe and this was noted to go into the soft tissue towards  the midline. Next, a 15 blade scalpel was used to excise the native skin at the apex of the flap in a crescent shape to allow for exploration. The flap was left intact. Granulation tract was identified and excised with curette, direct rough debridement with a lap and cautery.  The tract appeared to go deep approximately 8 cm but not into the abdomen.  This was debrided as much as the granulation was able to be visualized. It was cauterized with Bovie cautery to burn any remaining granulation tissue and washed copiously with irrigation.  Hemostasis was achieved. Next, the flap was then advanced to close the dead space.  2-0 Vicryl was used to reapproximate the deep tissue. 3-0 Vicryl was used to reapproximate the superficial tissue loosely.  2-0 Prolene was used to close the skin in an interrupted fashion along midline, but left open laterally to drain.  Sterile dressing was placed on top.    Patient was then flipped to supine and reprepped and draped.  Local was administered along the prior port scar. 15 blade scalpel was used to open the skin and the catheter was identified.  A 4-0 Vicryl was then used to tie a stitch through the tract.  The patient was valsalvaed, pressure was held on the tract, and the port was removed.  The stitch was tied down and a second stitch was applied to close off the tract.  Next the port itself was removed after dissection down with Bovie.  Hemostasis was achieved.  The wound was then closed in two layers with 4-0 vicryl and 4-0 Monocryl.  Sterile dressing was placed on top. Patient tolerated procedure well and was brought to recovery in excellent condition.    Significant Surgical Tasks Conducted by the Assistant(s), if Applicable: none    Complications: None    Estimated Blood Loss (EBL): 50 cc           Implants:   Implant Name Type Inv. Item Serial No.  Lot No. LRB No. Used Action   PORT POWER CLEAR VIEW - [id number]  PORT POWER CLEAR VIEW  [employer] [id number] Left 1  Explanted       Specimens:   Specimen (12h ago, onward)    None                  Condition: Good    Disposition: PACU - hemodynamically stable    Attestation: I was present and scrubbed throughout the entire procedure.

## 2020-08-13 NOTE — BRIEF OP NOTE
[facility]  Brief Operative Note    Surgery Date: 8/13/2020     Surgeon(s) and Role:     * Sebsatian Dan MD - Primary    Assisting Surgeon: None    Pre-op Diagnosis:  Dehiscence of operative wound, initial encounter [T81.31XA]    Post-op Diagnosis:  Post-Op Diagnosis Codes:     * Dehiscence of operative wound, initial encounter [T81.31XA]    Procedure(s) (LRB):  DEBRIDEMENT, WOUND (N/A)  REMOVAL (N/A)    Anesthesia: General    Description of the findings of the procedure(s): mediport removal, perineal wound debridement with advancement of flap    Estimated Blood Loss: 50 cc         Specimens:   Specimen (12h ago, onward)    None            Discharge Note    OUTCOME: Pt tolerated procedure well and will be d/c from pacu    DISPOSITION: home    FINAL DIAGNOSIS:  Rectal cancer    FOLLOWUP: 1 week    DISCHARGE INSTRUCTIONS:  Follow instructions from the office

## 2020-08-13 NOTE — INTERVAL H&P NOTE
The patient has been examined and the H&P has been reviewed:    No changes. Will proceed with port removal today also.    Risks, benefits and alternative options discussed and understood by patient/family.          Active Hospital Problems    Diagnosis  POA    Rectal cancer [C20]  Yes      Resolved Hospital Problems   No resolved problems to display.

## 2020-08-13 NOTE — TRANSFER OF CARE
Anesthesia Transfer of Care Note    Patient: Sukhjinder Presley    Procedure(s) Performed: Procedure(s) (LRB):  DEBRIDEMENT, WOUND (N/A)  REMOVAL (N/A)    Patient location: PACU    Anesthesia Type: general    Transport from OR: Transported from OR on room air with adequate spontaneous ventilation    Post pain: adequate analgesia    Post assessment: no apparent anesthetic complications    Post vital signs: stable    Level of consciousness: sedated    Nausea/Vomiting: no nausea/vomiting    Complications: none    Transfer of care protocol was followed      Last vitals:   Visit Vitals  /81 (Patient Position: Sitting)   Pulse 78   Temp 36.6 °C (97.9 °F) (Temporal)   Resp 16   Ht 6' (1.829 m)   Wt 75.5 kg (166 lb 7.2 oz)   SpO2 98%   BMI 22.57 kg/m²

## 2020-08-13 NOTE — ANESTHESIA PREPROCEDURE EVALUATION
08/12/2020  Sukhjinder Presley is a 54 y.o., male.    Pre-op Assessment    I have reviewed the Patient Summary Reports.     I have reviewed the Nursing Notes.    I have reviewed the Medications.     Review of Systems  Anesthesia Hx:  No problems with previous Anesthesia    Social:  Smoker    Hematology/Oncology:  Hematology Normal      Oncology Comments: Rectal cancer s/p neoadjuvant chemoradiation    Cardiovascular:  Cardiovascular Normal     Pulmonary:  Pulmonary Normal    Renal/:   renal calculi    Hepatic/GI:  Hepatic/GI Normal Hx rectal cancer   Neurological:  Neurology Normal    Endocrine:  Endocrine Normal        Physical Exam  General:  Well nourished    Airway/Jaw/Neck:  Airway Findings: Mouth Opening: Normal Tongue: Normal  General Airway Assessment: Adult  Mallampati: II  TM Distance: 4 - 6 cm  Jaw/Neck Findings:  Neck ROM: Normal ROM      Dental:  Dental Findings: Periodontal disease, SevereSome missing teeth.   Chest/Lungs:  Chest/Lungs Findings: Clear to auscultation, Normal Respiratory Rate     Heart/Vascular:  Heart Findings: Rate: Normal  Rhythm: Regular Rhythm  Sounds: Normal        Mental Status:  Mental Status Findings:  Cooperative, Alert and Oriented         Anesthesia Plan  Type of Anesthesia, risks & benefits discussed:  Anesthesia Type:  general  Patient's Preference:   Intra-op Monitoring Plan: standard ASA monitors, central line and arterial line  Intra-op Monitoring Plan Comments:   Post Op Pain Control Plan: multimodal analgesia and per primary service following discharge from PACU  Post Op Pain Control Plan Comments:   Induction:   IV  Beta Blocker:  Patient is not currently on a Beta-Blocker (No further documentation required).       Informed Consent: Patient understands risks and agrees with Anesthesia plan.  Questions answered. Anesthesia consent signed with patient.  ASA Score:  2     Day of Surgery Review of History & Physical:  There are no significant changes.          Ready For Surgery From Anesthesia Perspective.     Patient Active Problem List   Diagnosis    Smoker    Polycythemia    Rectal bleeding    At risk for coronary artery disease    Rectal cancer    Kidney stone    Iron deficiency anemia due to chronic blood loss    Cachexia    Prostate cancer screening    Colostomy in place    Rectal fistula with localized perforation    Benign prostatic hyperplasia    Urinary tract infection without hematuria    Post-procedural erectile dysfunction       Chemistry        Component Value Date/Time     07/07/2020 1429    K 3.5 07/07/2020 1429     07/07/2020 1429    CO2 26 07/07/2020 1429    BUN 7 07/07/2020 1429    CREATININE 0.9 07/07/2020 1429     07/07/2020 1429        Component Value Date/Time    CALCIUM 9.1 07/07/2020 1429    ALKPHOS 69 07/07/2020 1429    AST 12 07/07/2020 1429    ALT 8 (L) 07/07/2020 1429    BILITOT 0.3 07/07/2020 1429    ESTGFRAFRICA >60 07/07/2020 1429    EGFRNONAA >60 07/07/2020 1429        Lab Results   Component Value Date    WBC 5.97 07/07/2020    HGB 14.1 07/07/2020    HCT 44.2 07/07/2020    MCV 87 07/07/2020     07/07/2020

## 2020-08-18 LAB
FINAL PATHOLOGIC DIAGNOSIS: NORMAL
GROSS: NORMAL

## 2020-08-19 VITALS
HEART RATE: 83 BPM | WEIGHT: 166.44 LBS | OXYGEN SATURATION: 95 % | TEMPERATURE: 97 F | RESPIRATION RATE: 15 BRPM | BODY MASS INDEX: 22.54 KG/M2 | DIASTOLIC BLOOD PRESSURE: 68 MMHG | HEIGHT: 72 IN | SYSTOLIC BLOOD PRESSURE: 107 MMHG

## 2020-08-24 NOTE — PHYSICIAN QUERY
PT Name: Sukhjinder Presley  MR #: [id number]     Documentation Clarification      CDS/: Christina Edgar               Contact information: [email or web address]    This form is a permanent document in the medical record.     Query Date: August 24, 2020    By submitting this query, we are merely seeking further clarification of documentation. Please utilize your independent clinical judgment when addressing the question(s) below.    The Medical Record reflects the following:    Supporting Clinical Findings Location in Medical Record   perineum debridement with advancement of VRAM       Operative Note                                                                                      Dear Provider, please provide the size of the advancement of VRAM with above clinical findings performed.    [   ] ___5____x __3cm_______   [   ] Other (please specify): ____________   [  ] Clinically undetermined                                                                                                           Present on admission (POA) status:   [   ] Yes (Y)                          [  ] Clinically Undetermined (W)  [ X  ] No (N)                            [   ] Documentation insufficient to determine if condition is POA (U)

## 2020-08-25 DIAGNOSIS — C20 RECTAL CANCER: ICD-10-CM

## 2020-08-25 RX ORDER — OXYCODONE HYDROCHLORIDE 10 MG/1
10 TABLET ORAL EVERY 4 HOURS PRN
Qty: 90 TABLET | Refills: 0 | Status: SHIPPED | OUTPATIENT
Start: 2020-08-25 | End: 2020-09-23 | Stop reason: SDUPTHER

## 2020-09-23 DIAGNOSIS — C20 RECTAL CANCER: ICD-10-CM

## 2020-09-24 RX ORDER — OXYCODONE HYDROCHLORIDE 10 MG/1
10 TABLET ORAL EVERY 4 HOURS PRN
Qty: 90 TABLET | Refills: 0 | Status: SHIPPED | OUTPATIENT
Start: 2020-09-24 | End: 2020-10-13 | Stop reason: SDUPTHER

## 2020-09-28 ENCOUNTER — OFFICE VISIT (OUTPATIENT)
Dept: SURGERY | Facility: CLINIC | Age: 55
End: 2020-09-28
Payer: COMMERCIAL

## 2020-09-28 VITALS
HEART RATE: 78 BPM | SYSTOLIC BLOOD PRESSURE: 122 MMHG | DIASTOLIC BLOOD PRESSURE: 79 MMHG | TEMPERATURE: 99 F | WEIGHT: 181.19 LBS | BODY MASS INDEX: 24.58 KG/M2

## 2020-09-28 DIAGNOSIS — C20 RECTAL CANCER: Primary | ICD-10-CM

## 2020-09-28 DIAGNOSIS — R14.0 ABDOMINAL DISTENTION: ICD-10-CM

## 2020-09-28 PROCEDURE — 99214 PR OFFICE/OUTPT VISIT, EST, LEVL IV, 30-39 MIN: ICD-10-PCS | Mod: S$GLB,,, | Performed by: COLON & RECTAL SURGERY

## 2020-09-28 PROCEDURE — 3008F PR BODY MASS INDEX (BMI) DOCUMENTED: ICD-10-PCS | Mod: CPTII,S$GLB,, | Performed by: COLON & RECTAL SURGERY

## 2020-09-28 PROCEDURE — 99214 OFFICE O/P EST MOD 30 MIN: CPT | Mod: S$GLB,,, | Performed by: COLON & RECTAL SURGERY

## 2020-09-28 PROCEDURE — 3008F BODY MASS INDEX DOCD: CPT | Mod: CPTII,S$GLB,, | Performed by: COLON & RECTAL SURGERY

## 2020-09-29 NOTE — PROGRESS NOTES
History & Physical    SUBJECTIVE:     CC: rectal adenocarcinoma  Ref: Anjelica Saavedra MD    History of Present Illness:  Patient is a 54 y.o. male presents for evaluation of rectal cancer.  Patient reports he has had increasing pelvic/rectal pain along with decreased bowel movements over the last 6 weeks.  Reports an uncomfortable feeling in his pelvis associated with mucous discharge from his rectum as well as bloody bowel movements that are thin and less than normal. He reports associated chills, fatigue and 16 lb weight loss over the past 5 months.  He has also noticed a decrease in appetite as well. He underwent a colonoscopy on 06/06/2019 where a nonobstructing rectal mass was found with biopsy showing well-differentiated adenocarcinoma.  He was then referred to Colorectal [facility] for evaluation.  He has no family history of colorectal cancer or IBD.    8/13/19: Completed neoadj chemoXRT  2/18/2020: Laparoscopic loop sigmoid colostomy 2/2 rectal perforation on chemotx  4/9/2020: APR with extra anatomic resection of fistula tract and left gluteus muscle, omental pedicle flap and VRAM with skin paddle by plastics    Interval history:  Since last clinic visit, the patient continues to do well. States perineum and flap have completely healed and has no drainage. Is having 10-15lbs of weight gain and abdominal swelling over past 2 months.     Review of patient's allergies indicates:  No Known Allergies    Current Outpatient Medications   Medication Sig Dispense Refill    acetaminophen (TYLENOL) 325 MG tablet Take 2 tablets (650 mg total) by mouth every 6 (six) hours as needed.  0    capecitabine (XELODA) 500 MG Tab Take 3 tablets (1500 mg) by mouth twice daily after breakfast and dinner on days of radiation only. 168 tablet 0    dronabinol (MARINOL) 10 MG capsule Take 1 capsule (10 mg total) by mouth 2 (two) times daily before meals. 60 capsule 0    finasteride (PROSCAR) 5 mg tablet Take 1 tablet (5 mg  total) by mouth once daily. 30 tablet 11    HYDROcodone-acetaminophen (NORCO) 7.5-325 mg per tablet       iron fum-B12-IF-C-folic acid (FOLTRIN) 110-0.5 mg capsule Take 1 capsule by mouth 2 (two) times daily. 60 capsule 1    magic mouthwash diphen/antac/lidoc Swish and spit 15 ml by mouth every 4 hours as needed 500 mL 2    morphine (MS CONTIN) 30 MG 12 hr tablet Take 1 tablet (30 mg total) by mouth every 8 (eight) hours. 90 tablet 0    ondansetron (ZOFRAN-ODT) 4 MG TbDL       oxyCODONE (ROXICODONE) 10 mg Tab immediate release tablet Take 1 tablet (10 mg total) by mouth every 4 (four) hours as needed for Pain (medically necessary). 90 tablet 0    promethazine (PHENERGAN) 25 MG tablet Take 1 tablet (25 mg total) by mouth every 4 (four) hours. 60 tablet 4    sildenafiL (VIAGRA) 100 MG tablet Take 1 tablet (100 mg total) by mouth daily as needed for Erectile Dysfunction. 20 tablet 11    tamsulosin (FLOMAX) 0.4 mg Cap Take 1 capsule (0.4 mg total) by mouth once daily. 30 capsule 11     No current facility-administered medications for this visit.        Past Medical History:   Diagnosis Date    Anemia     Cancer     rectal     Past Surgical History:   Procedure Laterality Date    ABDOMINOPERINEAL RESECTION OF RECTUM N/A 4/9/2020    Procedure: PROCTECTOMY, ABDOMINOPERINEAL;  Surgeon: Jan New MD;  Location: [facility] OR;  Service: General;  Laterality: N/A;    COLON SURGERY      COLONOSCOPY N/A 6/6/2019    Procedure: COLONOSCOPY;  Surgeon: Anjelica Saavedra MD;  Location: [facility];  Service: Endoscopy;  Laterality: N/A;    CREATION OF MUSCLE ROTATIONAL FLAP Left 4/9/2020    Procedure: CREATION, FLAP, MUSCLE ROTATION;  Surgeon: Sebastian Dan MD;  Location: [facility] OR;  Service: Plastics;  Laterality: Left;   VRAM    ESOPHAGOGASTRODUODENOSCOPY N/A 6/6/2019    Procedure: EGD (ESOPHAGOGASTRODUODENOSCOPY);  Surgeon: Anjelica Saavedra MD;  Location: [facility] ENDO;  Service: Endoscopy;  Laterality: N/A;    FLAP  GRAFT SURGERY N/A 4/9/2020    Procedure: FLAP GRAFT;  Surgeon: Sebastian Dan MD;  Location: [facility] OR;  Service: Plastics;  Laterality: N/A;  left fasciocutaneous buttocks flap    HERNIA REPAIR  1995    INJECTION OF ANESTHETIC AGENT INTO TISSUE PLANE DEFINED BY TRANSVERSUS ABDOMINIS MUSCLE N/A 2/18/2020    Procedure: BLOCK, TRANSVERSUS ABDOMINIS PLANE;  Surgeon: Jan New MD;  Location: [facility] OR;  Service: General;  Laterality: N/A;    INSERTION OF TUNNELED CENTRAL VENOUS CATHETER (CVC) WITH SUBCUTANEOUS PORT N/A 10/8/2019    Procedure: [id number];  Surgeon: Jan New MD;  Location: [facility] OR;  Service: General;  Laterality: N/A;    LAPAROSCOPIC COLOSTOMY      MEDIPORT REMOVAL N/A 8/13/2020    Procedure: REMOVAL, CATHETER, CENTRAL VENOUS, TUNNELED, WITH PORT;  Surgeon: Sebastian Dan MD;  Location: [facility] OR;  Service: Plastics;  Laterality: N/A;    TONSILLECTOMY      WOUND DEBRIDEMENT N/A 8/13/2020    Procedure: DEBRIDEMENT, WOUND;  Surgeon: Sebastian Dan MD;  Location: [facility] OR;  Service: Plastics;  Laterality: N/A;  layered closure     Family History   Problem Relation Age of Onset    Intestinal malrotation Mother     Leukemia Father     No Known Problems Sister     Coronary artery disease Brother     Coronary artery disease Maternal Grandmother     Stomach cancer Maternal Grandfather      Social History     Tobacco Use    Smoking status: Current Every Day Smoker     Packs/day: 1.00     Years: 35.00     Pack years: 35.00     Types: Cigarettes     Start date: 1/2/1984    Smokeless tobacco: Former User     Types: Chew    Tobacco comment: no smoking after m.n prior to sx   Substance Use Topics    Alcohol use: Yes     Alcohol/week: 46.0 standard drinks     Types: 42 Cans of beer, 4 Shots of liquor per week     Frequency: 4 or more times a week     Drinks per session: 5 or 6     Binge frequency: Daily or almost daily     Comment: nancie 7, beer socialy,  no alcohol 72  hours prior to surgery    Drug use: Never        Review of Systems:  Review of Systems   Constitutional: Positive for unexpected weight change. Negative for activity change, appetite change, chills, fatigue and fever.   HENT: Negative for congestion, ear pain, sore throat and trouble swallowing.    Eyes: Negative for pain, redness and itching.   Respiratory: Negative for cough, shortness of breath and wheezing.    Cardiovascular: Negative for chest pain, palpitations and leg swelling.   Gastrointestinal: Positive for abdominal distention. Negative for abdominal pain, anal bleeding, blood in stool, constipation, diarrhea, nausea, rectal pain and vomiting.   Endocrine: Negative for cold intolerance, heat intolerance and polyuria.   Genitourinary: Negative for dysuria, flank pain, frequency and hematuria.        +erectile dysfunction   Musculoskeletal: Negative for gait problem, joint swelling and neck pain.   Skin: Negative for color change, rash and wound.   Allergic/Immunologic: Negative for environmental allergies and immunocompromised state.   Neurological: Negative for dizziness, speech difficulty, weakness and numbness.   Psychiatric/Behavioral: Negative for agitation, confusion and hallucinations.       OBJECTIVE:     Vital Signs (Most Recent)  Temp: 98.6 °F (37 °C) (09/28/20 1050)  Pulse: 78 (09/28/20 1050)  BP: 122/79 (09/28/20 1050)     82.2 kg (181 lb 3.5 oz)     Physical Exam:  Physical Exam  Constitutional:       Appearance: He is well-developed.   HENT:      Head: Normocephalic and atraumatic.   Eyes:      Conjunctiva/sclera: Conjunctivae normal.   Neck:      Musculoskeletal: Normal range of motion.      Thyroid: No thyromegaly.   Cardiovascular:      Rate and Rhythm: Regular rhythm.   Pulmonary:      Effort: Pulmonary effort is normal. No respiratory distress.   Abdominal:      Comments: Soft, +distention, midline incision well-healed; ostomy pink and viable with stool in bag; likely parastomal hernia    Genitourinary:     Comments: Flap well-perfused and well-healed without drainage or evidence of fistula tracts  Musculoskeletal: Normal range of motion.         General: No tenderness.   Skin:     General: Skin is warm and dry.      Capillary Refill: Capillary refill takes less than 2 seconds.      Findings: No rash.   Neurological:      Mental Status: He is alert and oriented to person, place, and time.       Laboratory  Lab Results   Component Value Date    WBC 5.97 07/07/2020    HGB 14.1 07/07/2020    HCT 44.2 07/07/2020     07/07/2020    CHOL 158 06/01/2019    TRIG 194 (H) 06/01/2019    HDL 30 (L) 06/01/2019    ALT 8 (L) 07/07/2020    AST 12 07/07/2020     07/07/2020    K 3.5 07/07/2020     07/07/2020    CREATININE 0.9 07/07/2020    BUN 7 07/07/2020    CO2 26 07/07/2020    TSH 0.907 07/07/2020    PSA 0.37 06/01/2019    INR 1.0 06/11/2019       Diagnostic Results:  Colonoscopy images and report reviewed which shows a rectal mass that malignant     MRI March 2020:  FINDINGS:  Tumor Location: Tumor location (from anal verge):    Distal aspect of the mass is 7-8 cm from the anal verge.    Relationship to anterior peritoneal reflection: Straddles    Tumor Characteristics/extent:    Circumferential extent/location: Significant interval decrease in size of the lesion with accurate measurements difficult.  Tumor length is estimated at 4-5 cm.  There is persistent left eccentric wall thickening/exophytic component also significantly improved.  There is persistent involvement of the left mesorectal fat/fascia.    Left-sided fistula present communicating with rim enhancing 5 x 2 cm fluid collection which extends posterior inferiorly to involve subcutaneous tissues overlying the medial left gluteus amelia musculature.  No other concerning for or new fluid collection.    Lymph Nodes:    No suspicious lymph nodes currently.      Impression       Continued interval decrease in size of the rectal mass,  significantly decreased in volume from June 2019 MRI exam.    Fistula/perforation findings present with pelvic fluid collection concerning for abscess as above as noted on recent CT exam.       CT March 2020:  FINDINGS:  Thoracic aorta, great vessels and heart are unchanged.  MediPort present.  No pathologically enlarged axillary, mediastinal or hilar lymph nodes present.    Lungs demonstrate no acute opacity.  Stable bilateral tiny nodules noted, largest within the right lower lobe measuring 3.7 mm.  Nodules are annotated on the images.    Liver and spleen are unchanged.  No biliary dilatation.  Gallbladder unremarkable.  Pancreas unchanged.  Adrenal glands stable.  Kidneys are unchanged.    Stomach unremarkable.  No small bowel dilatation.  Left lower quadrant colostomy noted.  Degree of colonic constipation present.    Known rectal mass present at the better described on concurrent MRI.  Known perforation/fistula formation findings involving the left aspect of the rectum/mass persists with left pelvic fluid collection extending inferior-posteriorly to involve the subcutaneous tissues overlying the medial left gluteus musculature.  Largest component within the pelvis measures approximately 4 x 2 cm.  Previously noted air within the collection has resolved.  Soft tissue air overlying the gluteus muscle jugular as resolved as well with persistent fat stranding/phlegmonous changes.    No pathologically enlarged mesenteric or retroperitoneal lymph nodes appreciated.  No significant free fluid.    No acute osseous abnormality.      Impression       1. No detrimental change of the chest.  Stable tiny sub 4 mm nodules.  2. Persistent pelvic fluid collection consistent with known rectal mass perforation/fistulous change.  Fluid/inflammation again noted extending inferiorly to 2 overlie the medial left gluteus amelia musculature.  Previously noted air components have resolved.  3. Correlate with concurrent MRI rectal  report for rectal mass characterization.       PATHOLOGY:  1. Soft tissue, left lateral sidewall margin (biopsy):  - Benign fibro muscular tissue, negative for carcinoma  - Confirmed with negative CDX2 immunostain and rare background; CDX2 immunostain with appropriate  control  2. Rectum, anus with left buttock skin (abdominal peritoneal resection with extra anatomic resection of  fistula tract and the left gluteus muscle):  - Residual invasive adenocarcinoma, moderately differentiated, extending to pericolorectal tissue, with  perforation (see synoptic report)  - Carcinoma extends to inked resection margin at perforation site; final resection margin maybe negative  (also see part 1);  - Pathologic Stage ypT3, N0  - Background colon with hemosiderin-laden macrophages, fibrosis and thickened wall vessels, consistent  with neoadjuvant therapy effect  - Diverticulosis  - Anus with two skin tags and reactive stromal cells, viewed by Dr. Servin who concurs this consistent with  chemoradiation therapy efect  - Eighteen lymph nodes, negative for metastatic carcinoma (0/18)    COLON AND RECTUM: Resection, Including Transanal Disk Excision of Rectal Neoplasms  Procedure Abdominoperineal resection, extra anatomic resection of fistula tract and the left gluteus  muscle  Tumor Site Rectum  Tumor Location Straddles the anterior peritoneal reflection  Tumor Size  Greatest dimension (centimeters): 4 cm  Macroscopic Tumor Perforation Present  Macroscopic Evaluation of Mesorectum Complete  Histologic Type Adenocarcinoma  Histologic Grade G2: Moderately differentiated  Tumor Extension Tumor invades through the muscularis propria into pericolorectal tissue  Margins (Note F)  All margins are uninvolved by invasive carcinoma, high grade dysplasia / intramucosal carcinoma, and low  grade dysplasia  Margins examined: Proximal, distal and radial resection margins (see also part 1)  Treatment Effect  Present  Residual cancer with evident  tumor regression, but more than single cells or rare small groups of cancer  cells (partial response, score 2)  Lymphovascular Invasion Not identified  Perineural Invasion Not identified  Tumor Deposits Not identified  Regional Lymph Nodes  Lymph Node Examination (required only if lymph nodes present in specimen)  Number of Lymph Nodes Involved: 0  Number of Lymph Nodes Examined: 18  Pathologic Stage Classification (pTNM, AJCC 8th Edition)  y (posttreatment)  Primary Tumor (pT)  pT3: Tumor invades through the muscularis propria into pericolorectal tissues  Regional Lymph Nodes (pN)  pN0: No regional lymph node metastasis  Additional Pathologic Findings: Diverticulosis, tatoo identified  Ancillary Studies  NORMAL FOR COLORECTAL/ENDOMETRAIL CARCINOMA  Immunohistochemistry (IHC) Testing for Mismatch Repair (MMR) Proteins:  MLH1 - Intact nuclear expression  MSH2 - Intact nuclear expression  MSH6 - Intact nuclear expression  PMS2 - Intact nuclear expression  Background nonneoplastic tissue/internal control with intact nuclear expression  IHC Interpretation  No loss of nuclear expression of MMR proteins: low probability of microsatellite instability  There are exceptions to the above IHC interpretations. These results should not be considered in isolation,  and clinical correlation with genetic counseling is recommended to assess the need for germline testing.  Comment: CDX2 immunostain performed block 2K shows tumor cells are positive; in comparison, CDX2  and AE1/AE3 stain on 2U shows lymph node with abundant mucin, negative for metastatic carcinoma.  CDX2 and AE1/AE3 stains with appropriate controls. Dr. Goel viewed selected slides, and concurs the  staging of carcinoma.  ASSESSMENT/PLAN:     54-year-old male with rectal adenocarcinoma with likely T3N1 disease based upon preop imaging without evidence of metastatic disease now s/p neoadj chemoXRT which completed on 8/13/19 but with post-tx MRI showing continued  threatened mesorectal fascia who developed rectal perforation and abscess/fistula on cycle 11/12 of neoadj chemotx in Feb 2020 requiring lap diverting sigmoid colostomy with continued fluid collection/perforation/fistula on CT/MRI now s/p APR with extra-anatomic resection of fistula tract and left gluteus muscle, omental pedicle flap and VRAM with skin paddle by plastics on 4/9/2020    - Given weight gain and abdominal distention, will send for CT C/A/P to re-evaluate for any metastatic disease and ascites  - needs CEA level  - RTC 3 months or sooner if necessary    Over 25 minutes were spent in face to face contact with the patient with greater than 50% spent discussing their diagnosis and coordination of care.     Jan New MD  Colon and Rectal Surgery  [facility] [facility]

## 2020-10-06 ENCOUNTER — PATIENT MESSAGE (OUTPATIENT)
Dept: ADMINISTRATIVE | Facility: HOSPITAL | Age: 55
End: 2020-10-06

## 2020-10-08 ENCOUNTER — TELEPHONE (OUTPATIENT)
Dept: RADIOLOGY | Facility: HOSPITAL | Age: 55
End: 2020-10-08

## 2020-10-09 ENCOUNTER — HOSPITAL ENCOUNTER (OUTPATIENT)
Dept: RADIOLOGY | Facility: HOSPITAL | Age: 55
Discharge: HOME | End: 2020-10-09
Attending: COLON & RECTAL SURGERY
Payer: COMMERCIAL

## 2020-10-09 DIAGNOSIS — C20 RECTAL CANCER: ICD-10-CM

## 2020-10-09 PROCEDURE — 74177 CT ABD & PELVIS W/CONTRAST: CPT | Mod: TC

## 2020-10-09 PROCEDURE — 71260 CT THORAX DX C+: CPT | Mod: 26,,, | Performed by: RADIOLOGY

## 2020-10-09 PROCEDURE — 71260 CT CHEST ABDOMEN PELVIS WITH CONTRAST (XPD): ICD-10-PCS | Mod: 26,,, | Performed by: RADIOLOGY

## 2020-10-09 PROCEDURE — 25500020 PHARM REV CODE 255: Performed by: COLON & RECTAL SURGERY

## 2020-10-09 PROCEDURE — 74177 CT CHEST ABDOMEN PELVIS WITH CONTRAST (XPD): ICD-10-PCS | Mod: 26,,, | Performed by: RADIOLOGY

## 2020-10-09 PROCEDURE — 74177 CT ABD & PELVIS W/CONTRAST: CPT | Mod: 26,,, | Performed by: RADIOLOGY

## 2020-10-09 RX ADMIN — IOHEXOL 1000 ML: 12 SOLUTION ORAL at 10:10

## 2020-10-09 RX ADMIN — IOHEXOL 100 ML: 350 INJECTION, SOLUTION INTRAVENOUS at 11:10

## 2020-10-12 NOTE — PROGRESS NOTES
Subjective:       Patient ID: Sukhjinder Presley is a 54 y.o. male.    Chief Complaint: Rectal cancer [C20]  HPI: We have an opportunity to see Mr. Sukhjinder Presley in Hematology Oncology clinic at [facility] on 10/12/2020.  Mr. Sukhjinder Presley is a 54 y.o. gentleman with rectal cancer completed neoadjuvant chemoradiation with concurrent xeloda.  Reported symptoms have improved with better pain control and appetite.  Restaging MRI rectum showed      Impression         1. Large rectal mass with invasion of the muscularis and involvement of the mesorectal fat and fascia along the left and posterior aspect of the mass.  Single lymph node within the mesorectal fat posteriorly measuring approximately 6 mm.  Additional subcentimeter obturator and external iliac chain lymph nodes also seen bilaterally.  When compared to the study prior to neoadjuvant therapy there has been significant decrease in wall thickening.      Has been evaluated by Dr. New, given fascia involvement, likely resection would be R1.  Recommended total neoadjuvant chemotherapy prior to surgery.     Currently on chemotherapy with Avastin FOLFOXIRI s/p 4 cycles.     CT chest abdomen pelvis showed               Impression         1. Significant decrease in soft tissue prominence of the previously noted rectal mass with some persistent circumferential wall thickening still present on the current study.  There is also some persistent stranding within the perirectal fat and thickening of the mesial rectal fascia along with thickening/fluid density noted within the presacral soft tissues.  No metastatic lesions to the chest.  2. Remaining findings as discussed above and stable.      Had cycles 5-6 avastin folfoxiri, cycle 7 with FOLFOX.       Developed contained perforation of distal rectum. Underwent loop sigmoid colostomy. Underwent APR on 4/20/2020.  Pathology showed 1. Soft tissue, left lateral sidewall margin (biopsy):  - Benign fibro muscular  tissue, negative for carcinoma  - Confirmed with negative CDX2 immunostain and rare background; CDX2 immunostain with appropriate  control  2. Rectum, anus with left buttock skin (abdominal peritoneal resection with extra anatomic resection of  fistula tract and the left gluteus muscle):  - Residual invasive adenocarcinoma, moderately differentiated, extending to pericolorectal tissue, with  perforation (see synoptic report)  - Carcinoma extends to inked resection margin at perforation site; final resection margin maybe negative  (also see part 1);  - Pathologic Stage ypT3, N0  - Background colon with hemosiderin-laden macrophages, fibrosis and thickened wall vessels, consistent  with neoadjuvant therapy effect  - Diverticulosis  - Anus with two skin tags and reactive stromal cells, viewed by Dr. Servin who concurs this consistent with  chemoradiation therapy efect  - Eighteen lymph nodes, negative for metastatic carcinoma (0/18)      Oncology History   Rectal cancer   6/24/2019 Initial Diagnosis    Rectal cancer     6/26/2019 Cancer Staged    Staging form: Colon and Rectum, AJCC 8th Edition  - Clinical stage from 6/26/2019: Stage IIIB (cT3, cN2a, cM0) - Signed by Artem Mishra II, MD on 6/26/2019     7/10/2019 - 8/13/2019 Radiation Therapy    Treatment Site Ref. ID Energy Dose/Fx (Gy) #Fx Dose Correction (Gy) Total Dose (Gy) Start Date End Date Elapsed Days   RECTUM Pelvis 6X 2 25 / 25 0 50 7/10/2019 8/13/2019 34          7/10/2019 - 7/10/2019 Chemotherapy    Treatment Summary   Plan Name: OP CAPECITABINE 5 DAYS + RADIOTHERAPY  Treatment Goal: Curative  Status: Inactive  Start Date: [No treatment day found]  End Date: [No treatment day found]  Provider: Song Aden MD  Chemotherapy: [No matching medication found in this treatment plan]     10/9/2019 Tumor Conference      Multidisciplinary Rectal Cancer Conference - Evaluation and Recommendation Summary    10/9/2019  Sukhjinder Presley  [id number]  53 y.o. male    1.  Evaluation    C scope on 6/6/19 - non obstructing mass    MRI date: 6/17/19    Tumor Location in Rectum: middle third    Indication of Sphincter Involvement:  Uninvolved    Pretreatment Circumferential Resection Margin (CRM) status:  Threatened Tumor abuts CRM on the left.     Pretreatment (clinical) AJCC Stage: IIIB  Stage I  [] I: T1N0M0  [] I: T2N0M0  Stage II  [] IIA: T3N0M0  [] IIB U3sZ5G2  [] IIC: C5cH8N4  Stage III  [] IIIA: T1-2N1M0  [] IIIA: D6G4hE5  [x] IIIB: T3-K5eX3R4  [] IIIB: T2-3N2aM0  [] IIIB: T1-2N2bM0  [] IIIC: N2hA3sO0  [] IIIC: T3-2yK3kI5  [] IIIC: M4rX5-5O0   Stage IV  [] IV: A4-7F3-4K3a-b    CEA level:   Lab Results   Component Value Date    CEA 1.4 09/30/2019       2. Treatment     Neoadjuvant Therapy Recommendation: Long Course Chemoradiotherapy - completed on 8/13    CT scan 7/30 during treatment: Unchanged appearance of large rectal mass, mild curcumferential bladder wall thickening.     MRI rectum 9/24: Large mass with invasion of muscularis and involvement of the mesorectal fat and fascia along the left and posterior aspect of the mass.   Single LN within the mesorectal fat posteriorly measuring 6 mm.   Subcentimeter obturator and external iliac changes LNDs seen bilaterally.     Recommendations:     Complete COLLINS with initiation of FOLFOX.     After follow by LAR + DI.      10/9/2019 - 3/16/2020 Chemotherapy    Treatment Summary   Plan Name: OP FOLFOXIRI Q2W  Treatment Goal: Curative  Status: Inactive  Start Date: 10/9/2019  End Date: 2/5/2020  Provider: Song Aden MD  Chemotherapy: fluorouracil 3,200 mg/m2 = 6,305 mg in sodium chloride 0.9% 253 mL chemo infusion, 3,200 mg/m2 = 6,305 mg, Intravenous, Over 48 hours, 7 of 8 cycles  Administration: 6,305 mg (10/9/2019), 6,270 mg (10/23/2019), 6,210 mg (11/6/2019), 6,305 mg (11/20/2019), 6,145 mg (1/6/2020), 6,145 mg (1/20/2020), 6,240 mg (2/3/2020)  bevacizumab (AVASTIN) 5 mg/kg = 380 mg in sodium chloride 0.9% 100 mL chemo infusion, 5  mg/kg = 380 mg (100 % of original dose 5 mg/kg), Intravenous, Clinic/HOD 1 time, 6 of 6 cycles  Dose modification: 5 mg/kg (original dose 5 mg/kg, Cycle 1, Reason: MD Discretion), 5 mg/kg (original dose 5 mg/kg, Cycle 2)  Administration: 380 mg (10/9/2019), 380 mg (10/23/2019), 370 mg (11/6/2019), 380 mg (11/20/2019), 365 mg (1/6/2020), 360 mg (1/20/2020)  irinotecan (CAMPTOSAR) 165 mg/m2 = 326 mg in sodium chloride 0.9% 266.3 mL chemo infusion, 165 mg/m2 = 326 mg, Intravenous, Clinic/HOD 1 time, 6 of 6 cycles  Administration: 326 mg (10/9/2019), 324 mg (10/23/2019), 320 mg (11/6/2019), 326 mg (11/20/2019), 316 mg (1/6/2020), 316 mg (1/20/2020)  leucovorin calcium 200 mg/m2 = 395 mg in dextrose 5 % 269.75 mL infusion, 200 mg/m2 = 395 mg, Intravenous, Clinic/HOD 1 time, 7 of 8 cycles  Administration: 395 mg (10/9/2019), 390 mg (10/23/2019), 390 mg (11/6/2019), 395 mg (11/20/2019), 385 mg (1/6/2020), 385 mg (1/20/2020), 390 mg (2/3/2020)  oxaliplatin (ELOXATIN) 85 mg/m2 = 167 mg in dextrose 5 % 533.4 mL chemo infusion, 85 mg/m2 = 167 mg, Intravenous, Clinic/HOD 1 time, 7 of 8 cycles  Administration: 167 mg (10/9/2019), 167 mg (10/23/2019), 165 mg (11/6/2019), 167 mg (11/20/2019), 163 mg (1/6/2020), 163 mg (1/20/2020), 166 mg (2/3/2020)       Past Medical History:   Diagnosis Date    Anemia     Cancer     rectal     Family History   Problem Relation Age of Onset    Intestinal malrotation Mother     Leukemia Father     No Known Problems Sister     Coronary artery disease Brother     Coronary artery disease Maternal Grandmother     Stomach cancer Maternal Grandfather      Social History     Socioeconomic History    Marital status: Significant Other     Spouse name: Not on file    Number of children: Not on file    Years of education: Not on file    Highest education level: Not on file   Occupational History    Not on file   Social Needs    Financial resource strain: Somewhat hard    Food insecurity      Worry: Sometimes true     Inability: Sometimes true    Transportation needs     Medical: No     Non-medical: No   Tobacco Use    Smoking status: Current Every Day Smoker     Packs/day: 1.00     Years: 35.00     Pack years: 35.00     Types: Cigarettes     Start date: 1/2/1984    Smokeless tobacco: Former User     Types: Chew    Tobacco comment: no smoking after m.n prior to sx   Substance and Sexual Activity    Alcohol use: Yes     Alcohol/week: 46.0 standard drinks     Types: 42 Cans of beer, 4 Shots of liquor per week     Frequency: 4 or more times a week     Drinks per session: 5 or 6     Binge frequency: Daily or almost daily     Comment: nancie 7, beer socialy,  no alcohol 72 hours prior to surgery    Drug use: Never    Sexual activity: Yes     Partners: Female     Birth control/protection: None   Lifestyle    Physical activity     Days per week: 1 day     Minutes per session: 60 min    Stress: Not on file   Relationships    Social connections     Talks on phone: More than three times a week     Gets together: More than three times a week     Attends [employer] service: Not on file     Active member of club or organization: No     Attends meetings of clubs or organizations: Never     Relationship status: Living with partner   Other Topics Concern    Not on file   Social History Narrative    Not on file     Past Surgical History:   Procedure Laterality Date    ABDOMINOPERINEAL RESECTION OF RECTUM N/A 4/9/2020    Procedure: PROCTECTOMY, ABDOMINOPERINEAL;  Surgeon: Jan New MD;  [facility]  Service: General;  Laterality: N/A;    COLON SURGERY      COLONOSCOPY N/A 6/6/2019    Procedure: COLONOSCOPY;  Surgeon: Anjelica Saavedra MD;  Location: [facility];  Service: Endoscopy;  Laterality: N/A;    CREATION OF MUSCLE ROTATIONAL FLAP Left 4/9/2020    Procedure: CREATION, FLAP, MUSCLE ROTATION;  Surgeon: Sebastian Dan MD;  Location: [facility] OR;  Service: Plastics;  Laterality: Left;   VRAM     ESOPHAGOGASTRODUODENOSCOPY N/A 6/6/2019    Procedure: EGD (ESOPHAGOGASTRODUODENOSCOPY);  Surgeon: Anjelica Saavedra MD;  Location: [facility] ENDO;  Service: Endoscopy;  Laterality: N/A;    FLAP GRAFT SURGERY N/A 4/9/2020    Procedure: FLAP GRAFT;  Surgeon: Sebastian Dan MD;  Location: [facility] OR;  Service: Plastics;  Laterality: N/A;  left fasciocutaneous buttocks flap    HERNIA REPAIR  1995    INJECTION OF ANESTHETIC AGENT INTO TISSUE PLANE DEFINED BY TRANSVERSUS ABDOMINIS MUSCLE N/A 2/18/2020    Procedure: BLOCK, TRANSVERSUS ABDOMINIS PLANE;  Surgeon: Jan New MD;  Location: [facility] OR;  Service: General;  Laterality: N/A;    INSERTION OF TUNNELED CENTRAL VENOUS CATHETER (CVC) WITH SUBCUTANEOUS PORT N/A 10/8/2019    Procedure: [id number]-[id number]-P-CATH;  Surgeon: Jan New MD;  Location: [facility] OR;  Service: General;  Laterality: N/A;    LAPAROSCOPIC COLOSTOMY      MEDIPORT REMOVAL N/A 8/13/2020    Procedure: REMOVAL, CATHETER, CENTRAL VENOUS, TUNNELED, WITH PORT;  Surgeon: Sebastian Dan MD;  Location: [facility] OR;  Service: Plastics;  Laterality: N/A;    TONSILLECTOMY      WOUND DEBRIDEMENT N/A 8/13/2020    Procedure: DEBRIDEMENT, WOUND;  Surgeon: Sebastian Dan MD;  Location: [facility] OR;  Service: Plastics;  Laterality: N/A;  layered closure     Current Outpatient Medications   Medication Sig Dispense Refill    acetaminophen (TYLENOL) 325 MG tablet Take 2 tablets (650 mg total) by mouth every 6 (six) hours as needed.  0    capecitabine (XELODA) 500 MG Tab Take 3 tablets (1500 mg) by mouth twice daily after breakfast and dinner on days of radiation only. 168 tablet 0    dronabinol (MARINOL) 10 MG capsule Take 1 capsule (10 mg total) by mouth 2 (two) times daily before meals. 60 capsule 0    finasteride (PROSCAR) 5 mg tablet Take 1 tablet (5 mg total) by mouth once daily. 30 tablet 11    HYDROcodone-acetaminophen (NORCO) 7.5-325 mg per tablet       iron fum-B12-IF-C-folic acid (FOLTRIN) 110-0.5  mg capsule Take 1 capsule by mouth 2 (two) times daily. 60 capsule 1    magic mouthwash diphen/antac/lidoc Swish and spit 15 ml by mouth every 4 hours as needed 500 mL 2    morphine (MS CONTIN) 30 MG 12 hr tablet Take 1 tablet (30 mg total) by mouth every 8 (eight) hours. 90 tablet 0    ondansetron (ZOFRAN-ODT) 4 MG TbDL       oxyCODONE (ROXICODONE) 10 mg Tab immediate release tablet Take 1 tablet (10 mg total) by mouth every 4 (four) hours as needed for Pain (medically necessary). 90 tablet 0    promethazine (PHENERGAN) 25 MG tablet Take 1 tablet (25 mg total) by mouth every 4 (four) hours. 60 tablet 4    sildenafiL (VIAGRA) 100 MG tablet Take 1 tablet (100 mg total) by mouth daily as needed for Erectile Dysfunction. 20 tablet 11    tamsulosin (FLOMAX) 0.4 mg Cap Take 1 capsule (0.4 mg total) by mouth once daily. 30 capsule 11     No current facility-administered medications for this visit.        Labs:  Lab Results   Component Value Date    WBC 5.97 07/07/2020    HGB 14.1 07/07/2020    HCT 44.2 07/07/2020    MCV 87 07/07/2020     07/07/2020     BMP  Lab Results   Component Value Date     07/07/2020    K 3.5 07/07/2020     07/07/2020    CO2 26 07/07/2020    BUN 7 07/07/2020    CREATININE 0.9 07/07/2020    CALCIUM 9.1 07/07/2020    ANIONGAP 11 07/07/2020    ESTGFRAFRICA >60 07/07/2020    EGFRNONAA >60 07/07/2020     Lab Results   Component Value Date    ALT 8 (L) 07/07/2020    AST 12 07/07/2020    ALKPHOS 69 07/07/2020    BILITOT 0.3 07/07/2020       Lab Results   Component Value Date    IRON 50 07/07/2020    TIBC 377 07/07/2020    FERRITIN 96 07/07/2020     Lab Results   Component Value Date    EQAJSVAK29 245 07/07/2020     Lab Results   Component Value Date    FOLATE 6.0 07/07/2020     Lab Results   Component Value Date    TSH 0.907 07/07/2020       I have reviewed the radiology reports and examined the scan/xray images.    Review of Systems   Constitutional: Negative.    HENT: Negative.     Eyes: Negative.    Respiratory: Negative.    Cardiovascular: Negative.    Gastrointestinal: Negative.    Endocrine: Negative.    Genitourinary: Negative.    Musculoskeletal: Negative.    Skin: Negative.    Allergic/Immunologic: Negative.    Neurological: Negative.    Hematological: Negative.    Psychiatric/Behavioral: Negative.      ECOG SCORE    0 - Fully active-able to carry on all pre-disease performance without restriction            Objective:     Vitals:    10/13/20 1418   BP: 123/82   Pulse: 89   Temp: 98.7 °F (37.1 °C)   Body mass index is 24.52 kg/m².  Physical Exam  Vitals signs and nursing note reviewed.   Constitutional:       Appearance: He is well-developed.   HENT:      Head: Normocephalic and atraumatic.   Eyes:      Conjunctiva/sclera: Conjunctivae normal.   Neck:      Musculoskeletal: Normal range of motion and neck supple.   Cardiovascular:      Rate and Rhythm: Normal rate and regular rhythm.   Pulmonary:      Effort: Pulmonary effort is normal.      Breath sounds: Normal breath sounds.   Abdominal:      General: Bowel sounds are normal.      Palpations: Abdomen is soft.   Musculoskeletal: Normal range of motion.   Skin:     General: Skin is warm and dry.   Neurological:      Mental Status: He is alert and oriented to person, place, and time.   Psychiatric:         Behavior: Behavior normal.         Thought Content: Thought content normal.         Judgment: Judgment normal.           Assessment:      1. Rectal cancer    2. Teeth decayed           Plan:     Rectal cancer  CT showed CR.  Will restage at end of December.  -     oxyCODONE (ROXICODONE) 10 mg Tab immediate release tablet; Take 1 tablet (10 mg total) by mouth every 4 (four) hours as needed for Pain (medically necessary).  Dispense: 90 tablet; Refill: 0  -     CBC auto differential; Future; Expected date: 12/28/2020  -     Comprehensive Metabolic Panel; Future; Expected date: 12/28/2020  -     CT Chest Abdomen Pelvis With Contrast;  Future; Expected date: 12/28/2020    Teeth decayed  -     amoxicillin (AMOXIL) 500 MG capsule; Take 1 capsule (500 mg total) by mouth 3 (three) times daily. for 14 days  Dispense: 42 capsule; Refill: 0

## 2020-10-13 ENCOUNTER — OFFICE VISIT (OUTPATIENT)
Dept: HEMATOLOGY/ONCOLOGY | Facility: CLINIC | Age: 55
End: 2020-10-13
Payer: COMMERCIAL

## 2020-10-13 VITALS
TEMPERATURE: 99 F | BODY MASS INDEX: 24.48 KG/M2 | WEIGHT: 180.75 LBS | HEART RATE: 89 BPM | SYSTOLIC BLOOD PRESSURE: 123 MMHG | HEIGHT: 72 IN | OXYGEN SATURATION: 97 % | DIASTOLIC BLOOD PRESSURE: 82 MMHG

## 2020-10-13 DIAGNOSIS — C20 RECTAL CANCER: Primary | ICD-10-CM

## 2020-10-13 DIAGNOSIS — K02.9 TEETH DECAYED: ICD-10-CM

## 2020-10-13 PROCEDURE — 99214 PR OFFICE/OUTPT VISIT, EST, LEVL IV, 30-39 MIN: ICD-10-PCS | Mod: S$GLB,,, | Performed by: INTERNAL MEDICINE

## 2020-10-13 PROCEDURE — 3008F PR BODY MASS INDEX (BMI) DOCUMENTED: ICD-10-PCS | Mod: CPTII,S$GLB,, | Performed by: INTERNAL MEDICINE

## 2020-10-13 PROCEDURE — 99999 PR PBB SHADOW E&M-EST. PATIENT-LVL III: ICD-10-PCS | Mod: PBBFAC,,, | Performed by: INTERNAL MEDICINE

## 2020-10-13 PROCEDURE — 99999 PR PBB SHADOW E&M-EST. PATIENT-LVL III: CPT | Mod: PBBFAC,,, | Performed by: INTERNAL MEDICINE

## 2020-10-13 PROCEDURE — 99214 OFFICE O/P EST MOD 30 MIN: CPT | Mod: S$GLB,,, | Performed by: INTERNAL MEDICINE

## 2020-10-13 PROCEDURE — 3008F BODY MASS INDEX DOCD: CPT | Mod: CPTII,S$GLB,, | Performed by: INTERNAL MEDICINE

## 2020-10-13 RX ORDER — OXYCODONE HYDROCHLORIDE 10 MG/1
10 TABLET ORAL EVERY 4 HOURS PRN
Qty: 90 TABLET | Refills: 0 | Status: SHIPPED | OUTPATIENT
Start: 2020-10-13 | End: 2020-11-03 | Stop reason: SDUPTHER

## 2020-10-13 RX ORDER — AMOXICILLIN 500 MG/1
500 CAPSULE ORAL 3 TIMES DAILY
Qty: 42 CAPSULE | Refills: 0 | Status: SHIPPED | OUTPATIENT
Start: 2020-10-13 | End: 2020-10-27

## 2020-10-22 DIAGNOSIS — C20 RECTAL CANCER: ICD-10-CM

## 2020-10-22 RX ORDER — OXYCODONE HYDROCHLORIDE 10 MG/1
10 TABLET ORAL EVERY 4 HOURS PRN
Qty: 90 TABLET | Refills: 0 | OUTPATIENT
Start: 2020-10-22

## 2020-11-03 ENCOUNTER — PATIENT MESSAGE (OUTPATIENT)
Dept: SURGERY | Facility: CLINIC | Age: 55
End: 2020-11-03

## 2020-11-03 DIAGNOSIS — C20 RECTAL CANCER: ICD-10-CM

## 2020-11-03 RX ORDER — OXYCODONE HYDROCHLORIDE 10 MG/1
10 TABLET ORAL EVERY 4 HOURS PRN
Qty: 90 TABLET | Refills: 0 | Status: SHIPPED | OUTPATIENT
Start: 2020-11-03 | End: 2020-12-09 | Stop reason: SDUPTHER

## 2020-12-02 ENCOUNTER — LAB VISIT (OUTPATIENT)
Dept: LAB | Facility: HOSPITAL | Age: 55
End: 2020-12-02
Attending: UROLOGY
Payer: COMMERCIAL

## 2020-12-02 DIAGNOSIS — N40.0 BENIGN PROSTATIC HYPERPLASIA, UNSPECIFIED WHETHER LOWER URINARY TRACT SYMPTOMS PRESENT: ICD-10-CM

## 2020-12-02 DIAGNOSIS — Z12.5 PROSTATE CANCER SCREENING: ICD-10-CM

## 2020-12-02 DIAGNOSIS — N30.01 ACUTE CYSTITIS WITH HEMATURIA: ICD-10-CM

## 2020-12-02 PROCEDURE — 84153 ASSAY OF PSA TOTAL: CPT

## 2020-12-02 PROCEDURE — 36415 COLL VENOUS BLD VENIPUNCTURE: CPT

## 2020-12-03 LAB — COMPLEXED PSA SERPL-MCNC: 0.58 NG/ML (ref 0–4)

## 2020-12-08 ENCOUNTER — PATIENT MESSAGE (OUTPATIENT)
Dept: SURGERY | Facility: CLINIC | Age: 55
End: 2020-12-08

## 2020-12-09 ENCOUNTER — PATIENT MESSAGE (OUTPATIENT)
Dept: HEMATOLOGY/ONCOLOGY | Facility: CLINIC | Age: 55
End: 2020-12-09

## 2020-12-09 DIAGNOSIS — C20 RECTAL CANCER: ICD-10-CM

## 2020-12-09 RX ORDER — OXYCODONE HYDROCHLORIDE 10 MG/1
10 TABLET ORAL EVERY 4 HOURS PRN
Qty: 90 TABLET | Refills: 0 | Status: SHIPPED | OUTPATIENT
Start: 2020-12-09 | End: 2021-01-06 | Stop reason: SDUPTHER

## 2020-12-14 ENCOUNTER — PATIENT MESSAGE (OUTPATIENT)
Dept: INTERNAL MEDICINE | Facility: CLINIC | Age: 55
End: 2020-12-14

## 2020-12-14 DIAGNOSIS — C20 RECTAL CANCER: ICD-10-CM

## 2020-12-21 ENCOUNTER — OFFICE VISIT (OUTPATIENT)
Dept: UROLOGY | Facility: CLINIC | Age: 55
End: 2020-12-21
Payer: COMMERCIAL

## 2020-12-21 VITALS
BODY MASS INDEX: 23.99 KG/M2 | WEIGHT: 181 LBS | DIASTOLIC BLOOD PRESSURE: 80 MMHG | TEMPERATURE: 99 F | HEIGHT: 73 IN | SYSTOLIC BLOOD PRESSURE: 110 MMHG

## 2020-12-21 DIAGNOSIS — Z12.5 PROSTATE CANCER SCREENING: Primary | ICD-10-CM

## 2020-12-21 DIAGNOSIS — N52.39 OTHER POST-PROCEDURAL ERECTILE DYSFUNCTION: ICD-10-CM

## 2020-12-21 DIAGNOSIS — N52.9 ERECTILE DYSFUNCTION, UNSPECIFIED ERECTILE DYSFUNCTION TYPE: ICD-10-CM

## 2020-12-21 DIAGNOSIS — N40.0 BENIGN PROSTATIC HYPERPLASIA, UNSPECIFIED WHETHER LOWER URINARY TRACT SYMPTOMS PRESENT: ICD-10-CM

## 2020-12-21 LAB — POC RESIDUAL URINE VOLUME: 74 ML (ref 0–100)

## 2020-12-21 PROCEDURE — 1125F PR PAIN SEVERITY QUANTIFIED, PAIN PRESENT: ICD-10-PCS | Mod: S$GLB,,, | Performed by: UROLOGY

## 2020-12-21 PROCEDURE — 99999 PR PBB SHADOW E&M-EST. PATIENT-LVL III: ICD-10-PCS | Mod: PBBFAC,,, | Performed by: UROLOGY

## 2020-12-21 PROCEDURE — 99214 OFFICE O/P EST MOD 30 MIN: CPT | Mod: S$GLB,,, | Performed by: UROLOGY

## 2020-12-21 PROCEDURE — 99214 PR OFFICE/OUTPT VISIT, EST, LEVL IV, 30-39 MIN: ICD-10-PCS | Mod: S$GLB,,, | Performed by: UROLOGY

## 2020-12-21 PROCEDURE — 3008F PR BODY MASS INDEX (BMI) DOCUMENTED: ICD-10-PCS | Mod: CPTII,S$GLB,, | Performed by: UROLOGY

## 2020-12-21 PROCEDURE — 99999 PR PBB SHADOW E&M-EST. PATIENT-LVL III: CPT | Mod: PBBFAC,,, | Performed by: UROLOGY

## 2020-12-21 PROCEDURE — 3008F BODY MASS INDEX DOCD: CPT | Mod: CPTII,S$GLB,, | Performed by: UROLOGY

## 2020-12-21 PROCEDURE — 51798 POCT BLADDER SCAN: ICD-10-PCS | Mod: S$GLB,,, | Performed by: UROLOGY

## 2020-12-21 PROCEDURE — 51798 US URINE CAPACITY MEASURE: CPT | Mod: S$GLB,,, | Performed by: UROLOGY

## 2020-12-21 PROCEDURE — 1125F AMNT PAIN NOTED PAIN PRSNT: CPT | Mod: S$GLB,,, | Performed by: UROLOGY

## 2020-12-21 RX ORDER — TAMSULOSIN HYDROCHLORIDE 0.4 MG/1
0.4 CAPSULE ORAL DAILY
Qty: 30 CAPSULE | Refills: 11 | Status: SHIPPED | OUTPATIENT
Start: 2020-12-21 | End: 2021-03-23 | Stop reason: SDUPTHER

## 2020-12-21 RX ORDER — SILDENAFIL 100 MG/1
100 TABLET, FILM COATED ORAL DAILY PRN
Qty: 20 TABLET | Refills: 11 | Status: SHIPPED | OUTPATIENT
Start: 2020-12-21 | End: 2021-03-23 | Stop reason: SDUPTHER

## 2020-12-21 RX ORDER — FINASTERIDE 5 MG/1
5 TABLET, FILM COATED ORAL DAILY
Qty: 30 TABLET | Refills: 11 | Status: SHIPPED | OUTPATIENT
Start: 2020-12-21 | End: 2021-03-23 | Stop reason: SDUPTHER

## 2020-12-21 NOTE — PROGRESS NOTES
Chief Complaint: Erectile dysfunction    HPI:   12/21/20: Sildenafil 100 working well enough.  Stream sometimes good sometimes feels he has to strain, most of the time it is good.  3 cups coffee a day and some sodas.  Retrograde ejaculation.  8/5/20- sildenafil 100mg is working well.  7/1/20: patient normally goes to Dr. Altamirano.  Apparently he has been having ED since his rectal surgery and would like to discuss options.  Recent dx with UTI and started abx today, otherwise voiding well.  Patient states he gets no erections after surgery, he has not tried any forms of medical therapy.  Libido and energy are still present.  6/4/20: PVR was 635 after our last visit and he was instructed in CIC.  Voiding normally he feels and hasn't done CIC in two weeks.  PVR today 105 ml.   5/6/20: Been on flomax since last visit.  Cysto/uroflow normal today.    4/20/20: 53 yo man had colon cancer with resection last week, referred by Dr. New.  Is in retention postoperatively.  No unusual abd/pelvic pain and no exac/rel factors.  No hematuria.  No urolithiasis.  PVR >700 ml.  No  history.      Allergies:  Patient has no known allergies.    Medications:  has a current medication list which includes the following prescription(s): acetaminophen, finasteride, oxycodone, sildenafil, tamsulosin, dronabinol, iron fum-b12-if-c-folic acid, magic mouthwash diphen/antac/lidoc, ondansetron, and promethazine.    Review of Systems:  General: No fever, chills, fatigability, or weight loss.  Skin: No rashes, itching, or changes in color or texture of skin.  Chest: Denies MARTIN, cyanosis, wheezing, cough, and sputum production.  Abdomen: Appetite fine. No weight loss.   Musculoskeletal: No joint stiffness or swelling. Denies back pain.  : As above.  All other review of systems negative.    PMH:   has a past medical history of Anemia and Cancer.    PSH:   has a past surgical history that includes Hernia repair (1995); Tonsillectomy; Colonoscopy  (N/A, 6/6/2019); Esophagogastroduodenoscopy (N/A, 6/6/2019); Insertion of tunneled central venous catheter (CVC) with subcutaneous port (N/A, 10/8/2019); Injection of anesthetic agent into tissue plane defined by transversus abdominis muscle (N/A, 2/18/2020); Colon surgery; Laparoscopic colostomy; Abdominoperineal resection of rectum (N/A, 4/9/2020); Creation of muscle rotational flap (Left, 4/9/2020); Flap graft surgery (N/A, 4/9/2020); Wound debridement (N/A, 8/13/2020); and Mediport removal (N/A, 8/13/2020).    FamHx: family history includes Coronary artery disease in his brother and maternal grandmother; Intestinal malrotation in his mother; Leukemia in his father; No Known Problems in his sister; Stomach cancer in his maternal grandfather.    SocHx:  reports that he has been smoking cigarettes. He started smoking about 36 years ago. He has a 35.00 pack-year smoking history. He has quit using smokeless tobacco.  His smokeless tobacco use included chew. He reports current alcohol use of about 46.0 standard drinks of alcohol per week. He reports that he does not use drugs.      Physical Exam:  Vitals:    12/21/20 1551   BP: 110/80   Temp: 99.3 °F (37.4 °C)     General: A&Ox3, no apparent distress, no deformities  Neck: No masses, normal thyroid  Lungs: normal inspiration, no use of accessory muscles  Heart: normal pulse, no arrhythmias  Abdomen: Soft, NT, ND  Skin: The skin is warm and dry. No jaundice.  Ext: No c/c/e.  :   5/6/20: Test desc tim, no abnormalities of epididymus. Penis normal, with normal penile and scrotal skin. Meatus normal.     Labs/Studies:   Bladder Scan performed in office:     5/7/20: 600ml    6/4/20:  ml.  PSA    6/19: 0.37    12/20: 0.58    Impression/Plan:   1. ED- sildenafil 100mg is working well  2. BPH- continue flomax and finasteride, states he is voiding well.  3. Low risk of prostate cancer.  PSA next year

## 2020-12-28 ENCOUNTER — HOSPITAL ENCOUNTER (OUTPATIENT)
Dept: RADIOLOGY | Facility: HOSPITAL | Age: 55
Discharge: HOME | End: 2020-12-28
Attending: INTERNAL MEDICINE
Payer: COMMERCIAL

## 2020-12-28 DIAGNOSIS — C20 RECTAL CANCER: ICD-10-CM

## 2020-12-28 PROCEDURE — A9698 NON-RAD CONTRAST MATERIALNOC: HCPCS | Performed by: INTERNAL MEDICINE

## 2020-12-28 PROCEDURE — 71260 CT CHEST ABDOMEN PELVIS WITH CONTRAST (XPD): ICD-10-PCS | Mod: 26,,, | Performed by: RADIOLOGY

## 2020-12-28 PROCEDURE — 74177 CT ABD & PELVIS W/CONTRAST: CPT | Mod: 26,,, | Performed by: RADIOLOGY

## 2020-12-28 PROCEDURE — 71260 CT THORAX DX C+: CPT | Mod: 26,,, | Performed by: RADIOLOGY

## 2020-12-28 PROCEDURE — 74177 CT CHEST ABDOMEN PELVIS WITH CONTRAST (XPD): ICD-10-PCS | Mod: 26,,, | Performed by: RADIOLOGY

## 2020-12-28 PROCEDURE — 74177 CT ABD & PELVIS W/CONTRAST: CPT | Mod: TC

## 2020-12-28 PROCEDURE — 25500020 PHARM REV CODE 255: Performed by: INTERNAL MEDICINE

## 2020-12-28 RX ADMIN — IOHEXOL 100 ML: 350 INJECTION, SOLUTION INTRAVENOUS at 08:12

## 2020-12-28 RX ADMIN — IOHEXOL 1000 ML: 12 SOLUTION ORAL at 07:12

## 2021-01-06 ENCOUNTER — OFFICE VISIT (OUTPATIENT)
Dept: HEMATOLOGY/ONCOLOGY | Facility: CLINIC | Age: 56
End: 2021-01-06
Payer: COMMERCIAL

## 2021-01-06 ENCOUNTER — LAB VISIT (OUTPATIENT)
Dept: LAB | Facility: HOSPITAL | Age: 56
End: 2021-01-06
Attending: INTERNAL MEDICINE
Payer: COMMERCIAL

## 2021-01-06 VITALS
SYSTOLIC BLOOD PRESSURE: 138 MMHG | BODY MASS INDEX: 24.99 KG/M2 | DIASTOLIC BLOOD PRESSURE: 89 MMHG | HEIGHT: 72 IN | WEIGHT: 184.5 LBS | OXYGEN SATURATION: 99 % | TEMPERATURE: 98 F | HEART RATE: 78 BPM

## 2021-01-06 DIAGNOSIS — C20 RECTAL CANCER: ICD-10-CM

## 2021-01-06 DIAGNOSIS — C20 RECTAL CANCER: Primary | ICD-10-CM

## 2021-01-06 DIAGNOSIS — D64.9 ANEMIA, UNSPECIFIED TYPE: ICD-10-CM

## 2021-01-06 LAB
ALBUMIN SERPL BCP-MCNC: 3.8 G/DL (ref 3.5–5.2)
ALP SERPL-CCNC: 72 U/L (ref 55–135)
ALT SERPL W/O P-5'-P-CCNC: 13 U/L (ref 10–44)
ANION GAP SERPL CALC-SCNC: 8 MMOL/L (ref 8–16)
AST SERPL-CCNC: 15 U/L (ref 10–40)
BASOPHILS # BLD AUTO: 0.04 K/UL (ref 0–0.2)
BASOPHILS NFR BLD: 0.7 % (ref 0–1.9)
BILIRUB SERPL-MCNC: 0.6 MG/DL (ref 0.1–1)
BUN SERPL-MCNC: 11 MG/DL (ref 6–20)
CALCIUM SERPL-MCNC: 9.3 MG/DL (ref 8.7–10.5)
CHLORIDE SERPL-SCNC: 106 MMOL/L (ref 95–110)
CO2 SERPL-SCNC: 28 MMOL/L (ref 23–29)
CREAT SERPL-MCNC: 1.1 MG/DL (ref 0.5–1.4)
DIFFERENTIAL METHOD: ABNORMAL
EOSINOPHIL # BLD AUTO: 0.2 K/UL (ref 0–0.5)
EOSINOPHIL NFR BLD: 3.6 % (ref 0–8)
ERYTHROCYTE [DISTWIDTH] IN BLOOD BY AUTOMATED COUNT: 13 % (ref 11.5–14.5)
EST. GFR  (AFRICAN AMERICAN): >60 ML/MIN/1.73 M^2
EST. GFR  (NON AFRICAN AMERICAN): >60 ML/MIN/1.73 M^2
GLUCOSE SERPL-MCNC: 100 MG/DL (ref 70–110)
HCT VFR BLD AUTO: 47.2 % (ref 40–54)
HGB BLD-MCNC: 16.1 G/DL (ref 14–18)
IMM GRANULOCYTES # BLD AUTO: 0.02 K/UL (ref 0–0.04)
IMM GRANULOCYTES NFR BLD AUTO: 0.3 % (ref 0–0.5)
LYMPHOCYTES # BLD AUTO: 1.8 K/UL (ref 1–4.8)
LYMPHOCYTES NFR BLD: 29.7 % (ref 18–48)
MCH RBC QN AUTO: 30.5 PG (ref 27–31)
MCHC RBC AUTO-ENTMCNC: 34.1 G/DL (ref 32–36)
MCV RBC AUTO: 89 FL (ref 82–98)
MONOCYTES # BLD AUTO: 0.4 K/UL (ref 0.3–1)
MONOCYTES NFR BLD: 5.8 % (ref 4–15)
NEUTROPHILS # BLD AUTO: 3.6 K/UL (ref 1.8–7.7)
NEUTROPHILS NFR BLD: 59.9 % (ref 38–73)
NRBC BLD-RTO: 0 /100 WBC
PLATELET # BLD AUTO: 224 K/UL (ref 150–350)
PMV BLD AUTO: 8.8 FL (ref 9.2–12.9)
POTASSIUM SERPL-SCNC: 4.9 MMOL/L (ref 3.5–5.1)
PROT SERPL-MCNC: 7.5 G/DL (ref 6–8.4)
RBC # BLD AUTO: 5.28 M/UL (ref 4.6–6.2)
SODIUM SERPL-SCNC: 142 MMOL/L (ref 136–145)
WBC # BLD AUTO: 6.06 K/UL (ref 3.9–12.7)

## 2021-01-06 PROCEDURE — 80053 COMPREHEN METABOLIC PANEL: CPT

## 2021-01-06 PROCEDURE — 99999 PR PBB SHADOW E&M-EST. PATIENT-LVL III: ICD-10-PCS | Mod: PBBFAC,,, | Performed by: INTERNAL MEDICINE

## 2021-01-06 PROCEDURE — 1126F PR PAIN SEVERITY QUANTIFIED, NO PAIN PRESENT: ICD-10-PCS | Mod: S$GLB,,, | Performed by: INTERNAL MEDICINE

## 2021-01-06 PROCEDURE — 3008F BODY MASS INDEX DOCD: CPT | Mod: CPTII,S$GLB,, | Performed by: INTERNAL MEDICINE

## 2021-01-06 PROCEDURE — 99999 PR PBB SHADOW E&M-EST. PATIENT-LVL III: CPT | Mod: PBBFAC,,, | Performed by: INTERNAL MEDICINE

## 2021-01-06 PROCEDURE — 85025 COMPLETE CBC W/AUTO DIFF WBC: CPT

## 2021-01-06 PROCEDURE — 3008F PR BODY MASS INDEX (BMI) DOCUMENTED: ICD-10-PCS | Mod: CPTII,S$GLB,, | Performed by: INTERNAL MEDICINE

## 2021-01-06 PROCEDURE — 1126F AMNT PAIN NOTED NONE PRSNT: CPT | Mod: S$GLB,,, | Performed by: INTERNAL MEDICINE

## 2021-01-06 PROCEDURE — 99215 OFFICE O/P EST HI 40 MIN: CPT | Mod: S$GLB,,, | Performed by: INTERNAL MEDICINE

## 2021-01-06 PROCEDURE — 99215 PR OFFICE/OUTPT VISIT, EST, LEVL V, 40-54 MIN: ICD-10-PCS | Mod: S$GLB,,, | Performed by: INTERNAL MEDICINE

## 2021-01-06 PROCEDURE — 36415 COLL VENOUS BLD VENIPUNCTURE: CPT

## 2021-01-06 RX ORDER — OXYCODONE HYDROCHLORIDE 10 MG/1
10 TABLET ORAL EVERY 4 HOURS PRN
Qty: 90 TABLET | Refills: 0 | Status: SHIPPED | OUTPATIENT
Start: 2021-01-06 | End: 2021-02-16 | Stop reason: SDUPTHER

## 2021-02-16 DIAGNOSIS — C20 RECTAL CANCER: ICD-10-CM

## 2021-02-17 RX ORDER — OXYCODONE HYDROCHLORIDE 10 MG/1
10 TABLET ORAL EVERY 4 HOURS PRN
Qty: 90 TABLET | Refills: 0 | Status: SHIPPED | OUTPATIENT
Start: 2021-02-17 | End: 2021-03-24 | Stop reason: SDUPTHER

## 2021-02-23 ENCOUNTER — OFFICE VISIT (OUTPATIENT)
Dept: URGENT CARE | Facility: CLINIC | Age: 56
End: 2021-02-23
Payer: COMMERCIAL

## 2021-02-23 VITALS
HEART RATE: 103 BPM | WEIGHT: 180 LBS | OXYGEN SATURATION: 97 % | TEMPERATURE: 98 F | RESPIRATION RATE: 16 BRPM | SYSTOLIC BLOOD PRESSURE: 103 MMHG | HEIGHT: 72 IN | DIASTOLIC BLOOD PRESSURE: 72 MMHG | BODY MASS INDEX: 24.38 KG/M2

## 2021-02-23 DIAGNOSIS — L03.119 CELLULITIS OF HAND: Primary | ICD-10-CM

## 2021-02-23 PROBLEM — F17.200 TOBACCO DEPENDENCE SYNDROME: Status: ACTIVE | Noted: 2018-10-22

## 2021-02-23 PROCEDURE — 1125F PR PAIN SEVERITY QUANTIFIED, PAIN PRESENT: ICD-10-PCS | Mod: S$GLB,,, | Performed by: EMERGENCY MEDICINE

## 2021-02-23 PROCEDURE — 90715 TDAP VACCINE GREATER THAN OR EQUAL TO 7YO IM: ICD-10-PCS | Mod: S$GLB,,, | Performed by: EMERGENCY MEDICINE

## 2021-02-23 PROCEDURE — 3008F PR BODY MASS INDEX (BMI) DOCUMENTED: ICD-10-PCS | Mod: CPTII,S$GLB,, | Performed by: EMERGENCY MEDICINE

## 2021-02-23 PROCEDURE — 99213 OFFICE O/P EST LOW 20 MIN: CPT | Mod: 25,S$GLB,, | Performed by: EMERGENCY MEDICINE

## 2021-02-23 PROCEDURE — 90715 TDAP VACCINE 7 YRS/> IM: CPT | Mod: S$GLB,,, | Performed by: EMERGENCY MEDICINE

## 2021-02-23 PROCEDURE — 99213 PR OFFICE/OUTPT VISIT, EST, LEVL III, 20-29 MIN: ICD-10-PCS | Mod: 25,S$GLB,, | Performed by: EMERGENCY MEDICINE

## 2021-02-23 PROCEDURE — 90471 TDAP VACCINE GREATER THAN OR EQUAL TO 7YO IM: ICD-10-PCS | Mod: S$GLB,,, | Performed by: EMERGENCY MEDICINE

## 2021-02-23 PROCEDURE — 1125F AMNT PAIN NOTED PAIN PRSNT: CPT | Mod: S$GLB,,, | Performed by: EMERGENCY MEDICINE

## 2021-02-23 PROCEDURE — 90471 IMMUNIZATION ADMIN: CPT | Mod: S$GLB,,, | Performed by: EMERGENCY MEDICINE

## 2021-02-23 PROCEDURE — 3008F BODY MASS INDEX DOCD: CPT | Mod: CPTII,S$GLB,, | Performed by: EMERGENCY MEDICINE

## 2021-02-23 RX ORDER — CLINDAMYCIN HYDROCHLORIDE 300 MG/1
300 CAPSULE ORAL EVERY 6 HOURS
Qty: 28 CAPSULE | Refills: 0 | Status: SHIPPED | OUTPATIENT
Start: 2021-02-23 | End: 2021-03-02

## 2021-02-23 RX ORDER — CLINDAMYCIN PHOSPHATE 150 MG/ML
600 INJECTION, SOLUTION INTRAVENOUS
Status: COMPLETED | OUTPATIENT
Start: 2021-02-23 | End: 2021-02-23

## 2021-02-23 RX ADMIN — CLINDAMYCIN PHOSPHATE 600 MG: 150 INJECTION, SOLUTION INTRAVENOUS at 05:02

## 2021-02-26 ENCOUNTER — TELEPHONE (OUTPATIENT)
Dept: URGENT CARE | Facility: CLINIC | Age: 56
End: 2021-02-26

## 2021-03-24 DIAGNOSIS — C20 RECTAL CANCER: ICD-10-CM

## 2021-03-24 RX ORDER — OXYCODONE HYDROCHLORIDE 10 MG/1
10 TABLET ORAL EVERY 4 HOURS PRN
Qty: 90 TABLET | Refills: 0 | Status: SHIPPED | OUTPATIENT
Start: 2021-03-24 | End: 2021-04-26 | Stop reason: SDUPTHER

## 2021-03-25 ENCOUNTER — PATIENT MESSAGE (OUTPATIENT)
Dept: ADMINISTRATIVE | Facility: HOSPITAL | Age: 56
End: 2021-03-25

## 2021-03-31 ENCOUNTER — PATIENT MESSAGE (OUTPATIENT)
Dept: HEMATOLOGY/ONCOLOGY | Facility: CLINIC | Age: 56
End: 2021-03-31

## 2021-04-20 ENCOUNTER — PATIENT OUTREACH (OUTPATIENT)
Dept: ADMINISTRATIVE | Facility: OTHER | Age: 56
End: 2021-04-20

## 2021-04-21 ENCOUNTER — PATIENT MESSAGE (OUTPATIENT)
Dept: UROLOGY | Facility: CLINIC | Age: 56
End: 2021-04-21

## 2021-04-21 ENCOUNTER — OFFICE VISIT (OUTPATIENT)
Dept: UROLOGY | Facility: CLINIC | Age: 56
End: 2021-04-21
Payer: COMMERCIAL

## 2021-04-21 ENCOUNTER — PATIENT MESSAGE (OUTPATIENT)
Dept: SURGERY | Facility: CLINIC | Age: 56
End: 2021-04-21

## 2021-04-21 VITALS
WEIGHT: 185.63 LBS | HEART RATE: 85 BPM | HEIGHT: 72 IN | BODY MASS INDEX: 25.14 KG/M2 | SYSTOLIC BLOOD PRESSURE: 120 MMHG | DIASTOLIC BLOOD PRESSURE: 78 MMHG

## 2021-04-21 DIAGNOSIS — N21.0 BLADDER STONE: ICD-10-CM

## 2021-04-21 DIAGNOSIS — N39.0 URINARY TRACT INFECTION WITHOUT HEMATURIA, SITE UNSPECIFIED: Primary | ICD-10-CM

## 2021-04-21 LAB
BILIRUB SERPL-MCNC: ABNORMAL MG/DL
BLOOD URINE, POC: POSITIVE
CLARITY, POC UA: ABNORMAL
COLOR, POC UA: ABNORMAL
GLUCOSE UR QL STRIP: ABNORMAL
KETONES UR QL STRIP: ABNORMAL
LEUKOCYTE ESTERASE URINE, POC: ABNORMAL
NITRITE, POC UA: ABNORMAL
PH, POC UA: 7
PROTEIN, POC: ABNORMAL
SPECIFIC GRAVITY, POC UA: 1.01
UROBILINOGEN, POC UA: ABNORMAL

## 2021-04-21 PROCEDURE — 99999 PR PBB SHADOW E&M-EST. PATIENT-LVL III: ICD-10-PCS | Mod: PBBFAC,,, | Performed by: UROLOGY

## 2021-04-21 PROCEDURE — 3008F BODY MASS INDEX DOCD: CPT | Mod: CPTII,S$GLB,, | Performed by: UROLOGY

## 2021-04-21 PROCEDURE — 1125F PR PAIN SEVERITY QUANTIFIED, PAIN PRESENT: ICD-10-PCS | Mod: S$GLB,,, | Performed by: UROLOGY

## 2021-04-21 PROCEDURE — 87086 URINE CULTURE/COLONY COUNT: CPT | Performed by: UROLOGY

## 2021-04-21 PROCEDURE — 99214 PR OFFICE/OUTPT VISIT, EST, LEVL IV, 30-39 MIN: ICD-10-PCS | Mod: 25,S$GLB,, | Performed by: UROLOGY

## 2021-04-21 PROCEDURE — 1125F AMNT PAIN NOTED PAIN PRSNT: CPT | Mod: S$GLB,,, | Performed by: UROLOGY

## 2021-04-21 PROCEDURE — 81002 URINALYSIS NONAUTO W/O SCOPE: CPT | Mod: S$GLB,,, | Performed by: UROLOGY

## 2021-04-21 PROCEDURE — 81002 POCT URINE DIPSTICK WITHOUT MICROSCOPE: ICD-10-PCS | Mod: S$GLB,,, | Performed by: UROLOGY

## 2021-04-21 PROCEDURE — 87186 SC STD MICRODIL/AGAR DIL: CPT | Performed by: UROLOGY

## 2021-04-21 PROCEDURE — 99999 PR PBB SHADOW E&M-EST. PATIENT-LVL III: CPT | Mod: PBBFAC,,, | Performed by: UROLOGY

## 2021-04-21 PROCEDURE — 99214 OFFICE O/P EST MOD 30 MIN: CPT | Mod: 25,S$GLB,, | Performed by: UROLOGY

## 2021-04-21 PROCEDURE — 87088 URINE BACTERIA CULTURE: CPT | Performed by: UROLOGY

## 2021-04-21 PROCEDURE — 87077 CULTURE AEROBIC IDENTIFY: CPT | Performed by: UROLOGY

## 2021-04-21 PROCEDURE — 3008F PR BODY MASS INDEX (BMI) DOCUMENTED: ICD-10-PCS | Mod: CPTII,S$GLB,, | Performed by: UROLOGY

## 2021-04-21 RX ORDER — CLINDAMYCIN HYDROCHLORIDE 300 MG/1
300 CAPSULE ORAL EVERY 6 HOURS
Status: ON HOLD | COMMUNITY
Start: 2021-04-19 | End: 2022-02-24

## 2021-04-24 ENCOUNTER — PATIENT MESSAGE (OUTPATIENT)
Dept: UROLOGY | Facility: CLINIC | Age: 56
End: 2021-04-24

## 2021-04-24 LAB — BACTERIA UR CULT: ABNORMAL

## 2021-04-26 DIAGNOSIS — C20 RECTAL CANCER: ICD-10-CM

## 2021-04-26 RX ORDER — SULFAMETHOXAZOLE AND TRIMETHOPRIM 800; 160 MG/1; MG/1
1 TABLET ORAL DAILY
Qty: 30 TABLET | Refills: 2 | Status: SHIPPED | OUTPATIENT
Start: 2021-04-26 | End: 2021-05-26

## 2021-04-26 RX ORDER — OXYCODONE HYDROCHLORIDE 10 MG/1
10 TABLET ORAL EVERY 4 HOURS PRN
Qty: 90 TABLET | Refills: 0 | Status: SHIPPED | OUTPATIENT
Start: 2021-04-26 | End: 2021-05-24

## 2021-04-26 RX ORDER — SULFAMETHOXAZOLE AND TRIMETHOPRIM 800; 160 MG/1; MG/1
1 TABLET ORAL 2 TIMES DAILY
Qty: 10 TABLET | Refills: 0 | Status: SHIPPED | OUTPATIENT
Start: 2021-04-26 | End: 2021-05-01

## 2021-04-29 ENCOUNTER — PATIENT MESSAGE (OUTPATIENT)
Dept: RESEARCH | Facility: HOSPITAL | Age: 56
End: 2021-04-29

## 2021-04-30 ENCOUNTER — PROCEDURE VISIT (OUTPATIENT)
Dept: UROLOGY | Facility: CLINIC | Age: 56
End: 2021-04-30
Payer: COMMERCIAL

## 2021-04-30 VITALS
HEIGHT: 72 IN | DIASTOLIC BLOOD PRESSURE: 74 MMHG | BODY MASS INDEX: 25.14 KG/M2 | SYSTOLIC BLOOD PRESSURE: 126 MMHG | HEART RATE: 84 BPM | WEIGHT: 185.63 LBS

## 2021-04-30 DIAGNOSIS — R39.14 BENIGN PROSTATIC HYPERPLASIA WITH INCOMPLETE BLADDER EMPTYING: Primary | ICD-10-CM

## 2021-04-30 DIAGNOSIS — N40.1 BENIGN PROSTATIC HYPERPLASIA WITH INCOMPLETE BLADDER EMPTYING: Primary | ICD-10-CM

## 2021-04-30 PROCEDURE — 51741 PR UROFLOWMETRY, COMPLEX: ICD-10-PCS | Mod: 26,51,S$GLB, | Performed by: UROLOGY

## 2021-04-30 PROCEDURE — 51741 ELECTRO-UROFLOWMETRY FIRST: CPT | Mod: 26,51,S$GLB, | Performed by: UROLOGY

## 2021-04-30 PROCEDURE — 51797 PR VOIDING PRESS STUDY INTRA-ABDOMINAL VOID: ICD-10-PCS | Mod: 26,S$GLB,, | Performed by: UROLOGY

## 2021-04-30 PROCEDURE — 51728 PR COMPLEX CYSTOMETROGRAM VOIDING PRESSURE STUDIES: ICD-10-PCS | Mod: 26,S$GLB,, | Performed by: UROLOGY

## 2021-04-30 PROCEDURE — 51797 INTRAABDOMINAL PRESSURE TEST: CPT | Mod: 26,S$GLB,, | Performed by: UROLOGY

## 2021-04-30 PROCEDURE — 51728 CYSTOMETROGRAM W/VP: CPT | Mod: 26,S$GLB,, | Performed by: UROLOGY

## 2021-04-30 PROCEDURE — 51784 ANAL/URINARY MUSCLE STUDY: CPT | Mod: 26,51,S$GLB, | Performed by: UROLOGY

## 2021-04-30 PROCEDURE — 51784 PR ANAL/URINARY MUSCLE STUDY: ICD-10-PCS | Mod: 26,51,S$GLB, | Performed by: UROLOGY

## 2021-05-14 ENCOUNTER — PATIENT MESSAGE (OUTPATIENT)
Dept: SURGERY | Facility: CLINIC | Age: 56
End: 2021-05-14

## 2021-05-14 ENCOUNTER — OFFICE VISIT (OUTPATIENT)
Dept: UROLOGY | Facility: CLINIC | Age: 56
End: 2021-05-14
Payer: COMMERCIAL

## 2021-05-14 ENCOUNTER — DOCUMENTATION ONLY (OUTPATIENT)
Dept: UROLOGY | Facility: CLINIC | Age: 56
End: 2021-05-14

## 2021-05-14 VITALS
HEART RATE: 99 BPM | WEIGHT: 186.06 LBS | DIASTOLIC BLOOD PRESSURE: 68 MMHG | HEIGHT: 72 IN | BODY MASS INDEX: 25.2 KG/M2 | SYSTOLIC BLOOD PRESSURE: 110 MMHG

## 2021-05-14 DIAGNOSIS — N40.1 BENIGN PROSTATIC HYPERPLASIA WITH INCOMPLETE BLADDER EMPTYING: Primary | ICD-10-CM

## 2021-05-14 DIAGNOSIS — Z01.818 PREOP TESTING: Primary | ICD-10-CM

## 2021-05-14 DIAGNOSIS — R39.14 BENIGN PROSTATIC HYPERPLASIA WITH INCOMPLETE BLADDER EMPTYING: Primary | ICD-10-CM

## 2021-05-14 PROBLEM — N40.0 BPH (BENIGN PROSTATIC HYPERPLASIA): Status: ACTIVE | Noted: 2021-05-14

## 2021-05-14 PROCEDURE — 99999 PR PBB SHADOW E&M-EST. PATIENT-LVL III: ICD-10-PCS | Mod: PBBFAC,,, | Performed by: UROLOGY

## 2021-05-14 PROCEDURE — 99214 PR OFFICE/OUTPT VISIT, EST, LEVL IV, 30-39 MIN: ICD-10-PCS | Mod: S$GLB,,, | Performed by: UROLOGY

## 2021-05-14 PROCEDURE — 3008F PR BODY MASS INDEX (BMI) DOCUMENTED: ICD-10-PCS | Mod: CPTII,S$GLB,, | Performed by: UROLOGY

## 2021-05-14 PROCEDURE — 1125F AMNT PAIN NOTED PAIN PRSNT: CPT | Mod: S$GLB,,, | Performed by: UROLOGY

## 2021-05-14 PROCEDURE — 99214 OFFICE O/P EST MOD 30 MIN: CPT | Mod: S$GLB,,, | Performed by: UROLOGY

## 2021-05-14 PROCEDURE — 99999 PR PBB SHADOW E&M-EST. PATIENT-LVL III: CPT | Mod: PBBFAC,,, | Performed by: UROLOGY

## 2021-05-14 PROCEDURE — 3008F BODY MASS INDEX DOCD: CPT | Mod: CPTII,S$GLB,, | Performed by: UROLOGY

## 2021-05-14 PROCEDURE — 1125F PR PAIN SEVERITY QUANTIFIED, PAIN PRESENT: ICD-10-PCS | Mod: S$GLB,,, | Performed by: UROLOGY

## 2021-05-17 ENCOUNTER — HOSPITAL ENCOUNTER (OUTPATIENT)
Dept: RADIOLOGY | Facility: HOSPITAL | Age: 56
Discharge: HOME | End: 2021-05-17
Attending: UROLOGY
Payer: COMMERCIAL

## 2021-05-17 ENCOUNTER — HOSPITAL ENCOUNTER (OUTPATIENT)
Dept: CARDIOLOGY | Facility: HOSPITAL | Age: 56
Discharge: HOME | End: 2021-05-17
Attending: UROLOGY
Payer: COMMERCIAL

## 2021-05-17 DIAGNOSIS — Z01.818 PREOP TESTING: ICD-10-CM

## 2021-05-17 PROCEDURE — 93005 ELECTROCARDIOGRAM TRACING: CPT | Mod: PO

## 2021-05-17 PROCEDURE — 71046 X-RAY EXAM CHEST 2 VIEWS: CPT | Mod: TC,FY,PO

## 2021-05-17 PROCEDURE — 71046 X-RAY EXAM CHEST 2 VIEWS: CPT | Mod: 26,,, | Performed by: RADIOLOGY

## 2021-05-17 PROCEDURE — 93010 ELECTROCARDIOGRAM REPORT: CPT | Mod: ,,, | Performed by: INTERNAL MEDICINE

## 2021-05-17 PROCEDURE — 93010 EKG 12-LEAD: ICD-10-PCS | Mod: ,,, | Performed by: INTERNAL MEDICINE

## 2021-05-17 PROCEDURE — 71046 XR CHEST PA AND LATERAL PRE-OP: ICD-10-PCS | Mod: 26,,, | Performed by: RADIOLOGY

## 2021-05-24 ENCOUNTER — TELEPHONE (OUTPATIENT)
Dept: UROLOGY | Facility: CLINIC | Age: 56
End: 2021-05-24

## 2021-05-24 ENCOUNTER — PATIENT MESSAGE (OUTPATIENT)
Dept: SURGERY | Facility: HOSPITAL | Age: 56
End: 2021-05-24

## 2021-06-02 ENCOUNTER — TELEPHONE (OUTPATIENT)
Dept: UROLOGY | Facility: CLINIC | Age: 56
End: 2021-06-02

## 2021-06-04 ENCOUNTER — TELEPHONE (OUTPATIENT)
Dept: UROLOGY | Facility: CLINIC | Age: 56
End: 2021-06-04

## 2021-06-04 ENCOUNTER — LAB VISIT (OUTPATIENT)
Dept: OTOLARYNGOLOGY | Facility: CLINIC | Age: 56
End: 2021-06-04
Payer: COMMERCIAL

## 2021-06-04 ENCOUNTER — LAB VISIT (OUTPATIENT)
Dept: LAB | Facility: HOSPITAL | Age: 56
End: 2021-06-04
Attending: UROLOGY
Payer: COMMERCIAL

## 2021-06-04 DIAGNOSIS — Z01.818 PREOP TESTING: ICD-10-CM

## 2021-06-04 LAB
ALBUMIN SERPL BCP-MCNC: 3.7 G/DL (ref 3.5–5.2)
ALP SERPL-CCNC: 80 U/L (ref 55–135)
ALT SERPL W/O P-5'-P-CCNC: 14 U/L (ref 10–44)
ANION GAP SERPL CALC-SCNC: 8 MMOL/L (ref 8–16)
AST SERPL-CCNC: 14 U/L (ref 10–40)
BASOPHILS # BLD AUTO: 0.07 K/UL (ref 0–0.2)
BASOPHILS NFR BLD: 1.1 % (ref 0–1.9)
BILIRUB SERPL-MCNC: 0.5 MG/DL (ref 0.1–1)
BUN SERPL-MCNC: 8 MG/DL (ref 6–20)
CALCIUM SERPL-MCNC: 9.3 MG/DL (ref 8.7–10.5)
CHLORIDE SERPL-SCNC: 105 MMOL/L (ref 95–110)
CO2 SERPL-SCNC: 27 MMOL/L (ref 23–29)
CREAT SERPL-MCNC: 1 MG/DL (ref 0.5–1.4)
DIFFERENTIAL METHOD: NORMAL
EOSINOPHIL # BLD AUTO: 0.3 K/UL (ref 0–0.5)
EOSINOPHIL NFR BLD: 4.2 % (ref 0–8)
ERYTHROCYTE [DISTWIDTH] IN BLOOD BY AUTOMATED COUNT: 14.5 % (ref 11.5–14.5)
EST. GFR  (AFRICAN AMERICAN): >60 ML/MIN/1.73 M^2
EST. GFR  (NON AFRICAN AMERICAN): >60 ML/MIN/1.73 M^2
GLUCOSE SERPL-MCNC: 96 MG/DL (ref 70–110)
HCT VFR BLD AUTO: 46.3 % (ref 40–54)
HGB BLD-MCNC: 15.1 G/DL (ref 14–18)
IMM GRANULOCYTES # BLD AUTO: 0.02 K/UL (ref 0–0.04)
IMM GRANULOCYTES NFR BLD AUTO: 0.3 % (ref 0–0.5)
LYMPHOCYTES # BLD AUTO: 1.7 K/UL (ref 1–4.8)
LYMPHOCYTES NFR BLD: 26.4 % (ref 18–48)
MCH RBC QN AUTO: 29.8 PG (ref 27–31)
MCHC RBC AUTO-ENTMCNC: 32.6 G/DL (ref 32–36)
MCV RBC AUTO: 91 FL (ref 82–98)
MONOCYTES # BLD AUTO: 0.4 K/UL (ref 0.3–1)
MONOCYTES NFR BLD: 6.1 % (ref 4–15)
NEUTROPHILS # BLD AUTO: 4 K/UL (ref 1.8–7.7)
NEUTROPHILS NFR BLD: 61.9 % (ref 38–73)
NRBC BLD-RTO: 0 /100 WBC
PLATELET # BLD AUTO: 276 K/UL (ref 150–450)
PMV BLD AUTO: 9.4 FL (ref 9.2–12.9)
POTASSIUM SERPL-SCNC: 4.3 MMOL/L (ref 3.5–5.1)
PROT SERPL-MCNC: 7.6 G/DL (ref 6–8.4)
RBC # BLD AUTO: 5.07 M/UL (ref 4.6–6.2)
SODIUM SERPL-SCNC: 140 MMOL/L (ref 136–145)
WBC # BLD AUTO: 6.43 K/UL (ref 3.9–12.7)

## 2021-06-04 PROCEDURE — 80053 COMPREHEN METABOLIC PANEL: CPT | Performed by: UROLOGY

## 2021-06-04 PROCEDURE — 36415 COLL VENOUS BLD VENIPUNCTURE: CPT | Performed by: UROLOGY

## 2021-06-04 PROCEDURE — U0005 INFEC AGEN DETEC AMPLI PROBE: HCPCS | Performed by: UROLOGY

## 2021-06-04 PROCEDURE — U0003 INFECTIOUS AGENT DETECTION BY NUCLEIC ACID (DNA OR RNA); SEVERE ACUTE RESPIRATORY SYNDROME CORONAVIRUS 2 (SARS-COV-2) (CORONAVIRUS DISEASE [COVID-19]), AMPLIFIED PROBE TECHNIQUE, MAKING USE OF HIGH THROUGHPUT TECHNOLOGIES AS DESCRIBED BY CMS-2020-01-R: HCPCS | Performed by: UROLOGY

## 2021-06-04 PROCEDURE — 85025 COMPLETE CBC W/AUTO DIFF WBC: CPT | Performed by: UROLOGY

## 2021-06-05 LAB — SARS-COV-2 RNA RESP QL NAA+PROBE: NOT DETECTED

## 2021-06-07 ENCOUNTER — ANESTHESIA (OUTPATIENT)
Dept: SURGERY | Facility: HOSPITAL | Age: 56
End: 2021-06-07
Payer: COMMERCIAL

## 2021-06-07 ENCOUNTER — ANESTHESIA EVENT (OUTPATIENT)
Dept: SURGERY | Facility: HOSPITAL | Age: 56
End: 2021-06-07
Payer: COMMERCIAL

## 2021-06-07 ENCOUNTER — HOSPITAL ENCOUNTER (OUTPATIENT)
Facility: HOSPITAL | Age: 56
Discharge: HOME | End: 2021-06-08
Attending: UROLOGY | Admitting: UROLOGY
Payer: COMMERCIAL

## 2021-06-07 DIAGNOSIS — N40.1 BENIGN PROSTATIC HYPERPLASIA WITH INCOMPLETE BLADDER EMPTYING: ICD-10-CM

## 2021-06-07 DIAGNOSIS — R39.14 BENIGN PROSTATIC HYPERPLASIA WITH INCOMPLETE BLADDER EMPTYING: ICD-10-CM

## 2021-06-07 PROBLEM — N21.0 BLADDER STONE: Status: ACTIVE | Noted: 2021-06-07

## 2021-06-07 LAB
ANION GAP SERPL CALC-SCNC: 6 MMOL/L (ref 8–16)
BASOPHILS # BLD AUTO: 0.05 K/UL (ref 0–0.2)
BASOPHILS NFR BLD: 0.8 % (ref 0–1.9)
BUN SERPL-MCNC: 10 MG/DL (ref 6–20)
CALCIUM SERPL-MCNC: 8.1 MG/DL (ref 8.7–10.5)
CHLORIDE SERPL-SCNC: 106 MMOL/L (ref 95–110)
CO2 SERPL-SCNC: 25 MMOL/L (ref 23–29)
CREAT SERPL-MCNC: 0.9 MG/DL (ref 0.5–1.4)
DIFFERENTIAL METHOD: ABNORMAL
EOSINOPHIL # BLD AUTO: 0.2 K/UL (ref 0–0.5)
EOSINOPHIL NFR BLD: 3.9 % (ref 0–8)
ERYTHROCYTE [DISTWIDTH] IN BLOOD BY AUTOMATED COUNT: 13.7 % (ref 11.5–14.5)
EST. GFR  (AFRICAN AMERICAN): >60 ML/MIN/1.73 M^2
EST. GFR  (NON AFRICAN AMERICAN): >60 ML/MIN/1.73 M^2
GLUCOSE SERPL-MCNC: 149 MG/DL (ref 70–110)
HCT VFR BLD AUTO: 41.9 % (ref 40–54)
HGB BLD-MCNC: 13.9 G/DL (ref 14–18)
IMM GRANULOCYTES # BLD AUTO: 0.02 K/UL (ref 0–0.04)
IMM GRANULOCYTES NFR BLD AUTO: 0.3 % (ref 0–0.5)
LYMPHOCYTES # BLD AUTO: 1.5 K/UL (ref 1–4.8)
LYMPHOCYTES NFR BLD: 24.1 % (ref 18–48)
MCH RBC QN AUTO: 29.3 PG (ref 27–31)
MCHC RBC AUTO-ENTMCNC: 33.2 G/DL (ref 32–36)
MCV RBC AUTO: 88 FL (ref 82–98)
MONOCYTES # BLD AUTO: 0.2 K/UL (ref 0.3–1)
MONOCYTES NFR BLD: 3.6 % (ref 4–15)
NEUTROPHILS # BLD AUTO: 4.2 K/UL (ref 1.8–7.7)
NEUTROPHILS NFR BLD: 67.3 % (ref 38–73)
NRBC BLD-RTO: 0 /100 WBC
PLATELET # BLD AUTO: 243 K/UL (ref 150–450)
PMV BLD AUTO: 9.3 FL (ref 9.2–12.9)
POTASSIUM SERPL-SCNC: 4.3 MMOL/L (ref 3.5–5.1)
RBC # BLD AUTO: 4.74 M/UL (ref 4.6–6.2)
SODIUM SERPL-SCNC: 137 MMOL/L (ref 136–145)
WBC # BLD AUTO: 6.17 K/UL (ref 3.9–12.7)

## 2021-06-07 PROCEDURE — 37000008 HC ANESTHESIA 1ST 15 MINUTES: Performed by: UROLOGY

## 2021-06-07 PROCEDURE — 80048 BASIC METABOLIC PNL TOTAL CA: CPT | Performed by: UROLOGY

## 2021-06-07 PROCEDURE — 52601 PR TRANSURETHRAL ELEC-SURG PROSTATECTOM: ICD-10-PCS | Mod: ,,, | Performed by: UROLOGY

## 2021-06-07 PROCEDURE — 99900035 HC TECH TIME PER 15 MIN (STAT)

## 2021-06-07 PROCEDURE — 88305 TISSUE EXAM BY PATHOLOGIST: CPT | Mod: 26,,, | Performed by: PATHOLOGY

## 2021-06-07 PROCEDURE — 63600175 PHARM REV CODE 636 W HCPCS: Performed by: ANESTHESIOLOGY

## 2021-06-07 PROCEDURE — 88305 TISSUE EXAM BY PATHOLOGIST: CPT | Performed by: PATHOLOGY

## 2021-06-07 PROCEDURE — 52318 REMOVE BLADDER STONE: CPT | Mod: 51,,, | Performed by: UROLOGY

## 2021-06-07 PROCEDURE — 52318 PR LITHOLOPAXY; BY ANY MEANS, COMPLICATED/LARGE >2.5CM: ICD-10-PCS | Mod: 51,,, | Performed by: UROLOGY

## 2021-06-07 PROCEDURE — 88307 TISSUE EXAM BY PATHOLOGIST: CPT | Performed by: PATHOLOGY

## 2021-06-07 PROCEDURE — 88305 TISSUE EXAM BY PATHOLOGIST: ICD-10-PCS | Mod: 26,,, | Performed by: PATHOLOGY

## 2021-06-07 PROCEDURE — 37000009 HC ANESTHESIA EA ADD 15 MINS: Performed by: UROLOGY

## 2021-06-07 PROCEDURE — 52601 PROSTATECTOMY (TURP): CPT | Mod: ,,, | Performed by: UROLOGY

## 2021-06-07 PROCEDURE — 25000003 PHARM REV CODE 250: Performed by: UROLOGY

## 2021-06-07 PROCEDURE — 36000707: Performed by: UROLOGY

## 2021-06-07 PROCEDURE — 27201423 OPTIME MED/SURG SUP & DEVICES STERILE SUPPLY: Performed by: UROLOGY

## 2021-06-07 PROCEDURE — 85025 COMPLETE CBC W/AUTO DIFF WBC: CPT | Performed by: UROLOGY

## 2021-06-07 PROCEDURE — 71000039 HC RECOVERY, EACH ADD'L HOUR: Performed by: UROLOGY

## 2021-06-07 PROCEDURE — 63600175 PHARM REV CODE 636 W HCPCS: Performed by: UROLOGY

## 2021-06-07 PROCEDURE — 36000706: Performed by: UROLOGY

## 2021-06-07 PROCEDURE — 25000003 PHARM REV CODE 250: Performed by: ANESTHESIOLOGY

## 2021-06-07 PROCEDURE — 25000003 PHARM REV CODE 250: Performed by: NURSE ANESTHETIST, CERTIFIED REGISTERED

## 2021-06-07 PROCEDURE — 96374 THER/PROPH/DIAG INJ IV PUSH: CPT

## 2021-06-07 PROCEDURE — 96376 TX/PRO/DX INJ SAME DRUG ADON: CPT

## 2021-06-07 PROCEDURE — 63600175 PHARM REV CODE 636 W HCPCS: Performed by: NURSE ANESTHETIST, CERTIFIED REGISTERED

## 2021-06-07 PROCEDURE — 96361 HYDRATE IV INFUSION ADD-ON: CPT | Mod: 59

## 2021-06-07 PROCEDURE — 71000033 HC RECOVERY, INTIAL HOUR: Performed by: UROLOGY

## 2021-06-07 RX ORDER — FINASTERIDE 5 MG/1
5 TABLET, FILM COATED ORAL DAILY
Status: DISCONTINUED | OUTPATIENT
Start: 2021-06-07 | End: 2021-06-08 | Stop reason: HOSPADM

## 2021-06-07 RX ORDER — DEXAMETHASONE SODIUM PHOSPHATE 4 MG/ML
INJECTION, SOLUTION INTRA-ARTICULAR; INTRALESIONAL; INTRAMUSCULAR; INTRAVENOUS; SOFT TISSUE
Status: DISCONTINUED | OUTPATIENT
Start: 2021-06-07 | End: 2021-06-07

## 2021-06-07 RX ORDER — SODIUM CHLORIDE 0.9 % (FLUSH) 0.9 %
10 SYRINGE (ML) INJECTION
Status: DISCONTINUED | OUTPATIENT
Start: 2021-06-07 | End: 2021-06-08 | Stop reason: HOSPADM

## 2021-06-07 RX ORDER — DIPHENHYDRAMINE HYDROCHLORIDE 50 MG/ML
25 INJECTION INTRAMUSCULAR; INTRAVENOUS EVERY 6 HOURS PRN
Status: DISCONTINUED | OUTPATIENT
Start: 2021-06-07 | End: 2021-06-07 | Stop reason: HOSPADM

## 2021-06-07 RX ORDER — LIDOCAINE HYDROCHLORIDE 10 MG/ML
INJECTION, SOLUTION EPIDURAL; INFILTRATION; INTRACAUDAL; PERINEURAL
Status: DISCONTINUED | OUTPATIENT
Start: 2021-06-07 | End: 2021-06-07

## 2021-06-07 RX ORDER — DEXMEDETOMIDINE HYDROCHLORIDE 100 UG/ML
INJECTION, SOLUTION INTRAVENOUS
Status: DISCONTINUED | OUTPATIENT
Start: 2021-06-07 | End: 2021-06-07

## 2021-06-07 RX ORDER — ONDANSETRON 2 MG/ML
INJECTION INTRAMUSCULAR; INTRAVENOUS
Status: DISCONTINUED | OUTPATIENT
Start: 2021-06-07 | End: 2021-06-07

## 2021-06-07 RX ORDER — OXYCODONE HYDROCHLORIDE 5 MG/1
10 TABLET ORAL EVERY 4 HOURS PRN
Status: DISCONTINUED | OUTPATIENT
Start: 2021-06-07 | End: 2021-06-08 | Stop reason: HOSPADM

## 2021-06-07 RX ORDER — NEOSTIGMINE METHYLSULFATE 1 MG/ML
INJECTION, SOLUTION INTRAVENOUS
Status: DISCONTINUED | OUTPATIENT
Start: 2021-06-07 | End: 2021-06-07

## 2021-06-07 RX ORDER — SODIUM CHLORIDE, SODIUM LACTATE, POTASSIUM CHLORIDE, CALCIUM CHLORIDE 600; 310; 30; 20 MG/100ML; MG/100ML; MG/100ML; MG/100ML
INJECTION, SOLUTION INTRAVENOUS CONTINUOUS
Status: DISCONTINUED | OUTPATIENT
Start: 2021-06-07 | End: 2021-06-08 | Stop reason: HOSPADM

## 2021-06-07 RX ORDER — MEPERIDINE HYDROCHLORIDE 25 MG/ML
12.5 INJECTION INTRAMUSCULAR; INTRAVENOUS; SUBCUTANEOUS ONCE AS NEEDED
Status: DISCONTINUED | OUTPATIENT
Start: 2021-06-07 | End: 2021-06-07 | Stop reason: HOSPADM

## 2021-06-07 RX ORDER — HYDROMORPHONE HYDROCHLORIDE 2 MG/ML
0.2 INJECTION, SOLUTION INTRAMUSCULAR; INTRAVENOUS; SUBCUTANEOUS EVERY 5 MIN PRN
Status: DISCONTINUED | OUTPATIENT
Start: 2021-06-07 | End: 2021-06-07 | Stop reason: HOSPADM

## 2021-06-07 RX ORDER — METOCLOPRAMIDE HYDROCHLORIDE 5 MG/ML
10 INJECTION INTRAMUSCULAR; INTRAVENOUS EVERY 10 MIN PRN
Status: DISCONTINUED | OUTPATIENT
Start: 2021-06-07 | End: 2021-06-07 | Stop reason: HOSPADM

## 2021-06-07 RX ORDER — SODIUM CHLORIDE 0.9 % (FLUSH) 0.9 %
3 SYRINGE (ML) INJECTION EVERY 8 HOURS
Status: DISCONTINUED | OUTPATIENT
Start: 2021-06-07 | End: 2021-06-07 | Stop reason: HOSPADM

## 2021-06-07 RX ORDER — PHENAZOPYRIDINE HYDROCHLORIDE 100 MG/1
200 TABLET, FILM COATED ORAL
Status: DISCONTINUED | OUTPATIENT
Start: 2021-06-07 | End: 2021-06-08 | Stop reason: HOSPADM

## 2021-06-07 RX ORDER — ACETAMINOPHEN 325 MG/1
650 TABLET ORAL EVERY 6 HOURS
Status: DISCONTINUED | OUTPATIENT
Start: 2021-06-07 | End: 2021-06-08 | Stop reason: HOSPADM

## 2021-06-07 RX ORDER — ONDANSETRON 4 MG/1
4 TABLET, ORALLY DISINTEGRATING ORAL EVERY 6 HOURS PRN
Status: DISCONTINUED | OUTPATIENT
Start: 2021-06-07 | End: 2021-06-08 | Stop reason: HOSPADM

## 2021-06-07 RX ORDER — SODIUM CHLORIDE, SODIUM LACTATE, POTASSIUM CHLORIDE, CALCIUM CHLORIDE 600; 310; 30; 20 MG/100ML; MG/100ML; MG/100ML; MG/100ML
INJECTION, SOLUTION INTRAVENOUS CONTINUOUS PRN
Status: DISCONTINUED | OUTPATIENT
Start: 2021-06-07 | End: 2021-06-07

## 2021-06-07 RX ORDER — ROCURONIUM BROMIDE 10 MG/ML
INJECTION, SOLUTION INTRAVENOUS
Status: DISCONTINUED | OUTPATIENT
Start: 2021-06-07 | End: 2021-06-07

## 2021-06-07 RX ORDER — MIDAZOLAM HYDROCHLORIDE 1 MG/ML
INJECTION INTRAMUSCULAR; INTRAVENOUS
Status: DISCONTINUED | OUTPATIENT
Start: 2021-06-07 | End: 2021-06-07

## 2021-06-07 RX ORDER — PROPOFOL 10 MG/ML
VIAL (ML) INTRAVENOUS
Status: DISCONTINUED | OUTPATIENT
Start: 2021-06-07 | End: 2021-06-07

## 2021-06-07 RX ORDER — KETOROLAC TROMETHAMINE 30 MG/ML
15 INJECTION, SOLUTION INTRAMUSCULAR; INTRAVENOUS EVERY 6 HOURS
Status: DISCONTINUED | OUTPATIENT
Start: 2021-06-07 | End: 2021-06-08 | Stop reason: HOSPADM

## 2021-06-07 RX ORDER — OXYBUTYNIN CHLORIDE 5 MG/1
5 TABLET ORAL 3 TIMES DAILY
Status: DISCONTINUED | OUTPATIENT
Start: 2021-06-07 | End: 2021-06-08 | Stop reason: HOSPADM

## 2021-06-07 RX ORDER — FENTANYL CITRATE 50 UG/ML
INJECTION, SOLUTION INTRAMUSCULAR; INTRAVENOUS
Status: DISCONTINUED | OUTPATIENT
Start: 2021-06-07 | End: 2021-06-07

## 2021-06-07 RX ORDER — TAMSULOSIN HYDROCHLORIDE 0.4 MG/1
0.4 CAPSULE ORAL DAILY
Status: DISCONTINUED | OUTPATIENT
Start: 2021-06-07 | End: 2021-06-08 | Stop reason: HOSPADM

## 2021-06-07 RX ORDER — PROMETHAZINE HYDROCHLORIDE 25 MG/1
25 TABLET ORAL EVERY 4 HOURS PRN
Status: DISCONTINUED | OUTPATIENT
Start: 2021-06-07 | End: 2021-06-08 | Stop reason: HOSPADM

## 2021-06-07 RX ORDER — SUCCINYLCHOLINE CHLORIDE 20 MG/ML
INJECTION INTRAMUSCULAR; INTRAVENOUS
Status: DISCONTINUED | OUTPATIENT
Start: 2021-06-07 | End: 2021-06-07

## 2021-06-07 RX ADMIN — KETOROLAC TROMETHAMINE 15 MG: 30 INJECTION, SOLUTION INTRAMUSCULAR; INTRAVENOUS at 06:06

## 2021-06-07 RX ADMIN — LIDOCAINE HYDROCHLORIDE 100 MG: 10 INJECTION, SOLUTION EPIDURAL; INFILTRATION; INTRACAUDAL; PERINEURAL at 07:06

## 2021-06-07 RX ADMIN — NEOSTIGMINE METHYLSULFATE 3 MG: 1 INJECTION INTRAVENOUS at 08:06

## 2021-06-07 RX ADMIN — KETOROLAC TROMETHAMINE 15 MG: 30 INJECTION, SOLUTION INTRAMUSCULAR; INTRAVENOUS at 11:06

## 2021-06-07 RX ADMIN — OXYBUTYNIN CHLORIDE 5 MG: 5 TABLET ORAL at 09:06

## 2021-06-07 RX ADMIN — GLYCOPYRROLATE 0.4 MG: 0.2 INJECTION, SOLUTION INTRAMUSCULAR; INTRAVENOUS at 08:06

## 2021-06-07 RX ADMIN — ACETAMINOPHEN 650 MG: 325 TABLET ORAL at 05:06

## 2021-06-07 RX ADMIN — PROPOFOL 180 MG: 10 INJECTION, EMULSION INTRAVENOUS at 07:06

## 2021-06-07 RX ADMIN — MIDAZOLAM HYDROCHLORIDE 2 MG: 1 INJECTION, SOLUTION INTRAMUSCULAR; INTRAVENOUS at 07:06

## 2021-06-07 RX ADMIN — OXYBUTYNIN CHLORIDE 5 MG: 5 TABLET ORAL at 02:06

## 2021-06-07 RX ADMIN — DEXAMETHASONE SODIUM PHOSPHATE 4 MG: 4 INJECTION, SOLUTION INTRAMUSCULAR; INTRAVENOUS at 07:06

## 2021-06-07 RX ADMIN — FINASTERIDE 5 MG: 5 TABLET, FILM COATED ORAL at 11:06

## 2021-06-07 RX ADMIN — SUCCINYLCHOLINE CHLORIDE 160 MG: 20 INJECTION, SOLUTION INTRAMUSCULAR; INTRAVENOUS at 07:06

## 2021-06-07 RX ADMIN — PHENAZOPYRIDINE HYDROCHLORIDE 200 MG: 100 TABLET ORAL at 11:06

## 2021-06-07 RX ADMIN — ROCURONIUM BROMIDE 5 MG: 10 INJECTION, SOLUTION INTRAVENOUS at 07:06

## 2021-06-07 RX ADMIN — ROCURONIUM BROMIDE 25 MG: 10 INJECTION, SOLUTION INTRAVENOUS at 07:06

## 2021-06-07 RX ADMIN — SODIUM CHLORIDE, SODIUM LACTATE, POTASSIUM CHLORIDE, AND CALCIUM CHLORIDE: .6; .31; .03; .02 INJECTION, SOLUTION INTRAVENOUS at 11:06

## 2021-06-07 RX ADMIN — TAMSULOSIN HYDROCHLORIDE 0.4 MG: 0.4 CAPSULE ORAL at 11:06

## 2021-06-07 RX ADMIN — DEXMEDETOMIDINE HYDROCHLORIDE 10 MCG: 100 INJECTION, SOLUTION, CONCENTRATE INTRAVENOUS at 08:06

## 2021-06-07 RX ADMIN — PIPERACILLIN SODIUM,TAZOBACTAM SODIUM 3.38 G: 3; .375 INJECTION, POWDER, FOR SOLUTION INTRAVENOUS at 07:06

## 2021-06-07 RX ADMIN — KETOROLAC TROMETHAMINE 15 MG: 30 INJECTION, SOLUTION INTRAMUSCULAR; INTRAVENOUS at 12:06

## 2021-06-07 RX ADMIN — ACETAMINOPHEN 650 MG: 325 TABLET ORAL at 11:06

## 2021-06-07 RX ADMIN — FENTANYL CITRATE 100 MCG: 50 INJECTION, SOLUTION INTRAMUSCULAR; INTRAVENOUS at 07:06

## 2021-06-07 RX ADMIN — ONDANSETRON 4 MG: 2 INJECTION, SOLUTION INTRAMUSCULAR; INTRAVENOUS at 07:06

## 2021-06-07 RX ADMIN — PHENAZOPYRIDINE HYDROCHLORIDE 200 MG: 100 TABLET ORAL at 05:06

## 2021-06-07 RX ADMIN — DEXMEDETOMIDINE HYDROCHLORIDE 20 MCG: 100 INJECTION, SOLUTION, CONCENTRATE INTRAVENOUS at 08:06

## 2021-06-07 RX ADMIN — SODIUM CHLORIDE, SODIUM LACTATE, POTASSIUM CHLORIDE, AND CALCIUM CHLORIDE: 600; 310; 30; 20 INJECTION, SOLUTION INTRAVENOUS at 06:06

## 2021-06-08 VITALS
DIASTOLIC BLOOD PRESSURE: 71 MMHG | WEIGHT: 197.75 LBS | OXYGEN SATURATION: 97 % | HEIGHT: 72 IN | HEART RATE: 60 BPM | TEMPERATURE: 97 F | RESPIRATION RATE: 18 BRPM | SYSTOLIC BLOOD PRESSURE: 111 MMHG | BODY MASS INDEX: 26.78 KG/M2

## 2021-06-08 PROBLEM — N21.0 BLADDER STONE: Status: RESOLVED | Noted: 2021-06-07 | Resolved: 2021-06-08

## 2021-06-08 PROCEDURE — 99900035 HC TECH TIME PER 15 MIN (STAT)

## 2021-06-08 PROCEDURE — 96376 TX/PRO/DX INJ SAME DRUG ADON: CPT

## 2021-06-08 PROCEDURE — 94799 UNLISTED PULMONARY SVC/PX: CPT

## 2021-06-08 PROCEDURE — 63600175 PHARM REV CODE 636 W HCPCS: Performed by: UROLOGY

## 2021-06-08 PROCEDURE — 94760 N-INVAS EAR/PLS OXIMETRY 1: CPT

## 2021-06-08 PROCEDURE — 25000003 PHARM REV CODE 250: Performed by: UROLOGY

## 2021-06-08 RX ORDER — NITROFURANTOIN 25; 75 MG/1; MG/1
100 CAPSULE ORAL 2 TIMES DAILY
Qty: 8 CAPSULE | Refills: 0 | Status: SHIPPED | OUTPATIENT
Start: 2021-06-08 | End: 2021-06-12

## 2021-06-08 RX ADMIN — FINASTERIDE 5 MG: 5 TABLET, FILM COATED ORAL at 07:06

## 2021-06-08 RX ADMIN — OXYBUTYNIN CHLORIDE 5 MG: 5 TABLET ORAL at 07:06

## 2021-06-08 RX ADMIN — TAMSULOSIN HYDROCHLORIDE 0.4 MG: 0.4 CAPSULE ORAL at 07:06

## 2021-06-08 RX ADMIN — KETOROLAC TROMETHAMINE 15 MG: 30 INJECTION, SOLUTION INTRAMUSCULAR; INTRAVENOUS at 05:06

## 2021-06-08 RX ADMIN — ACETAMINOPHEN 650 MG: 325 TABLET ORAL at 05:06

## 2021-06-08 RX ADMIN — PHENAZOPYRIDINE HYDROCHLORIDE 200 MG: 100 TABLET ORAL at 07:06

## 2021-06-09 ENCOUNTER — TELEPHONE (OUTPATIENT)
Dept: UROLOGY | Facility: CLINIC | Age: 56
End: 2021-06-09

## 2021-06-10 ENCOUNTER — CLINICAL SUPPORT (OUTPATIENT)
Dept: UROLOGY | Facility: CLINIC | Age: 56
End: 2021-06-10
Payer: COMMERCIAL

## 2021-06-10 DIAGNOSIS — R33.9 URINARY RETENTION: Primary | ICD-10-CM

## 2021-06-14 ENCOUNTER — TELEPHONE (OUTPATIENT)
Dept: UROLOGY | Facility: CLINIC | Age: 56
End: 2021-06-14

## 2021-06-21 ENCOUNTER — OFFICE VISIT (OUTPATIENT)
Dept: HEMATOLOGY/ONCOLOGY | Facility: CLINIC | Age: 56
End: 2021-06-21
Payer: COMMERCIAL

## 2021-06-21 ENCOUNTER — LAB VISIT (OUTPATIENT)
Dept: LAB | Facility: HOSPITAL | Age: 56
End: 2021-06-21
Attending: INTERNAL MEDICINE
Payer: COMMERCIAL

## 2021-06-21 VITALS
HEART RATE: 86 BPM | TEMPERATURE: 98 F | HEIGHT: 72 IN | DIASTOLIC BLOOD PRESSURE: 74 MMHG | SYSTOLIC BLOOD PRESSURE: 115 MMHG | BODY MASS INDEX: 25.56 KG/M2 | OXYGEN SATURATION: 98 % | RESPIRATION RATE: 18 BRPM | WEIGHT: 188.69 LBS

## 2021-06-21 DIAGNOSIS — C20 RECTAL CANCER: ICD-10-CM

## 2021-06-21 DIAGNOSIS — Z93.3 COLOSTOMY IN PLACE: ICD-10-CM

## 2021-06-21 DIAGNOSIS — F10.19 ALCOHOL ABUSE WITH ALCOHOL-INDUCED DISORDER: ICD-10-CM

## 2021-06-21 DIAGNOSIS — D64.9 ANEMIA, UNSPECIFIED TYPE: ICD-10-CM

## 2021-06-21 LAB
ALBUMIN SERPL BCP-MCNC: 3.6 G/DL (ref 3.5–5.2)
ALP SERPL-CCNC: 80 U/L (ref 55–135)
ALT SERPL W/O P-5'-P-CCNC: 10 U/L (ref 10–44)
ANION GAP SERPL CALC-SCNC: 9 MMOL/L (ref 8–16)
AST SERPL-CCNC: 10 U/L (ref 10–40)
BILIRUB SERPL-MCNC: 0.4 MG/DL (ref 0.1–1)
BUN SERPL-MCNC: 8 MG/DL (ref 6–20)
CALCIUM SERPL-MCNC: 9 MG/DL (ref 8.7–10.5)
CHLORIDE SERPL-SCNC: 104 MMOL/L (ref 95–110)
CO2 SERPL-SCNC: 28 MMOL/L (ref 23–29)
CREAT SERPL-MCNC: 1 MG/DL (ref 0.5–1.4)
ERYTHROCYTE [DISTWIDTH] IN BLOOD BY AUTOMATED COUNT: 13.4 % (ref 11.5–14.5)
EST. GFR  (AFRICAN AMERICAN): >60 ML/MIN/1.73 M^2
EST. GFR  (NON AFRICAN AMERICAN): >60 ML/MIN/1.73 M^2
GLUCOSE SERPL-MCNC: 95 MG/DL (ref 70–110)
HCT VFR BLD AUTO: 41.2 % (ref 40–54)
HGB BLD-MCNC: 14.3 G/DL (ref 14–18)
IMM GRANULOCYTES # BLD AUTO: 0.02 K/UL (ref 0–0.04)
MCH RBC QN AUTO: 30 PG (ref 27–31)
MCHC RBC AUTO-ENTMCNC: 34.7 G/DL (ref 32–36)
MCV RBC AUTO: 86 FL (ref 82–98)
NEUTROPHILS # BLD AUTO: 4.8 K/UL (ref 1.8–7.7)
PLATELET # BLD AUTO: 313 K/UL (ref 150–450)
PMV BLD AUTO: 8.5 FL (ref 9.2–12.9)
POTASSIUM SERPL-SCNC: 3.6 MMOL/L (ref 3.5–5.1)
PROT SERPL-MCNC: 7.7 G/DL (ref 6–8.4)
RBC # BLD AUTO: 4.77 M/UL (ref 4.6–6.2)
SODIUM SERPL-SCNC: 141 MMOL/L (ref 136–145)
WBC # BLD AUTO: 7.7 K/UL (ref 3.9–12.7)

## 2021-06-21 PROCEDURE — 36415 COLL VENOUS BLD VENIPUNCTURE: CPT | Performed by: INTERNAL MEDICINE

## 2021-06-21 PROCEDURE — 99215 OFFICE O/P EST HI 40 MIN: CPT | Mod: S$GLB,,, | Performed by: INTERNAL MEDICINE

## 2021-06-21 PROCEDURE — 99999 PR PBB SHADOW E&M-EST. PATIENT-LVL IV: CPT | Mod: PBBFAC,,, | Performed by: INTERNAL MEDICINE

## 2021-06-21 PROCEDURE — 99215 PR OFFICE/OUTPT VISIT, EST, LEVL V, 40-54 MIN: ICD-10-PCS | Mod: S$GLB,,, | Performed by: INTERNAL MEDICINE

## 2021-06-21 PROCEDURE — 80053 COMPREHEN METABOLIC PANEL: CPT | Performed by: INTERNAL MEDICINE

## 2021-06-21 PROCEDURE — 3008F PR BODY MASS INDEX (BMI) DOCUMENTED: ICD-10-PCS | Mod: CPTII,S$GLB,, | Performed by: INTERNAL MEDICINE

## 2021-06-21 PROCEDURE — 85027 COMPLETE CBC AUTOMATED: CPT | Performed by: INTERNAL MEDICINE

## 2021-06-21 PROCEDURE — 1126F AMNT PAIN NOTED NONE PRSNT: CPT | Mod: S$GLB,,, | Performed by: INTERNAL MEDICINE

## 2021-06-21 PROCEDURE — 1126F PR PAIN SEVERITY QUANTIFIED, NO PAIN PRESENT: ICD-10-PCS | Mod: S$GLB,,, | Performed by: INTERNAL MEDICINE

## 2021-06-21 PROCEDURE — 99999 PR PBB SHADOW E&M-EST. PATIENT-LVL IV: ICD-10-PCS | Mod: PBBFAC,,, | Performed by: INTERNAL MEDICINE

## 2021-06-21 PROCEDURE — 3008F BODY MASS INDEX DOCD: CPT | Mod: CPTII,S$GLB,, | Performed by: INTERNAL MEDICINE

## 2021-06-21 RX ORDER — OXYCODONE HYDROCHLORIDE 10 MG/1
10 TABLET ORAL EVERY 4 HOURS PRN
Qty: 90 TABLET | Refills: 0 | Status: SHIPPED | OUTPATIENT
Start: 2021-06-21 | End: 2021-07-19 | Stop reason: SDUPTHER

## 2021-06-28 ENCOUNTER — TELEPHONE (OUTPATIENT)
Dept: RADIOLOGY | Facility: HOSPITAL | Age: 56
End: 2021-06-28

## 2021-06-28 DIAGNOSIS — C20 RECTAL CANCER: Primary | ICD-10-CM

## 2021-06-29 ENCOUNTER — HOSPITAL ENCOUNTER (OUTPATIENT)
Dept: RADIOLOGY | Facility: HOSPITAL | Age: 56
Discharge: HOME | End: 2021-06-29
Attending: INTERNAL MEDICINE
Payer: COMMERCIAL

## 2021-06-29 DIAGNOSIS — C20 RECTAL CANCER: ICD-10-CM

## 2021-06-29 PROCEDURE — 71260 CT THORAX DX C+: CPT | Mod: TC

## 2021-06-29 PROCEDURE — 71260 CT CHEST ABDOMEN PELVIS WITH CONTRAST (XPD): ICD-10-PCS | Mod: 26,,, | Performed by: RADIOLOGY

## 2021-06-29 PROCEDURE — 74177 CT ABD & PELVIS W/CONTRAST: CPT | Mod: 26,,, | Performed by: RADIOLOGY

## 2021-06-29 PROCEDURE — A9698 NON-RAD CONTRAST MATERIALNOC: HCPCS | Performed by: INTERNAL MEDICINE

## 2021-06-29 PROCEDURE — 25500020 PHARM REV CODE 255: Performed by: INTERNAL MEDICINE

## 2021-06-29 PROCEDURE — 74177 CT CHEST ABDOMEN PELVIS WITH CONTRAST (XPD): ICD-10-PCS | Mod: 26,,, | Performed by: RADIOLOGY

## 2021-06-29 PROCEDURE — 74177 CT ABD & PELVIS W/CONTRAST: CPT | Mod: TC

## 2021-06-29 PROCEDURE — 71260 CT THORAX DX C+: CPT | Mod: 26,,, | Performed by: RADIOLOGY

## 2021-06-29 RX ADMIN — IOHEXOL 100 ML: 350 INJECTION, SOLUTION INTRAVENOUS at 09:06

## 2021-06-29 RX ADMIN — IOHEXOL 1000 ML: 12 SOLUTION ORAL at 07:06

## 2021-07-19 DIAGNOSIS — C20 RECTAL CANCER: ICD-10-CM

## 2021-07-19 RX ORDER — OXYCODONE HYDROCHLORIDE 10 MG/1
10 TABLET ORAL EVERY 4 HOURS PRN
Qty: 90 TABLET | Refills: 0 | Status: SHIPPED | OUTPATIENT
Start: 2021-07-19 | End: 2021-08-20 | Stop reason: SDUPTHER

## 2021-08-02 ENCOUNTER — PATIENT MESSAGE (OUTPATIENT)
Dept: ADMINISTRATIVE | Facility: OTHER | Age: 56
End: 2021-08-02

## 2021-08-17 ENCOUNTER — PATIENT MESSAGE (OUTPATIENT)
Dept: ADMINISTRATIVE | Facility: OTHER | Age: 56
End: 2021-08-17

## 2021-08-20 DIAGNOSIS — C20 RECTAL CANCER: ICD-10-CM

## 2021-08-20 RX ORDER — OXYCODONE HYDROCHLORIDE 10 MG/1
10 TABLET ORAL EVERY 4 HOURS PRN
Qty: 90 TABLET | Refills: 0 | Status: SHIPPED | OUTPATIENT
Start: 2021-08-20 | End: 2021-09-27 | Stop reason: SDUPTHER

## 2021-09-09 ENCOUNTER — LAB VISIT (OUTPATIENT)
Dept: LAB | Facility: HOSPITAL | Age: 56
End: 2021-09-09
Attending: INTERNAL MEDICINE
Payer: COMMERCIAL

## 2021-09-09 DIAGNOSIS — C20 RECTAL CANCER: ICD-10-CM

## 2021-09-09 LAB
ALBUMIN SERPL BCP-MCNC: 3.6 G/DL (ref 3.5–5.2)
ALP SERPL-CCNC: 66 U/L (ref 55–135)
ALT SERPL W/O P-5'-P-CCNC: 10 U/L (ref 10–44)
ANION GAP SERPL CALC-SCNC: 13 MMOL/L (ref 8–16)
AST SERPL-CCNC: 10 U/L (ref 10–40)
BASOPHILS # BLD AUTO: 0.04 K/UL (ref 0–0.2)
BASOPHILS NFR BLD: 0.6 % (ref 0–1.9)
BILIRUB SERPL-MCNC: 0.6 MG/DL (ref 0.1–1)
BUN SERPL-MCNC: 13 MG/DL (ref 6–20)
CALCIUM SERPL-MCNC: 9.4 MG/DL (ref 8.7–10.5)
CEA SERPL-MCNC: <1.7 NG/ML (ref 0–5)
CHLORIDE SERPL-SCNC: 107 MMOL/L (ref 95–110)
CO2 SERPL-SCNC: 21 MMOL/L (ref 23–29)
CREAT SERPL-MCNC: 1 MG/DL (ref 0.5–1.4)
DIFFERENTIAL METHOD: ABNORMAL
EOSINOPHIL # BLD AUTO: 0.2 K/UL (ref 0–0.5)
EOSINOPHIL NFR BLD: 3.7 % (ref 0–8)
ERYTHROCYTE [DISTWIDTH] IN BLOOD BY AUTOMATED COUNT: 14.9 % (ref 11.5–14.5)
EST. GFR  (AFRICAN AMERICAN): >60 ML/MIN/1.73 M^2
EST. GFR  (NON AFRICAN AMERICAN): >60 ML/MIN/1.73 M^2
GLUCOSE SERPL-MCNC: 107 MG/DL (ref 70–110)
HCT VFR BLD AUTO: 51.1 % (ref 40–54)
HGB BLD-MCNC: 16.8 G/DL (ref 14–18)
IMM GRANULOCYTES # BLD AUTO: 0.02 K/UL (ref 0–0.04)
IMM GRANULOCYTES NFR BLD AUTO: 0.3 % (ref 0–0.5)
LYMPHOCYTES # BLD AUTO: 2.4 K/UL (ref 1–4.8)
LYMPHOCYTES NFR BLD: 37 % (ref 18–48)
MCH RBC QN AUTO: 30.2 PG (ref 27–31)
MCHC RBC AUTO-ENTMCNC: 32.9 G/DL (ref 32–36)
MCV RBC AUTO: 92 FL (ref 82–98)
MONOCYTES # BLD AUTO: 0.4 K/UL (ref 0.3–1)
MONOCYTES NFR BLD: 5.3 % (ref 4–15)
NEUTROPHILS # BLD AUTO: 3.5 K/UL (ref 1.8–7.7)
NEUTROPHILS NFR BLD: 53.1 % (ref 38–73)
NRBC BLD-RTO: 0 /100 WBC
PLATELET # BLD AUTO: 208 K/UL (ref 150–450)
PMV BLD AUTO: 9.5 FL (ref 9.2–12.9)
POTASSIUM SERPL-SCNC: 3.7 MMOL/L (ref 3.5–5.1)
PROT SERPL-MCNC: 7.2 G/DL (ref 6–8.4)
RBC # BLD AUTO: 5.57 M/UL (ref 4.6–6.2)
SODIUM SERPL-SCNC: 141 MMOL/L (ref 136–145)
WBC # BLD AUTO: 6.56 K/UL (ref 3.9–12.7)

## 2021-09-09 PROCEDURE — 85025 COMPLETE CBC W/AUTO DIFF WBC: CPT | Performed by: INTERNAL MEDICINE

## 2021-09-09 PROCEDURE — 80053 COMPREHEN METABOLIC PANEL: CPT | Performed by: INTERNAL MEDICINE

## 2021-09-09 PROCEDURE — 82378 CARCINOEMBRYONIC ANTIGEN: CPT | Performed by: INTERNAL MEDICINE

## 2021-09-09 PROCEDURE — 36415 COLL VENOUS BLD VENIPUNCTURE: CPT | Mod: PO | Performed by: INTERNAL MEDICINE

## 2021-09-17 ENCOUNTER — OFFICE VISIT (OUTPATIENT)
Dept: HEMATOLOGY/ONCOLOGY | Facility: CLINIC | Age: 56
End: 2021-09-17
Payer: COMMERCIAL

## 2021-09-17 ENCOUNTER — TELEPHONE (OUTPATIENT)
Dept: SURGERY | Facility: CLINIC | Age: 56
End: 2021-09-17

## 2021-09-17 VITALS
HEART RATE: 71 BPM | SYSTOLIC BLOOD PRESSURE: 135 MMHG | OXYGEN SATURATION: 95 % | HEIGHT: 72 IN | TEMPERATURE: 98 F | DIASTOLIC BLOOD PRESSURE: 91 MMHG | WEIGHT: 183.88 LBS | BODY MASS INDEX: 24.91 KG/M2

## 2021-09-17 DIAGNOSIS — F10.19 ALCOHOL ABUSE WITH ALCOHOL-INDUCED DISORDER: ICD-10-CM

## 2021-09-17 DIAGNOSIS — C20 RECTAL CANCER: ICD-10-CM

## 2021-09-17 PROCEDURE — 99215 OFFICE O/P EST HI 40 MIN: CPT | Mod: S$GLB,,, | Performed by: INTERNAL MEDICINE

## 2021-09-17 PROCEDURE — 99215 PR OFFICE/OUTPT VISIT, EST, LEVL V, 40-54 MIN: ICD-10-PCS | Mod: S$GLB,,, | Performed by: INTERNAL MEDICINE

## 2021-09-17 PROCEDURE — 3080F DIAST BP >= 90 MM HG: CPT | Mod: CPTII,S$GLB,, | Performed by: INTERNAL MEDICINE

## 2021-09-17 PROCEDURE — 1159F MED LIST DOCD IN RCRD: CPT | Mod: CPTII,S$GLB,, | Performed by: INTERNAL MEDICINE

## 2021-09-17 PROCEDURE — 99999 PR PBB SHADOW E&M-EST. PATIENT-LVL III: ICD-10-PCS | Mod: PBBFAC,,, | Performed by: INTERNAL MEDICINE

## 2021-09-17 PROCEDURE — 3075F SYST BP GE 130 - 139MM HG: CPT | Mod: CPTII,S$GLB,, | Performed by: INTERNAL MEDICINE

## 2021-09-17 PROCEDURE — 3008F BODY MASS INDEX DOCD: CPT | Mod: CPTII,S$GLB,, | Performed by: INTERNAL MEDICINE

## 2021-09-17 PROCEDURE — 99999 PR PBB SHADOW E&M-EST. PATIENT-LVL III: CPT | Mod: PBBFAC,,, | Performed by: INTERNAL MEDICINE

## 2021-09-17 PROCEDURE — 3075F PR MOST RECENT SYSTOLIC BLOOD PRESS GE 130-139MM HG: ICD-10-PCS | Mod: CPTII,S$GLB,, | Performed by: INTERNAL MEDICINE

## 2021-09-17 PROCEDURE — 3080F PR MOST RECENT DIASTOLIC BLOOD PRESSURE >= 90 MM HG: ICD-10-PCS | Mod: CPTII,S$GLB,, | Performed by: INTERNAL MEDICINE

## 2021-09-17 PROCEDURE — 3008F PR BODY MASS INDEX (BMI) DOCUMENTED: ICD-10-PCS | Mod: CPTII,S$GLB,, | Performed by: INTERNAL MEDICINE

## 2021-09-17 PROCEDURE — 1159F PR MEDICATION LIST DOCUMENTED IN MEDICAL RECORD: ICD-10-PCS | Mod: CPTII,S$GLB,, | Performed by: INTERNAL MEDICINE

## 2021-09-27 ENCOUNTER — TELEPHONE (OUTPATIENT)
Dept: PHARMACY | Facility: CLINIC | Age: 56
End: 2021-09-27

## 2021-09-27 DIAGNOSIS — C20 RECTAL CANCER: ICD-10-CM

## 2021-09-27 RX ORDER — OXYCODONE HYDROCHLORIDE 10 MG/1
10 TABLET ORAL EVERY 4 HOURS PRN
Qty: 90 TABLET | Refills: 0 | Status: SHIPPED | OUTPATIENT
Start: 2021-09-27 | End: 2021-10-27 | Stop reason: SDUPTHER

## 2021-10-04 ENCOUNTER — OFFICE VISIT (OUTPATIENT)
Dept: SURGERY | Facility: CLINIC | Age: 56
End: 2021-10-04
Payer: COMMERCIAL

## 2021-10-04 VITALS
WEIGHT: 186.06 LBS | TEMPERATURE: 99 F | BODY MASS INDEX: 25.24 KG/M2 | HEART RATE: 78 BPM | DIASTOLIC BLOOD PRESSURE: 91 MMHG | SYSTOLIC BLOOD PRESSURE: 148 MMHG

## 2021-10-04 DIAGNOSIS — C20 RECTAL CANCER: ICD-10-CM

## 2021-10-04 DIAGNOSIS — Z85.048 PERSONAL HISTORY OF RECTAL CANCER: Primary | ICD-10-CM

## 2021-10-04 DIAGNOSIS — Z01.818 PRE-OP TESTING: Primary | ICD-10-CM

## 2021-10-04 PROCEDURE — 3080F DIAST BP >= 90 MM HG: CPT | Mod: CPTII,S$GLB,, | Performed by: COLON & RECTAL SURGERY

## 2021-10-04 PROCEDURE — 1160F RVW MEDS BY RX/DR IN RCRD: CPT | Mod: CPTII,S$GLB,, | Performed by: COLON & RECTAL SURGERY

## 2021-10-04 PROCEDURE — 1159F PR MEDICATION LIST DOCUMENTED IN MEDICAL RECORD: ICD-10-PCS | Mod: CPTII,S$GLB,, | Performed by: COLON & RECTAL SURGERY

## 2021-10-04 PROCEDURE — 3077F SYST BP >= 140 MM HG: CPT | Mod: CPTII,S$GLB,, | Performed by: COLON & RECTAL SURGERY

## 2021-10-04 PROCEDURE — 99999 PR PBB SHADOW E&M-EST. PATIENT-LVL III: ICD-10-PCS | Mod: PBBFAC,,, | Performed by: COLON & RECTAL SURGERY

## 2021-10-04 PROCEDURE — 99214 OFFICE O/P EST MOD 30 MIN: CPT | Mod: S$GLB,,, | Performed by: COLON & RECTAL SURGERY

## 2021-10-04 PROCEDURE — 99214 PR OFFICE/OUTPT VISIT, EST, LEVL IV, 30-39 MIN: ICD-10-PCS | Mod: S$GLB,,, | Performed by: COLON & RECTAL SURGERY

## 2021-10-04 PROCEDURE — 3077F PR MOST RECENT SYSTOLIC BLOOD PRESSURE >= 140 MM HG: ICD-10-PCS | Mod: CPTII,S$GLB,, | Performed by: COLON & RECTAL SURGERY

## 2021-10-04 PROCEDURE — 3080F PR MOST RECENT DIASTOLIC BLOOD PRESSURE >= 90 MM HG: ICD-10-PCS | Mod: CPTII,S$GLB,, | Performed by: COLON & RECTAL SURGERY

## 2021-10-04 PROCEDURE — 1159F MED LIST DOCD IN RCRD: CPT | Mod: CPTII,S$GLB,, | Performed by: COLON & RECTAL SURGERY

## 2021-10-04 PROCEDURE — 99999 PR PBB SHADOW E&M-EST. PATIENT-LVL III: CPT | Mod: PBBFAC,,, | Performed by: COLON & RECTAL SURGERY

## 2021-10-04 PROCEDURE — 1160F PR REVIEW ALL MEDS BY PRESCRIBER/CLIN PHARMACIST DOCUMENTED: ICD-10-PCS | Mod: CPTII,S$GLB,, | Performed by: COLON & RECTAL SURGERY

## 2021-10-04 PROCEDURE — 3008F PR BODY MASS INDEX (BMI) DOCUMENTED: ICD-10-PCS | Mod: CPTII,S$GLB,, | Performed by: COLON & RECTAL SURGERY

## 2021-10-04 PROCEDURE — 3008F BODY MASS INDEX DOCD: CPT | Mod: CPTII,S$GLB,, | Performed by: COLON & RECTAL SURGERY

## 2021-10-04 RX ORDER — SODIUM, POTASSIUM,MAG SULFATES 17.5-3.13G
1 SOLUTION, RECONSTITUTED, ORAL ORAL DAILY
Qty: 1 KIT | Refills: 0 | Status: SHIPPED | OUTPATIENT
Start: 2021-10-04 | End: 2021-10-06

## 2021-10-27 DIAGNOSIS — C20 RECTAL CANCER: ICD-10-CM

## 2021-10-27 RX ORDER — OXYCODONE HYDROCHLORIDE 10 MG/1
10 TABLET ORAL EVERY 4 HOURS PRN
Qty: 90 TABLET | Refills: 0 | Status: SHIPPED | OUTPATIENT
Start: 2021-10-27 | End: 2021-11-30 | Stop reason: SDUPTHER

## 2021-11-30 DIAGNOSIS — C20 RECTAL CANCER: ICD-10-CM

## 2021-11-30 DIAGNOSIS — R11.0 NAUSEA: ICD-10-CM

## 2021-11-30 RX ORDER — OXYCODONE HYDROCHLORIDE 10 MG/1
10 TABLET ORAL EVERY 4 HOURS PRN
Qty: 90 TABLET | Refills: 0 | Status: SHIPPED | OUTPATIENT
Start: 2021-11-30 | End: 2021-12-27 | Stop reason: SDUPTHER

## 2021-11-30 RX ORDER — PROMETHAZINE HYDROCHLORIDE 25 MG/1
25 TABLET ORAL EVERY 4 HOURS
Qty: 60 TABLET | Refills: 4 | Status: SHIPPED | OUTPATIENT
Start: 2021-11-30 | End: 2022-04-27 | Stop reason: SDUPTHER

## 2021-12-01 ENCOUNTER — TELEPHONE (OUTPATIENT)
Dept: SCHEDULING | Age: 56
End: 2021-12-01

## 2021-12-01 DIAGNOSIS — Z13.6 SCREENING FOR CARDIOVASCULAR CONDITION: Primary | ICD-10-CM

## 2021-12-14 ENCOUNTER — PATIENT MESSAGE (OUTPATIENT)
Dept: PREADMISSION TESTING | Facility: HOSPITAL | Age: 56
End: 2021-12-14
Payer: COMMERCIAL

## 2021-12-15 ENCOUNTER — TELEPHONE (OUTPATIENT)
Dept: RADIOLOGY | Facility: HOSPITAL | Age: 56
End: 2021-12-15
Payer: COMMERCIAL

## 2021-12-16 ENCOUNTER — LAB VISIT (OUTPATIENT)
Dept: LAB | Facility: HOSPITAL | Age: 56
End: 2021-12-16
Attending: INTERNAL MEDICINE
Payer: COMMERCIAL

## 2021-12-16 ENCOUNTER — HOSPITAL ENCOUNTER (OUTPATIENT)
Dept: RADIOLOGY | Facility: HOSPITAL | Age: 56
Discharge: HOME | End: 2021-12-16
Attending: INTERNAL MEDICINE
Payer: COMMERCIAL

## 2021-12-16 DIAGNOSIS — C20 RECTAL CANCER: ICD-10-CM

## 2021-12-16 DIAGNOSIS — F10.19 ALCOHOL ABUSE WITH ALCOHOL-INDUCED DISORDER: ICD-10-CM

## 2021-12-16 LAB
ALBUMIN SERPL BCP-MCNC: 3.9 G/DL (ref 3.5–5.2)
ALP SERPL-CCNC: 70 U/L (ref 55–135)
ALT SERPL W/O P-5'-P-CCNC: 10 U/L (ref 10–44)
ANION GAP SERPL CALC-SCNC: 10 MMOL/L (ref 8–16)
AST SERPL-CCNC: 8 U/L (ref 10–40)
BASOPHILS # BLD AUTO: 0.05 K/UL (ref 0–0.2)
BASOPHILS NFR BLD: 0.7 % (ref 0–1.9)
BILIRUB SERPL-MCNC: 0.6 MG/DL (ref 0.1–1)
BUN SERPL-MCNC: 10 MG/DL (ref 6–20)
CALCIUM SERPL-MCNC: 9.5 MG/DL (ref 8.7–10.5)
CHLORIDE SERPL-SCNC: 109 MMOL/L (ref 95–110)
CO2 SERPL-SCNC: 27 MMOL/L (ref 23–29)
CREAT SERPL-MCNC: 0.9 MG/DL (ref 0.5–1.4)
DIFFERENTIAL METHOD: NORMAL
EOSINOPHIL # BLD AUTO: 0.2 K/UL (ref 0–0.5)
EOSINOPHIL NFR BLD: 3.2 % (ref 0–8)
ERYTHROCYTE [DISTWIDTH] IN BLOOD BY AUTOMATED COUNT: 13.1 % (ref 11.5–14.5)
EST. GFR  (AFRICAN AMERICAN): >60 ML/MIN/1.73 M^2
EST. GFR  (NON AFRICAN AMERICAN): >60 ML/MIN/1.73 M^2
GLUCOSE SERPL-MCNC: 102 MG/DL (ref 70–110)
HCT VFR BLD AUTO: 53.3 % (ref 40–54)
HGB BLD-MCNC: 17.8 G/DL (ref 14–18)
IMM GRANULOCYTES # BLD AUTO: 0.02 K/UL (ref 0–0.04)
IMM GRANULOCYTES NFR BLD AUTO: 0.3 % (ref 0–0.5)
LYMPHOCYTES # BLD AUTO: 2 K/UL (ref 1–4.8)
LYMPHOCYTES NFR BLD: 29 % (ref 18–48)
MCH RBC QN AUTO: 29.9 PG (ref 27–31)
MCHC RBC AUTO-ENTMCNC: 33.4 G/DL (ref 32–36)
MCV RBC AUTO: 90 FL (ref 82–98)
MONOCYTES # BLD AUTO: 0.4 K/UL (ref 0.3–1)
MONOCYTES NFR BLD: 5.3 % (ref 4–15)
NEUTROPHILS # BLD AUTO: 4.3 K/UL (ref 1.8–7.7)
NEUTROPHILS NFR BLD: 61.5 % (ref 38–73)
NRBC BLD-RTO: 0 /100 WBC
PLATELET # BLD AUTO: 220 K/UL (ref 150–450)
PMV BLD AUTO: 9.6 FL (ref 9.2–12.9)
POTASSIUM SERPL-SCNC: 5 MMOL/L (ref 3.5–5.1)
PROT SERPL-MCNC: 7.6 G/DL (ref 6–8.4)
RBC # BLD AUTO: 5.95 M/UL (ref 4.6–6.2)
SODIUM SERPL-SCNC: 146 MMOL/L (ref 136–145)
WBC # BLD AUTO: 6.96 K/UL (ref 3.9–12.7)

## 2021-12-16 PROCEDURE — 25500020 PHARM REV CODE 255: Performed by: INTERNAL MEDICINE

## 2021-12-16 PROCEDURE — 71260 CT THORAX DX C+: CPT | Mod: 26,,, | Performed by: RADIOLOGY

## 2021-12-16 PROCEDURE — 36415 COLL VENOUS BLD VENIPUNCTURE: CPT | Performed by: INTERNAL MEDICINE

## 2021-12-16 PROCEDURE — 71260 CT THORAX DX C+: CPT | Mod: TC

## 2021-12-16 PROCEDURE — 74177 CT CHEST ABDOMEN PELVIS WITH CONTRAST (XPD): ICD-10-PCS | Mod: 26,,, | Performed by: RADIOLOGY

## 2021-12-16 PROCEDURE — 74177 CT ABD & PELVIS W/CONTRAST: CPT | Mod: TC

## 2021-12-16 PROCEDURE — 71260 CT CHEST ABDOMEN PELVIS WITH CONTRAST (XPD): ICD-10-PCS | Mod: 26,,, | Performed by: RADIOLOGY

## 2021-12-16 PROCEDURE — A9698 NON-RAD CONTRAST MATERIALNOC: HCPCS | Performed by: INTERNAL MEDICINE

## 2021-12-16 PROCEDURE — 85025 COMPLETE CBC W/AUTO DIFF WBC: CPT | Performed by: INTERNAL MEDICINE

## 2021-12-16 PROCEDURE — 80053 COMPREHEN METABOLIC PANEL: CPT | Performed by: INTERNAL MEDICINE

## 2021-12-16 PROCEDURE — 74177 CT ABD & PELVIS W/CONTRAST: CPT | Mod: 26,,, | Performed by: RADIOLOGY

## 2021-12-16 RX ADMIN — IOHEXOL 100 ML: 350 INJECTION, SOLUTION INTRAVENOUS at 09:12

## 2021-12-16 RX ADMIN — IOHEXOL 1000 ML: 12 SOLUTION ORAL at 09:12

## 2021-12-17 ENCOUNTER — HOSPITAL ENCOUNTER (OUTPATIENT)
Facility: HOSPITAL | Age: 56
Discharge: HOME | End: 2021-12-17
Attending: COLON & RECTAL SURGERY | Admitting: COLON & RECTAL SURGERY
Payer: COMMERCIAL

## 2021-12-17 ENCOUNTER — ANESTHESIA (OUTPATIENT)
Dept: ENDOSCOPY | Facility: HOSPITAL | Age: 56
End: 2021-12-17
Payer: COMMERCIAL

## 2021-12-17 ENCOUNTER — ANESTHESIA EVENT (OUTPATIENT)
Dept: ENDOSCOPY | Facility: HOSPITAL | Age: 56
End: 2021-12-17
Payer: COMMERCIAL

## 2021-12-17 ENCOUNTER — OFFICE VISIT (OUTPATIENT)
Dept: HEMATOLOGY/ONCOLOGY | Facility: CLINIC | Age: 56
End: 2021-12-17
Payer: COMMERCIAL

## 2021-12-17 VITALS
OXYGEN SATURATION: 98 % | DIASTOLIC BLOOD PRESSURE: 80 MMHG | HEART RATE: 85 BPM | WEIGHT: 183.63 LBS | TEMPERATURE: 98 F | HEIGHT: 72 IN | BODY MASS INDEX: 24.87 KG/M2 | SYSTOLIC BLOOD PRESSURE: 130 MMHG | RESPIRATION RATE: 96 BRPM

## 2021-12-17 DIAGNOSIS — C20 RECTAL CANCER: ICD-10-CM

## 2021-12-17 DIAGNOSIS — C20 RECTAL CANCER: Primary | ICD-10-CM

## 2021-12-17 DIAGNOSIS — F10.19 ALCOHOL ABUSE WITH ALCOHOL-INDUCED DISORDER: ICD-10-CM

## 2021-12-17 DIAGNOSIS — Z12.11 SCREENING FOR COLON CANCER: ICD-10-CM

## 2021-12-17 LAB
CTP QC/QA: YES
SARS-COV-2 RDRP RESP QL NAA+PROBE: NEGATIVE

## 2021-12-17 PROCEDURE — 27201042 HC RETRIEVAL NET: Performed by: COLON & RECTAL SURGERY

## 2021-12-17 PROCEDURE — 88305 TISSUE EXAM BY PATHOLOGIST: ICD-10-PCS | Mod: 26,,, | Performed by: PATHOLOGY

## 2021-12-17 PROCEDURE — 37000008 HC ANESTHESIA 1ST 15 MINUTES: Performed by: COLON & RECTAL SURGERY

## 2021-12-17 PROCEDURE — U0002 COVID-19 LAB TEST NON-CDC: HCPCS | Performed by: COLON & RECTAL SURGERY

## 2021-12-17 PROCEDURE — 88305 TISSUE EXAM BY PATHOLOGIST: CPT | Performed by: PATHOLOGY

## 2021-12-17 PROCEDURE — 99215 PR OFFICE/OUTPT VISIT, EST, LEVL V, 40-54 MIN: ICD-10-PCS | Mod: S$GLB,,, | Performed by: INTERNAL MEDICINE

## 2021-12-17 PROCEDURE — 45385 COLONOSCOPY W/LESION REMOVAL: CPT | Mod: 33,,, | Performed by: COLON & RECTAL SURGERY

## 2021-12-17 PROCEDURE — 45385 PR COLONOSCOPY,REMV LESN,SNARE: ICD-10-PCS | Mod: 33,,, | Performed by: COLON & RECTAL SURGERY

## 2021-12-17 PROCEDURE — 37000009 HC ANESTHESIA EA ADD 15 MINS: Performed by: COLON & RECTAL SURGERY

## 2021-12-17 PROCEDURE — 63600175 PHARM REV CODE 636 W HCPCS: Performed by: NURSE ANESTHETIST, CERTIFIED REGISTERED

## 2021-12-17 PROCEDURE — 99999 PR PBB SHADOW E&M-EST. PATIENT-LVL III: CPT | Mod: PBBFAC,,, | Performed by: INTERNAL MEDICINE

## 2021-12-17 PROCEDURE — 99215 OFFICE O/P EST HI 40 MIN: CPT | Mod: S$GLB,,, | Performed by: INTERNAL MEDICINE

## 2021-12-17 PROCEDURE — 99999 PR PBB SHADOW E&M-EST. PATIENT-LVL III: ICD-10-PCS | Mod: PBBFAC,,, | Performed by: INTERNAL MEDICINE

## 2021-12-17 PROCEDURE — 45385 COLONOSCOPY W/LESION REMOVAL: CPT | Performed by: COLON & RECTAL SURGERY

## 2021-12-17 PROCEDURE — 27201089 HC SNARE, DISP (ANY): Performed by: COLON & RECTAL SURGERY

## 2021-12-17 PROCEDURE — 88305 TISSUE EXAM BY PATHOLOGIST: CPT | Mod: 26,,, | Performed by: PATHOLOGY

## 2021-12-17 RX ORDER — PROPOFOL 10 MG/ML
VIAL (ML) INTRAVENOUS
Status: DISCONTINUED | OUTPATIENT
Start: 2021-12-17 | End: 2021-12-17

## 2021-12-17 RX ORDER — SODIUM CHLORIDE, SODIUM LACTATE, POTASSIUM CHLORIDE, CALCIUM CHLORIDE 600; 310; 30; 20 MG/100ML; MG/100ML; MG/100ML; MG/100ML
INJECTION, SOLUTION INTRAVENOUS CONTINUOUS PRN
Status: DISCONTINUED | OUTPATIENT
Start: 2021-12-17 | End: 2021-12-17

## 2021-12-17 RX ADMIN — PROPOFOL 50 MG: 10 INJECTION, EMULSION INTRAVENOUS at 09:12

## 2021-12-17 RX ADMIN — PROPOFOL 50 MG: 10 INJECTION, EMULSION INTRAVENOUS at 08:12

## 2021-12-17 RX ADMIN — PROPOFOL 200 MG: 10 INJECTION, EMULSION INTRAVENOUS at 08:12

## 2021-12-17 RX ADMIN — SODIUM CHLORIDE, SODIUM LACTATE, POTASSIUM CHLORIDE, AND CALCIUM CHLORIDE: 600; 310; 30; 20 INJECTION, SOLUTION INTRAVENOUS at 08:12

## 2021-12-20 VITALS
BODY MASS INDEX: 25.06 KG/M2 | TEMPERATURE: 98 F | SYSTOLIC BLOOD PRESSURE: 112 MMHG | DIASTOLIC BLOOD PRESSURE: 66 MMHG | HEIGHT: 72 IN | RESPIRATION RATE: 20 BRPM | OXYGEN SATURATION: 98 % | HEART RATE: 79 BPM | WEIGHT: 185 LBS

## 2021-12-22 LAB
FINAL PATHOLOGIC DIAGNOSIS: NORMAL
GROSS: NORMAL
Lab: NORMAL

## 2021-12-26 DIAGNOSIS — C20 RECTAL CANCER: ICD-10-CM

## 2021-12-26 RX ORDER — OXYCODONE HYDROCHLORIDE 10 MG/1
10 TABLET ORAL EVERY 4 HOURS PRN
Qty: 90 TABLET | Refills: 0 | OUTPATIENT
Start: 2021-12-26

## 2021-12-27 DIAGNOSIS — C20 RECTAL CANCER: ICD-10-CM

## 2021-12-27 RX ORDER — OXYCODONE HYDROCHLORIDE 10 MG/1
10 TABLET ORAL EVERY 4 HOURS PRN
Qty: 90 TABLET | Refills: 0 | Status: SHIPPED | OUTPATIENT
Start: 2021-12-27 | End: 2022-01-26 | Stop reason: SDUPTHER

## 2022-01-01 ENCOUNTER — PATIENT MESSAGE (OUTPATIENT)
Dept: SURGERY | Facility: CLINIC | Age: 57
End: 2022-01-01
Payer: COMMERCIAL

## 2022-01-03 ENCOUNTER — OFFICE VISIT (OUTPATIENT)
Dept: SURGERY | Facility: CLINIC | Age: 57
End: 2022-01-03
Payer: COMMERCIAL

## 2022-01-03 ENCOUNTER — LAB VISIT (OUTPATIENT)
Dept: LAB | Facility: HOSPITAL | Age: 57
End: 2022-01-03
Attending: COLON & RECTAL SURGERY
Payer: COMMERCIAL

## 2022-01-03 VITALS
DIASTOLIC BLOOD PRESSURE: 88 MMHG | TEMPERATURE: 98 F | SYSTOLIC BLOOD PRESSURE: 132 MMHG | WEIGHT: 183.63 LBS | HEART RATE: 69 BPM | BODY MASS INDEX: 24.91 KG/M2

## 2022-01-03 DIAGNOSIS — K63.89 COLONIC MASS: ICD-10-CM

## 2022-01-03 DIAGNOSIS — Z85.048 PERSONAL HISTORY OF RECTAL CANCER: Primary | ICD-10-CM

## 2022-01-03 DIAGNOSIS — Z85.048 PERSONAL HISTORY OF RECTAL CANCER: ICD-10-CM

## 2022-01-03 PROCEDURE — 99214 PR OFFICE/OUTPT VISIT, EST, LEVL IV, 30-39 MIN: ICD-10-PCS | Mod: S$GLB,,, | Performed by: COLON & RECTAL SURGERY

## 2022-01-03 PROCEDURE — 3079F DIAST BP 80-89 MM HG: CPT | Mod: CPTII,S$GLB,, | Performed by: COLON & RECTAL SURGERY

## 2022-01-03 PROCEDURE — 3075F SYST BP GE 130 - 139MM HG: CPT | Mod: CPTII,S$GLB,, | Performed by: COLON & RECTAL SURGERY

## 2022-01-03 PROCEDURE — 99999 PR PBB SHADOW E&M-EST. PATIENT-LVL III: ICD-10-PCS | Mod: PBBFAC,,, | Performed by: COLON & RECTAL SURGERY

## 2022-01-03 PROCEDURE — 3075F PR MOST RECENT SYSTOLIC BLOOD PRESS GE 130-139MM HG: ICD-10-PCS | Mod: CPTII,S$GLB,, | Performed by: COLON & RECTAL SURGERY

## 2022-01-03 PROCEDURE — 3008F PR BODY MASS INDEX (BMI) DOCUMENTED: ICD-10-PCS | Mod: CPTII,S$GLB,, | Performed by: COLON & RECTAL SURGERY

## 2022-01-03 PROCEDURE — 36415 COLL VENOUS BLD VENIPUNCTURE: CPT | Performed by: COLON & RECTAL SURGERY

## 2022-01-03 PROCEDURE — 99999 PR PBB SHADOW E&M-EST. PATIENT-LVL III: CPT | Mod: PBBFAC,,, | Performed by: COLON & RECTAL SURGERY

## 2022-01-03 PROCEDURE — 1159F MED LIST DOCD IN RCRD: CPT | Mod: CPTII,S$GLB,, | Performed by: COLON & RECTAL SURGERY

## 2022-01-03 PROCEDURE — 1159F PR MEDICATION LIST DOCUMENTED IN MEDICAL RECORD: ICD-10-PCS | Mod: CPTII,S$GLB,, | Performed by: COLON & RECTAL SURGERY

## 2022-01-03 PROCEDURE — 99214 OFFICE O/P EST MOD 30 MIN: CPT | Mod: S$GLB,,, | Performed by: COLON & RECTAL SURGERY

## 2022-01-03 PROCEDURE — 3008F BODY MASS INDEX DOCD: CPT | Mod: CPTII,S$GLB,, | Performed by: COLON & RECTAL SURGERY

## 2022-01-03 PROCEDURE — 82378 CARCINOEMBRYONIC ANTIGEN: CPT | Performed by: COLON & RECTAL SURGERY

## 2022-01-03 PROCEDURE — 3079F PR MOST RECENT DIASTOLIC BLOOD PRESSURE 80-89 MM HG: ICD-10-PCS | Mod: CPTII,S$GLB,, | Performed by: COLON & RECTAL SURGERY

## 2022-01-03 NOTE — PROGRESS NOTES
History & Physical    SUBJECTIVE:     CC: rectal adenocarcinoma  Ref: Anjelica Saavedra MD    History of Present Illness:  Patient is a 56 y.o. male presents for evaluation of rectal cancer.  Patient reports he has had increasing pelvic/rectal pain along with decreased bowel movements over the last 6 weeks.  Reports an uncomfortable feeling in his pelvis associated with mucous discharge from his rectum as well as bloody bowel movements that are thin and less than normal. He reports associated chills, fatigue and 16 lb weight loss over the past 5 months.  He has also noticed a decrease in appetite as well. He underwent a colonoscopy on 06/06/2019 where a nonobstructing rectal mass was found with biopsy showing well-differentiated adenocarcinoma.  He was then referred to [facility]orectal surgery [facility] for evaluation.  He has no family history of colorectal cancer or IBD.    8/13/19: Completed neoadj chemoXRT  2/18/2020: Laparoscopic loop sigmoid colostomy 2/2 rectal perforation on chemotx  4/9/2020: APR with extra anatomic resection of fistula tract and left gluteus muscle, omental pedicle flap and VRAM with skin paddle by plastics    Interval history:  Since last clinic visit, the patient underwent a colonoscopy in December 2021 which revealed large polyps throughout his colon, 1 which was un resected in the transverse as well as a incomplete resection of 1 in the ascending colon.  Multiple other adenomas were removed.  The pathology did reveal villous and tubular adenomas.  Patient continues to have no current symptoms.  He had a CT scan in December 2021 showing no evidence of recurrent or metastatic disease.    Review of patient's allergies indicates:  No Known Allergies    Current Outpatient Medications   Medication Sig Dispense Refill    acetaminophen (TYLENOL) 325 MG tablet Take 2 tablets (650 mg total) by mouth every 6 (six) hours as needed.  0    clindamycin (CLEOCIN) 300 MG capsule Take 300 mg by mouth every 6 (six)  hours.      dronabinol (MARINOL) 10 MG capsule Take 1 capsule (10 mg total) by mouth 2 (two) times daily before meals. 60 capsule 0    finasteride (PROSCAR) 5 mg tablet Take 1 tablet (5 mg total) by mouth once daily. 30 tablet 11    iron fum-B12-IF-C-folic acid (FOLTRIN) 110-0.5 mg capsule Take 1 capsule by mouth 2 (two) times daily. 60 capsule 1    magic mouthwash diphen/antac/lidoc Swish and spit 15 ml by mouth every 4 hours as needed 500 mL 2    ondansetron (ZOFRAN-ODT) 4 MG TbDL Take 4 mg by mouth every 6 (six) hours as needed.       oxyCODONE (ROXICODONE) 10 mg Tab immediate release tablet Take 1 tablet (10 mg total) by mouth every 4 (four) hours as needed. for pain. 90 tablet 0    promethazine (PHENERGAN) 25 MG tablet Take 1 tablet (25 mg total) by mouth every 4 (four) hours. 60 tablet 4    sildenafiL (VIAGRA) 100 MG tablet Take 1 tablet (100 mg total) by mouth daily as needed for Erectile Dysfunction. 20 tablet 11    tamsulosin (FLOMAX) 0.4 mg Cap Take 1 capsule (0.4 mg total) by mouth once daily. 30 capsule 11     No current facility-administered medications for this visit.       Past Medical History:   Diagnosis Date    Anemia     Arthritis     Cancer     rectal    Renal disorder     kidney stones     Past Surgical History:   Procedure Laterality Date    ABDOMINOPERINEAL RESECTION OF RECTUM N/A 4/9/2020    Procedure: PROCTECTOMY, ABDOMINOPERINEAL;  Surgeon: Jan New MD;  Location: [facility] OR;  Service: General;  Laterality: N/A;    COLON SURGERY      COLONOSCOPY N/A 6/6/2019    Procedure: COLONOSCOPY;  Surgeon: Anjelica Saavedra MD;  Location: [facility] ENDO;  Service: Endoscopy;  Laterality: N/A;    COLONOSCOPY N/A 12/17/2021    Procedure: COLONOSCOPY;  Surgeon: Jan New MD;  Location: [facility] ENDO;  Service: General;  Laterality: N/A;    CREATION OF MUSCLE ROTATIONAL FLAP Left 4/9/2020    Procedure: CREATION, FLAP, MUSCLE ROTATION;  Surgeon: Sebastian Dan MD;  Location: [facility]  OR;  Service: Plastics;  Laterality: Left;   VRAM    ESOPHAGOGASTRODUODENOSCOPY N/A 6/6/2019    Procedure: EGD (ESOPHAGOGASTRODUODENOSCOPY);  Surgeon: Anjelica Saavedra MD;  Location: [facility];  Service: Endoscopy;  Laterality: N/A;    FLAP GRAFT SURGERY N/A 4/9/2020    Procedure: FLAP GRAFT;  Surgeon: Sebastian Dan MD;  Location: [facility] OR;  Service: Plastics;  Laterality: N/A;  left fasciocutaneous buttocks flap    HERNIA REPAIR  1995    INJECTION OF ANESTHETIC AGENT INTO TISSUE PLANE DEFINED BY TRANSVERSUS ABDOMINIS MUSCLE N/A 2/18/2020    Procedure: BLOCK, TRANSVERSUS ABDOMINIS PLANE;  Surgeon: Jan New MD;  Location: [facility] OR;  Service: General;  Laterality: N/A;    INSERTION OF TUNNELED CENTRAL VENOUS CATHETER (CVC) WITH SUBCUTANEOUS PORT N/A 10/8/2019    Procedure: [id number];  Surgeon: Jan New MD;  Location: [facility] OR;  Service: General;  Laterality: N/A;    LAPAROSCOPIC COLOSTOMY      MEDIPORT REMOVAL N/A 8/13/2020    Procedure: REMOVAL, CATHETER, CENTRAL VENOUS, TUNNELED, WITH PORT;  Surgeon: Sebastian Dan MD;  Location: [facility] OR;  Service: Plastics;  Laterality: N/A;    TONSILLECTOMY      TRANSURETHRAL RESECTION OF PROSTATE N/A 6/7/2021    Procedure: TURP (TRANSURETHRAL RESECTION OF PROSTATE), CYSTOLITHALOPAXY;  Surgeon: Sukhjinder Tucker MD;  Location: [facility];  Service: Urology;  Laterality: N/A;    WOUND DEBRIDEMENT N/A 8/13/2020    Procedure: DEBRIDEMENT, WOUND;  Surgeon: Sebastian Dan MD;  Location: [facility] OR;  Service: Plastics;  Laterality: N/A;  layered closure     Family History   Problem Relation Age of Onset    Intestinal malrotation Mother     Leukemia Father     No Known Problems Sister     Coronary artery disease Brother     Coronary artery disease Maternal Grandmother     Stomach cancer Maternal Grandfather      Social History     Tobacco Use    Smoking status: Current Every Day Smoker     Packs/day: 1.00     Years: 35.00     Pack  years: 35.00     Types: Cigarettes     Start date: 1/2/1984    Smokeless tobacco: Former User     Types: Chew    Tobacco comment: no smoking after m.n prior to sx   Substance Use Topics    Alcohol use: Yes     Alcohol/week: 46.0 standard drinks     Types: 42 Cans of beer, 4 Shots of liquor per week     Comment: segrams 7, beer daily  no alcohol 72 hours prior to surgery    Drug use: Never        Review of Systems:  Review of Systems   Constitutional: Negative for activity change, appetite change, chills, fatigue, fever and unexpected weight change.   HENT: Negative for congestion, ear pain, sore throat and trouble swallowing.    Eyes: Negative for pain, redness and itching.   Respiratory: Negative for cough, shortness of breath and wheezing.    Cardiovascular: Negative for chest pain, palpitations and leg swelling.   Gastrointestinal: Negative for abdominal distention, abdominal pain, anal bleeding, blood in stool, constipation, diarrhea, nausea, rectal pain and vomiting.   Endocrine: Negative for cold intolerance, heat intolerance and polyuria.   Genitourinary: Negative for dysuria, flank pain, frequency and hematuria.   Musculoskeletal: Negative for gait problem, joint swelling and neck pain.   Skin: Negative for color change, rash and wound.   Allergic/Immunologic: Negative for environmental allergies and immunocompromised state.   Neurological: Negative for dizziness, speech difficulty, weakness and numbness.   Psychiatric/Behavioral: Negative for agitation, confusion and hallucinations.       OBJECTIVE:     Vital Signs (Most Recent)  Temp: 98 °F (36.7 °C) (01/03/22 0835)  Pulse: 69 (01/03/22 0835)  BP: 132/88 (01/03/22 0835)     83.3 kg (183 lb 10.3 oz)     Physical Exam:  Physical Exam  Constitutional:       Appearance: He is well-developed.   HENT:      Head: Normocephalic and atraumatic.   Eyes:      Conjunctiva/sclera: Conjunctivae normal.   Neck:      Thyroid: No thyromegaly.   Cardiovascular:       Rate and Rhythm: Regular rhythm.   Pulmonary:      Effort: Pulmonary effort is normal. No respiratory distress.   Abdominal:      Comments: Soft, nondistended; midline incision well-healed; ostomy pink and viable with stool in bag; likely parastomal hernia   Genitourinary:     Comments: Skin flap well-perfused and well-healed without drainage or evidence of fistula tracts; no perineal hernia  Musculoskeletal:         General: No tenderness. Normal range of motion.      Cervical back: Normal range of motion.   Skin:     General: Skin is warm and dry.      Capillary Refill: Capillary refill takes less than 2 seconds.      Findings: No rash.   Neurological:      Mental Status: He is alert and oriented to person, place, and time.       Laboratory  Lab Results   Component Value Date    WBC 6.96 12/16/2021    HGB 17.8 12/16/2021    HCT 53.3 12/16/2021     12/16/2021    CHOL 158 06/01/2019    TRIG 194 (H) 06/01/2019    HDL 30 (L) 06/01/2019    ALT 10 12/16/2021    AST 8 (L) 12/16/2021     (H) 12/16/2021    K 5.0 12/16/2021     12/16/2021    CREATININE 0.9 12/16/2021    BUN 10 12/16/2021    CO2 27 12/16/2021    TSH 0.907 07/07/2020    PSA 0.58 12/02/2020    INR 1.0 06/11/2019     CEA: <1.7 (Sept 2021)    Diagnostic Results:  Colonoscopy images and report reviewed which shows a rectal mass that malignant     CT June 2021:  FINDINGS:  Thoracic aorta and great vessels are unremarkable.  Heart is normal without chamber enlargement.  No pericardial or pleural effusion.  No mediastinal, hilar or axillary adenopathy.     Lungs demonstrate no acute opacity with stable 4 mm left pulmonary nodules.  No new nodule appreciated.     Within the abdomen, the liver and spleen are unchanged.  Gallbladder unremarkable.  No biliary dilatation.  Pancreas and adrenal glands are unremarkable.     Kidneys are stable in appearance with left renal lower pole staghorn calculus unchanged.  No hydronephrosis.     Stomach is  unremarkable.  Small bowel nonobstructive.  Appendix normal.  Left lower quadrant colostomy present without concerning colonic abnormality.  No ascites.  No adenopathy.     Within the pelvis, the urinary bladder demonstrates similar mild circumferential wall thickening.  Dependent intraluminal calcification/stone has decreased in size.  No pelvic mass or ascites.  Presacral postsurgical findings are stable.  Stable soft tissue scarring extending toward the left gluteus amelia muscle.     No acute osseous abnormality.     Impression:     Stable appearance of the chest, abdomen pelvis without detrimental change.    ASSESSMENT/PLAN:     56-year-old male with rectal adenocarcinoma with likely T3N1 disease based upon preop imaging without evidence of metastatic disease now s/p neoadj chemoXRT which completed on 8/13/19 but with post-tx MRI showing continued threatened mesorectal fascia who developed rectal perforation and abscess/fistula on cycle 11/12 of neoadj chemotx in Feb 2020 requiring lap diverting sigmoid colostomy with continued fluid collection/perforation/fistula on CT/MRI now s/p APR with extra-anatomic resection of fistula tract and left gluteus muscle, omental pedicle flap and VRAM with skin paddle by plastics on 4/9/2020 and now newly found unresectable polyps in the ascending and transverse colon    - Long discussion with the patient regarding the newly found polyps throughout the colon.  There are at least 2 large ones in the ascending and transverse colon which were either incompletely resected or not resected at all.  Discussed that these are likely pre cancerous polyps although underlying malignancy cannot be completely excluded until they are resected or removed either endoscopically or surgically.  Given the large lesions seen and previous difficulty with his rectal cancer, we discussed either repeating his colonoscopy or proceeding with completion colectomy and end ileostomy construction given his  cancer risk.  After discussing the risks and benefits, he would like to proceed with eventual completion colectomy.  However he would like to wait until March or April due to work construction at this time.  We discussed that while he did not have definitive cancer diagnosis at this time, we cannot completely exclude cancer until these are resected there was some risk associated with waiting until this time.  For surgical resection.  He voiced understanding of this but would still like to wait at this time.  - will plan for eventual completion colectomy and end ileostomy construction.  - CEA level today  - RTC 6 weeks for re-evaluation or sooner if patient is amenable to scheduling surgery at a sooner date    Jan New MD  Colon and Rectal Surgery  [facility]

## 2022-01-04 LAB — CEA SERPL-MCNC: 1.8 NG/ML (ref 0–5)

## 2022-01-05 ENCOUNTER — OFFICE VISIT (OUTPATIENT)
Dept: UROLOGY | Facility: CLINIC | Age: 57
End: 2022-01-05
Payer: COMMERCIAL

## 2022-01-05 VITALS
SYSTOLIC BLOOD PRESSURE: 122 MMHG | HEART RATE: 94 BPM | DIASTOLIC BLOOD PRESSURE: 86 MMHG | BODY MASS INDEX: 24.91 KG/M2 | WEIGHT: 183.88 LBS | HEIGHT: 72 IN | TEMPERATURE: 98 F

## 2022-01-05 DIAGNOSIS — N21.0 BLADDER STONE: Primary | ICD-10-CM

## 2022-01-05 PROCEDURE — 3074F SYST BP LT 130 MM HG: CPT | Mod: CPTII,S$GLB,, | Performed by: UROLOGY

## 2022-01-05 PROCEDURE — 3008F BODY MASS INDEX DOCD: CPT | Mod: CPTII,S$GLB,, | Performed by: UROLOGY

## 2022-01-05 PROCEDURE — 1160F RVW MEDS BY RX/DR IN RCRD: CPT | Mod: CPTII,S$GLB,, | Performed by: UROLOGY

## 2022-01-05 PROCEDURE — 99999 PR PBB SHADOW E&M-EST. PATIENT-LVL III: CPT | Mod: PBBFAC,,, | Performed by: UROLOGY

## 2022-01-05 PROCEDURE — 1159F PR MEDICATION LIST DOCUMENTED IN MEDICAL RECORD: ICD-10-PCS | Mod: CPTII,S$GLB,, | Performed by: UROLOGY

## 2022-01-05 PROCEDURE — 1160F PR REVIEW ALL MEDS BY PRESCRIBER/CLIN PHARMACIST DOCUMENTED: ICD-10-PCS | Mod: CPTII,S$GLB,, | Performed by: UROLOGY

## 2022-01-05 PROCEDURE — 3079F PR MOST RECENT DIASTOLIC BLOOD PRESSURE 80-89 MM HG: ICD-10-PCS | Mod: CPTII,S$GLB,, | Performed by: UROLOGY

## 2022-01-05 PROCEDURE — 3074F PR MOST RECENT SYSTOLIC BLOOD PRESSURE < 130 MM HG: ICD-10-PCS | Mod: CPTII,S$GLB,, | Performed by: UROLOGY

## 2022-01-05 PROCEDURE — 99213 PR OFFICE/OUTPT VISIT, EST, LEVL III, 20-29 MIN: ICD-10-PCS | Mod: S$GLB,,, | Performed by: UROLOGY

## 2022-01-05 PROCEDURE — 99999 PR PBB SHADOW E&M-EST. PATIENT-LVL III: ICD-10-PCS | Mod: PBBFAC,,, | Performed by: UROLOGY

## 2022-01-05 PROCEDURE — 87088 URINE BACTERIA CULTURE: CPT | Performed by: UROLOGY

## 2022-01-05 PROCEDURE — 99213 OFFICE O/P EST LOW 20 MIN: CPT | Mod: S$GLB,,, | Performed by: UROLOGY

## 2022-01-05 PROCEDURE — 87086 URINE CULTURE/COLONY COUNT: CPT | Performed by: UROLOGY

## 2022-01-05 PROCEDURE — 1159F MED LIST DOCD IN RCRD: CPT | Mod: CPTII,S$GLB,, | Performed by: UROLOGY

## 2022-01-05 PROCEDURE — 3008F PR BODY MASS INDEX (BMI) DOCUMENTED: ICD-10-PCS | Mod: CPTII,S$GLB,, | Performed by: UROLOGY

## 2022-01-05 PROCEDURE — 87186 SC STD MICRODIL/AGAR DIL: CPT | Performed by: UROLOGY

## 2022-01-05 PROCEDURE — 3079F DIAST BP 80-89 MM HG: CPT | Mod: CPTII,S$GLB,, | Performed by: UROLOGY

## 2022-01-05 PROCEDURE — 87077 CULTURE AEROBIC IDENTIFY: CPT | Performed by: UROLOGY

## 2022-01-05 NOTE — PROGRESS NOTES
Chief Complaint: voiding issues    HPI:   01/05/2022 - patient returns today for follow-up, denies any issues in the interim, voiding with a good stream and emptying his bladder well, denies any gross hematuria or dysuria, had a CT last month which showed a new bladder stone    06/07/2021 - TURP with cystolitholapaxy    05/14/2021 - patient presents today for follow-up, he underwent urodynamics 2 weeks ago which showed a decent bladder contraction with the obstruction, he presents today for discussion of options, still notes urgency but denies any of the dysuria that he presented with initially, denies gross hematuria the    04/21/2021 - patient returns today for follow-up, notes a 3-4 months history of dysuria, urgency, frequency, weak stream, and incomplete bladder emptying, still taking the flomax and finasteride, was previiously intstructed how to perform CIC but has not done this in some time, no GH but states that he 'keeps a bladder infection'  IPSS - 4/1/5/5/5/5/4 = 29 QOL - 6(terrible)    12/21/20: Sildenafil 100 working well enough.  Stream sometimes good sometimes feels he has to strain, most of the time it is good.  3 cups coffee a day and some sodas.  Retrograde ejaculation.  8/5/20- sildenafil 100mg is working well.  7/1/20: patient normally goes to Dr. Altamirano.  Apparently he has been having ED since his rectal surgery and would like to discuss options.  Recent dx with UTI and started abx today, otherwise voiding well.  Patient states he gets no erections after surgery, he has not tried any forms of medical therapy.  Libido and energy are still present.  6/4/20: PVR was 635 after our last visit and he was instructed in CIC.  Voiding normally he feels and hasn't done CIC in two weeks.  PVR today 105 ml.   5/6/20: Been on flomax since last visit.  Cysto/uroflow normal today.    4/20/20: 53 yo man had colon cancer with resection last week, referred by Dr. New.  Is in retention postoperatively.  No  unusual abd/pelvic pain and no exac/rel factors.  No hematuria.  No urolithiasis.  PVR >700 ml.  No  history.      PMH:  Past Medical History:   Diagnosis Date    Anemia     Arthritis     Cancer     rectal    Renal disorder     kidney stones       PSH:  Past Surgical History:   Procedure Laterality Date    ABDOMINOPERINEAL RESECTION OF RECTUM N/A 4/9/2020    Procedure: PROCTECTOMY, ABDOMINOPERINEAL;  Surgeon: Jan New MD;  Location: [facility];  Service: General;  Laterality: N/A;    COLON SURGERY      COLONOSCOPY N/A 6/6/2019    Procedure: COLONOSCOPY;  Surgeon: Anjelica Saavedra MD;  Location: [facility] ENDO;  Service: Endoscopy;  Laterality: N/A;    COLONOSCOPY N/A 12/17/2021    Procedure: COLONOSCOPY;  Surgeon: Jan New MD;  Location: [facility];  Service: General;  Laterality: N/A;    CREATION OF MUSCLE ROTATIONAL FLAP Left 4/9/2020    Procedure: CREATION, FLAP, MUSCLE ROTATION;  Surgeon: Sebastian Dan MD;  Location: [facility];  Service: Plastics;  Laterality: Left;   VRAM    ESOPHAGOGASTRODUODENOSCOPY N/A 6/6/2019    Procedure: EGD (ESOPHAGOGASTRODUODENOSCOPY);  Surgeon: Anjelica Saavedra MD;  Location: [facility];  Service: Endoscopy;  Laterality: N/A;    FLAP GRAFT SURGERY N/A 4/9/2020    Procedure: FLAP GRAFT;  Surgeon: Sebastian Dan MD;  Location: [facility];  Service: Plastics;  Laterality: N/A;  left fasciocutaneous buttocks flap    HERNIA REPAIR  1995    INJECTION OF ANESTHETIC AGENT INTO TISSUE PLANE DEFINED BY TRANSVERSUS ABDOMINIS MUSCLE N/A 2/18/2020    Procedure: BLOCK, TRANSVERSUS ABDOMINIS PLANE;  Surgeon: Jan New MD;  Location: [facility] OR;  Service: General;  Laterality: N/A;    INSERTION OF TUNNELED CENTRAL VENOUS CATHETER (CVC) WITH SUBCUTANEOUS PORT N/A 10/8/2019    Procedure: [id number];  Surgeon: Jan New MD;  Location: [facility];  Service: General;  Laterality: N/A;    LAPAROSCOPIC COLOSTOMY      MEDIPORT REMOVAL N/A 8/13/2020     Procedure: REMOVAL, CATHETER, CENTRAL VENOUS, TUNNELED, WITH PORT;  Surgeon: Sebastian Dan MD;  Location: [facility] OR;  Service: Plastics;  Laterality: N/A;    TONSILLECTOMY      TRANSURETHRAL RESECTION OF PROSTATE N/A 6/7/2021    Procedure: TURP (TRANSURETHRAL RESECTION OF PROSTATE), CYSTOLITHALOPAXY;  Surgeon: Sukhjinder Tucker MD;  Location: [facility] OR;  Service: Urology;  Laterality: N/A;    WOUND DEBRIDEMENT N/A 8/13/2020    Procedure: DEBRIDEMENT, WOUND;  Surgeon: Sebastian Dan MD;  Location: [facility] OR;  Service: Plastics;  Laterality: N/A;  layered closure       Family History:  Family History   Problem Relation Age of Onset    Intestinal malrotation Mother     Leukemia Father     No Known Problems Sister     Coronary artery disease Brother     Coronary artery disease Maternal Grandmother     Stomach cancer Maternal Grandfather        Social History:  Social History     Tobacco Use    Smoking status: Current Every Day Smoker     Packs/day: 1.00     Years: 35.00     Pack years: 35.00     Types: Cigarettes     Start date: 1/2/1984    Smokeless tobacco: Former User     Types: Chew    Tobacco comment: no smoking after m.n prior to sx   Substance Use Topics    Alcohol use: Yes     Alcohol/week: 46.0 standard drinks     Types: 42 Cans of beer, 4 Shots of liquor per week     Comment: segrams 7, beer daily  no alcohol 72 hours prior to surgery    Drug use: Never        Review of Systems:  General: No fever, chills  Skin: No rashes  Chest:  Denies cough and sputum production  Heart: Denies chest pain  Resp: Denies dyspnea  Abdomen: Denies diarrhea, abdominal pain, hematemesis, or blood in stool.  Musculoskeletal: No joint stiffness or swelling. Denies back pain.  : see HPI  Neuro: no dizziness or weakness    Allergies:  Patient has no known allergies.    Medications:    Current Outpatient Medications:     acetaminophen (TYLENOL) 325 MG tablet, Take 2 tablets (650 mg total) by mouth every 6 (six)  hours as needed., Disp: , Rfl: 0    finasteride (PROSCAR) 5 mg tablet, Take 1 tablet (5 mg total) by mouth once daily., Disp: 30 tablet, Rfl: 11    oxyCODONE (ROXICODONE) 10 mg Tab immediate release tablet, Take 1 tablet (10 mg total) by mouth every 4 (four) hours as needed. for pain., Disp: 90 tablet, Rfl: 0    sildenafiL (VIAGRA) 100 MG tablet, Take 1 tablet (100 mg total) by mouth daily as needed for Erectile Dysfunction., Disp: 20 tablet, Rfl: 11    tamsulosin (FLOMAX) 0.4 mg Cap, Take 1 capsule (0.4 mg total) by mouth once daily., Disp: 30 capsule, Rfl: 11    clindamycin (CLEOCIN) 300 MG capsule, Take 300 mg by mouth every 6 (six) hours., Disp: , Rfl:     dronabinol (MARINOL) 10 MG capsule, Take 1 capsule (10 mg total) by mouth 2 (two) times daily before meals. (Patient not taking: Reported on 1/5/2022), Disp: 60 capsule, Rfl: 0    iron fum-B12-IF-C-folic acid (FOLTRIN) 110-0.5 mg capsule, Take 1 capsule by mouth 2 (two) times daily. (Patient not taking: Reported on 1/5/2022), Disp: 60 capsule, Rfl: 1    magic mouthwash diphen/antac/lidoc, Swish and spit 15 ml by mouth every 4 hours as needed (Patient not taking: Reported on 1/5/2022), Disp: 500 mL, Rfl: 2    ondansetron (ZOFRAN-ODT) 4 MG TbDL, Take 4 mg by mouth every 6 (six) hours as needed. , Disp: , Rfl:     promethazine (PHENERGAN) 25 MG tablet, Take 1 tablet (25 mg total) by mouth every 4 (four) hours. (Patient not taking: Reported on 1/5/2022), Disp: 60 tablet, Rfl: 4    Physical Exam:  Vitals:    01/05/22 0820   BP: 122/86   Pulse: 94   Temp: 98.4 °F (36.9 °C)     General: awake, alert, cooperative  Head: NC/AT  Ears: external ears normal  Eyes: sclera normal  Lungs: normal inspiration, NAD  Heart: well-perfused  Abdomen: Soft, NT, ND  : 5/6/20: Test desc tim, no abnormalities of epididymus. Penis normal, with normal penile and scrotal skin. Meatus normal.   Lymphatic: groin nodes negative  Back: No CVA TTP, no spine TTP  Skin: The skin is  warm and dry  Ext: No c/c/e.  Neuro: grossly intact, AOx3    RADIOLOGY:  CT CHEST ABDOMEN PELVIS WITH CONTRAST (XPD) 12/16/2021     CLINICAL HISTORY:  Rectal cancer; Malignant neoplasm of rectum     TECHNIQUE:  Low dose axial images, sagittal and coronal reformations were obtained from the thoracic inlet to the pubic symphysis following the IV administration of 100 mL of Omnipaque 350 and the oral administration of 1000 ml of Omnipaque 12 solution.     COMPARISON:  06/29/2021     FINDINGS:  Chest:     Heart and great vessels: Within normal limits.     Adenopathy: None demonstrated.     Lungs: There are multiple small pulmonary nodules in the right lung which appear unchanged, the largest of which measure up to 4 mm.  No new or enlarging pulmonary nodules demonstrated.  No parenchymal consolidations or pleural effusions.     Abdomen:     Liver: Within normal limits.     Gallbladder and biliary: Within normal limits.     Spleen: Within normal limits.     Pancreas: Within normal limits.     Adrenals: Within normal limits.     Kidneys: Staghorn calculus again noted at the lower pole of the left kidney.  No hydronephrosis or hydroureter.     Bowel: Postoperative findings from prior bowel resection are again noted with left lower quadrant colostomy in place.  No abnormal bowel wall thickening or evidence of obstruction.     Peritoneum: No ascites or pneumoperitoneum.     Abdominal Adenopathy: None.     Vasculature: Within normal limits.     Pelvis:     Urinary bladder: There is a 15 mm stone present in the urinary bladder.     No pelvic masses visualized.  Postsurgical soft tissue thickening in noted in the presacral region which is unchanged in appearance from prior.     Pelvis adenopathy: None.     Bones: No acute findings.     Miscellaneous: None     Impression:     1. Unchanged appearance of multiple pulmonary nodules as above  2. Unchanged appearance of postsurgical findings as above  3. Left-sided nephrolithiasis  with 15 mm stone also noted in the urinary bladder.  No hydronephrosis.  4. No definite evidence of metastatic disease or suspicious adenopathy    LABS:  I personally reviewed the following lab values:  Lab Results   Component Value Date    WBC 6.96 12/16/2021    HGB 17.8 12/16/2021    HCT 53.3 12/16/2021     12/16/2021     (H) 12/16/2021    K 5.0 12/16/2021     12/16/2021    CREATININE 0.9 12/16/2021    BUN 10 12/16/2021    CO2 27 12/16/2021    TSH 0.907 07/07/2020    PSA 0.58 12/02/2020    INR 1.0 06/11/2019    CHOL 158 06/01/2019    TRIG 194 (H) 06/01/2019    HDL 30 (L) 06/01/2019    ALT 10 12/16/2021    AST 8 (L) 12/16/2021       URINALYSIS:  Urinalysis obtained in clinic today specific gravity 1.010, pH 6, 2+ leukocyte esterase, positive nitrites, positive protein, trace blood    Bladder Scan performed in office:   5/7/20: 600ml  6/4/20:  ml.  04/21/2021 - 13mL  01/05/2022 - 1mL    PSA  6/19: 0.37  12/20: 0.58    Assessment/Plan:   Sukhjinder Presley is a 56 y.o. male with bladder stone and recurrent UTIs due to BPH.  Patient is emptying his bladder well, so it is likely that his bladder stone is due to recurrent UTIs.  Discussed options including returning for a repeat cystolitholapaxy, patient would like to combine this with possible completion colectomy with colorectal.  This is acceptable from my standpoint.  He will contact me when he has a date for his colectomy.    Sukhjinder Tucker MD  Urology

## 2022-01-08 ENCOUNTER — PATIENT MESSAGE (OUTPATIENT)
Dept: SURGERY | Facility: CLINIC | Age: 57
End: 2022-01-08
Payer: COMMERCIAL

## 2022-01-08 LAB — BACTERIA UR CULT: ABNORMAL

## 2022-01-09 ENCOUNTER — PATIENT MESSAGE (OUTPATIENT)
Dept: SURGERY | Facility: CLINIC | Age: 57
End: 2022-01-09
Payer: COMMERCIAL

## 2022-01-26 DIAGNOSIS — C20 RECTAL CANCER: ICD-10-CM

## 2022-01-26 RX ORDER — OXYCODONE HYDROCHLORIDE 10 MG/1
10 TABLET ORAL EVERY 4 HOURS PRN
Qty: 90 TABLET | Refills: 0 | Status: SHIPPED | OUTPATIENT
Start: 2022-01-26 | End: 2022-02-28 | Stop reason: SDUPTHER

## 2022-02-02 ENCOUNTER — PATIENT MESSAGE (OUTPATIENT)
Dept: SURGERY | Facility: CLINIC | Age: 57
End: 2022-02-02
Payer: COMMERCIAL

## 2022-02-03 ENCOUNTER — HOSPITAL ENCOUNTER (OUTPATIENT)
Dept: CT IMAGING | Age: 57
Discharge: HOME | End: 2022-02-03
Attending: INTERNAL MEDICINE

## 2022-02-03 DIAGNOSIS — Z13.6 SCREENING FOR CARDIOVASCULAR CONDITION: ICD-10-CM

## 2022-02-03 PROCEDURE — 75571 CT HRT W/O DYE W/CA TEST: CPT

## 2022-02-04 ENCOUNTER — TELEPHONE (OUTPATIENT)
Dept: CT IMAGING | Age: 57
End: 2022-02-04

## 2022-02-07 ENCOUNTER — OFFICE VISIT (OUTPATIENT)
Dept: SURGERY | Facility: CLINIC | Age: 57
End: 2022-02-07
Payer: COMMERCIAL

## 2022-02-07 VITALS
BODY MASS INDEX: 25.65 KG/M2 | TEMPERATURE: 98 F | SYSTOLIC BLOOD PRESSURE: 114 MMHG | WEIGHT: 189.13 LBS | HEART RATE: 87 BPM | DIASTOLIC BLOOD PRESSURE: 76 MMHG

## 2022-02-07 DIAGNOSIS — F17.200 SMOKER: ICD-10-CM

## 2022-02-07 DIAGNOSIS — Z85.048 HISTORY OF RECTAL CANCER: ICD-10-CM

## 2022-02-07 DIAGNOSIS — K63.89 COLONIC MASS: Primary | ICD-10-CM

## 2022-02-07 DIAGNOSIS — Z01.818 PRE-OP TESTING: Primary | ICD-10-CM

## 2022-02-07 DIAGNOSIS — K43.5 PARASTOMAL HERNIA WITHOUT OBSTRUCTION OR GANGRENE: ICD-10-CM

## 2022-02-07 PROCEDURE — 3074F SYST BP LT 130 MM HG: CPT | Mod: CPTII,S$GLB,, | Performed by: COLON & RECTAL SURGERY

## 2022-02-07 PROCEDURE — 3078F PR MOST RECENT DIASTOLIC BLOOD PRESSURE < 80 MM HG: ICD-10-PCS | Mod: CPTII,S$GLB,, | Performed by: COLON & RECTAL SURGERY

## 2022-02-07 PROCEDURE — 99215 OFFICE O/P EST HI 40 MIN: CPT | Mod: S$GLB,,, | Performed by: COLON & RECTAL SURGERY

## 2022-02-07 PROCEDURE — 99999 PR PBB SHADOW E&M-EST. PATIENT-LVL V: CPT | Mod: PBBFAC,,, | Performed by: COLON & RECTAL SURGERY

## 2022-02-07 PROCEDURE — 1159F MED LIST DOCD IN RCRD: CPT | Mod: CPTII,S$GLB,, | Performed by: COLON & RECTAL SURGERY

## 2022-02-07 PROCEDURE — 99215 PR OFFICE/OUTPT VISIT, EST, LEVL V, 40-54 MIN: ICD-10-PCS | Mod: S$GLB,,, | Performed by: COLON & RECTAL SURGERY

## 2022-02-07 PROCEDURE — 3008F PR BODY MASS INDEX (BMI) DOCUMENTED: ICD-10-PCS | Mod: CPTII,S$GLB,, | Performed by: COLON & RECTAL SURGERY

## 2022-02-07 PROCEDURE — 3008F BODY MASS INDEX DOCD: CPT | Mod: CPTII,S$GLB,, | Performed by: COLON & RECTAL SURGERY

## 2022-02-07 PROCEDURE — 1159F PR MEDICATION LIST DOCUMENTED IN MEDICAL RECORD: ICD-10-PCS | Mod: CPTII,S$GLB,, | Performed by: COLON & RECTAL SURGERY

## 2022-02-07 PROCEDURE — 3074F PR MOST RECENT SYSTOLIC BLOOD PRESSURE < 130 MM HG: ICD-10-PCS | Mod: CPTII,S$GLB,, | Performed by: COLON & RECTAL SURGERY

## 2022-02-07 PROCEDURE — 3078F DIAST BP <80 MM HG: CPT | Mod: CPTII,S$GLB,, | Performed by: COLON & RECTAL SURGERY

## 2022-02-07 PROCEDURE — 99999 PR PBB SHADOW E&M-EST. PATIENT-LVL V: ICD-10-PCS | Mod: PBBFAC,,, | Performed by: COLON & RECTAL SURGERY

## 2022-02-07 RX ORDER — CELECOXIB 100 MG/1
400 CAPSULE ORAL
Status: CANCELLED | OUTPATIENT
Start: 2022-02-07 | End: 2022-02-07

## 2022-02-07 RX ORDER — METRONIDAZOLE 500 MG/100ML
500 INJECTION, SOLUTION INTRAVENOUS
Status: CANCELLED | OUTPATIENT
Start: 2022-02-07

## 2022-02-07 RX ORDER — SODIUM CHLORIDE, SODIUM LACTATE, POTASSIUM CHLORIDE, CALCIUM CHLORIDE 600; 310; 30; 20 MG/100ML; MG/100ML; MG/100ML; MG/100ML
INJECTION, SOLUTION INTRAVENOUS CONTINUOUS
Status: CANCELLED | OUTPATIENT
Start: 2022-02-07

## 2022-02-07 RX ORDER — GABAPENTIN 100 MG/1
800 CAPSULE ORAL
Status: CANCELLED | OUTPATIENT
Start: 2022-02-07 | End: 2022-02-07

## 2022-02-07 RX ORDER — ONDANSETRON 2 MG/ML
4 INJECTION INTRAMUSCULAR; INTRAVENOUS EVERY 12 HOURS PRN
Status: CANCELLED | OUTPATIENT
Start: 2022-02-07

## 2022-02-07 RX ORDER — ACETAMINOPHEN 325 MG/1
1000 TABLET ORAL ONCE
Status: CANCELLED | OUTPATIENT
Start: 2022-02-07 | End: 2022-02-07

## 2022-02-07 RX ORDER — HEPARIN SODIUM 5000 [USP'U]/ML
5000 INJECTION, SOLUTION INTRAVENOUS; SUBCUTANEOUS
Status: CANCELLED | OUTPATIENT
Start: 2022-02-07 | End: 2022-02-08

## 2022-02-07 NOTE — Clinical Note
Planning for completion colectomy on Thursday 2/24,. Know you had mentioned to him removing bladder stone at same operation, happy to have you join to do so if able

## 2022-02-07 NOTE — H&P (VIEW-ONLY)
History & Physical    SUBJECTIVE:     CC: rectal adenocarcinoma  Ref: Anjelica Saavedra MD    History of Present Illness:  Patient is a 56 y.o. male presents for evaluation of rectal cancer.  Patient reports he has had increasing pelvic/rectal pain along with decreased bowel movements over the last 6 weeks.  Reports an uncomfortable feeling in his pelvis associated with mucous discharge from his rectum as well as bloody bowel movements that are thin and less than normal. He reports associated chills, fatigue and 16 lb weight loss over the past 5 months.  He has also noticed a decrease in appetite as well. He underwent a colonoscopy on 06/06/2019 where a nonobstructing rectal mass was found with biopsy showing well-differentiated adenocarcinoma.  He was then referred to [facility] for evaluation.  He has no family history of colorectal cancer or IBD.    8/13/19: Completed neoadj chemoXRT  2/18/2020: Laparoscopic loop sigmoid colostomy 2/2 rectal perforation on chemotx  4/9/2020: APR with extra anatomic resection of fistula tract and left gluteus muscle, omental pedicle flap and VRAM with skin paddle by plastics    Interval history:  Since last clinic visit, the patient has done well.  He has thought about his options for continued surveillance/reintervention with repeat colonoscopy versus surgical intervention he has elected to undergo surgical intervention.  He denies any fever, chills, nausea, vomiting.  Continues to have good bowel function.    Review of patient's allergies indicates:  No Known Allergies    Current Outpatient Medications   Medication Sig Dispense Refill    acetaminophen (TYLENOL) 325 MG tablet Take 2 tablets (650 mg total) by mouth every 6 (six) hours as needed.  0    clindamycin (CLEOCIN) 300 MG capsule Take 300 mg by mouth every 6 (six) hours.      dronabinol (MARINOL) 10 MG capsule Take 1 capsule (10 mg total) by mouth 2 (two) times daily before meals. 60 capsule 0    finasteride  (PROSCAR) 5 mg tablet Take 1 tablet (5 mg total) by mouth once daily. 30 tablet 11    iron fum-B12-IF-C-folic acid (FOLTRIN) 110-0.5 mg capsule Take 1 capsule by mouth 2 (two) times daily. 60 capsule 1    magic mouthwash diphen/antac/lidoc Swish and spit 15 ml by mouth every 4 hours as needed 500 mL 2    ondansetron (ZOFRAN-ODT) 4 MG TbDL Take 4 mg by mouth every 6 (six) hours as needed.       oxyCODONE (ROXICODONE) 10 mg Tab immediate release tablet Take 1 tablet (10 mg total) by mouth every 4 (four) hours as needed. for pain. 90 tablet 0    promethazine (PHENERGAN) 25 MG tablet Take 1 tablet (25 mg total) by mouth every 4 (four) hours. 60 tablet 4    sildenafiL (VIAGRA) 100 MG tablet Take 1 tablet (100 mg total) by mouth daily as needed for Erectile Dysfunction. 20 tablet 11    tamsulosin (FLOMAX) 0.4 mg Cap Take 1 capsule (0.4 mg total) by mouth once daily. 30 capsule 11     No current facility-administered medications for this visit.       Past Medical History:   Diagnosis Date    Anemia     Arthritis     Cancer     rectal    Renal disorder     kidney stones     Past Surgical History:   Procedure Laterality Date    ABDOMINOPERINEAL RESECTION OF RECTUM N/A 4/9/2020    Procedure: PROCTECTOMY, ABDOMINOPERINEAL;  Surgeon: Jan New MD;  Location: [facility] OR;  Service: General;  Laterality: N/A;    COLON SURGERY      COLONOSCOPY N/A 6/6/2019    Procedure: COLONOSCOPY;  Surgeon: Anjelica Saavedra MD;  Location: [facility] ENDO;  Service: Endoscopy;  Laterality: N/A;    COLONOSCOPY N/A 12/17/2021    Procedure: COLONOSCOPY;  Surgeon: Jan New MD;  Location: [facility] ENDO;  Service: General;  Laterality: N/A;    CREATION OF MUSCLE ROTATIONAL FLAP Left 4/9/2020    Procedure: CREATION, FLAP, MUSCLE ROTATION;  Surgeon: Sebastian Dan MD;  Location: [facility] OR;  Service: Plastics;  Laterality: Left;   VRAM    ESOPHAGOGASTRODUODENOSCOPY N/A 6/6/2019    Procedure: EGD (ESOPHAGOGASTRODUODENOSCOPY);   Surgeon: Anjelica Saavedra MD;  Location: [facility];  Service: Endoscopy;  Laterality: N/A;    FLAP GRAFT SURGERY N/A 4/9/2020    Procedure: FLAP GRAFT;  Surgeon: Sebastian Dan MD;  Location: [facility] OR;  Service: Plastics;  Laterality: N/A;  left fasciocutaneous buttocks flap    HERNIA REPAIR  1995    INJECTION OF ANESTHETIC AGENT INTO TISSUE PLANE DEFINED BY TRANSVERSUS ABDOMINIS MUSCLE N/A 2/18/2020    Procedure: BLOCK, TRANSVERSUS ABDOMINIS PLANE;  Surgeon: Jan New MD;  Location: [facility] OR;  Service: General;  Laterality: N/A;    INSERTION OF TUNNELED CENTRAL VENOUS CATHETER (CVC) WITH SUBCUTANEOUS PORT N/A 10/8/2019    Procedure: [id number];  Surgeon: Jan New MD;  Location: [facility];  Service: General;  Laterality: N/A;    LAPAROSCOPIC COLOSTOMY      MEDIPORT REMOVAL N/A 8/13/2020    Procedure: REMOVAL, CATHETER, CENTRAL VENOUS, TUNNELED, WITH PORT;  Surgeon: Sebastian Dan MD;  Location: [facility] OR;  Service: Plastics;  Laterality: N/A;    TONSILLECTOMY      TRANSURETHRAL RESECTION OF PROSTATE N/A 6/7/2021    Procedure: TURP (TRANSURETHRAL RESECTION OF PROSTATE), CYSTOLITHALOPAXY;  Surgeon: Sukhjinder Tucker MD;  Location: [facility];  Service: Urology;  Laterality: N/A;    WOUND DEBRIDEMENT N/A 8/13/2020    Procedure: DEBRIDEMENT, WOUND;  Surgeon: Sebastian Dan MD;  Location: [facility];  Service: Plastics;  Laterality: N/A;  layered closure     Family History   Problem Relation Age of Onset    Intestinal malrotation Mother     Leukemia Father     No Known Problems Sister     Coronary artery disease Brother     Coronary artery disease Maternal Grandmother     Stomach cancer Maternal Grandfather      Social History     Tobacco Use    Smoking status: Current Every Day Smoker     Packs/day: 1.00     Years: 35.00     Pack years: 35.00     Types: Cigarettes     Start date: 1/2/1984    Smokeless tobacco: Former User     Types: Chew    Tobacco comment: no smoking after  m.n prior to sx   Substance Use Topics    Alcohol use: Yes     Alcohol/week: 46.0 standard drinks     Types: 42 Cans of beer, 4 Shots of liquor per week     Comment: segrams 7, beer daily  no alcohol 72 hours prior to surgery    Drug use: Never        Review of Systems:  Review of Systems   Constitutional: Negative for activity change, appetite change, chills, fatigue, fever and unexpected weight change.   HENT: Negative for congestion, ear pain, sore throat and trouble swallowing.    Eyes: Negative for pain, redness and itching.   Respiratory: Negative for cough, shortness of breath and wheezing.    Cardiovascular: Negative for chest pain, palpitations and leg swelling.   Gastrointestinal: Negative for abdominal distention, abdominal pain, anal bleeding, blood in stool, constipation, diarrhea, nausea, rectal pain and vomiting.   Endocrine: Negative for cold intolerance, heat intolerance and polyuria.   Genitourinary: Negative for dysuria, flank pain, frequency and hematuria.   Musculoskeletal: Negative for gait problem, joint swelling and neck pain.   Skin: Negative for color change, rash and wound.   Allergic/Immunologic: Negative for environmental allergies and immunocompromised state.   Neurological: Negative for dizziness, speech difficulty, weakness and numbness.   Psychiatric/Behavioral: Negative for agitation, confusion and hallucinations.       OBJECTIVE:     Vital Signs (Most Recent)  Temp: 98.4 °F (36.9 °C) (02/07/22 1604)  Pulse: 87 (02/07/22 1604)  BP: 114/76 (02/07/22 1604)     85.8 kg (189 lb 2.5 oz)     Physical Exam:  Physical Exam  Constitutional:       Appearance: He is well-developed.   HENT:      Head: Normocephalic and atraumatic.   Eyes:      Conjunctiva/sclera: Conjunctivae normal.   Neck:      Thyroid: No thyromegaly.   Cardiovascular:      Rate and Rhythm: Regular rhythm.   Pulmonary:      Effort: Pulmonary effort is normal. No respiratory distress.   Abdominal:      Comments: Soft,  nondistended; midline incision well-healed; ostomy pink and viable with stool in bag; likely parastomal hernia   Genitourinary:     Comments: Skin flap well-perfused and well-healed without drainage or evidence of fistula tracts; no perineal hernia  Musculoskeletal:         General: No tenderness. Normal range of motion.      Cervical back: Normal range of motion.   Skin:     General: Skin is warm and dry.      Capillary Refill: Capillary refill takes less than 2 seconds.      Findings: No rash.   Neurological:      Mental Status: He is alert and oriented to person, place, and time.       Laboratory  Lab Results   Component Value Date    WBC 6.96 12/16/2021    HGB 17.8 12/16/2021    HCT 53.3 12/16/2021     12/16/2021    CHOL 158 06/01/2019    TRIG 194 (H) 06/01/2019    HDL 30 (L) 06/01/2019    ALT 10 12/16/2021    AST 8 (L) 12/16/2021     (H) 12/16/2021    K 5.0 12/16/2021     12/16/2021    CREATININE 0.9 12/16/2021    BUN 10 12/16/2021    CO2 27 12/16/2021    TSH 0.907 07/07/2020    PSA 0.58 12/02/2020    INR 1.0 06/11/2019     CEA: 1.8 (Jan '22) <-- <1.7 (Sept 2021)    Diagnostic Results:  Colonoscopy images and report reviewed which shows a rectal mass that malignant     CT June 2021:  FINDINGS:  Thoracic aorta and great vessels are unremarkable.  Heart is normal without chamber enlargement.  No pericardial or pleural effusion.  No mediastinal, hilar or axillary adenopathy.     Lungs demonstrate no acute opacity with stable 4 mm left pulmonary nodules.  No new nodule appreciated.     Within the abdomen, the liver and spleen are unchanged.  Gallbladder unremarkable.  No biliary dilatation.  Pancreas and adrenal glands are unremarkable.     Kidneys are stable in appearance with left renal lower pole staghorn calculus unchanged.  No hydronephrosis.     Stomach is unremarkable.  Small bowel nonobstructive.  Appendix normal.  Left lower quadrant colostomy present without concerning colonic  abnormality.  No ascites.  No adenopathy.     Within the pelvis, the urinary bladder demonstrates similar mild circumferential wall thickening.  Dependent intraluminal calcification/stone has decreased in size.  No pelvic mass or ascites.  Presacral postsurgical findings are stable.  Stable soft tissue scarring extending toward the left gluteus amelia muscle.     No acute osseous abnormality.     Impression:     Stable appearance of the chest, abdomen pelvis without detrimental change.    ASSESSMENT/PLAN:     56-year-old male with rectal adenocarcinoma with likely T3N1 disease based upon preop imaging without evidence of metastatic disease now s/p neoadj chemoXRT which completed on 8/13/19 but with post-tx MRI showing continued threatened mesorectal fascia who developed rectal perforation and abscess/fistula on cycle 11/12 of neoadj chemotx in Feb 2020 requiring lap diverting sigmoid colostomy with continued fluid collection/perforation/fistula on CT/MRI now s/p APR with extra-anatomic resection of fistula tract and left gluteus muscle, omental pedicle flap and VRAM with skin paddle by plastics on 4/9/2020 and now newly found unresectable polyps in the ascending and transverse colon    - Long discussion with the patient regarding his new masses in his colon.  Discussed that these were not endoscopically resectable at the most recent colonoscopy, but we would be willing to repeat his colonoscopy to see if they are now endoscopically resectable and re-attempt this versus surgical excision with completion colectomy given his previous cancer and large polyps that were seen recently.  After discussing the risks and benefits of each approach, the patient would like to undergo a completion colectomy and remove the cancer potential future which I think is reasonable at this time.  - we will plan for exploratory laparotomy, completion total colectomy and possible combination procedure with Urology to return remove his  bladder stone at the same time (will discuss with Dr Tucker). Will plan for 2/24/22  - All risks, benefits and alternatives fully explained to patient. Risks include, but are not limited to, bleeding, infection, anastomotic leak, damage to ureter, damage to other intra-abdominal organs such as colon, rectum, small bowel, stomach, liver, bladder, reproductive organs, sexual dysfunction, urinary dysfunction, postoperative abscess, conversion to open operation, perioperative MI, CVA and death.  All questions field and appropriately answered to patient's satisfaction.  Consent signed and placed on chart.  - mechanical and oral abx bowel prep  - ERAS protocol  - oral encouraged smoking cessation.  Discussed that he currently has a peristomal hernia and that is likely this hernia will recur especially if he continues to gain weight and smoke in the perioperative.  - RTC postop    Jan New MD  Colon and Rectal Surgery  [facility]

## 2022-02-07 NOTE — PROGRESS NOTES
History & Physical    SUBJECTIVE:     CC: rectal adenocarcinoma  Ref: Anjelica Saavedra MD    History of Present Illness:  Patient is a 56 y.o. male presents for evaluation of rectal cancer.  Patient reports he has had increasing pelvic/rectal pain along with decreased bowel movements over the last 6 weeks.  Reports an uncomfortable feeling in his pelvis associated with mucous discharge from his rectum as well as bloody bowel movements that are thin and less than normal. He reports associated chills, fatigue and 16 lb weight loss over the past 5 months.  He has also noticed a decrease in appetite as well. He underwent a colonoscopy on 06/06/2019 where a nonobstructing rectal mass was found with biopsy showing well-differentiated adenocarcinoma.  He was then referred to Colorectal surgery Clinic for evaluation.  He has no family history of colorectal cancer or IBD.    8/13/19: Completed neoadj chemoXRT  2/18/2020: Laparoscopic loop sigmoid colostomy 2/2 rectal perforation on chemotx  4/9/2020: APR with extra anatomic resection of fistula tract and left gluteus muscle, omental pedicle flap and VRAM with skin paddle by plastics    Interval history:  Since last clinic visit, the patient has done well.  He has thought about his options for continued surveillance/reintervention with repeat colonoscopy versus surgical intervention he has elected to undergo surgical intervention.  He denies any fever, chills, nausea, vomiting.  Continues to have good bowel function.    Review of patient's allergies indicates:  No Known Allergies    Current Outpatient Medications   Medication Sig Dispense Refill    acetaminophen (TYLENOL) 325 MG tablet Take 2 tablets (650 mg total) by mouth every 6 (six) hours as needed.  0    clindamycin (CLEOCIN) 300 MG capsule Take 300 mg by mouth every 6 (six) hours.      dronabinol (MARINOL) 10 MG capsule Take 1 capsule (10 mg total) by mouth 2 (two) times daily before meals. 60 capsule 0    finasteride  (PROSCAR) 5 mg tablet Take 1 tablet (5 mg total) by mouth once daily. 30 tablet 11    iron fum-B12-IF-C-folic acid (FOLTRIN) 110-0.5 mg capsule Take 1 capsule by mouth 2 (two) times daily. 60 capsule 1    magic mouthwash diphen/antac/lidoc Swish and spit 15 ml by mouth every 4 hours as needed 500 mL 2    ondansetron (ZOFRAN-ODT) 4 MG TbDL Take 4 mg by mouth every 6 (six) hours as needed.       oxyCODONE (ROXICODONE) 10 mg Tab immediate release tablet Take 1 tablet (10 mg total) by mouth every 4 (four) hours as needed. for pain. 90 tablet 0    promethazine (PHENERGAN) 25 MG tablet Take 1 tablet (25 mg total) by mouth every 4 (four) hours. 60 tablet 4    sildenafiL (VIAGRA) 100 MG tablet Take 1 tablet (100 mg total) by mouth daily as needed for Erectile Dysfunction. 20 tablet 11    tamsulosin (FLOMAX) 0.4 mg Cap Take 1 capsule (0.4 mg total) by mouth once daily. 30 capsule 11     No current facility-administered medications for this visit.       Past Medical History:   Diagnosis Date    Anemia     Arthritis     Cancer     rectal    Renal disorder     kidney stones     Past Surgical History:   Procedure Laterality Date    ABDOMINOPERINEAL RESECTION OF RECTUM N/A 4/9/2020    Procedure: PROCTECTOMY, ABDOMINOPERINEAL;  Surgeon: Jan New MD;  Location: [facility] OR;  Service: General;  Laterality: N/A;    COLON SURGERY      COLONOSCOPY N/A 6/6/2019    Procedure: COLONOSCOPY;  Surgeon: Anjelica Saavedra MD;  Location: [facility] ENDO;  Service: Endoscopy;  Laterality: N/A;    COLONOSCOPY N/A 12/17/2021    Procedure: COLONOSCOPY;  Surgeon: Jan New MD;  Location: [facility] ENDO;  Service: General;  Laterality: N/A;    CREATION OF MUSCLE ROTATIONAL FLAP Left 4/9/2020    Procedure: CREATION, FLAP, MUSCLE ROTATION;  Surgeon: Sebastian Dan MD;  Location: [facility] OR;  Service: Plastics;  Laterality: Left;   VRAM    ESOPHAGOGASTRODUODENOSCOPY N/A 6/6/2019    Procedure: EGD (ESOPHAGOGASTRODUODENOSCOPY);   Surgeon: Anjelica Saavedra MD;  Location: [facility];  Service: Endoscopy;  Laterality: N/A;    FLAP GRAFT SURGERY N/A 4/9/2020    Procedure: FLAP GRAFT;  Surgeon: Sebastian Dan MD;  Location: [facility] OR;  Service: Plastics;  Laterality: N/A;  left fasciocutaneous buttocks flap    HERNIA REPAIR  1995    INJECTION OF ANESTHETIC AGENT INTO TISSUE PLANE DEFINED BY TRANSVERSUS ABDOMINIS MUSCLE N/A 2/18/2020    Procedure: BLOCK, TRANSVERSUS ABDOMINIS PLANE;  Surgeon: Jan New MD;  Location: [facility] OR;  Service: General;  Laterality: N/A;    INSERTION OF TUNNELED CENTRAL VENOUS CATHETER (CVC) WITH SUBCUTANEOUS PORT N/A 10/8/2019    Procedure: RIGHIZUTX-WUFS-X-CATH;  Surgeon: Jan New MD;  Location: [facility];  Service: General;  Laterality: N/A;    LAPAROSCOPIC COLOSTOMY      MEDIPORT REMOVAL N/A 8/13/2020    Procedure: REMOVAL, CATHETER, CENTRAL VENOUS, TUNNELED, WITH PORT;  Surgeon: Sebastian Dan MD;  Location: [facility] OR;  Service: Plastics;  Laterality: N/A;    TONSILLECTOMY      TRANSURETHRAL RESECTION OF PROSTATE N/A 6/7/2021    Procedure: TURP (TRANSURETHRAL RESECTION OF PROSTATE), CYSTOLITHALOPAXY;  Surgeon: Sukhjinder Tucker MD;  Location: [facility];  Service: Urology;  Laterality: N/A;    WOUND DEBRIDEMENT N/A 8/13/2020    Procedure: DEBRIDEMENT, WOUND;  Surgeon: Sebastian Dan MD;  Location: [facility];  Service: Plastics;  Laterality: N/A;  layered closure     Family History   Problem Relation Age of Onset    Intestinal malrotation Mother     Leukemia Father     No Known Problems Sister     Coronary artery disease Brother     Coronary artery disease Maternal Grandmother     Stomach cancer Maternal Grandfather      Social History     Tobacco Use    Smoking status: Current Every Day Smoker     Packs/day: 1.00     Years: 35.00     Pack years: 35.00     Types: Cigarettes     Start date: 1/2/1984    Smokeless tobacco: Former User     Types: Chew    Tobacco comment: no smoking after  m.n prior to sx   Substance Use Topics    Alcohol use: Yes     Alcohol/week: 46.0 standard drinks     Types: 42 Cans of beer, 4 Shots of liquor per week     Comment: segrams 7, beer daily  no alcohol 72 hours prior to surgery    Drug use: Never        Review of Systems:  Review of Systems   Constitutional: Negative for activity change, appetite change, chills, fatigue, fever and unexpected weight change.   HENT: Negative for congestion, ear pain, sore throat and trouble swallowing.    Eyes: Negative for pain, redness and itching.   Respiratory: Negative for cough, shortness of breath and wheezing.    Cardiovascular: Negative for chest pain, palpitations and leg swelling.   Gastrointestinal: Negative for abdominal distention, abdominal pain, anal bleeding, blood in stool, constipation, diarrhea, nausea, rectal pain and vomiting.   Endocrine: Negative for cold intolerance, heat intolerance and polyuria.   Genitourinary: Negative for dysuria, flank pain, frequency and hematuria.   Musculoskeletal: Negative for gait problem, joint swelling and neck pain.   Skin: Negative for color change, rash and wound.   Allergic/Immunologic: Negative for environmental allergies and immunocompromised state.   Neurological: Negative for dizziness, speech difficulty, weakness and numbness.   Psychiatric/Behavioral: Negative for agitation, confusion and hallucinations.       OBJECTIVE:     Vital Signs (Most Recent)  Temp: 98.4 °F (36.9 °C) (02/07/22 1604)  Pulse: 87 (02/07/22 1604)  BP: 114/76 (02/07/22 1604)     85.8 kg (189 lb 2.5 oz)     Physical Exam:  Physical Exam  Constitutional:       Appearance: He is well-developed.   HENT:      Head: Normocephalic and atraumatic.   Eyes:      Conjunctiva/sclera: Conjunctivae normal.   Neck:      Thyroid: No thyromegaly.   Cardiovascular:      Rate and Rhythm: Regular rhythm.   Pulmonary:      Effort: Pulmonary effort is normal. No respiratory distress.   Abdominal:      Comments: Soft,  nondistended; midline incision well-healed; ostomy pink and viable with stool in bag; likely parastomal hernia   Genitourinary:     Comments: Skin flap well-perfused and well-healed without drainage or evidence of fistula tracts; no perineal hernia  Musculoskeletal:         General: No tenderness. Normal range of motion.      Cervical back: Normal range of motion.   Skin:     General: Skin is warm and dry.      Capillary Refill: Capillary refill takes less than 2 seconds.      Findings: No rash.   Neurological:      Mental Status: He is alert and oriented to person, place, and time.       Laboratory  Lab Results   Component Value Date    WBC 6.96 12/16/2021    HGB 17.8 12/16/2021    HCT 53.3 12/16/2021     12/16/2021    CHOL 158 06/01/2019    TRIG 194 (H) 06/01/2019    HDL 30 (L) 06/01/2019    ALT 10 12/16/2021    AST 8 (L) 12/16/2021     (H) 12/16/2021    K 5.0 12/16/2021     12/16/2021    CREATININE 0.9 12/16/2021    BUN 10 12/16/2021    CO2 27 12/16/2021    TSH 0.907 07/07/2020    PSA 0.58 12/02/2020    INR 1.0 06/11/2019     CEA: 1.8 (Jan '22) <-- <1.7 (Sept 2021)    Diagnostic Results:  Colonoscopy images and report reviewed which shows a rectal mass that malignant     CT June 2021:  FINDINGS:  Thoracic aorta and great vessels are unremarkable.  Heart is normal without chamber enlargement.  No pericardial or pleural effusion.  No mediastinal, hilar or axillary adenopathy.     Lungs demonstrate no acute opacity with stable 4 mm left pulmonary nodules.  No new nodule appreciated.     Within the abdomen, the liver and spleen are unchanged.  Gallbladder unremarkable.  No biliary dilatation.  Pancreas and adrenal glands are unremarkable.     Kidneys are stable in appearance with left renal lower pole staghorn calculus unchanged.  No hydronephrosis.     Stomach is unremarkable.  Small bowel nonobstructive.  Appendix normal.  Left lower quadrant colostomy present without concerning colonic  abnormality.  No ascites.  No adenopathy.     Within the pelvis, the urinary bladder demonstrates similar mild circumferential wall thickening.  Dependent intraluminal calcification/stone has decreased in size.  No pelvic mass or ascites.  Presacral postsurgical findings are stable.  Stable soft tissue scarring extending toward the left gluteus amelia muscle.     No acute osseous abnormality.     Impression:     Stable appearance of the chest, abdomen pelvis without detrimental change.    ASSESSMENT/PLAN:     56-year-old male with rectal adenocarcinoma with likely T3N1 disease based upon preop imaging without evidence of metastatic disease now s/p neoadj chemoXRT which completed on 8/13/19 but with post-tx MRI showing continued threatened mesorectal fascia who developed rectal perforation and abscess/fistula on cycle 11/12 of neoadj chemotx in Feb 2020 requiring lap diverting sigmoid colostomy with continued fluid collection/perforation/fistula on CT/MRI now s/p APR with extra-anatomic resection of fistula tract and left gluteus muscle, omental pedicle flap and VRAM with skin paddle by plastics on 4/9/2020 and now newly found unresectable polyps in the ascending and transverse colon    - Long discussion with the patient regarding his new masses in his colon.  Discussed that these were not endoscopically resectable at the most recent colonoscopy, but we would be willing to repeat his colonoscopy to see if they are now endoscopically resectable and re-attempt this versus surgical excision with completion colectomy given his previous cancer and large polyps that were seen recently.  After discussing the risks and benefits of each approach, the patient would like to undergo a completion colectomy and remove the cancer potential future which I think is reasonable at this time.  - we will plan for exploratory laparotomy, completion total colectomy and possible combination procedure with Urology to return remove his  bladder stone at the same time (will discuss with Dr Tucker). Will plan for 2/24/22  - All risks, benefits and alternatives fully explained to patient. Risks include, but are not limited to, bleeding, infection, anastomotic leak, damage to ureter, damage to other intra-abdominal organs such as colon, rectum, small bowel, stomach, liver, bladder, reproductive organs, sexual dysfunction, urinary dysfunction, postoperative abscess, conversion to open operation, perioperative MI, CVA and death.  All questions field and appropriately answered to patient's satisfaction.  Consent signed and placed on chart.  - mechanical and oral abx bowel prep  - ERAS protocol  - oral encouraged smoking cessation.  Discussed that he currently has a peristomal hernia and that is likely this hernia will recur especially if he continues to gain weight and smoke in the perioperative.  - RTC postop    Jan New MD  Colon and Rectal Surgery  [facility]

## 2022-02-12 ENCOUNTER — PATIENT MESSAGE (OUTPATIENT)
Dept: SURGERY | Facility: CLINIC | Age: 57
End: 2022-02-12
Payer: COMMERCIAL

## 2022-02-14 DIAGNOSIS — R11.0 NAUSEA: ICD-10-CM

## 2022-02-15 ENCOUNTER — PATIENT MESSAGE (OUTPATIENT)
Dept: SURGERY | Facility: CLINIC | Age: 57
End: 2022-02-15
Payer: COMMERCIAL

## 2022-02-15 DIAGNOSIS — K63.89 COLONIC MASS: Primary | ICD-10-CM

## 2022-02-15 RX ORDER — NEOMYCIN SULFATE 500 MG/1
1000 TABLET ORAL 3 TIMES DAILY
Qty: 6 TABLET | Refills: 0 | Status: ON HOLD | OUTPATIENT
Start: 2022-02-15 | End: 2022-02-24

## 2022-02-15 RX ORDER — POLYETHYLENE GLYCOL 3350 17 G/17G
238 POWDER, FOR SOLUTION ORAL DAILY
Qty: 1 EACH | Refills: 0 | Status: ON HOLD | OUTPATIENT
Start: 2022-02-15 | End: 2022-02-24

## 2022-02-15 RX ORDER — METRONIDAZOLE 500 MG/1
500 TABLET ORAL 3 TIMES DAILY
Qty: 3 TABLET | Refills: 0 | Status: ON HOLD | OUTPATIENT
Start: 2022-02-15 | End: 2022-02-24

## 2022-02-17 ENCOUNTER — PATIENT MESSAGE (OUTPATIENT)
Dept: UROLOGY | Facility: CLINIC | Age: 57
End: 2022-02-17
Payer: COMMERCIAL

## 2022-02-21 ENCOUNTER — HOSPITAL ENCOUNTER (OUTPATIENT)
Dept: CARDIOLOGY | Facility: HOSPITAL | Age: 57
Discharge: HOME | DRG: 331 | End: 2022-02-21
Attending: COLON & RECTAL SURGERY
Payer: COMMERCIAL

## 2022-02-21 ENCOUNTER — LAB VISIT (OUTPATIENT)
Dept: PRIMARY CARE CLINIC | Facility: CLINIC | Age: 57
End: 2022-02-21
Payer: COMMERCIAL

## 2022-02-21 DIAGNOSIS — Z01.818 PRE-OP TESTING: ICD-10-CM

## 2022-02-21 PROCEDURE — 93010 ELECTROCARDIOGRAM REPORT: CPT | Mod: ,,, | Performed by: INTERNAL MEDICINE

## 2022-02-21 PROCEDURE — 93005 ELECTROCARDIOGRAM TRACING: CPT

## 2022-02-21 PROCEDURE — 93010 EKG 12-LEAD: ICD-10-PCS | Mod: ,,, | Performed by: INTERNAL MEDICINE

## 2022-02-21 PROCEDURE — U0005 INFEC AGEN DETEC AMPLI PROBE: HCPCS | Performed by: COLON & RECTAL SURGERY

## 2022-02-21 PROCEDURE — U0003 INFECTIOUS AGENT DETECTION BY NUCLEIC ACID (DNA OR RNA); SEVERE ACUTE RESPIRATORY SYNDROME CORONAVIRUS 2 (SARS-COV-2) (CORONAVIRUS DISEASE [COVID-19]), AMPLIFIED PROBE TECHNIQUE, MAKING USE OF HIGH THROUGHPUT TECHNOLOGIES AS DESCRIBED BY CMS-2020-01-R: HCPCS | Performed by: COLON & RECTAL SURGERY

## 2022-02-22 LAB
SARS-COV-2 RNA RESP QL NAA+PROBE: NOT DETECTED
SARS-COV-2- CYCLE NUMBER: NORMAL

## 2022-02-23 ENCOUNTER — ANESTHESIA EVENT (OUTPATIENT)
Dept: SURGERY | Facility: HOSPITAL | Age: 57
DRG: 331 | End: 2022-02-23
Payer: COMMERCIAL

## 2022-02-23 NOTE — PRE ADMISSION SCREENING
Pre op instructions reviewed with patient per phone:    To confirm, Your surgeon has instructed you:  Surgery is scheduled 22at 09:40am.      Please report to [facility] 1[facility] floor main lobby by 08:00am.   Pre admit office to call afternoon prior to surgery with final arrival time    Covid 19 testin/21 (-)    INSTRUCTIONS IMPORTANT!!!  Reviewed Metronidazole and Neomycin instructions: Take at 2:00pm, 3:00pm and final dose at 10:00pm. Clear liquid diet today, nothing red or purple. Glycolax:Mix with 2L of gatorade, drink all mixed solution starting@12pm day before surgery, finish by 10:00pm. Pt vu all instructions.    ¨ Do not eat, drink, or smoke after 12 midnight prior to surgery, including water. OK to brush teeth, no gum, candy or mints!    ¨ Take only these medicines with a small swallow of water-morning of surgery.  none    -------   Do not shave morning of surgery  ____   1 visitor is  allowed in the pre operative area, no one under the age of 16,and must adhere to social distancing guidelines.  1 visitor/family member is allowed to              visit non covid  in-patients in their room        ____   Family/caregivers will be updated re pt status via text/cell phone      ____  Do not wear makeup, including mascara.  ____  No powder, lotions or creams to surgical area.  ____  Please remove all jewelry, including piercings and leave at home.  ____  No money or valuables needed. Please leave at home.  ____  Please bring identification and insurance information to hospital.  ____  If going home the same day, arrange for a ride home. You will not be able to   drive if Anesthesia was used.  ____  Children, under 12 years old, must remain in the waiting room with an adult.  They are not allowed in patient areas.  ____  Wear loose fitting clothing. Allow for dressings, bandages.  ____  Stop Aspirin, Ibuprofen, Motrin and Aleve at least 5-7 days before surgery, unless otherwise  instructed by your doctor, or the nurse.   You MAY use Tylenol/acetaminophen until day of surgery.  ____  If you take diabetic medication, do not take am of surgery unless instructed by   Doctor.  ____ Stop taking any Fish Oil supplement or any Vitamins that contain Vitamin E at least 5 days prior to surgery.          Bathing Instructions-- The night before surgery and the morning prior to coming to the hospital:   -Do not shave the surgical area.   -Shower and wash your hair and body as usual with anti-bacterial  soap and shampoo.   -Rinse your hair and body completely.   -Use one packet of hibiclens to wash the surgical site (using your hand) gently for 5 minutes.  Do not scrub you skin too hard.   -Do not use hibiclens on your head, face, or genitals.   -Do not wash with anti-bacterial soap after you use the hibiclens.   -Rinse your body thoroughly.   -Dry with clean, soft towel.  Do not use lotion, cream, deodorant, or powders on   the surgical site.    Use antibacterial soap in place of hibiclens if your surgery is on the head, face or genitals.         Surgical Site Infection    Prevention of surgical site infections:     -Keep incisions clean and dry.   -Do not soak/submerge incisions in water until completely healed.   -Do not apply lotions, powders, creams, or deodorants to site.   -Always make sure hands are cleaned with antibacterial soap/ alcohol-based   prior to touching the surgical site.  (This includes doctors, nurses, staff, and yourself.)    Signs and symptoms:   -Redness and pain around the area where you had surgery   -Drainage of cloudy fluid from your surgical wound   -Fever over 100.4  I have read or had read and explained to me, and understand the above information.

## 2022-02-24 ENCOUNTER — ANESTHESIA (OUTPATIENT)
Dept: SURGERY | Facility: HOSPITAL | Age: 57
DRG: 331 | End: 2022-02-24
Payer: COMMERCIAL

## 2022-02-24 ENCOUNTER — HOSPITAL ENCOUNTER (INPATIENT)
Facility: HOSPITAL | Age: 57
LOS: 3 days | Discharge: HOME | DRG: 331 | End: 2022-02-27
Attending: COLON & RECTAL SURGERY | Admitting: COLON & RECTAL SURGERY
Payer: COMMERCIAL

## 2022-02-24 DIAGNOSIS — K63.89 COLONIC MASS: Primary | ICD-10-CM

## 2022-02-24 DIAGNOSIS — Z85.048 HISTORY OF RECTAL CANCER: ICD-10-CM

## 2022-02-24 LAB — POCT GLUCOSE: 133 MG/DL (ref 70–110)

## 2022-02-24 PROCEDURE — 71000033 HC RECOVERY, INTIAL HOUR: Performed by: COLON & RECTAL SURGERY

## 2022-02-24 PROCEDURE — 44120 REMOVAL OF SMALL INTESTINE: CPT | Mod: 51,,, | Performed by: COLON & RECTAL SURGERY

## 2022-02-24 PROCEDURE — S0030 INJECTION, METRONIDAZOLE: HCPCS | Performed by: COLON & RECTAL SURGERY

## 2022-02-24 PROCEDURE — C9290 INJ, BUPIVACAINE LIPOSOME: HCPCS | Performed by: COLON & RECTAL SURGERY

## 2022-02-24 PROCEDURE — 88307 PR  SURG PATH,LEVEL V: ICD-10-PCS | Mod: 26,,, | Performed by: PATHOLOGY

## 2022-02-24 PROCEDURE — 25000003 PHARM REV CODE 250: Performed by: COLON & RECTAL SURGERY

## 2022-02-24 PROCEDURE — 94761 N-INVAS EAR/PLS OXIMETRY MLT: CPT

## 2022-02-24 PROCEDURE — 52317 PR REMOVE BLADDER STONE,<2.5 CM: ICD-10-PCS | Mod: ,,, | Performed by: UROLOGY

## 2022-02-24 PROCEDURE — 88300 SURGICAL PATH GROSS: CPT | Mod: 26,59,, | Performed by: PATHOLOGY

## 2022-02-24 PROCEDURE — 63600175 PHARM REV CODE 636 W HCPCS: Performed by: COLON & RECTAL SURGERY

## 2022-02-24 PROCEDURE — 25000003 PHARM REV CODE 250: Performed by: NURSE ANESTHETIST, CERTIFIED REGISTERED

## 2022-02-24 PROCEDURE — 36000708 HC OR TIME LEV III 1ST 15 MIN: Performed by: COLON & RECTAL SURGERY

## 2022-02-24 PROCEDURE — 99900035 HC TECH TIME PER 15 MIN (STAT)

## 2022-02-24 PROCEDURE — 25000003 PHARM REV CODE 250

## 2022-02-24 PROCEDURE — 44155 PR REMOVAL COLON/PROCTECTOMY/ILEOSTOMY: ICD-10-PCS | Mod: 22,,, | Performed by: COLON & RECTAL SURGERY

## 2022-02-24 PROCEDURE — 44155 REMOVAL OF COLON/ILEOSTOMY: CPT | Mod: 22,,, | Performed by: COLON & RECTAL SURGERY

## 2022-02-24 PROCEDURE — 11000001 HC ACUTE MED/SURG PRIVATE ROOM

## 2022-02-24 PROCEDURE — 88300 PR  SURG PATH,GROSS,LEVEL I: ICD-10-PCS | Mod: 26,59,, | Performed by: PATHOLOGY

## 2022-02-24 PROCEDURE — 71000039 HC RECOVERY, EACH ADD'L HOUR: Performed by: COLON & RECTAL SURGERY

## 2022-02-24 PROCEDURE — 44120 PR RESECT SMALL INTEST,SINGL RESEC/ANAS: ICD-10-PCS | Mod: 51,,, | Performed by: COLON & RECTAL SURGERY

## 2022-02-24 PROCEDURE — 88307 TISSUE EXAM BY PATHOLOGIST: CPT | Mod: 26,,, | Performed by: PATHOLOGY

## 2022-02-24 PROCEDURE — 52317 REMOVE BLADDER STONE: CPT | Mod: ,,, | Performed by: UROLOGY

## 2022-02-24 PROCEDURE — 63600175 PHARM REV CODE 636 W HCPCS

## 2022-02-24 PROCEDURE — 27000221 HC OXYGEN, UP TO 24 HOURS

## 2022-02-24 PROCEDURE — 36000709 HC OR TIME LEV III EA ADD 15 MIN: Performed by: COLON & RECTAL SURGERY

## 2022-02-24 PROCEDURE — 88307 TISSUE EXAM BY PATHOLOGIST: CPT | Performed by: PATHOLOGY

## 2022-02-24 PROCEDURE — 63600175 PHARM REV CODE 636 W HCPCS: Performed by: NURSE ANESTHETIST, CERTIFIED REGISTERED

## 2022-02-24 PROCEDURE — 88300 SURGICAL PATH GROSS: CPT | Performed by: PATHOLOGY

## 2022-02-24 PROCEDURE — 27201423 OPTIME MED/SURG SUP & DEVICES STERILE SUPPLY: Performed by: COLON & RECTAL SURGERY

## 2022-02-24 PROCEDURE — 37000009 HC ANESTHESIA EA ADD 15 MINS: Performed by: COLON & RECTAL SURGERY

## 2022-02-24 PROCEDURE — 94799 UNLISTED PULMONARY SVC/PX: CPT

## 2022-02-24 PROCEDURE — 37000008 HC ANESTHESIA 1ST 15 MINUTES: Performed by: COLON & RECTAL SURGERY

## 2022-02-24 PROCEDURE — 63600175 PHARM REV CODE 636 W HCPCS: Performed by: ANESTHESIOLOGY

## 2022-02-24 RX ORDER — ACETAMINOPHEN 500 MG
1000 TABLET ORAL ONCE
Status: COMPLETED | OUTPATIENT
Start: 2022-02-24 | End: 2022-02-24

## 2022-02-24 RX ORDER — GABAPENTIN 300 MG/1
300 CAPSULE ORAL 3 TIMES DAILY
Status: DISCONTINUED | OUTPATIENT
Start: 2022-02-24 | End: 2022-02-27 | Stop reason: HOSPADM

## 2022-02-24 RX ORDER — SUCCINYLCHOLINE CHLORIDE 20 MG/ML
INJECTION INTRAMUSCULAR; INTRAVENOUS
Status: DISCONTINUED | OUTPATIENT
Start: 2022-02-24 | End: 2022-02-24

## 2022-02-24 RX ORDER — HEPARIN SODIUM 5000 [USP'U]/ML
5000 INJECTION, SOLUTION INTRAVENOUS; SUBCUTANEOUS
Status: COMPLETED | OUTPATIENT
Start: 2022-02-24 | End: 2022-02-24

## 2022-02-24 RX ORDER — FAMOTIDINE 20 MG/1
20 TABLET, FILM COATED ORAL 2 TIMES DAILY
Status: DISCONTINUED | OUTPATIENT
Start: 2022-02-24 | End: 2022-02-27 | Stop reason: HOSPADM

## 2022-02-24 RX ORDER — KETOROLAC TROMETHAMINE 30 MG/ML
15 INJECTION, SOLUTION INTRAMUSCULAR; INTRAVENOUS EVERY 8 HOURS PRN
Status: DISCONTINUED | OUTPATIENT
Start: 2022-02-24 | End: 2022-02-24 | Stop reason: HOSPADM

## 2022-02-24 RX ORDER — OXYCODONE AND ACETAMINOPHEN 5; 325 MG/1; MG/1
1 TABLET ORAL
Status: DISCONTINUED | OUTPATIENT
Start: 2022-02-24 | End: 2022-02-24 | Stop reason: HOSPADM

## 2022-02-24 RX ORDER — HYDROMORPHONE HYDROCHLORIDE 2 MG/ML
0.2 INJECTION, SOLUTION INTRAMUSCULAR; INTRAVENOUS; SUBCUTANEOUS EVERY 5 MIN PRN
Status: COMPLETED | OUTPATIENT
Start: 2022-02-24 | End: 2022-02-24

## 2022-02-24 RX ORDER — ONDANSETRON 2 MG/ML
4 INJECTION INTRAMUSCULAR; INTRAVENOUS EVERY 6 HOURS PRN
Status: DISCONTINUED | OUTPATIENT
Start: 2022-02-24 | End: 2022-02-27 | Stop reason: HOSPADM

## 2022-02-24 RX ORDER — NALOXONE HYDROCHLORIDE 1 MG/ML
0.02 INJECTION INTRAMUSCULAR; INTRAVENOUS; SUBCUTANEOUS
Status: DISCONTINUED | OUTPATIENT
Start: 2022-02-24 | End: 2022-02-27 | Stop reason: HOSPADM

## 2022-02-24 RX ORDER — SODIUM CHLORIDE, SODIUM LACTATE, POTASSIUM CHLORIDE, CALCIUM CHLORIDE 600; 310; 30; 20 MG/100ML; MG/100ML; MG/100ML; MG/100ML
INJECTION, SOLUTION INTRAVENOUS CONTINUOUS
Status: DISCONTINUED | OUTPATIENT
Start: 2022-02-24 | End: 2022-02-24

## 2022-02-24 RX ORDER — SODIUM CHLORIDE, SODIUM LACTATE, POTASSIUM CHLORIDE, CALCIUM CHLORIDE 600; 310; 30; 20 MG/100ML; MG/100ML; MG/100ML; MG/100ML
INJECTION, SOLUTION INTRAVENOUS CONTINUOUS
Status: DISCONTINUED | OUTPATIENT
Start: 2022-02-24 | End: 2022-02-26

## 2022-02-24 RX ORDER — ROCURONIUM BROMIDE 10 MG/ML
INJECTION, SOLUTION INTRAVENOUS
Status: DISCONTINUED | OUTPATIENT
Start: 2022-02-24 | End: 2022-02-24

## 2022-02-24 RX ORDER — CHLORHEXIDINE GLUCONATE ORAL RINSE 1.2 MG/ML
10 SOLUTION DENTAL 2 TIMES DAILY
Status: DISCONTINUED | OUTPATIENT
Start: 2022-02-24 | End: 2022-02-27 | Stop reason: HOSPADM

## 2022-02-24 RX ORDER — ENOXAPARIN SODIUM 100 MG/ML
40 INJECTION SUBCUTANEOUS EVERY 24 HOURS
Status: DISCONTINUED | OUTPATIENT
Start: 2022-02-25 | End: 2022-02-27 | Stop reason: HOSPADM

## 2022-02-24 RX ORDER — PHENYLEPHRINE HYDROCHLORIDE 10 MG/ML
INJECTION INTRAVENOUS
Status: DISCONTINUED | OUTPATIENT
Start: 2022-02-24 | End: 2022-02-24

## 2022-02-24 RX ORDER — FENTANYL CITRATE 50 UG/ML
INJECTION, SOLUTION INTRAMUSCULAR; INTRAVENOUS
Status: DISCONTINUED | OUTPATIENT
Start: 2022-02-24 | End: 2022-02-24

## 2022-02-24 RX ORDER — ONDANSETRON 2 MG/ML
4 INJECTION INTRAMUSCULAR; INTRAVENOUS DAILY PRN
Status: DISCONTINUED | OUTPATIENT
Start: 2022-02-24 | End: 2022-02-24 | Stop reason: HOSPADM

## 2022-02-24 RX ORDER — BUPIVACAINE HYDROCHLORIDE 2.5 MG/ML
INJECTION, SOLUTION EPIDURAL; INFILTRATION; INTRACAUDAL
Status: DISCONTINUED | OUTPATIENT
Start: 2022-02-24 | End: 2022-02-24 | Stop reason: HOSPADM

## 2022-02-24 RX ORDER — FINASTERIDE 5 MG/1
5 TABLET, FILM COATED ORAL DAILY
Status: DISCONTINUED | OUTPATIENT
Start: 2022-02-24 | End: 2022-02-27 | Stop reason: HOSPADM

## 2022-02-24 RX ORDER — GABAPENTIN 400 MG/1
800 CAPSULE ORAL
Status: COMPLETED | OUTPATIENT
Start: 2022-02-24 | End: 2022-02-24

## 2022-02-24 RX ORDER — KETAMINE HYDROCHLORIDE 50 MG/ML
INJECTION, SOLUTION INTRAMUSCULAR; INTRAVENOUS
Status: DISCONTINUED | OUTPATIENT
Start: 2022-02-24 | End: 2022-02-24

## 2022-02-24 RX ORDER — SODIUM CHLORIDE, SODIUM LACTATE, POTASSIUM CHLORIDE, CALCIUM CHLORIDE 600; 310; 30; 20 MG/100ML; MG/100ML; MG/100ML; MG/100ML
INJECTION, SOLUTION INTRAVENOUS CONTINUOUS PRN
Status: DISCONTINUED | OUTPATIENT
Start: 2022-02-24 | End: 2022-02-24

## 2022-02-24 RX ORDER — ACETAMINOPHEN 10 MG/ML
1000 INJECTION, SOLUTION INTRAVENOUS EVERY 8 HOURS
Status: COMPLETED | OUTPATIENT
Start: 2022-02-24 | End: 2022-02-25

## 2022-02-24 RX ORDER — ONDANSETRON 2 MG/ML
4 INJECTION INTRAMUSCULAR; INTRAVENOUS EVERY 12 HOURS PRN
Status: DISCONTINUED | OUTPATIENT
Start: 2022-02-24 | End: 2022-02-24 | Stop reason: HOSPADM

## 2022-02-24 RX ORDER — DEXAMETHASONE SODIUM PHOSPHATE 4 MG/ML
INJECTION, SOLUTION INTRA-ARTICULAR; INTRALESIONAL; INTRAMUSCULAR; INTRAVENOUS; SOFT TISSUE
Status: DISCONTINUED | OUTPATIENT
Start: 2022-02-24 | End: 2022-02-24

## 2022-02-24 RX ORDER — HYDROMORPHONE HYDROCHLORIDE 2 MG/ML
INJECTION, SOLUTION INTRAMUSCULAR; INTRAVENOUS; SUBCUTANEOUS
Status: DISCONTINUED | OUTPATIENT
Start: 2022-02-24 | End: 2022-02-24

## 2022-02-24 RX ORDER — DIPHENHYDRAMINE HYDROCHLORIDE 50 MG/ML
12.5 INJECTION INTRAMUSCULAR; INTRAVENOUS EVERY 6 HOURS PRN
Status: DISCONTINUED | OUTPATIENT
Start: 2022-02-24 | End: 2022-02-27 | Stop reason: HOSPADM

## 2022-02-24 RX ORDER — PROPOFOL 10 MG/ML
VIAL (ML) INTRAVENOUS
Status: DISCONTINUED | OUTPATIENT
Start: 2022-02-24 | End: 2022-02-24

## 2022-02-24 RX ORDER — METRONIDAZOLE 500 MG/100ML
500 INJECTION, SOLUTION INTRAVENOUS
Status: COMPLETED | OUTPATIENT
Start: 2022-02-24 | End: 2022-02-24

## 2022-02-24 RX ORDER — TAMSULOSIN HYDROCHLORIDE 0.4 MG/1
0.4 CAPSULE ORAL DAILY
Status: DISCONTINUED | OUTPATIENT
Start: 2022-02-24 | End: 2022-02-27 | Stop reason: HOSPADM

## 2022-02-24 RX ORDER — MIDAZOLAM HYDROCHLORIDE 1 MG/ML
INJECTION INTRAMUSCULAR; INTRAVENOUS
Status: DISCONTINUED | OUTPATIENT
Start: 2022-02-24 | End: 2022-02-24

## 2022-02-24 RX ORDER — CELECOXIB 100 MG/1
400 CAPSULE ORAL
Status: COMPLETED | OUTPATIENT
Start: 2022-02-24 | End: 2022-02-24

## 2022-02-24 RX ORDER — MORPHINE SULFATE 1 MG/ML
INJECTION INTRAVENOUS CONTINUOUS
Status: DISCONTINUED | OUTPATIENT
Start: 2022-02-24 | End: 2022-02-26

## 2022-02-24 RX ORDER — LIDOCAINE HYDROCHLORIDE 10 MG/ML
INJECTION, SOLUTION EPIDURAL; INFILTRATION; INTRACAUDAL; PERINEURAL
Status: DISCONTINUED | OUTPATIENT
Start: 2022-02-24 | End: 2022-02-24

## 2022-02-24 RX ADMIN — CHLORHEXIDINE GLUCONATE 0.12% ORAL RINSE 10 ML: 1.2 LIQUID ORAL at 09:02

## 2022-02-24 RX ADMIN — PHENYLEPHRINE HYDROCHLORIDE 100 MCG: 10 INJECTION INTRAVENOUS at 11:02

## 2022-02-24 RX ADMIN — HYDROMORPHONE HYDROCHLORIDE 0.2 MG: 2 INJECTION INTRAMUSCULAR; INTRAVENOUS; SUBCUTANEOUS at 02:02

## 2022-02-24 RX ADMIN — KETAMINE HYDROCHLORIDE 25 MG: 50 INJECTION, SOLUTION, CONCENTRATE INTRAMUSCULAR; INTRAVENOUS at 10:02

## 2022-02-24 RX ADMIN — SUGAMMADEX 200 MG: 100 INJECTION, SOLUTION INTRAVENOUS at 01:02

## 2022-02-24 RX ADMIN — SODIUM CHLORIDE, SODIUM LACTATE, POTASSIUM CHLORIDE, AND CALCIUM CHLORIDE: .6; .31; .03; .02 INJECTION, SOLUTION INTRAVENOUS at 02:02

## 2022-02-24 RX ADMIN — FENTANYL CITRATE 50 MCG: 50 INJECTION, SOLUTION INTRAMUSCULAR; INTRAVENOUS at 11:02

## 2022-02-24 RX ADMIN — MORPHINE SULFATE: 1 INJECTION INTRAVENOUS at 02:02

## 2022-02-24 RX ADMIN — ROCURONIUM BROMIDE 5 MG: 10 INJECTION, SOLUTION INTRAVENOUS at 09:02

## 2022-02-24 RX ADMIN — HEPARIN SODIUM 5000 UNITS: 5000 INJECTION INTRAVENOUS; SUBCUTANEOUS at 08:02

## 2022-02-24 RX ADMIN — FENTANYL CITRATE 100 MCG: 50 INJECTION, SOLUTION INTRAMUSCULAR; INTRAVENOUS at 09:02

## 2022-02-24 RX ADMIN — CEFTRIAXONE SODIUM 2 G: 2 INJECTION, SOLUTION INTRAVENOUS at 10:02

## 2022-02-24 RX ADMIN — ACETAMINOPHEN 1000 MG: 10 INJECTION INTRAVENOUS at 09:02

## 2022-02-24 RX ADMIN — HYDROMORPHONE HYDROCHLORIDE 0.4 MG: 2 INJECTION INTRAMUSCULAR; INTRAVENOUS; SUBCUTANEOUS at 01:02

## 2022-02-24 RX ADMIN — KETAMINE HYDROCHLORIDE 10 MG: 50 INJECTION, SOLUTION, CONCENTRATE INTRAMUSCULAR; INTRAVENOUS at 11:02

## 2022-02-24 RX ADMIN — GABAPENTIN 300 MG: 300 CAPSULE ORAL at 03:02

## 2022-02-24 RX ADMIN — SUCCINYLCHOLINE CHLORIDE 160 MG: 20 INJECTION, SOLUTION INTRAMUSCULAR; INTRAVENOUS at 09:02

## 2022-02-24 RX ADMIN — GABAPENTIN 800 MG: 400 CAPSULE ORAL at 08:02

## 2022-02-24 RX ADMIN — PROPOFOL 30 MG: 10 INJECTION, EMULSION INTRAVENOUS at 11:02

## 2022-02-24 RX ADMIN — FAMOTIDINE 20 MG: 20 TABLET ORAL at 09:02

## 2022-02-24 RX ADMIN — CELECOXIB 400 MG: 100 CAPSULE ORAL at 08:02

## 2022-02-24 RX ADMIN — MIDAZOLAM HYDROCHLORIDE 2 MG: 1 INJECTION, SOLUTION INTRAMUSCULAR; INTRAVENOUS at 09:02

## 2022-02-24 RX ADMIN — ROCURONIUM BROMIDE 10 MG: 10 INJECTION, SOLUTION INTRAVENOUS at 11:02

## 2022-02-24 RX ADMIN — ACETAMINOPHEN 1000 MG: 500 TABLET ORAL at 08:02

## 2022-02-24 RX ADMIN — FINASTERIDE 5 MG: 5 TABLET, FILM COATED ORAL at 03:02

## 2022-02-24 RX ADMIN — GABAPENTIN 300 MG: 300 CAPSULE ORAL at 09:02

## 2022-02-24 RX ADMIN — ROCURONIUM BROMIDE 10 MG: 10 INJECTION, SOLUTION INTRAVENOUS at 12:02

## 2022-02-24 RX ADMIN — HYDROMORPHONE HYDROCHLORIDE 0.2 MG: 2 INJECTION INTRAMUSCULAR; INTRAVENOUS; SUBCUTANEOUS at 01:02

## 2022-02-24 RX ADMIN — PROPOFOL 130 MG: 10 INJECTION, EMULSION INTRAVENOUS at 09:02

## 2022-02-24 RX ADMIN — METRONIDAZOLE 500 MG: 500 SOLUTION INTRAVENOUS at 09:02

## 2022-02-24 RX ADMIN — ROCURONIUM BROMIDE 30 MG: 10 INJECTION, SOLUTION INTRAVENOUS at 10:02

## 2022-02-24 RX ADMIN — KETOROLAC TROMETHAMINE 15 MG: 30 INJECTION, SOLUTION INTRAMUSCULAR at 02:02

## 2022-02-24 RX ADMIN — FENTANYL CITRATE 50 MCG: 50 INJECTION, SOLUTION INTRAMUSCULAR; INTRAVENOUS at 10:02

## 2022-02-24 RX ADMIN — LIDOCAINE HYDROCHLORIDE 100 MG: 10 INJECTION, SOLUTION EPIDURAL; INFILTRATION; INTRACAUDAL; PERINEURAL at 09:02

## 2022-02-24 RX ADMIN — SODIUM CHLORIDE, SODIUM LACTATE, POTASSIUM CHLORIDE, AND CALCIUM CHLORIDE: .6; .31; .03; .02 INJECTION, SOLUTION INTRAVENOUS at 11:02

## 2022-02-24 RX ADMIN — ROCURONIUM BROMIDE 35 MG: 10 INJECTION, SOLUTION INTRAVENOUS at 09:02

## 2022-02-24 RX ADMIN — TAMSULOSIN HYDROCHLORIDE 0.4 MG: 0.4 CAPSULE ORAL at 03:02

## 2022-02-24 RX ADMIN — DEXAMETHASONE SODIUM PHOSPHATE 8 MG: 4 INJECTION, SOLUTION INTRAMUSCULAR; INTRAVENOUS at 10:02

## 2022-02-24 RX ADMIN — SODIUM CHLORIDE, SODIUM LACTATE, POTASSIUM CHLORIDE, AND CALCIUM CHLORIDE: .6; .31; .03; .02 INJECTION, SOLUTION INTRAVENOUS at 09:02

## 2022-02-24 NOTE — ANESTHESIA POSTPROCEDURE EVALUATION
Anesthesia Post Evaluation    Patient: Sukhjinder Presley    Procedure(s) Performed: Procedure(s) (LRB):  COLECTOMY, TOTAL (N/A)  CYSTOLITHOLAPAXY (N/A)  BLOCK, TRANSVERSUS ABDOMINIS PLANE  EXCISION, SMALL INTESTINE (N/A)  CREATION, ILEAL CONDUIT    Final Anesthesia Type: general      Patient location during evaluation: PACU  Patient participation: Yes- Able to Participate  Level of consciousness: awake and alert  Post-procedure vital signs: reviewed and stable  Pain management: adequate  Airway patency: patent  GABRIEL mitigation strategies: Verification of full reversal of neuromuscular block  PONV status at discharge: No PONV  Anesthetic complications: no      Cardiovascular status: hemodynamically stable  Respiratory status: spontaneous ventilation  Hydration status: euvolemic  Follow-up not needed.          Vitals Value Taken Time   /70 02/24/22 1440   Temp  02/24/22 1444   Pulse 71 02/24/22 1443   Resp 8 02/24/22 1443   SpO2 96 % 02/24/22 1443   Vitals shown include unvalidated device data.      No case tracking events are documented in the log.      Pain/Stacy Score: Pain Rating Prior to Med Admin: 6 (2/24/2022  2:30 PM)  Stacy Score: 10 (2/24/2022  2:20 PM)

## 2022-02-24 NOTE — OP NOTE
[facility]  Operative Note    Date: 02/24/2022    Pre-Op Diagnosis: bladder stone    Post-Op Diagnosis: same    Procedure(s) Performed:   Cystolitholapaxy     Specimen(s): stone for gross only    Surgeon: Sukhjinder Tucker MD    Anesthesia: General endotracheal anesthesia    Indications: Sukhjinder Presley is a 56 y.o. male with a history of incomplete bladder emptying and bladder stones presents for combo cystolitholapaxy with colectomy with Dr New    Findings: normal bladder, approx 2cm bladder stone fragmented extracted with the Elik     Estimated Blood Loss: min    Drains: 20Fr liz    Procedure in detail:  After informed consent was obtained, the patient was brought the the cystoscopy suite and placed in the supine position.  SCDs were applied and working.  Anesthesia was administered.  The patient was then placed in the dorsal lithotomy position and prepped and draped in the usual sterile fashion.      A 28Fr laser scope was inserted under direct vision with the visual .  Prostate was noted to be open consistent with prior TURP.  Bladder was entered, bilateral ureteral orifices were noted to be in normal orthotopic position.  Bladder mucosa was normal.  There was a 2 cm stone noted at the trigone.  The visual obturator was exchanged for the laser bridge, and a 500 micron laser fiber was inserted through the scope.  The stone was fragmented into extractable fragments.  The fragments were removed using the Telma evacuator.  Thereafter, the scope was removed and a Liz catheter was inserted with 10 cc sterile water in the balloon.  The patient was then turned over to Dr. New for his portion of the procedure.    Disposition: Patient will stay overnight at least, will f/u in the AM, likely d/c liz once ambulatory     Sukhjinder Tucker MD

## 2022-02-24 NOTE — OP NOTE
[facility] - Surgery ([facility])  Surgery Department  Operative Note    SUMMARY     Date of Procedure: 2/24/2022     Procedure:  1. Total abdominal colectomy  2. Small bowel resection  3. Lysis of adhesions  4. End ileostomy construction  5. Transversus abdominis plane block    Surgeon(s) and Role:  Panel 1:     * Jan New MD - Primary    Assisting Surgeon: None    Pre-Operative Diagnosis: Colonic mass [K63.89]  History of rectal cancer [Z85.048]    Post-Operative Diagnosis: Post-Op Diagnosis Codes:     * Colonic mass [K63.89]     * History of rectal cancer [Z85.048]    Anesthesia: General    Technical Procedures Used:   1. Total abdominal colectomy  2. Small bowel resection  3. Lysis of adhesions  4. End ileostomy construction  5. Transversus abdominis plane block    Indications for Procedure:  56-year-old male with previous rectal adenocarcinoma who required APR who has unresectable colonic polyps/masses who presents for completion total colectomy with end ileostomy construction    Findings of the Procedure:  Diffuse adhesions throughout the abdominal cavity including interloop small bowel adhesions, adhesions to the anterior abdominal wall and into the pelvis    Description of the Procedure:  Patient was brought to the operating room and placed supine on the table.  General endotracheal anesthesia was then induced.  The patient was then moved to lithotomy position.  The abdomen as well as the perineum and penis were then prepped and draped usual sterile fashion.  A preoperative surgical time-out was performed confirming the correct patient, procedure and preop medications given.  The colostomy site was suture closed using a 0 silk suture in usual fashion and covered with a Tegaderm.  The case was then turned over to Urology for their portion of the operation for the bladder stone.  Please see their separately dictated operative for full details.    Once the case was turned back over to us, a midline incision  was made through his previous midline incision.  Dissection was carried down to level of fascia.  The fascia was sharply incised and the abdomen was entered sharply under direct visualization.  There was no injury to underlying structures upon entry.  Immediately upon entering the abdominal cavity, there were large amount of adhesions to the anterior abdominal wall including to the midline in the lower midline.  A 1 hour lysis of adhesion was then performed taking down all the adhesions to this area as well as the anterior abdominal wall.  There was also some interloop small bowel adhesions were also lysed during this process.  The incision was extended inferiorly towards the pubis and superiorly toward the xiphoid process from the previous incision.  There was a few serosal tears that were made during the takedown of the small intestine from the anterior abdominal wall.  These were marked with a Vicryl suture for evaluation later time.  The small intestine was also adherent into the pelvis where the pedicle flap of the omentum was as well as a previous muscle flap.  This was sharply lysed to free the small intestine from this area without injury to either the pedicles as well as no injury to the small intestine.  Total lysis of adhesion time was around 1 hour.  Once all small intestine was freed, attention was turned to the colon resection.  The abdomen was also checked for signs of metastatic disease and there was none.    Attention was then turned to the right lower quadrant and the cecum and appendix were identified.  The white line of Toldt along the ascending colon was sharply incised and a lateral to medial dissection was undertaken to free the colon from its underlying attachments to the retroperitoneal structures including the perinephric fascia as well as the duodenum which were protected throughout this process.  This was carried up to the level of the hepatic flexure which was also sharply taken down  using electrocautery and blunt dissection.  Once the colon had been mobilized fully, attention was turned to the ileocolic pedicle.  A mesenteric window was made on either side of the ileocolic pedicle.  And it was confirmed that there was no attachment to the duodenum that was left and it was well away.  High ligation of the ileocolic pedicle was undertaken using the EnSeal device.  The pedicle was completely hemostatic after taking it.  Once this was completed, attention was turned to the transverse colon.  The lesser sac was entered and the omentum was dissected off of the transverse colon of its entire length.  Care was taken to not injure the omentum as this was a pedicle into the pelvis on the left side.  The colon and mesocolon were freed from its attachments and care was taken to protect the stomach, duodenum and other structures surrounding the colon mesocolon during this process which were not injured.    Once this was fully mobilized, attention was turned to the splenic flexure.  The splenic flexure was then mobilized as the lesser sac was already entered by taking down the gastrocolic, splenocolic and phrenocolic ligaments.  The dissection was carried around the splenic flexure along the white line of Toldt to fully free the splenic flexure and mesocolon.  The white line of Toldt was incised all way down to where the colostomy was.  Once this was completed all flimsy attachments were taken down, the colon was completely mobilized and ready for mesenteric ligation.  Care was taken to protect the surrounding structures including the spleen which were not injured during this process.  Attention was turned externally to the colostomy site.  The mucocutaneous junction was then circumferentially incised using electrocautery.  Dissection was carried down to level of fascia which was sharply incised although was hernia sac involved in this.  Once the colostomy site was freed of its attachments to the  subcutaneous tissues and circumferential fascia, it was placed back within the abdominal cavity without injury to the intestine itself.      The mesenteric ligation was then performed using the EnSeal device going from the colostomy site up to the level of the splenic flexure as the MAGDALENO had previously been taken at the previous resection.  Once this was accomplished, the mesentery was taken just above the ligament of Treitz in usual fashion with care taken to protect the ligament of Treitz and the small bowel.  The middle colic vessels were then ligated via high ligation using the EnSeal device.  After taking it the stumps were completely hemostatic.  The remaining mesentery between the middle colic vessels in the previously taken ileocolic pedicle was then taken using the EnSeal device to complete the resection.  All sites were then found to be completely hemostatic.    Attention was then turned to the terminal ileum.  A mesenteric window was made just proximal to the ileocecal valve.  A MINERVA 75 blue load stapler was used to transect the terminal ileum at this location.  The remaining mesentery between this stapled end and the previously taken ileocolic pedicle was then taken using the EnSeal device.  Once this was completed, the colon was then passed off the field for final pathology.  Attention was then turned to the small intestine.  This was run from the ligament of Treitz to the stapled end of the terminal ileum.  There was an area in the midportion which had a large serosal tear that was not amenable to repair and therefore would require resection.  Mesenteric window was made on either side of the serosal tear and the EnSeal device was used to transect the mesentery just below the lumen.  The lumen of small intestine was opened on either side of this and a MINERVA 75 blue load stapler was used to construct the common anastomosis for the side-to-side functional end-to-end stapled anastomosis.  The common enterotomy  was then closed using a MINERVA 75 blue load stapler for a Camilo technique in usual fashion.  The final staple line was then oversewn with a 2-0 Vicryl suture.  A 3-0 Vicryl suture used to place a crotch stitch.  The mesenteric defect was then closed using 3-0 Vicryl suture.  The small intestine segment was then passed off the field final pathology as to complete the small bowel resection.      The abdomen was then copiously irrigated found to be completely hemostatic.  Attention was turned to the previous ostomy site in the left hemiabdomen.  It was 4 fingerbreadths wide including the previous hernia defect.  The hernia sac was then sharply excised using electrocautery.  The fascial defect was then reapproximated to 2 finger breaths with by placing figure-of-eight 0 Ethibond sutures to close the parastomal hernia.  Once this was completed, the staple line of the terminal ileum was brought through the defect and reached easily above the skin level.  The abdomen was then checked and found to be completely hemostatic.  The mesentery was checked and found to be completely straight and there was no twisting of the mesentery including taking of the terminal ileum up through the stoma site.    The local infiltration was completed with the transversus abdominis plane block by mixing 20 cc of Exparel, 30 cc of 0.25% bupivacaine plain and 10 cc of injectable saline.  30 cc injected bilaterally into the transversus abdominis plane under direct visualization for total of 60 cc given for the block.  The midline incision fascia was then closed by using #1 looped PDS suture.  The skin was then copiously irrigated and made hemostatic.  The skin was then loosely stapled shut using a skin stapler.  The site was then covered.  Attention was turned to the stapled end of the ileum.  The staple line was then sharply excised.  For Brooking sutures were placed in usual fashion using 3-0 Vicryl suture to mature the ileostomy.  The  remaining circumferential mucocutaneous junction was then approximated with interrupted 3-0 Vicryl sutures in usual fashion.  A stoma appliance was applied.  The patient then had Betadine-soaked Telfa sandie placed between the midline staples and surgical dressings were applied to this as well.  The patient was moved back in the supine position.  He was woken from general endotracheal anesthesia and taken to the postanesthesia care in stable condition.  He tolerated procedure well.  All sponge, needle and instrument counts were correct at the end of the case.    Significant Surgical Tasks Conducted by the Assistant(s), if Applicable: N/A    Complications: No    Estimated Blood Loss (EBL): 50mL           Implants: * No implants in log *    Specimens:   Specimen (24h ago, onward)            None                  Condition: Good    Disposition: PACU - hemodynamically stable.    Attestation: I performed the procedure.

## 2022-02-24 NOTE — ANESTHESIA PREPROCEDURE EVALUATION
02/24/2022  Sukhjinder Presley is a 56 y.o., male.    Patient Active Problem List   Diagnosis    Smoker    Polycythemia    Rectal bleeding    At risk for coronary artery disease    Rectal cancer    Kidney stone    Iron deficiency anemia due to chronic blood loss    Cachexia    Prostate cancer screening    Colostomy in place    Rectal fistula with localized perforation    Benign prostatic hyperplasia    Urinary tract infection without hematuria    Post-procedural erectile dysfunction    Tobacco dependence syndrome    BPH (benign prostatic hyperplasia)    Alcohol abuse with alcohol-induced disorder     Past Surgical History:   Procedure Laterality Date    ABDOMINOPERINEAL RESECTION OF RECTUM N/A 4/9/2020    Procedure: PROCTECTOMY, ABDOMINOPERINEAL;  Surgeon: Jan New MD;  Location: [facility];  Service: General;  Laterality: N/A;    COLON SURGERY      COLONOSCOPY N/A 6/6/2019    Procedure: COLONOSCOPY;  Surgeon: Anjelica Saavedra MD;  Location: [facility];  Service: Endoscopy;  Laterality: N/A;    COLONOSCOPY N/A 12/17/2021    Procedure: COLONOSCOPY;  Surgeon: Jan New MD;  Location: [facility];  Service: General;  Laterality: N/A;    CREATION OF MUSCLE ROTATIONAL FLAP Left 4/9/2020    Procedure: CREATION, FLAP, MUSCLE ROTATION;  Surgeon: Sebastian Dan MD;  Location: [facility];  Service: Plastics;  Laterality: Left;   VRAM    ESOPHAGOGASTRODUODENOSCOPY N/A 6/6/2019    Procedure: EGD (ESOPHAGOGASTRODUODENOSCOPY);  Surgeon: Anjelica Saavedra MD;  Location: [facility];  Service: Endoscopy;  Laterality: N/A;    FLAP GRAFT SURGERY N/A 4/9/2020    Procedure: FLAP GRAFT;  Surgeon: Sebastian Dan MD;  Location: [facility];  Service: Plastics;  Laterality: N/A;  left fasciocutaneous buttocks flap    HERNIA REPAIR  1995    INJECTION OF ANESTHETIC AGENT INTO TISSUE PLANE DEFINED BY  TRANSVERSUS ABDOMINIS MUSCLE N/A 2/18/2020    Procedure: BLOCK, TRANSVERSUS ABDOMINIS PLANE;  Surgeon: Jan New MD;  Location: [facility] OR;  Service: General;  Laterality: N/A;    INSERTION OF TUNNELED CENTRAL VENOUS CATHETER (CVC) WITH SUBCUTANEOUS PORT N/A 10/8/2019    Procedure: [id number];  Surgeon: Jan New MD;  Location: [facility] OR;  Service: General;  Laterality: N/A;    LAPAROSCOPIC COLOSTOMY      MEDIPORT REMOVAL N/A 8/13/2020    Procedure: REMOVAL, CATHETER, CENTRAL VENOUS, TUNNELED, WITH PORT;  Surgeon: Sebastian Dan MD;  Location: [facility] OR;  Service: Plastics;  Laterality: N/A;    TONSILLECTOMY      TRANSURETHRAL RESECTION OF PROSTATE N/A 6/7/2021    Procedure: TURP (TRANSURETHRAL RESECTION OF PROSTATE), CYSTOLITHALOPAXY;  Surgeon: Sukhjinder Tucker MD;  Location: [facility] OR;  Service: Urology;  Laterality: N/A;    WOUND DEBRIDEMENT N/A 8/13/2020    Procedure: DEBRIDEMENT, WOUND;  Surgeon: Sebastian Dan MD;  Location: [facility] OR;  Service: Plastics;  Laterality: N/A;  layered closure       Pre-op Assessment    I have reviewed the Patient Summary Reports.     I have reviewed the Nursing Notes. I have reviewed the NPO Status.   I have reviewed the Medications.     Review of Systems  Anesthesia Hx:  No problems with previous Anesthesia    Social:  Smoker, Alcohol Use Alcohol abuse with alcohol-induced disorder   Hematology/Oncology:         -- Anemia: Current/Recent Cancer.   Cardiovascular:  Cardiovascular Normal  ECG has been reviewed.    Pulmonary:  Pulmonary Normal    Renal/:   renal calculi BPH    Hepatic/GI:  Hepatic/GI Normal S/p rectal cancer.  now newly found unresectable polyps in the ascending and transverse colon    will plan for exploratory laparotomy, completion total colectomy and possible combination procedure with Urology to return remove his bladder stone at the same time   Musculoskeletal:   Arthritis     Neurological:  Neurology Normal     Endocrine:  Endocrine Normal        Physical Exam  General: Well nourished    Airway:  Mallampati: II   Mouth Opening: Normal  TM Distance: Normal  Neck ROM: Normal ROM    Dental:  Intact, Periodontal disease  Multiple missing teeth.  Some in particularly poor condition.  Chest/Lungs:  Clear to auscultation    Heart:  Rate: Normal  Rhythm: Regular Rhythm        Anesthesia Plan  Type of Anesthesia, risks & benefits discussed:    Anesthesia Type: Gen ETT  Intra-op Monitoring Plan: Standard ASA Monitors  Post Op Pain Control Plan: multimodal analgesia  Induction:  IV  Airway Plan: , Post-Induction  Informed Consent: Informed consent signed with the Patient and all parties understand the risks and agree with anesthesia plan.  All questions answered.   ASA Score: 2    Ready For Surgery From Anesthesia Perspective.     .      Chemistry        Component Value Date/Time     02/21/2022 1310    K 4.3 02/21/2022 1310     02/21/2022 1310    CO2 28 02/21/2022 1310    BUN 11 02/21/2022 1310    CREATININE 1.0 02/21/2022 1310    GLU 93 02/21/2022 1310        Component Value Date/Time    CALCIUM 9.3 02/21/2022 1310    ALKPHOS 78 02/21/2022 1310    AST 19 02/21/2022 1310    ALT 11 02/21/2022 1310    BILITOT 0.7 02/21/2022 1310    ESTGFRAFRICA >60 02/21/2022 1310    EGFRNONAA >60 02/21/2022 1310        Lab Results   Component Value Date    WBC 6.38 02/21/2022    HGB 16.1 02/21/2022    HCT 47.5 02/21/2022    MCV 90 02/21/2022     02/21/2022

## 2022-02-24 NOTE — TRANSFER OF CARE
Anesthesia Transfer of Care Note    Patient: Sukhjinder Presley    Procedure(s) Performed: Procedure(s) (LRB):  COLECTOMY, TOTAL (N/A)  CYSTOLITHOLAPAXY (N/A)  BLOCK, TRANSVERSUS ABDOMINIS PLANE  EXCISION, SMALL INTESTINE (N/A)  CREATION, ILEAL CONDUIT    Patient location: PACU    Anesthesia Type: general    Transport from OR: Transported from OR on room air with adequate spontaneous ventilation    Post pain: adequate analgesia    Post assessment: no apparent anesthetic complications    Post vital signs: stable    Level of consciousness: awake and alert    Nausea/Vomiting: no nausea/vomiting    Complications: none    Transfer of care protocol was followed      Last vitals:   Visit Vitals  /79 (BP Location: Right arm, Patient Position: Sitting)   Pulse 73   Temp 36.3 °C (97.3 °F) (Temporal)   Resp 18   Ht 6' (1.829 m)   Wt 78.5 kg (173 lb 2.7 oz)   SpO2 97%   BMI 23.49 kg/m²

## 2022-02-24 NOTE — INTERVAL H&P NOTE
The patient has been examined and the H&P has been reviewed:    I concur with the findings and no changes have occurred since H&P was written.    Anesthesia/Surgery risks, benefits and alternative options discussed and understood by patient/family.

## 2022-02-25 PROBLEM — K63.89 COLONIC MASS: Status: ACTIVE | Noted: 2022-02-25

## 2022-02-25 LAB
ANION GAP SERPL CALC-SCNC: 7 MMOL/L (ref 8–16)
BASOPHILS # BLD AUTO: 0.03 K/UL (ref 0–0.2)
BASOPHILS NFR BLD: 0.3 % (ref 0–1.9)
BUN SERPL-MCNC: 19 MG/DL (ref 6–20)
CALCIUM SERPL-MCNC: 8.3 MG/DL (ref 8.7–10.5)
CHLORIDE SERPL-SCNC: 107 MMOL/L (ref 95–110)
CO2 SERPL-SCNC: 23 MMOL/L (ref 23–29)
CREAT SERPL-MCNC: 0.9 MG/DL (ref 0.5–1.4)
DIFFERENTIAL METHOD: ABNORMAL
EOSINOPHIL # BLD AUTO: 0.1 K/UL (ref 0–0.5)
EOSINOPHIL NFR BLD: 0.5 % (ref 0–8)
ERYTHROCYTE [DISTWIDTH] IN BLOOD BY AUTOMATED COUNT: 13.2 % (ref 11.5–14.5)
EST. GFR  (AFRICAN AMERICAN): >60 ML/MIN/1.73 M^2
EST. GFR  (NON AFRICAN AMERICAN): >60 ML/MIN/1.73 M^2
GLUCOSE SERPL-MCNC: 96 MG/DL (ref 70–110)
HCT VFR BLD AUTO: 40.4 % (ref 40–54)
HGB BLD-MCNC: 13.8 G/DL (ref 14–18)
IMM GRANULOCYTES # BLD AUTO: 0.03 K/UL (ref 0–0.04)
IMM GRANULOCYTES NFR BLD AUTO: 0.3 % (ref 0–0.5)
LYMPHOCYTES # BLD AUTO: 2.1 K/UL (ref 1–4.8)
LYMPHOCYTES NFR BLD: 20.9 % (ref 18–48)
MAGNESIUM SERPL-MCNC: 1.6 MG/DL (ref 1.6–2.6)
MCH RBC QN AUTO: 30.7 PG (ref 27–31)
MCHC RBC AUTO-ENTMCNC: 34.2 G/DL (ref 32–36)
MCV RBC AUTO: 90 FL (ref 82–98)
MONOCYTES # BLD AUTO: 0.8 K/UL (ref 0.3–1)
MONOCYTES NFR BLD: 8.2 % (ref 4–15)
NEUTROPHILS # BLD AUTO: 6.9 K/UL (ref 1.8–7.7)
NEUTROPHILS NFR BLD: 69.8 % (ref 38–73)
NRBC BLD-RTO: 0 /100 WBC
PHOSPHATE SERPL-MCNC: 3.3 MG/DL (ref 2.7–4.5)
PLATELET # BLD AUTO: 220 K/UL (ref 150–450)
PMV BLD AUTO: 9.5 FL (ref 9.2–12.9)
POTASSIUM SERPL-SCNC: 4.4 MMOL/L (ref 3.5–5.1)
RBC # BLD AUTO: 4.5 M/UL (ref 4.6–6.2)
SODIUM SERPL-SCNC: 137 MMOL/L (ref 136–145)
WBC # BLD AUTO: 9.81 K/UL (ref 3.9–12.7)

## 2022-02-25 PROCEDURE — 80048 BASIC METABOLIC PNL TOTAL CA: CPT | Performed by: COLON & RECTAL SURGERY

## 2022-02-25 PROCEDURE — 94799 UNLISTED PULMONARY SVC/PX: CPT

## 2022-02-25 PROCEDURE — 99024 POSTOP FOLLOW-UP VISIT: CPT | Mod: ,,, | Performed by: COLON & RECTAL SURGERY

## 2022-02-25 PROCEDURE — 85025 COMPLETE CBC W/AUTO DIFF WBC: CPT | Performed by: COLON & RECTAL SURGERY

## 2022-02-25 PROCEDURE — 99024 PR POST-OP FOLLOW-UP VISIT: ICD-10-PCS | Mod: ,,, | Performed by: COLON & RECTAL SURGERY

## 2022-02-25 PROCEDURE — 25000003 PHARM REV CODE 250: Performed by: COLON & RECTAL SURGERY

## 2022-02-25 PROCEDURE — 99900035 HC TECH TIME PER 15 MIN (STAT)

## 2022-02-25 PROCEDURE — 63600175 PHARM REV CODE 636 W HCPCS: Performed by: COLON & RECTAL SURGERY

## 2022-02-25 PROCEDURE — 11000001 HC ACUTE MED/SURG PRIVATE ROOM

## 2022-02-25 PROCEDURE — 84100 ASSAY OF PHOSPHORUS: CPT | Performed by: COLON & RECTAL SURGERY

## 2022-02-25 PROCEDURE — 96372 THER/PROPH/DIAG INJ SC/IM: CPT

## 2022-02-25 PROCEDURE — 36415 COLL VENOUS BLD VENIPUNCTURE: CPT | Performed by: COLON & RECTAL SURGERY

## 2022-02-25 PROCEDURE — 94761 N-INVAS EAR/PLS OXIMETRY MLT: CPT

## 2022-02-25 PROCEDURE — 83735 ASSAY OF MAGNESIUM: CPT | Performed by: COLON & RECTAL SURGERY

## 2022-02-25 RX ADMIN — FINASTERIDE 5 MG: 5 TABLET, FILM COATED ORAL at 09:02

## 2022-02-25 RX ADMIN — ACETAMINOPHEN 1000 MG: 10 INJECTION INTRAVENOUS at 01:02

## 2022-02-25 RX ADMIN — CHLORHEXIDINE GLUCONATE 0.12% ORAL RINSE 10 ML: 1.2 LIQUID ORAL at 09:02

## 2022-02-25 RX ADMIN — SODIUM CHLORIDE, SODIUM LACTATE, POTASSIUM CHLORIDE, AND CALCIUM CHLORIDE: .6; .31; .03; .02 INJECTION, SOLUTION INTRAVENOUS at 07:02

## 2022-02-25 RX ADMIN — GABAPENTIN 300 MG: 300 CAPSULE ORAL at 02:02

## 2022-02-25 RX ADMIN — ACETAMINOPHEN 1000 MG: 10 INJECTION INTRAVENOUS at 05:02

## 2022-02-25 RX ADMIN — TAMSULOSIN HYDROCHLORIDE 0.4 MG: 0.4 CAPSULE ORAL at 09:02

## 2022-02-25 RX ADMIN — FAMOTIDINE 20 MG: 20 TABLET ORAL at 09:02

## 2022-02-25 RX ADMIN — GABAPENTIN 300 MG: 300 CAPSULE ORAL at 09:02

## 2022-02-25 RX ADMIN — MORPHINE SULFATE: 1 INJECTION INTRAVENOUS at 03:02

## 2022-02-25 RX ADMIN — ENOXAPARIN SODIUM 40 MG: 100 INJECTION SUBCUTANEOUS at 05:02

## 2022-02-25 NOTE — PLAN OF CARE
Pt remains free from falls/injuries this shift. Safety precautions maintained. Pain managed with pain medication and rest. No s/s of acute distress noted. Will continue to monitor. Chart check completed.

## 2022-02-25 NOTE — PROGRESS NOTES
[facility] Surg  Colorectal Surgery  Progress Note    Subjective:     History of Present Illness:  56-year-old male with previous rectal adenocarcinoma who required APR who has unresectable colonic polyps/masses who presents for completion total colectomy with end ileostomy construction      Post-Op Info:  Procedure(s) (LRB):  COLECTOMY, TOTAL (N/A)  CYSTOLITHOLAPAXY (N/A)  BLOCK, TRANSVERSUS ABDOMINIS PLANE  EXCISION, SMALL INTESTINE (N/A)  CREATION, ILEAL CONDUIT   1 Day Post-Op     Interval History: Pain well controlled. Having some bilious output in ostomy appliance. Tolerating clears without nausea/vomiting. Voiding well after liz removal.    Medications:  Continuous Infusions:   lactated ringers 75 mL/hr at 02/24/22 1819    morphine       Scheduled Meds:   chlorhexidine  10 mL Mouth/Throat BID    enoxaparin  40 mg Subcutaneous Daily    famotidine  20 mg Oral BID    finasteride  5 mg Oral Daily    gabapentin  300 mg Oral TID    tamsulosin  0.4 mg Oral Daily     PRN Meds:diphenhydrAMINE, naloxone, ondansetron     Review of patient's allergies indicates:  No Known Allergies  Objective:     Vital Signs (Most Recent):  Temp: 97.4 °F (36.3 °C) (02/25/22 1200)  Pulse: 66 (02/25/22 1200)  Resp: 18 (02/25/22 1200)  BP: 121/75 (02/25/22 1200)  SpO2: 98 % (02/25/22 1200) Vital Signs (24h Range):  Temp:  [97.4 °F (36.3 °C)-98.9 °F (37.2 °C)] 97.4 °F (36.3 °C)  Pulse:  [66-89] 66  Resp:  [14-18] 18  SpO2:  [96 %-99 %] 98 %  BP: ()/(60-82) 121/75     Weight: 80.5 kg (177 lb 7.5 oz)  Body mass index is 24.07 kg/m².    Intake/Output - Last 3 Shifts         02/23 0700  02/24 0659 02/24 0700 02/25 0659 02/25 0700  02/26 0659    P.O.  90 1120    I.V. (mL/kg)  1753 (21.8) 10 (0.1)    Total Intake(mL/kg)  1843 (22.9) 1130 (14)    Urine (mL/kg/hr)  1000     Total Output  1000     Net  +843 +1130           Urine Occurrence   1 x            Physical Exam  Constitutional:       Appearance: He is well-developed.    HENT:      Head: Normocephalic and atraumatic.   Eyes:      Conjunctiva/sclera: Conjunctivae normal.   Neck:      Thyroid: No thyromegaly.   Cardiovascular:      Rate and Rhythm: Normal rate and regular rhythm.   Pulmonary:      Effort: Pulmonary effort is normal. No respiratory distress.   Abdominal:      Comments: Soft, nondistended, appropriately TTP; incision c/d/I without nausea or vomiting. LLQ ileostomy viable with bilious output in bag   Musculoskeletal:         General: No tenderness. Normal range of motion.      Cervical back: Normal range of motion.   Skin:     General: Skin is warm and dry.      Capillary Refill: Capillary refill takes less than 2 seconds.      Findings: No rash.   Neurological:      Mental Status: He is alert and oriented to person, place, and time.       Significant Labs:  I have reviewed all pertinent lab results within the past 24 hours.  CBC:   Recent Labs   Lab 02/25/22  0635   WBC 9.81   RBC 4.50*   HGB 13.8*   HCT 40.4      MCV 90   MCH 30.7   MCHC 34.2     BMP:   Recent Labs   Lab 02/25/22  0635   GLU 96      K 4.4      CO2 23   BUN 19   CREATININE 0.9   CALCIUM 8.3*   MG 1.6     Assessment/Plan:     Colonic mass  57yo M with previous rectal cancer s/p APR with endoscopically unresectable colonic masses/lesions/polyps who is now s/p open completion total abdominal colectomy and small bowel resection on 2/24/22    - cont CLD for now, will advance over weekend if remains without distention and good ostomy output  - PCA for pain control  - OOB, ambulation  - IVF  - voiding well after liz removal this AM  - ppx: lvnx  - IS 10xs/hr while awake  - SUJATHA c/s      Jan New MD  Colorectal Surgery  [facility]

## 2022-02-25 NOTE — ASSESSMENT & PLAN NOTE
57yo M with previous rectal cancer s/p APR with endoscopically unresectable colonic masses/lesions/polyps who is now s/p open completion total abdominal colectomy and small bowel resection on 2/24/22    - cont CLD for now, will advance over weekend if remains without distention and good ostomy output  - PCA for pain control  - OOB, ambulation  - IVF  - voiding well after liz removal this AM  - ppx: lvnx  - IS 10xs/hr while awake  - WCON c/s

## 2022-02-25 NOTE — NURSING
Received pt resting in bed AAOx4 in stable condition. No apparent distress noted. No complaint of any pain thus far. Respiration unlabored. Ileostomy education given to pt and family. Safety and comfort measures in place. Call bell in reach. Continue POC

## 2022-02-25 NOTE — PLAN OF CARE
Patient remains free of injury. AAOx4. POC reviewed, patient verbalizes understanding. Pain controlled with PCA pump. Surgical dressing intact with small drainage. IV fluids infusing. Clear liquids tolerated. Ileostomy in place to LLQ. Call light and personal items within reach. Chart check complete. Will continue to monitor.

## 2022-02-25 NOTE — HPI
56-year-old male with previous rectal adenocarcinoma who required APR who has unresectable colonic polyps/masses who presents for completion total colectomy with end ileostomy construction

## 2022-02-25 NOTE — SUBJECTIVE & OBJECTIVE
Interval History: Pain well controlled. Having some bilious output in ostomy appliance. Tolerating clears without nausea/vomiting. Voiding well after liz removal.    Medications:  Continuous Infusions:   lactated ringers 75 mL/hr at 02/24/22 1819    morphine       Scheduled Meds:   chlorhexidine  10 mL Mouth/Throat BID    enoxaparin  40 mg Subcutaneous Daily    famotidine  20 mg Oral BID    finasteride  5 mg Oral Daily    gabapentin  300 mg Oral TID    tamsulosin  0.4 mg Oral Daily     PRN Meds:diphenhydrAMINE, naloxone, ondansetron     Review of patient's allergies indicates:  No Known Allergies  Objective:     Vital Signs (Most Recent):  Temp: 97.4 °F (36.3 °C) (02/25/22 1200)  Pulse: 66 (02/25/22 1200)  Resp: 18 (02/25/22 1200)  BP: 121/75 (02/25/22 1200)  SpO2: 98 % (02/25/22 1200) Vital Signs (24h Range):  Temp:  [97.4 °F (36.3 °C)-98.9 °F (37.2 °C)] 97.4 °F (36.3 °C)  Pulse:  [66-89] 66  Resp:  [14-18] 18  SpO2:  [96 %-99 %] 98 %  BP: ()/(60-82) 121/75     Weight: 80.5 kg (177 lb 7.5 oz)  Body mass index is 24.07 kg/m².    Intake/Output - Last 3 Shifts         02/23 0700  02/24 0659 02/24 0700 02/25 0659 02/25 0700  02/26 0659    P.O.  90 1120    I.V. (mL/kg)  1753 (21.8) 10 (0.1)    Total Intake(mL/kg)  1843 (22.9) 1130 (14)    Urine (mL/kg/hr)  1000     Total Output  1000     Net  +843 +1130           Urine Occurrence   1 x            Physical Exam  Constitutional:       Appearance: He is well-developed.   HENT:      Head: Normocephalic and atraumatic.   Eyes:      Conjunctiva/sclera: Conjunctivae normal.   Neck:      Thyroid: No thyromegaly.   Cardiovascular:      Rate and Rhythm: Normal rate and regular rhythm.   Pulmonary:      Effort: Pulmonary effort is normal. No respiratory distress.   Abdominal:      Comments: Soft, nondistended, appropriately TTP; incision c/d/I without nausea or vomiting. LLQ ileostomy viable with bilious output in bag   Musculoskeletal:         General: No tenderness.  Normal range of motion.      Cervical back: Normal range of motion.   Skin:     General: Skin is warm and dry.      Capillary Refill: Capillary refill takes less than 2 seconds.      Findings: No rash.   Neurological:      Mental Status: He is alert and oriented to person, place, and time.       Significant Labs:  I have reviewed all pertinent lab results within the past 24 hours.  CBC:   Recent Labs   Lab 02/25/22  0635   WBC 9.81   RBC 4.50*   HGB 13.8*   HCT 40.4      MCV 90   MCH 30.7   MCHC 34.2     BMP:   Recent Labs   Lab 02/25/22  0635   GLU 96      K 4.4      CO2 23   BUN 19   CREATININE 0.9   CALCIUM 8.3*   MG 1.6

## 2022-02-25 NOTE — PLAN OF CARE
[facility] - [facility]  Initial Discharge Assessment       Primary Care Provider: Drake Elizalde MD    Admission Diagnosis: Colonic mass [K63.89]  History of rectal cancer [Z85.048]    Admission Date: 2/24/2022  Expected Discharge Date:     Discharge Barriers Identified: None    Payor: [employer] / Plan: [place] PPO / Product Type: PPO /     Extended Emergency Contact Information  Primary Emergency Contact: LUZ ESPAÑA  Mobile Phone: [phone]  Relation: Significant other   needed? No    Discharge Plan A: Home  Discharge Plan B: Home      [facility] - [place] - [address]  [address]  [place] [address]  Phone: [phone] Fax: [phone]      Initial Assessment (most recent)     Adult Discharge Assessment - 02/25/22 1339        Discharge Assessment    Assessment Type Discharge Planning Assessment     Confirmed/corrected address, phone number and insurance Yes     Confirmed Demographics Correct on Facesheet     Source of Information patient     Communicated ISAAC with patient/caregiver Date not available/Unable to determine     Lives With significant other     Facility Arrived From: home     Do you expect to return to your current living situation? Yes     Do you have help at home or someone to help you manage your care at home? No     Prior to hospitilization cognitive status: Alert/Oriented     Current cognitive status: Alert/Oriented     Walking or Climbing Stairs Difficulty none     Dressing/Bathing Difficulty none     Equipment Currently Used at Home none     Readmission within 30 days? No     Patient currently being followed by outpatient case management? No     Do you currently have service(s) that help you manage your care at home? No     Do you take prescription medications? Yes     Do you have prescription coverage? Yes     Coverage BCBS     Do you have any problems affording any of your prescribed medications? No     Is the patient taking  medications as prescribed? yes     Who is going to help you get home at discharge? Spouse     How do you get to doctors appointments? car, drives self;family or friend will provide     Are you on dialysis? No     Do you take coumadin? No     Discharge Plan A Home     Discharge Plan B Home     DME Needed Upon Discharge  none     Discharge Plan discussed with: Patient     Discharge Barriers Identified None               Initial assessment completed with patient. Patient reports independence with ADL's  prior to hospitalization. Patient uses no DME at home. Patient currently has no cm needs upon DC. CM will continue following to assist with other needs.   CM provided information on advanced directives, information on pharmacy bedside delivery, and discharge planning begins on admission with contact information for any needs/questions.

## 2022-02-25 NOTE — HOSPITAL COURSE
02/24/2022: Underwent completion colectomy and small bowel resection    02/25/2022: POD 1. Pain well controlled. Having some bilious output in ostomy appliance. Tolerating clears without nausea/vomiting. Voiding well after liz removal.  2/25/2022 POD2.  Patient states he feels well.  Wants to get off the PCA and back on p.o. oxycodone.  Afebrile vital signs stable.  Tolerating clear liquids.  Urinating without difficulty.  Some liquid stool within the ostomy bag.  Incision looks good without signs of infection.  The Betadine sandie were removed.  Replace phos.  Will advance to soft diet in a.m..  Patient was educated by the ostomy nurse on Friday.  He feels that he does not need any further instruction.  Anticipate discharge in a.m..    2/26/2022 POD3 patient states he did well overnight.  Pain well controlled with his regular dose oxycodone po.  Tolerated soft diet this morning.  Ambulating well.  No difficulty urinating.  Afebrile vital signs stable.  Incision clean dry and intact.  Ostomy output with increased liquid stool.  Labs reviewed.  Electrolytes within normal limits.  White blood cell count within normal limits.  Will discharge to home.  Instructed him and his family to monitor output and to keep hydrated.  He is to check in with the surgeons NURSE this week by phone.  He will follow up in the office in 10 days.  No prescriptions given.  Patient will call his primary doctor for his oxycodone prescription.  Patient states that the ostomy nurse came by to see him Friday and he has no questions at this time.  He does not feel he needs any further instructions.

## 2022-02-25 NOTE — PLAN OF CARE
Problem: Adult Inpatient Plan of Care  Goal: Plan of Care Review  Outcome: Ongoing, Progressing  Goal: Patient-Specific Goal (Individualized)  Outcome: Ongoing, Progressing  Goal: Absence of Hospital-Acquired Illness or Injury  Outcome: Ongoing, Progressing  Goal: Optimal Comfort and Wellbeing  Outcome: Ongoing, Progressing  Goal: Readiness for Transition of Care  Outcome: Ongoing, Progressing     Problem: Infection  Goal: Absence of Infection Signs and Symptoms  Outcome: Ongoing, Progressing     Problem: Skin Injury Risk Increased  Goal: Skin Health and Integrity  Outcome: Ongoing, Progressing

## 2022-02-25 NOTE — PROGRESS NOTES
Subjective:   NAEO, pain controlled, good UOP     Objective:     Temp:  [97.8 °F (36.6 °C)-98.9 °F (37.2 °C)] 98.3 °F (36.8 °C)  Pulse:  [67-89] 67  Resp:  [12-18] 14  SpO2:  [95 %-100 %] 97 %  BP: ()/() 116/73  I/O last 3 completed shifts:  In: 1843 [P.O.:90; I.V.:1753]  Out: 1000 [Urine:1000]  Date 02/25/22 0700 - 02/26/22 0659   Shift 8896-6919 0569-9367 0008-7254 24 Hour Total   INTAKE   P.O. 380   380   I.V.(mL/kg) 10(0.1)   10(0.1)   Shift Total(mL/kg) 390(4.8)   390(4.8)   OUTPUT   Shift Total(mL/kg)       Weight (kg) 80.5 80.5 80.5 80.5       General: awake, alert, cooperative  Head: NC/AT  Ears: external ears normal  Eyes: sclera normal  Lungs: normal inspiration, NAD  Heart: well-perfused  Abdomen: Soft, NT, ND  : liz draining CYU  Skin: The skin is warm and dry  Ext: No c/c/e.  Neuro: grossly intact, AOx3      Labs:   Recent Labs   Lab 02/21/22  1310 02/25/22  0635    137   K 4.3 4.4    107   CO2 28 23   BUN 11 19   CREATININE 1.0 0.9   CALCIUM 9.3 8.3*     Recent Labs   Lab 02/21/22  1310 02/25/22  0635   WBC 6.38 9.81   HGB 16.1 13.8*   HCT 47.5 40.4    220       Assessment/Plan:   Sukhjinder Presley is a 56 y.o. male with bladder stone, s/p cystolitholapaxy, doing well.    - liz out today  - patient can f/u with me 6 weeks after d/c  - call with questions or concerns    Sukhjinder Tucker MD

## 2022-02-26 LAB
ANION GAP SERPL CALC-SCNC: 7 MMOL/L (ref 8–16)
BASOPHILS # BLD AUTO: 0.04 K/UL (ref 0–0.2)
BASOPHILS NFR BLD: 0.5 % (ref 0–1.9)
BUN SERPL-MCNC: 9 MG/DL (ref 6–20)
CALCIUM SERPL-MCNC: 8.8 MG/DL (ref 8.7–10.5)
CHLORIDE SERPL-SCNC: 106 MMOL/L (ref 95–110)
CO2 SERPL-SCNC: 27 MMOL/L (ref 23–29)
CREAT SERPL-MCNC: 0.8 MG/DL (ref 0.5–1.4)
DIFFERENTIAL METHOD: ABNORMAL
EOSINOPHIL # BLD AUTO: 0.2 K/UL (ref 0–0.5)
EOSINOPHIL NFR BLD: 2.5 % (ref 0–8)
ERYTHROCYTE [DISTWIDTH] IN BLOOD BY AUTOMATED COUNT: 13.2 % (ref 11.5–14.5)
EST. GFR  (AFRICAN AMERICAN): >60 ML/MIN/1.73 M^2
EST. GFR  (NON AFRICAN AMERICAN): >60 ML/MIN/1.73 M^2
GLUCOSE SERPL-MCNC: 101 MG/DL (ref 70–110)
HCT VFR BLD AUTO: 40.9 % (ref 40–54)
HGB BLD-MCNC: 13.8 G/DL (ref 14–18)
IMM GRANULOCYTES # BLD AUTO: 0.02 K/UL (ref 0–0.04)
IMM GRANULOCYTES NFR BLD AUTO: 0.3 % (ref 0–0.5)
LYMPHOCYTES # BLD AUTO: 1.6 K/UL (ref 1–4.8)
LYMPHOCYTES NFR BLD: 22.2 % (ref 18–48)
MAGNESIUM SERPL-MCNC: 1.7 MG/DL (ref 1.6–2.6)
MCH RBC QN AUTO: 29.9 PG (ref 27–31)
MCHC RBC AUTO-ENTMCNC: 33.7 G/DL (ref 32–36)
MCV RBC AUTO: 89 FL (ref 82–98)
MONOCYTES # BLD AUTO: 0.5 K/UL (ref 0.3–1)
MONOCYTES NFR BLD: 6.4 % (ref 4–15)
NEUTROPHILS # BLD AUTO: 5 K/UL (ref 1.8–7.7)
NEUTROPHILS NFR BLD: 68.1 % (ref 38–73)
NRBC BLD-RTO: 0 /100 WBC
PHOSPHATE SERPL-MCNC: 1.9 MG/DL (ref 2.7–4.5)
PLATELET # BLD AUTO: 213 K/UL (ref 150–450)
PMV BLD AUTO: 9.3 FL (ref 9.2–12.9)
POTASSIUM SERPL-SCNC: 3.8 MMOL/L (ref 3.5–5.1)
RBC # BLD AUTO: 4.62 M/UL (ref 4.6–6.2)
SODIUM SERPL-SCNC: 140 MMOL/L (ref 136–145)
WBC # BLD AUTO: 7.3 K/UL (ref 3.9–12.7)

## 2022-02-26 PROCEDURE — 83735 ASSAY OF MAGNESIUM: CPT | Performed by: COLON & RECTAL SURGERY

## 2022-02-26 PROCEDURE — 94761 N-INVAS EAR/PLS OXIMETRY MLT: CPT

## 2022-02-26 PROCEDURE — 11000001 HC ACUTE MED/SURG PRIVATE ROOM

## 2022-02-26 PROCEDURE — 94799 UNLISTED PULMONARY SVC/PX: CPT

## 2022-02-26 PROCEDURE — 63600175 PHARM REV CODE 636 W HCPCS: Performed by: COLON & RECTAL SURGERY

## 2022-02-26 PROCEDURE — 99900035 HC TECH TIME PER 15 MIN (STAT)

## 2022-02-26 PROCEDURE — 84100 ASSAY OF PHOSPHORUS: CPT | Performed by: COLON & RECTAL SURGERY

## 2022-02-26 PROCEDURE — 25000003 PHARM REV CODE 250: Performed by: COLON & RECTAL SURGERY

## 2022-02-26 PROCEDURE — 36415 COLL VENOUS BLD VENIPUNCTURE: CPT | Performed by: COLON & RECTAL SURGERY

## 2022-02-26 PROCEDURE — 25000003 PHARM REV CODE 250: Performed by: SURGERY

## 2022-02-26 PROCEDURE — 80048 BASIC METABOLIC PNL TOTAL CA: CPT | Performed by: COLON & RECTAL SURGERY

## 2022-02-26 PROCEDURE — 85025 COMPLETE CBC W/AUTO DIFF WBC: CPT | Performed by: COLON & RECTAL SURGERY

## 2022-02-26 RX ORDER — OXYCODONE HYDROCHLORIDE 5 MG/1
10 TABLET ORAL EVERY 4 HOURS PRN
Status: DISCONTINUED | OUTPATIENT
Start: 2022-02-26 | End: 2022-02-27 | Stop reason: HOSPADM

## 2022-02-26 RX ADMIN — ENOXAPARIN SODIUM 40 MG: 100 INJECTION SUBCUTANEOUS at 04:02

## 2022-02-26 RX ADMIN — CHLORHEXIDINE GLUCONATE 0.12% ORAL RINSE 10 ML: 1.2 LIQUID ORAL at 08:02

## 2022-02-26 RX ADMIN — GABAPENTIN 300 MG: 300 CAPSULE ORAL at 04:02

## 2022-02-26 RX ADMIN — SODIUM CHLORIDE, SODIUM LACTATE, POTASSIUM CHLORIDE, AND CALCIUM CHLORIDE: .6; .31; .03; .02 INJECTION, SOLUTION INTRAVENOUS at 08:02

## 2022-02-26 RX ADMIN — FAMOTIDINE 20 MG: 20 TABLET ORAL at 08:02

## 2022-02-26 RX ADMIN — FINASTERIDE 5 MG: 5 TABLET, FILM COATED ORAL at 08:02

## 2022-02-26 RX ADMIN — TAMSULOSIN HYDROCHLORIDE 0.4 MG: 0.4 CAPSULE ORAL at 08:02

## 2022-02-26 RX ADMIN — GABAPENTIN 300 MG: 300 CAPSULE ORAL at 08:02

## 2022-02-26 RX ADMIN — POTASSIUM PHOSPHATE, MONOBASIC 1000 MG: 500 TABLET, SOLUBLE ORAL at 08:02

## 2022-02-26 RX ADMIN — OXYCODONE HYDROCHLORIDE 10 MG: 5 TABLET ORAL at 04:02

## 2022-02-26 NOTE — PLAN OF CARE
Pt understands and ask questions for POC, pt is ambulating in hallway, wants to go home as soon as possible. Pt understands that he needs to call when he is in pain, pca was discontinued and pt is now on full liquid diet. Safety measures in place, will continue to monitor.

## 2022-02-26 NOTE — NURSING
Received pt lying in bed awake and alert with significant other at bedside,assessment per flowsheet, bed low, call light within reach.

## 2022-02-26 NOTE — PROGRESS NOTES
[facility]    Patient Name:  Sukhjinder Presley   MRN:  [id number]  Date: 2/26/2022  Diagnosis: <principal problem not specified>    History:     Past Medical History:   Diagnosis Date    Anemia     Arthritis     Cancer     rectal    Renal disorder     kidney stones       Social History     Socioeconomic History    Marital status: Significant Other   Tobacco Use    Smoking status: Current Every Day Smoker     Packs/day: 1.00     Years: 35.00     Pack years: 35.00     Types: Cigarettes     Start date: 1/2/1984    Smokeless tobacco: Former User     Types: Chew    Tobacco comment: no smoking after m.n prior to sx   Substance and Sexual Activity    Alcohol use: Yes     Alcohol/week: 46.0 standard drinks     Types: 42 Cans of beer, 4 Shots of liquor per week     Comment: segrams 7, beer daily  hold now prior to surgery    Drug use: Never    Sexual activity: Yes     Partners: Female     Birth control/protection: None     Social Determinants of Health     Financial Resource Strain: Unknown    Difficulty of Paying Living Expenses: Patient refused   Food Insecurity: Unknown    Worried About Running Out of Food in the Last Year: Patient refused    Ran Out of Food in the Last Year: Patient refused   Transportation Needs: No Transportation Needs    Lack of Transportation (Medical): No    Lack of Transportation (Non-Medical): No   Physical Activity: Sufficiently Active    Days of Exercise per Week: 5 days    Minutes of Exercise per Session: 150+ min   Stress: Stress Concern Present    Feeling of Stress : To some extent   Social Connections: Unknown    Frequency of Communication with Friends and Family: Three times a week    Frequency of Social Gatherings with Friends and Family: Once a week    Active Member of Clubs or Organizations: No    Attends Club or Organization Meetings: Patient refused    Marital Status: Living with partner       Precautions:     Allergies as of 02/07/2022    (No  "Known Allergies)       [facility] Assessment Details/Treatment     Consulted to this 56 year old male patient previous rectal adenocarcinoma who required APR who has unresectable colonic polyps/masses who presents for completion total colectomy with end ileostomy construction. LLQ ileostomy with small amount bowel sweat noted in pouch. Surgical pouch removed. Stoma and luiz stomal skin cleansed gently with water and gauze. Patted dry. Stoma is moist, red. Measures 28mm. Luiz wound skin sprayed with cavilon barrier film and allowed to dry. One piece moldable ring applied around stoma. One piece flat nell pouch cut to size and applied to LLQ ileostomy, pressing to seal. Patient reports he is independent with ostomy care (had colostomy previously since 2/2020), but will follow and continue education and assist to ensure proper pouching. Discussed differences of ostomies, care, and output.     Please review Yuliana's procedure "Pouching : Colostomy or Ileostomy" for instructions on ostomy.stoma care. Change ostomy appliance weekly or IMMEDIATELY IF LEAKING. All ostomy care supplies can be requested from materials management.     OSTOMY CARE SUPPLIES:  One Piece Flat Hudson Pouch #[id number]         Brava Stoma Ring #[id number]       Cavilon Spray #[id number]                  02/26/2022  "

## 2022-02-27 ENCOUNTER — PATIENT MESSAGE (OUTPATIENT)
Dept: SURGERY | Facility: CLINIC | Age: 57
End: 2022-02-27
Payer: COMMERCIAL

## 2022-02-27 VITALS
HEART RATE: 76 BPM | WEIGHT: 178.13 LBS | DIASTOLIC BLOOD PRESSURE: 86 MMHG | HEIGHT: 72 IN | BODY MASS INDEX: 24.13 KG/M2 | RESPIRATION RATE: 18 BRPM | SYSTOLIC BLOOD PRESSURE: 154 MMHG | OXYGEN SATURATION: 95 % | TEMPERATURE: 99 F

## 2022-02-27 LAB
ANION GAP SERPL CALC-SCNC: 10 MMOL/L (ref 8–16)
BASOPHILS # BLD AUTO: 0.04 K/UL (ref 0–0.2)
BASOPHILS NFR BLD: 0.5 % (ref 0–1.9)
BUN SERPL-MCNC: 7 MG/DL (ref 6–20)
CALCIUM SERPL-MCNC: 9.2 MG/DL (ref 8.7–10.5)
CHLORIDE SERPL-SCNC: 102 MMOL/L (ref 95–110)
CO2 SERPL-SCNC: 29 MMOL/L (ref 23–29)
CREAT SERPL-MCNC: 0.9 MG/DL (ref 0.5–1.4)
DIFFERENTIAL METHOD: NORMAL
EOSINOPHIL # BLD AUTO: 0.2 K/UL (ref 0–0.5)
EOSINOPHIL NFR BLD: 2.8 % (ref 0–8)
ERYTHROCYTE [DISTWIDTH] IN BLOOD BY AUTOMATED COUNT: 12.9 % (ref 11.5–14.5)
EST. GFR  (AFRICAN AMERICAN): >60 ML/MIN/1.73 M^2
EST. GFR  (NON AFRICAN AMERICAN): >60 ML/MIN/1.73 M^2
GLUCOSE SERPL-MCNC: 114 MG/DL (ref 70–110)
HCT VFR BLD AUTO: 45.1 % (ref 40–54)
HGB BLD-MCNC: 15.1 G/DL (ref 14–18)
IMM GRANULOCYTES # BLD AUTO: 0.03 K/UL (ref 0–0.04)
IMM GRANULOCYTES NFR BLD AUTO: 0.4 % (ref 0–0.5)
LYMPHOCYTES # BLD AUTO: 1.4 K/UL (ref 1–4.8)
LYMPHOCYTES NFR BLD: 18.3 % (ref 18–48)
MAGNESIUM SERPL-MCNC: 1.9 MG/DL (ref 1.6–2.6)
MCH RBC QN AUTO: 30 PG (ref 27–31)
MCHC RBC AUTO-ENTMCNC: 33.5 G/DL (ref 32–36)
MCV RBC AUTO: 90 FL (ref 82–98)
MONOCYTES # BLD AUTO: 0.5 K/UL (ref 0.3–1)
MONOCYTES NFR BLD: 6.6 % (ref 4–15)
NEUTROPHILS # BLD AUTO: 5.3 K/UL (ref 1.8–7.7)
NEUTROPHILS NFR BLD: 71.4 % (ref 38–73)
NRBC BLD-RTO: 0 /100 WBC
PHOSPHATE SERPL-MCNC: 3 MG/DL (ref 2.7–4.5)
PLATELET # BLD AUTO: 254 K/UL (ref 150–450)
PMV BLD AUTO: 9.4 FL (ref 9.2–12.9)
POTASSIUM SERPL-SCNC: 4.7 MMOL/L (ref 3.5–5.1)
RBC # BLD AUTO: 5.03 M/UL (ref 4.6–6.2)
SODIUM SERPL-SCNC: 141 MMOL/L (ref 136–145)
WBC # BLD AUTO: 7.45 K/UL (ref 3.9–12.7)

## 2022-02-27 PROCEDURE — 85025 COMPLETE CBC W/AUTO DIFF WBC: CPT | Performed by: COLON & RECTAL SURGERY

## 2022-02-27 PROCEDURE — 36415 COLL VENOUS BLD VENIPUNCTURE: CPT | Performed by: COLON & RECTAL SURGERY

## 2022-02-27 PROCEDURE — 80048 BASIC METABOLIC PNL TOTAL CA: CPT | Performed by: COLON & RECTAL SURGERY

## 2022-02-27 PROCEDURE — 84100 ASSAY OF PHOSPHORUS: CPT | Performed by: COLON & RECTAL SURGERY

## 2022-02-27 PROCEDURE — 25000003 PHARM REV CODE 250: Performed by: SURGERY

## 2022-02-27 PROCEDURE — 25000003 PHARM REV CODE 250: Performed by: COLON & RECTAL SURGERY

## 2022-02-27 PROCEDURE — 99900035 HC TECH TIME PER 15 MIN (STAT)

## 2022-02-27 PROCEDURE — 83735 ASSAY OF MAGNESIUM: CPT | Performed by: COLON & RECTAL SURGERY

## 2022-02-27 RX ADMIN — OXYCODONE HYDROCHLORIDE 10 MG: 5 TABLET ORAL at 08:02

## 2022-02-27 RX ADMIN — GABAPENTIN 300 MG: 300 CAPSULE ORAL at 08:02

## 2022-02-27 RX ADMIN — FAMOTIDINE 20 MG: 20 TABLET ORAL at 08:02

## 2022-02-27 RX ADMIN — CHLORHEXIDINE GLUCONATE 0.12% ORAL RINSE 10 ML: 1.2 LIQUID ORAL at 08:02

## 2022-02-27 RX ADMIN — FINASTERIDE 5 MG: 5 TABLET, FILM COATED ORAL at 08:02

## 2022-02-27 RX ADMIN — TAMSULOSIN HYDROCHLORIDE 0.4 MG: 0.4 CAPSULE ORAL at 08:02

## 2022-02-27 NOTE — PROGRESS NOTES
[facility] Surg  General Surgery  Progress Note    Subjective:     History of Present Illness:  56-year-old male with previous rectal adenocarcinoma who required APR who has unresectable colonic polyps/masses who presents for completion total colectomy with end ileostomy construction      Post-Op Info:  Procedure(s) (LRB):  COLECTOMY, TOTAL (N/A)  CYSTOLITHOLAPAXY (N/A)  BLOCK, TRANSVERSUS ABDOMINIS PLANE  EXCISION, SMALL INTESTINE (N/A)  CREATION, ILEAL CONDUIT   2 Days Post-Op     Interval History:  2/25/2022 POD2.  Patient states he feels well.  Wants to get off the PCA and back on p.o. oxycodone.  Afebrile vital signs stable.  Tolerating clear liquids.  Urinating without difficulty.  Some liquid stool within the ostomy bag.  Incision looks good without signs of infection.  The Betadine sandie were removed.  Replace phos.  Will advance to soft diet in a.m..  Patient was educated by the ostomy nurse on Friday.  He feels that he does not need any further instruction.  Anticipate discharge in a.m..  Medications:  Continuous Infusions:  Scheduled Meds:   chlorhexidine  10 mL Mouth/Throat BID    enoxaparin  40 mg Subcutaneous Daily    famotidine  20 mg Oral BID    finasteride  5 mg Oral Daily    gabapentin  300 mg Oral TID    potassium phosphate (monobasic)  1,000 mg Oral Once    tamsulosin  0.4 mg Oral Daily     PRN Meds:diphenhydrAMINE, naloxone, ondansetron, oxyCODONE     Review of patient's allergies indicates:  No Known Allergies  Objective:     Vital Signs (Most Recent):  Temp: 99.4 °F (37.4 °C) (02/26/22 1641)  Pulse: 78 (02/26/22 1641)  Resp: 18 (02/26/22 1641)  BP: 129/73 (02/26/22 1641)  SpO2: 97 % (02/26/22 1641) Vital Signs (24h Range):  Temp:  [97.5 °F (36.4 °C)-99.4 °F (37.4 °C)] 99.4 °F (37.4 °C)  Pulse:  [71-82] 78  Resp:  [8-20] 18  SpO2:  [95 %-97 %] 97 %  BP: (102-134)/(66-87) 129/73     Weight: 82 kg (180 lb 12.4 oz)  Body mass index is 24.52 kg/m².    Intake/Output - Last 3 Shifts          02/24 0700  02/25 0659 02/25 0700 02/26 0659 02/26 0700 02/27 0659    P.O. 90 1120 838    I.V. (mL/kg) 1753 (21.8) 1665.3 (20.3) 1291.7 (15.8)    IV Piggyback  291.3     Total Intake(mL/kg) 1843 (22.9) 3076.7 (37.5) 2129.7 (26)    Urine (mL/kg/hr) 1000      Total Output 1000      Net +843 +3076.7 +2129.7           Urine Occurrence  3 x             Physical Exam  Vitals reviewed.   Constitutional:       General: He is not in acute distress.     Appearance: He is not ill-appearing.   HENT:      Nose: Nose normal.      Mouth/Throat:      Mouth: Mucous membranes are moist.   Cardiovascular:      Rate and Rhythm: Normal rate and regular rhythm.      Heart sounds: Normal heart sounds.   Pulmonary:      Effort: Pulmonary effort is normal.      Breath sounds: Normal breath sounds.   Abdominal:      General: Bowel sounds are normal.      Palpations: Abdomen is soft.      Comments: Incision looks good without signs of any infection.  Betadine sandie removed.  Stool in ostomy bag.  Ostomy pink and healthy.  Positive bowel sounds.  Appropriate incisional tenderness   Musculoskeletal:         General: Normal range of motion.   Skin:     General: Skin is warm and dry.   Neurological:      General: No focal deficit present.      Mental Status: He is alert and oriented to person, place, and time.   Psychiatric:         Mood and Affect: Mood normal.         Behavior: Behavior normal.         Thought Content: Thought content normal.         Judgment: Judgment normal.       Significant Labs:  I have reviewed all pertinent lab results within the past 24 hours.  CBC:   Recent Labs   Lab 02/26/22  0901   WBC 7.30   RBC 4.62   HGB 13.8*   HCT 40.9      MCV 89   MCH 29.9   MCHC 33.7     BMP:   Recent Labs   Lab 02/26/22  0901         K 3.8      CO2 27   BUN 9   CREATININE 0.8   CALCIUM 8.8   MG 1.7       Significant Diagnostics:  I have reviewed all pertinent imaging results/findings within the past 24 hours.  I  have reviewed and interpreted all pertinent imaging results/findings within the past 24 hours.    Assessment/Plan:     Colonic mass  57yo M with previous rectal cancer s/p APR with endoscopically unresectable colonic masses/lesions/polyps who is now s/p open completion total abdominal colectomy and small bowel resection on 2/24/22    - advanced to full liquid diet today.  Will advance to soft diet in a.m..  - DC PCA.  Oxycodone p.o. p.r.n.  - OOB, ambulation  - saline lock  -replace phos  - ppx: lvnx  - IS 10xs/hr while awake  - WCON c/s  -Betadine sandie removed.  -ostomy nurse reviewed ostomy care with patient on Friday.  Patient does not feel he needs any further instruction.        Vickie Arellano MD  General Surgery  [facility]

## 2022-02-27 NOTE — ASSESSMENT & PLAN NOTE
57yo M with previous rectal cancer s/p APR with endoscopically unresectable colonic masses/lesions/polyps who is now s/p open completion total abdominal colectomy and small bowel resection on 2/24/22    - advanced to full liquid diet today.  Will advance to soft diet in a.m..  - DC PCA.  Oxycodone p.o. p.r.n.  - OOB, ambulation  - saline lock  -replace phos  - ppx: lvnx  - IS 10xs/hr while awake  - WCON c/s  -Betadine sandie removed.  -ostomy nurse reviewed ostomy care with patient on Friday.  Patient does not feel he needs any further instruction.

## 2022-02-27 NOTE — ASSESSMENT & PLAN NOTE
55yo M with previous rectal cancer s/p APR with endoscopically unresectable colonic masses/lesions/polyps who is now s/p open completion total abdominal colectomy and small bowel resection on 2/24/22    patient states he did well overnight.  Pain well controlled with his regular dose oxycodone po.  Tolerated soft diet this morning.  Ambulating well.  No difficulty urinating.  Afebrile vital signs stable.  Incision clean dry and intact.  Ostomy output with increased liquid stool.  Labs reviewed.  Electrolytes within normal limits.  White blood cell count within normal limits.  Will discharge to home.  Instructed him and his family to monitor output and to keep hydrated.  He is to check in with the surgeons NURSE this week by phone.  He will follow up in the office in 10 days.  No prescriptions given.  Patient will call his primary doctor for his oxycodone prescription.  Patient states that the ostomy nurse came by to see him Friday and he has no questions at this time.  He does not feel he needs any further instructions.

## 2022-02-27 NOTE — PLAN OF CARE
Pt and spouse understand the DC instructions, IV has been removed, tip is in tact, Pt's belongings have been brought down and the room has been swept. Pt will walk down with Chasity to be discharged.

## 2022-02-27 NOTE — PLAN OF CARE
Problem: Adult Inpatient Plan of Care  Goal: Plan of Care Review  Outcome: Ongoing, Progressing  Goal: Patient-Specific Goal (Individualized)  Outcome: Ongoing, Progressing  Goal: Absence of Hospital-Acquired Illness or Injury  Outcome: Ongoing, Progressing  Goal: Optimal Comfort and Wellbeing  Outcome: Ongoing, Progressing  Goal: Readiness for Transition of Care  Outcome: Ongoing, Progressing     Problem: Infection  Goal: Absence of Infection Signs and Symptoms  Outcome: Ongoing, Progressing     Problem: Skin Injury Risk Increased  Goal: Skin Health and Integrity  Outcome: Ongoing, Progressing     Problem: Fall Injury Risk  Goal: Absence of Fall and Fall-Related Injury  Outcome: Ongoing, Progressing      Yes - the patient is able to be screened

## 2022-02-27 NOTE — DISCHARGE SUMMARY
[facility] Surg  General Surgery  Discharge Summary      Patient Name: Sukhjinder Presley  MRN: [id number]  Admission Date: 2/24/2022  Hospital Length of Stay: 3 days  Discharge Date and Time:  02/27/2022 10:09 AM  Attending Physician: Jan New MD   Discharging Provider: Vickie Arellano MD  Primary Care Provider: Drake Elizalde MD    HPI:   56-year-old male with previous rectal adenocarcinoma who required APR who has unresectable colonic polyps/masses who presents for completion total colectomy with end ileostomy construction      Procedure(s) (LRB):  COLECTOMY, TOTAL (N/A)  CYSTOLITHOLAPAXY (N/A)  BLOCK, TRANSVERSUS ABDOMINIS PLANE  EXCISION, SMALL INTESTINE (N/A)  CREATION, ILEAL CONDUIT      Indwelling Lines/Drains at time of discharge:   Lines/Drains/Airways     Central Venous Catheter Line  Duration           Port A Cath Single Lumen -- days          Drain  Duration                Ileostomy 02/24/22 1318 other (see comments) LUQ 2 days              Hospital Course: 02/24/2022: Underwent completion colectomy and small bowel resection    02/25/2022: POD 1. Pain well controlled. Having some bilious output in ostomy appliance. Tolerating clears without nausea/vomiting. Voiding well after liz removal.  2/25/2022 POD2.  Patient states he feels well.  Wants to get off the PCA and back on p.o. oxycodone.  Afebrile vital signs stable.  Tolerating clear liquids.  Urinating without difficulty.  Some liquid stool within the ostomy bag.  Incision looks good without signs of infection.  The Betadine sandie were removed.  Replace phos.  Will advance to soft diet in a.m..  Patient was educated by the ostomy nurse on Friday.  He feels that he does not need any further instruction.  Anticipate discharge in a.m..    2/26/2022 POD3 patient states he did well overnight.  Pain well controlled with his regular dose oxycodone po.  Tolerated soft diet this morning.  Ambulating well.  No difficulty urinating.  Afebrile vital  signs stable.  Incision clean dry and intact.  Ostomy output with increased liquid stool.  Labs reviewed.  Electrolytes within normal limits.  White blood cell count within normal limits.  Will discharge to home.  Instructed him and his family to monitor output and to keep hydrated.  He is to check in with the surgeons NURSE this week by phone.  He will follow up in the office in 10 days.  No prescriptions given.  Patient will call his primary doctor for his oxycodone prescription.  Patient states that the ostomy nurse came by to see him Friday and he has no questions at this time.  He does not feel he needs any further instructions.        Goals of Care Treatment Preferences:  Code Status: Full Code      Consults:     Significant Diagnostic Studies: Radiology: CT scan: CT ABDOMEN PELVIS WITH CONTRAST: No results found for this visit on 02/24/22.    Pending Diagnostic Studies:     Procedure Component Value Units Date/Time    Specimen to Pathology, Surgery General Surgery [[id number]] Collected: 02/24/22 1338    Order Status: Sent Lab Status: In process Updated: 02/25/22 0829        Final Active Diagnoses:    Diagnosis Date Noted POA    PRINCIPAL PROBLEM:  Colonic mass [K63.89] 02/25/2022 Yes      Problems Resolved During this Admission:      Discharged Condition: good    Disposition: Home or Self Care    Follow Up:   Follow-up Information     Jan New MD. Schedule an appointment as soon as possible for a visit.    Specialties: Colon and Rectal Surgery, General Surgery  Why: from time of surgery  Contact information:  [address] [place] [address] DR Xiomy [place]A [address] [phone]                       Patient Instructions:      Diet Adult Regular     Lifting restrictions     Notify your health care provider if you experience any of the following:  temperature >100.4     Notify your health care provider if you experience any of the following:  persistent nausea and vomiting or diarrhea     Notify your health  care provider if you experience any of the following:  severe uncontrolled pain     Notify your health care provider if you experience any of the following:  redness, tenderness, or signs of infection (pain, swelling, redness, odor or green/yellow discharge around incision site)     Notify your health care provider if you experience any of the following:  difficulty breathing or increased cough     Notify your health care provider if you experience any of the following:  severe persistent headache     Notify your health care provider if you experience any of the following:  worsening rash     Notify your health care provider if you experience any of the following:  persistent dizziness, light-headedness, or visual disturbances     Notify your health care provider if you experience any of the following:  increased confusion or weakness     Notify your health care provider if you experience any of the following:     Change dressing (specify)   Order Comments: Keep dry dressing in place until office visit. May remove dressing for showers. Staples can get wet starting Sunday evening.     Medications:  Reconciled Home Medications:      Medication List      CONTINUE taking these medications    acetaminophen 325 MG tablet  Commonly known as: TYLENOL  Take 2 tablets (650 mg total) by mouth every 6 (six) hours as needed.     finasteride 5 mg tablet  Commonly known as: PROSCAR  Take 1 tablet (5 mg total) by mouth once daily.     oxyCODONE 10 mg Tab immediate release tablet  Commonly known as: ROXICODONE  Take 1 tablet (10 mg total) by mouth every 4 (four) hours as needed. for pain.     promethazine 25 MG tablet  Commonly known as: PHENERGAN  Take 1 tablet (25 mg total) by mouth every 4 (four) hours.     sildenafiL 100 MG tablet  Commonly known as: VIAGRA  Take 1 tablet (100 mg total) by mouth daily as needed for Erectile Dysfunction.     tamsulosin 0.4 mg Cap  Commonly known as: FLOMAX  Take 1 capsule (0.4 mg total) by mouth  once daily.        STOP taking these medications    clindamycin 300 MG capsule  Commonly known as: CLEOCIN     dronabinoL 10 MG capsule  Commonly known as: MARINOL     iron fum-B12-IF-C-folic acid 110-0.5 mg capsule  Commonly known as: FOLTRIN     magic mouthwash diphen/antac/lidoc     metroNIDAZOLE 500 MG tablet  Commonly known as: FLAGYL     neomycin 500 mg Tab  Commonly known as: MYCIFRADIN     ondansetron 4 MG Tbdl  Commonly known as: ZOFRAN-ODT     polyethylene glycol 17 gram/dose powder  Commonly known as: GLYCOLAX          Time spent on the discharge of patient: 30 minutes    Vickie Arellano MD  General Surgery  [facility]

## 2022-02-27 NOTE — SUBJECTIVE & OBJECTIVE
Interval History:  2/25/2022 POD2.  Patient states he feels well.  Wants to get off the PCA and back on p.o. oxycodone.  Afebrile vital signs stable.  Tolerating clear liquids.  Urinating without difficulty.  Some liquid stool within the ostomy bag.  Incision looks good without signs of infection.  The Betadine sandie were removed.  Replace phos.  Will advance to soft diet in a.m..  Patient was educated by the ostomy nurse on Friday.  He feels that he does not need any further instruction.  Anticipate discharge in a.m..  Medications:  Continuous Infusions:  Scheduled Meds:   chlorhexidine  10 mL Mouth/Throat BID    enoxaparin  40 mg Subcutaneous Daily    famotidine  20 mg Oral BID    finasteride  5 mg Oral Daily    gabapentin  300 mg Oral TID    potassium phosphate (monobasic)  1,000 mg Oral Once    tamsulosin  0.4 mg Oral Daily     PRN Meds:diphenhydrAMINE, naloxone, ondansetron, oxyCODONE     Review of patient's allergies indicates:  No Known Allergies  Objective:     Vital Signs (Most Recent):  Temp: 99.4 °F (37.4 °C) (02/26/22 1641)  Pulse: 78 (02/26/22 1641)  Resp: 18 (02/26/22 1641)  BP: 129/73 (02/26/22 1641)  SpO2: 97 % (02/26/22 1641) Vital Signs (24h Range):  Temp:  [97.5 °F (36.4 °C)-99.4 °F (37.4 °C)] 99.4 °F (37.4 °C)  Pulse:  [71-82] 78  Resp:  [8-20] 18  SpO2:  [95 %-97 %] 97 %  BP: (102-134)/(66-87) 129/73     Weight: 82 kg (180 lb 12.4 oz)  Body mass index is 24.52 kg/m².    Intake/Output - Last 3 Shifts         02/24 0700  02/25 0659 02/25 0700 02/26 0659 02/26 0700 02/27 0659    P.O. 90 1120 838    I.V. (mL/kg) 1753 (21.8) 1665.3 (20.3) 1291.7 (15.8)    IV Piggyback  291.3     Total Intake(mL/kg) 1843 (22.9) 3076.7 (37.5) 2129.7 (26)    Urine (mL/kg/hr) 1000      Total Output 1000      Net +843 +3076.7 +2129.7           Urine Occurrence  3 x             Physical Exam  Vitals reviewed.   Constitutional:       General: He is not in acute distress.     Appearance: He is not ill-appearing.   HENT:       Nose: Nose normal.      Mouth/Throat:      Mouth: Mucous membranes are moist.   Cardiovascular:      Rate and Rhythm: Normal rate and regular rhythm.      Heart sounds: Normal heart sounds.   Pulmonary:      Effort: Pulmonary effort is normal.      Breath sounds: Normal breath sounds.   Abdominal:      General: Bowel sounds are normal.      Palpations: Abdomen is soft.      Comments: Incision looks good without signs of any infection.  Betadine sandie removed.  Stool in ostomy bag.  Ostomy pink and healthy.  Positive bowel sounds.  Appropriate incisional tenderness   Musculoskeletal:         General: Normal range of motion.   Skin:     General: Skin is warm and dry.   Neurological:      General: No focal deficit present.      Mental Status: He is alert and oriented to person, place, and time.   Psychiatric:         Mood and Affect: Mood normal.         Behavior: Behavior normal.         Thought Content: Thought content normal.         Judgment: Judgment normal.       Significant Labs:  I have reviewed all pertinent lab results within the past 24 hours.  CBC:   Recent Labs   Lab 02/26/22  0901   WBC 7.30   RBC 4.62   HGB 13.8*   HCT 40.9      MCV 89   MCH 29.9   MCHC 33.7     BMP:   Recent Labs   Lab 02/26/22  0901         K 3.8      CO2 27   BUN 9   CREATININE 0.8   CALCIUM 8.8   MG 1.7       Significant Diagnostics:  I have reviewed all pertinent imaging results/findings within the past 24 hours.  I have reviewed and interpreted all pertinent imaging results/findings within the past 24 hours.

## 2022-02-28 DIAGNOSIS — C20 RECTAL CANCER: ICD-10-CM

## 2022-02-28 RX ORDER — OXYCODONE HYDROCHLORIDE 10 MG/1
10 TABLET ORAL EVERY 4 HOURS PRN
Qty: 90 TABLET | Refills: 0 | Status: SHIPPED | OUTPATIENT
Start: 2022-02-28 | End: 2022-03-25 | Stop reason: SDUPTHER

## 2022-02-28 NOTE — PLAN OF CARE
[facility]  Discharge Final Note    Primary Care Provider: Drake Elizalde MD    Expected Discharge Date: 2/27/2022    Final Discharge Note (most recent)     Final Note - 02/28/22 0919        Final Note    Assessment Type Final Discharge Note     Anticipated Discharge Disposition Home or Self Care     Hospital Resources/Appts/Education Provided Provided patient/caregiver with written discharge plan information;Appointments scheduled and added to AVS        Post-Acute Status    Discharge Delays None known at this time                 Important Message from Medicare             Contact Info     Jan New MD   Specialty: Colon and Rectal Surgery, General Surgery    [address] [place] [address] [address] [place]A [address]   Phone: [phone]       Next Steps: Schedule an appointment as soon as possible for a visit    Instructions: from time of surgery

## 2022-03-03 LAB
FINAL PATHOLOGIC DIAGNOSIS: NORMAL
GROSS: NORMAL
Lab: NORMAL

## 2022-03-08 NOTE — PHYSICIAN QUERY
PT Name: Sukhjinder Presley  MR #: [id number]    DOCUMENTATION CLARIFICATION     CDS Brooke Tracy RN, BSN        Contact Information:    [email or web address]         This form is a permanent document in the medical record.     Query Date: March 8, 2022    By submitting this query, we are merely seeking further clarification of documentation.  Please utilize your independent clinical judgment when addressing the question(s) below.    The medical record contains the following:  Pathology Findings Location in Medical Record     Pre-Operative Diagnosis: Colonic mass  History of rectal cancer     Procedure:  1. Total abdominal colectomy  2. Small bowel resection  3. Lysis of adhesions  4. End ileostomy construction  5. Transversus abdominis plane block    Indications for Procedure:  56-year-old male with previous rectal adenocarcinoma who required APR who has unresectable colonic polyps/masses who presents for completion total colectomy with end ileostomy construction     Findings of the Procedure:  Diffuse adhesions throughout the abdominal cavity including interloop small bowel adhesions, adhesions to the anterior abdominal wall and into the pelvis     SPECIMEN     1. Cecal polypectomies  2. Ascending polypectomy  3. Transverse polypectomy  4. Descending polypectomy    FINAL PATHOLOGIC DIAGNOSIS     1. SPECIMENS FROM CECUM:  4 ADENOMATOUS POLYP FRAGMENTS    2. SPECIMEN FROM ASCENDING COLON:  MIXED VILLOUS AND ADENOMATOUS POLYP    SPECIMENS FROM 3. TRANSVERSE AND 4. DESCENDING COLON:  ADENOMATOUS POLYPS   OP note 2/24 2/24 Path report        Please clarify:    [ x ] Pathology findings noted above are ruled in/confirmed as diagnoses     [  ] Pathology findings noted above are not confirmed as diagnoses     [  ] Other diagnosis (please specify): ___________     [  ] Clinically Undetermined       Please document in your progress notes daily for the duration of treatment until resolved and  include in your discharge summary.    Form No. [id number]

## 2022-03-14 ENCOUNTER — OFFICE VISIT (OUTPATIENT)
Dept: SURGERY | Facility: CLINIC | Age: 57
End: 2022-03-14
Payer: COMMERCIAL

## 2022-03-14 VITALS
SYSTOLIC BLOOD PRESSURE: 127 MMHG | TEMPERATURE: 99 F | BODY MASS INDEX: 22.75 KG/M2 | WEIGHT: 167.75 LBS | HEART RATE: 103 BPM | DIASTOLIC BLOOD PRESSURE: 83 MMHG

## 2022-03-14 DIAGNOSIS — K63.89 COLONIC MASS: Primary | ICD-10-CM

## 2022-03-14 DIAGNOSIS — Z85.048 HISTORY OF RECTAL CANCER: ICD-10-CM

## 2022-03-14 PROCEDURE — 3074F PR MOST RECENT SYSTOLIC BLOOD PRESSURE < 130 MM HG: ICD-10-PCS | Mod: CPTII,S$GLB,, | Performed by: COLON & RECTAL SURGERY

## 2022-03-14 PROCEDURE — 3008F PR BODY MASS INDEX (BMI) DOCUMENTED: ICD-10-PCS | Mod: CPTII,S$GLB,, | Performed by: COLON & RECTAL SURGERY

## 2022-03-14 PROCEDURE — 99024 PR POST-OP FOLLOW-UP VISIT: ICD-10-PCS | Mod: S$GLB,,, | Performed by: COLON & RECTAL SURGERY

## 2022-03-14 PROCEDURE — 3079F PR MOST RECENT DIASTOLIC BLOOD PRESSURE 80-89 MM HG: ICD-10-PCS | Mod: CPTII,S$GLB,, | Performed by: COLON & RECTAL SURGERY

## 2022-03-14 PROCEDURE — 99999 PR PBB SHADOW E&M-EST. PATIENT-LVL III: CPT | Mod: PBBFAC,,, | Performed by: COLON & RECTAL SURGERY

## 2022-03-14 PROCEDURE — 99999 PR PBB SHADOW E&M-EST. PATIENT-LVL III: ICD-10-PCS | Mod: PBBFAC,,, | Performed by: COLON & RECTAL SURGERY

## 2022-03-14 PROCEDURE — 1159F MED LIST DOCD IN RCRD: CPT | Mod: CPTII,S$GLB,, | Performed by: COLON & RECTAL SURGERY

## 2022-03-14 PROCEDURE — 3008F BODY MASS INDEX DOCD: CPT | Mod: CPTII,S$GLB,, | Performed by: COLON & RECTAL SURGERY

## 2022-03-14 PROCEDURE — 3079F DIAST BP 80-89 MM HG: CPT | Mod: CPTII,S$GLB,, | Performed by: COLON & RECTAL SURGERY

## 2022-03-14 PROCEDURE — 99024 POSTOP FOLLOW-UP VISIT: CPT | Mod: S$GLB,,, | Performed by: COLON & RECTAL SURGERY

## 2022-03-14 PROCEDURE — 1159F PR MEDICATION LIST DOCUMENTED IN MEDICAL RECORD: ICD-10-PCS | Mod: CPTII,S$GLB,, | Performed by: COLON & RECTAL SURGERY

## 2022-03-14 PROCEDURE — 3074F SYST BP LT 130 MM HG: CPT | Mod: CPTII,S$GLB,, | Performed by: COLON & RECTAL SURGERY

## 2022-03-25 DIAGNOSIS — C20 RECTAL CANCER: ICD-10-CM

## 2022-03-28 RX ORDER — OXYCODONE HYDROCHLORIDE 10 MG/1
10 TABLET ORAL EVERY 4 HOURS PRN
Qty: 90 TABLET | Refills: 0 | Status: SHIPPED | OUTPATIENT
Start: 2022-03-28 | End: 2022-04-27 | Stop reason: SDUPTHER

## 2022-03-31 ENCOUNTER — TELEPHONE (OUTPATIENT)
Dept: PHARMACY | Facility: CLINIC | Age: 57
End: 2022-03-31
Payer: COMMERCIAL

## 2022-04-05 ENCOUNTER — OFFICE VISIT (OUTPATIENT)
Dept: UROLOGY | Facility: CLINIC | Age: 57
End: 2022-04-05
Payer: COMMERCIAL

## 2022-04-05 VITALS
BODY MASS INDEX: 23.32 KG/M2 | DIASTOLIC BLOOD PRESSURE: 69 MMHG | SYSTOLIC BLOOD PRESSURE: 109 MMHG | WEIGHT: 171.94 LBS

## 2022-04-05 DIAGNOSIS — N21.0 BLADDER STONE: Primary | ICD-10-CM

## 2022-04-05 PROCEDURE — 99213 PR OFFICE/OUTPT VISIT, EST, LEVL III, 20-29 MIN: ICD-10-PCS | Mod: S$GLB,,, | Performed by: UROLOGY

## 2022-04-05 PROCEDURE — 1159F MED LIST DOCD IN RCRD: CPT | Mod: CPTII,S$GLB,, | Performed by: UROLOGY

## 2022-04-05 PROCEDURE — 1159F PR MEDICATION LIST DOCUMENTED IN MEDICAL RECORD: ICD-10-PCS | Mod: CPTII,S$GLB,, | Performed by: UROLOGY

## 2022-04-05 PROCEDURE — 99999 PR PBB SHADOW E&M-EST. PATIENT-LVL III: ICD-10-PCS | Mod: PBBFAC,,, | Performed by: UROLOGY

## 2022-04-05 PROCEDURE — 3008F BODY MASS INDEX DOCD: CPT | Mod: CPTII,S$GLB,, | Performed by: UROLOGY

## 2022-04-05 PROCEDURE — 3008F PR BODY MASS INDEX (BMI) DOCUMENTED: ICD-10-PCS | Mod: CPTII,S$GLB,, | Performed by: UROLOGY

## 2022-04-05 PROCEDURE — 3074F SYST BP LT 130 MM HG: CPT | Mod: CPTII,S$GLB,, | Performed by: UROLOGY

## 2022-04-05 PROCEDURE — 99213 OFFICE O/P EST LOW 20 MIN: CPT | Mod: S$GLB,,, | Performed by: UROLOGY

## 2022-04-05 PROCEDURE — 1160F RVW MEDS BY RX/DR IN RCRD: CPT | Mod: CPTII,S$GLB,, | Performed by: UROLOGY

## 2022-04-05 PROCEDURE — 1160F PR REVIEW ALL MEDS BY PRESCRIBER/CLIN PHARMACIST DOCUMENTED: ICD-10-PCS | Mod: CPTII,S$GLB,, | Performed by: UROLOGY

## 2022-04-05 PROCEDURE — 3078F PR MOST RECENT DIASTOLIC BLOOD PRESSURE < 80 MM HG: ICD-10-PCS | Mod: CPTII,S$GLB,, | Performed by: UROLOGY

## 2022-04-05 PROCEDURE — 3078F DIAST BP <80 MM HG: CPT | Mod: CPTII,S$GLB,, | Performed by: UROLOGY

## 2022-04-05 PROCEDURE — 99999 PR PBB SHADOW E&M-EST. PATIENT-LVL III: CPT | Mod: PBBFAC,,, | Performed by: UROLOGY

## 2022-04-05 PROCEDURE — 3074F PR MOST RECENT SYSTOLIC BLOOD PRESSURE < 130 MM HG: ICD-10-PCS | Mod: CPTII,S$GLB,, | Performed by: UROLOGY

## 2022-04-05 NOTE — PROGRESS NOTES
Chief Complaint: voiding issues    HPI:   04/05/2022 - patient returns today for f/u, no issues since his surgery, has been healing well, colon path negative for cancer, voiding with a good stream and feels like he empties    02/24/2022 - cystolitholapaxy and completion colectomy    01/05/2022 - patient returns today for follow-up, denies any issues in the interim, voiding with a good stream and emptying his bladder well, denies any gross hematuria or dysuria, had a CT last month which showed a new bladder stone    06/07/2021 - TURP with cystolitholapaxy    05/14/2021 - patient presents today for follow-up, he underwent urodynamics 2 weeks ago which showed a decent bladder contraction with the obstruction, he presents today for discussion of options, still notes urgency but denies any of the dysuria that he presented with initially, denies gross hematuria the    04/21/2021 - patient returns today for follow-up, notes a 3-4 months history of dysuria, urgency, frequency, weak stream, and incomplete bladder emptying, still taking the flomax and finasteride, was previiously intstructed how to perform CIC but has not done this in some time, no GH but states that he 'keeps a bladder infection'  IPSS - 4/1/5/5/5/5/4 = 29 QOL - 6(terrible)    12/21/20: Sildenafil 100 working well enough.  Stream sometimes good sometimes feels he has to strain, most of the time it is good.  3 cups coffee a day and some sodas.  Retrograde ejaculation.  8/5/20- sildenafil 100mg is working well.  7/1/20: patient normally goes to Dr. Altamirano.  Apparently he has been having ED since his rectal surgery and would like to discuss options.  Recent dx with UTI and started abx today, otherwise voiding well.  Patient states he gets no erections after surgery, he has not tried any forms of medical therapy.  Libido and energy are still present.  6/4/20: PVR was 635 after our last visit and he was instructed in CIC.  Voiding normally he feels and hasn't  done CIC in two weeks.  PVR today 105 ml.   5/6/20: Been on flomax since last visit.  Cysto/uroflow normal today.    4/20/20: 53 yo man had colon cancer with resection last week, referred by Dr. New.  Is in retention postoperatively.  No unusual abd/pelvic pain and no exac/rel factors.  No hematuria.  No urolithiasis.  PVR >700 ml.  No  history.      PMH:  Past Medical History:   Diagnosis Date    Anemia     Arthritis     Cancer     rectal    Renal disorder     kidney stones       PSH:  Past Surgical History:   Procedure Laterality Date    ABDOMINOPERINEAL RESECTION OF RECTUM N/A 4/9/2020    Procedure: PROCTECTOMY, ABDOMINOPERINEAL;  Surgeon: Jan New MD;  Location: [facility];  Service: General;  Laterality: N/A;    COLECTOMY, TOTAL N/A 2/24/2022    Procedure: COLECTOMY, TOTAL;  Surgeon: Jan New MD;  Location: [facility];  Service: Colon and Rectal;  Laterality: N/A;    COLON SURGERY      COLONOSCOPY N/A 6/6/2019    Procedure: COLONOSCOPY;  Surgeon: Anjelica Saavedra MD;  Location: [facility];  Service: Endoscopy;  Laterality: N/A;    COLONOSCOPY N/A 12/17/2021    Procedure: COLONOSCOPY;  Surgeon: Jan New MD;  Location: [facility];  Service: General;  Laterality: N/A;    CREATION OF MUSCLE ROTATIONAL FLAP Left 4/9/2020    Procedure: CREATION, FLAP, MUSCLE ROTATION;  Surgeon: Sebastian Dan MD;  Location: [facility] OR;  Service: Plastics;  Laterality: Left;   VRAM    CYSTOSCOPIC LITHOLAPAXY N/A 2/24/2022    Procedure: CYSTOLITHOLAPAXY;  Surgeon: Sukhjinder Tucker MD;  Location: [facility] OR;  Service: Urology;  Laterality: N/A;    ESOPHAGOGASTRODUODENOSCOPY N/A 6/6/2019    Procedure: EGD (ESOPHAGOGASTRODUODENOSCOPY);  Surgeon: Anjelica Saavedra MD;  Location: [facility];  Service: Endoscopy;  Laterality: N/A;    FLAP GRAFT SURGERY N/A 4/9/2020    Procedure: FLAP GRAFT;  Surgeon: Sebastian Dan MD;  Location: [facility] OR;  Service: Plastics;  Laterality: N/A;  left fasciocutaneous  buttocks flap    HERNIA REPAIR  1995    INJECTION OF ANESTHETIC AGENT INTO TISSUE PLANE DEFINED BY TRANSVERSUS ABDOMINIS MUSCLE N/A 2/18/2020    Procedure: BLOCK, TRANSVERSUS ABDOMINIS PLANE;  Surgeon: Jan New MD;  Location: [facility] OR;  Service: General;  Laterality: N/A;    INJECTION OF ANESTHETIC AGENT INTO TISSUE PLANE DEFINED BY TRANSVERSUS ABDOMINIS MUSCLE  2/24/2022    Procedure: BLOCK, TRANSVERSUS ABDOMINIS PLANE;  Surgeon: Jan New MD;  Location: [facility] OR;  Service: Colon and Rectal;;    INSERTION OF TUNNELED CENTRAL VENOUS CATHETER (CVC) WITH SUBCUTANEOUS PORT N/A 10/8/2019    Procedure: [id number];  Surgeon: Jan New MD;  Location: [facility] OR;  Service: General;  Laterality: N/A;    LAPAROSCOPIC COLOSTOMY      MEDIPORT REMOVAL N/A 8/13/2020    Procedure: REMOVAL, CATHETER, CENTRAL VENOUS, TUNNELED, WITH PORT;  Surgeon: Sebastian Dan MD;  Location: [facility] OR;  Service: Plastics;  Laterality: N/A;    TONSILLECTOMY      TRANSURETHRAL RESECTION OF PROSTATE N/A 6/7/2021    Procedure: TURP (TRANSURETHRAL RESECTION OF PROSTATE), CYSTOLITHALOPAXY;  Surgeon: Sukhjinder Tucker MD;  Location: [facility] OR;  Service: Urology;  Laterality: N/A;    WOUND DEBRIDEMENT N/A 8/13/2020    Procedure: DEBRIDEMENT, WOUND;  Surgeon: Sebastian Dan MD;  Location: [facility] OR;  Service: Plastics;  Laterality: N/A;  layered closure       Family History:  Family History   Problem Relation Age of Onset    Intestinal malrotation Mother     Leukemia Father     No Known Problems Sister     Coronary artery disease Brother     Coronary artery disease Maternal Grandmother     Stomach cancer Maternal Grandfather        Social History:  Social History     Tobacco Use    Smoking status: Current Every Day Smoker     Packs/day: 1.00     Years: 35.00     Pack years: 35.00     Types: Cigarettes     Start date: 1/2/1984    Smokeless tobacco: Former User     Types: Chew    Tobacco comment: no  smoking after m.n prior to sx   Substance Use Topics    Alcohol use: Yes     Alcohol/week: 46.0 standard drinks     Types: 42 Cans of beer, 4 Shots of liquor per week     Comment: segrams 7, beer daily  hold now prior to surgery    Drug use: Never        Review of Systems:  General: No fever, chills  Skin: No rashes  Chest:  Denies cough and sputum production  Heart: Denies chest pain  Resp: Denies dyspnea  Abdomen: Denies diarrhea, abdominal pain, hematemesis, or blood in stool.  Musculoskeletal: No joint stiffness or swelling. Denies back pain.  : see HPI  Neuro: no dizziness or weakness    Allergies:  Patient has no known allergies.    Medications:    Current Outpatient Medications:     acetaminophen (TYLENOL) 325 MG tablet, Take 2 tablets (650 mg total) by mouth every 6 (six) hours as needed., Disp: , Rfl: 0    finasteride (PROSCAR) 5 mg tablet, Take 1 tablet (5 mg total) by mouth once daily., Disp: 30 tablet, Rfl: 11    oxyCODONE (ROXICODONE) 10 mg Tab immediate release tablet, Take 1 tablet (10 mg total) by mouth every 4 (four) hours as needed. for pain., Disp: 90 tablet, Rfl: 0    promethazine (PHENERGAN) 25 MG tablet, Take 1 tablet (25 mg total) by mouth every 4 (four) hours., Disp: 60 tablet, Rfl: 4    sildenafiL (VIAGRA) 100 MG tablet, Take 1 tablet (100 mg total) by mouth daily as needed for Erectile Dysfunction., Disp: 20 tablet, Rfl: 11    tamsulosin (FLOMAX) 0.4 mg Cap, Take 1 capsule (0.4 mg total) by mouth once daily., Disp: 30 capsule, Rfl: 11    Physical Exam:  Vitals:    04/05/22 1130   BP: 109/69     General: awake, alert, cooperative  Head: NC/AT  Ears: external ears normal  Eyes: sclera normal  Lungs: normal inspiration, NAD  Heart: well-perfused  Abdomen: Soft, NT, ND, incisions healed, ostomy healthy  Skin: The skin is warm and dry  Ext: No c/c/e.  Neuro: grossly intact, AOx3    RADIOLOGY:  No recent relevant scans    LABS:  I personally reviewed the following lab values:  Lab  Results   Component Value Date    WBC 7.45 02/27/2022    HGB 15.1 02/27/2022    HCT 45.1 02/27/2022     02/27/2022     02/27/2022    K 4.7 02/27/2022     02/27/2022    CREATININE 0.9 02/27/2022    BUN 7 02/27/2022    CO2 29 02/27/2022    TSH 0.907 07/07/2020    PSA 0.58 12/02/2020    INR 1.0 06/11/2019    CHOL 158 06/01/2019    TRIG 194 (H) 06/01/2019    HDL 30 (L) 06/01/2019    ALT 11 02/21/2022    AST 19 02/21/2022     Bladder Scan performed in office:   5/7/20: 600ml  6/4/20:  ml.  04/21/2021 - 13mL  01/05/2022 - 1mL    PSA  6/19: 0.37  12/20: 0.58    Assessment/Plan:   Sukhjinder Presley is a 56 y.o. male with bladder stone and recurrent UTIs due to BPH.  Reviewed that he is likely not emptying completely due to his recurrent stones, thus I would recommend he catheterize weekly to prevent further stones from forming, he understands and agrees, f/u 6 months.     Sukhjinder Tucker MD  Urology

## 2022-04-07 ENCOUNTER — LAB VISIT (OUTPATIENT)
Dept: LAB | Facility: HOSPITAL | Age: 57
End: 2022-04-07
Attending: INTERNAL MEDICINE
Payer: COMMERCIAL

## 2022-04-07 DIAGNOSIS — F10.19 ALCOHOL ABUSE WITH ALCOHOL-INDUCED DISORDER: ICD-10-CM

## 2022-04-07 DIAGNOSIS — Z85.048 HISTORY OF RECTAL CANCER: ICD-10-CM

## 2022-04-07 DIAGNOSIS — C20 RECTAL CANCER: ICD-10-CM

## 2022-04-07 LAB
ALBUMIN SERPL BCP-MCNC: 3.8 G/DL (ref 3.5–5.2)
ALP SERPL-CCNC: 77 U/L (ref 55–135)
ALT SERPL W/O P-5'-P-CCNC: 14 U/L (ref 10–44)
ANION GAP SERPL CALC-SCNC: 8 MMOL/L (ref 8–16)
AST SERPL-CCNC: 17 U/L (ref 10–40)
BASOPHILS # BLD AUTO: 0.06 K/UL (ref 0–0.2)
BASOPHILS NFR BLD: 0.8 % (ref 0–1.9)
BILIRUB SERPL-MCNC: 0.3 MG/DL (ref 0.1–1)
BUN SERPL-MCNC: 6 MG/DL (ref 6–20)
CALCIUM SERPL-MCNC: 9.2 MG/DL (ref 8.7–10.5)
CHLORIDE SERPL-SCNC: 104 MMOL/L (ref 95–110)
CO2 SERPL-SCNC: 25 MMOL/L (ref 23–29)
CREAT SERPL-MCNC: 1 MG/DL (ref 0.5–1.4)
DIFFERENTIAL METHOD: ABNORMAL
EOSINOPHIL # BLD AUTO: 0.3 K/UL (ref 0–0.5)
EOSINOPHIL NFR BLD: 4.1 % (ref 0–8)
ERYTHROCYTE [DISTWIDTH] IN BLOOD BY AUTOMATED COUNT: 13.1 % (ref 11.5–14.5)
EST. GFR  (AFRICAN AMERICAN): >60 ML/MIN/1.73 M^2
EST. GFR  (NON AFRICAN AMERICAN): >60 ML/MIN/1.73 M^2
GLUCOSE SERPL-MCNC: 106 MG/DL (ref 70–110)
HCT VFR BLD AUTO: 43.2 % (ref 40–54)
HGB BLD-MCNC: 14.2 G/DL (ref 14–18)
IMM GRANULOCYTES # BLD AUTO: 0.02 K/UL (ref 0–0.04)
IMM GRANULOCYTES NFR BLD AUTO: 0.3 % (ref 0–0.5)
LYMPHOCYTES # BLD AUTO: 2 K/UL (ref 1–4.8)
LYMPHOCYTES NFR BLD: 28.5 % (ref 18–48)
MCH RBC QN AUTO: 29.3 PG (ref 27–31)
MCHC RBC AUTO-ENTMCNC: 32.9 G/DL (ref 32–36)
MCV RBC AUTO: 89 FL (ref 82–98)
MONOCYTES # BLD AUTO: 0.6 K/UL (ref 0.3–1)
MONOCYTES NFR BLD: 8.1 % (ref 4–15)
NEUTROPHILS # BLD AUTO: 4.2 K/UL (ref 1.8–7.7)
NEUTROPHILS NFR BLD: 58.2 % (ref 38–73)
NRBC BLD-RTO: 0 /100 WBC
PLATELET # BLD AUTO: 267 K/UL (ref 150–450)
PMV BLD AUTO: 9.1 FL (ref 9.2–12.9)
POTASSIUM SERPL-SCNC: 4 MMOL/L (ref 3.5–5.1)
PROT SERPL-MCNC: 7.6 G/DL (ref 6–8.4)
RBC # BLD AUTO: 4.84 M/UL (ref 4.6–6.2)
SODIUM SERPL-SCNC: 137 MMOL/L (ref 136–145)
WBC # BLD AUTO: 7.15 K/UL (ref 3.9–12.7)

## 2022-04-07 PROCEDURE — 36415 COLL VENOUS BLD VENIPUNCTURE: CPT | Mod: PO | Performed by: INTERNAL MEDICINE

## 2022-04-07 PROCEDURE — 80053 COMPREHEN METABOLIC PANEL: CPT | Performed by: INTERNAL MEDICINE

## 2022-04-07 PROCEDURE — 82378 CARCINOEMBRYONIC ANTIGEN: CPT | Performed by: COLON & RECTAL SURGERY

## 2022-04-07 PROCEDURE — 85025 COMPLETE CBC W/AUTO DIFF WBC: CPT | Performed by: INTERNAL MEDICINE

## 2022-04-08 ENCOUNTER — OFFICE VISIT (OUTPATIENT)
Dept: HEMATOLOGY/ONCOLOGY | Facility: CLINIC | Age: 57
End: 2022-04-08
Payer: COMMERCIAL

## 2022-04-08 VITALS
TEMPERATURE: 98 F | WEIGHT: 175.94 LBS | HEIGHT: 72 IN | BODY MASS INDEX: 23.83 KG/M2 | OXYGEN SATURATION: 98 % | DIASTOLIC BLOOD PRESSURE: 68 MMHG | HEART RATE: 92 BPM | SYSTOLIC BLOOD PRESSURE: 104 MMHG

## 2022-04-08 DIAGNOSIS — C20 RECTAL CANCER: ICD-10-CM

## 2022-04-08 PROCEDURE — 1159F MED LIST DOCD IN RCRD: CPT | Mod: CPTII,S$GLB,, | Performed by: INTERNAL MEDICINE

## 2022-04-08 PROCEDURE — 99215 PR OFFICE/OUTPT VISIT, EST, LEVL V, 40-54 MIN: ICD-10-PCS | Mod: S$GLB,,, | Performed by: INTERNAL MEDICINE

## 2022-04-08 PROCEDURE — 3078F DIAST BP <80 MM HG: CPT | Mod: CPTII,S$GLB,, | Performed by: INTERNAL MEDICINE

## 2022-04-08 PROCEDURE — 99999 PR PBB SHADOW E&M-EST. PATIENT-LVL III: ICD-10-PCS | Mod: PBBFAC,,, | Performed by: INTERNAL MEDICINE

## 2022-04-08 PROCEDURE — 99999 PR PBB SHADOW E&M-EST. PATIENT-LVL III: CPT | Mod: PBBFAC,,, | Performed by: INTERNAL MEDICINE

## 2022-04-08 PROCEDURE — 99215 OFFICE O/P EST HI 40 MIN: CPT | Mod: S$GLB,,, | Performed by: INTERNAL MEDICINE

## 2022-04-08 PROCEDURE — 3074F PR MOST RECENT SYSTOLIC BLOOD PRESSURE < 130 MM HG: ICD-10-PCS | Mod: CPTII,S$GLB,, | Performed by: INTERNAL MEDICINE

## 2022-04-08 PROCEDURE — 3008F BODY MASS INDEX DOCD: CPT | Mod: CPTII,S$GLB,, | Performed by: INTERNAL MEDICINE

## 2022-04-08 PROCEDURE — 3078F PR MOST RECENT DIASTOLIC BLOOD PRESSURE < 80 MM HG: ICD-10-PCS | Mod: CPTII,S$GLB,, | Performed by: INTERNAL MEDICINE

## 2022-04-08 PROCEDURE — 1159F PR MEDICATION LIST DOCUMENTED IN MEDICAL RECORD: ICD-10-PCS | Mod: CPTII,S$GLB,, | Performed by: INTERNAL MEDICINE

## 2022-04-08 PROCEDURE — 3008F PR BODY MASS INDEX (BMI) DOCUMENTED: ICD-10-PCS | Mod: CPTII,S$GLB,, | Performed by: INTERNAL MEDICINE

## 2022-04-08 PROCEDURE — 3074F SYST BP LT 130 MM HG: CPT | Mod: CPTII,S$GLB,, | Performed by: INTERNAL MEDICINE

## 2022-04-08 NOTE — PROGRESS NOTES
Subjective:       Patient ID: Sukhjinder Presley is a 56 y.o. male.    Chief Complaint: No primary diagnosis found.  HPI: We have an opportunity to see . Sukhjinder Presley in Hematology Oncology clinic at [facility] on 04/08/2022.  . Sukhjinder Presley is a 56 y.o. gentleman with rectal cancer completed neoadjuvant chemoradiation with concurrent xeloda.  Reported symptoms have improved with better pain control and appetite.  Restaging MRI rectum showed      Impression         1. Large rectal mass with invasion of the muscularis and involvement of the mesorectal fat and fascia along the left and posterior aspect of the mass.  Single lymph node within the mesorectal fat posteriorly measuring approximately 6 mm.  Additional subcentimeter obturator and external iliac chain lymph nodes also seen bilaterally.  When compared to the study prior to neoadjuvant therapy there has been significant decrease in wall thickening.      Has been evaluated by Dr. New, given fascia involvement, likely resection would be R1.  Recommended total neoadjuvant chemotherapy prior to surgery.     Status post chemotherapy with Avastin FOLFOXIRI s/p 4 cycles.     CT chest abdomen pelvis showed               Impression         1. Significant decrease in soft tissue prominence of the previously noted rectal mass with some persistent circumferential wall thickening still present on the current study.  There is also some persistent stranding within the perirectal fat and thickening of the mesial rectal fascia along with thickening/fluid density noted within the presacral soft tissues.  No metastatic lesions to the chest.  2. Remaining findings as discussed above and stable.      Had cycles 5-6 avastin folfoxiri, cycle 7 with FOLFOX.       Developed contained perforation of distal rectum. Underwent loop sigmoid colostomy. Underwent APR on 4/20/2020.  Pathology showed 1. Soft tissue, left lateral sidewall margin (biopsy):  - Benign fibro muscular  tissue, negative for carcinoma  - Confirmed with negative CDX2 immunostain and rare background; CDX2 immunostain with appropriate  control  2. Rectum, anus with left buttock skin (abdominal peritoneal resection with extra anatomic resection of  fistula tract and the left gluteus muscle):  - Residual invasive adenocarcinoma, moderately differentiated, extending to pericolorectal tissue, with  perforation (see synoptic report)  - Carcinoma extends to inked resection margin at perforation site; final resection margin maybe negative  (also see part 1);  - Pathologic Stage ypT3, N0  - Background colon with hemosiderin-laden macrophages, fibrosis and thickened wall vessels, consistent  with neoadjuvant therapy effect  - Diverticulosis  - Anus with two skin tags and reactive stromal cells, viewed by Dr. Servin who concurs this consistent with  chemoradiation therapy efect  - Eighteen lymph nodes, negative for metastatic carcinoma (0/18)    Interval Hx: 57 yo with stage III colorectal cancer, s/p neoadjuvant chemo followed by APR presents for routine follow up. No new complaints since the last visit. He had colonoscopy today.    Oncology History Overview Note   Entered in error     Rectal cancer   6/24/2019 Initial Diagnosis    Rectal cancer     6/26/2019 Cancer Staged    Staging form: Colon and Rectum, AJCC 8th Edition  - Clinical stage from 6/26/2019: Stage IIIB (cT3, cN2a, cM0) - Signed by Artem Mishra II, MD on 6/26/2019     7/10/2019 - 8/13/2019 Radiation Therapy    Treatment Site Ref. ID Energy Dose/Fx (Gy) #Fx Dose Correction (Gy) Total Dose (Gy) Start Date End Date Elapsed Days   RECTUM Pelvis 6X 2 25 / 25 0 50 7/10/2019 8/13/2019 34          7/10/2019 - 7/10/2019 Chemotherapy    Treatment Summary   Plan Name: OP CAPECITABINE 5 DAYS + RADIOTHERAPY  Treatment Goal: Curative  Status: Inactive  Start Date: [No treatment day found]  End Date: [No treatment day found]  Provider: Song Aden MD  Chemotherapy: [No  matching medication found in this treatment plan]     10/9/2019 Tumor Conference      Multidisciplinary Rectal Cancer Conference - Evaluation and Recommendation Summary    10/9/2019  Sukhjinder Presley  [id number]  53 y.o. male    1. Evaluation    C scope on 6/6/19 - non obstructing mass    MRI date: 6/17/19    Tumor Location in Rectum: middle third    Indication of Sphincter Involvement:  Uninvolved    Pretreatment Circumferential Resection Margin (CRM) status:  Threatened Tumor abuts CRM on the left.     Pretreatment (clinical) AJCC Stage: IIIB  Stage I  [] I: T1N0M0  [] I: T2N0M0  Stage II  [] IIA: T3N0M0  [] IIB D8wA9P3  [] IIC: D9hN9H8  Stage III  [] IIIA: T1-2N1M0  [] IIIA: I0C0rO0  [x] IIIB: T3-C5hE7O0  [] IIIB: T2-3N2aM0  [] IIIB: T1-2N2bM0  [] IIIC: N2cN5cN5  [] IIIC: T3-6gI6dB2  [] IIIC: O4eP2-5L1   Stage IV  [] IV: [id number]-b    CEA level:   Lab Results   Component Value Date    CEA 1.4 09/30/2019       2. Treatment     Neoadjuvant Therapy Recommendation: Long Course Chemoradiotherapy - completed on 8/13    CT scan 7/30 during treatment: Unchanged appearance of large rectal mass, mild curcumferential bladder wall thickening.     MRI rectum 9/24: Large mass with invasion of muscularis and involvement of the mesorectal fat and fascia along the left and posterior aspect of the mass.   Single LN within the mesorectal fat posteriorly measuring 6 mm.   Subcentimeter obturator and external iliac changes LNDs seen bilaterally.     Recommendations:     Complete COLLINS with initiation of FOLFOX.     After follow by LAR + DI.      10/9/2019 - 3/16/2020 Chemotherapy    Treatment Summary   Plan Name: OP FOLFOXIRI Q2W  Treatment Goal: Curative  Status: Inactive  Start Date: 10/9/2019  End Date: 2/5/2020  Provider: Song Aden MD  Chemotherapy: fluorouracil 3,200 mg/m2 = 6,305 mg in sodium chloride 0.9% 253 mL chemo infusion, 3,200 mg/m2 = 6,305 mg, Intravenous, Over 48 hours, 7 of 8 cycles  Administration: 6,305 mg  (10/9/2019), 6,270 mg (10/23/2019), 6,210 mg (11/6/2019), 6,305 mg (11/20/2019), 6,145 mg (1/6/2020), 6,145 mg (1/20/2020), 6,240 mg (2/3/2020)  bevacizumab (AVASTIN) 5 mg/kg = 380 mg in sodium chloride 0.9% 100 mL chemo infusion, 5 mg/kg = 380 mg (100 % of original dose 5 mg/kg), Intravenous, Clinic/HOD 1 time, 6 of 6 cycles  Dose modification: 5 mg/kg (original dose 5 mg/kg, Cycle 1, Reason: MD Discretion), 5 mg/kg (original dose 5 mg/kg, Cycle 2)  Administration: 380 mg (10/9/2019), 380 mg (10/23/2019), 370 mg (11/6/2019), 380 mg (11/20/2019), 365 mg (1/6/2020), 360 mg (1/20/2020)  irinotecan (CAMPTOSAR) 165 mg/m2 = 326 mg in sodium chloride 0.9% 266.3 mL chemo infusion, 165 mg/m2 = 326 mg, Intravenous, Clinic/HOD 1 time, 6 of 6 cycles  Administration: 326 mg (10/9/2019), 324 mg (10/23/2019), 320 mg (11/6/2019), 326 mg (11/20/2019), 316 mg (1/6/2020), 316 mg (1/20/2020)  leucovorin calcium 200 mg/m2 = 395 mg in dextrose 5 % 269.75 mL infusion, 200 mg/m2 = 395 mg, Intravenous, Clinic/HOD 1 time, 7 of 8 cycles  Administration: 395 mg (10/9/2019), 390 mg (10/23/2019), 390 mg (11/6/2019), 395 mg (11/20/2019), 385 mg (1/6/2020), 385 mg (1/20/2020), 390 mg (2/3/2020)  oxaliplatin (ELOXATIN) 85 mg/m2 = 167 mg in dextrose 5 % 533.4 mL chemo infusion, 85 mg/m2 = 167 mg, Intravenous, Clinic/HOD 1 time, 7 of 8 cycles  Administration: 167 mg (10/9/2019), 167 mg (10/23/2019), 165 mg (11/6/2019), 167 mg (11/20/2019), 163 mg (1/6/2020), 163 mg (1/20/2020), 166 mg (2/3/2020)       Past Medical History:   Diagnosis Date    Anemia     Arthritis     Cancer     rectal    Renal disorder     kidney stones     Family History   Problem Relation Age of Onset    Intestinal malrotation Mother     Leukemia Father     No Known Problems Sister     Coronary artery disease Brother     Coronary artery disease Maternal Grandmother     Stomach cancer Maternal Grandfather      Social History     Socioeconomic History    Marital status:  Significant Other   Tobacco Use    Smoking status: Current Every Day Smoker     Packs/day: 1.00     Years: 35.00     Pack years: 35.00     Types: Cigarettes     Start date: 1/2/1984    Smokeless tobacco: Former User     Types: Chew    Tobacco comment: no smoking after m.n prior to sx   Substance and Sexual Activity    Alcohol use: Yes     Alcohol/week: 46.0 standard drinks     Types: 42 Cans of beer, 4 Shots of liquor per week     Comment: segrams 7, beer daily  hold now prior to surgery    Drug use: Never    Sexual activity: Yes     Partners: Female     Birth control/protection: None     Social Determinants of Health     Financial Resource Strain: Unknown    Difficulty of Paying Living Expenses: Patient refused   Food Insecurity: Unknown    Worried About Running Out of Food in the Last Year: Patient refused    Ran Out of Food in the Last Year: Patient refused   Transportation Needs: No Transportation Needs    Lack of Transportation (Medical): No    Lack of Transportation (Non-Medical): No   Physical Activity: Sufficiently Active    Days of Exercise per Week: 5 days    Minutes of Exercise per Session: 150+ min   Stress: Stress Concern Present    Feeling of Stress : To some extent   Social Connections: Unknown    Frequency of Communication with Friends and Family: Three times a week    Frequency of Social Gatherings with Friends and Family: Once a week    Active Member of Clubs or Organizations: No    Attends Club or Organization Meetings: Patient refused    Marital Status: Living with partner     Past Surgical History:   Procedure Laterality Date    ABDOMINOPERINEAL RESECTION OF RECTUM N/A 4/9/2020    Procedure: PROCTECTOMY, ABDOMINOPERINEAL;  Surgeon: Jan New MD;  Location: [facility] OR;  Service: General;  Laterality: N/A;    COLECTOMY, TOTAL N/A 2/24/2022    Procedure: COLECTOMY, TOTAL;  Surgeon: Jan New MD;  Location: [facility] OR;  Service: Colon and Rectal;  Laterality: N/A;     COLON SURGERY      COLONOSCOPY N/A 6/6/2019    Procedure: COLONOSCOPY;  Surgeon: Anjelica Saavedra MD;  Location: [facility];  Service: Endoscopy;  Laterality: N/A;    COLONOSCOPY N/A 12/17/2021    Procedure: COLONOSCOPY;  Surgeon: Jan New MD;  Location: [facility];  Service: General;  Laterality: N/A;    CREATION OF MUSCLE ROTATIONAL FLAP Left 4/9/2020    Procedure: CREATION, FLAP, MUSCLE ROTATION;  Surgeon: Sebastian Dan MD;  Location: [facility] OR;  Service: Plastics;  Laterality: Left;   VRAM    CYSTOSCOPIC LITHOLAPAXY N/A 2/24/2022    Procedure: CYSTOLITHOLAPAXY;  Surgeon: Sukhjinder Tucker MD;  Location: [facility];  Service: Urology;  Laterality: N/A;    ESOPHAGOGASTRODUODENOSCOPY N/A 6/6/2019    Procedure: EGD (ESOPHAGOGASTRODUODENOSCOPY);  Surgeon: Anjelica Saavedra MD;  Location: [facility];  Service: Endoscopy;  Laterality: N/A;    FLAP GRAFT SURGERY N/A 4/9/2020    Procedure: FLAP GRAFT;  Surgeon: Sebastian Dan MD;  Location: [facility];  Service: Plastics;  Laterality: N/A;  left fasciocutaneous buttocks flap    HERNIA REPAIR  1995    INJECTION OF ANESTHETIC AGENT INTO TISSUE PLANE DEFINED BY TRANSVERSUS ABDOMINIS MUSCLE N/A 2/18/2020    Procedure: BLOCK, TRANSVERSUS ABDOMINIS PLANE;  Surgeon: Jan New MD;  Location: [facility];  Service: General;  Laterality: N/A;    INJECTION OF ANESTHETIC AGENT INTO TISSUE PLANE DEFINED BY TRANSVERSUS ABDOMINIS MUSCLE  2/24/2022    Procedure: BLOCK, TRANSVERSUS ABDOMINIS PLANE;  Surgeon: Jan New MD;  Location: [facility];  Service: Colon and Rectal;;    INSERTION OF TUNNELED CENTRAL VENOUS CATHETER (CVC) WITH SUBCUTANEOUS PORT N/A 10/8/2019    Procedure: [id number];  Surgeon: Jan New MD;  Location: [facility];  Service: General;  Laterality: N/A;    LAPAROSCOPIC COLOSTOMY      MEDIPORT REMOVAL N/A 8/13/2020    Procedure: REMOVAL, CATHETER, CENTRAL VENOUS, TUNNELED, WITH PORT;  Surgeon: Sebastian Dan MD;   Location: [facility] OR;  Service: Plastics;  Laterality: N/A;    TONSILLECTOMY      TRANSURETHRAL RESECTION OF PROSTATE N/A 6/7/2021    Procedure: TURP (TRANSURETHRAL RESECTION OF PROSTATE), CYSTOLITHALOPAXY;  Surgeon: Sukhjinder Tucker MD;  Location: [facility] OR;  Service: Urology;  Laterality: N/A;    WOUND DEBRIDEMENT N/A 8/13/2020    Procedure: DEBRIDEMENT, WOUND;  Surgeon: Sebastian Dan MD;  Location: [facility] OR;  Service: Plastics;  Laterality: N/A;  layered closure     Current Outpatient Medications   Medication Sig Dispense Refill    acetaminophen (TYLENOL) 325 MG tablet Take 2 tablets (650 mg total) by mouth every 6 (six) hours as needed.  0    finasteride (PROSCAR) 5 mg tablet Take 1 tablet (5 mg total) by mouth once daily. 30 tablet 11    oxyCODONE (ROXICODONE) 10 mg Tab immediate release tablet Take 1 tablet (10 mg total) by mouth every 4 (four) hours as needed. for pain. 90 tablet 0    promethazine (PHENERGAN) 25 MG tablet Take 1 tablet (25 mg total) by mouth every 4 (four) hours. 60 tablet 4    sildenafiL (VIAGRA) 100 MG tablet Take 1 tablet (100 mg total) by mouth daily as needed for Erectile Dysfunction. 20 tablet 11    tamsulosin (FLOMAX) 0.4 mg Cap Take 1 capsule (0.4 mg total) by mouth once daily. 30 capsule 11     No current facility-administered medications for this visit.       Labs:  Lab Results   Component Value Date    WBC 7.15 04/07/2022    HGB 14.2 04/07/2022    HCT 43.2 04/07/2022    MCV 89 04/07/2022     04/07/2022     BMP  Lab Results   Component Value Date     04/07/2022    K 4.0 04/07/2022     04/07/2022    CO2 25 04/07/2022    BUN 6 04/07/2022    CREATININE 1.0 04/07/2022    CALCIUM 9.2 04/07/2022    ANIONGAP 8 04/07/2022    ESTGFRAFRICA >60 04/07/2022    EGFRNONAA >60 04/07/2022     Lab Results   Component Value Date    ALT 14 04/07/2022    AST 17 04/07/2022    ALKPHOS 77 04/07/2022    BILITOT 0.3 04/07/2022       Lab Results   Component Value Date    IRON 50  07/07/2020    TIBC 377 07/07/2020    FERRITIN 96 07/07/2020     Lab Results   Component Value Date    UNKXBPOR19 245 07/07/2020     Lab Results   Component Value Date    FOLATE 6.0 07/07/2020     Lab Results   Component Value Date    TSH 0.907 07/07/2020       I have reviewed the radiology reports and examined the scan/xray images.    Review of Systems   Constitutional: Negative.  Negative for appetite change and unexpected weight change.   HENT: Negative.  Negative for mouth sores.    Eyes: Negative.  Negative for visual disturbance.   Respiratory: Negative.  Negative for cough and shortness of breath.    Cardiovascular: Negative.  Negative for chest pain.   Gastrointestinal: Negative.  Negative for diarrhea.   Endocrine: Negative.    Genitourinary: Negative.  Negative for frequency.   Musculoskeletal: Negative.  Negative for back pain.   Skin: Negative.  Negative for rash.   Allergic/Immunologic: Negative.    Neurological: Negative.  Negative for headaches.   Hematological: Negative.  Negative for adenopathy.   Psychiatric/Behavioral: Negative.  The patient is not nervous/anxious.      ECOG SCORE    1 - Restricted in strenuous activity-ambulatory and able to carry out work of a light nature            Objective:     There were no vitals filed for this visit.There is no height or weight on file to calculate BMI.  Physical Exam  Vitals and nursing note reviewed.   Constitutional:       Appearance: He is well-developed.   HENT:      Head: Normocephalic and atraumatic.   Eyes:      Conjunctiva/sclera: Conjunctivae normal.   Cardiovascular:      Rate and Rhythm: Normal rate and regular rhythm.   Pulmonary:      Effort: Pulmonary effort is normal.      Breath sounds: Normal breath sounds.   Abdominal:      General: Bowel sounds are normal.      Palpations: Abdomen is soft.   Musculoskeletal:         General: Normal range of motion.      Cervical back: Normal range of motion and neck supple.   Skin:     General: Skin is  warm and dry.   Neurological:      Mental Status: He is alert and oriented to person, place, and time.   Psychiatric:         Behavior: Behavior normal.         Thought Content: Thought content normal.         Judgment: Judgment normal.           Assessment:      1. Rectal cancer           Plan:     Rectal cancer  Stage IIIB rectal adenocarcinoma, status post COLLINS approach, status post APR with extra anatomic resection of fistula track and partial left gluteus muscle and VRAM.     Surveillance CT scan of chest abdomen and pelvis from 12/16/2021 showed unchanged appearance of multiple subcentimeter pulmonary nodules; no evidence of metastatic disease.  Noted left-sided nephrolithiasis with no hydronephrosis.    Surveillance colonoscopy in December of 2021 showed 4 adenomatous polyps in the cecum as well as mixed villous adenomatous polyps in the ascending colon.  Status post completion colectomy/total colectomy with end ileostomy construction in February of 2022, final pathology showed benign adenomas.    Reviewed labs, no concerning cytopenia, return in 6 months with repeat labs or sooner if needed.  Encourage follow-up with colorectal surgeon.    Rectal cancer  -     CBC Auto Differential; Future; Expected date: 04/08/2022  -     Comprehensive Metabolic Panel; Future; Expected date: 04/08/2022      Raphael Valencia MD

## 2022-04-19 NOTE — ASSESSMENT & PLAN NOTE
Stage IIIB rectal adenocarcinoma, status post COLLINS approach, status post APR with extra anatomic resection of fistula track and partial left gluteus muscle and VRAM.     Surveillance CT scan of chest abdomen and pelvis from 12/16/2021 showed unchanged appearance of multiple subcentimeter pulmonary nodules; no evidence of metastatic disease.  Noted left-sided nephrolithiasis with no hydronephrosis.    Surveillance colonoscopy in December of 2021 showed 4 adenomatous polyps in the cecum as well as mixed villous adenomatous polyps in the ascending colon.  Status post completion colectomy/total colectomy with end ileostomy construction in February of 2022, final pathology showed benign adenomas.    Reviewed labs, no concerning cytopenia, return in 6 months with repeat labs or sooner if needed.  Encourage follow-up with colorectal surgeon.  
Female

## 2022-04-27 ENCOUNTER — PATIENT MESSAGE (OUTPATIENT)
Dept: ADMINISTRATIVE | Facility: HOSPITAL | Age: 57
End: 2022-04-27
Payer: COMMERCIAL

## 2022-04-27 DIAGNOSIS — C20 RECTAL CANCER: ICD-10-CM

## 2022-04-27 DIAGNOSIS — R11.0 NAUSEA: ICD-10-CM

## 2022-04-27 RX ORDER — OXYCODONE HYDROCHLORIDE 10 MG/1
10 TABLET ORAL EVERY 4 HOURS PRN
Qty: 90 TABLET | Refills: 0 | Status: SHIPPED | OUTPATIENT
Start: 2022-04-27 | End: 2022-05-27 | Stop reason: SDUPTHER

## 2022-04-27 RX ORDER — PROMETHAZINE HYDROCHLORIDE 25 MG/1
25 TABLET ORAL EVERY 4 HOURS
Qty: 60 TABLET | Refills: 4 | Status: SHIPPED | OUTPATIENT
Start: 2022-04-27 | End: 2022-09-21 | Stop reason: SDUPTHER

## 2022-05-27 DIAGNOSIS — C20 RECTAL CANCER: ICD-10-CM

## 2022-06-01 RX ORDER — OXYCODONE HYDROCHLORIDE 10 MG/1
10 TABLET ORAL EVERY 4 HOURS PRN
Qty: 90 TABLET | Refills: 0 | Status: SHIPPED | OUTPATIENT
Start: 2022-06-01 | End: 2022-06-27 | Stop reason: SDUPTHER

## 2022-06-02 DIAGNOSIS — N52.39 OTHER POST-PROCEDURAL ERECTILE DYSFUNCTION: ICD-10-CM

## 2022-06-02 RX ORDER — FINASTERIDE 5 MG/1
5 TABLET, FILM COATED ORAL DAILY
Qty: 30 TABLET | Refills: 11 | Status: SHIPPED | OUTPATIENT
Start: 2022-06-02 | End: 2022-07-25 | Stop reason: SDUPTHER

## 2022-06-02 RX ORDER — SILDENAFIL 100 MG/1
100 TABLET, FILM COATED ORAL DAILY PRN
Qty: 20 TABLET | Refills: 11 | Status: SHIPPED | OUTPATIENT
Start: 2022-06-02 | End: 2022-07-25 | Stop reason: SDUPTHER

## 2022-06-02 RX ORDER — TAMSULOSIN HYDROCHLORIDE 0.4 MG/1
0.4 CAPSULE ORAL DAILY
Qty: 30 CAPSULE | Refills: 11 | Status: SHIPPED | OUTPATIENT
Start: 2022-06-02 | End: 2022-07-25 | Stop reason: SDUPTHER

## 2022-06-13 ENCOUNTER — LAB VISIT (OUTPATIENT)
Dept: LAB | Facility: HOSPITAL | Age: 57
End: 2022-06-13
Attending: INTERNAL MEDICINE
Payer: COMMERCIAL

## 2022-06-13 ENCOUNTER — OFFICE VISIT (OUTPATIENT)
Dept: SURGERY | Facility: CLINIC | Age: 57
End: 2022-06-13
Payer: COMMERCIAL

## 2022-06-13 VITALS
WEIGHT: 173.94 LBS | HEART RATE: 90 BPM | DIASTOLIC BLOOD PRESSURE: 82 MMHG | SYSTOLIC BLOOD PRESSURE: 120 MMHG | TEMPERATURE: 98 F | BODY MASS INDEX: 23.59 KG/M2

## 2022-06-13 DIAGNOSIS — K63.89 COLONIC MASS: ICD-10-CM

## 2022-06-13 DIAGNOSIS — C20 RECTAL CANCER: Primary | ICD-10-CM

## 2022-06-13 DIAGNOSIS — Z85.048 HISTORY OF RECTAL CANCER: ICD-10-CM

## 2022-06-13 DIAGNOSIS — C20 RECTAL CANCER: ICD-10-CM

## 2022-06-13 LAB — CEA SERPL-MCNC: 1.7 NG/ML (ref 0–5)

## 2022-06-13 PROCEDURE — 99214 OFFICE O/P EST MOD 30 MIN: CPT | Mod: S$GLB,,, | Performed by: COLON & RECTAL SURGERY

## 2022-06-13 PROCEDURE — 99999 PR PBB SHADOW E&M-EST. PATIENT-LVL III: ICD-10-PCS | Mod: PBBFAC,,, | Performed by: COLON & RECTAL SURGERY

## 2022-06-13 PROCEDURE — 3008F PR BODY MASS INDEX (BMI) DOCUMENTED: ICD-10-PCS | Mod: CPTII,S$GLB,, | Performed by: COLON & RECTAL SURGERY

## 2022-06-13 PROCEDURE — 3074F PR MOST RECENT SYSTOLIC BLOOD PRESSURE < 130 MM HG: ICD-10-PCS | Mod: CPTII,S$GLB,, | Performed by: COLON & RECTAL SURGERY

## 2022-06-13 PROCEDURE — 1159F MED LIST DOCD IN RCRD: CPT | Mod: CPTII,S$GLB,, | Performed by: COLON & RECTAL SURGERY

## 2022-06-13 PROCEDURE — 3074F SYST BP LT 130 MM HG: CPT | Mod: CPTII,S$GLB,, | Performed by: COLON & RECTAL SURGERY

## 2022-06-13 PROCEDURE — 99999 PR PBB SHADOW E&M-EST. PATIENT-LVL III: CPT | Mod: PBBFAC,,, | Performed by: COLON & RECTAL SURGERY

## 2022-06-13 PROCEDURE — 36415 COLL VENOUS BLD VENIPUNCTURE: CPT

## 2022-06-13 PROCEDURE — 3079F PR MOST RECENT DIASTOLIC BLOOD PRESSURE 80-89 MM HG: ICD-10-PCS | Mod: CPTII,S$GLB,, | Performed by: COLON & RECTAL SURGERY

## 2022-06-13 PROCEDURE — 1159F PR MEDICATION LIST DOCUMENTED IN MEDICAL RECORD: ICD-10-PCS | Mod: CPTII,S$GLB,, | Performed by: COLON & RECTAL SURGERY

## 2022-06-13 PROCEDURE — 3008F BODY MASS INDEX DOCD: CPT | Mod: CPTII,S$GLB,, | Performed by: COLON & RECTAL SURGERY

## 2022-06-13 PROCEDURE — 82378 CARCINOEMBRYONIC ANTIGEN: CPT

## 2022-06-13 PROCEDURE — 99214 PR OFFICE/OUTPT VISIT, EST, LEVL IV, 30-39 MIN: ICD-10-PCS | Mod: S$GLB,,, | Performed by: COLON & RECTAL SURGERY

## 2022-06-13 PROCEDURE — 3079F DIAST BP 80-89 MM HG: CPT | Mod: CPTII,S$GLB,, | Performed by: COLON & RECTAL SURGERY

## 2022-06-13 NOTE — PROGRESS NOTES
History & Physical    SUBJECTIVE:     CC: rectal adenocarcinoma  Ref: Anjelica Saavedra MD    History of Present Illness:  Patient is a 56 y.o. male presents for evaluation of rectal cancer.  Patient reports he has had increasing pelvic/rectal pain along with decreased bowel movements over the last 6 weeks.  Reports an uncomfortable feeling in his pelvis associated with mucous discharge from his rectum as well as bloody bowel movements that are thin and less than normal. He reports associated chills, fatigue and 16 lb weight loss over the past 5 months.  He has also noticed a decrease in appetite as well. He underwent a colonoscopy on 06/06/2019 where a nonobstructing rectal mass was found with biopsy showing well-differentiated adenocarcinoma.  He was then referred to [facility] for evaluation.  He has no family history of colorectal cancer or IBD.    8/13/19: Completed neoadj chemoXRT  2/18/2020: Laparoscopic loop sigmoid colostomy 2/2 rectal perforation on chemotx  4/9/2020: APR with extra anatomic resection of fistula tract and left gluteus muscle, omental pedicle flap and VRAM with skin paddle by plastics  2/24/22: Completion colectomy/total colectomy with end ileostomy construction    Interval history:  Since last clinic visit, the patient has done well. He does report some midline lower abdominal pain after periods of exertion which is relieved with rest. He is tolerating a regular diet and having good ileostomy function. He is still smoking. He denies fevers, chills, nausea, and vomiting.     Review of patient's allergies indicates:  No Known Allergies    Current Outpatient Medications   Medication Sig Dispense Refill    acetaminophen (TYLENOL) 325 MG tablet Take 2 tablets (650 mg total) by mouth every 6 (six) hours as needed.  0    finasteride (PROSCAR) 5 mg tablet TAKE 1 TABLET (5 MG TOTAL) BY MOUTH ONCE DAILY. 30 tablet 11    oxyCODONE (ROXICODONE) 10 mg Tab immediate release tablet Take 1  tablet (10 mg total) by mouth every 4 (four) hours as needed. for pain. 90 tablet 0    sildenafiL (VIAGRA) 100 MG tablet TAKE 1 TABLET (100 MG TOTAL) BY MOUTH DAILY AS NEEDED FOR ERECTILE DYSFUNCTION. 20 tablet 11    tamsulosin (FLOMAX) 0.4 mg Cap TAKE 1 CAPSULE (0.4 MG TOTAL) BY MOUTH ONCE DAILY. 30 capsule 11    promethazine (PHENERGAN) 25 MG tablet Take 1 tablet (25 mg total) by mouth every 4 (four) hours. (Patient not taking: Reported on 6/13/2022) 60 tablet 4     No current facility-administered medications for this visit.       Past Medical History:   Diagnosis Date    Anemia     Arthritis     Cancer     rectal    Renal disorder     kidney stones     Past Surgical History:   Procedure Laterality Date    ABDOMINOPERINEAL RESECTION OF RECTUM N/A 4/9/2020    Procedure: PROCTECTOMY, ABDOMINOPERINEAL;  Surgeon: Jan New MD;  Location: [facility];  Service: General;  Laterality: N/A;    COLECTOMY, TOTAL N/A 2/24/2022    Procedure: COLECTOMY, TOTAL;  Surgeon: Jan New MD;  Location: [facility];  Service: Colon and Rectal;  Laterality: N/A;    COLON SURGERY      COLONOSCOPY N/A 6/6/2019    Procedure: COLONOSCOPY;  Surgeon: Anjelica Saavedra MD;  Location: [facility];  Service: Endoscopy;  Laterality: N/A;    COLONOSCOPY N/A 12/17/2021    Procedure: COLONOSCOPY;  Surgeon: Jan New MD;  Location: [facility];  Service: General;  Laterality: N/A;    CREATION OF MUSCLE ROTATIONAL FLAP Left 4/9/2020    Procedure: CREATION, FLAP, MUSCLE ROTATION;  Surgeon: Sebastian Dan MD;  Location: [facility];  Service: Plastics;  Laterality: Left;   VRAM    CYSTOSCOPIC LITHOLAPAXY N/A 2/24/2022    Procedure: CYSTOLITHOLAPAXY;  Surgeon: Sukhjinder Tucker MD;  Location: [facility];  Service: Urology;  Laterality: N/A;    ESOPHAGOGASTRODUODENOSCOPY N/A 6/6/2019    Procedure: EGD (ESOPHAGOGASTRODUODENOSCOPY);  Surgeon: Anjelica Saavedra MD;  Location: [facility];  Service: Endoscopy;  Laterality: N/A;     FLAP GRAFT SURGERY N/A 4/9/2020    Procedure: FLAP GRAFT;  Surgeon: Sebastian Dan MD;  Location: [facility];  Service: Plastics;  Laterality: N/A;  left fasciocutaneous buttocks flap    HERNIA REPAIR  1995    INJECTION OF ANESTHETIC AGENT INTO TISSUE PLANE DEFINED BY TRANSVERSUS ABDOMINIS MUSCLE N/A 2/18/2020    Procedure: BLOCK, TRANSVERSUS ABDOMINIS PLANE;  Surgeon: Jan New MD;  Location: [facility];  Service: General;  Laterality: N/A;    INJECTION OF ANESTHETIC AGENT INTO TISSUE PLANE DEFINED BY TRANSVERSUS ABDOMINIS MUSCLE  2/24/2022    Procedure: BLOCK, TRANSVERSUS ABDOMINIS PLANE;  Surgeon: Jan New MD;  Location: [facility];  Service: Colon and Rectal;;    INSERTION OF TUNNELED CENTRAL VENOUS CATHETER (CVC) WITH SUBCUTANEOUS PORT N/A 10/8/2019    Procedure: [id number]-[id number];  Surgeon: Jan New MD;  Location: [facility];  Service: General;  Laterality: N/A;    LAPAROSCOPIC COLOSTOMY      MEDIPORT REMOVAL N/A 8/13/2020    Procedure: REMOVAL, CATHETER, CENTRAL VENOUS, TUNNELED, WITH PORT;  Surgeon: Sebastian Dan MD;  Location: [facility];  Service: Plastics;  Laterality: N/A;    TONSILLECTOMY      TRANSURETHRAL RESECTION OF PROSTATE N/A 6/7/2021    Procedure: TURP (TRANSURETHRAL RESECTION OF PROSTATE), CYSTOLITHALOPAXY;  Surgeon: Sukhjinder Tucker MD;  Location: [facility];  Service: Urology;  Laterality: N/A;    WOUND DEBRIDEMENT N/A 8/13/2020    Procedure: DEBRIDEMENT, WOUND;  Surgeon: Sebastian Dan MD;  Location: [facility];  Service: Plastics;  Laterality: N/A;  layered closure     Family History   Problem Relation Age of Onset    Intestinal malrotation Mother     Leukemia Father     No Known Problems Sister     Coronary artery disease Brother     Coronary artery disease Maternal Grandmother     Stomach cancer Maternal Grandfather      Social History     Tobacco Use    Smoking status: Current Every Day Smoker     Packs/day: 1.00     Years: 35.00     Pack years:  35.00     Types: Cigarettes     Start date: 1/2/1984    Smokeless tobacco: Former User     Types: Chew    Tobacco comment: no smoking after m.n prior to sx   Substance Use Topics    Alcohol use: Yes     Alcohol/week: 46.0 standard drinks     Types: 42 Cans of beer, 4 Shots of liquor per week     Comment: segrams 7, beer daily  hold now prior to surgery    Drug use: Never        Review of Systems:  Review of Systems   Constitutional: Negative for activity change, appetite change, chills, fatigue, fever and unexpected weight change.   HENT: Negative for congestion, ear pain, sore throat and trouble swallowing.    Eyes: Negative for pain, redness and itching.   Respiratory: Negative for cough, shortness of breath and wheezing.    Cardiovascular: Negative for chest pain, palpitations and leg swelling.   Gastrointestinal: Negative for abdominal distention, blood in stool, constipation, diarrhea, nausea, and vomiting.   Endocrine: Negative for cold intolerance, heat intolerance and polyuria.   Genitourinary: Negative for dysuria, flank pain, frequency and hematuria.   Musculoskeletal: Negative for gait problem, joint swelling and neck pain.   Skin: Negative for color change, rash and wound.   Allergic/Immunologic: Negative for environmental allergies and immunocompromised state.   Neurological: Negative for dizziness, speech difficulty, weakness and numbness.   Psychiatric/Behavioral: Negative for agitation, confusion and hallucinations.       OBJECTIVE:     Vital Signs (Most Recent)  Temp: 97.5 °F (36.4 °C) (06/13/22 0835)  Pulse: 90 (06/13/22 0835)  BP: 120/82 (06/13/22 0835)     78.9 kg (173 lb 15.1 oz)     Physical Exam:  Physical Exam  Constitutional:       Appearance: He is well-developed.   HENT:      Head: Normocephalic and atraumatic.   Eyes:      Conjunctiva/sclera: Conjunctivae normal.   Neck:      Thyroid: No thyromegaly.   Cardiovascular:      Rate and Rhythm: Regular rhythm.   Pulmonary:      Effort:  Pulmonary effort is normal. No respiratory distress.   Abdominal:      Comments: Soft, nondistended; midline incision well-healed ostomy pink and viable with stool in bag. No parastomal hernia.   Genitourinary:     Comments: Skin flap well-perfused and well-healed without drainage or evidence of fistula tracts; no perineal hernia  Musculoskeletal:         General: No tenderness. Normal range of motion.      Cervical back: Normal range of motion.   Skin:     General: Skin is warm and dry.      Capillary Refill: Capillary refill takes less than 2 seconds.      Findings: No rash.   Neurological:      Mental Status: He is alert and oriented to person, place, and time.       Laboratory  Lab Results   Component Value Date    WBC 7.15 04/07/2022    HGB 14.2 04/07/2022    HCT 43.2 04/07/2022     04/07/2022    CHOL 158 06/01/2019    TRIG 194 (H) 06/01/2019    HDL 30 (L) 06/01/2019    ALT 14 04/07/2022    AST 17 04/07/2022     04/07/2022    K 4.0 04/07/2022     04/07/2022    CREATININE 1.0 04/07/2022    BUN 6 04/07/2022    CO2 25 04/07/2022    TSH 0.907 07/07/2020    PSA 0.58 12/02/2020    INR 1.0 06/11/2019     CEA: 1.8 (Jan '22) <-- <1.7 (Sept 2021)    Diagnostic Results:  CT December 2021:  FINDINGS:  Chest:     Heart and great vessels: Within normal limits.     Adenopathy: None demonstrated.     Lungs: There are multiple small pulmonary nodules in the right lung which appear unchanged, the largest of which measure up to 4 mm.  No new or enlarging pulmonary nodules demonstrated.  No parenchymal consolidations or pleural effusions.     Abdomen:     Liver: Within normal limits.     Gallbladder and biliary: Within normal limits.     Spleen: Within normal limits.     Pancreas: Within normal limits.     Adrenals: Within normal limits.     Kidneys: Staghorn calculus again noted at the lower pole of the left kidney.  No hydronephrosis or hydroureter.     Bowel: Postoperative findings from prior bowel resection  are again noted with left lower quadrant colostomy in place.  No abnormal bowel wall thickening or evidence of obstruction.     Peritoneum: No ascites or pneumoperitoneum.     Abdominal Adenopathy: None.     Vasculature: Within normal limits.     Pelvis:     Urinary bladder: There is a 15 mm stone present in the urinary bladder.     No pelvic masses visualized.  Postsurgical soft tissue thickening in noted in the presacral region which is unchanged in appearance from prior.     Pelvis adenopathy: None.     Bones: No acute findings.     Miscellaneous: None     Impression:     1. Unchanged appearance of multiple pulmonary nodules as above  2. Unchanged appearance of postsurgical findings as above  3. Left-sided nephrolithiasis with 15 mm stone also noted in the urinary bladder.  No hydronephrosis.  4. No definite evidence of metastatic disease or suspicious adenopathy     CT June 2021:  FINDINGS:  Thoracic aorta and great vessels are unremarkable.  Heart is normal without chamber enlargement.  No pericardial or pleural effusion.  No mediastinal, hilar or axillary adenopathy.     Lungs demonstrate no acute opacity with stable 4 mm left pulmonary nodules.  No new nodule appreciated.     Within the abdomen, the liver and spleen are unchanged.  Gallbladder unremarkable.  No biliary dilatation.  Pancreas and adrenal glands are unremarkable.     Kidneys are stable in appearance with left renal lower pole staghorn calculus unchanged.  No hydronephrosis.     Stomach is unremarkable.  Small bowel nonobstructive.  Appendix normal.  Left lower quadrant colostomy present without concerning colonic abnormality.  No ascites.  No adenopathy.     Within the pelvis, the urinary bladder demonstrates similar mild circumferential wall thickening.  Dependent intraluminal calcification/stone has decreased in size.  No pelvic mass or ascites.  Presacral postsurgical findings are stable.  Stable soft tissue scarring extending toward the left  gluteus amelia muscle.     No acute osseous abnormality.     Impression:     Stable appearance of the chest, abdomen pelvis without detrimental change.      PATHOLOGY:  2. Colon, completion total colectomy:   - Tubular adenomas (x2)   - Appendix with no diagnostic histopathologic alterations   - Thirty-three benign lymph nodes   - Negative for malignancy   3. Small bowel, resection:   - Segment of small bowel with no diagnostic histopathologic alterations   - Negative for malignancy     ASSESSMENT/PLAN:     56-year-old male with rectal adenocarcinoma with likely T3N1 disease based upon preop imaging without evidence of metastatic disease now s/p neoadj chemoXRT which completed on 8/13/19 but with post-tx MRI showing continued threatened mesorectal fascia who developed rectal perforation and abscess/fistula on cycle 11/12 of neoadj chemotx in Feb 2020 requiring lap diverting sigmoid colostomy with continued fluid collection/perforation/fistula on CT/MRI now s/p APR with extra-anatomic resection of fistula tract and left gluteus muscle, omental pedicle flap and VRAM with skin paddle by plastics on 4/9/2020 and now newly found unresectable polyps in the ascending and transverse colon who is s/p open completion colectomy/total colectomy with end ileostomy construction in Feb '22 with final path showing benign adenomas    - CEA ordered for today and Q3 months  - CT C/A/P next month when contrast is available for surveillance  - RTC 3 months for surveillance    Jan New MD  Colon and Rectal Surgery  [facility]

## 2022-06-14 ENCOUNTER — PATIENT MESSAGE (OUTPATIENT)
Dept: SURGERY | Facility: CLINIC | Age: 57
End: 2022-06-14
Payer: COMMERCIAL

## 2022-06-14 DIAGNOSIS — C20 RECTAL CANCER: Primary | ICD-10-CM

## 2022-06-27 DIAGNOSIS — C20 RECTAL CANCER: ICD-10-CM

## 2022-06-27 RX ORDER — OXYCODONE HYDROCHLORIDE 10 MG/1
10 TABLET ORAL EVERY 4 HOURS PRN
Qty: 90 TABLET | Refills: 0 | Status: SHIPPED | OUTPATIENT
Start: 2022-06-27 | End: 2022-07-25 | Stop reason: SDUPTHER

## 2022-07-20 ENCOUNTER — HOSPITAL ENCOUNTER (OUTPATIENT)
Dept: RADIOLOGY | Facility: HOSPITAL | Age: 57
Discharge: HOME | End: 2022-07-20
Payer: COMMERCIAL

## 2022-07-20 DIAGNOSIS — C20 RECTAL CANCER: ICD-10-CM

## 2022-07-20 PROCEDURE — 25500020 PHARM REV CODE 255

## 2022-07-20 PROCEDURE — 71260 CT THORAX DX C+: CPT | Mod: TC

## 2022-07-20 PROCEDURE — A9698 NON-RAD CONTRAST MATERIALNOC: HCPCS

## 2022-07-20 PROCEDURE — 74177 CT ABD & PELVIS W/CONTRAST: CPT | Mod: TC

## 2022-07-20 RX ADMIN — IOHEXOL 1000 ML: 9 SOLUTION ORAL at 11:07

## 2022-07-20 RX ADMIN — IOHEXOL 100 ML: 350 INJECTION, SOLUTION INTRAVENOUS at 11:07

## 2022-07-25 DIAGNOSIS — C20 RECTAL CANCER: ICD-10-CM

## 2022-07-25 RX ORDER — OXYCODONE HYDROCHLORIDE 10 MG/1
10 TABLET ORAL EVERY 4 HOURS PRN
Qty: 90 TABLET | Refills: 0 | Status: SHIPPED | OUTPATIENT
Start: 2022-07-25 | End: 2022-08-22 | Stop reason: SDUPTHER

## 2022-08-22 DIAGNOSIS — C20 RECTAL CANCER: ICD-10-CM

## 2022-08-23 RX ORDER — OXYCODONE HYDROCHLORIDE 10 MG/1
10 TABLET ORAL EVERY 4 HOURS PRN
Qty: 90 TABLET | Refills: 0 | Status: SHIPPED | OUTPATIENT
Start: 2022-08-23 | End: 2022-09-21 | Stop reason: SDUPTHER

## 2022-09-09 ENCOUNTER — TELEPHONE (OUTPATIENT)
Dept: SURGERY | Facility: CLINIC | Age: 57
End: 2022-09-09
Payer: COMMERCIAL

## 2022-09-09 NOTE — TELEPHONE ENCOUNTER
Attempted to contact patient. Left voicemail with appointment date, address, time and callback number as [phone].

## 2022-09-12 ENCOUNTER — OFFICE VISIT (OUTPATIENT)
Dept: SURGERY | Facility: CLINIC | Age: 57
End: 2022-09-12
Payer: COMMERCIAL

## 2022-09-12 ENCOUNTER — LAB VISIT (OUTPATIENT)
Dept: LAB | Facility: HOSPITAL | Age: 57
End: 2022-09-12
Attending: COLON & RECTAL SURGERY
Payer: COMMERCIAL

## 2022-09-12 VITALS
SYSTOLIC BLOOD PRESSURE: 134 MMHG | BODY MASS INDEX: 23.86 KG/M2 | TEMPERATURE: 99 F | HEART RATE: 83 BPM | WEIGHT: 175.94 LBS | DIASTOLIC BLOOD PRESSURE: 76 MMHG

## 2022-09-12 DIAGNOSIS — C20 RECTAL CANCER: ICD-10-CM

## 2022-09-12 DIAGNOSIS — Z85.048 HISTORY OF RECTAL CANCER: Primary | ICD-10-CM

## 2022-09-12 DIAGNOSIS — Z85.048 PERSONAL HISTORY OF RECTAL CANCER: ICD-10-CM

## 2022-09-12 DIAGNOSIS — F17.200 SMOKER: ICD-10-CM

## 2022-09-12 LAB — CEA SERPL-MCNC: <1.7 NG/ML (ref 0–5)

## 2022-09-12 PROCEDURE — 99999 PR PBB SHADOW E&M-EST. PATIENT-LVL III: CPT | Mod: PBBFAC,,, | Performed by: COLON & RECTAL SURGERY

## 2022-09-12 PROCEDURE — 99999 PR PBB SHADOW E&M-EST. PATIENT-LVL III: ICD-10-PCS | Mod: PBBFAC,,, | Performed by: COLON & RECTAL SURGERY

## 2022-09-12 PROCEDURE — 1159F MED LIST DOCD IN RCRD: CPT | Mod: CPTII,S$GLB,, | Performed by: COLON & RECTAL SURGERY

## 2022-09-12 PROCEDURE — 3075F SYST BP GE 130 - 139MM HG: CPT | Mod: CPTII,S$GLB,, | Performed by: COLON & RECTAL SURGERY

## 2022-09-12 PROCEDURE — 3078F DIAST BP <80 MM HG: CPT | Mod: CPTII,S$GLB,, | Performed by: COLON & RECTAL SURGERY

## 2022-09-12 PROCEDURE — 3008F PR BODY MASS INDEX (BMI) DOCUMENTED: ICD-10-PCS | Mod: CPTII,S$GLB,, | Performed by: COLON & RECTAL SURGERY

## 2022-09-12 PROCEDURE — 36415 COLL VENOUS BLD VENIPUNCTURE: CPT

## 2022-09-12 PROCEDURE — 99214 PR OFFICE/OUTPT VISIT, EST, LEVL IV, 30-39 MIN: ICD-10-PCS | Mod: S$GLB,,, | Performed by: COLON & RECTAL SURGERY

## 2022-09-12 PROCEDURE — 3075F PR MOST RECENT SYSTOLIC BLOOD PRESS GE 130-139MM HG: ICD-10-PCS | Mod: CPTII,S$GLB,, | Performed by: COLON & RECTAL SURGERY

## 2022-09-12 PROCEDURE — 3078F PR MOST RECENT DIASTOLIC BLOOD PRESSURE < 80 MM HG: ICD-10-PCS | Mod: CPTII,S$GLB,, | Performed by: COLON & RECTAL SURGERY

## 2022-09-12 PROCEDURE — 3008F BODY MASS INDEX DOCD: CPT | Mod: CPTII,S$GLB,, | Performed by: COLON & RECTAL SURGERY

## 2022-09-12 PROCEDURE — 82378 CARCINOEMBRYONIC ANTIGEN: CPT

## 2022-09-12 PROCEDURE — 1159F PR MEDICATION LIST DOCUMENTED IN MEDICAL RECORD: ICD-10-PCS | Mod: CPTII,S$GLB,, | Performed by: COLON & RECTAL SURGERY

## 2022-09-12 PROCEDURE — 99214 OFFICE O/P EST MOD 30 MIN: CPT | Mod: S$GLB,,, | Performed by: COLON & RECTAL SURGERY

## 2022-09-19 NOTE — PROGRESS NOTES
History & Physical    SUBJECTIVE:     CC: rectal adenocarcinoma  Ref: Anjelica Saavedra MD    History of Present Illness:  Patient is a 56 y.o. male presents for evaluation of rectal cancer.  Patient reports he has had increasing pelvic/rectal pain along with decreased bowel movements over the last 6 weeks.  Reports an uncomfortable feeling in his pelvis associated with mucous discharge from his rectum as well as bloody bowel movements that are thin and less than normal. He reports associated chills, fatigue and 16 lb weight loss over the past 5 months.  He has also noticed a decrease in appetite as well. He underwent a colonoscopy on 06/06/2019 where a nonobstructing rectal mass was found with biopsy showing well-differentiated adenocarcinoma.  He was then referred to [facility] for evaluation.  He has no family history of colorectal cancer or IBD.    8/13/19: Completed neoadj chemoXRT  2/18/2020: Laparoscopic loop sigmoid colostomy 2/2 rectal perforation on chemotx  4/9/2020: APR with extra anatomic resection of fistula tract and left gluteus muscle, omental pedicle flap and VRAM with skin paddle by plastics  2/24/22: Completion colectomy/total colectomy with end ileostomy construction    Interval history:  Since last clinic visit, the patient continues do well. Having good ostomy function and tolerating regular diet. No hematochezia. No fever, chills, nausea or vomiting.    Review of patient's allergies indicates:  No Known Allergies    Current Outpatient Medications   Medication Sig Dispense Refill    acetaminophen (TYLENOL) 325 MG tablet Take 2 tablets (650 mg total) by mouth every 6 (six) hours as needed.  0    finasteride (PROSCAR) 5 mg tablet TAKE 1 TABLET (5 MG TOTAL) BY MOUTH ONCE DAILY. 30 tablet 11    oxyCODONE (ROXICODONE) 10 mg Tab immediate release tablet Take 1 tablet (10 mg total) by mouth every 4 (four) hours as needed. for pain. 90 tablet 0    promethazine (PHENERGAN) 25 MG tablet Take 1  tablet (25 mg total) by mouth every 4 (four) hours. 60 tablet 4    sildenafiL (VIAGRA) 100 MG tablet TAKE 1 TABLET (100 MG TOTAL) BY MOUTH DAILY AS NEEDED FOR ERECTILE DYSFUNCTION. 20 tablet 11    tamsulosin (FLOMAX) 0.4 mg Cap Take 1 capsule (0.4 mg total) by mouth once daily. 30 capsule 11     No current facility-administered medications for this visit.       Past Medical History:   Diagnosis Date    Anemia     Arthritis     Cancer     rectal    Renal disorder     kidney stones     Past Surgical History:   Procedure Laterality Date    ABDOMINOPERINEAL RESECTION OF RECTUM N/A 4/9/2020    Procedure: PROCTECTOMY, ABDOMINOPERINEAL;  Surgeon: Jan New MD;  Location: [facility] OR;  Service: General;  Laterality: N/A;    COLECTOMY, TOTAL N/A 2/24/2022    Procedure: COLECTOMY, TOTAL;  Surgeon: Jan New MD;  Location: [facility];  Service: Colon and Rectal;  Laterality: N/A;    COLON SURGERY      COLONOSCOPY N/A 6/6/2019    Procedure: COLONOSCOPY;  Surgeon: Anjelica Saavedra MD;  Location: [facility];  Service: Endoscopy;  Laterality: N/A;    COLONOSCOPY N/A 12/17/2021    Procedure: COLONOSCOPY;  Surgeon: Jan New MD;  Location: [facility];  Service: General;  Laterality: N/A;    CREATION OF MUSCLE ROTATIONAL FLAP Left 4/9/2020    Procedure: CREATION, FLAP, MUSCLE ROTATION;  Surgeon: Sebastian Dan MD;  Location: [facility] OR;  Service: Plastics;  Laterality: Left;   VRAM    CYSTOSCOPIC LITHOLAPAXY N/A 2/24/2022    Procedure: CYSTOLITHOLAPAXY;  Surgeon: Sukhjinder Tucker MD;  Location: [facility] OR;  Service: Urology;  Laterality: N/A;    ESOPHAGOGASTRODUODENOSCOPY N/A 6/6/2019    Procedure: EGD (ESOPHAGOGASTRODUODENOSCOPY);  Surgeon: Anjelica Saavedra MD;  Location: [facility];  Service: Endoscopy;  Laterality: N/A;    FLAP GRAFT SURGERY N/A 4/9/2020    Procedure: FLAP GRAFT;  Surgeon: Sebastian Dan MD;  Location: [facility] OR;  Service: Plastics;  Laterality: N/A;  left fasciocutaneous buttocks flap    HERNIA  REPAIR  1995    INJECTION OF ANESTHETIC AGENT INTO TISSUE PLANE DEFINED BY TRANSVERSUS ABDOMINIS MUSCLE N/A 2/18/2020    Procedure: BLOCK, TRANSVERSUS ABDOMINIS PLANE;  Surgeon: Jan eNw MD;  Location: [facility] OR;  Service: General;  Laterality: N/A;    INJECTION OF ANESTHETIC AGENT INTO TISSUE PLANE DEFINED BY TRANSVERSUS ABDOMINIS MUSCLE  2/24/2022    Procedure: BLOCK, TRANSVERSUS ABDOMINIS PLANE;  Surgeon: Jan New MD;  Location: [facility] OR;  Service: Colon and Rectal;;    INSERTION OF TUNNELED CENTRAL VENOUS CATHETER (CVC) WITH SUBCUTANEOUS PORT N/A 10/8/2019    Procedure: [id number];  Surgeon: Jan New MD;  Location: [facility] OR;  Service: General;  Laterality: N/A;    LAPAROSCOPIC COLOSTOMY      MEDIPORT REMOVAL N/A 8/13/2020    Procedure: REMOVAL, CATHETER, CENTRAL VENOUS, TUNNELED, WITH PORT;  Surgeon: Sebastian Dan MD;  Location: [facility] OR;  Service: Plastics;  Laterality: N/A;    TONSILLECTOMY      TRANSURETHRAL RESECTION OF PROSTATE N/A 6/7/2021    Procedure: TURP (TRANSURETHRAL RESECTION OF PROSTATE), CYSTOLITHALOPAXY;  Surgeon: Sukhjinder Tucker MD;  Location: [facility] OR;  Service: Urology;  Laterality: N/A;    WOUND DEBRIDEMENT N/A 8/13/2020    Procedure: DEBRIDEMENT, WOUND;  Surgeon: Sebastian Dan MD;  Location: [facility] OR;  Service: Plastics;  Laterality: N/A;  layered closure     Family History   Problem Relation Age of Onset    Intestinal malrotation Mother     Leukemia Father     No Known Problems Sister     Coronary artery disease Brother     Coronary artery disease Maternal Grandmother     Stomach cancer Maternal Grandfather      Social History     Tobacco Use    Smoking status: Every Day     Packs/day: 1.00     Years: 35.00     Pack years: 35.00     Types: Cigarettes     Start date: 1/2/1984    Smokeless tobacco: Former     Types: Chew    Tobacco comments:     no smoking after m.n prior to sx   Substance Use Topics    Alcohol use: Yes     Alcohol/week: 46.0  standard drinks     Types: 42 Cans of beer, 4 Shots of liquor per week     Comment: segrams 7, beer daily  hold now prior to surgery    Drug use: Never        Review of Systems:  Review of Systems   Constitutional: Negative for activity change, appetite change, chills, fatigue, fever and unexpected weight change.   HENT: Negative for congestion, ear pain, sore throat and trouble swallowing.    Eyes: Negative for pain, redness and itching.   Respiratory: Negative for cough, shortness of breath and wheezing.    Cardiovascular: Negative for chest pain, palpitations and leg swelling.   Gastrointestinal: Negative for abdominal distention, blood in stool, constipation, diarrhea, nausea, and vomiting.   Endocrine: Negative for cold intolerance, heat intolerance and polyuria.   Genitourinary: Negative for dysuria, flank pain, frequency and hematuria.   Musculoskeletal: Negative for gait problem, joint swelling and neck pain.   Skin: Negative for color change, rash and wound.   Allergic/Immunologic: Negative for environmental allergies and immunocompromised state.   Neurological: Negative for dizziness, speech difficulty, weakness and numbness.   Psychiatric/Behavioral: Negative for agitation, confusion and hallucinations.       OBJECTIVE:     Vital Signs (Most Recent)  Temp: 98.9 °F (37.2 °C) (09/12/22 1521)  Pulse: 83 (09/12/22 1521)  BP: 134/76 (09/12/22 1521)     79.8 kg (175 lb 14.8 oz)     Physical Exam:  Physical Exam  Constitutional:       Appearance: He is well-developed.   HENT:      Head: Normocephalic and atraumatic.   Eyes:      Conjunctiva/sclera: Conjunctivae normal.   Neck:      Thyroid: No thyromegaly.   Cardiovascular:      Rate and Rhythm: Regular rhythm.   Pulmonary:      Effort: Pulmonary effort is normal. No respiratory distress.   Abdominal:      Comments: Soft, nondistended; midline incision well-healed ostomy pink and viable with stool in bag  Genitourinary:     Comments: Skin flap well-perfused and  well-healed without drainage or evidence of fistula tracts; no perineal hernia  Musculoskeletal:         General: No tenderness. Normal range of motion.      Cervical back: Normal range of motion.   Skin:     General: Skin is warm and dry.      Capillary Refill: Capillary refill takes less than 2 seconds.      Findings: No rash.   Neurological:      Mental Status: He is alert and oriented to person, place, and time.       Laboratory  Lab Results   Component Value Date    WBC 7.15 04/07/2022    HGB 14.2 04/07/2022    HCT 43.2 04/07/2022     04/07/2022    CHOL 158 06/01/2019    TRIG 194 (H) 06/01/2019    HDL 30 (L) 06/01/2019    ALT 14 04/07/2022    AST 17 04/07/2022     04/07/2022    K 4.0 04/07/2022     04/07/2022    CREATININE 1.0 07/20/2022    BUN 6 04/07/2022    CO2 25 04/07/2022    TSH 0.907 07/07/2020    PSA 0.58 12/02/2020    INR 1.0 06/11/2019     Component Ref Range & Units 6 d ago   (9/12/22) 3 mo ago   (6/13/22) 8 mo ago   (1/3/22) 1 yr ago   (9/9/21) 2 yr ago   (3/24/20) 2 yr ago   (2/3/20) 2 yr ago   (9/30/19)   CEA 0.0 - 5.0 ng/mL <1.7  1.7 CM  1.8 CM  <1.7           Diagnostic Results:  CT July 2022:  FINDINGS:  Chest:     Heart and great vessels: Borderline cardiomegaly.  No evidence of pulmonary embolism.     Adenopathy: Minimal shotty nodes which do not appear pathologically enlarged.     Lungs: There are multiple small pulmonary nodules in the right lung which appear unchanged, the largest of which measure up to 4 mm.  No new or enlarging pulmonary nodules demonstrated.  No parenchymal consolidations or pleural effusions.     Abdomen:     Liver: Within normal limits.     Gallbladder and biliary: Within normal limits.     Spleen: Within normal limits.     Pancreas: Within normal limits.     Adrenals: Within normal limits.     Kidneys: Staghorn calculus again noted at the lower pole of the left kidney.  No hydronephrosis or hydroureter.     Bowel: Postoperative findings from prior  bowel resection are again noted with left lower quadrant colostomy in place.  No abnormal bowel wall thickening or evidence of obstruction.     Peritoneum: No ascites or pneumoperitoneum.     Abdominal Adenopathy: None.     Small fat containing left inguinal hernia.     Vasculature: Within normal limits.     Pelvis:     Urinary bladder: Mild nonspecific bladder wall thickening.  Previous bladder stone no longer demonstrated..     No pelvic masses visualized.  Postsurgical soft tissue thickening in noted in the presacral region which is unchanged in appearance from prior.     Pelvis adenopathy: None.     Bones: No acute findings.  Mild degenerative changes lower lumbar spine.     Miscellaneous: None     Impression:     Overall stable findings above without evidence for recurrence or metastatic disease at this time.  Continued surveillance is recommended    ASSESSMENT/PLAN:     56-year-old male with rectal adenocarcinoma with likely T3N1 disease based upon preop imaging without evidence of metastatic disease now s/p neoadj chemoXRT which completed on 8/13/19 but with post-tx MRI showing continued threatened mesorectal fascia who developed rectal perforation and abscess/fistula on cycle 11/12 of neoadj chemotx in Feb 2020 requiring lap diverting sigmoid colostomy with continued fluid collection/perforation/fistula on CT/MRI now s/p APR with extra-anatomic resection of fistula tract and left gluteus muscle, omental pedicle flap and VRAM with skin paddle by plastics on 4/9/2020 and now newly found unresectable polyps in the ascending and transverse colon who is s/p open completion colectomy/total colectomy with end ileostomy construction in Feb '22 with final path showing benign adenomas    - CEA today and Q3 months  - CT C/A/P next year  - RTC 3 months for surveillance    Jan New MD  Colon and Rectal Surgery  [facility]

## 2022-09-21 DIAGNOSIS — R11.0 NAUSEA: ICD-10-CM

## 2022-09-21 DIAGNOSIS — C20 RECTAL CANCER: ICD-10-CM

## 2022-09-21 RX ORDER — OXYCODONE HYDROCHLORIDE 10 MG/1
10 TABLET ORAL EVERY 4 HOURS PRN
Qty: 90 TABLET | Refills: 0 | Status: SHIPPED | OUTPATIENT
Start: 2022-09-21 | End: 2022-10-21 | Stop reason: SDUPTHER

## 2022-09-21 RX ORDER — PROMETHAZINE HYDROCHLORIDE 25 MG/1
25 TABLET ORAL EVERY 4 HOURS
Qty: 60 TABLET | Refills: 4 | Status: SHIPPED | OUTPATIENT
Start: 2022-09-21 | End: 2023-02-21 | Stop reason: SDUPTHER

## 2022-10-05 ENCOUNTER — OFFICE VISIT (OUTPATIENT)
Dept: UROLOGY | Facility: CLINIC | Age: 57
End: 2022-10-05
Payer: COMMERCIAL

## 2022-10-05 VITALS
DIASTOLIC BLOOD PRESSURE: 72 MMHG | HEART RATE: 79 BPM | SYSTOLIC BLOOD PRESSURE: 143 MMHG | HEIGHT: 72 IN | BODY MASS INDEX: 23.7 KG/M2 | WEIGHT: 175 LBS

## 2022-10-05 DIAGNOSIS — Z12.5 PROSTATE CANCER SCREENING: Primary | ICD-10-CM

## 2022-10-05 PROCEDURE — 1159F PR MEDICATION LIST DOCUMENTED IN MEDICAL RECORD: ICD-10-PCS | Mod: CPTII,S$GLB,, | Performed by: UROLOGY

## 2022-10-05 PROCEDURE — 3008F PR BODY MASS INDEX (BMI) DOCUMENTED: ICD-10-PCS | Mod: CPTII,S$GLB,, | Performed by: UROLOGY

## 2022-10-05 PROCEDURE — 3078F DIAST BP <80 MM HG: CPT | Mod: CPTII,S$GLB,, | Performed by: UROLOGY

## 2022-10-05 PROCEDURE — 3078F PR MOST RECENT DIASTOLIC BLOOD PRESSURE < 80 MM HG: ICD-10-PCS | Mod: CPTII,S$GLB,, | Performed by: UROLOGY

## 2022-10-05 PROCEDURE — 99213 PR OFFICE/OUTPT VISIT, EST, LEVL III, 20-29 MIN: ICD-10-PCS | Mod: S$GLB,,, | Performed by: UROLOGY

## 2022-10-05 PROCEDURE — 99999 PR PBB SHADOW E&M-EST. PATIENT-LVL III: CPT | Mod: PBBFAC,,, | Performed by: UROLOGY

## 2022-10-05 PROCEDURE — 1160F PR REVIEW ALL MEDS BY PRESCRIBER/CLIN PHARMACIST DOCUMENTED: ICD-10-PCS | Mod: CPTII,S$GLB,, | Performed by: UROLOGY

## 2022-10-05 PROCEDURE — 99999 PR PBB SHADOW E&M-EST. PATIENT-LVL III: ICD-10-PCS | Mod: PBBFAC,,, | Performed by: UROLOGY

## 2022-10-05 PROCEDURE — 99213 OFFICE O/P EST LOW 20 MIN: CPT | Mod: S$GLB,,, | Performed by: UROLOGY

## 2022-10-05 PROCEDURE — 3077F SYST BP >= 140 MM HG: CPT | Mod: CPTII,S$GLB,, | Performed by: UROLOGY

## 2022-10-05 PROCEDURE — 3008F BODY MASS INDEX DOCD: CPT | Mod: CPTII,S$GLB,, | Performed by: UROLOGY

## 2022-10-05 PROCEDURE — 3077F PR MOST RECENT SYSTOLIC BLOOD PRESSURE >= 140 MM HG: ICD-10-PCS | Mod: CPTII,S$GLB,, | Performed by: UROLOGY

## 2022-10-05 PROCEDURE — 1160F RVW MEDS BY RX/DR IN RCRD: CPT | Mod: CPTII,S$GLB,, | Performed by: UROLOGY

## 2022-10-05 PROCEDURE — 1159F MED LIST DOCD IN RCRD: CPT | Mod: CPTII,S$GLB,, | Performed by: UROLOGY

## 2022-10-05 NOTE — PROGRESS NOTES
Chief Complaint: voiding issues    HPI:   10/05/2022 - patient returns today for follow-up, no issues in the interim, was catheterizing the after surgery but not recently, feels like he empties well, no gross hematuria or UTIs    04/05/2022 - patient returns today for f/u, no issues since his surgery, has been healing well, colon path negative for cancer, voiding with a good stream and feels like he empties    02/24/2022 - cystolitholapaxy and completion colectomy    01/05/2022 - patient returns today for follow-up, denies any issues in the interim, voiding with a good stream and emptying his bladder well, denies any gross hematuria or dysuria, had a CT last month which showed a new bladder stone    06/07/2021 - TURP with cystolitholapaxy    05/14/2021 - patient presents today for follow-up, he underwent urodynamics 2 weeks ago which showed a decent bladder contraction with the obstruction, he presents today for discussion of options, still notes urgency but denies any of the dysuria that he presented with initially, denies gross hematuria the    04/21/2021 - patient returns today for follow-up, notes a 3-4 months history of dysuria, urgency, frequency, weak stream, and incomplete bladder emptying, still taking the flomax and finasteride, was previiously intstructed how to perform CIC but has not done this in some time, no GH but states that he 'keeps a bladder infection'  IPSS - 4/1/5/5/5/5/4 = 29 QOL - 6(terrible)    12/21/20: Sildenafil 100 working well enough.  Stream sometimes good sometimes feels he has to strain, most of the time it is good.  3 cups coffee a day and some sodas.  Retrograde ejaculation.  8/5/20- sildenafil 100mg is working well.  7/1/20: patient normally goes to Dr. Altamirano.  Apparently he has been having ED since his rectal surgery and would like to discuss options.  Recent dx with UTI and started abx today, otherwise voiding well.  Patient states he gets no erections after surgery, he has  not tried any forms of medical therapy.  Libido and energy are still present.  6/4/20: PVR was 635 after our last visit and he was instructed in CIC.  Voiding normally he feels and hasn't done CIC in two weeks.  PVR today 105 ml.   5/6/20: Been on flomax since last visit.  Cysto/uroflow normal today.    4/20/20: 55 yo man had colon cancer with resection last week, referred by Dr. New.  Is in retention postoperatively.  No unusual abd/pelvic pain and no exac/rel factors.  No hematuria.  No urolithiasis.  PVR >700 ml.  No  history.      PMH:  Past Medical History:   Diagnosis Date    Anemia     Arthritis     Cancer     rectal    Renal disorder     kidney stones       PSH:  Past Surgical History:   Procedure Laterality Date    ABDOMINOPERINEAL RESECTION OF RECTUM N/A 4/9/2020    Procedure: PROCTECTOMY, ABDOMINOPERINEAL;  Surgeon: Jan New MD;  Location: [facility];  Service: General;  Laterality: N/A;    COLECTOMY, TOTAL N/A 2/24/2022    Procedure: COLECTOMY, TOTAL;  Surgeon: Jan New MD;  Location: [facility];  Service: Colon and Rectal;  Laterality: N/A;    COLON SURGERY      COLONOSCOPY N/A 6/6/2019    Procedure: COLONOSCOPY;  Surgeon: Anjelica Saavedra MD;  Location: [facility];  Service: Endoscopy;  Laterality: N/A;    COLONOSCOPY N/A 12/17/2021    Procedure: COLONOSCOPY;  Surgeon: Jan New MD;  Location: [facility];  Service: General;  Laterality: N/A;    CREATION OF MUSCLE ROTATIONAL FLAP Left 4/9/2020    Procedure: CREATION, FLAP, MUSCLE ROTATION;  Surgeon: Sebastian Dan MD;  Location: [facility] OR;  Service: Plastics;  Laterality: Left;   VRAM    CYSTOSCOPIC LITHOLAPAXY N/A 2/24/2022    Procedure: CYSTOLITHOLAPAXY;  Surgeon: Sukhjinder Tucker MD;  Location: [facility];  Service: Urology;  Laterality: N/A;    ESOPHAGOGASTRODUODENOSCOPY N/A 6/6/2019    Procedure: EGD (ESOPHAGOGASTRODUODENOSCOPY);  Surgeon: Anjelica Saavedra MD;  Location: [facility];  Service: Endoscopy;  Laterality: N/A;     FLAP GRAFT SURGERY N/A 4/9/2020    Procedure: FLAP GRAFT;  Surgeon: Sebastian Dan MD;  Location: [facility] OR;  Service: Plastics;  Laterality: N/A;  left fasciocutaneous buttocks flap    HERNIA REPAIR  1995    INJECTION OF ANESTHETIC AGENT INTO TISSUE PLANE DEFINED BY TRANSVERSUS ABDOMINIS MUSCLE N/A 2/18/2020    Procedure: BLOCK, TRANSVERSUS ABDOMINIS PLANE;  Surgeon: Jan New MD;  Location: [facility] OR;  Service: General;  Laterality: N/A;    INJECTION OF ANESTHETIC AGENT INTO TISSUE PLANE DEFINED BY TRANSVERSUS ABDOMINIS MUSCLE  2/24/2022    Procedure: BLOCK, TRANSVERSUS ABDOMINIS PLANE;  Surgeon: Jan New MD;  Location: [facility] OR;  Service: Colon and Rectal;;    INSERTION OF TUNNELED CENTRAL VENOUS CATHETER (CVC) WITH SUBCUTANEOUS PORT N/A 10/8/2019    Procedure: [id number];  Surgeon: Jan New MD;  Location: [facility] OR;  Service: General;  Laterality: N/A;    LAPAROSCOPIC COLOSTOMY      MEDIPORT REMOVAL N/A 8/13/2020    Procedure: REMOVAL, CATHETER, CENTRAL VENOUS, TUNNELED, WITH PORT;  Surgeon: Sebastian Dan MD;  Location: [facility] OR;  Service: Plastics;  Laterality: N/A;    TONSILLECTOMY      TRANSURETHRAL RESECTION OF PROSTATE N/A 6/7/2021    Procedure: TURP (TRANSURETHRAL RESECTION OF PROSTATE), CYSTOLITHALOPAXY;  Surgeon: Sukhjinder Tucker MD;  Location: [facility] OR;  Service: Urology;  Laterality: N/A;    WOUND DEBRIDEMENT N/A 8/13/2020    Procedure: DEBRIDEMENT, WOUND;  Surgeon: Sebastian Dan MD;  Location: [facility] OR;  Service: Plastics;  Laterality: N/A;  layered closure       Family History:  Family History   Problem Relation Age of Onset    Intestinal malrotation Mother     Leukemia Father     No Known Problems Sister     Coronary artery disease Brother     Coronary artery disease Maternal Grandmother     Stomach cancer Maternal Grandfather        Social History:  Social History     Tobacco Use    Smoking status: Every Day     Packs/day: 1.00     Years: 35.00      Pack years: 35.00     Types: Cigarettes     Start date: 1/2/1984    Smokeless tobacco: Former     Types: Chew    Tobacco comments:     no smoking after m.n prior to sx   Substance Use Topics    Alcohol use: Yes     Alcohol/week: 46.0 standard drinks     Types: 42 Cans of beer, 4 Shots of liquor per week     Comment: nancie 7, beer daily  hold now prior to surgery    Drug use: Never        Review of Systems:  General: No fever, chills  Skin: No rashes  Chest:  Denies cough and sputum production  Heart: Denies chest pain  Resp: Denies dyspnea  Abdomen: Denies diarrhea, abdominal pain, hematemesis, or blood in stool.  Musculoskeletal: No joint stiffness or swelling. Denies back pain.  : see HPI  Neuro: no dizziness or weakness    Allergies:  Patient has no known allergies.    Medications:    Current Outpatient Medications:     acetaminophen (TYLENOL) 325 MG tablet, Take 2 tablets (650 mg total) by mouth every 6 (six) hours as needed., Disp: , Rfl: 0    finasteride (PROSCAR) 5 mg tablet, TAKE 1 TABLET (5 MG TOTAL) BY MOUTH ONCE DAILY., Disp: 30 tablet, Rfl: 11    oxyCODONE (ROXICODONE) 10 mg Tab immediate release tablet, Take 1 tablet (10 mg total) by mouth every 4 (four) hours as needed. for pain., Disp: 90 tablet, Rfl: 0    sildenafiL (VIAGRA) 100 MG tablet, Take 1 tablet (100 mg total) by mouth daily as needed for Erectile Dysfunction., Disp: 20 tablet, Rfl: 11    tamsulosin (FLOMAX) 0.4 mg Cap, Take 1 capsule (0.4 mg total) by mouth once daily., Disp: 30 capsule, Rfl: 11    promethazine (PHENERGAN) 25 MG tablet, Take 1 tablet (25 mg total) by mouth every 4 (four) hours. (Patient not taking: Reported on 10/5/2022), Disp: 60 tablet, Rfl: 4    Physical Exam:  Vitals:    10/05/22 0956   BP: (!) 143/72   Pulse: 79     General: awake, alert, cooperative  Head: NC/AT  Ears: external ears normal  Eyes: sclera normal  Lungs: normal inspiration, NAD  Heart: well-perfused  Abdomen: Soft, NT, ND, incisions healed, ostomy  healthy  Skin: The skin is warm and dry  Ext: No c/c/e.  Neuro: grossly intact, AOx3    RADIOLOGY:  No recent relevant scans    LABS:  I personally reviewed the following lab values:  Lab Results   Component Value Date    WBC 7.15 04/07/2022    HGB 14.2 04/07/2022    HCT 43.2 04/07/2022     04/07/2022     04/07/2022    K 4.0 04/07/2022     04/07/2022    CREATININE 1.0 07/20/2022    BUN 6 04/07/2022    CO2 25 04/07/2022    TSH 0.907 07/07/2020    PSA 0.58 12/02/2020    INR 1.0 06/11/2019    CHOL 158 06/01/2019    TRIG 194 (H) 06/01/2019    HDL 30 (L) 06/01/2019    ALT 14 04/07/2022    AST 17 04/07/2022     Bladder Scan performed in office:   5/7/20: 600ml  6/4/20:  ml.  04/21/2021 - 13mL  01/05/2022 - 1mL    PSA  6/19: 0.37  12/20: 0.58    Assessment/Plan:   Sukhjinder Presley is a 56 y.o. male with bladder stone and recurrent UTIs due to BPH.  Continue flomax, advised that if he continues to form stones, will need to resume CIC, he understands    Sukhjinder Tucker MD  Urology

## 2022-10-21 DIAGNOSIS — C20 RECTAL CANCER: ICD-10-CM

## 2022-10-21 DIAGNOSIS — N52.39 OTHER POST-PROCEDURAL ERECTILE DYSFUNCTION: ICD-10-CM

## 2022-10-21 RX ORDER — OXYCODONE HYDROCHLORIDE 10 MG/1
10 TABLET ORAL EVERY 4 HOURS PRN
Qty: 90 TABLET | Refills: 0 | Status: CANCELLED | OUTPATIENT
Start: 2022-10-21

## 2022-10-21 RX ORDER — FINASTERIDE 5 MG/1
5 TABLET, FILM COATED ORAL DAILY
Qty: 30 TABLET | Refills: 11 | Status: SHIPPED | OUTPATIENT
Start: 2022-10-21 | End: 2022-11-21 | Stop reason: SDUPTHER

## 2022-10-21 RX ORDER — OXYCODONE HYDROCHLORIDE 10 MG/1
10 TABLET ORAL EVERY 4 HOURS PRN
Qty: 90 TABLET | Refills: 0 | Status: SHIPPED | OUTPATIENT
Start: 2022-10-21 | End: 2022-11-21 | Stop reason: SDUPTHER

## 2022-10-25 DIAGNOSIS — C20 RECTAL CANCER: Primary | ICD-10-CM

## 2022-11-13 NOTE — Clinical Note
Flap looks good, small granulation tissue at superior edge near gluteus. May need silver nitrate but told him i'd let you look before we do it since its your flap [id number]:4-6 Days|| ||00\01||False;

## 2022-11-21 DIAGNOSIS — N52.39 OTHER POST-PROCEDURAL ERECTILE DYSFUNCTION: ICD-10-CM

## 2022-11-21 DIAGNOSIS — C20 RECTAL CANCER: ICD-10-CM

## 2022-11-22 RX ORDER — FINASTERIDE 5 MG/1
5 TABLET, FILM COATED ORAL DAILY
Qty: 30 TABLET | Refills: 11 | Status: SHIPPED | OUTPATIENT
Start: 2022-11-22 | End: 2023-11-20 | Stop reason: SDUPTHER

## 2022-11-22 RX ORDER — OXYCODONE HYDROCHLORIDE 10 MG/1
10 TABLET ORAL EVERY 4 HOURS PRN
Qty: 90 TABLET | Refills: 0 | Status: SHIPPED | OUTPATIENT
Start: 2022-11-22 | End: 2022-12-27 | Stop reason: SDUPTHER

## 2022-12-08 ENCOUNTER — LAB VISIT (OUTPATIENT)
Dept: LAB | Facility: HOSPITAL | Age: 57
End: 2022-12-08
Attending: INTERNAL MEDICINE
Payer: COMMERCIAL

## 2022-12-08 DIAGNOSIS — C20 RECTAL CANCER: ICD-10-CM

## 2022-12-08 LAB
ALBUMIN SERPL BCP-MCNC: 3.9 G/DL (ref 3.5–5.2)
ALP SERPL-CCNC: 73 U/L (ref 55–135)
ALT SERPL W/O P-5'-P-CCNC: 9 U/L (ref 10–44)
ANION GAP SERPL CALC-SCNC: 13 MMOL/L (ref 8–16)
AST SERPL-CCNC: 14 U/L (ref 10–40)
BASOPHILS # BLD AUTO: 0.07 K/UL (ref 0–0.2)
BASOPHILS NFR BLD: 0.8 % (ref 0–1.9)
BILIRUB SERPL-MCNC: 0.6 MG/DL (ref 0.1–1)
BUN SERPL-MCNC: 7 MG/DL (ref 6–20)
CALCIUM SERPL-MCNC: 9.4 MG/DL (ref 8.7–10.5)
CHLORIDE SERPL-SCNC: 104 MMOL/L (ref 95–110)
CO2 SERPL-SCNC: 24 MMOL/L (ref 23–29)
CREAT SERPL-MCNC: 1 MG/DL (ref 0.5–1.4)
DIFFERENTIAL METHOD: NORMAL
EOSINOPHIL # BLD AUTO: 0.3 K/UL (ref 0–0.5)
EOSINOPHIL NFR BLD: 2.9 % (ref 0–8)
ERYTHROCYTE [DISTWIDTH] IN BLOOD BY AUTOMATED COUNT: 12.5 % (ref 11.5–14.5)
EST. GFR  (NO RACE VARIABLE): >60 ML/MIN/1.73 M^2
GLUCOSE SERPL-MCNC: 132 MG/DL (ref 70–110)
HCT VFR BLD AUTO: 44.6 % (ref 40–54)
HGB BLD-MCNC: 15.7 G/DL (ref 14–18)
IMM GRANULOCYTES # BLD AUTO: 0.03 K/UL (ref 0–0.04)
IMM GRANULOCYTES NFR BLD AUTO: 0.3 % (ref 0–0.5)
LDH SERPL L TO P-CCNC: 167 U/L (ref 110–260)
LYMPHOCYTES # BLD AUTO: 2.6 K/UL (ref 1–4.8)
LYMPHOCYTES NFR BLD: 29.7 % (ref 18–48)
MCH RBC QN AUTO: 30.9 PG (ref 27–31)
MCHC RBC AUTO-ENTMCNC: 35.2 G/DL (ref 32–36)
MCV RBC AUTO: 88 FL (ref 82–98)
MONOCYTES # BLD AUTO: 0.4 K/UL (ref 0.3–1)
MONOCYTES NFR BLD: 4.9 % (ref 4–15)
NEUTROPHILS # BLD AUTO: 5.3 K/UL (ref 1.8–7.7)
NEUTROPHILS NFR BLD: 61.4 % (ref 38–73)
NRBC BLD-RTO: 0 /100 WBC
PLATELET # BLD AUTO: 317 K/UL (ref 150–450)
PMV BLD AUTO: 9.7 FL (ref 9.2–12.9)
POTASSIUM SERPL-SCNC: 4.3 MMOL/L (ref 3.5–5.1)
PROT SERPL-MCNC: 7.8 G/DL (ref 6–8.4)
RBC # BLD AUTO: 5.08 M/UL (ref 4.6–6.2)
SODIUM SERPL-SCNC: 141 MMOL/L (ref 136–145)
WBC # BLD AUTO: 8.61 K/UL (ref 3.9–12.7)

## 2022-12-08 PROCEDURE — 80053 COMPREHEN METABOLIC PANEL: CPT | Performed by: INTERNAL MEDICINE

## 2022-12-08 PROCEDURE — 36415 COLL VENOUS BLD VENIPUNCTURE: CPT | Mod: PN | Performed by: INTERNAL MEDICINE

## 2022-12-08 PROCEDURE — 83615 LACTATE (LD) (LDH) ENZYME: CPT | Performed by: INTERNAL MEDICINE

## 2022-12-08 PROCEDURE — 85025 COMPLETE CBC W/AUTO DIFF WBC: CPT | Performed by: INTERNAL MEDICINE

## 2022-12-12 ENCOUNTER — LAB VISIT (OUTPATIENT)
Dept: LAB | Facility: HOSPITAL | Age: 57
End: 2022-12-12
Attending: COLON & RECTAL SURGERY
Payer: COMMERCIAL

## 2022-12-12 DIAGNOSIS — Z12.5 PROSTATE CANCER SCREENING: ICD-10-CM

## 2022-12-12 DIAGNOSIS — Z85.048 HISTORY OF RECTAL CANCER: ICD-10-CM

## 2022-12-12 LAB
CEA SERPL-MCNC: <1.7 NG/ML (ref 0–5)
COMPLEXED PSA SERPL-MCNC: 0.45 NG/ML (ref 0–4)

## 2022-12-12 PROCEDURE — 82378 CARCINOEMBRYONIC ANTIGEN: CPT | Performed by: COLON & RECTAL SURGERY

## 2022-12-12 PROCEDURE — 84153 ASSAY OF PSA TOTAL: CPT | Performed by: UROLOGY

## 2022-12-12 PROCEDURE — 36415 COLL VENOUS BLD VENIPUNCTURE: CPT | Performed by: UROLOGY

## 2022-12-15 ENCOUNTER — OFFICE VISIT (OUTPATIENT)
Dept: HEMATOLOGY/ONCOLOGY | Facility: CLINIC | Age: 57
End: 2022-12-15
Payer: COMMERCIAL

## 2022-12-15 VITALS
DIASTOLIC BLOOD PRESSURE: 79 MMHG | TEMPERATURE: 98 F | WEIGHT: 177.5 LBS | BODY MASS INDEX: 24.04 KG/M2 | HEIGHT: 72 IN | OXYGEN SATURATION: 97 % | RESPIRATION RATE: 18 BRPM | HEART RATE: 84 BPM | SYSTOLIC BLOOD PRESSURE: 117 MMHG

## 2022-12-15 DIAGNOSIS — C20 RECTAL CANCER: Primary | ICD-10-CM

## 2022-12-15 PROCEDURE — 3078F DIAST BP <80 MM HG: CPT | Mod: CPTII,S$GLB,, | Performed by: INTERNAL MEDICINE

## 2022-12-15 PROCEDURE — 1159F MED LIST DOCD IN RCRD: CPT | Mod: CPTII,S$GLB,, | Performed by: INTERNAL MEDICINE

## 2022-12-15 PROCEDURE — 3074F PR MOST RECENT SYSTOLIC BLOOD PRESSURE < 130 MM HG: ICD-10-PCS | Mod: CPTII,S$GLB,, | Performed by: INTERNAL MEDICINE

## 2022-12-15 PROCEDURE — 3074F SYST BP LT 130 MM HG: CPT | Mod: CPTII,S$GLB,, | Performed by: INTERNAL MEDICINE

## 2022-12-15 PROCEDURE — 3078F PR MOST RECENT DIASTOLIC BLOOD PRESSURE < 80 MM HG: ICD-10-PCS | Mod: CPTII,S$GLB,, | Performed by: INTERNAL MEDICINE

## 2022-12-15 PROCEDURE — 1160F RVW MEDS BY RX/DR IN RCRD: CPT | Mod: CPTII,S$GLB,, | Performed by: INTERNAL MEDICINE

## 2022-12-15 PROCEDURE — 99214 OFFICE O/P EST MOD 30 MIN: CPT | Mod: S$GLB,,, | Performed by: INTERNAL MEDICINE

## 2022-12-15 PROCEDURE — 1159F PR MEDICATION LIST DOCUMENTED IN MEDICAL RECORD: ICD-10-PCS | Mod: CPTII,S$GLB,, | Performed by: INTERNAL MEDICINE

## 2022-12-15 PROCEDURE — 3008F PR BODY MASS INDEX (BMI) DOCUMENTED: ICD-10-PCS | Mod: CPTII,S$GLB,, | Performed by: INTERNAL MEDICINE

## 2022-12-15 PROCEDURE — 1160F PR REVIEW ALL MEDS BY PRESCRIBER/CLIN PHARMACIST DOCUMENTED: ICD-10-PCS | Mod: CPTII,S$GLB,, | Performed by: INTERNAL MEDICINE

## 2022-12-15 PROCEDURE — 99214 PR OFFICE/OUTPT VISIT, EST, LEVL IV, 30-39 MIN: ICD-10-PCS | Mod: S$GLB,,, | Performed by: INTERNAL MEDICINE

## 2022-12-15 PROCEDURE — 99999 PR PBB SHADOW E&M-EST. PATIENT-LVL IV: ICD-10-PCS | Mod: PBBFAC,,, | Performed by: INTERNAL MEDICINE

## 2022-12-15 PROCEDURE — 99999 PR PBB SHADOW E&M-EST. PATIENT-LVL IV: CPT | Mod: PBBFAC,,, | Performed by: INTERNAL MEDICINE

## 2022-12-15 PROCEDURE — 3008F BODY MASS INDEX DOCD: CPT | Mod: CPTII,S$GLB,, | Performed by: INTERNAL MEDICINE

## 2022-12-15 NOTE — PROGRESS NOTES
Subjective:      DATE OF VISIT: 12/15/22     ?  Patient ID:?Sukhjinder Presley is a 57 y.o. male.?? MR#: [id number]   ?   REFERRING PROVIDER: No referring provider defined for this encounter.     ? Primary Care Providers:  Drake Elizalde MD, MD (General)     CHIEF COMPLAINT: ?Follow-up???   ?   ONCOLOGIC DIAGNOSIS:  Rectal adenocarcinoma, MSI stable, cT3 N2, stage IIIB, diagnosed 06/06/2019, s/p neoadjuvant chemoradiation with capecitabine and FOLFOXIRI plus Avastin under direction of Dr. Aden, APR, 04/09/2020, ypT3 ypN0  ?   CURRENT TREATMENT:  Surveillance      ?   ONCOLOGIC HISTORY:   ?   Oncology History Overview Note   Entered in error     Rectal cancer   6/24/2019 Initial Diagnosis    Rectal cancer     6/26/2019 Cancer Staged    Staging form: Colon and Rectum, AJCC 8th Edition  - Clinical stage from 6/26/2019: Stage IIIB (cT3, cN2a, cM0) - Signed by Artem Mishra II, MD on 6/26/2019       7/10/2019 - 8/13/2019 Radiation Therapy    Treatment Site Ref. ID Energy Dose/Fx (Gy) #Fx Dose Correction (Gy) Total Dose (Gy) Start Date End Date Elapsed Days   RECTUM Pelvis 6X 2 25 / 25 0 50 7/10/2019 8/13/2019 34          7/10/2019 - 7/10/2019 Chemotherapy    Treatment Summary   Plan Name: OP CAPECITABINE 5 DAYS + RADIOTHERAPY  Treatment Goal: Curative  Status: Inactive  Start Date: [No treatment day found]  End Date: [No treatment day found]  Provider: Song Aden MD  Chemotherapy: [No matching medication found in this treatment plan]       10/9/2019 Tumor Conference      Multidisciplinary Rectal Cancer Conference - Evaluation and Recommendation Summary    10/9/2019  Sukhjinder Presley  [id number]  53 y.o. male    1. Evaluation    C scope on 6/6/19 - non obstructing mass    MRI date: 6/17/19    Tumor Location in Rectum: middle third    Indication of Sphincter Involvement:  Uninvolved    Pretreatment Circumferential Resection Margin (CRM) status:  Threatened Tumor abuts CRM on the left.     Pretreatment (clinical) AJCC Stage:  IIIB  Stage I  [] I: T1N0M0  [] I: T2N0M0  Stage II  [] IIA: T3N0M0  [] IIB [id number]  [] IIC: I9dR2N2  Stage III  [] IIIA: T1-2N1M0  [] IIIA: Q6X4tA5  [x] IIIB: T3-F9mW5J1  [] IIIB: T2-3N2aM0  [] IIIB: T1-2N2bM0  [] IIIC: C2oF6gN1  [] IIIC: T3-2sI9nO3  [] IIIC: C7mI6-6G0   Stage IV  [] IV: D1-5L7-8F0g-b    CEA level:   Lab Results   Component Value Date    CEA 1.4 09/30/2019     2. Treatment     Neoadjuvant Therapy Recommendation: Long Course Chemoradiotherapy - completed on 8/13    CT scan 7/30 during treatment: Unchanged appearance of large rectal mass, mild curcumferential bladder wall thickening.     MRI rectum 9/24: Large mass with invasion of muscularis and involvement of the mesorectal fat and fascia along the left and posterior aspect of the mass.   Single LN within the mesorectal fat posteriorly measuring 6 mm.   Subcentimeter obturator and external iliac changes LNDs seen bilaterally.     Recommendations:     Complete COLLINS with initiation of FOLFOX.     After follow by LAR + DI.      10/9/2019 - 3/16/2020 Chemotherapy    Treatment Summary   Plan Name: OP FOLFOXIRI Q2W  Treatment Goal: Curative  Status: Inactive  Start Date: 10/9/2019  End Date: 2/5/2020  Provider: Song Aden MD  Chemotherapy: fluorouracil 3,200 mg/m2 = 6,305 mg in sodium chloride 0.9% 253 mL chemo infusion, 3,200 mg/m2 = 6,305 mg, Intravenous, Over 48 hours, 7 of 8 cycles  Administration: 6,305 mg (10/9/2019), 6,270 mg (10/23/2019), 6,210 mg (11/6/2019), 6,305 mg (11/20/2019), 6,145 mg (1/6/2020), 6,145 mg (1/20/2020), 6,240 mg (2/3/2020)  bevacizumab (AVASTIN) 5 mg/kg = 380 mg in sodium chloride 0.9% 100 mL chemo infusion, 5 mg/kg = 380 mg (100 % of original dose 5 mg/kg), Intravenous, Clinic/HOD 1 time, 6 of 6 cycles  Dose modification: 5 mg/kg (original dose 5 mg/kg, Cycle 1, Reason: MD Discretion), 5 mg/kg (original dose 5 mg/kg, Cycle 2)  Administration: 380 mg (10/9/2019), 380 mg (10/23/2019), 370 mg (11/6/2019), 380 mg  (11/20/2019), 365 mg (1/6/2020), 360 mg (1/20/2020)  irinotecan (CAMPTOSAR) 165 mg/m2 = 326 mg in sodium chloride 0.9% 266.3 mL chemo infusion, 165 mg/m2 = 326 mg, Intravenous, Clinic/HOD 1 time, 6 of 6 cycles  Administration: 326 mg (10/9/2019), 324 mg (10/23/2019), 320 mg (11/6/2019), 326 mg (11/20/2019), 316 mg (1/6/2020), 316 mg (1/20/2020)  leucovorin calcium 200 mg/m2 = 395 mg in dextrose 5 % 269.75 mL infusion, 200 mg/m2 = 395 mg, Intravenous, Clinic/HOD 1 time, 7 of 8 cycles  Administration: 395 mg (10/9/2019), 390 mg (10/23/2019), 390 mg (11/6/2019), 395 mg (11/20/2019), 385 mg (1/6/2020), 385 mg (1/20/2020), 390 mg (2/3/2020)  oxaliplatin (ELOXATIN) 85 mg/m2 = 167 mg in dextrose 5 % 533.4 mL chemo infusion, 85 mg/m2 = 167 mg, Intravenous, Clinic/HOD 1 time, 7 of 8 cycles  Administration: 167 mg (10/9/2019), 167 mg (10/23/2019), 165 mg (11/6/2019), 167 mg (11/20/2019), 163 mg (1/6/2020), 163 mg (1/20/2020), 166 mg (2/3/2020)         Cancer Staging   Rectal cancer  - Clinical stage from 6/26/2019: Stage IIIB (cT3, cN2a, cM0) - Signed by Artem Mishra II, MD on 6/26/2019         HPI    He has been doing well in surveillance.  Colonoscopy December 2021 with adenomatous polyp with surgery February 2022 resection which he recovered well from.  No new issues or concerns.    Review of Systems    ?   A comprehensive 14-point review of systems was reviewed with patient and was negative other than as specified above.   ?   PAST MEDICAL HISTORY:   Past Medical History:   Diagnosis Date    Anemia     Arthritis     Cancer     rectal    Renal disorder     kidney stones    ?     PAST SURGICAL HISTORY:   Past Surgical History:   Procedure Laterality Date    ABDOMINOPERINEAL RESECTION OF RECTUM N/A 4/9/2020    Procedure: PROCTECTOMY, ABDOMINOPERINEAL;  Surgeon: Jan New MD;  Location: [facility];  Service: General;  Laterality: N/A;    COLECTOMY, TOTAL N/A 2/24/2022    Procedure: COLECTOMY, TOTAL;  Surgeon:  Jan New MD;  Location: [facility];  Service: Colon and Rectal;  Laterality: N/A;    COLON SURGERY      COLONOSCOPY N/A 6/6/2019    Procedure: COLONOSCOPY;  Surgeon: Anjelica Saavedra MD;  Location: [facility];  Service: Endoscopy;  Laterality: N/A;    COLONOSCOPY N/A 12/17/2021    Procedure: COLONOSCOPY;  Surgeon: Jan New MD;  Location: [facility] ENDO;  Service: General;  Laterality: N/A;    CREATION OF MUSCLE ROTATIONAL FLAP Left 4/9/2020    Procedure: CREATION, FLAP, MUSCLE ROTATION;  Surgeon: Sebastian Dan MD;  Location: [facility] OR;  Service: Plastics;  Laterality: Left;   VRAM    CYSTOSCOPIC LITHOLAPAXY N/A 2/24/2022    Procedure: CYSTOLITHOLAPAXY;  Surgeon: Sukhjinder Tucker MD;  Location: [facility];  Service: Urology;  Laterality: N/A;    ESOPHAGOGASTRODUODENOSCOPY N/A 6/6/2019    Procedure: EGD (ESOPHAGOGASTRODUODENOSCOPY);  Surgeon: Anjelica Saavedra MD;  Location: [facility];  Service: Endoscopy;  Laterality: N/A;    FLAP GRAFT SURGERY N/A 4/9/2020    Procedure: FLAP GRAFT;  Surgeon: Sebastian Dan MD;  Location: [facility];  Service: Plastics;  Laterality: N/A;  left fasciocutaneous buttocks flap    HERNIA REPAIR  1995    INJECTION OF ANESTHETIC AGENT INTO TISSUE PLANE DEFINED BY TRANSVERSUS ABDOMINIS MUSCLE N/A 2/18/2020    Procedure: BLOCK, TRANSVERSUS ABDOMINIS PLANE;  Surgeon: Jan New MD;  Location: [facility] OR;  Service: General;  Laterality: N/A;    INJECTION OF ANESTHETIC AGENT INTO TISSUE PLANE DEFINED BY TRANSVERSUS ABDOMINIS MUSCLE  2/24/2022    Procedure: BLOCK, TRANSVERSUS ABDOMINIS PLANE;  Surgeon: Jan New MD;  Location: [facility];  Service: Colon and Rectal;;    INSERTION OF TUNNELED CENTRAL VENOUS CATHETER (CVC) WITH SUBCUTANEOUS PORT N/A 10/8/2019    Procedure: [id number];  Surgeon: Jan New MD;  Location: [facility];  Service: General;  Laterality: N/A;    LAPAROSCOPIC COLOSTOMY      MEDIPORT REMOVAL N/A 8/13/2020    Procedure:  REMOVAL, CATHETER, CENTRAL VENOUS, TUNNELED, WITH PORT;  Surgeon: Sebastian Dan MD;  Location: [facility] OR;  Service: Plastics;  Laterality: N/A;    TONSILLECTOMY      TRANSURETHRAL RESECTION OF PROSTATE N/A 6/7/2021    Procedure: TURP (TRANSURETHRAL RESECTION OF PROSTATE), CYSTOLITHALOPAXY;  Surgeon: Sukhjinder Tucker MD;  Location: [facility] OR;  Service: Urology;  Laterality: N/A;    WOUND DEBRIDEMENT N/A 8/13/2020    Procedure: DEBRIDEMENT, WOUND;  Surgeon: Sebastian Dan MD;  Location: [facility] OR;  Service: Plastics;  Laterality: N/A;  layered closure      ?   ALLERGIES:   Allergies as of 12/15/2022    (No Known Allergies)      ?   MEDICATIONS:?   Outpatient Medications Marked as Taking for the 12/15/22 encounter (Office Visit) with Maritza Lindsey MD   Medication Sig Dispense Refill    acetaminophen (TYLENOL) 325 MG tablet Take 2 tablets (650 mg total) by mouth every 6 (six) hours as needed.  0    finasteride (PROSCAR) 5 mg tablet Take 1 tablet (5 mg total) by mouth once daily. 30 tablet 11    oxyCODONE (ROXICODONE) 10 mg Tab immediate release tablet Take 1 tablet (10 mg total) by mouth every 4 (four) hours as needed. for pain. 90 tablet 0    promethazine (PHENERGAN) 25 MG tablet Take 1 tablet (25 mg total) by mouth every 4 (four) hours. 60 tablet 4    sildenafiL (VIAGRA) 100 MG tablet Take 1 tablet (100 mg total) by mouth daily as needed for Erectile Dysfunction. 20 tablet 11    tamsulosin (FLOMAX) 0.4 mg Cap Take 1 capsule (0.4 mg total) by mouth once daily. 30 capsule 11      ?   SOCIAL HISTORY:?   Social History     Tobacco Use    Smoking status: Every Day     Packs/day: 1.00     Years: 35.00     Pack years: 35.00     Types: Cigarettes     Start date: 1/2/1984    Smokeless tobacco: Former     Types: Chew    Tobacco comments:     no smoking after m.n prior to sx   Substance Use Topics    Alcohol use: Yes     Alcohol/week: 46.0 standard drinks     Types: 42 Cans of beer, 4 Shots of liquor per  week     Comment: segrams 7, beer daily  hold now prior to surgery      ?      ?   FAMILY HISTORY:   family history includes Coronary artery disease in his brother and maternal grandmother; Intestinal malrotation in his mother; Leukemia in his father; No Known Problems in his sister; Stomach cancer in his maternal grandfather.   ?        Objective:      Physical Exam      ?   Vitals:    12/15/22 1511   BP: 117/79   Pulse: 84   Resp: 18   Temp: 98.2 °F (36.8 °C)      ECOG:?0   General appearance: Generally well appearing, in no acute distress.   Head, eyes, ears, nose, and throat: moist mucous membranes.   Respiratory:  Normal work of breathing  Abdomen: nontender, nondistended.   Extremities: Warm, without edema.   Neurologic: Alert and oriented. Grossly normal strength, coordination, and gait.   Skin: No rashes, ecchymoses or petechial lesion.   Psychiatric:  Normal mood and affect.      ?   Laboratory:  ?   Lab Results   Component Value Date    WBC 8.61 12/08/2022    HGB 15.7 12/08/2022    HCT 44.6 12/08/2022    MCV 88 12/08/2022     12/08/2022       CMP  Sodium   Date Value Ref Range Status   12/08/2022 141 136 - 145 mmol/L Final     Potassium   Date Value Ref Range Status   12/08/2022 4.3 3.5 - 5.1 mmol/L Final     Chloride   Date Value Ref Range Status   12/08/2022 104 95 - 110 mmol/L Final     CO2   Date Value Ref Range Status   12/08/2022 24 23 - 29 mmol/L Final     Glucose   Date Value Ref Range Status   12/08/2022 132 (H) 70 - 110 mg/dL Final     BUN   Date Value Ref Range Status   12/08/2022 7 6 - 20 mg/dL Final     Creatinine   Date Value Ref Range Status   12/08/2022 1.0 0.5 - 1.4 mg/dL Final     Calcium   Date Value Ref Range Status   12/08/2022 9.4 8.7 - 10.5 mg/dL Final     Total Protein   Date Value Ref Range Status   12/08/2022 7.8 6.0 - 8.4 g/dL Final     Albumin   Date Value Ref Range Status   12/08/2022 3.9 3.5 - 5.2 g/dL Final     Total Bilirubin   Date Value Ref Range Status   12/08/2022  0.6 0.1 - 1.0 mg/dL Final     Comment:     For infants and newborns, interpretation of results should be based  on gestational age, weight and in agreement with clinical  observations.    Premature Infant recommended reference ranges:  Up to 24 hours.............<8.0 mg/dL  Up to 48 hours............<12.0 mg/dL  3-5 days..................<15.0 mg/dL  6-29 days.................<15.0 mg/dL       Alkaline Phosphatase   Date Value Ref Range Status   12/08/2022 73 55 - 135 U/L Final     AST   Date Value Ref Range Status   12/08/2022 14 10 - 40 U/L Final     ALT   Date Value Ref Range Status   12/08/2022 9 (L) 10 - 44 U/L Final     Anion Gap   Date Value Ref Range Status   12/08/2022 13 8 - 16 mmol/L Final     eGFR   Date Value Ref Range Status   12/08/2022 >60 >60 mL/min/1.73 m^2 Final       No visits with results within 1 Day(s) from this visit.   Latest known visit with results is:   Lab Visit on 12/12/2022   Component Date Value Ref Range Status    CEA 12/12/2022 <1.7  0.0 - 5.0 ng/mL Final    PSA, Screen 12/12/2022 0.45  0.00 - 4.00 ng/mL Final      ?   CEA   Date Value Ref Range Status   12/12/2022 <1.7 0.0 - 5.0 ng/mL Final     Comment:     CEA Normal Range:  Non-Smokers: 0-3.0 ng/mL  Smokers:     0-5.0 ng/mL    The testing method is a chemiluminescent microparticle immunoassay   manufactured by [employer] and performed on the [employer]   or   [employer] system. Values obtained with different assay manufacturers   for   methods may be different and cannot be used interchangeably.     09/12/2022 <1.7 0.0 - 5.0 ng/mL Final     Comment:     CEA Normal Range:  Non-Smokers: 0-3.0 ng/mL  Smokers:     0-5.0 ng/mL    The testing method is a chemiluminescent microparticle immunoassay   manufactured by [employer] and performed on the    or   [employer] system. Values obtained with different assay manufacturers   for   methods may be different and cannot be used interchangeably.     06/13/2022 1.7  0.0 - 5.0 ng/mL Final     Comment:     CEA Normal Range:  Non-Smokers: 0-3.0 ng/mL  Smokers:     0-5.0 ng/mL    The testing method is a chemiluminescent microparticle immunoassay   manufactured by [employer] and performed on the [employer]   or   [employer] system. Values obtained with different assay manufacturers   for   methods may be different and cannot be used interchangeably.     01/03/2022 1.8 0.0 - 5.0 ng/mL Final     Comment:     CEA Normal Range:  Non-Smokers: 0-3.0 ng/mL  Smokers:     0-5.0 ng/mL     09/09/2021 <1.7 0.0 - 5.0 ng/mL Final     Comment:     CEA Normal Range:  Non-Smokers: 0-3.0 ng/mL  Smokers:     0-5.0 ng/mL     03/24/2020 1.7 0.0 - 5.0 ng/mL Final     Comment:     CEA Normal Range:  Non-Smokers: 0-3.0 ng/mL  Smokers:     0-5.0 ng/mL     02/03/2020 3.8 0.0 - 5.0 ng/mL Final     Comment:     CEA Normal Range:  Non-Smokers: 0-3.0 ng/mL  Smokers:     0-5.0 ng/mL     09/30/2019 1.4 0.0 - 5.0 ng/mL Final     Comment:     CEA Normal Range:  Non-Smokers: 0-3.0 ng/mL  Smokers:     0-5.0 ng/mL     07/24/2019 1.8 0.0 - 5.0 ng/mL Final     Comment:     CEA Normal Range:  Non-Smokers: 0-3.0 ng/mL  Smokers:     0-5.0 ng/mL     07/10/2019 2.3 0.0 - 5.0 ng/mL Final     Comment:     CEA Normal Range:  Non-Smokers: 0-3.0 ng/mL  Smokers:     0-5.0 ng/mL     06/11/2019 2.2 0.0 - 5.0 ng/mL Final     Comment:     CEA Normal Range:  Non-Smokers: 0-3.0 ng/mL  Smokers:     0-5.0 ng/mL         Tumor markers   ?   ?   Imaging:  ?    Last 7/2022    Pathology:    Reviewed in Epic  ?   Assessment/Plan:   Rectal cancer  -     CT Chest Abdomen Pelvis With Contrast (xpd); Future; Expected date: 03/15/2023  -     CBC Auto Differential; Standing  -     Comprehensive Metabolic Panel; Standing  -     CEA; Standing       1. Rectal cancer            Plan:     Problem List Items Addressed This Visit          Oncology    Rectal cancer - Primary     Rectal adenocarcinoma, MSI stable, cT3 N2, stage IIIB, diagnosed  06/06/2019, s/p neoadjuvant chemoradiation with capecitabine and FOLFOXIRI plus Avastin under direction of Dr. Aden, APR, 04/09/2020, ypT3 ypN0, currently in surveillance.  Repeat colonoscopy December 2021 with adenomatous polyp status post completion colectomy/total colectomy with end ileostomy construction in February of 2022; final pathology with no evidence of malignancy present.  He continues in surveillance.  Lab work including CBC CMP and CEA undetectable without concerning findings on history or physical exam.  Recommend surveillance imaging will follow-up in 3 months (most recent imaging CT chest abdomen pelvis July 2022).  Continue follow-up with colorectal surgery.  ?       Follow-Up:   Route Chart for Scheduling    Med Onc Chart Routing      Follow up with physician 3 months.   Follow up with REE    Infusion scheduling note    Injection scheduling note    Labs CBC, CMP and CEA   Lab interval: every 12 weeks     Imaging CT chest abdomen pelvis   in 3 mo with labs prior to MD visit   Pharmacy appointment    Other referrals          Therapy Plan Information  Flushes  heparin, porcine (PF) 100 unit/mL injection flush 500 Units  500 Units, Intravenous, Every visit  sodium chloride 0.9% flush 10 mL  10 mL, Intravenous, Every visit

## 2022-12-15 NOTE — PROGRESS NOTES
Subjective:      DATE OF VISIT: 12/15/22     ?  Patient ID:?Sukhjinder Presley is a 57 y.o. male.?? MR#: [id number]   ?   REFERRING PROVIDER: No referring provider defined for this encounter.     ? Primary Care Providers:  Drake Elizalde MD, MD (General)     CHIEF COMPLAINT: ????Follow-up   ?   ONCOLOGIC DIAGNOSIS:  Stage IIIB cT3 cN2a cM0 rectal adenocarcinoma MSI stable, status post neoadjuvant chemotherapy FOLFOXIRI and Avastin, chemoradiation with capecitabine, APR 4/2020; ypT3 ypN0; Status post completion colectomy/total colectomy with end ileostomy construction in February of 2022 for benign polyps  ?   CURRENT TREATMENT:  Surveillance      ?   ONCOLOGIC HISTORY:   ?   Oncology History Overview Note   Entered in error     Rectal cancer   6/24/2019 Initial Diagnosis    Rectal cancer     6/26/2019 Cancer Staged    Staging form: Colon and Rectum, AJCC 8th Edition  - Clinical stage from 6/26/2019: Stage IIIB (cT3, cN2a, cM0) - Signed by Artem Mishra II, MD on 6/26/2019       7/10/2019 - 8/13/2019 Radiation Therapy    Treatment Site Ref. ID Energy Dose/Fx (Gy) #Fx Dose Correction (Gy) Total Dose (Gy) Start Date End Date Elapsed Days   RECTUM Pelvis 6X 2 25 / 25 0 50 7/10/2019 8/13/2019 34          7/10/2019 - 7/10/2019 Chemotherapy    Treatment Summary   Plan Name: OP CAPECITABINE 5 DAYS + RADIOTHERAPY  Treatment Goal: Curative  Status: Inactive  Start Date: [No treatment day found]  End Date: [No treatment day found]  Provider: Song Aden MD  Chemotherapy: [No matching medication found in this treatment plan]       10/9/2019 Tumor Conference      Multidisciplinary Rectal Cancer Conference - Evaluation and Recommendation Summary    10/9/2019  Sukhjinder Presley  [id number]  53 y.o. male    1. Evaluation    C scope on 6/6/19 - non obstructing mass    MRI date: 6/17/19    Tumor Location in Rectum: middle third    Indication of Sphincter Involvement:  Uninvolved    Pretreatment Circumferential Resection Margin (CRM)  status:  Threatened Tumor abuts CRM on the left.     Pretreatment (clinical) AJCC Stage: IIIB  Stage I  [] I: T1N0M0  [] I: T2N0M0  Stage II  [] IIA: T3N0M0  [] IIB N5qT6P9  [] IIC: O9lT2C9  Stage III  [] IIIA: T1-2N1M0  [] IIIA: C2Q0sU3  [x] IIIB: T3-W4aI3U7  [] IIIB: T2-3N2aM0  [] IIIB: T1-2N2bM0  [] IIIC: Z5uY1wA1  [] IIIC: T3-6nD7cR0  [] IIIC: E5qL0-2U3   Stage IV  [] IV: [id number]-b    CEA level:   Lab Results   Component Value Date    CEA 1.4 09/30/2019     2. Treatment     Neoadjuvant Therapy Recommendation: Long Course Chemoradiotherapy - completed on 8/13    CT scan 7/30 during treatment: Unchanged appearance of large rectal mass, mild curcumferential bladder wall thickening.     MRI rectum 9/24: Large mass with invasion of muscularis and involvement of the mesorectal fat and fascia along the left and posterior aspect of the mass.   Single LN within the mesorectal fat posteriorly measuring 6 mm.   Subcentimeter obturator and external iliac changes LNDs seen bilaterally.     Recommendations:     Complete COLLINS with initiation of FOLFOX.     After follow by DUNCAN + MIRZA.      10/9/2019 - 3/16/2020 Chemotherapy    Treatment Summary   Plan Name: OP FOLFOXIRI Q2W  Treatment Goal: Curative  Status: Inactive  Start Date: 10/9/2019  End Date: 2/5/2020  Provider: Song Aden MD  Chemotherapy: fluorouracil 3,200 mg/m2 = 6,305 mg in sodium chloride 0.9% 253 mL chemo infusion, 3,200 mg/m2 = 6,305 mg, Intravenous, Over 48 hours, 7 of 8 cycles  Administration: 6,305 mg (10/9/2019), 6,270 mg (10/23/2019), 6,210 mg (11/6/2019), 6,305 mg (11/20/2019), 6,145 mg (1/6/2020), 6,145 mg (1/20/2020), 6,240 mg (2/3/2020)  bevacizumab (AVASTIN) 5 mg/kg = 380 mg in sodium chloride 0.9% 100 mL chemo infusion, 5 mg/kg = 380 mg (100 % of original dose 5 mg/kg), Intravenous, Clinic/HOD 1 time, 6 of 6 cycles  Dose modification: 5 mg/kg (original dose 5 mg/kg, Cycle 1, Reason: MD Discretion), 5 mg/kg (original dose 5 mg/kg, Cycle  2)  Administration: 380 mg (10/9/2019), 380 mg (10/23/2019), 370 mg (11/6/2019), 380 mg (11/20/2019), 365 mg (1/6/2020), 360 mg (1/20/2020)  irinotecan (CAMPTOSAR) 165 mg/m2 = 326 mg in sodium chloride 0.9% 266.3 mL chemo infusion, 165 mg/m2 = 326 mg, Intravenous, Clinic/HOD 1 time, 6 of 6 cycles  Administration: 326 mg (10/9/2019), 324 mg (10/23/2019), 320 mg (11/6/2019), 326 mg (11/20/2019), 316 mg (1/6/2020), 316 mg (1/20/2020)  leucovorin calcium 200 mg/m2 = 395 mg in dextrose 5 % 269.75 mL infusion, 200 mg/m2 = 395 mg, Intravenous, Clinic/HOD 1 time, 7 of 8 cycles  Administration: 395 mg (10/9/2019), 390 mg (10/23/2019), 390 mg (11/6/2019), 395 mg (11/20/2019), 385 mg (1/6/2020), 385 mg (1/20/2020), 390 mg (2/3/2020)  oxaliplatin (ELOXATIN) 85 mg/m2 = 167 mg in dextrose 5 % 533.4 mL chemo infusion, 85 mg/m2 = 167 mg, Intravenous, Clinic/HOD 1 time, 7 of 8 cycles  Administration: 167 mg (10/9/2019), 167 mg (10/23/2019), 165 mg (11/6/2019), 167 mg (11/20/2019), 163 mg (1/6/2020), 163 mg (1/20/2020), 166 mg (2/3/2020)         Cancer Staging   Rectal cancer  - Clinical stage from 6/26/2019: Stage IIIB (cT3, cN2a, cM0) - Signed by Artem Mishra II, MD on 6/26/2019         HPI    He is doing well recovered well from surgery in February 2022.  No bowel changes.  No unintentional weight loss new pain or evidence of bleeding.    Review of Systems    ?   A comprehensive 14-point review of systems was reviewed with patient and was negative other than as specified above.   ?   PAST MEDICAL HISTORY:   Past Medical History:   Diagnosis Date    Anemia     Arthritis     Cancer     rectal    Renal disorder     kidney stones    ?     PAST SURGICAL HISTORY:   Past Surgical History:   Procedure Laterality Date    ABDOMINOPERINEAL RESECTION OF RECTUM N/A 4/9/2020    Procedure: PROCTECTOMY, ABDOMINOPERINEAL;  Surgeon: Jna New MD;  Location: [facility];  Service: General;  Laterality: N/A;    COLECTOMY, TOTAL N/A 2/24/2022     Procedure: COLECTOMY, TOTAL;  Surgeon: Jan New MD;  Location: [facility];  Service: Colon and Rectal;  Laterality: N/A;    COLON SURGERY      COLONOSCOPY N/A 6/6/2019    Procedure: COLONOSCOPY;  Surgeon: Anjelica Saavedra MD;  Location: [facility];  Service: Endoscopy;  Laterality: N/A;    COLONOSCOPY N/A 12/17/2021    Procedure: COLONOSCOPY;  Surgeon: Jan New MD;  Location: [facility];  Service: General;  Laterality: N/A;    CREATION OF MUSCLE ROTATIONAL FLAP Left 4/9/2020    Procedure: CREATION, FLAP, MUSCLE ROTATION;  Surgeon: Sebastian Dan MD;  Location: [facility];  Service: Plastics;  Laterality: Left;   VRAM    CYSTOSCOPIC LITHOLAPAXY N/A 2/24/2022    Procedure: CYSTOLITHOLAPAXY;  Surgeon: Sukhjinder Tucker MD;  Location: [facility];  Service: Urology;  Laterality: N/A;    ESOPHAGOGASTRODUODENOSCOPY N/A 6/6/2019    Procedure: EGD (ESOPHAGOGASTRODUODENOSCOPY);  Surgeon: Anjelica Saavedra MD;  Location: [facility];  Service: Endoscopy;  Laterality: N/A;    FLAP GRAFT SURGERY N/A 4/9/2020    Procedure: FLAP GRAFT;  Surgeon: Sebastian Dan MD;  Location: [facility];  Service: Plastics;  Laterality: N/A;  left fasciocutaneous buttocks flap    HERNIA REPAIR  1995    INJECTION OF ANESTHETIC AGENT INTO TISSUE PLANE DEFINED BY TRANSVERSUS ABDOMINIS MUSCLE N/A 2/18/2020    Procedure: BLOCK, TRANSVERSUS ABDOMINIS PLANE;  Surgeon: Jan New MD;  Location: [facility];  Service: General;  Laterality: N/A;    INJECTION OF ANESTHETIC AGENT INTO TISSUE PLANE DEFINED BY TRANSVERSUS ABDOMINIS MUSCLE  2/24/2022    Procedure: BLOCK, TRANSVERSUS ABDOMINIS PLANE;  Surgeon: Jan New MD;  Location: [facility];  Service: Colon and Rectal;;    INSERTION OF TUNNELED CENTRAL VENOUS CATHETER (CVC) WITH SUBCUTANEOUS PORT N/A 10/8/2019    Procedure: [id number];  Surgeon: Jan New MD;  Location: [facility];  Service: General;  Laterality: N/A;    LAPAROSCOPIC COLOSTOMY      MEDIPORT REMOVAL  N/A 8/13/2020    Procedure: REMOVAL, CATHETER, CENTRAL VENOUS, TUNNELED, WITH PORT;  Surgeon: Sebastian Dan MD;  Location: [facility];  Service: Plastics;  Laterality: N/A;    TONSILLECTOMY      TRANSURETHRAL RESECTION OF PROSTATE N/A 6/7/2021    Procedure: TURP (TRANSURETHRAL RESECTION OF PROSTATE), CYSTOLITHALOPAXY;  Surgeon: Sukhjinder Tucker MD;  Location: [facility] OR;  Service: Urology;  Laterality: N/A;    WOUND DEBRIDEMENT N/A 8/13/2020    Procedure: DEBRIDEMENT, WOUND;  Surgeon: Sebastian Dan MD;  Location: [facility] OR;  Service: Plastics;  Laterality: N/A;  layered closure      ?   ALLERGIES:   Allergies as of 12/15/2022    (No Known Allergies)      ?   MEDICATIONS:?   Outpatient Medications Marked as Taking for the 12/15/22 encounter (Office Visit) with Maritza Lindsey MD   Medication Sig Dispense Refill    acetaminophen (TYLENOL) 325 MG tablet Take 2 tablets (650 mg total) by mouth every 6 (six) hours as needed.  0    finasteride (PROSCAR) 5 mg tablet Take 1 tablet (5 mg total) by mouth once daily. 30 tablet 11    oxyCODONE (ROXICODONE) 10 mg Tab immediate release tablet Take 1 tablet (10 mg total) by mouth every 4 (four) hours as needed. for pain. 90 tablet 0    promethazine (PHENERGAN) 25 MG tablet Take 1 tablet (25 mg total) by mouth every 4 (four) hours. 60 tablet 4    sildenafiL (VIAGRA) 100 MG tablet Take 1 tablet (100 mg total) by mouth daily as needed for Erectile Dysfunction. 20 tablet 11    tamsulosin (FLOMAX) 0.4 mg Cap Take 1 capsule (0.4 mg total) by mouth once daily. 30 capsule 11      ?   SOCIAL HISTORY:?   Social History     Tobacco Use    Smoking status: Every Day     Packs/day: 1.00     Years: 35.00     Pack years: 35.00     Types: Cigarettes     Start date: 1/2/1984    Smokeless tobacco: Former     Types: Chew    Tobacco comments:     no smoking after m.n prior to sx   Substance Use Topics    Alcohol use: Yes     Alcohol/week: 46.0 standard drinks     Types: 42 Cans of beer, 4 Shots  of liquor per week     Comment: segrams 7, beer daily  hold now prior to surgery      ?      ?   FAMILY HISTORY:   family history includes Coronary artery disease in his brother and maternal grandmother; Intestinal malrotation in his mother; Leukemia in his father; No Known Problems in his sister; Stomach cancer in his maternal grandfather.   ?        Objective:      Physical Exam      ?   Vitals:    12/15/22 1511   BP: 117/79   Pulse: 84   Resp: 18   Temp: 98.2 °F (36.8 °C)      ?   ECOG:?0   General appearance: Generally well appearing, in no acute distress.   Head, eyes, ears, nose, and throat: moist mucous membranes.   Respiratory:  Normal work of breathing  Abdomen: nontender, nondistended.   Extremities: Warm, without edema.   Neurologic: Alert and oriented. Grossly normal strength, coordination, and gait.   Skin: No rashes, ecchymoses or petechial lesion.   Psychiatric:  Normal mood and affect.    ?   Laboratory:  ?   No visits with results within 1 Day(s) from this visit.   Latest known visit with results is:   Lab Visit on 12/12/2022   Component Date Value Ref Range Status    CEA 12/12/2022 <1.7  0.0 - 5.0 ng/mL Final    PSA, Screen 12/12/2022 0.45  0.00 - 4.00 ng/mL Final      ?   Lab Results   Component Value Date    WBC 8.61 12/08/2022    HGB 15.7 12/08/2022    HCT 44.6 12/08/2022    MCV 88 12/08/2022     12/08/2022       Lab Results   Component Value Date    WBC 8.61 12/08/2022    HGB 15.7 12/08/2022    HCT 44.6 12/08/2022    MCV 88 12/08/2022     12/08/2022           Tumor markers   ?   Lab Results   Component Value Date    CEA <1.7 12/12/2022       ?   Imaging:  ?    No results found for this or any previous visit (from the past 2160 hour(s)).  No results found for this or any previous visit (from the past 2160 hour(s)).  No results found for this or any previous visit (from the past 2160 hour(s)).      Pathology:    Reviewed in epic    ?   Assessment/Plan:   Rectal cancer       1.  Rectal cancer            Plan:     Problem List Items Addressed This Visit          Oncology    Rectal cancer - Primary        Stage IIIB cT3 cN2a cM0 rectal adenocarcinoma MSI stable, status post neoadjuvant chemotherapy FOLFOXIRI and Avastin, chemoradiation with capecitabine, APR 4/2020; ypT3 ypN0; Status post completion colectomy/total colectomy with end ileostomy construction in February of 2022 for benign polyps.  No concerning clinical or lab changes.  Recommend continued follow-up with colorectal surgery in surveillance with repeat imaging in 3 months (last July 2022).  ?     Follow-Up:   Route Chart for Scheduling    Med Onc Chart Routing      Follow up with physician 3 months.   Follow up with REE    Infusion scheduling note    Injection scheduling note    Labs CBC, CEA and CMP   Lab interval:     Imaging CT chest abdomen pelvis      Pharmacy appointment    Other referrals          Therapy Plan Information  Flushes  heparin, porcine (PF) 100 unit/mL injection flush 500 Units  500 Units, Intravenous, Every visit  sodium chloride 0.9% flush 10 mL  10 mL, Intravenous, Every visit    30 minutes of total time spent on the encounter, which includes face to face time and non-face to face time preparing to see the patient (eg, review of tests), Obtaining and/or reviewing separately obtained history, Documenting clinical information in the electronic or other health record, Independently interpreting results (not separately reported) and communicating results to the patient/family/caregiver, or Care coordination (not separately reported).

## 2022-12-25 DIAGNOSIS — C20 RECTAL CANCER: ICD-10-CM

## 2022-12-25 RX ORDER — OXYCODONE HYDROCHLORIDE 10 MG/1
10 TABLET ORAL EVERY 4 HOURS PRN
Qty: 90 TABLET | Refills: 0 | Status: CANCELLED | OUTPATIENT
Start: 2022-12-25

## 2022-12-27 DIAGNOSIS — C20 RECTAL CANCER: ICD-10-CM

## 2022-12-27 RX ORDER — OXYCODONE HYDROCHLORIDE 10 MG/1
10 TABLET ORAL EVERY 4 HOURS PRN
Qty: 90 TABLET | Refills: 0 | Status: SHIPPED | OUTPATIENT
Start: 2022-12-27 | End: 2023-01-22 | Stop reason: SDUPTHER

## 2023-01-18 ENCOUNTER — TELEPHONE (OUTPATIENT)
Dept: INTERNAL MEDICINE | Facility: CLINIC | Age: 58
End: 2023-01-18
Payer: COMMERCIAL

## 2023-02-21 DIAGNOSIS — C20 RECTAL CANCER: ICD-10-CM

## 2023-02-21 DIAGNOSIS — R11.0 NAUSEA: ICD-10-CM

## 2023-02-21 RX ORDER — OXYCODONE HYDROCHLORIDE 10 MG/1
10 TABLET ORAL EVERY 4 HOURS PRN
Qty: 90 TABLET | Refills: 0 | Status: CANCELLED | OUTPATIENT
Start: 2023-02-21

## 2023-02-21 RX ORDER — OXYCODONE HYDROCHLORIDE 10 MG/1
10 TABLET ORAL EVERY 4 HOURS PRN
Qty: 90 TABLET | Refills: 0 | Status: SHIPPED | OUTPATIENT
Start: 2023-02-21 | End: 2023-03-23 | Stop reason: SDUPTHER

## 2023-02-21 RX ORDER — PROMETHAZINE HYDROCHLORIDE 25 MG/1
25 TABLET ORAL EVERY 4 HOURS
Qty: 60 TABLET | Refills: 4 | Status: CANCELLED | OUTPATIENT
Start: 2023-02-21

## 2023-02-21 RX ORDER — PROMETHAZINE HYDROCHLORIDE 25 MG/1
25 TABLET ORAL EVERY 4 HOURS
Qty: 60 TABLET | Refills: 4 | Status: SHIPPED | OUTPATIENT
Start: 2023-02-21 | End: 2023-08-21 | Stop reason: SDUPTHER

## 2023-03-13 ENCOUNTER — PATIENT MESSAGE (OUTPATIENT)
Dept: HEMATOLOGY/ONCOLOGY | Facility: CLINIC | Age: 58
End: 2023-03-13
Payer: COMMERCIAL

## 2023-03-13 ENCOUNTER — LAB VISIT (OUTPATIENT)
Dept: LAB | Facility: HOSPITAL | Age: 58
End: 2023-03-13
Attending: INTERNAL MEDICINE
Payer: COMMERCIAL

## 2023-03-13 DIAGNOSIS — C20 RECTAL CANCER: ICD-10-CM

## 2023-03-13 LAB
ALBUMIN SERPL BCP-MCNC: 4 G/DL (ref 3.5–5.2)
ALP SERPL-CCNC: 74 U/L (ref 55–135)
ALT SERPL W/O P-5'-P-CCNC: 14 U/L (ref 10–44)
ANION GAP SERPL CALC-SCNC: 12 MMOL/L (ref 8–16)
AST SERPL-CCNC: 12 U/L (ref 10–40)
BASOPHILS # BLD AUTO: 0.06 K/UL (ref 0–0.2)
BASOPHILS NFR BLD: 0.8 % (ref 0–1.9)
BILIRUB SERPL-MCNC: 0.4 MG/DL (ref 0.1–1)
BUN SERPL-MCNC: 9 MG/DL (ref 6–20)
CALCIUM SERPL-MCNC: 9.3 MG/DL (ref 8.7–10.5)
CEA SERPL-MCNC: 1.9 NG/ML (ref 0–5)
CHLORIDE SERPL-SCNC: 108 MMOL/L (ref 95–110)
CO2 SERPL-SCNC: 23 MMOL/L (ref 23–29)
CREAT SERPL-MCNC: 1.1 MG/DL (ref 0.5–1.4)
DIFFERENTIAL METHOD: NORMAL
EOSINOPHIL # BLD AUTO: 0.3 K/UL (ref 0–0.5)
EOSINOPHIL NFR BLD: 4 % (ref 0–8)
ERYTHROCYTE [DISTWIDTH] IN BLOOD BY AUTOMATED COUNT: 12.8 % (ref 11.5–14.5)
EST. GFR  (NO RACE VARIABLE): >60 ML/MIN/1.73 M^2
GLUCOSE SERPL-MCNC: 126 MG/DL (ref 70–110)
HCT VFR BLD AUTO: 46.9 % (ref 40–54)
HGB BLD-MCNC: 16.2 G/DL (ref 14–18)
IMM GRANULOCYTES # BLD AUTO: 0.03 K/UL (ref 0–0.04)
IMM GRANULOCYTES NFR BLD AUTO: 0.4 % (ref 0–0.5)
LYMPHOCYTES # BLD AUTO: 2.6 K/UL (ref 1–4.8)
LYMPHOCYTES NFR BLD: 34.2 % (ref 18–48)
MCH RBC QN AUTO: 29.8 PG (ref 27–31)
MCHC RBC AUTO-ENTMCNC: 34.5 G/DL (ref 32–36)
MCV RBC AUTO: 86 FL (ref 82–98)
MONOCYTES # BLD AUTO: 0.4 K/UL (ref 0.3–1)
MONOCYTES NFR BLD: 4.6 % (ref 4–15)
NEUTROPHILS # BLD AUTO: 4.2 K/UL (ref 1.8–7.7)
NEUTROPHILS NFR BLD: 56 % (ref 38–73)
NRBC BLD-RTO: 0 /100 WBC
PLATELET # BLD AUTO: 267 K/UL (ref 150–450)
PMV BLD AUTO: 9.7 FL (ref 9.2–12.9)
POTASSIUM SERPL-SCNC: 4.7 MMOL/L (ref 3.5–5.1)
PROT SERPL-MCNC: 7.8 G/DL (ref 6–8.4)
RBC # BLD AUTO: 5.44 M/UL (ref 4.6–6.2)
SODIUM SERPL-SCNC: 143 MMOL/L (ref 136–145)
WBC # BLD AUTO: 7.57 K/UL (ref 3.9–12.7)

## 2023-03-13 PROCEDURE — 36415 COLL VENOUS BLD VENIPUNCTURE: CPT | Performed by: INTERNAL MEDICINE

## 2023-03-13 PROCEDURE — 82378 CARCINOEMBRYONIC ANTIGEN: CPT | Performed by: INTERNAL MEDICINE

## 2023-03-13 PROCEDURE — 85025 COMPLETE CBC W/AUTO DIFF WBC: CPT | Performed by: INTERNAL MEDICINE

## 2023-03-13 PROCEDURE — 80053 COMPREHEN METABOLIC PANEL: CPT | Performed by: INTERNAL MEDICINE

## 2023-03-23 ENCOUNTER — PATIENT MESSAGE (OUTPATIENT)
Dept: HEMATOLOGY/ONCOLOGY | Facility: CLINIC | Age: 58
End: 2023-03-23
Payer: COMMERCIAL

## 2023-03-23 DIAGNOSIS — C20 RECTAL CANCER: ICD-10-CM

## 2023-03-23 RX ORDER — OXYCODONE HYDROCHLORIDE 10 MG/1
10 TABLET ORAL EVERY 4 HOURS PRN
Qty: 90 TABLET | Refills: 0 | Status: SHIPPED | OUTPATIENT
Start: 2023-03-23 | End: 2023-04-19 | Stop reason: SDUPTHER

## 2023-04-19 DIAGNOSIS — C20 RECTAL CANCER: ICD-10-CM

## 2023-04-19 RX ORDER — OXYCODONE HYDROCHLORIDE 10 MG/1
10 TABLET ORAL EVERY 4 HOURS PRN
Qty: 90 TABLET | Refills: 0 | Status: SHIPPED | OUTPATIENT
Start: 2023-04-19 | End: 2023-05-18 | Stop reason: SDUPTHER

## 2023-05-18 DIAGNOSIS — C20 RECTAL CANCER: ICD-10-CM

## 2023-05-18 RX ORDER — OXYCODONE HYDROCHLORIDE 10 MG/1
10 TABLET ORAL EVERY 4 HOURS PRN
Qty: 90 TABLET | Refills: 0 | Status: SHIPPED | OUTPATIENT
Start: 2023-05-18 | End: 2023-06-26 | Stop reason: SDUPTHER

## 2023-05-19 ENCOUNTER — TELEPHONE (OUTPATIENT)
Dept: PHARMACY | Facility: CLINIC | Age: 58
End: 2023-05-19
Payer: COMMERCIAL

## 2023-05-19 NOTE — TELEPHONE ENCOUNTER
DOCUMENTATION ONLY  oxyCODONE HCl 10mg (90 tablets per 15 days)  Approval date: 5/18/2023 to 5/17/2024   Case ID# [id number]

## 2023-06-06 ENCOUNTER — PATIENT MESSAGE (OUTPATIENT)
Dept: SURGERY | Facility: CLINIC | Age: 58
End: 2023-06-06
Payer: COMMERCIAL

## 2023-06-17 DIAGNOSIS — C20 RECTAL CANCER: ICD-10-CM

## 2023-06-19 DIAGNOSIS — C20 RECTAL CANCER: Primary | ICD-10-CM

## 2023-06-19 RX ORDER — OXYCODONE HYDROCHLORIDE 10 MG/1
10 TABLET ORAL EVERY 4 HOURS PRN
Qty: 90 TABLET | Refills: 0 | OUTPATIENT
Start: 2023-06-19

## 2023-06-21 ENCOUNTER — LAB VISIT (OUTPATIENT)
Dept: LAB | Facility: HOSPITAL | Age: 58
End: 2023-06-21
Attending: INTERNAL MEDICINE
Payer: COMMERCIAL

## 2023-06-21 DIAGNOSIS — C20 RECTAL CANCER: ICD-10-CM

## 2023-06-21 LAB
ALBUMIN SERPL BCP-MCNC: 4 G/DL (ref 3.5–5.2)
ALP SERPL-CCNC: 76 U/L (ref 55–135)
ALT SERPL W/O P-5'-P-CCNC: 14 U/L (ref 10–44)
ANION GAP SERPL CALC-SCNC: 9 MMOL/L (ref 8–16)
AST SERPL-CCNC: 16 U/L (ref 10–40)
BASOPHILS # BLD AUTO: 0.07 K/UL (ref 0–0.2)
BASOPHILS NFR BLD: 0.8 % (ref 0–1.9)
BILIRUB SERPL-MCNC: 0.4 MG/DL (ref 0.1–1)
BUN SERPL-MCNC: 11 MG/DL (ref 6–20)
CALCIUM SERPL-MCNC: 9.6 MG/DL (ref 8.7–10.5)
CEA SERPL-MCNC: 2 NG/ML (ref 0–5)
CHLORIDE SERPL-SCNC: 107 MMOL/L (ref 95–110)
CO2 SERPL-SCNC: 23 MMOL/L (ref 23–29)
CREAT SERPL-MCNC: 1 MG/DL (ref 0.5–1.4)
DIFFERENTIAL METHOD: NORMAL
EOSINOPHIL # BLD AUTO: 0.3 K/UL (ref 0–0.5)
EOSINOPHIL NFR BLD: 3.7 % (ref 0–8)
ERYTHROCYTE [DISTWIDTH] IN BLOOD BY AUTOMATED COUNT: 12.9 % (ref 11.5–14.5)
EST. GFR  (NO RACE VARIABLE): >60 ML/MIN/1.73 M^2
FERRITIN SERPL-MCNC: 246 NG/ML (ref 20–300)
GLUCOSE SERPL-MCNC: 110 MG/DL (ref 70–110)
HCT VFR BLD AUTO: 45.9 % (ref 40–54)
HGB BLD-MCNC: 15.5 G/DL (ref 14–18)
IMM GRANULOCYTES # BLD AUTO: 0.03 K/UL (ref 0–0.04)
IMM GRANULOCYTES NFR BLD AUTO: 0.3 % (ref 0–0.5)
IRON SERPL-MCNC: 89 UG/DL (ref 45–160)
LDH SERPL L TO P-CCNC: 149 U/L (ref 110–260)
LYMPHOCYTES # BLD AUTO: 2.6 K/UL (ref 1–4.8)
LYMPHOCYTES NFR BLD: 29.5 % (ref 18–48)
MCH RBC QN AUTO: 29.9 PG (ref 27–31)
MCHC RBC AUTO-ENTMCNC: 33.8 G/DL (ref 32–36)
MCV RBC AUTO: 88 FL (ref 82–98)
MONOCYTES # BLD AUTO: 0.4 K/UL (ref 0.3–1)
MONOCYTES NFR BLD: 4.7 % (ref 4–15)
NEUTROPHILS # BLD AUTO: 5.4 K/UL (ref 1.8–7.7)
NEUTROPHILS NFR BLD: 61 % (ref 38–73)
NRBC BLD-RTO: 0 /100 WBC
PLATELET # BLD AUTO: 287 K/UL (ref 150–450)
PMV BLD AUTO: 10 FL (ref 9.2–12.9)
POTASSIUM SERPL-SCNC: 4.4 MMOL/L (ref 3.5–5.1)
PROT SERPL-MCNC: 7.7 G/DL (ref 6–8.4)
RBC # BLD AUTO: 5.19 M/UL (ref 4.6–6.2)
SATURATED IRON: 25 % (ref 20–50)
SODIUM SERPL-SCNC: 139 MMOL/L (ref 136–145)
TOTAL IRON BINDING CAPACITY: 360 UG/DL (ref 250–450)
TRANSFERRIN SERPL-MCNC: 243 MG/DL (ref 200–375)
WBC # BLD AUTO: 8.81 K/UL (ref 3.9–12.7)

## 2023-06-21 PROCEDURE — 85025 COMPLETE CBC W/AUTO DIFF WBC: CPT | Performed by: INTERNAL MEDICINE

## 2023-06-21 PROCEDURE — 36415 COLL VENOUS BLD VENIPUNCTURE: CPT | Mod: PN | Performed by: INTERNAL MEDICINE

## 2023-06-21 PROCEDURE — 83615 LACTATE (LD) (LDH) ENZYME: CPT | Performed by: INTERNAL MEDICINE

## 2023-06-21 PROCEDURE — 84466 ASSAY OF TRANSFERRIN: CPT | Performed by: INTERNAL MEDICINE

## 2023-06-21 PROCEDURE — 82728 ASSAY OF FERRITIN: CPT | Performed by: INTERNAL MEDICINE

## 2023-06-21 PROCEDURE — 80053 COMPREHEN METABOLIC PANEL: CPT | Performed by: INTERNAL MEDICINE

## 2023-06-21 PROCEDURE — 82378 CARCINOEMBRYONIC ANTIGEN: CPT | Performed by: INTERNAL MEDICINE

## 2023-06-26 ENCOUNTER — OFFICE VISIT (OUTPATIENT)
Dept: HEMATOLOGY/ONCOLOGY | Facility: CLINIC | Age: 58
End: 2023-06-26
Payer: COMMERCIAL

## 2023-06-26 DIAGNOSIS — C20 RECTAL CANCER: ICD-10-CM

## 2023-06-26 PROCEDURE — 99214 PR OFFICE/OUTPT VISIT, EST, LEVL IV, 30-39 MIN: ICD-10-PCS | Mod: 95,,, | Performed by: INTERNAL MEDICINE

## 2023-06-26 PROCEDURE — 99214 OFFICE O/P EST MOD 30 MIN: CPT | Mod: 95,,, | Performed by: INTERNAL MEDICINE

## 2023-06-26 RX ORDER — OXYCODONE HYDROCHLORIDE 10 MG/1
10 TABLET ORAL EVERY 8 HOURS PRN
Qty: 90 TABLET | Refills: 0 | Status: SHIPPED | OUTPATIENT
Start: 2023-06-26 | End: 2023-07-19 | Stop reason: SDUPTHER

## 2023-06-26 NOTE — PROGRESS NOTES
The patient location is:  [place], [place]  The chief complaint leading to consultation is:  Follow-up    Visit type: audiovisual    Face to Face time with patient:  Minutes  Thirty minutes of total time spent on the encounter, which includes face to face time and non-face to face time preparing to see the patient (eg, review of tests), Obtaining and/or reviewing separately obtained history, Documenting clinical information in the electronic or other health record, Independently interpreting results (not separately reported) and communicating results to the patient/family/caregiver, or Care coordination (not separately reported).         Each patient to whom he or she provides medical services by telemedicine is:  (1) informed of the relationship between the physician and patient and the respective role of any other health care provider with respect to management of the patient; and (2) notified that he or she may decline to receive medical services by telemedicine and may withdraw from such care at any time.    Notes:       Subjective:      DATE OF VISIT: 6/26/23     ?  Patient ID:?Sukhjinder Presley is a 57 y.o. male.?? MR#: [id number]   ?   REFERRING PROVIDER: No referring provider defined for this encounter.     ? Primary Care Providers:  Drake Elizalde MD, MD (General)     CHIEF COMPLAINT: ?Follow-up???   ?   ONCOLOGIC DIAGNOSIS:  Rectal adenocarcinoma, MSI stable, cT3 N2, stage IIIB, diagnosed 06/06/2019, s/p neoadjuvant chemoradiation with capecitabine and FOLFOXIRI plus Avastin under direction of Dr. Aden, APR, 04/09/2020, ypT3 ypN0  ?   CURRENT TREATMENT:  Surveillance      ?   ONCOLOGIC HISTORY:   ?   Oncology History Overview Note   Entered in error     Rectal cancer   6/24/2019 Initial Diagnosis    Rectal cancer     6/26/2019 Cancer Staged    Staging form: Colon and Rectum, AJCC 8th Edition  - Clinical stage from 6/26/2019: Stage IIIB (cT3, cN2a, cM0) - Signed by Artem Mishra II, MD on 6/26/2019      7/10/2019 - 8/13/2019 Radiation Therapy    Treatment Site Ref. ID Energy Dose/Fx (Gy) #Fx Dose Correction (Gy) Total Dose (Gy) Start Date End Date Elapsed Days   RECTUM Pelvis 6X 2 25 / 25 0 50 7/10/2019 8/13/2019 34          7/10/2019 - 7/10/2019 Chemotherapy    Treatment Summary   Plan Name: OP CAPECITABINE 5 DAYS + RADIOTHERAPY  Treatment Goal: Curative  Status: Inactive  Start Date: [No treatment day found]  End Date: [No treatment day found]  Provider: Song Aden MD  Chemotherapy: [No matching medication found in this treatment plan]     10/9/2019 Tumor Conference      Multidisciplinary Rectal Cancer Conference - Evaluation and Recommendation Summary    10/9/2019  Sukhjinder Presley  [id number]  53 y.o. male    1. Evaluation    C scope on 6/6/19 - non obstructing mass    MRI date: 6/17/19    Tumor Location in Rectum: middle third    Indication of Sphincter Involvement:  Uninvolved    Pretreatment Circumferential Resection Margin (CRM) status:  Threatened Tumor abuts CRM on the left.     Pretreatment (clinical) AJCC Stage: IIIB  Stage I  [] I: T1N0M0  [] I: T2N0M0  Stage II  [] IIA: T3N0M0  [] IIB O6xA2F0  [] IIC: C3zV0D1  Stage III  [] IIIA: T1-2N1M0  [] IIIA: N7K1bU0  [x] IIIB: T3-G6aU5H4  [] IIIB: T2-3N2aM0  [] IIIB: T1-2N2bM0  [] IIIC: S7pJ8zG4  [] IIIC: T3-9bF6yH4  [] IIIC: B7aJ9-8U1   Stage IV  [] IV: Z4-8N8-3F0x-b    CEA level:   Lab Results   Component Value Date    CEA 1.4 09/30/2019       2. Treatment     Neoadjuvant Therapy Recommendation: Long Course Chemoradiotherapy - completed on 8/13    CT scan 7/30 during treatment: Unchanged appearance of large rectal mass, mild curcumferential bladder wall thickening.     MRI rectum 9/24: Large mass with invasion of muscularis and involvement of the mesorectal fat and fascia along the left and posterior aspect of the mass.   Single LN within the mesorectal fat posteriorly measuring 6 mm.   Subcentimeter obturator and external iliac changes LNDs seen bilaterally.      Recommendations:     Complete COLLINS with initiation of FOLFOX.     After follow by DUNCAN + MIRZA.      10/9/2019 - 3/16/2020 Chemotherapy    Treatment Summary   Plan Name: OP FOLFOXIRI Q2W  Treatment Goal: Curative  Status: Inactive  Start Date: 10/9/2019  End Date: 2/5/2020  Provider: Song Aden MD  Chemotherapy: fluorouracil 3,200 mg/m2 = 6,305 mg in sodium chloride 0.9% 253 mL chemo infusion, 3,200 mg/m2 = 6,305 mg, Intravenous, Over 48 hours, 7 of 8 cycles  Administration: 6,305 mg (10/9/2019), 6,270 mg (10/23/2019), 6,210 mg (11/6/2019), 6,305 mg (11/20/2019), 6,145 mg (1/6/2020), 6,145 mg (1/20/2020), 6,240 mg (2/3/2020)  bevacizumab (AVASTIN) 5 mg/kg = 380 mg in sodium chloride 0.9% 100 mL chemo infusion, 5 mg/kg = 380 mg (100 % of original dose 5 mg/kg), Intravenous, Clinic/HOD 1 time, 6 of 6 cycles  Dose modification: 5 mg/kg (original dose 5 mg/kg, Cycle 1, Reason: MD Discretion), 5 mg/kg (original dose 5 mg/kg, Cycle 2)  Administration: 380 mg (10/9/2019), 380 mg (10/23/2019), 370 mg (11/6/2019), 380 mg (11/20/2019), 365 mg (1/6/2020), 360 mg (1/20/2020)  irinotecan (CAMPTOSAR) 165 mg/m2 = 326 mg in sodium chloride 0.9% 266.3 mL chemo infusion, 165 mg/m2 = 326 mg, Intravenous, Clinic/HOD 1 time, 6 of 6 cycles  Administration: 326 mg (10/9/2019), 324 mg (10/23/2019), 320 mg (11/6/2019), 326 mg (11/20/2019), 316 mg (1/6/2020), 316 mg (1/20/2020)  leucovorin calcium 200 mg/m2 = 395 mg in dextrose 5 % 269.75 mL infusion, 200 mg/m2 = 395 mg, Intravenous, Clinic/HOD 1 time, 7 of 8 cycles  Administration: 395 mg (10/9/2019), 390 mg (10/23/2019), 390 mg (11/6/2019), 395 mg (11/20/2019), 385 mg (1/6/2020), 385 mg (1/20/2020), 390 mg (2/3/2020)  oxaliplatin (ELOXATIN) 85 mg/m2 = 167 mg in dextrose 5 % 533.4 mL chemo infusion, 85 mg/m2 = 167 mg, Intravenous, Clinic/HOD 1 time, 7 of 8 cycles  Administration: 167 mg (10/9/2019), 167 mg (10/23/2019), 165 mg (11/6/2019), 167 mg (11/20/2019), 163 mg (1/6/2020), 163 mg  (1/20/2020), 166 mg (2/3/2020)        Cancer Staging   Rectal cancer  - Clinical stage from 6/26/2019: Stage IIIB (cT3, cN2a, cM0) - Signed by Artem Mishra II, MD on 6/26/2019         HPI    He has been doing well in surveillance he continues to be active working full-time.  He does have chronic pain in abdominal pelvic region partially contributed to by ventral and inguinal hernia per patient report uses oxycodone which helps him stay active at least twice per day.  Refill requested of oxycodone today.  No new atypical pain change in bowel or bleeding or unintentional weight loss.  He is overdue for CT scan for financial reasons.    Review of Systems    ?   A comprehensive 14-point review of systems was reviewed with patient and was negative other than as specified above.   ?   PAST MEDICAL HISTORY:   Past Medical History:   Diagnosis Date    Anemia     Arthritis     Cancer     rectal    Renal disorder     kidney stones    ?     PAST SURGICAL HISTORY:   Past Surgical History:   Procedure Laterality Date    ABDOMINOPERINEAL RESECTION OF RECTUM N/A 4/9/2020    Procedure: PROCTECTOMY, ABDOMINOPERINEAL;  Surgeon: Jan New MD;  Location: [facility] OR;  Service: General;  Laterality: N/A;    COLECTOMY, TOTAL N/A 2/24/2022    Procedure: COLECTOMY, TOTAL;  Surgeon: Jan New MD;  Location: [facility] OR;  Service: Colon and Rectal;  Laterality: N/A;    COLON SURGERY      COLONOSCOPY N/A 6/6/2019    Procedure: COLONOSCOPY;  Surgeon: Anjelica Saavedra MD;  Location: [facility] ENDO;  Service: Endoscopy;  Laterality: N/A;    COLONOSCOPY N/A 12/17/2021    Procedure: COLONOSCOPY;  Surgeon: Jan New MD;  Location: [facility] ENDO;  Service: General;  Laterality: N/A;    CREATION OF MUSCLE ROTATIONAL FLAP Left 4/9/2020    Procedure: CREATION, FLAP, MUSCLE ROTATION;  Surgeon: Sebastian Dan MD;  Location: [facility] OR;  Service: Plastics;  Laterality: Left;   VRAM    CYSTOSCOPIC LITHOLAPAXY N/A 2/24/2022    Procedure:  CYSTOLITHOLAPAXY;  Surgeon: uSkhjinder Tucker MD;  Location: [facility] OR;  Service: Urology;  Laterality: N/A;    ESOPHAGOGASTRODUODENOSCOPY N/A 6/6/2019    Procedure: EGD (ESOPHAGOGASTRODUODENOSCOPY);  Surgeon: Anjelica Saavedra MD;  Location: [facility] ENDO;  Service: Endoscopy;  Laterality: N/A;    FLAP GRAFT SURGERY N/A 4/9/2020    Procedure: FLAP GRAFT;  Surgeon: Sebastian Dan MD;  Location: [facility] OR;  Service: Plastics;  Laterality: N/A;  left fasciocutaneous buttocks flap    HERNIA REPAIR  1995    INJECTION OF ANESTHETIC AGENT INTO TISSUE PLANE DEFINED BY TRANSVERSUS ABDOMINIS MUSCLE N/A 2/18/2020    Procedure: BLOCK, TRANSVERSUS ABDOMINIS PLANE;  Surgeon: Jan New MD;  Location: [facility] OR;  Service: General;  Laterality: N/A;    INJECTION OF ANESTHETIC AGENT INTO TISSUE PLANE DEFINED BY TRANSVERSUS ABDOMINIS MUSCLE  2/24/2022    Procedure: BLOCK, TRANSVERSUS ABDOMINIS PLANE;  Surgeon: Jan New MD;  Location: [facility] OR;  Service: Colon and Rectal;;    INSERTION OF TUNNELED CENTRAL VENOUS CATHETER (CVC) WITH SUBCUTANEOUS PORT N/A 10/8/2019    Procedure: [id number];  Surgeon: Jan New MD;  Location: [facility] OR;  Service: General;  Laterality: N/A;    LAPAROSCOPIC COLOSTOMY      MEDIPORT REMOVAL N/A 8/13/2020    Procedure: REMOVAL, CATHETER, CENTRAL VENOUS, TUNNELED, WITH PORT;  Surgeon: Sebastian Dan MD;  Location: [facility] OR;  Service: Plastics;  Laterality: N/A;    TONSILLECTOMY      TRANSURETHRAL RESECTION OF PROSTATE N/A 6/7/2021    Procedure: TURP (TRANSURETHRAL RESECTION OF PROSTATE), CYSTOLITHALOPAXY;  Surgeon: Sukhjinder Tucker MD;  Location: [facility] OR;  Service: Urology;  Laterality: N/A;    WOUND DEBRIDEMENT N/A 8/13/2020    Procedure: DEBRIDEMENT, WOUND;  Surgeon: Sebastian Dan MD;  Location: [facility] OR;  Service: Plastics;  Laterality: N/A;  layered closure      ?   ALLERGIES:   Allergies as of 06/26/2023    (No Known Allergies)      ?   MEDICATIONS:?   No  outpatient medications have been marked as taking for the 6/26/23 encounter (Appointment) with Maritza Lindsey MD.      ?   SOCIAL HISTORY:?   Social History     Tobacco Use    Smoking status: Every Day     Packs/day: 1.00     Years: 35.00     Pack years: 35.00     Types: Cigarettes     Start date: 1/2/1984    Smokeless tobacco: Former     Types: Chew    Tobacco comments:     no smoking after m.n prior to sx   Substance Use Topics    Alcohol use: Yes     Alcohol/week: 46.0 standard drinks     Types: 42 Cans of beer, 4 Shots of liquor per week     Comment: segrams 7, beer daily  hold now prior to surgery      ?      ?   FAMILY HISTORY:   family history includes Coronary artery disease in his brother and maternal grandmother; Intestinal malrotation in his mother; Leukemia in his father; No Known Problems in his sister; Stomach cancer in his maternal grandfather.   ?        Objective:      Physical Exam      ?   There were no vitals filed for this visit.     ECOG:?0   General appearance: Generally well appearing, in no acute distress.   Head, eyes, ears, nose, and throat: moist mucous membranes.   Respiratory:  Normal work of breathing  Psychiatric:  Normal mood and affect.    Limited due to virtual visit  ?   Laboratory:  ?   Lab Results   Component Value Date    WBC 8.81 06/21/2023    HGB 15.5 06/21/2023    HCT 45.9 06/21/2023    MCV 88 06/21/2023     06/21/2023       CMP  Sodium   Date Value Ref Range Status   06/21/2023 139 136 - 145 mmol/L Final     Potassium   Date Value Ref Range Status   06/21/2023 4.4 3.5 - 5.1 mmol/L Final     Chloride   Date Value Ref Range Status   06/21/2023 107 95 - 110 mmol/L Final     CO2   Date Value Ref Range Status   06/21/2023 23 23 - 29 mmol/L Final     Glucose   Date Value Ref Range Status   06/21/2023 110 70 - 110 mg/dL Final     BUN   Date Value Ref Range Status   06/21/2023 11 6 - 20 mg/dL Final     Creatinine   Date Value Ref Range Status   06/21/2023 1.0 0.5 - 1.4  mg/dL Final     Calcium   Date Value Ref Range Status   06/21/2023 9.6 8.7 - 10.5 mg/dL Final     Total Protein   Date Value Ref Range Status   06/21/2023 7.7 6.0 - 8.4 g/dL Final     Albumin   Date Value Ref Range Status   06/21/2023 4.0 3.5 - 5.2 g/dL Final     Total Bilirubin   Date Value Ref Range Status   06/21/2023 0.4 0.1 - 1.0 mg/dL Final     Comment:     For infants and newborns, interpretation of results should be based  on gestational age, weight and in agreement with clinical  observations.    Premature Infant recommended reference ranges:  Up to 24 hours.............<8.0 mg/dL  Up to 48 hours............<12.0 mg/dL  3-5 days..................<15.0 mg/dL  6-29 days.................<15.0 mg/dL       Alkaline Phosphatase   Date Value Ref Range Status   06/21/2023 76 55 - 135 U/L Final     AST   Date Value Ref Range Status   06/21/2023 16 10 - 40 U/L Final     ALT   Date Value Ref Range Status   06/21/2023 14 10 - 44 U/L Final     Anion Gap   Date Value Ref Range Status   06/21/2023 9 8 - 16 mmol/L Final     eGFR   Date Value Ref Range Status   06/21/2023 >60 >60 mL/min/1.73 m^2 Final       No visits with results within 1 Day(s) from this visit.   Latest known visit with results is:   Lab Visit on 06/21/2023   Component Date Value Ref Range Status    Sodium 06/21/2023 139  136 - 145 mmol/L Final    Potassium 06/21/2023 4.4  3.5 - 5.1 mmol/L Final    Chloride 06/21/2023 107  95 - 110 mmol/L Final    CO2 06/21/2023 23  23 - 29 mmol/L Final    Glucose 06/21/2023 110  70 - 110 mg/dL Final    BUN 06/21/2023 11  6 - 20 mg/dL Final    Creatinine 06/21/2023 1.0  0.5 - 1.4 mg/dL Final    Calcium 06/21/2023 9.6  8.7 - 10.5 mg/dL Final    Total Protein 06/21/2023 7.7  6.0 - 8.4 g/dL Final    Albumin 06/21/2023 4.0  3.5 - 5.2 g/dL Final    Total Bilirubin 06/21/2023 0.4  0.1 - 1.0 mg/dL Final    Alkaline Phosphatase 06/21/2023 76  55 - 135 U/L Final    AST 06/21/2023 16  10 - 40 U/L Final    ALT 06/21/2023 14  10 - 44  U/L Final    eGFR 06/21/2023 >60  >60 mL/min/1.73 m^2 Final    Anion Gap 06/21/2023 9  8 - 16 mmol/L Final    WBC 06/21/2023 8.81  3.90 - 12.70 K/uL Final    RBC 06/21/2023 5.19  4.60 - 6.20 M/uL Final    Hemoglobin 06/21/2023 15.5  14.0 - 18.0 g/dL Final    Hematocrit 06/21/2023 45.9  40.0 - 54.0 % Final    MCV 06/21/2023 88  82 - 98 fL Final    MCH 06/21/2023 29.9  27.0 - 31.0 pg Final    MCHC 06/21/2023 33.8  32.0 - 36.0 g/dL Final    RDW 06/21/2023 12.9  11.5 - 14.5 % Final    Platelets 06/21/2023 287  150 - 450 K/uL Final    MPV 06/21/2023 10.0  9.2 - 12.9 fL Final    Immature Granulocytes 06/21/2023 0.3  0.0 - 0.5 % Final    Gran # (ANC) 06/21/2023 5.4  1.8 - 7.7 K/uL Final    Immature Grans (Abs) 06/21/2023 0.03  0.00 - 0.04 K/uL Final    Lymph # 06/21/2023 2.6  1.0 - 4.8 K/uL Final    Mono # 06/21/2023 0.4  0.3 - 1.0 K/uL Final    Eos # 06/21/2023 0.3  0.0 - 0.5 K/uL Final    Baso # 06/21/2023 0.07  0.00 - 0.20 K/uL Final    nRBC 06/21/2023 0  0 /100 WBC Final    Gran % 06/21/2023 61.0  38.0 - 73.0 % Final    Lymph % 06/21/2023 29.5  18.0 - 48.0 % Final    Mono % 06/21/2023 4.7  4.0 - 15.0 % Final    Eosinophil % 06/21/2023 3.7  0.0 - 8.0 % Final    Basophil % 06/21/2023 0.8  0.0 - 1.9 % Final    Differential Method 06/21/2023 Automated   Final    CEA 06/21/2023 2.0  0.0 - 5.0 ng/mL Final    LD 06/21/2023 149  110 - 260 U/L Final    Iron 06/21/2023 89  45 - 160 ug/dL Final    Transferrin 06/21/2023 243  200 - 375 mg/dL Final    TIBC 06/21/2023 360  250 - 450 ug/dL Final    Saturated Iron 06/21/2023 25  20 - 50 % Final    Ferritin 06/21/2023 246  20.0 - 300.0 ng/mL Final      ?   CEA   Date Value Ref Range Status   06/21/2023 2.0 0.0 - 5.0 ng/mL Final     Comment:     CEA Normal Range:  Non-Smokers: 0-3.0 ng/mL  Smokers:     0-5.0 ng/mL    The testing method is a chemiluminescent microparticle immunoassay   manufactured by [employer] and performed on the [employer]   or   Alea system. Values  obtained with different assay manufacturers   for   methods may be different and cannot be used interchangeably.     03/13/2023 1.9 0.0 - 5.0 ng/mL Final     Comment:     CEA Normal Range:  Non-Smokers: 0-3.0 ng/mL  Smokers:     0-5.0 ng/mL    The testing method is a chemiluminescent microparticle immunoassay   manufactured by [employer] and performed on the [employer]   or   [employer] system. Values obtained with different assay manufacturers   for   methods may be different and cannot be used interchangeably.     12/12/2022 <1.7 0.0 - 5.0 ng/mL Final     Comment:     CEA Normal Range:  Non-Smokers: 0-3.0 ng/mL  Smokers:     0-5.0 ng/mL    The testing method is a chemiluminescent microparticle immunoassay   manufactured by [employer] and performed on the [employer]   or   [employer] system. Values obtained with different assay manufacturers   for   methods may be different and cannot be used interchangeably.     09/12/2022 <1.7 0.0 - 5.0 ng/mL Final     Comment:     CEA Normal Range:  Non-Smokers: 0-3.0 ng/mL  Smokers:     0-5.0 ng/mL    The testing method is a chemiluminescent microparticle immunoassay   manufactured by [employer] and performed on the [employer]   or   [employer] system. Values obtained with different assay manufacturers   for   methods may be different and cannot be used interchangeably.     06/13/2022 1.7 0.0 - 5.0 ng/mL Final     Comment:     CEA Normal Range:  Non-Smokers: 0-3.0 ng/mL  Smokers:     0-5.0 ng/mL    The testing method is a chemiluminescent microparticle immunoassay   manufactured by [employer] and performed on the [employer]   or   [employer] system. Values obtained with different assay manufacturers   for   methods may be different and cannot be used interchangeably.     01/03/2022 1.8 0.0 - 5.0 ng/mL Final     Comment:     CEA Normal Range:  Non-Smokers: 0-3.0 ng/mL  Smokers:     0-5.0 ng/mL     09/09/2021 <1.7 0.0 - 5.0 ng/mL Final     Comment:      CEA Normal Range:  Non-Smokers: 0-3.0 ng/mL  Smokers:     0-5.0 ng/mL     03/24/2020 1.7 0.0 - 5.0 ng/mL Final     Comment:     CEA Normal Range:  Non-Smokers: 0-3.0 ng/mL  Smokers:     0-5.0 ng/mL     02/03/2020 3.8 0.0 - 5.0 ng/mL Final     Comment:     CEA Normal Range:  Non-Smokers: 0-3.0 ng/mL  Smokers:     0-5.0 ng/mL     09/30/2019 1.4 0.0 - 5.0 ng/mL Final     Comment:     CEA Normal Range:  Non-Smokers: 0-3.0 ng/mL  Smokers:     0-5.0 ng/mL     07/24/2019 1.8 0.0 - 5.0 ng/mL Final     Comment:     CEA Normal Range:  Non-Smokers: 0-3.0 ng/mL  Smokers:     0-5.0 ng/mL     07/10/2019 2.3 0.0 - 5.0 ng/mL Final     Comment:     CEA Normal Range:  Non-Smokers: 0-3.0 ng/mL  Smokers:     0-5.0 ng/mL     06/11/2019 2.2 0.0 - 5.0 ng/mL Final     Comment:     CEA Normal Range:  Non-Smokers: 0-3.0 ng/mL  Smokers:     0-5.0 ng/mL         Tumor markers   ?   ?   Imaging:  ?    Last 7/2022    Pathology:    Reviewed in Epic  ?   Assessment/Plan:   There are no diagnoses linked to this encounter.     No diagnosis found.          Plan:     Problem List Items Addressed This Visit    None      Rectal adenocarcinoma, MSI stable, cT3 N2, stage IIIB, diagnosed 06/06/2019, s/p neoadjuvant chemoradiation with capecitabine and FOLFOXIRI plus Avastin under direction of Dr. Aden, APR, 04/09/2020, ypT3 ypN0, currently in surveillance.  Repeat colonoscopy December 2021 with adenomatous polyp status post completion colectomy/total colectomy with end ileostomy construction in February of 2022; final pathology with no evidence of malignancy present.  He continues in surveillance.  Lab work including CBC CMP and CEA undetectable without concerning findings on history.  Limited exam due to virtual visit.  He is overdue for CT scan he delayed due to financial reasons but is amenable to scheduling at this time.  Recommend this and follow-up with colorectal surgery for continued surveillance.    (most recent imaging CT chest abdomen pelvis  July 2022).  Continue follow-up with colorectal surgery.  ?       Follow-Up:   Route Chart for Scheduling    Med Onc Chart Routing      Follow up with physician    Follow up with REE 6 months.   Infusion scheduling note    Injection scheduling note    Labs CBC, CMP and CEA   Scheduling:  Preferred lab:  Lab interval:     Imaging CT chest abdomen pelvis   in next couple wks please   Pharmacy appointment    Other referrals            Therapy Plan Information  Flushes  heparin, porcine (PF) 100 unit/mL injection flush 500 Units  500 Units, Intravenous, Every visit  sodium chloride 0.9% flush 10 mL  10 mL, Intravenous, Every visit

## 2023-07-11 ENCOUNTER — HOSPITAL ENCOUNTER (OUTPATIENT)
Dept: RADIOLOGY | Facility: HOSPITAL | Age: 58
Discharge: HOME | End: 2023-07-11
Attending: INTERNAL MEDICINE
Payer: COMMERCIAL

## 2023-07-11 DIAGNOSIS — C20 RECTAL CANCER: ICD-10-CM

## 2023-07-11 PROCEDURE — 74177 CT CHEST ABDOMEN PELVIS WITH CONTRAST (XPD): ICD-10-PCS | Mod: 26,,, | Performed by: RADIOLOGY

## 2023-07-11 PROCEDURE — 71260 CT THORAX DX C+: CPT | Mod: 26,,, | Performed by: RADIOLOGY

## 2023-07-11 PROCEDURE — 74177 CT ABD & PELVIS W/CONTRAST: CPT | Mod: TC,PN

## 2023-07-11 PROCEDURE — 25500020 PHARM REV CODE 255: Mod: PN | Performed by: INTERNAL MEDICINE

## 2023-07-11 PROCEDURE — A9698 NON-RAD CONTRAST MATERIALNOC: HCPCS | Mod: PN | Performed by: INTERNAL MEDICINE

## 2023-07-11 PROCEDURE — 71260 CT CHEST ABDOMEN PELVIS WITH CONTRAST (XPD): ICD-10-PCS | Mod: 26,,, | Performed by: RADIOLOGY

## 2023-07-11 PROCEDURE — 71260 CT THORAX DX C+: CPT | Mod: TC,PN

## 2023-07-11 PROCEDURE — 74177 CT ABD & PELVIS W/CONTRAST: CPT | Mod: 26,,, | Performed by: RADIOLOGY

## 2023-07-11 RX ADMIN — IOHEXOL 75 ML: 350 INJECTION, SOLUTION INTRAVENOUS at 01:07

## 2023-07-11 RX ADMIN — IOHEXOL 1000 ML: 12 SOLUTION ORAL at 12:07

## 2023-07-19 DIAGNOSIS — N13.30 HYDRONEPHROSIS, UNSPECIFIED HYDRONEPHROSIS TYPE: Primary | ICD-10-CM

## 2023-07-19 DIAGNOSIS — C20 RECTAL CANCER: ICD-10-CM

## 2023-07-19 RX ORDER — OXYCODONE HYDROCHLORIDE 10 MG/1
10 TABLET ORAL EVERY 8 HOURS PRN
Qty: 90 TABLET | Refills: 0 | Status: SHIPPED | OUTPATIENT
Start: 2023-07-19 | End: 2023-08-21 | Stop reason: SDUPTHER

## 2023-08-21 DIAGNOSIS — C20 RECTAL CANCER: ICD-10-CM

## 2023-08-21 DIAGNOSIS — R11.0 NAUSEA: ICD-10-CM

## 2023-08-21 RX ORDER — PROMETHAZINE HYDROCHLORIDE 25 MG/1
25 TABLET ORAL EVERY 4 HOURS
Qty: 60 TABLET | Refills: 4 | Status: SHIPPED | OUTPATIENT
Start: 2023-08-21

## 2023-08-23 RX ORDER — OXYCODONE HYDROCHLORIDE 10 MG/1
10 TABLET ORAL EVERY 8 HOURS PRN
Qty: 90 TABLET | Refills: 0 | Status: SHIPPED | OUTPATIENT
Start: 2023-08-23 | End: 2023-09-20 | Stop reason: SDUPTHER

## 2023-08-24 ENCOUNTER — OFFICE VISIT (OUTPATIENT)
Dept: UROLOGY | Facility: CLINIC | Age: 58
End: 2023-08-24
Payer: COMMERCIAL

## 2023-08-24 ENCOUNTER — HOSPITAL ENCOUNTER (OUTPATIENT)
Dept: CARDIOLOGY | Facility: HOSPITAL | Age: 58
Discharge: HOME | End: 2023-08-24
Attending: UROLOGY
Payer: COMMERCIAL

## 2023-08-24 VITALS
BODY MASS INDEX: 23.4 KG/M2 | SYSTOLIC BLOOD PRESSURE: 143 MMHG | RESPIRATION RATE: 18 BRPM | HEART RATE: 94 BPM | DIASTOLIC BLOOD PRESSURE: 83 MMHG | WEIGHT: 172.5 LBS

## 2023-08-24 DIAGNOSIS — Z01.818 PRE-OP TESTING: Primary | ICD-10-CM

## 2023-08-24 DIAGNOSIS — N13.30 HYDRONEPHROSIS, UNSPECIFIED HYDRONEPHROSIS TYPE: ICD-10-CM

## 2023-08-24 DIAGNOSIS — Z01.818 PRE-OP TESTING: ICD-10-CM

## 2023-08-24 PROCEDURE — 1160F PR REVIEW ALL MEDS BY PRESCRIBER/CLIN PHARMACIST DOCUMENTED: ICD-10-PCS | Mod: CPTII,S$GLB,, | Performed by: UROLOGY

## 2023-08-24 PROCEDURE — 93010 EKG 12-LEAD: ICD-10-PCS | Mod: ,,, | Performed by: INTERNAL MEDICINE

## 2023-08-24 PROCEDURE — 1159F PR MEDICATION LIST DOCUMENTED IN MEDICAL RECORD: ICD-10-PCS | Mod: CPTII,S$GLB,, | Performed by: UROLOGY

## 2023-08-24 PROCEDURE — 1159F MED LIST DOCD IN RCRD: CPT | Mod: CPTII,S$GLB,, | Performed by: UROLOGY

## 2023-08-24 PROCEDURE — 99214 OFFICE O/P EST MOD 30 MIN: CPT | Mod: S$GLB,,, | Performed by: UROLOGY

## 2023-08-24 PROCEDURE — 99999 PR PBB SHADOW E&M-EST. PATIENT-LVL V: ICD-10-PCS | Mod: PBBFAC,,, | Performed by: UROLOGY

## 2023-08-24 PROCEDURE — 3079F PR MOST RECENT DIASTOLIC BLOOD PRESSURE 80-89 MM HG: ICD-10-PCS | Mod: CPTII,S$GLB,, | Performed by: UROLOGY

## 2023-08-24 PROCEDURE — 87088 URINE BACTERIA CULTURE: CPT | Performed by: UROLOGY

## 2023-08-24 PROCEDURE — 3008F PR BODY MASS INDEX (BMI) DOCUMENTED: ICD-10-PCS | Mod: CPTII,S$GLB,, | Performed by: UROLOGY

## 2023-08-24 PROCEDURE — 93010 ELECTROCARDIOGRAM REPORT: CPT | Mod: ,,, | Performed by: INTERNAL MEDICINE

## 2023-08-24 PROCEDURE — 1160F RVW MEDS BY RX/DR IN RCRD: CPT | Mod: CPTII,S$GLB,, | Performed by: UROLOGY

## 2023-08-24 PROCEDURE — 99214 PR OFFICE/OUTPT VISIT, EST, LEVL IV, 30-39 MIN: ICD-10-PCS | Mod: S$GLB,,, | Performed by: UROLOGY

## 2023-08-24 PROCEDURE — 3077F SYST BP >= 140 MM HG: CPT | Mod: CPTII,S$GLB,, | Performed by: UROLOGY

## 2023-08-24 PROCEDURE — 3079F DIAST BP 80-89 MM HG: CPT | Mod: CPTII,S$GLB,, | Performed by: UROLOGY

## 2023-08-24 PROCEDURE — 87186 SC STD MICRODIL/AGAR DIL: CPT | Performed by: UROLOGY

## 2023-08-24 PROCEDURE — 3077F PR MOST RECENT SYSTOLIC BLOOD PRESSURE >= 140 MM HG: ICD-10-PCS | Mod: CPTII,S$GLB,, | Performed by: UROLOGY

## 2023-08-24 PROCEDURE — 87086 URINE CULTURE/COLONY COUNT: CPT | Performed by: UROLOGY

## 2023-08-24 PROCEDURE — 3008F BODY MASS INDEX DOCD: CPT | Mod: CPTII,S$GLB,, | Performed by: UROLOGY

## 2023-08-24 PROCEDURE — 99999 PR PBB SHADOW E&M-EST. PATIENT-LVL V: CPT | Mod: PBBFAC,,, | Performed by: UROLOGY

## 2023-08-24 PROCEDURE — 93005 ELECTROCARDIOGRAM TRACING: CPT

## 2023-08-24 PROCEDURE — 87077 CULTURE AEROBIC IDENTIFY: CPT | Performed by: UROLOGY

## 2023-08-24 NOTE — H&P (VIEW-ONLY)
Chief Complaint: voiding issues    HPI:   08/24/2023 - returns today for follow-up, had a CT obtained by Dr. Reyes 10 which showed right-sided hydronephrosis which is new, this also showed some abnormal bladder wall thickening on the right side, patient notes that he has been urinating without difficulty since his TURP, no gross hematuria or dysuria, urine today shows positive nitrites positive leukocyte esterase    10/05/2022 - patient returns today for follow-up, no issues in the interim, was catheterizing the after surgery but not recently, feels like he empties well, no gross hematuria or UTIs    04/05/2022 - patient returns today for f/u, no issues since his surgery, has been healing well, colon path negative for cancer, voiding with a good stream and feels like he empties    02/24/2022 - cystolitholapaxy and completion colectomy    01/05/2022 - patient returns today for follow-up, denies any issues in the interim, voiding with a good stream and emptying his bladder well, denies any gross hematuria or dysuria, had a CT last month which showed a new bladder stone    06/07/2021 - TURP with cystolitholapaxy    05/14/2021 - patient presents today for follow-up, he underwent urodynamics 2 weeks ago which showed a decent bladder contraction with the obstruction, he presents today for discussion of options, still notes urgency but denies any of the dysuria that he presented with initially, denies gross hematuria the    04/21/2021 - patient returns today for follow-up, notes a 3-4 months history of dysuria, urgency, frequency, weak stream, and incomplete bladder emptying, still taking the flomax and finasteride, was previiously intstructed how to perform CIC but has not done this in some time, no GH but states that he 'keeps a bladder infection'  IPSS - 4/1/5/5/5/5/4 = 29 QOL - 6(terrible)    12/21/20: Sildenafil 100 working well enough.  Stream sometimes good sometimes feels he has to strain, most of the time it is  good.  3 cups coffee a day and some sodas.  Retrograde ejaculation.  8/5/20- sildenafil 100mg is working well.  7/1/20: patient normally goes to Dr. Altamirano.  Apparently he has been having ED since his rectal surgery and would like to discuss options.  Recent dx with UTI and started abx today, otherwise voiding well.  Patient states he gets no erections after surgery, he has not tried any forms of medical therapy.  Libido and energy are still present.  6/4/20: PVR was 635 after our last visit and he was instructed in CIC.  Voiding normally he feels and hasn't done CIC in two weeks.  PVR today 105 ml.   5/6/20: Been on flomax since last visit.  Cysto/uroflow normal today.    4/20/20: 53 yo man had colon cancer with resection last week, referred by Dr. New.  Is in retention postoperatively.  No unusual abd/pelvic pain and no exac/rel factors.  No hematuria.  No urolithiasis.  PVR >700 ml.  No  history.      PMH:  Past Medical History:   Diagnosis Date    Anemia     Arthritis     Cancer     rectal    Renal disorder     kidney stones       PSH:  Past Surgical History:   Procedure Laterality Date    ABDOMINOPERINEAL RESECTION OF RECTUM N/A 4/9/2020    Procedure: PROCTECTOMY, ABDOMINOPERINEAL;  Surgeon: Jan New MD;  Location: [facility] OR;  Service: General;  Laterality: N/A;    COLECTOMY, TOTAL N/A 2/24/2022    Procedure: COLECTOMY, TOTAL;  Surgeon: Jan New MD;  Location: [facility] OR;  Service: Colon and Rectal;  Laterality: N/A;    COLON SURGERY      COLONOSCOPY N/A 6/6/2019    Procedure: COLONOSCOPY;  Surgeon: Anjelica Saavedra MD;  Location: [facility] ENDO;  Service: Endoscopy;  Laterality: N/A;    COLONOSCOPY N/A 12/17/2021    Procedure: COLONOSCOPY;  Surgeon: Jan New MD;  Location: [facility] ENDO;  Service: General;  Laterality: N/A;    CREATION OF MUSCLE ROTATIONAL FLAP Left 4/9/2020    Procedure: CREATION, FLAP, MUSCLE ROTATION;  Surgeon: Sebastian Dan MD;  Location: [facility] OR;  Service:  Plastics;  Laterality: Left;   VRAM    CYSTOSCOPIC LITHOLAPAXY N/A 2/24/2022    Procedure: CYSTOLITHOLAPAXY;  Surgeon: Sukhjinder Tucker MD;  Location: [facility] OR;  Service: Urology;  Laterality: N/A;    ESOPHAGOGASTRODUODENOSCOPY N/A 6/6/2019    Procedure: EGD (ESOPHAGOGASTRODUODENOSCOPY);  Surgeon: Anjelica Saavedra MD;  Location: [facility] ENDO;  Service: Endoscopy;  Laterality: N/A;    FLAP GRAFT SURGERY N/A 4/9/2020    Procedure: FLAP GRAFT;  Surgeon: Sebastian Dan MD;  Location: [facility] OR;  Service: Plastics;  Laterality: N/A;  left fasciocutaneous buttocks flap    HERNIA REPAIR  1995    INJECTION OF ANESTHETIC AGENT INTO TISSUE PLANE DEFINED BY TRANSVERSUS ABDOMINIS MUSCLE N/A 2/18/2020    Procedure: BLOCK, TRANSVERSUS ABDOMINIS PLANE;  Surgeon: Jan New MD;  Location: [facility] OR;  Service: General;  Laterality: N/A;    INJECTION OF ANESTHETIC AGENT INTO TISSUE PLANE DEFINED BY TRANSVERSUS ABDOMINIS MUSCLE  2/24/2022    Procedure: BLOCK, TRANSVERSUS ABDOMINIS PLANE;  Surgeon: Jan New MD;  Location: [facility] OR;  Service: Colon and Rectal;;    INSERTION OF TUNNELED CENTRAL VENOUS CATHETER (CVC) WITH SUBCUTANEOUS PORT N/A 10/8/2019    Procedure: [id number];  Surgeon: Jan New MD;  Location: [facility] OR;  Service: General;  Laterality: N/A;    LAPAROSCOPIC COLOSTOMY      MEDIPORT REMOVAL N/A 8/13/2020    Procedure: REMOVAL, CATHETER, CENTRAL VENOUS, TUNNELED, WITH PORT;  Surgeon: Sebastian Dan MD;  Location: [facility] OR;  Service: Plastics;  Laterality: N/A;    TONSILLECTOMY      TRANSURETHRAL RESECTION OF PROSTATE N/A 6/7/2021    Procedure: TURP (TRANSURETHRAL RESECTION OF PROSTATE), CYSTOLITHALOPAXY;  Surgeon: Sukhjinder Tucker MD;  Location: [facility] OR;  Service: Urology;  Laterality: N/A;    WOUND DEBRIDEMENT N/A 8/13/2020    Procedure: DEBRIDEMENT, WOUND;  Surgeon: Sebastian Dan MD;  Location: [facility] OR;  Service: Plastics;  Laterality: N/A;  layered closure       Family  History:  Family History   Problem Relation Age of Onset    Intestinal malrotation Mother     Leukemia Father     No Known Problems Sister     Coronary artery disease Brother     Coronary artery disease Maternal Grandmother     Stomach cancer Maternal Grandfather        Social History:  Social History     Tobacco Use    Smoking status: Every Day     Current packs/day: 1.00     Average packs/day: 1 pack/day for 39.6 years (39.6 ttl pk-yrs)     Types: Cigarettes     Start date: 1/2/1984    Smokeless tobacco: Former     Types: Chew    Tobacco comments:     no smoking after m.n prior to sx   Substance Use Topics    Alcohol use: Yes     Alcohol/week: 46.0 standard drinks of alcohol     Types: 42 Cans of beer, 4 Shots of liquor per week     Comment: segrams 7, beer daily  hold now prior to surgery    Drug use: Never        Review of Systems:  General: No fever, chills  Skin: No rashes  Chest:  Denies cough and sputum production  Heart: Denies chest pain  Resp: Denies dyspnea  Abdomen: Denies diarrhea, abdominal pain, hematemesis, or blood in stool.  Musculoskeletal: No joint stiffness or swelling. Denies back pain.  : see HPI  Neuro: no dizziness or weakness    Allergies:  Patient has no known allergies.    Medications:    Current Outpatient Medications:     acetaminophen (TYLENOL) 325 MG tablet, Take 2 tablets (650 mg total) by mouth every 6 (six) hours as needed., Disp: , Rfl: 0    finasteride (PROSCAR) 5 mg tablet, Take 1 tablet (5 mg total) by mouth once daily., Disp: 30 tablet, Rfl: 11    oxyCODONE (ROXICODONE) 10 mg Tab immediate release tablet, Take 1 tablet (10 mg total) by mouth every 8 (eight) hours as needed for Pain (severe)., Disp: 90 tablet, Rfl: 0    promethazine (PHENERGAN) 25 MG tablet, Take 1 tablet (25 mg total) by mouth every 4 (four) hours., Disp: 60 tablet, Rfl: 4    sildenafiL (VIAGRA) 100 MG tablet, Take 1 tablet (100 mg total) by mouth daily as needed for Erectile Dysfunction., Disp: 20 tablet,  Rfl: 11    tamsulosin (FLOMAX) 0.4 mg Cap, Take 1 capsule (0.4 mg total) by mouth once daily., Disp: 30 capsule, Rfl: 11    Physical Exam:  Vitals:    08/24/23 1139   BP: (!) 143/83   Pulse: 94   Resp: 18     General: awake, alert, cooperative  Head: NC/AT  Ears: external ears normal  Eyes: sclera normal  Lungs: normal inspiration, NAD  Heart: well-perfused  Abdomen: Soft, NT, ND, incisions healed, ostomy healthy  Skin: The skin is warm and dry  Ext: No c/c/e.  Neuro: grossly intact, AOx3    RADIOLOGY:  CT CHEST ABDOMEN PELVIS WITH CONTRAST (XPD)     CLINICAL HISTORY:  Colon cancer, assess treatment response;.     TECHNIQUE:  Low dose axial images, sagittal and coronal reformations were obtained from the thoracic inlet to the pubic symphysis following the IV administration of Omnipaque 350.     COMPARISON:  07/20/2022     FINDINGS:  CT chest:     Mild atelectasis right lower lobe lung.  Heart normal.  Aorta normal in caliber with minimal atherosclerotic calcification.  Negative for adenopathy throughout the chest.     Chest wall soft tissues are normal.  Regional bones unremarkable.     CT abdomen and pelvis:     Punctate calcified granuloma left lobe of the liver.  Normal gallbladder.  Spleen normal in size with multiple punctate calcified granulomas.  Normal pancreas.  Normal adrenal glands.  Normal caliber aorta and iliac vasculature with atherosclerotic calcification.  IVC normal.     Staghorn calculus lower pole left kidney measures up to 3 cm.  No left hydronephrosis.  Left ureter nondilated.     Mild to moderate right hydronephrosis with mild dilatation of the right ureter.  New since prior examination.  Far distal right ureter is not well visualized secondary to scarring within the pelvis.  No discrete obstructing lesion identified.  Findings could be due to a recently passed calculus or could indicate avascular necrosis.  Chronic obstruction secondary to scarring within the pelvis thought less likely.      Stomach and small intestine are normal.  Total colectomy has been performed.  Left lower quadrant ostomy noted.  Stomal hernia contains a short loop of small bowel.  Negative for obstruction.  Negative for inflammatory change.     Mild degenerative changes of the spine.  No suspicious osseous lesions.     Impression:     Status post total colectomy.  Left lower quadrant ileostomy.  Negative for evidence of metastatic disease.     Mild to moderate right hydronephrosis, new since 07/20/2022 with perinephric stranding.  See above discussion.    LABS:  I personally reviewed the following lab values:  Lab Results   Component Value Date    WBC 8.81 06/21/2023    HGB 15.5 06/21/2023    HCT 45.9 06/21/2023     06/21/2023     06/21/2023    K 4.4 06/21/2023     06/21/2023    CREATININE 1.0 06/21/2023    BUN 11 06/21/2023    CO2 23 06/21/2023    TSH 0.907 07/07/2020    PSA 0.45 12/12/2022    INR 1.0 06/11/2019    CHOL 158 06/01/2019    TRIG 194 (H) 06/01/2019    HDL 30 (L) 06/01/2019    ALT 14 06/21/2023    AST 16 06/21/2023     Bladder Scan performed in office:   5/7/20: 600ml  6/4/20:  ml.  04/21/2021 - 13mL  01/05/2022 - 1mL    PSA  6/19: 0.37  12/20: 0.58    Urinalysis obtained in clinic today specific gravity 1.020 pH six moderate blood positive leukocyte esterase positive nitrites    Assessment/Plan:   Sukhjinder Presley is a 57 y.o. male with:    Right hydronephrosis and bladder wall thickening - to OR for cystoscopy and bladder biopsy/possible TURBT    Bladder stone and recurrent UTIs due to BPH - s/p TURP, continue flomax    UTI - urine for culture    Sukhjinder Tucker MD  Urology

## 2023-08-24 NOTE — PROGRESS NOTES
Chief Complaint: voiding issues    HPI:   08/24/2023 - returns today for follow-up, had a CT obtained by Dr. Reyes 10 which showed right-sided hydronephrosis which is new, this also showed some abnormal bladder wall thickening on the right side, patient notes that he has been urinating without difficulty since his TURP, no gross hematuria or dysuria, urine today shows positive nitrites positive leukocyte esterase    10/05/2022 - patient returns today for follow-up, no issues in the interim, was catheterizing the after surgery but not recently, feels like he empties well, no gross hematuria or UTIs    04/05/2022 - patient returns today for f/u, no issues since his surgery, has been healing well, colon path negative for cancer, voiding with a good stream and feels like he empties    02/24/2022 - cystolitholapaxy and completion colectomy    01/05/2022 - patient returns today for follow-up, denies any issues in the interim, voiding with a good stream and emptying his bladder well, denies any gross hematuria or dysuria, had a CT last month which showed a new bladder stone    06/07/2021 - TURP with cystolitholapaxy    05/14/2021 - patient presents today for follow-up, he underwent urodynamics 2 weeks ago which showed a decent bladder contraction with the obstruction, he presents today for discussion of options, still notes urgency but denies any of the dysuria that he presented with initially, denies gross hematuria the    04/21/2021 - patient returns today for follow-up, notes a 3-4 months history of dysuria, urgency, frequency, weak stream, and incomplete bladder emptying, still taking the flomax and finasteride, was previiously intstructed how to perform CIC but has not done this in some time, no GH but states that he 'keeps a bladder infection'  IPSS - 4/1/5/5/5/5/4 = 29 QOL - 6(terrible)    12/21/20: Sildenafil 100 working well enough.  Stream sometimes good sometimes feels he has to strain, most of the time it is  good.  3 cups coffee a day and some sodas.  Retrograde ejaculation.  8/5/20- sildenafil 100mg is working well.  7/1/20: patient normally goes to Dr. Altamirano.  Apparently he has been having ED since his rectal surgery and would like to discuss options.  Recent dx with UTI and started abx today, otherwise voiding well.  Patient states he gets no erections after surgery, he has not tried any forms of medical therapy.  Libido and energy are still present.  6/4/20: PVR was 635 after our last visit and he was instructed in CIC.  Voiding normally he feels and hasn't done CIC in two weeks.  PVR today 105 ml.   5/6/20: Been on flomax since last visit.  Cysto/uroflow normal today.    4/20/20: 55 yo man had colon cancer with resection last week, referred by Dr. New.  Is in retention postoperatively.  No unusual abd/pelvic pain and no exac/rel factors.  No hematuria.  No urolithiasis.  PVR >700 ml.  No  history.      PMH:  Past Medical History:   Diagnosis Date    Anemia     Arthritis     Cancer     rectal    Renal disorder     kidney stones       PSH:  Past Surgical History:   Procedure Laterality Date    ABDOMINOPERINEAL RESECTION OF RECTUM N/A 4/9/2020    Procedure: PROCTECTOMY, ABDOMINOPERINEAL;  Surgeon: Jan New MD;  Location: [facility] OR;  Service: General;  Laterality: N/A;    COLECTOMY, TOTAL N/A 2/24/2022    Procedure: COLECTOMY, TOTAL;  Surgeon: Jan New MD;  Location: [facility] OR;  Service: Colon and Rectal;  Laterality: N/A;    COLON SURGERY      COLONOSCOPY N/A 6/6/2019    Procedure: COLONOSCOPY;  Surgeon: Anjelica Saavedra MD;  Location: [facility] ENDO;  Service: Endoscopy;  Laterality: N/A;    COLONOSCOPY N/A 12/17/2021    Procedure: COLONOSCOPY;  Surgeon: Jan New MD;  Location: [facility] ENDO;  Service: General;  Laterality: N/A;    CREATION OF MUSCLE ROTATIONAL FLAP Left 4/9/2020    Procedure: CREATION, FLAP, MUSCLE ROTATION;  Surgeon: Sebastian Dan MD;  Location: [facility] OR;  Service:  Plastics;  Laterality: Left;   VRAM    CYSTOSCOPIC LITHOLAPAXY N/A 2/24/2022    Procedure: CYSTOLITHOLAPAXY;  Surgeon: Sukhjinder Tucker MD;  Location: [facility] OR;  Service: Urology;  Laterality: N/A;    ESOPHAGOGASTRODUODENOSCOPY N/A 6/6/2019    Procedure: EGD (ESOPHAGOGASTRODUODENOSCOPY);  Surgeon: Anjelica Saavedra MD;  Location: [facility] ENDO;  Service: Endoscopy;  Laterality: N/A;    FLAP GRAFT SURGERY N/A 4/9/2020    Procedure: FLAP GRAFT;  Surgeon: Sebastian Dan MD;  Location: [facility] OR;  Service: Plastics;  Laterality: N/A;  left fasciocutaneous buttocks flap    HERNIA REPAIR  1995    INJECTION OF ANESTHETIC AGENT INTO TISSUE PLANE DEFINED BY TRANSVERSUS ABDOMINIS MUSCLE N/A 2/18/2020    Procedure: BLOCK, TRANSVERSUS ABDOMINIS PLANE;  Surgeon: Jan New MD;  Location: [facility] OR;  Service: General;  Laterality: N/A;    INJECTION OF ANESTHETIC AGENT INTO TISSUE PLANE DEFINED BY TRANSVERSUS ABDOMINIS MUSCLE  2/24/2022    Procedure: BLOCK, TRANSVERSUS ABDOMINIS PLANE;  Surgeon: Jan New MD;  Location: [facility] OR;  Service: Colon and Rectal;;    INSERTION OF TUNNELED CENTRAL VENOUS CATHETER (CVC) WITH SUBCUTANEOUS PORT N/A 10/8/2019    Procedure: [id number]-CATH;  Surgeon: Jan New MD;  Location: [facility] OR;  Service: General;  Laterality: N/A;    LAPAROSCOPIC COLOSTOMY      MEDIPORT REMOVAL N/A 8/13/2020    Procedure: REMOVAL, CATHETER, CENTRAL VENOUS, TUNNELED, WITH PORT;  Surgeon: Sebastian Dan MD;  Location: [facility] OR;  Service: Plastics;  Laterality: N/A;    TONSILLECTOMY      TRANSURETHRAL RESECTION OF PROSTATE N/A 6/7/2021    Procedure: TURP (TRANSURETHRAL RESECTION OF PROSTATE), CYSTOLITHALOPAXY;  Surgeon: Sukhjinder Tucker MD;  Location: [facility] OR;  Service: Urology;  Laterality: N/A;    WOUND DEBRIDEMENT N/A 8/13/2020    Procedure: DEBRIDEMENT, WOUND;  Surgeon: Sebastian Dan MD;  Location: [facility] OR;  Service: Plastics;  Laterality: N/A;  layered closure       Family  History:  Family History   Problem Relation Age of Onset    Intestinal malrotation Mother     Leukemia Father     No Known Problems Sister     Coronary artery disease Brother     Coronary artery disease Maternal Grandmother     Stomach cancer Maternal Grandfather        Social History:  Social History     Tobacco Use    Smoking status: Every Day     Current packs/day: 1.00     Average packs/day: 1 pack/day for 39.6 years (39.6 ttl pk-yrs)     Types: Cigarettes     Start date: 1/2/1984    Smokeless tobacco: Former     Types: Chew    Tobacco comments:     no smoking after m.n prior to sx   Substance Use Topics    Alcohol use: Yes     Alcohol/week: 46.0 standard drinks of alcohol     Types: 42 Cans of beer, 4 Shots of liquor per week     Comment: segrams 7, beer daily  hold now prior to surgery    Drug use: Never        Review of Systems:  General: No fever, chills  Skin: No rashes  Chest:  Denies cough and sputum production  Heart: Denies chest pain  Resp: Denies dyspnea  Abdomen: Denies diarrhea, abdominal pain, hematemesis, or blood in stool.  Musculoskeletal: No joint stiffness or swelling. Denies back pain.  : see HPI  Neuro: no dizziness or weakness    Allergies:  Patient has no known allergies.    Medications:    Current Outpatient Medications:     acetaminophen (TYLENOL) 325 MG tablet, Take 2 tablets (650 mg total) by mouth every 6 (six) hours as needed., Disp: , Rfl: 0    finasteride (PROSCAR) 5 mg tablet, Take 1 tablet (5 mg total) by mouth once daily., Disp: 30 tablet, Rfl: 11    oxyCODONE (ROXICODONE) 10 mg Tab immediate release tablet, Take 1 tablet (10 mg total) by mouth every 8 (eight) hours as needed for Pain (severe)., Disp: 90 tablet, Rfl: 0    promethazine (PHENERGAN) 25 MG tablet, Take 1 tablet (25 mg total) by mouth every 4 (four) hours., Disp: 60 tablet, Rfl: 4    sildenafiL (VIAGRA) 100 MG tablet, Take 1 tablet (100 mg total) by mouth daily as needed for Erectile Dysfunction., Disp: 20 tablet,  Rfl: 11    tamsulosin (FLOMAX) 0.4 mg Cap, Take 1 capsule (0.4 mg total) by mouth once daily., Disp: 30 capsule, Rfl: 11    Physical Exam:  Vitals:    08/24/23 1139   BP: (!) 143/83   Pulse: 94   Resp: 18     General: awake, alert, cooperative  Head: NC/AT  Ears: external ears normal  Eyes: sclera normal  Lungs: normal inspiration, NAD  Heart: well-perfused  Abdomen: Soft, NT, ND, incisions healed, ostomy healthy  Skin: The skin is warm and dry  Ext: No c/c/e.  Neuro: grossly intact, AOx3    RADIOLOGY:  CT CHEST ABDOMEN PELVIS WITH CONTRAST (XPD)     CLINICAL HISTORY:  Colon cancer, assess treatment response;.     TECHNIQUE:  Low dose axial images, sagittal and coronal reformations were obtained from the thoracic inlet to the pubic symphysis following the IV administration of Omnipaque 350.     COMPARISON:  07/20/2022     FINDINGS:  CT chest:     Mild atelectasis right lower lobe lung.  Heart normal.  Aorta normal in caliber with minimal atherosclerotic calcification.  Negative for adenopathy throughout the chest.     Chest wall soft tissues are normal.  Regional bones unremarkable.     CT abdomen and pelvis:     Punctate calcified granuloma left lobe of the liver.  Normal gallbladder.  Spleen normal in size with multiple punctate calcified granulomas.  Normal pancreas.  Normal adrenal glands.  Normal caliber aorta and iliac vasculature with atherosclerotic calcification.  IVC normal.     Staghorn calculus lower pole left kidney measures up to 3 cm.  No left hydronephrosis.  Left ureter nondilated.     Mild to moderate right hydronephrosis with mild dilatation of the right ureter.  New since prior examination.  Far distal right ureter is not well visualized secondary to scarring within the pelvis.  No discrete obstructing lesion identified.  Findings could be due to a recently passed calculus or could indicate avascular necrosis.  Chronic obstruction secondary to scarring within the pelvis thought less likely.      Stomach and small intestine are normal.  Total colectomy has been performed.  Left lower quadrant ostomy noted.  Stomal hernia contains a short loop of small bowel.  Negative for obstruction.  Negative for inflammatory change.     Mild degenerative changes of the spine.  No suspicious osseous lesions.     Impression:     Status post total colectomy.  Left lower quadrant ileostomy.  Negative for evidence of metastatic disease.     Mild to moderate right hydronephrosis, new since 07/20/2022 with perinephric stranding.  See above discussion.    LABS:  I personally reviewed the following lab values:  Lab Results   Component Value Date    WBC 8.81 06/21/2023    HGB 15.5 06/21/2023    HCT 45.9 06/21/2023     06/21/2023     06/21/2023    K 4.4 06/21/2023     06/21/2023    CREATININE 1.0 06/21/2023    BUN 11 06/21/2023    CO2 23 06/21/2023    TSH 0.907 07/07/2020    PSA 0.45 12/12/2022    INR 1.0 06/11/2019    CHOL 158 06/01/2019    TRIG 194 (H) 06/01/2019    HDL 30 (L) 06/01/2019    ALT 14 06/21/2023    AST 16 06/21/2023     Bladder Scan performed in office:   5/7/20: 600ml  6/4/20:  ml.  04/21/2021 - 13mL  01/05/2022 - 1mL    PSA  6/19: 0.37  12/20: 0.58    Urinalysis obtained in clinic today specific gravity 1.020 pH six moderate blood positive leukocyte esterase positive nitrites    Assessment/Plan:   Sukhjinder Presley is a 57 y.o. male with:    Right hydronephrosis and bladder wall thickening - to OR for cystoscopy and bladder biopsy/possible TURBT    Bladder stone and recurrent UTIs due to BPH - s/p TURP, continue flomax    UTI - urine for culture    Sukhjinder Tucker MD  Urology

## 2023-08-25 DIAGNOSIS — N52.39 OTHER POST-PROCEDURAL ERECTILE DYSFUNCTION: ICD-10-CM

## 2023-08-25 RX ORDER — TAMSULOSIN HYDROCHLORIDE 0.4 MG/1
0.4 CAPSULE ORAL DAILY
Qty: 90 CAPSULE | Refills: 3 | Status: SHIPPED | OUTPATIENT
Start: 2023-08-25 | End: 2024-08-24

## 2023-08-26 LAB — BACTERIA UR CULT: ABNORMAL

## 2023-08-28 ENCOUNTER — PATIENT MESSAGE (OUTPATIENT)
Dept: UROLOGY | Facility: CLINIC | Age: 58
End: 2023-08-28
Payer: COMMERCIAL

## 2023-08-28 RX ORDER — SULFAMETHOXAZOLE AND TRIMETHOPRIM 800; 160 MG/1; MG/1
1 TABLET ORAL 2 TIMES DAILY
Qty: 10 TABLET | Refills: 0 | Status: SHIPPED | OUTPATIENT
Start: 2023-08-28 | End: 2023-09-02

## 2023-09-06 ENCOUNTER — TELEPHONE (OUTPATIENT)
Dept: PREADMISSION TESTING | Facility: HOSPITAL | Age: 58
End: 2023-09-06
Payer: COMMERCIAL

## 2023-09-06 NOTE — TELEPHONE ENCOUNTER
Pre op instructions reviewed with Pt,verbalized understanding.    To confirm, Surgery is scheduled on 9/11/23. We will call you late afternoon the business day prior to surgery with your arrival time.    *Please report to the [facility] Lobby (1st Floor) located off of [facility] (2nd Entrance/Building on the left, in front of the flag pole).  Address: [address] [place] [address] [place] [address]        INSTRUCTIONS IMPORTANT!!!  Do Not Eat, Drink, or Smoke after 12 midnight unless instructed otherwise by your Surgeon. OK to brush teeth, no gum, candy or mints!    >>>MEDICATION INSTRUCTIONS<<<: Morning of Surgery, please ONLY take:  None        *Diabetic Patients: If you take diabetic or weight loss medication, Do NOT take morning of surgery unless instructed by Doctor. Metformin to be stopped 24 hrs prior to surgery. Ozempic/ Mounjaro/ Wegovy injections or weight loss medication to be stopped 7 days prior to surgery. DO NOT take long-acting insulin the evening before surgery. Blood sugars will be checked in pre-op by Nurse.    *Patients should HOLD all vitamins, herbal supplements, weight loss medication, aspirin products & NSAIDS 7 days prior to surgery, as these can thin the blood. Ok to take Tylenol.    ____  Avoid Alcoholic beverages 3 days prior to surgery, as it can thin the blood.  ____  NO Acrylic/fake nails or nail polish worn day of surgery (specifically hand/arm & foot surgeries).  ____  NO powder, lotions, deodorants, oils or cream on body.  ____  Remove all jewelry & piercings & foreign objects before arrival & leave at home.  ____  Remove Dentures, Hearing Aids & Contact Lens prior to surgery.  ____  Bring photo ID and insurance information to hospital (Leave Valuables at Home).  ____  If going home the same day, arrange for a ride home. You will not be able to drive for 24 hrs if Anesthesia was used.   ____  Females (ages 11-60): may need to give a urine sample the morning of surgery;  please see Pre op Nurse prior to using the restroom.  ____  Males: Stop ED medications (Viagra, Cialis) 24 hrs prior to surgery.  ____  Wear clean, loose fitting clothing to allow for dressings/ bandages.      Bathing Instructions:    -Shower with anti-bacterial Soap (Hibiclens or Dial) the night before surgery and the morning of.   -Do not use Hibiclens on your face or genitals.   -Apply clean clothes after shower.  -Do not shave your face or body 2 days prior to surgery unless instructed otherwise by your Surgeon.  -Do not shave pubic hair 7 days prior to surgery (gyn pt's).    [facility] Policy:  Only 2 adults allowed in pre op/recovery area during your procedure. You MUST HAVE A RIDE HOME from a responsible adult that you know and trust. Medical Transport, Uber or Lyft can ONLY be used if patient has a responsible adult to accompany them during ride home.    Discharge Instructions: You will receive Post-op/Discharge instructions by your Discharge Nurse prior to going home.   *Prevention of surgical site infections:   -Keep incisions clean and dry.   -Do not soak/submerge incisions in water until completely healed.   -Do not apply lotions, powders, creams, or deodorants to site.   -Always make sure hands are cleaned with antibacterial soap/ alcohol-based  prior to touching the surgical site.        *Signs and symptoms of Infection:               -Redness and pain around the area where you had surgery               -Drainage of cloudy fluid from your surgical wound               -Fever, chills or any flu-like symptoms     >>>Call Surgeon office/on-call Surgeon if you experience any of these signs & symptoms before or after surgery @ [phone]<<<       *If you are running late or have questions the morning of surgery, please call the [facility] Surgery Dept @ [phone].     *Billing questions:  [phone]       Thank you,  -[facility] Pre [facility].  [phone] or  [phone]  M-F 7:30 am-4:00 pm (Closed Major Holidays)

## 2023-09-08 ENCOUNTER — TELEPHONE (OUTPATIENT)
Dept: UROLOGY | Facility: CLINIC | Age: 58
End: 2023-09-08
Payer: COMMERCIAL

## 2023-09-08 ENCOUNTER — TELEPHONE (OUTPATIENT)
Dept: PREADMISSION TESTING | Facility: HOSPITAL | Age: 58
End: 2023-09-08
Payer: COMMERCIAL

## 2023-09-08 NOTE — TELEPHONE ENCOUNTER
Called pt confirmed name and . I let the pt know about his arrival time for his surgery on  since the hospital could not reach him. Told pt his arrival time. Pt voiced  understanding.               ----- Message from Sushma Jimenez RN sent at 2023  2:41 PM CDT -----  Regarding: surgery   Unable to reach Pt to review arrival time for surgery on Monday.  Arrival time-9:00 am.    Thank you,    Sushma RODRIGUEZ

## 2023-09-08 NOTE — TELEPHONE ENCOUNTER
Called and LVM about the following:     Please arrive to [facility] ([facility] at 9:00 am on 9/11/23 for your scheduled procedure.  Address: [address] [place] [address] [place] [address] (2nd Building on left, 1st Floor Lobby)  >>>NO eating or drinking after midnight unless instructed otherwise by your Surgeon<<<

## 2023-09-10 ENCOUNTER — ANESTHESIA EVENT (OUTPATIENT)
Dept: SURGERY | Facility: HOSPITAL | Age: 58
End: 2023-09-10
Payer: COMMERCIAL

## 2023-09-10 NOTE — ANESTHESIA PREPROCEDURE EVALUATION
09/10/2023  Sukhjinder Presley is a 57 y.o., male    Patient Active Problem List   Diagnosis    Smoker    Polycythemia    Rectal bleeding    At risk for coronary artery disease    Rectal cancer    Kidney stone    Iron deficiency anemia due to chronic blood loss    Cachexia    Prostate cancer screening    Colostomy in place    Rectal fistula with localized perforation    Benign prostatic hyperplasia    Urinary tract infection without hematuria    Post-procedural erectile dysfunction    Tobacco dependence syndrome    BPH (benign prostatic hyperplasia)    Alcohol abuse with alcohol-induced disorder    Colonic mass     Past Medical History:   Diagnosis Date    Anemia     Arthritis     Cancer     rectal    Renal disorder     kidney stones     Past Surgical History:   Procedure Laterality Date    ABDOMINOPERINEAL RESECTION OF RECTUM N/A 4/9/2020    Procedure: PROCTECTOMY, ABDOMINOPERINEAL;  Surgeon: Jan New MD;  Location: [facility];  Service: General;  Laterality: N/A;    COLECTOMY, TOTAL N/A 2/24/2022    Procedure: COLECTOMY, TOTAL;  Surgeon: Jan New MD;  Location: [facility];  Service: Colon and Rectal;  Laterality: N/A;    COLON SURGERY      COLONOSCOPY N/A 6/6/2019    Procedure: COLONOSCOPY;  Surgeon: Anjelica Saavedra MD;  Location: [facility];  Service: Endoscopy;  Laterality: N/A;    COLONOSCOPY N/A 12/17/2021    Procedure: COLONOSCOPY;  Surgeon: Jan New MD;  Location: [facility];  Service: General;  Laterality: N/A;    CREATION OF MUSCLE ROTATIONAL FLAP Left 4/9/2020    Procedure: CREATION, FLAP, MUSCLE ROTATION;  Surgeon: Sebastian Dan MD;  Location: [facility] OR;  Service: Plastics;  Laterality: Left;   VRAM    CYSTOSCOPIC LITHOLAPAXY N/A 2/24/2022    Procedure: CYSTOLITHOLAPAXY;  Surgeon: Sukhjinder Tucker MD;  Location: [facility];  Service: Urology;   Laterality: N/A;    ESOPHAGOGASTRODUODENOSCOPY N/A 6/6/2019    Procedure: EGD (ESOPHAGOGASTRODUODENOSCOPY);  Surgeon: Anjelica Saavedra MD;  Location: [facility] ENDO;  Service: Endoscopy;  Laterality: N/A;    FLAP GRAFT SURGERY N/A 4/9/2020    Procedure: FLAP GRAFT;  Surgeon: Sebastian Dan MD;  Location: [facility] OR;  Service: Plastics;  Laterality: N/A;  left fasciocutaneous buttocks flap    HERNIA REPAIR  1995    INJECTION OF ANESTHETIC AGENT INTO TISSUE PLANE DEFINED BY TRANSVERSUS ABDOMINIS MUSCLE N/A 2/18/2020    Procedure: BLOCK, TRANSVERSUS ABDOMINIS PLANE;  Surgeon: Jan New MD;  Location: [facility] OR;  Service: General;  Laterality: N/A;    INJECTION OF ANESTHETIC AGENT INTO TISSUE PLANE DEFINED BY TRANSVERSUS ABDOMINIS MUSCLE  2/24/2022    Procedure: BLOCK, TRANSVERSUS ABDOMINIS PLANE;  Surgeon: Jan New MD;  Location: [facility];  Service: Colon and Rectal;;    INSERTION OF TUNNELED CENTRAL VENOUS CATHETER (CVC) WITH SUBCUTANEOUS PORT N/A 10/8/2019    Procedure: [id number]-M-[id number];  Surgeon: Jan New MD;  Location: [facility] OR;  Service: General;  Laterality: N/A;    LAPAROSCOPIC COLOSTOMY      MEDIPORT REMOVAL N/A 8/13/2020    Procedure: REMOVAL, CATHETER, CENTRAL VENOUS, TUNNELED, WITH PORT;  Surgeon: Sebastian Dan MD;  Location: [facility] OR;  Service: Plastics;  Laterality: N/A;    TONSILLECTOMY      TRANSURETHRAL RESECTION OF PROSTATE N/A 6/7/2021    Procedure: TURP (TRANSURETHRAL RESECTION OF PROSTATE), CYSTOLITHALOPAXY;  Surgeon: Sukhjinder Tucker MD;  Location: [facility] OR;  Service: Urology;  Laterality: N/A;    WOUND DEBRIDEMENT N/A 8/13/2020    Procedure: DEBRIDEMENT, WOUND;  Surgeon: Sebastian Dan MD;  Location: [facility] OR;  Service: Plastics;  Laterality: N/A;  layered closure       Pre-op Assessment    I have reviewed the Patient Summary Reports.     I have reviewed the Nursing Notes. I have reviewed the NPO Status.   I have reviewed the Medications.     Review of  Systems  Anesthesia Hx:  No problems with previous Anesthesia  History of prior surgery of interest to airway management or planning: Previous anesthesia: General   Social:  Smoker, Alcohol Use Alcohol abuse with alcohol-induced disorder   Hematology/Oncology:         -- Anemia: Current/Recent Cancer.   Cardiovascular:  Cardiovascular Normal  ECG has been reviewed. Normal sinus rhythm   Possible Left atrial enlargement   Incomplete right bundle branch block   Borderline Abnormal ECG   When compared with ECG of 21-FEB-2022 13:45,   No significant change was found   Confirmed by HANSEL LOONEY MD ([id number]) on 8/24/2023 6:52:10 PM    Pulmonary:  Pulmonary Normal    Renal/:   renal calculi BPH had a CT obtained by Dr. Reyes 10 which showed right-sided hydronephrosis which is new, this also showed some abnormal bladder wall thickening on the right side   Hepatic/GI:  Hepatic/GI Normal S/p colectomy for mass.   Musculoskeletal:   Arthritis     Neurological:  Neurology Normal    Endocrine:  Endocrine Normal        Physical Exam  General: Well nourished    Airway:  Mallampati: II   Mouth Opening: Normal  TM Distance: Normal  Neck ROM: Normal ROM    Dental:  Intact, Periodontal disease  Multiple missing teeth.  Some in particularly poor condition.  Chest/Lungs:  Clear to auscultation    Heart:  Rate: Normal  Rhythm: Regular Rhythm        Anesthesia Plan  Type of Anesthesia, risks & benefits discussed:    Anesthesia Type: Gen ETT, Gen Supraglottic Airway  Intra-op Monitoring Plan: Standard ASA Monitors  Post Op Pain Control Plan: multimodal analgesia and IV/PO Opioids PRN  Induction:  IV  Airway Plan: Direct, Post-Induction  Informed Consent: Informed consent signed with the Patient and all parties understand the risks and agree with anesthesia plan.  All questions answered.   ASA Score: 2  Day of Surgery Review of History & Physical: H&P Update referred to the surgeon/provider.  Anesthesia Plan Notes: Present Prior to  Hospital Arrival?: No; Placement Date: 02/24/22; Placement Time: 0949; Method of Intubation: Direct laryngoscopy; Inserted by: CRNA; Airway Device: Endotracheal Tube; Mask Ventilation: Not Attempted; Intubated: Postinduction; Blade: Villarreal #2; Airway Device Size: 7.5; Style: Cuffed; Cuff Inflation: Minimal occlusive pressure; Placement Verified By: Auscultation, Capnometry, ETT Condensation; Grade: Grade II; Complicating Factors: None; Intubation Findings: Positive EtCO2, Bilateral breath sounds, Atraumatic/Condition of teeth unchanged;  Depth of Insertion: 23; Securment: Lips; Complications: None; Breath Sounds: Equal Bilateral; Insertion Attempts: 1; Removal Date: 02/24/22;  Removal Time: 1320    Ready For Surgery From Anesthesia Perspective.     .        Chemistry        Component Value Date/Time     08/24/2023 1219    K 4.5 08/24/2023 1219     08/24/2023 1219    CO2 25 08/24/2023 1219    BUN 14 08/24/2023 1219    CREATININE 1.3 08/24/2023 1219    GLU 96 08/24/2023 1219        Component Value Date/Time    CALCIUM 10.2 08/24/2023 1219    ALKPHOS 76 06/21/2023 1150    AST 16 06/21/2023 1150    ALT 14 06/21/2023 1150    BILITOT 0.4 06/21/2023 1150    ESTGFRAFRICA >60 07/20/2022 0956    EGFRNONAA >60 07/20/2022 0956        Lab Results   Component Value Date    WBC 10.48 08/24/2023    HGB 14.7 08/24/2023    HCT 46.8 08/24/2023    MCV 92 08/24/2023     08/24/2023

## 2023-09-11 ENCOUNTER — ANESTHESIA (OUTPATIENT)
Dept: SURGERY | Facility: HOSPITAL | Age: 58
End: 2023-09-11
Payer: COMMERCIAL

## 2023-09-11 ENCOUNTER — HOSPITAL ENCOUNTER (OUTPATIENT)
Facility: HOSPITAL | Age: 58
Discharge: HOME | End: 2023-09-11
Attending: UROLOGY | Admitting: UROLOGY
Payer: COMMERCIAL

## 2023-09-11 ENCOUNTER — PATIENT MESSAGE (OUTPATIENT)
Dept: SURGERY | Facility: HOSPITAL | Age: 58
End: 2023-09-11
Payer: COMMERCIAL

## 2023-09-11 DIAGNOSIS — N13.30 HYDRONEPHROSIS, UNSPECIFIED HYDRONEPHROSIS TYPE: Primary | ICD-10-CM

## 2023-09-11 PROCEDURE — 88305 TISSUE EXAM BY PATHOLOGIST: ICD-10-PCS | Mod: 26,,, | Performed by: STUDENT IN AN ORGANIZED HEALTH CARE EDUCATION/TRAINING PROGRAM

## 2023-09-11 PROCEDURE — 63600175 PHARM REV CODE 636 W HCPCS: Performed by: ANESTHESIOLOGY

## 2023-09-11 PROCEDURE — 63600175 PHARM REV CODE 636 W HCPCS: Performed by: UROLOGY

## 2023-09-11 PROCEDURE — 25000003 PHARM REV CODE 250: Performed by: NURSE ANESTHETIST, CERTIFIED REGISTERED

## 2023-09-11 PROCEDURE — 88305 TISSUE EXAM BY PATHOLOGIST: CPT | Mod: 59 | Performed by: STUDENT IN AN ORGANIZED HEALTH CARE EDUCATION/TRAINING PROGRAM

## 2023-09-11 PROCEDURE — 88305 TISSUE EXAM BY PATHOLOGIST: CPT | Mod: 26,,, | Performed by: STUDENT IN AN ORGANIZED HEALTH CARE EDUCATION/TRAINING PROGRAM

## 2023-09-11 PROCEDURE — 36000706: Performed by: UROLOGY

## 2023-09-11 PROCEDURE — 71000033 HC RECOVERY, INTIAL HOUR: Performed by: UROLOGY

## 2023-09-11 PROCEDURE — 52204 CYSTOSCOPY W/BIOPSY(S): CPT | Mod: 59,,, | Performed by: UROLOGY

## 2023-09-11 PROCEDURE — 27201423 OPTIME MED/SURG SUP & DEVICES STERILE SUPPLY: Performed by: UROLOGY

## 2023-09-11 PROCEDURE — 36000707: Performed by: UROLOGY

## 2023-09-11 PROCEDURE — 37000009 HC ANESTHESIA EA ADD 15 MINS: Performed by: UROLOGY

## 2023-09-11 PROCEDURE — 52351 CYSTOURETERO & OR PYELOSCOPE: CPT | Mod: ,,, | Performed by: UROLOGY

## 2023-09-11 PROCEDURE — 52204 PR CYSTOURETHROSCOPY,BIOPSY: ICD-10-PCS | Mod: 59,,, | Performed by: UROLOGY

## 2023-09-11 PROCEDURE — C1758 CATHETER, URETERAL: HCPCS | Performed by: UROLOGY

## 2023-09-11 PROCEDURE — 52351 PR CYSTO/URETERO/PYELOSCOPY, DX: ICD-10-PCS | Mod: ,,, | Performed by: UROLOGY

## 2023-09-11 PROCEDURE — 25500020 PHARM REV CODE 255: Performed by: UROLOGY

## 2023-09-11 PROCEDURE — 63600175 PHARM REV CODE 636 W HCPCS: Performed by: STUDENT IN AN ORGANIZED HEALTH CARE EDUCATION/TRAINING PROGRAM

## 2023-09-11 PROCEDURE — 63600175 PHARM REV CODE 636 W HCPCS: Performed by: NURSE ANESTHETIST, CERTIFIED REGISTERED

## 2023-09-11 PROCEDURE — 37000008 HC ANESTHESIA 1ST 15 MINUTES: Performed by: UROLOGY

## 2023-09-11 PROCEDURE — C1769 GUIDE WIRE: HCPCS | Performed by: UROLOGY

## 2023-09-11 PROCEDURE — 71000015 HC POSTOP RECOV 1ST HR: Performed by: UROLOGY

## 2023-09-11 RX ORDER — LIDOCAINE HYDROCHLORIDE 20 MG/ML
INJECTION INTRAVENOUS
Status: DISCONTINUED | OUTPATIENT
Start: 2023-09-11 | End: 2023-09-11

## 2023-09-11 RX ORDER — PROPOFOL 10 MG/ML
VIAL (ML) INTRAVENOUS
Status: DISCONTINUED | OUTPATIENT
Start: 2023-09-11 | End: 2023-09-11

## 2023-09-11 RX ORDER — OXYCODONE AND ACETAMINOPHEN 5; 325 MG/1; MG/1
1 TABLET ORAL
Status: DISCONTINUED | OUTPATIENT
Start: 2023-09-11 | End: 2023-09-11 | Stop reason: HOSPADM

## 2023-09-11 RX ORDER — FENTANYL CITRATE 50 UG/ML
INJECTION, SOLUTION INTRAMUSCULAR; INTRAVENOUS
Status: DISCONTINUED | OUTPATIENT
Start: 2023-09-11 | End: 2023-09-11

## 2023-09-11 RX ORDER — KETOROLAC TROMETHAMINE 30 MG/ML
15 INJECTION, SOLUTION INTRAMUSCULAR; INTRAVENOUS EVERY 8 HOURS PRN
Status: DISCONTINUED | OUTPATIENT
Start: 2023-09-11 | End: 2023-09-11 | Stop reason: HOSPADM

## 2023-09-11 RX ORDER — MEPERIDINE HYDROCHLORIDE 25 MG/ML
12.5 INJECTION INTRAMUSCULAR; INTRAVENOUS; SUBCUTANEOUS ONCE
Status: COMPLETED | OUTPATIENT
Start: 2023-09-11 | End: 2023-09-11

## 2023-09-11 RX ORDER — OXYCODONE HYDROCHLORIDE 5 MG/1
5 TABLET ORAL EVERY 4 HOURS PRN
Qty: 15 TABLET | Refills: 0 | Status: ON HOLD | OUTPATIENT
Start: 2023-09-11 | End: 2023-10-06 | Stop reason: SDUPTHER

## 2023-09-11 RX ORDER — SUCCINYLCHOLINE CHLORIDE 20 MG/ML
INJECTION INTRAMUSCULAR; INTRAVENOUS
Status: DISCONTINUED | OUTPATIENT
Start: 2023-09-11 | End: 2023-09-11

## 2023-09-11 RX ORDER — NITROFURANTOIN 25; 75 MG/1; MG/1
100 CAPSULE ORAL 2 TIMES DAILY
Qty: 20 CAPSULE | Refills: 0 | Status: SHIPPED | OUTPATIENT
Start: 2023-09-11 | End: 2023-09-21

## 2023-09-11 RX ORDER — CEFAZOLIN SODIUM 2 G/50ML
2 SOLUTION INTRAVENOUS
Status: COMPLETED | OUTPATIENT
Start: 2023-09-11 | End: 2023-09-11

## 2023-09-11 RX ORDER — ONDANSETRON 2 MG/ML
INJECTION INTRAMUSCULAR; INTRAVENOUS
Status: DISCONTINUED | OUTPATIENT
Start: 2023-09-11 | End: 2023-09-11

## 2023-09-11 RX ORDER — HYDROMORPHONE HYDROCHLORIDE 2 MG/ML
0.2 INJECTION, SOLUTION INTRAMUSCULAR; INTRAVENOUS; SUBCUTANEOUS EVERY 5 MIN PRN
Status: DISCONTINUED | OUTPATIENT
Start: 2023-09-11 | End: 2023-09-11 | Stop reason: HOSPADM

## 2023-09-11 RX ORDER — ROCURONIUM BROMIDE 10 MG/ML
INJECTION, SOLUTION INTRAVENOUS
Status: DISCONTINUED | OUTPATIENT
Start: 2023-09-11 | End: 2023-09-11

## 2023-09-11 RX ORDER — DEXAMETHASONE SODIUM PHOSPHATE 4 MG/ML
INJECTION, SOLUTION INTRA-ARTICULAR; INTRALESIONAL; INTRAMUSCULAR; INTRAVENOUS; SOFT TISSUE
Status: DISCONTINUED | OUTPATIENT
Start: 2023-09-11 | End: 2023-09-11

## 2023-09-11 RX ORDER — ONDANSETRON 2 MG/ML
4 INJECTION INTRAMUSCULAR; INTRAVENOUS DAILY PRN
Status: DISCONTINUED | OUTPATIENT
Start: 2023-09-11 | End: 2023-09-11 | Stop reason: HOSPADM

## 2023-09-11 RX ADMIN — ONDANSETRON 4 MG: 2 INJECTION INTRAMUSCULAR; INTRAVENOUS at 11:09

## 2023-09-11 RX ADMIN — CEFAZOLIN SODIUM 2 G: 2 SOLUTION INTRAVENOUS at 11:09

## 2023-09-11 RX ADMIN — SODIUM CHLORIDE, SODIUM LACTATE, POTASSIUM CHLORIDE, AND CALCIUM CHLORIDE: .6; .31; .03; .02 INJECTION, SOLUTION INTRAVENOUS at 11:09

## 2023-09-11 RX ADMIN — PROPOFOL 150 MG: 10 INJECTION, EMULSION INTRAVENOUS at 11:09

## 2023-09-11 RX ADMIN — LIDOCAINE HYDROCHLORIDE 50 MG: 20 INJECTION INTRAVENOUS at 11:09

## 2023-09-11 RX ADMIN — ONDANSETRON 4 MG: 2 INJECTION INTRAMUSCULAR; INTRAVENOUS at 12:09

## 2023-09-11 RX ADMIN — HYDROMORPHONE HYDROCHLORIDE 0.2 MG: 2 INJECTION INTRAMUSCULAR; INTRAVENOUS; SUBCUTANEOUS at 12:09

## 2023-09-11 RX ADMIN — FENTANYL CITRATE 25 MCG: 50 INJECTION, SOLUTION INTRAMUSCULAR; INTRAVENOUS at 11:09

## 2023-09-11 RX ADMIN — FENTANYL CITRATE 50 MCG: 50 INJECTION, SOLUTION INTRAMUSCULAR; INTRAVENOUS at 11:09

## 2023-09-11 RX ADMIN — SUCCINYLCHOLINE CHLORIDE 120 MG: 20 INJECTION, SOLUTION INTRAMUSCULAR; INTRAVENOUS; PARENTERAL at 11:09

## 2023-09-11 RX ADMIN — ROCURONIUM BROMIDE 5 MG: 10 INJECTION, SOLUTION INTRAVENOUS at 11:09

## 2023-09-11 RX ADMIN — MEPERIDINE HYDROCHLORIDE 12.5 MG: 25 INJECTION INTRAMUSCULAR; INTRAVENOUS; SUBCUTANEOUS at 12:09

## 2023-09-11 RX ADMIN — DEXAMETHASONE SODIUM PHOSPHATE 4 MG: 4 INJECTION, SOLUTION INTRA-ARTICULAR; INTRALESIONAL; INTRAMUSCULAR; INTRAVENOUS; SOFT TISSUE at 11:09

## 2023-09-11 RX ADMIN — FENTANYL CITRATE 25 MCG: 50 INJECTION, SOLUTION INTRAMUSCULAR; INTRAVENOUS at 12:09

## 2023-09-11 NOTE — INTERVAL H&P NOTE
The patient has been examined and the H&P has been reviewed:    I concur with the findings and no changes have occurred since H&P was written.    Anesthesia/Surgery risks, benefits and alternative options discussed and understood by patient/family.    There are no hospital problems to display for this patient.

## 2023-09-11 NOTE — TRANSFER OF CARE
Anesthesia Transfer of Care Note    Patient: Sukhjinder Presley    Procedure(s) Performed: Procedure(s) (LRB):  CYSTOSCOPY, WITH BLADDER BIOPSY, WITH FULGURATION IF INDICATED (N/A)  PYELOGRAM, RETROGRADE (N/A)    Patient location: PACU    Anesthesia Type: general    Transport from OR: Transported from OR on room air with adequate spontaneous ventilation    Post pain: adequate analgesia    Post assessment: no apparent anesthetic complications    Post vital signs: stable    Level of consciousness: responds to stimulation    Nausea/Vomiting: no nausea/vomiting    Complications: none    Transfer of care protocol was followed      Last vitals:   Visit Vitals  BP (!) 150/88   Pulse 67   Temp 36.6 °C (97.9 °F) (Temporal)   Resp 16   Ht 6' (1.829 m)   Wt 78.4 kg (172 lb 11.7 oz)   SpO2 100%   BMI 23.43 kg/m²

## 2023-09-11 NOTE — DISCHARGE SUMMARY
[facility] (Hospital)  Discharge Note  Short Stay    Procedure(s) (LRB):  CYSTOSCOPY, WITH BLADDER BIOPSY, WITH FULGURATION IF INDICATED (N/A)  PYELOGRAM, RETROGRADE (N/A)      OUTCOME: Patient tolerated treatment/procedure well without complication and is now ready for discharge.    DISPOSITION: Home or Self Care    FINAL DIAGNOSIS:  Hydronephrosis    FOLLOWUP: In clinic    DISCHARGE INSTRUCTIONS:    Discharge Procedure Orders   Diet Adult Regular     Notify your health care provider if you experience any of the following:  temperature >100.4     Notify your health care provider if you experience any of the following:  persistent nausea and vomiting or diarrhea     Notify your health care provider if you experience any of the following:  severe uncontrolled pain     Notify your health care provider if you experience any of the following:  difficulty breathing or increased cough     Notify your health care provider if you experience any of the following:  severe persistent headache     Notify your health care provider if you experience any of the following:  worsening rash     Notify your health care provider if you experience any of the following:  persistent dizziness, light-headedness, or visual disturbances     Notify your health care provider if you experience any of the following:  increased confusion or weakness     Activity as tolerated         Clinical Reference Documents Added to Patient Instructions         Document    NITROFURANTOIN, ADULT (ENGLISH)    OXYCODONE, ADULT (ENGLISH)    TRANSURETHRAL RESECTION OF BLADDER TUMOR DISCHARGE INSTRUCTIONS (ENGLISH)

## 2023-09-11 NOTE — OP NOTE
[facility]  Operative Note    Date: 09/11/2023    Pre-Op Diagnosis:  Asymmetric right-sided bladder wall thickening on CT and right hydronephrosis    Post-Op Diagnosis: same    Procedure(s) Performed:   Bladder biopsy, urethral dilation, right ureteroscopy, right retrograde ureteral pyelogram with interpretation less 1 hour, right ureteral stent placement    Specimen(s):  Bladder biopsy for permanent, right ureteral orifice for permanent    Surgeon: Sukhjinder Tucker MD    Anesthesia: General endotracheal anesthesia    Indications: Sukhjinder Presley is a 57 y.o. male with asymmetric right-sided bladder wall thickening on CT as well as right-sided hydronephrosis concerning for possible malignancy.  He presents today for further investigation.    Findings:   No obvious tumor present within the bladder, bladder biopsies taken, resection over the UO revealed no tumor and normal UO, right ureteroscopy negative for tumor, only revealing slightly stenotic right distal ureter    Right retrograde ureteral pyelogram with interpretation less 1 hour - no filling defects, tortuosity noted with moderate right-sided hydronephrosis, mild calyceal blunting    Estimated Blood Loss: min    Drains: 18 Fr liz catheter    Procedure in detail:  After the risks, benefits and possible complications of the procedure were explained, consents were obtained. The patient was taken to the operating room and placed under anesthesia. Pre-operative antibiotics were administered 30 minutes prior to expected start time. The patient was placed in the dorsal lithotomy position and prepped and draped in the normal and sterile fashion. Time out was performed.      A rigid cystoscope in a 22 Fr sheath was introduced into the bladder per urethra.  There was a bulbar urethral stricture noted which would not accommodate the scope.  A super stiff wire was inserted through the scope and this was passed into the bladder.  This was dilated  with Jesus.  A resectoscope with visual obturator was inserted and this passed easily through this area of stricture.  Complete cystoscopy was performed, there was no evidence of tumor.  Random biopsies were taken from the posterior aspect of the bladder with the resection loop.  Fulguration obtained good hemostasis.  Resection over the right UO revealed no obvious tumor but there was no efflux from the right UO either.  The resectoscope was exchanged for the cystoscope, and a [place] catheter was inserted and a retrograde was obtained.  See above for findings.  The scope was removed and a dual-lumen catheter was inserted to gently dilate the UO.  Thereafter an eight [place] semi-rigid ureteral scope was inserted and the right UO was cannulated and advanced proximally.  Mild stenosis of the right distal ureter was noted but there was no tumor.  This was advanced as far proximally as the patient's anatomy would allow which was just distal to the iliacs.  The scope was then removed.  The wire was backloaded through the cystoscope and a six [place] by 26 cm JJ ureteral stent without strings was inserted.  Good curls were noted both proximally and distally on fluoroscopy at the end of the case.      An 18 [place] Co[place] catheter was inserted with 10 cc sterile water in the balloon.    The patient tolerated the procedure well and was transferred to recovery in stable condition.    Disposition: d/c home, call with path    Sukhjinder Tucker MD

## 2023-09-11 NOTE — ANESTHESIA POSTPROCEDURE EVALUATION
Anesthesia Post Evaluation    Patient: Sukhjinder Presley    Procedure(s) Performed: Procedure(s) (LRB):  CYSTOSCOPY, WITH BLADDER BIOPSY, WITH FULGURATION IF INDICATED (N/A)  PYELOGRAM, RETROGRADE (N/A)    Final Anesthesia Type: general      Patient location during evaluation: PACU  Patient participation: Yes- Able to Participate  Level of consciousness: awake and alert  Post-procedure vital signs: reviewed and stable  Pain management: adequate  Airway patency: patent  GABRIEL mitigation strategies: Verification of full reversal of neuromuscular block  PONV status at discharge: No PONV  Anesthetic complications: no      Cardiovascular status: hemodynamically stable  Respiratory status: spontaneous ventilation  Hydration status: euvolemic  Follow-up not needed.          Vitals Value Taken Time   /78 09/11/23 1314   Temp 36.2 °C (97.2 °F) 09/11/23 1225   Pulse 79 09/11/23 1318   Resp 11 09/11/23 1317   SpO2 95 % 09/11/23 1318   Vitals shown include unvalidated device data.      Event Time   Out of Recovery 13:19:09         Pain/Stacy Score: Pain Rating Prior to Med Admin: 8 (9/11/2023 12:55 PM)  Stacy Score: 10 (9/11/2023  1:15 PM)

## 2023-09-12 VITALS
DIASTOLIC BLOOD PRESSURE: 85 MMHG | HEIGHT: 72 IN | TEMPERATURE: 97 F | SYSTOLIC BLOOD PRESSURE: 144 MMHG | HEART RATE: 74 BPM | OXYGEN SATURATION: 94 % | WEIGHT: 172.75 LBS | BODY MASS INDEX: 23.4 KG/M2 | RESPIRATION RATE: 11 BRPM

## 2023-09-14 ENCOUNTER — PATIENT MESSAGE (OUTPATIENT)
Dept: UROLOGY | Facility: CLINIC | Age: 58
End: 2023-09-14
Payer: COMMERCIAL

## 2023-09-15 LAB
FINAL PATHOLOGIC DIAGNOSIS: NORMAL
GROSS: NORMAL
Lab: NORMAL

## 2023-09-19 ENCOUNTER — CLINICAL SUPPORT (OUTPATIENT)
Dept: UROLOGY | Facility: CLINIC | Age: 58
End: 2023-09-19
Payer: COMMERCIAL

## 2023-09-19 DIAGNOSIS — N40.1 BENIGN PROSTATIC HYPERPLASIA WITH INCOMPLETE BLADDER EMPTYING: Primary | ICD-10-CM

## 2023-09-19 DIAGNOSIS — R39.14 BENIGN PROSTATIC HYPERPLASIA WITH INCOMPLETE BLADDER EMPTYING: Primary | ICD-10-CM

## 2023-09-19 NOTE — PROGRESS NOTES
Patient presents for voiding trial per MD.  Instilled 150 mL of NS in bladder  10 mL removed from balloon, catheter removed, tip intact. Patient instructed to empty bladder. PT put out 200ml. Bladder scan performed 6 mL left in bladder. Patient tolerated well. Patient instructed to drink plenty of fluids and notify clinic if patient has no urine output. Dr Tucker notified. Pt will follow up at next schedule appointment

## 2023-09-20 DIAGNOSIS — C20 RECTAL CANCER: ICD-10-CM

## 2023-09-20 DIAGNOSIS — N52.39 OTHER POST-PROCEDURAL ERECTILE DYSFUNCTION: ICD-10-CM

## 2023-09-20 RX ORDER — OXYCODONE HYDROCHLORIDE 10 MG/1
10 TABLET ORAL EVERY 8 HOURS PRN
Qty: 90 TABLET | Refills: 0 | Status: SHIPPED | OUTPATIENT
Start: 2023-09-20 | End: 2023-10-21 | Stop reason: SDUPTHER

## 2023-09-20 RX ORDER — SILDENAFIL 100 MG/1
100 TABLET, FILM COATED ORAL DAILY PRN
Qty: 20 TABLET | Refills: 11 | Status: SHIPPED | OUTPATIENT
Start: 2023-09-20

## 2023-09-25 ENCOUNTER — OFFICE VISIT (OUTPATIENT)
Dept: SURGERY | Facility: CLINIC | Age: 58
End: 2023-09-25
Payer: COMMERCIAL

## 2023-09-25 ENCOUNTER — LAB VISIT (OUTPATIENT)
Dept: LAB | Facility: HOSPITAL | Age: 58
End: 2023-09-25
Attending: COLON & RECTAL SURGERY
Payer: COMMERCIAL

## 2023-09-25 VITALS
HEART RATE: 79 BPM | WEIGHT: 171.88 LBS | TEMPERATURE: 99 F | BODY MASS INDEX: 23.28 KG/M2 | DIASTOLIC BLOOD PRESSURE: 71 MMHG | HEIGHT: 72 IN | SYSTOLIC BLOOD PRESSURE: 109 MMHG

## 2023-09-25 DIAGNOSIS — K43.9 VENTRAL HERNIA WITHOUT OBSTRUCTION OR GANGRENE: ICD-10-CM

## 2023-09-25 DIAGNOSIS — Z85.048 HISTORY OF RECTAL CANCER: Primary | ICD-10-CM

## 2023-09-25 DIAGNOSIS — Z85.048 HISTORY OF RECTAL CANCER: ICD-10-CM

## 2023-09-25 PROCEDURE — 3078F PR MOST RECENT DIASTOLIC BLOOD PRESSURE < 80 MM HG: ICD-10-PCS | Mod: CPTII,S$GLB,, | Performed by: COLON & RECTAL SURGERY

## 2023-09-25 PROCEDURE — 3074F PR MOST RECENT SYSTOLIC BLOOD PRESSURE < 130 MM HG: ICD-10-PCS | Mod: CPTII,S$GLB,, | Performed by: COLON & RECTAL SURGERY

## 2023-09-25 PROCEDURE — 99215 OFFICE O/P EST HI 40 MIN: CPT | Mod: S$GLB,,, | Performed by: COLON & RECTAL SURGERY

## 2023-09-25 PROCEDURE — 3008F PR BODY MASS INDEX (BMI) DOCUMENTED: ICD-10-PCS | Mod: CPTII,S$GLB,, | Performed by: COLON & RECTAL SURGERY

## 2023-09-25 PROCEDURE — 99215 PR OFFICE/OUTPT VISIT, EST, LEVL V, 40-54 MIN: ICD-10-PCS | Mod: S$GLB,,, | Performed by: COLON & RECTAL SURGERY

## 2023-09-25 PROCEDURE — 3074F SYST BP LT 130 MM HG: CPT | Mod: CPTII,S$GLB,, | Performed by: COLON & RECTAL SURGERY

## 2023-09-25 PROCEDURE — 36415 COLL VENOUS BLD VENIPUNCTURE: CPT | Performed by: COLON & RECTAL SURGERY

## 2023-09-25 PROCEDURE — 99999 PR PBB SHADOW E&M-EST. PATIENT-LVL IV: ICD-10-PCS | Mod: PBBFAC,,, | Performed by: COLON & RECTAL SURGERY

## 2023-09-25 PROCEDURE — 3008F BODY MASS INDEX DOCD: CPT | Mod: CPTII,S$GLB,, | Performed by: COLON & RECTAL SURGERY

## 2023-09-25 PROCEDURE — 1159F PR MEDICATION LIST DOCUMENTED IN MEDICAL RECORD: ICD-10-PCS | Mod: CPTII,S$GLB,, | Performed by: COLON & RECTAL SURGERY

## 2023-09-25 PROCEDURE — 99999 PR PBB SHADOW E&M-EST. PATIENT-LVL IV: CPT | Mod: PBBFAC,,, | Performed by: COLON & RECTAL SURGERY

## 2023-09-25 PROCEDURE — 82378 CARCINOEMBRYONIC ANTIGEN: CPT | Performed by: COLON & RECTAL SURGERY

## 2023-09-25 PROCEDURE — 1159F MED LIST DOCD IN RCRD: CPT | Mod: CPTII,S$GLB,, | Performed by: COLON & RECTAL SURGERY

## 2023-09-25 PROCEDURE — 3078F DIAST BP <80 MM HG: CPT | Mod: CPTII,S$GLB,, | Performed by: COLON & RECTAL SURGERY

## 2023-09-25 NOTE — PROGRESS NOTES
History & Physical    SUBJECTIVE:     CC: rectal adenocarcinoma  Ref: Anjelica Saavedra MD    History of Present Illness:  Patient is a 57 y.o. male presents for evaluation of rectal cancer.  Patient reports he has had increasing pelvic/rectal pain along with decreased bowel movements over the last 6 weeks.  Reports an uncomfortable feeling in his pelvis associated with mucous discharge from his rectum as well as bloody bowel movements that are thin and less than normal. He reports associated chills, fatigue and 16 lb weight loss over the past 5 months.  He has also noticed a decrease in appetite as well. He underwent a colonoscopy on 06/06/2019 where a nonobstructing rectal mass was found with biopsy showing well-differentiated adenocarcinoma.  He was then referred to Colorectal surgery Clinic for evaluation.  He has no family history of colorectal cancer or IBD.    8/13/19: Completed neoadj chemoXRT  2/18/2020: Laparoscopic loop sigmoid colostomy 2/2 rectal perforation on chemotx  4/9/2020: APR with extra anatomic resection of fistula tract and left gluteus muscle, omental pedicle flap and VRAM with skin paddle by plastics  2/24/22: Completion colectomy/total colectomy with end ileostomy construction    Interval history:  Since last clinic visit, the patient has done well from a cancer standpoint.  No evidence of recurrent or metastatic disease on recent imaging.  Does have ventral and parastomal hernia that is bothersome to him and would like evaluation for surgical intervention. No hematochezia. No fever, chills, nausea or vomiting. Good ostomy output.     Review of patient's allergies indicates:  No Known Allergies    Current Outpatient Medications   Medication Sig Dispense Refill    acetaminophen (TYLENOL) 325 MG tablet Take 2 tablets (650 mg total) by mouth every 6 (six) hours as needed.  0    finasteride (PROSCAR) 5 mg tablet Take 1 tablet (5 mg total) by mouth once daily. 30 tablet 11    oxyCODONE (ROXICODONE)  10 mg Tab immediate release tablet Take 1 tablet (10 mg total) by mouth every 8 (eight) hours as needed for Pain (severe). 90 tablet 0    oxyCODONE (ROXICODONE) 5 MG immediate release tablet Take 1 tablet (5 mg total) by mouth every 4 (four) hours as needed for Pain. 15 tablet 0    promethazine (PHENERGAN) 25 MG tablet Take 1 tablet (25 mg total) by mouth every 4 (four) hours. 60 tablet 4    sildenafiL (VIAGRA) 100 MG tablet Take 1 tablet (100 mg total) by mouth daily as needed for Erectile Dysfunction. 20 tablet 11    tamsulosin (FLOMAX) 0.4 mg Cap Take 1 capsule (0.4 mg total) by mouth once daily. 90 capsule 3     No current facility-administered medications for this visit.       Past Medical History:   Diagnosis Date    Anemia     Arthritis     Cancer     rectal    Renal disorder     kidney stones     Past Surgical History:   Procedure Laterality Date    ABDOMINOPERINEAL RESECTION OF RECTUM N/A 4/9/2020    Procedure: PROCTECTOMY, ABDOMINOPERINEAL;  Surgeon: Jan New MD;  Location: [facility] OR;  Service: General;  Laterality: N/A;    COLECTOMY, TOTAL N/A 2/24/2022    Procedure: COLECTOMY, TOTAL;  Surgeon: Jan New MD;  Location: [facility] OR;  Service: Colon and Rectal;  Laterality: N/A;    COLON SURGERY      COLONOSCOPY N/A 6/6/2019    Procedure: COLONOSCOPY;  Surgeon: Anjelica Saavedra MD;  Location: [facility];  Service: Endoscopy;  Laterality: N/A;    COLONOSCOPY N/A 12/17/2021    Procedure: COLONOSCOPY;  Surgeon: Jan New MD;  Location: [facility] ENDO;  Service: General;  Laterality: N/A;    CREATION OF MUSCLE ROTATIONAL FLAP Left 4/9/2020    Procedure: CREATION, FLAP, MUSCLE ROTATION;  Surgeon: Sebastian Dan MD;  Location: [facility] OR;  Service: Plastics;  Laterality: Left;   VRAM    CYSTOSCOPIC LITHOLAPAXY N/A 2/24/2022    Procedure: CYSTOLITHOLAPAXY;  Surgeon: Sukhjinder Tucker MD;  Location: [facility] OR;  Service: Urology;  Laterality: N/A;    CYSTOSCOPY WITH BIOPSY OF BLADDER N/A 9/11/2023     Procedure: CYSTOSCOPY, WITH BLADDER BIOPSY, WITH FULGURATION IF INDICATED;  Surgeon: Sukhjinder Tucker MD;  Location: [facility] OR;  Service: Urology;  Laterality: N/A;  , POSS TURBT    CYSTOSCOPY WITH URETEROSCOPY, RETROGRADE PYELOGRAPHY, AND INSERTION OF STENT N/A 9/11/2023    Procedure: CYSTOSCOPY, WITH RETROGRADE PYELOGRAM AND URETERAL STENT INSERTION;  Surgeon: Sukhjinder Tucker MD;  Location: [facility] OR;  Service: Urology;  Laterality: N/A;    DILATION OF URETHRA N/A 9/11/2023    Procedure: DILATION, URETHRA;  Surgeon: Sukhjinder Tucker MD;  Location: [facility] OR;  Service: Urology;  Laterality: N/A;    ESOPHAGOGASTRODUODENOSCOPY N/A 6/6/2019    Procedure: EGD (ESOPHAGOGASTRODUODENOSCOPY);  Surgeon: Anjelica Saavedra MD;  Location: [facility];  Service: Endoscopy;  Laterality: N/A;    FLAP GRAFT SURGERY N/A 4/9/2020    Procedure: FLAP GRAFT;  Surgeon: Sebastian Dan MD;  Location: [facility] OR;  Service: Plastics;  Laterality: N/A;  left fasciocutaneous buttocks flap    HERNIA REPAIR  1995    INJECTION OF ANESTHETIC AGENT INTO TISSUE PLANE DEFINED BY TRANSVERSUS ABDOMINIS MUSCLE N/A 2/18/2020    Procedure: BLOCK, TRANSVERSUS ABDOMINIS PLANE;  Surgeon: Jan New MD;  Location: [facility] OR;  Service: General;  Laterality: N/A;    INJECTION OF ANESTHETIC AGENT INTO TISSUE PLANE DEFINED BY TRANSVERSUS ABDOMINIS MUSCLE  2/24/2022    Procedure: BLOCK, TRANSVERSUS ABDOMINIS PLANE;  Surgeon: Jan New MD;  Location: [facility] OR;  Service: Colon and Rectal;;    INSERTION OF TUNNELED CENTRAL VENOUS CATHETER (CVC) WITH SUBCUTANEOUS PORT N/A 10/8/2019    Procedure: [id number];  Surgeon: Jan New MD;  Location: [facility] OR;  Service: General;  Laterality: N/A;    LAPAROSCOPIC COLOSTOMY      MEDIPORT REMOVAL N/A 8/13/2020    Procedure: REMOVAL, CATHETER, CENTRAL VENOUS, TUNNELED, WITH PORT;  Surgeon: Sebastian Dan MD;  Location: [facility] OR;  Service: Plastics;  Laterality: N/A;     TONSILLECTOMY      TRANSURETHRAL RESECTION OF PROSTATE N/A 6/7/2021    Procedure: TURP (TRANSURETHRAL RESECTION OF PROSTATE), CYSTOLITHALOPAXY;  Surgeon: Sukhjinder Tucker MD;  Location: [facility] OR;  Service: Urology;  Laterality: N/A;    WOUND DEBRIDEMENT N/A 8/13/2020    Procedure: DEBRIDEMENT, WOUND;  Surgeon: Sebastian Dan MD;  Location: [facility] OR;  Service: Plastics;  Laterality: N/A;  layered closure     Family History   Problem Relation Age of Onset    Intestinal malrotation Mother     Leukemia Father     No Known Problems Sister     Coronary artery disease Brother     Coronary artery disease Maternal Grandmother     Stomach cancer Maternal Grandfather      Social History     Tobacco Use    Smoking status: Every Day     Current packs/day: 1.00     Average packs/day: 1 pack/day for 39.7 years (39.7 ttl pk-yrs)     Types: Cigarettes     Start date: 1/2/1984    Smokeless tobacco: Former     Types: Chew    Tobacco comments:     no smoking after m.n prior to sx   Substance Use Topics    Alcohol use: Yes     Alcohol/week: 46.0 standard drinks of alcohol     Types: 42 Cans of beer, 4 Shots of liquor per week     Comment: segrams 7, beer daily  hold now prior to surgery    Drug use: Never        Review of Systems:  Review of Systems   Constitutional: Negative for activity change, appetite change, chills, fatigue, fever and unexpected weight change.   HENT: Negative for congestion, ear pain, sore throat and trouble swallowing.    Eyes: Negative for pain, redness and itching.   Respiratory: Negative for cough, shortness of breath and wheezing.    Cardiovascular: Negative for chest pain, palpitations and leg swelling.   Gastrointestinal: Negative for abdominal distention, blood in stool, constipation, diarrhea, nausea, and vomiting.   Endocrine: Negative for cold intolerance, heat intolerance and polyuria.   Genitourinary: Negative for dysuria, flank pain, frequency and hematuria.   Musculoskeletal: Negative for gait  problem, joint swelling and neck pain.   Skin: Negative for color change, rash and wound.   Allergic/Immunologic: Negative for environmental allergies and immunocompromised state.   Neurological: Negative for dizziness, speech difficulty, weakness and numbness.   Psychiatric/Behavioral: Negative for agitation, confusion and hallucinations.       OBJECTIVE:     Vital Signs (Most Recent)  Temp: 98.7 °F (37.1 °C) (09/25/23 1308)  Pulse: 79 (09/25/23 1308)  BP: 109/71 (09/25/23 1308)  6' (1.829 m)  78 kg (171 lb 13.6 oz)     Physical Exam:  Physical Exam  Constitutional:       Appearance: He is well-developed.   HENT:      Head: Normocephalic and atraumatic.   Eyes:      Conjunctiva/sclera: Conjunctivae normal.   Neck:      Thyroid: No thyromegaly.   Cardiovascular:      Rate and Rhythm: Regular rhythm.   Pulmonary:      Effort: Pulmonary effort is normal. No respiratory distress.   Abdominal:      Comments: Soft, nondistended; midline incision well-healed ostomy pink and viable with stool in bag; +ventral hernia at midline incision and parastomal hernia which is soft and reducible  Genitourinary:     Comments: Skin flap well-perfused and well-healed without drainage or evidence of fistula tracts; no perineal hernia  Musculoskeletal:         General: No tenderness. Normal range of motion.      Cervical back: Normal range of motion.   Skin:     General: Skin is warm and dry.      Capillary Refill: Capillary refill takes less than 2 seconds.      Findings: No rash.   Neurological:      Mental Status: He is alert and oriented to person, place, and time.           Laboratory  Lab Results   Component Value Date    WBC 10.48 08/24/2023    HGB 14.7 08/24/2023    HCT 46.8 08/24/2023     08/24/2023    CHOL 158 06/01/2019    TRIG 194 (H) 06/01/2019    HDL 30 (L) 06/01/2019    ALT 14 06/21/2023    AST 16 06/21/2023     08/24/2023    K 4.5 08/24/2023     08/24/2023    CREATININE 1.3 08/24/2023    BUN 14  08/24/2023    CO2 25 08/24/2023    TSH 0.907 07/07/2020    PSA 0.45 12/12/2022    INR 1.0 06/11/2019     Component Ref Range & Units 6 d ago   (9/12/22) 3 mo ago   (6/13/22) 8 mo ago   (1/3/22) 1 yr ago   (9/9/21) 2 yr ago   (3/24/20) 2 yr ago   (2/3/20) 2 yr ago   (9/30/19)   CEA 0.0 - 5.0 ng/mL <1.7  1.7 CM  1.8 CM  <1.7           Diagnostic Results:  CT July 2022:  FINDINGS:  Chest:     Heart and great vessels: Borderline cardiomegaly.  No evidence of pulmonary embolism.     Adenopathy: Minimal shotty nodes which do not appear pathologically enlarged.     Lungs: There are multiple small pulmonary nodules in the right lung which appear unchanged, the largest of which measure up to 4 mm.  No new or enlarging pulmonary nodules demonstrated.  No parenchymal consolidations or pleural effusions.     Abdomen:     Liver: Within normal limits.     Gallbladder and biliary: Within normal limits.     Spleen: Within normal limits.     Pancreas: Within normal limits.     Adrenals: Within normal limits.     Kidneys: Staghorn calculus again noted at the lower pole of the left kidney.  No hydronephrosis or hydroureter.     Bowel: Postoperative findings from prior bowel resection are again noted with left lower quadrant colostomy in place.  No abnormal bowel wall thickening or evidence of obstruction.     Peritoneum: No ascites or pneumoperitoneum.     Abdominal Adenopathy: None.     Small fat containing left inguinal hernia.     Vasculature: Within normal limits.     Pelvis:     Urinary bladder: Mild nonspecific bladder wall thickening.  Previous bladder stone no longer demonstrated..     No pelvic masses visualized.  Postsurgical soft tissue thickening in noted in the presacral region which is unchanged in appearance from prior.     Pelvis adenopathy: None.     Bones: No acute findings.  Mild degenerative changes lower lumbar spine.     Miscellaneous: None     Impression:     Overall stable findings above without evidence for  recurrence or metastatic disease at this time.  Continued surveillance is recommended    ASSESSMENT/PLAN:     57-year-old male with rectal adenocarcinoma with likely T3N1 disease based upon preop imaging without evidence of metastatic disease now s/p neoadj chemoXRT which completed on 8/13/19 but with post-tx MRI showing continued threatened mesorectal fascia who developed rectal perforation and abscess/fistula on cycle 11/12 of neoadj chemotx in Feb 2020 requiring lap diverting sigmoid colostomy with continued fluid collection/perforation/fistula on CT/MRI now s/p APR with extra-anatomic resection of fistula tract and left gluteus muscle, omental pedicle flap and VRAM with skin paddle by plastics on 4/9/2020 and now newly found unresectable polyps in the ascending and transverse colon who is s/p open completion colectomy/total colectomy with end ileostomy construction in Feb '22 with final path showing adenomas    - CEA today and Q3 months  - CT C/A/P next year  - Will likely need plastics referral/hernia specialist to eval for ventral and parastomal hernia repair given previous VRAM and large hernias present. Encouraged to continue smoking cessation  - RTC 3 months for surveillance    Jan New MD  Colon and Rectal Surgery  [facility]

## 2023-09-25 NOTE — Clinical Note
Serene Malik, this robert is a long-standing patient of mine. Lives in [place] and would like to switch PCP to clinic there. Can you see him or do I need to put in a new referral to see you?  Thanks, MDG

## 2023-09-26 ENCOUNTER — PROCEDURE VISIT (OUTPATIENT)
Dept: UROLOGY | Facility: CLINIC | Age: 58
End: 2023-09-26
Payer: COMMERCIAL

## 2023-09-26 ENCOUNTER — TELEPHONE (OUTPATIENT)
Dept: PRIMARY CARE CLINIC | Facility: CLINIC | Age: 58
End: 2023-09-26
Payer: COMMERCIAL

## 2023-09-26 VITALS — HEIGHT: 72 IN | WEIGHT: 171 LBS | BODY MASS INDEX: 23.16 KG/M2

## 2023-09-26 DIAGNOSIS — N13.39 OTHER HYDRONEPHROSIS: Primary | ICD-10-CM

## 2023-09-26 LAB — CEA SERPL-MCNC: 3.8 NG/ML (ref 0–5)

## 2023-09-26 PROCEDURE — 52310 CYSTOSCOPY AND TREATMENT: CPT | Mod: S$GLB,,, | Performed by: UROLOGY

## 2023-09-26 PROCEDURE — 52310 PR CYSTOSCOPY,REMV CALCULUS,SIMPLE: ICD-10-PCS | Mod: S$GLB,,, | Performed by: UROLOGY

## 2023-09-26 NOTE — TELEPHONE ENCOUNTER
----- Message from Marito Murphy MD sent at 9/25/2023  4:01 PM CDT -----  Regarding: Schedule appointment?  Would you please call this gentleman to see if he would like to schedule an appointment for a checkup, establish care, etc.?  ----- Message -----  From: Jan New MD  Sent: 9/25/2023   3:46 PM CDT  To: Marito Murphy MD    Serene Malik, this robert is a long-standing patient of mine. Lives in [place] and would like to switch PCP to clinic there. Can you see him or do I need to put in a new referral to see you?    Adelfo,  MDG

## 2023-09-26 NOTE — PROCEDURES
Procedures    Procedure:  Cystoscopy with stent removal    Indications:  Stent removal    UA: normal, see lab results for values    Procedure in Detail: After proper consents were obtained, the patient was prepped and draped in normal sterile fashion for diagnostic cystoscopy. 5 ml of lidocaine jelly was instilled in the urethra. The flexible cystoscope was then introduced into the urethra, and advanced into the bladder under direct vision. The urethral mucosa appeared normal, and no strictures were noted. The sphincter was normal, and the veru montanum was unremarkable. The prostatic mucosa and the lateral lobes of the prostate were unremarkable, with incomplete visual obstruction. The bladder neck was normal.  The stent was identified, and stent graspers were introduced through the cystoscope.  The stent was grasped, and the cystoscope and stent were removed.  The stent was examined and was noted to be intact.  The procedure was then terminated. Patient tolerated the procedure well. No complications.     Findings:  Stent removed intact    Disposition:  - follow-up 4 weeks with repeat CTU    Sukhjinder Tucker MD

## 2023-09-27 ENCOUNTER — PATIENT MESSAGE (OUTPATIENT)
Dept: UROLOGY | Facility: CLINIC | Age: 58
End: 2023-09-27
Payer: COMMERCIAL

## 2023-10-02 ENCOUNTER — OFFICE VISIT (OUTPATIENT)
Dept: INTERNAL MEDICINE | Facility: CLINIC | Age: 58
End: 2023-10-02
Payer: COMMERCIAL

## 2023-10-02 ENCOUNTER — LAB VISIT (OUTPATIENT)
Dept: LAB | Facility: HOSPITAL | Age: 58
End: 2023-10-02
Attending: NURSE PRACTITIONER
Payer: COMMERCIAL

## 2023-10-02 VITALS
OXYGEN SATURATION: 99 % | HEART RATE: 104 BPM | WEIGHT: 169.31 LBS | TEMPERATURE: 99 F | SYSTOLIC BLOOD PRESSURE: 130 MMHG | BODY MASS INDEX: 22.96 KG/M2 | DIASTOLIC BLOOD PRESSURE: 72 MMHG

## 2023-10-02 DIAGNOSIS — R53.83 FATIGUE, UNSPECIFIED TYPE: Primary | ICD-10-CM

## 2023-10-02 DIAGNOSIS — R53.83 FATIGUE, UNSPECIFIED TYPE: ICD-10-CM

## 2023-10-02 PROCEDURE — 81001 URINALYSIS AUTO W/SCOPE: CPT | Performed by: NURSE PRACTITIONER

## 2023-10-02 PROCEDURE — 3075F SYST BP GE 130 - 139MM HG: CPT | Mod: CPTII,S$GLB,, | Performed by: NURSE PRACTITIONER

## 2023-10-02 PROCEDURE — 85025 COMPLETE CBC W/AUTO DIFF WBC: CPT | Performed by: NURSE PRACTITIONER

## 2023-10-02 PROCEDURE — 99213 OFFICE O/P EST LOW 20 MIN: CPT | Mod: S$GLB,,, | Performed by: NURSE PRACTITIONER

## 2023-10-02 PROCEDURE — 99999 PR PBB SHADOW E&M-EST. PATIENT-LVL IV: CPT | Mod: PBBFAC,,, | Performed by: NURSE PRACTITIONER

## 2023-10-02 PROCEDURE — 3008F PR BODY MASS INDEX (BMI) DOCUMENTED: ICD-10-PCS | Mod: CPTII,S$GLB,, | Performed by: NURSE PRACTITIONER

## 2023-10-02 PROCEDURE — 99999 PR PBB SHADOW E&M-EST. PATIENT-LVL IV: ICD-10-PCS | Mod: PBBFAC,,, | Performed by: NURSE PRACTITIONER

## 2023-10-02 PROCEDURE — 1159F MED LIST DOCD IN RCRD: CPT | Mod: CPTII,S$GLB,, | Performed by: NURSE PRACTITIONER

## 2023-10-02 PROCEDURE — 80053 COMPREHEN METABOLIC PANEL: CPT | Performed by: NURSE PRACTITIONER

## 2023-10-02 PROCEDURE — 87088 URINE BACTERIA CULTURE: CPT | Performed by: NURSE PRACTITIONER

## 2023-10-02 PROCEDURE — 99213 PR OFFICE/OUTPT VISIT, EST, LEVL III, 20-29 MIN: ICD-10-PCS | Mod: S$GLB,,, | Performed by: NURSE PRACTITIONER

## 2023-10-02 PROCEDURE — 87077 CULTURE AEROBIC IDENTIFY: CPT | Performed by: NURSE PRACTITIONER

## 2023-10-02 PROCEDURE — 1160F RVW MEDS BY RX/DR IN RCRD: CPT | Mod: CPTII,S$GLB,, | Performed by: NURSE PRACTITIONER

## 2023-10-02 PROCEDURE — 1159F PR MEDICATION LIST DOCUMENTED IN MEDICAL RECORD: ICD-10-PCS | Mod: CPTII,S$GLB,, | Performed by: NURSE PRACTITIONER

## 2023-10-02 PROCEDURE — 3078F DIAST BP <80 MM HG: CPT | Mod: CPTII,S$GLB,, | Performed by: NURSE PRACTITIONER

## 2023-10-02 PROCEDURE — 87086 URINE CULTURE/COLONY COUNT: CPT | Performed by: NURSE PRACTITIONER

## 2023-10-02 PROCEDURE — 1160F PR REVIEW ALL MEDS BY PRESCRIBER/CLIN PHARMACIST DOCUMENTED: ICD-10-PCS | Mod: CPTII,S$GLB,, | Performed by: NURSE PRACTITIONER

## 2023-10-02 PROCEDURE — 3075F PR MOST RECENT SYSTOLIC BLOOD PRESS GE 130-139MM HG: ICD-10-PCS | Mod: CPTII,S$GLB,, | Performed by: NURSE PRACTITIONER

## 2023-10-02 PROCEDURE — 87186 SC STD MICRODIL/AGAR DIL: CPT | Performed by: NURSE PRACTITIONER

## 2023-10-02 PROCEDURE — 36415 COLL VENOUS BLD VENIPUNCTURE: CPT | Mod: PO | Performed by: NURSE PRACTITIONER

## 2023-10-02 PROCEDURE — 3078F PR MOST RECENT DIASTOLIC BLOOD PRESSURE < 80 MM HG: ICD-10-PCS | Mod: CPTII,S$GLB,, | Performed by: NURSE PRACTITIONER

## 2023-10-02 PROCEDURE — 84443 ASSAY THYROID STIM HORMONE: CPT | Performed by: NURSE PRACTITIONER

## 2023-10-02 PROCEDURE — 3008F BODY MASS INDEX DOCD: CPT | Mod: CPTII,S$GLB,, | Performed by: NURSE PRACTITIONER

## 2023-10-02 NOTE — PROGRESS NOTES
Subjective:       Patient ID: Sukhjinder Presley is a 57 y.o. male.    Chief Complaint: Fatigue and Tinnitus    Patient here for fatigue  Feels the same way he felt when he was on chemo  Had procedure 9/11  Had a catheter  Catheter removed 9/19  Stent removed 9/24  Has felt bad since 9/24  Patient is tired, no appetite, chills  Ears ring and has head pressure  Has CT scheduled brny  He denies dysuria but reports urine is cloudy  Was on antibiotics for positive urine culture but completed them    Fatigue  Associated symptoms include fatigue and headaches. Pertinent negatives include no arthralgias, chest pain, chills, diaphoresis, fever, myalgias or rash.   Ringing in Ears:    Associated symptoms: Headaches and tinnitus.  No dizziness and no fever.No dizziness.      /72   Pulse 104   Temp 99 °F (37.2 °C)   Wt 76.8 kg (169 lb 5 oz)   SpO2 99%   BMI 22.96 kg/m²     Review of Systems   Constitutional:  Positive for fatigue. Negative for activity change, appetite change, chills, diaphoresis, fever and unexpected weight change.   HENT:  Positive for tinnitus.    Eyes: Negative.    Respiratory:  Negative for apnea, chest tightness, shortness of breath and stridor.    Cardiovascular:  Negative for chest pain, palpitations and leg swelling.   Gastrointestinal: Negative.    Endocrine: Negative.    Genitourinary: Negative.    Musculoskeletal:  Negative for arthralgias and myalgias.   Skin:  Negative for color change, pallor, rash and wound.   Allergic/Immunologic: Negative.    Neurological:  Positive for headaches. Negative for dizziness, facial asymmetry and light-headedness.   Hematological:  Negative for adenopathy.   Psychiatric/Behavioral:  Negative for agitation and behavioral problems.        Objective:      Physical Exam  Vitals and nursing note reviewed.   Constitutional:       General: He is not in acute distress.     Appearance: He is well-developed. He is not diaphoretic.   HENT:      Head: Normocephalic and  atraumatic.      Right Ear: Tympanic membrane, ear canal and external ear normal.      Left Ear: Tympanic membrane, ear canal and external ear normal.      Nose: Nose normal.      Mouth/Throat:      Mouth: Mucous membranes are moist.   Eyes:      Conjunctiva/sclera: Conjunctivae normal.      Pupils: Pupils are equal, round, and reactive to light.   Cardiovascular:      Rate and Rhythm: Normal rate and regular rhythm.      Heart sounds: Normal heart sounds.   Pulmonary:      Effort: Pulmonary effort is normal. No respiratory distress.      Breath sounds: Normal breath sounds.   Abdominal:      General: Bowel sounds are normal. There is no distension.      Tenderness: There is no abdominal tenderness. There is no guarding.   Skin:     General: Skin is warm and dry.      Findings: No rash.   Neurological:      Mental Status: He is alert and oriented to person, place, and time.   Psychiatric:         Behavior: Behavior normal.         Thought Content: Thought content normal.         Judgment: Judgment normal.         Assessment:       1. Fatigue, unspecified type        Plan:       Sukhjinder was seen today for fatigue and tinnitus.    Diagnoses and all orders for this visit:    Fatigue, unspecified type  -     URINALYSIS  -     Urine culture  -     CBC Auto Differential; Future  -     Comprehensive Metabolic Panel; Future  -     TSH; Future    Will get urine and lab today  Patient has ct tomorrow with urology  Will contact with results and recommendations

## 2023-10-03 ENCOUNTER — HOSPITAL ENCOUNTER (OUTPATIENT)
Dept: RADIOLOGY | Facility: HOSPITAL | Age: 58
Discharge: HOME | End: 2023-10-03
Attending: UROLOGY
Payer: COMMERCIAL

## 2023-10-03 ENCOUNTER — PATIENT MESSAGE (OUTPATIENT)
Dept: INTERNAL MEDICINE | Facility: CLINIC | Age: 58
End: 2023-10-03
Payer: COMMERCIAL

## 2023-10-03 DIAGNOSIS — N13.39 OTHER HYDRONEPHROSIS: ICD-10-CM

## 2023-10-03 LAB
ALBUMIN SERPL BCP-MCNC: 3.3 G/DL (ref 3.5–5.2)
ALP SERPL-CCNC: 95 U/L (ref 55–135)
ALT SERPL W/O P-5'-P-CCNC: 14 U/L (ref 10–44)
ANION GAP SERPL CALC-SCNC: 13 MMOL/L (ref 8–16)
AST SERPL-CCNC: 18 U/L (ref 10–40)
BACTERIA #/AREA URNS AUTO: ABNORMAL /HPF
BASOPHILS # BLD AUTO: 0.07 K/UL (ref 0–0.2)
BASOPHILS NFR BLD: 0.7 % (ref 0–1.9)
BILIRUB SERPL-MCNC: 0.5 MG/DL (ref 0.1–1)
BILIRUB UR QL STRIP: NEGATIVE
BUN SERPL-MCNC: 17 MG/DL (ref 6–20)
CALCIUM SERPL-MCNC: 10.3 MG/DL (ref 8.7–10.5)
CHLORIDE SERPL-SCNC: 102 MMOL/L (ref 95–110)
CLARITY UR REFRACT.AUTO: ABNORMAL
CO2 SERPL-SCNC: 21 MMOL/L (ref 23–29)
COLOR UR AUTO: YELLOW
CREAT SERPL-MCNC: 1.5 MG/DL (ref 0.5–1.4)
DIFFERENTIAL METHOD: ABNORMAL
EOSINOPHIL # BLD AUTO: 0.1 K/UL (ref 0–0.5)
EOSINOPHIL NFR BLD: 1.1 % (ref 0–8)
ERYTHROCYTE [DISTWIDTH] IN BLOOD BY AUTOMATED COUNT: 13 % (ref 11.5–14.5)
EST. GFR  (NO RACE VARIABLE): 54.3 ML/MIN/1.73 M^2
GLUCOSE SERPL-MCNC: 113 MG/DL (ref 70–110)
GLUCOSE UR QL STRIP: NEGATIVE
HCT VFR BLD AUTO: 34.9 % (ref 40–54)
HGB BLD-MCNC: 11.2 G/DL (ref 14–18)
HGB UR QL STRIP: ABNORMAL
HYALINE CASTS UR QL AUTO: 0 /LPF
IMM GRANULOCYTES # BLD AUTO: 0.1 K/UL (ref 0–0.04)
IMM GRANULOCYTES NFR BLD AUTO: 1.1 % (ref 0–0.5)
KETONES UR QL STRIP: NEGATIVE
LEUKOCYTE ESTERASE UR QL STRIP: ABNORMAL
LYMPHOCYTES # BLD AUTO: 3.2 K/UL (ref 1–4.8)
LYMPHOCYTES NFR BLD: 33.9 % (ref 18–48)
MCH RBC QN AUTO: 27.6 PG (ref 27–31)
MCHC RBC AUTO-ENTMCNC: 32.1 G/DL (ref 32–36)
MCV RBC AUTO: 86 FL (ref 82–98)
MICROSCOPIC COMMENT: ABNORMAL
MONOCYTES # BLD AUTO: 0.7 K/UL (ref 0.3–1)
MONOCYTES NFR BLD: 7.3 % (ref 4–15)
NEUTROPHILS # BLD AUTO: 5.3 K/UL (ref 1.8–7.7)
NEUTROPHILS NFR BLD: 55.9 % (ref 38–73)
NITRITE UR QL STRIP: NEGATIVE
NRBC BLD-RTO: 0 /100 WBC
PH UR STRIP: 7 [PH] (ref 5–8)
PLATELET # BLD AUTO: 373 K/UL (ref 150–450)
PLATELET BLD QL SMEAR: ABNORMAL
PMV BLD AUTO: 10 FL (ref 9.2–12.9)
POTASSIUM SERPL-SCNC: 5.1 MMOL/L (ref 3.5–5.1)
PROT SERPL-MCNC: 8.6 G/DL (ref 6–8.4)
PROT UR QL STRIP: ABNORMAL
RBC # BLD AUTO: 4.06 M/UL (ref 4.6–6.2)
RBC #/AREA URNS AUTO: 2 /HPF (ref 0–4)
SMUDGE CELLS BLD QL SMEAR: PRESENT
SODIUM SERPL-SCNC: 136 MMOL/L (ref 136–145)
SP GR UR STRIP: 1.02 (ref 1–1.03)
TSH SERPL DL<=0.005 MIU/L-ACNC: 0.97 UIU/ML (ref 0.4–4)
URN SPEC COLLECT METH UR: ABNORMAL
WBC # BLD AUTO: 9.41 K/UL (ref 3.9–12.7)
WBC #/AREA URNS AUTO: >100 /HPF (ref 0–5)
WBC CLUMPS UR QL AUTO: ABNORMAL

## 2023-10-03 PROCEDURE — 74178 CT ABD&PLV WO CNTR FLWD CNTR: CPT | Mod: 26,,, | Performed by: RADIOLOGY

## 2023-10-03 PROCEDURE — 74178 CT UROGRAM ABD PELVIS W WO: ICD-10-PCS | Mod: 26,,, | Performed by: RADIOLOGY

## 2023-10-03 PROCEDURE — 74178 CT ABD&PLV WO CNTR FLWD CNTR: CPT | Mod: TC,PN

## 2023-10-03 PROCEDURE — 25500020 PHARM REV CODE 255: Mod: PN | Performed by: UROLOGY

## 2023-10-03 RX ORDER — SULFAMETHOXAZOLE AND TRIMETHOPRIM 800; 160 MG/1; MG/1
1 TABLET ORAL 2 TIMES DAILY
Qty: 28 TABLET | Refills: 0 | Status: SHIPPED | OUTPATIENT
Start: 2023-10-03 | End: 2023-10-17

## 2023-10-03 RX ADMIN — IOHEXOL 100 ML: 350 INJECTION, SOLUTION INTRAVENOUS at 08:10

## 2023-10-04 LAB — BACTERIA UR CULT: ABNORMAL

## 2023-10-05 ENCOUNTER — TELEPHONE (OUTPATIENT)
Dept: PRIMARY CARE CLINIC | Facility: CLINIC | Age: 58
End: 2023-10-05
Payer: COMMERCIAL

## 2023-10-05 ENCOUNTER — TELEPHONE (OUTPATIENT)
Dept: INTERNAL MEDICINE | Facility: CLINIC | Age: 58
End: 2023-10-05
Payer: COMMERCIAL

## 2023-10-05 ENCOUNTER — PATIENT MESSAGE (OUTPATIENT)
Dept: HEMATOLOGY/ONCOLOGY | Facility: CLINIC | Age: 58
End: 2023-10-05
Payer: COMMERCIAL

## 2023-10-05 NOTE — TELEPHONE ENCOUNTER
----- Message from Shanell Mcgill sent at 10/5/2023  8:54 AM CDT -----  Contact: self  Type:  Patient Returning Call    Who Called:Sukhjinder   Who Left Message for Patient:luis fernando  Does the patient know what this is regarding?:yes   Would the patient rather a call back or a response via [facility]? Call back   Best Call Back Number:[phone]  Additional Information:

## 2023-10-05 NOTE — TELEPHONE ENCOUNTER
----- Message from Britney Presley sent at 10/5/2023  8:48 AM CDT -----  Contact: Sukhjinder  .Type:  Patient Returning Call    Who Called:Sukhjinder  Who Left Message for Patient:Nurse  Does the patient know what this is regarding?:No  Would the patient rather a call back or a response via [facility]? Call back   Best Call Back Number:[phone]  Additional Information: NA                  Thanks  KT

## 2023-10-05 NOTE — TELEPHONE ENCOUNTER
----- Message from Marito Murphy MD sent at 9/25/2023  4:01 PM CDT -----  Regarding: Schedule appointment?  Would you please call this gentleman to see if he would like to schedule an appointment for a checkup, establish care, etc.?  ----- Message -----  From: Jan New MD  Sent: 9/25/2023   3:46 PM CDT  To: Marito Murphy MD    Serene Malik, this robert is a long-standing patient of mine. Lives in [place] and would like to switch PCP to clinic there. Can you see him or do I need to put in a new referral to see you?    Adelfo,  MDG

## 2023-10-06 ENCOUNTER — HOSPITAL ENCOUNTER (OUTPATIENT)
Facility: HOSPITAL | Age: 58
Discharge: HOME | End: 2023-10-06
Attending: UROLOGY | Admitting: UROLOGY
Payer: COMMERCIAL

## 2023-10-06 ENCOUNTER — ANESTHESIA EVENT (OUTPATIENT)
Dept: SURGERY | Facility: HOSPITAL | Age: 58
End: 2023-10-06
Payer: COMMERCIAL

## 2023-10-06 ENCOUNTER — OFFICE VISIT (OUTPATIENT)
Dept: UROLOGY | Facility: CLINIC | Age: 58
End: 2023-10-06
Payer: COMMERCIAL

## 2023-10-06 ENCOUNTER — ANESTHESIA (OUTPATIENT)
Dept: SURGERY | Facility: HOSPITAL | Age: 58
End: 2023-10-06
Payer: COMMERCIAL

## 2023-10-06 VITALS
WEIGHT: 164.44 LBS | SYSTOLIC BLOOD PRESSURE: 115 MMHG | HEART RATE: 96 BPM | DIASTOLIC BLOOD PRESSURE: 81 MMHG | BODY MASS INDEX: 22.31 KG/M2

## 2023-10-06 DIAGNOSIS — D49.4 BLADDER TUMOR: Primary | ICD-10-CM

## 2023-10-06 DIAGNOSIS — N32.89 BLADDER MASS: ICD-10-CM

## 2023-10-06 DIAGNOSIS — C20 RECTAL CANCER: ICD-10-CM

## 2023-10-06 DIAGNOSIS — N32.89 BLADDER MASS: Primary | ICD-10-CM

## 2023-10-06 PROCEDURE — 63600175 PHARM REV CODE 636 W HCPCS: Performed by: STUDENT IN AN ORGANIZED HEALTH CARE EDUCATION/TRAINING PROGRAM

## 2023-10-06 PROCEDURE — 99999 PR PBB SHADOW E&M-EST. PATIENT-LVL IV: CPT | Mod: PBBFAC,,, | Performed by: UROLOGY

## 2023-10-06 PROCEDURE — 36000706: Performed by: UROLOGY

## 2023-10-06 PROCEDURE — 99214 OFFICE O/P EST MOD 30 MIN: CPT | Mod: 25,S$GLB,, | Performed by: UROLOGY

## 2023-10-06 PROCEDURE — 3074F SYST BP LT 130 MM HG: CPT | Mod: CPTII,S$GLB,, | Performed by: UROLOGY

## 2023-10-06 PROCEDURE — C2617 STENT, NON-COR, TEM W/O DEL: HCPCS | Performed by: UROLOGY

## 2023-10-06 PROCEDURE — 96372 THER/PROPH/DIAG INJ SC/IM: CPT | Mod: S$GLB,,, | Performed by: UROLOGY

## 2023-10-06 PROCEDURE — 99214 PR OFFICE/OUTPT VISIT, EST, LEVL IV, 30-39 MIN: ICD-10-PCS | Mod: 25,S$GLB,, | Performed by: UROLOGY

## 2023-10-06 PROCEDURE — 74420 UROGRAPHY RTRGR +-KUB: CPT | Mod: 26,,, | Performed by: UROLOGY

## 2023-10-06 PROCEDURE — C1769 GUIDE WIRE: HCPCS | Performed by: UROLOGY

## 2023-10-06 PROCEDURE — 3008F BODY MASS INDEX DOCD: CPT | Mod: CPTII,S$GLB,, | Performed by: UROLOGY

## 2023-10-06 PROCEDURE — 36000707: Performed by: UROLOGY

## 2023-10-06 PROCEDURE — 3074F PR MOST RECENT SYSTOLIC BLOOD PRESSURE < 130 MM HG: ICD-10-PCS | Mod: CPTII,S$GLB,, | Performed by: UROLOGY

## 2023-10-06 PROCEDURE — C1729 CATH, DRAINAGE: HCPCS | Performed by: UROLOGY

## 2023-10-06 PROCEDURE — 63600175 PHARM REV CODE 636 W HCPCS: Performed by: UROLOGY

## 2023-10-06 PROCEDURE — 27201423 OPTIME MED/SURG SUP & DEVICES STERILE SUPPLY: Performed by: UROLOGY

## 2023-10-06 PROCEDURE — 1159F PR MEDICATION LIST DOCUMENTED IN MEDICAL RECORD: ICD-10-PCS | Mod: CPTII,S$GLB,, | Performed by: UROLOGY

## 2023-10-06 PROCEDURE — 25000003 PHARM REV CODE 250: Performed by: UROLOGY

## 2023-10-06 PROCEDURE — 37000009 HC ANESTHESIA EA ADD 15 MINS: Performed by: UROLOGY

## 2023-10-06 PROCEDURE — 52332 PR CYSTOSCOPY,INSERT URETERAL STENT: ICD-10-PCS | Mod: 59,RT,, | Performed by: UROLOGY

## 2023-10-06 PROCEDURE — 52240 CYSTOSCOPY AND TREATMENT: CPT | Mod: ,,, | Performed by: UROLOGY

## 2023-10-06 PROCEDURE — 3079F PR MOST RECENT DIASTOLIC BLOOD PRESSURE 80-89 MM HG: ICD-10-PCS | Mod: CPTII,S$GLB,, | Performed by: UROLOGY

## 2023-10-06 PROCEDURE — 71000033 HC RECOVERY, INTIAL HOUR: Performed by: UROLOGY

## 2023-10-06 PROCEDURE — 99999 PR PBB SHADOW E&M-EST. PATIENT-LVL IV: ICD-10-PCS | Mod: PBBFAC,,, | Performed by: UROLOGY

## 2023-10-06 PROCEDURE — 25000003 PHARM REV CODE 250: Performed by: NURSE ANESTHETIST, CERTIFIED REGISTERED

## 2023-10-06 PROCEDURE — 52240 PR CYSTOURETHROSCOPY,FULGUR >5 CM LESN: ICD-10-PCS | Mod: ,,, | Performed by: UROLOGY

## 2023-10-06 PROCEDURE — 25000003 PHARM REV CODE 250: Performed by: STUDENT IN AN ORGANIZED HEALTH CARE EDUCATION/TRAINING PROGRAM

## 2023-10-06 PROCEDURE — 1160F RVW MEDS BY RX/DR IN RCRD: CPT | Mod: CPTII,S$GLB,, | Performed by: UROLOGY

## 2023-10-06 PROCEDURE — C1758 CATHETER, URETERAL: HCPCS | Performed by: UROLOGY

## 2023-10-06 PROCEDURE — 3079F DIAST BP 80-89 MM HG: CPT | Mod: CPTII,S$GLB,, | Performed by: UROLOGY

## 2023-10-06 PROCEDURE — 1159F MED LIST DOCD IN RCRD: CPT | Mod: CPTII,S$GLB,, | Performed by: UROLOGY

## 2023-10-06 PROCEDURE — 74420 PR  X-RAY RETROGRADE PYELOGRAM: ICD-10-PCS | Mod: 26,,, | Performed by: UROLOGY

## 2023-10-06 PROCEDURE — 3008F PR BODY MASS INDEX (BMI) DOCUMENTED: ICD-10-PCS | Mod: CPTII,S$GLB,, | Performed by: UROLOGY

## 2023-10-06 PROCEDURE — 63600175 PHARM REV CODE 636 W HCPCS: Performed by: NURSE ANESTHETIST, CERTIFIED REGISTERED

## 2023-10-06 PROCEDURE — 96372 PR INJECTION,THERAP/PROPH/DIAG2ST, IM OR SUBCUT: ICD-10-PCS | Mod: S$GLB,,, | Performed by: UROLOGY

## 2023-10-06 PROCEDURE — 1160F PR REVIEW ALL MEDS BY PRESCRIBER/CLIN PHARMACIST DOCUMENTED: ICD-10-PCS | Mod: CPTII,S$GLB,, | Performed by: UROLOGY

## 2023-10-06 PROCEDURE — 71000015 HC POSTOP RECOV 1ST HR: Performed by: UROLOGY

## 2023-10-06 PROCEDURE — 52332 CYSTOSCOPY AND TREATMENT: CPT | Mod: 59,RT,, | Performed by: UROLOGY

## 2023-10-06 PROCEDURE — 37000008 HC ANESTHESIA 1ST 15 MINUTES: Performed by: UROLOGY

## 2023-10-06 DEVICE — STENT URETERAL UNIV 7FR 26CM
Type: IMPLANTABLE DEVICE | Site: URETER | Status: NON-FUNCTIONAL
Removed: 2024-01-08

## 2023-10-06 RX ORDER — ONDANSETRON 2 MG/ML
4 INJECTION INTRAMUSCULAR; INTRAVENOUS DAILY PRN
Status: DISCONTINUED | OUTPATIENT
Start: 2023-10-06 | End: 2023-10-06 | Stop reason: HOSPADM

## 2023-10-06 RX ORDER — ONDANSETRON 2 MG/ML
INJECTION INTRAMUSCULAR; INTRAVENOUS
Status: DISCONTINUED | OUTPATIENT
Start: 2023-10-06 | End: 2023-10-06

## 2023-10-06 RX ORDER — LIDOCAINE HYDROCHLORIDE 20 MG/ML
INJECTION INTRAVENOUS
Status: DISCONTINUED | OUTPATIENT
Start: 2023-10-06 | End: 2023-10-06

## 2023-10-06 RX ORDER — FENTANYL CITRATE 50 UG/ML
INJECTION, SOLUTION INTRAMUSCULAR; INTRAVENOUS
Status: DISCONTINUED | OUTPATIENT
Start: 2023-10-06 | End: 2023-10-06

## 2023-10-06 RX ORDER — MIDAZOLAM HYDROCHLORIDE 1 MG/ML
INJECTION INTRAMUSCULAR; INTRAVENOUS
Status: DISCONTINUED | OUTPATIENT
Start: 2023-10-06 | End: 2023-10-06

## 2023-10-06 RX ORDER — PROPOFOL 10 MG/ML
VIAL (ML) INTRAVENOUS
Status: DISCONTINUED | OUTPATIENT
Start: 2023-10-06 | End: 2023-10-06

## 2023-10-06 RX ORDER — HYDROMORPHONE HYDROCHLORIDE 2 MG/ML
0.2 INJECTION, SOLUTION INTRAMUSCULAR; INTRAVENOUS; SUBCUTANEOUS EVERY 5 MIN PRN
Status: DISCONTINUED | OUTPATIENT
Start: 2023-10-06 | End: 2023-10-06 | Stop reason: HOSPADM

## 2023-10-06 RX ORDER — OXYCODONE AND ACETAMINOPHEN 5; 325 MG/1; MG/1
1 TABLET ORAL
Status: DISCONTINUED | OUTPATIENT
Start: 2023-10-06 | End: 2023-10-06 | Stop reason: HOSPADM

## 2023-10-06 RX ORDER — CEFTRIAXONE 1 G/1
1 INJECTION, POWDER, FOR SOLUTION INTRAMUSCULAR; INTRAVENOUS
Status: DISCONTINUED | OUTPATIENT
Start: 2023-10-06 | End: 2023-10-06 | Stop reason: HOSPADM

## 2023-10-06 RX ORDER — ROCURONIUM BROMIDE 10 MG/ML
INJECTION, SOLUTION INTRAVENOUS
Status: DISCONTINUED | OUTPATIENT
Start: 2023-10-06 | End: 2023-10-06

## 2023-10-06 RX ORDER — SUCCINYLCHOLINE CHLORIDE 20 MG/ML
INJECTION INTRAMUSCULAR; INTRAVENOUS
Status: DISCONTINUED | OUTPATIENT
Start: 2023-10-06 | End: 2023-10-06

## 2023-10-06 RX ORDER — OXYCODONE HYDROCHLORIDE 5 MG/1
5 TABLET ORAL EVERY 4 HOURS PRN
Qty: 30 TABLET | Refills: 0 | Status: SHIPPED | OUTPATIENT
Start: 2023-10-06 | End: 2023-11-29 | Stop reason: SDUPTHER

## 2023-10-06 RX ADMIN — PROPOFOL 25 MG: 10 INJECTION, EMULSION INTRAVENOUS at 05:10

## 2023-10-06 RX ADMIN — PIPERACILLIN AND TAZOBACTAM 4.5 G: 4; .5 INJECTION, POWDER, LYOPHILIZED, FOR SOLUTION INTRAVENOUS; PARENTERAL at 03:10

## 2023-10-06 RX ADMIN — HYDROMORPHONE HYDROCHLORIDE 0.2 MG: 2 INJECTION INTRAMUSCULAR; INTRAVENOUS; SUBCUTANEOUS at 06:10

## 2023-10-06 RX ADMIN — SUGAMMADEX 200 MG: 100 INJECTION, SOLUTION INTRAVENOUS at 05:10

## 2023-10-06 RX ADMIN — MIDAZOLAM HYDROCHLORIDE 2 MG: 1 INJECTION, SOLUTION INTRAMUSCULAR; INTRAVENOUS at 03:10

## 2023-10-06 RX ADMIN — SUCCINYLCHOLINE CHLORIDE 100 MG: 20 INJECTION, SOLUTION INTRAMUSCULAR; INTRAVENOUS; PARENTERAL at 03:10

## 2023-10-06 RX ADMIN — ONDANSETRON 4 MG: 2 INJECTION INTRAMUSCULAR; INTRAVENOUS at 05:10

## 2023-10-06 RX ADMIN — ROCURONIUM BROMIDE 5 MG: 10 SOLUTION INTRAVENOUS at 03:10

## 2023-10-06 RX ADMIN — HYDROMORPHONE HYDROCHLORIDE 0.2 MG: 2 INJECTION INTRAMUSCULAR; INTRAVENOUS; SUBCUTANEOUS at 05:10

## 2023-10-06 RX ADMIN — CEFTRIAXONE 1 G: 1 INJECTION, POWDER, FOR SOLUTION INTRAMUSCULAR; INTRAVENOUS at 10:10

## 2023-10-06 RX ADMIN — LIDOCAINE HYDROCHLORIDE 50 MG: 20 INJECTION INTRAVENOUS at 03:10

## 2023-10-06 RX ADMIN — OXYCODONE HYDROCHLORIDE AND ACETAMINOPHEN 1 TABLET: 5; 325 TABLET ORAL at 05:10

## 2023-10-06 RX ADMIN — SODIUM CHLORIDE, SODIUM LACTATE, POTASSIUM CHLORIDE, AND CALCIUM CHLORIDE: .6; .31; .03; .02 INJECTION, SOLUTION INTRAVENOUS at 03:10

## 2023-10-06 RX ADMIN — ROCURONIUM BROMIDE 45 MG: 10 SOLUTION INTRAVENOUS at 03:10

## 2023-10-06 RX ADMIN — PROPOFOL 150 MG: 10 INJECTION, EMULSION INTRAVENOUS at 03:10

## 2023-10-06 RX ADMIN — FENTANYL CITRATE 100 MCG: 50 INJECTION, SOLUTION INTRAMUSCULAR; INTRAVENOUS at 03:10

## 2023-10-06 NOTE — PROGRESS NOTES
Per Dr. Tucker IM rocephin given to pt via left dorsogluteal using aseptic technique. Pt tolerated well. Pt instructed to remain in clinic for 15 minutes after injection to monitor for signs & symptoms of adverse reaction. Pt verbalized understanding.     Loraine Milanes RN

## 2023-10-06 NOTE — DISCHARGE SUMMARY
[facility] - Surgery (Hospital)  Discharge Note  Short Stay    Procedure(s) (LRB):  TURBT (TRANSURETHRAL RESECTION OF BLADDER TUMOR) (N/A)  CYSTOSCOPY, WITH URETERAL STENT INSERTION (Right)      OUTCOME: Patient tolerated treatment/procedure well without complication and is now ready for discharge.    DISPOSITION: Home or Self Care    FINAL DIAGNOSIS:  Bladder tumor    FOLLOWUP: In clinic    DISCHARGE INSTRUCTIONS:    Discharge Procedure Orders   Diet Adult Regular     Notify your health care provider if you experience any of the following:  temperature >100.4     Notify your health care provider if you experience any of the following:  persistent nausea and vomiting or diarrhea     Notify your health care provider if you experience any of the following:  severe uncontrolled pain     Notify your health care provider if you experience any of the following:  difficulty breathing or increased cough     Notify your health care provider if you experience any of the following:  severe persistent headache     Notify your health care provider if you experience any of the following:  worsening rash     Notify your health care provider if you experience any of the following:  persistent dizziness, light-headedness, or visual disturbances     Notify your health care provider if you experience any of the following:  increased confusion or weakness     Activity as tolerated

## 2023-10-06 NOTE — H&P (VIEW-ONLY)
Chief Complaint: voiding issues    HPI:   10/06/2023 - messaged the day after his stent was removed with worsening right-sided flank pain and feeling poorly, CT obtained two days ago showed recurrence of his right-sided hydronephrosis with delayed nephrogram and delayed excretion, patient feels like he is undergoing chemo, feeling very worn out, also notes that his urine looks milky, urine culture from earlier this week showed Proteus but he has not started his antibiotics yet    09/26/2023 - stent removal    08/24/2023 - returns today for follow-up, had a CT obtained by Dr. Reyes 10 which showed right-sided hydronephrosis which is new, this also showed some abnormal bladder wall thickening on the right side, patient notes that he has been urinating without difficulty since his TURP, no gross hematuria or dysuria, urine today shows positive nitrites positive leukocyte esterase    10/05/2022 - patient returns today for follow-up, no issues in the interim, was catheterizing the after surgery but not recently, feels like he empties well, no gross hematuria or UTIs    04/05/2022 - patient returns today for f/u, no issues since his surgery, has been healing well, colon path negative for cancer, voiding with a good stream and feels like he empties    02/24/2022 - cystolitholapaxy and completion colectomy    01/05/2022 - patient returns today for follow-up, denies any issues in the interim, voiding with a good stream and emptying his bladder well, denies any gross hematuria or dysuria, had a CT last month which showed a new bladder stone    06/07/2021 - TURP with cystolitholapaxy    05/14/2021 - patient presents today for follow-up, he underwent urodynamics 2 weeks ago which showed a decent bladder contraction with the obstruction, he presents today for discussion of options, still notes urgency but denies any of the dysuria that he presented with initially, denies gross hematuria the    04/21/2021 - patient returns today  for follow-up, notes a 3-4 months history of dysuria, urgency, frequency, weak stream, and incomplete bladder emptying, still taking the flomax and finasteride, was previiously intstructed how to perform CIC but has not done this in some time, no GH but states that he 'keeps a bladder infection'  IPSS - 4/1/5/5/5/5/4 = 29 QOL - 6(terrible)    12/21/20: Sildenafil 100 working well enough.  Stream sometimes good sometimes feels he has to strain, most of the time it is good.  3 cups coffee a day and some sodas.  Retrograde ejaculation.  8/5/20- sildenafil 100mg is working well.  7/1/20: patient normally goes to Dr. Altamirano.  Apparently he has been having ED since his rectal surgery and would like to discuss options.  Recent dx with UTI and started abx today, otherwise voiding well.  Patient states he gets no erections after surgery, he has not tried any forms of medical therapy.  Libido and energy are still present.  6/4/20: PVR was 635 after our last visit and he was instructed in CIC.  Voiding normally he feels and hasn't done CIC in two weeks.  PVR today 105 ml.   5/6/20: Been on flomax since last visit.  Cysto/uroflow normal today.    4/20/20: 53 yo man had colon cancer with resection last week, referred by Dr. New.  Is in retention postoperatively.  No unusual abd/pelvic pain and no exac/rel factors.  No hematuria.  No urolithiasis.  PVR >700 ml.  No  history.      PMH:  Past Medical History:   Diagnosis Date    Anemia     Arthritis     Cancer     rectal    Renal disorder     kidney stones       PSH:  Past Surgical History:   Procedure Laterality Date    ABDOMINOPERINEAL RESECTION OF RECTUM N/A 4/9/2020    Procedure: PROCTECTOMY, ABDOMINOPERINEAL;  Surgeon: Jan New MD;  Location: [facility] OR;  Service: General;  Laterality: N/A;    COLECTOMY, TOTAL N/A 2/24/2022    Procedure: COLECTOMY, TOTAL;  Surgeon: Jan New MD;  Location: [facility] OR;  Service: Colon and Rectal;  Laterality: N/A;    COLON  SURGERY      COLONOSCOPY N/A 6/6/2019    Procedure: COLONOSCOPY;  Surgeon: Anjelica Saavedra MD;  Location: [facility] ENDO;  Service: Endoscopy;  Laterality: N/A;    COLONOSCOPY N/A 12/17/2021    Procedure: COLONOSCOPY;  Surgeon: Jan New MD;  Location: [facility] ENDO;  Service: General;  Laterality: N/A;    CREATION OF MUSCLE ROTATIONAL FLAP Left 4/9/2020    Procedure: CREATION, FLAP, MUSCLE ROTATION;  Surgeon: Sebastian Dan MD;  Location: [facility] OR;  Service: Plastics;  Laterality: Left;   VRAM    CYSTOSCOPIC LITHOLAPAXY N/A 2/24/2022    Procedure: CYSTOLITHOLAPAXY;  Surgeon: Sukhjinder Tucker MD;  Location: [facility];  Service: Urology;  Laterality: N/A;    CYSTOSCOPY WITH BIOPSY OF BLADDER N/A 9/11/2023    Procedure: CYSTOSCOPY, WITH BLADDER BIOPSY, WITH FULGURATION IF INDICATED;  Surgeon: Sukhjinder Tucker MD;  Location: [facility] OR;  Service: Urology;  Laterality: N/A;  , POSS TURBT    CYSTOSCOPY WITH URETEROSCOPY, RETROGRADE PYELOGRAPHY, AND INSERTION OF STENT N/A 9/11/2023    Procedure: CYSTOSCOPY, WITH RETROGRADE PYELOGRAM AND URETERAL STENT INSERTION;  Surgeon: Sukhjinder Tucker MD;  Location: [facility] OR;  Service: Urology;  Laterality: N/A;    DILATION OF URETHRA N/A 9/11/2023    Procedure: DILATION, URETHRA;  Surgeon: Sukhjinder Tucker MD;  Location: [facility] OR;  Service: Urology;  Laterality: N/A;    ESOPHAGOGASTRODUODENOSCOPY N/A 6/6/2019    Procedure: EGD (ESOPHAGOGASTRODUODENOSCOPY);  Surgeon: Anjelica Saavedra MD;  Location: [facility];  Service: Endoscopy;  Laterality: N/A;    FLAP GRAFT SURGERY N/A 4/9/2020    Procedure: FLAP GRAFT;  Surgeon: Sebastian Dan MD;  Location: [facility];  Service: Plastics;  Laterality: N/A;  left fasciocutaneous buttocks flap    HERNIA REPAIR  1995    INJECTION OF ANESTHETIC AGENT INTO TISSUE PLANE DEFINED BY TRANSVERSUS ABDOMINIS MUSCLE N/A 2/18/2020    Procedure: BLOCK, TRANSVERSUS ABDOMINIS PLANE;  Surgeon: Jan New MD;  Location: [facility] OR;   Service: General;  Laterality: N/A;    INJECTION OF ANESTHETIC AGENT INTO TISSUE PLANE DEFINED BY TRANSVERSUS ABDOMINIS MUSCLE  2/24/2022    Procedure: BLOCK, TRANSVERSUS ABDOMINIS PLANE;  Surgeon: Jan New MD;  Location: [facility] OR;  Service: Colon and Rectal;;    INSERTION OF TUNNELED CENTRAL VENOUS CATHETER (CVC) WITH SUBCUTANEOUS PORT N/A 10/8/2019    Procedure: [id number];  Surgeon: Jan New MD;  Location: [facility] OR;  Service: General;  Laterality: N/A;    LAPAROSCOPIC COLOSTOMY      MEDIPORT REMOVAL N/A 8/13/2020    Procedure: REMOVAL, CATHETER, CENTRAL VENOUS, TUNNELED, WITH PORT;  Surgeon: Sebastian Dan MD;  Location: [facility] OR;  Service: Plastics;  Laterality: N/A;    TONSILLECTOMY      TRANSURETHRAL RESECTION OF PROSTATE N/A 6/7/2021    Procedure: TURP (TRANSURETHRAL RESECTION OF PROSTATE), CYSTOLITHALOPAXY;  Surgeon: Sukhjinder Tucker MD;  Location: [facility] OR;  Service: Urology;  Laterality: N/A;    WOUND DEBRIDEMENT N/A 8/13/2020    Procedure: DEBRIDEMENT, WOUND;  Surgeon: Sebastian Dan MD;  Location: [facility] OR;  Service: Plastics;  Laterality: N/A;  layered closure       Family History:  Family History   Problem Relation Age of Onset    Intestinal malrotation Mother     Leukemia Father     No Known Problems Sister     Coronary artery disease Brother     Coronary artery disease Maternal Grandmother     Stomach cancer Maternal Grandfather        Social History:  Social History     Tobacco Use    Smoking status: Every Day     Current packs/day: 1.00     Average packs/day: 1 pack/day for 39.8 years (39.8 ttl pk-yrs)     Types: Cigarettes     Start date: 1/2/1984    Smokeless tobacco: Former     Types: Chew    Tobacco comments:     no smoking after m.n prior to sx   Substance Use Topics    Alcohol use: Yes     Alcohol/week: 46.0 standard drinks of alcohol     Types: 42 Cans of beer, 4 Shots of liquor per week     Comment: segrams 7, beer daily  hold now prior to surgery    Drug  use: Never        Review of Systems:  General: No fever, chills  Skin: No rashes  Chest:  Denies cough and sputum production  Heart: Denies chest pain  Resp: Denies dyspnea  Abdomen: Denies diarrhea, abdominal pain, hematemesis, or blood in stool.  Musculoskeletal: No joint stiffness or swelling. Denies back pain.  : see HPI  Neuro: no dizziness or weakness    Allergies:  Patient has no known allergies.    Medications:    Current Outpatient Medications:     acetaminophen (TYLENOL) 325 MG tablet, Take 2 tablets (650 mg total) by mouth every 6 (six) hours as needed., Disp: , Rfl: 0    finasteride (PROSCAR) 5 mg tablet, Take 1 tablet (5 mg total) by mouth once daily., Disp: 30 tablet, Rfl: 11    oxyCODONE (ROXICODONE) 10 mg Tab immediate release tablet, Take 1 tablet (10 mg total) by mouth every 8 (eight) hours as needed for Pain (severe)., Disp: 90 tablet, Rfl: 0    oxyCODONE (ROXICODONE) 5 MG immediate release tablet, Take 1 tablet (5 mg total) by mouth every 4 (four) hours as needed for Pain., Disp: 15 tablet, Rfl: 0    promethazine (PHENERGAN) 25 MG tablet, Take 1 tablet (25 mg total) by mouth every 4 (four) hours., Disp: 60 tablet, Rfl: 4    sildenafiL (VIAGRA) 100 MG tablet, Take 1 tablet (100 mg total) by mouth daily as needed for Erectile Dysfunction., Disp: 20 tablet, Rfl: 11    sulfamethoxazole-trimethoprim 800-160mg (BACTRIM DS) 800-160 mg Tab, Take 1 tablet by mouth 2 (two) times daily. for 14 days, Disp: 28 tablet, Rfl: 0    tamsulosin (FLOMAX) 0.4 mg Cap, Take 1 capsule (0.4 mg total) by mouth once daily., Disp: 90 capsule, Rfl: 3    Physical Exam:  Vitals:    10/06/23 0939   BP: 115/81   Pulse: 96     General: awake, alert, cooperative  Head: NC/AT  Ears: external ears normal  Eyes: sclera normal  Lungs: normal inspiration, NAD  Heart: well-perfused  Abdomen: Soft, NT, ND, incisions healed, ostomy healthy  Skin: The skin is warm and dry  Ext: No c/c/e.  Neuro: grossly intact, AOx3    RADIOLOGY:  CT  CHEST ABDOMEN PELVIS WITH CONTRAST (XPD)     CLINICAL HISTORY:  Colon cancer, assess treatment response;.     TECHNIQUE:  Low dose axial images, sagittal and coronal reformations were obtained from the thoracic inlet to the pubic symphysis following the IV administration of Omnipaque 350.     COMPARISON:  07/20/2022     FINDINGS:  CT chest:     Mild atelectasis right lower lobe lung.  Heart normal.  Aorta normal in caliber with minimal atherosclerotic calcification.  Negative for adenopathy throughout the chest.     Chest wall soft tissues are normal.  Regional bones unremarkable.     CT abdomen and pelvis:     Punctate calcified granuloma left lobe of the liver.  Normal gallbladder.  Spleen normal in size with multiple punctate calcified granulomas.  Normal pancreas.  Normal adrenal glands.  Normal caliber aorta and iliac vasculature with atherosclerotic calcification.  IVC normal.     Staghorn calculus lower pole left kidney measures up to 3 cm.  No left hydronephrosis.  Left ureter nondilated.     Mild to moderate right hydronephrosis with mild dilatation of the right ureter.  New since prior examination.  Far distal right ureter is not well visualized secondary to scarring within the pelvis.  No discrete obstructing lesion identified.  Findings could be due to a recently passed calculus or could indicate avascular necrosis.  Chronic obstruction secondary to scarring within the pelvis thought less likely.     Stomach and small intestine are normal.  Total colectomy has been performed.  Left lower quadrant ostomy noted.  Stomal hernia contains a short loop of small bowel.  Negative for obstruction.  Negative for inflammatory change.     Mild degenerative changes of the spine.  No suspicious osseous lesions.     Impression:     Status post total colectomy.  Left lower quadrant ileostomy.  Negative for evidence of metastatic disease.     Mild to moderate right hydronephrosis, new since 07/20/2022 with perinephric  stranding.  See above discussion.    LABS:  I personally reviewed the following lab values:  Lab Results   Component Value Date    WBC 9.41 10/02/2023    HGB 11.2 (L) 10/02/2023    HCT 34.9 (L) 10/02/2023     10/02/2023     10/02/2023    K 5.1 10/02/2023     10/02/2023    CREATININE 1.5 (H) 10/02/2023    BUN 17 10/02/2023    CO2 21 (L) 10/02/2023    TSH 0.966 10/02/2023    PSA 0.45 12/12/2022    INR 1.0 06/11/2019    CHOL 158 06/01/2019    TRIG 194 (H) 06/01/2019    HDL 30 (L) 06/01/2019    ALT 14 10/02/2023    AST 18 10/02/2023     Bladder Scan performed in office:   5/7/20: 600ml  6/4/20:  ml.  04/21/2021 - 13mL  01/05/2022 - 1mL    PSA  6/19: 0.37  12/20: 0.58    Urinalysis obtained in clinic today specific gravity 1.020 pH six moderate blood positive leukocyte esterase positive nitrites    Assessment/Plan:   Sukhjinder Presley is a 57 y.o. male with:    Right hydronephrosis and bladder wall thickening - to OR for reTURBT and stent placement today    Bladder stone and recurrent UTIs due to BPH - s/p TURP, continue flomax    UTI - grew out proteus which was intermediate to Rocephin, Rocephin given here today    Sukhjinder Tucker MD  Urology

## 2023-10-06 NOTE — ANESTHESIA POSTPROCEDURE EVALUATION
Anesthesia Post Evaluation    Patient: Sukhjinder Presley    Procedure(s) Performed: Procedure(s) (LRB):  TURBT (TRANSURETHRAL RESECTION OF BLADDER TUMOR) (N/A)  CYSTOSCOPY, WITH URETERAL STENT INSERTION (Right)    Final Anesthesia Type: general      Patient location during evaluation: PACU  Patient participation: Yes- Able to Participate  Level of consciousness: awake and alert and oriented  Post-procedure vital signs: reviewed and stable  Pain management: adequate  Airway patency: patent  GABRIEL mitigation strategies: Verification of full reversal of neuromuscular block  PONV status at discharge: No PONV  Anesthetic complications: no      Cardiovascular status: blood pressure returned to baseline and hemodynamically stable  Respiratory status: unassisted  Hydration status: euvolemic  Follow-up not needed.          Vitals Value Taken Time   /76 10/06/23 1819   Temp 37.2 °C (99 °F) 10/06/23 1810   Pulse 93 10/06/23 1820   Resp 21 10/06/23 1820   SpO2 96 % 10/06/23 1820   Vitals shown include unvalidated device data.      Event Time   Out of Recovery 18:12:37         Pain/Stacy Score: Pain Rating Prior to Med Admin: 4 (10/6/2023  6:00 PM)  Stacy Score: 10 (10/6/2023  6:06 PM)

## 2023-10-06 NOTE — ANESTHESIA PREPROCEDURE EVALUATION
10/06/2023  Sukhjinder Presley is a 57 y.o., male    Patient Active Problem List   Diagnosis    Smoker    Polycythemia    Rectal bleeding    At risk for coronary artery disease    Rectal cancer    Kidney stone    Iron deficiency anemia due to chronic blood loss    Cachexia    Prostate cancer screening    Colostomy in place    Rectal fistula with localized perforation    Benign prostatic hyperplasia    Urinary tract infection without hematuria    Post-procedural erectile dysfunction    Tobacco dependence syndrome    BPH (benign prostatic hyperplasia)    Alcohol abuse with alcohol-induced disorder    Colonic mass    Hydronephrosis     Past Medical History:   Diagnosis Date    Anemia     Arthritis     Cancer     rectal    Renal disorder     kidney stones     Past Surgical History:   Procedure Laterality Date    ABDOMINOPERINEAL RESECTION OF RECTUM N/A 4/9/2020    Procedure: PROCTECTOMY, ABDOMINOPERINEAL;  Surgeon: Jan New MD;  Location: [facility];  Service: General;  Laterality: N/A;    COLECTOMY, TOTAL N/A 2/24/2022    Procedure: COLECTOMY, TOTAL;  Surgeon: Jan New MD;  Location: [facility];  Service: Colon and Rectal;  Laterality: N/A;    COLON SURGERY      COLONOSCOPY N/A 6/6/2019    Procedure: COLONOSCOPY;  Surgeon: Anjelica Saavedra MD;  Location: [facility];  Service: Endoscopy;  Laterality: N/A;    COLONOSCOPY N/A 12/17/2021    Procedure: COLONOSCOPY;  Surgeon: Jan New MD;  Location: [facility] ENDO;  Service: General;  Laterality: N/A;    CREATION OF MUSCLE ROTATIONAL FLAP Left 4/9/2020    Procedure: CREATION, FLAP, MUSCLE ROTATION;  Surgeon: Sebastian Dan MD;  Location: [facility] OR;  Service: Plastics;  Laterality: Left;   VRAM    CYSTOSCOPIC LITHOLAPAXY N/A 2/24/2022    Procedure: CYSTOLITHOLAPAXY;  Surgeon: Sukhjinder Tucker MD;  Location: [facility];   Service: Urology;  Laterality: N/A;    CYSTOSCOPY WITH BIOPSY OF BLADDER N/A 9/11/2023    Procedure: CYSTOSCOPY, WITH BLADDER BIOPSY, WITH FULGURATION IF INDICATED;  Surgeon: Sukhjinder Tucker MD;  Location: [facility] OR;  Service: Urology;  Laterality: N/A;  , POSS TURBT    CYSTOSCOPY WITH URETEROSCOPY, RETROGRADE PYELOGRAPHY, AND INSERTION OF STENT N/A 9/11/2023    Procedure: CYSTOSCOPY, WITH RETROGRADE PYELOGRAM AND URETERAL STENT INSERTION;  Surgeon: Sukhjinder Tucker MD;  Location: [facility] OR;  Service: Urology;  Laterality: N/A;    DILATION OF URETHRA N/A 9/11/2023    Procedure: DILATION, URETHRA;  Surgeon: Sukhjinder Tucker MD;  Location: [facility] OR;  Service: Urology;  Laterality: N/A;    ESOPHAGOGASTRODUODENOSCOPY N/A 6/6/2019    Procedure: EGD (ESOPHAGOGASTRODUODENOSCOPY);  Surgeon: Anjelica Saavedra MD;  Location: [facility];  Service: Endoscopy;  Laterality: N/A;    FLAP GRAFT SURGERY N/A 4/9/2020    Procedure: FLAP GRAFT;  Surgeon: Sebastian Dan MD;  Location: [facility] OR;  Service: Plastics;  Laterality: N/A;  left fasciocutaneous buttocks flap    HERNIA REPAIR  1995    INJECTION OF ANESTHETIC AGENT INTO TISSUE PLANE DEFINED BY TRANSVERSUS ABDOMINIS MUSCLE N/A 2/18/2020    Procedure: BLOCK, TRANSVERSUS ABDOMINIS PLANE;  Surgeon: Jan New MD;  Location: [facility] OR;  Service: General;  Laterality: N/A;    INJECTION OF ANESTHETIC AGENT INTO TISSUE PLANE DEFINED BY TRANSVERSUS ABDOMINIS MUSCLE  2/24/2022    Procedure: BLOCK, TRANSVERSUS ABDOMINIS PLANE;  Surgeon: Jan New MD;  Location: [facility] OR;  Service: Colon and Rectal;;    INSERTION OF TUNNELED CENTRAL VENOUS CATHETER (CVC) WITH SUBCUTANEOUS PORT N/A 10/8/2019    Procedure: [id number];  Surgeon: Jan New MD;  Location: [facility] OR;  Service: General;  Laterality: N/A;    LAPAROSCOPIC COLOSTOMY      MEDIPORT REMOVAL N/A 8/13/2020    Procedure: REMOVAL, CATHETER, CENTRAL VENOUS, TUNNELED, WITH PORT;   Surgeon: Sebastian Dan MD;  Location: [facility] OR;  Service: Plastics;  Laterality: N/A;    TONSILLECTOMY      TRANSURETHRAL RESECTION OF PROSTATE N/A 6/7/2021    Procedure: TURP (TRANSURETHRAL RESECTION OF PROSTATE), CYSTOLITHALOPAXY;  Surgeon: Sukhjinder Tucker MD;  Location: [facility] OR;  Service: Urology;  Laterality: N/A;    WOUND DEBRIDEMENT N/A 8/13/2020    Procedure: DEBRIDEMENT, WOUND;  Surgeon: Sebastian Dan MD;  Location: [facility] OR;  Service: Plastics;  Laterality: N/A;  layered closure       Pre-op Assessment    I have reviewed the Patient Summary Reports.     I have reviewed the Nursing Notes. I have reviewed the NPO Status.   I have reviewed the Medications.     Review of Systems  Anesthesia Hx:  No problems with previous Anesthesia  History of prior surgery of interest to airway management or planning: Previous anesthesia: General  Denies Personal Hx of Anesthesia complications.   Social:  Smoker, Alcohol Use Alcohol abuse with alcohol-induced disorder   Hematology/Oncology:         -- Anemia: Current/Recent Cancer.   Cardiovascular:  Cardiovascular Normal  ECG has been reviewed. Normal sinus rhythm   Possible Left atrial enlargement   Incomplete right bundle branch block   Borderline Abnormal ECG   When compared with ECG of 21-FEB-2022 13:45,   No significant change was found   Confirmed by HANSEL LOONEY MD ([id number]) on 8/24/2023 6:52:10 PM    Pulmonary:  Pulmonary Normal    Renal/:   Chronic Renal Disease renal calculi BPH had a CT obtained which showed right-sided hydronephrosis which is new, this also showed some abnormal bladder wall thickening on the right side   Hepatic/GI:  Hepatic/GI Normal S/p colectomy for mass.   Musculoskeletal:   Arthritis  Gout   Neurological:  Neurology Normal    Endocrine:  Endocrine Normal BMI 22       Physical Exam  General: Well nourished, Cooperative, Alert and Oriented    Airway:  Mallampati: II   Mouth Opening: Normal  TM Distance: Normal  Neck ROM: Normal  ROM    Dental:  Intact, Periodontal disease  Multiple missing teeth.  Some in particularly poor condition, but denies any loose currently; denies removable implants   Chest/Lungs:  Clear to auscultation    Heart:  Rate: Normal  Rhythm: Regular Rhythm        Anesthesia Plan  Type of Anesthesia, risks & benefits discussed:    Anesthesia Type: Gen ETT  Intra-op Monitoring Plan: Standard ASA Monitors  Post Op Pain Control Plan: multimodal analgesia and IV/PO Opioids PRN  Induction:  IV  Airway Plan: Direct, Post-Induction  Informed Consent: Informed consent signed with the Patient and all parties understand the risks and agree with anesthesia plan.  All questions answered.   ASA Score: 3  Day of Surgery Review of History & Physical: H&P Update referred to the surgeon/provider.  Anesthesia Plan Notes: Present Prior to Hospital Arrival?: No; Placement Date: 02/24/22; Placement Time: 0949; Method of Intubation: Direct laryngoscopy; Inserted by: CRNA; Airway Device: Endotracheal Tube; Mask Ventilation: Not Attempted; Intubated: Postinduction; Blade: Villarreal #2; Airway Device Size: 7.5; Style: Cuffed; Cuff Inflation: Minimal occlusive pressure; Placement Verified By: Auscultation, Capnometry, ETT Condensation; Grade: Grade II; Complicating Factors: None; Intubation Findings: Positive EtCO2, Bilateral breath sounds, Atraumatic/Condition of teeth unchanged;  Depth of Insertion: 23; Securment: Lips; Complications: None; Breath Sounds: Equal Bilateral; Insertion Attempts: 1; Removal Date: 02/24/22;  Removal Time: 1320    **Ate a piece of dry toast At approx 07:30 this AM     Ready For Surgery From Anesthesia Perspective.     .        Chemistry        Component Value Date/Time     10/02/2023 1603    K 5.1 10/02/2023 1603     10/02/2023 1603    CO2 21 (L) 10/02/2023 1603    BUN 17 10/02/2023 1603    CREATININE 1.5 (H) 10/02/2023 1603     (H) 10/02/2023 1603        Component Value Date/Time    CALCIUM 10.3  10/02/2023 1603    ALKPHOS 95 10/02/2023 1603    AST 18 10/02/2023 1603    ALT 14 10/02/2023 1603    BILITOT 0.5 10/02/2023 1603    ESTGFRAFRICA >60 07/20/2022 0956    EGFRNONAA >60 07/20/2022 0956        Lab Results   Component Value Date    WBC 9.41 10/02/2023    HGB 11.2 (L) 10/02/2023    HCT 34.9 (L) 10/02/2023    MCV 86 10/02/2023     10/02/2023

## 2023-10-06 NOTE — TRANSFER OF CARE
"Anesthesia Transfer of Care Note    Patient: Sukhjinder Presley    Procedure(s) Performed: Procedure(s) (LRB):  TURBT (TRANSURETHRAL RESECTION OF BLADDER TUMOR) (N/A)  CYSTOSCOPY, WITH URETERAL STENT INSERTION (Right)    Patient location: PACU    Anesthesia Type: general    Transport from OR: Transported from OR on room air with adequate spontaneous ventilation    Post pain: adequate analgesia    Post assessment: no apparent anesthetic complications    Post vital signs: stable    Level of consciousness: sedated    Nausea/Vomiting: no nausea/vomiting    Complications: none    Transfer of care protocol was followed      Last vitals:   Visit Vitals  BP (!) 153/92   Pulse 99   Temp 37.4 °C (99.3 °F) (Temporal)   Resp 15   Ht 6' 1" (1.854 m)   Wt 76.2 kg (168 lb)   SpO2 100%   BMI 22.16 kg/m²     "

## 2023-10-06 NOTE — ANESTHESIA PROCEDURE NOTES
Intubation    Date/Time: 10/6/2023 3:48 PM    Performed by: Sukhjinder Palma CRNA  Authorized by: Sebastian Colin MD    Intubation:     Induction:  Intravenous    Mask Ventilation:  Easy mask    Attempts:  1    Attempted By:  CRNA    Method of Intubation:  Direct    Blade:  Galilea 3    Laryngeal View Grade: Grade IIA - cords partially seen      Difficult Airway Encountered?: No      Complications:  None    Airway Device:  Oral endotracheal tube    Airway Device Size:  7.5    Style/Cuff Inflation:  Cuffed (inflated to minimal occlusive pressure)    Tube secured:  21    Secured at:  The lips    Placement Verified By:  Capnometry    Complicating Factors:  None    Findings Post-Intubation:  BS equal bilateral

## 2023-10-06 NOTE — PROGRESS NOTES
Chief Complaint: voiding issues    HPI:   10/06/2023 - messaged the day after his stent was removed with worsening right-sided flank pain and feeling poorly, CT obtained two days ago showed recurrence of his right-sided hydronephrosis with delayed nephrogram and delayed excretion, patient feels like he is undergoing chemo, feeling very worn out, also notes that his urine looks milky, urine culture from earlier this week showed Proteus but he has not started his antibiotics yet    09/26/2023 - stent removal    08/24/2023 - returns today for follow-up, had a CT obtained by Dr. Reyes 10 which showed right-sided hydronephrosis which is new, this also showed some abnormal bladder wall thickening on the right side, patient notes that he has been urinating without difficulty since his TURP, no gross hematuria or dysuria, urine today shows positive nitrites positive leukocyte esterase    10/05/2022 - patient returns today for follow-up, no issues in the interim, was catheterizing the after surgery but not recently, feels like he empties well, no gross hematuria or UTIs    04/05/2022 - patient returns today for f/u, no issues since his surgery, has been healing well, colon path negative for cancer, voiding with a good stream and feels like he empties    02/24/2022 - cystolitholapaxy and completion colectomy    01/05/2022 - patient returns today for follow-up, denies any issues in the interim, voiding with a good stream and emptying his bladder well, denies any gross hematuria or dysuria, had a CT last month which showed a new bladder stone    06/07/2021 - TURP with cystolitholapaxy    05/14/2021 - patient presents today for follow-up, he underwent urodynamics 2 weeks ago which showed a decent bladder contraction with the obstruction, he presents today for discussion of options, still notes urgency but denies any of the dysuria that he presented with initially, denies gross hematuria the    04/21/2021 - patient returns today  for follow-up, notes a 3-4 months history of dysuria, urgency, frequency, weak stream, and incomplete bladder emptying, still taking the flomax and finasteride, was previiously intstructed how to perform CIC but has not done this in some time, no GH but states that he 'keeps a bladder infection'  IPSS - 4/1/5/5/5/5/4 = 29 QOL - 6(terrible)    12/21/20: Sildenafil 100 working well enough.  Stream sometimes good sometimes feels he has to strain, most of the time it is good.  3 cups coffee a day and some sodas.  Retrograde ejaculation.  8/5/20- sildenafil 100mg is working well.  7/1/20: patient normally goes to Dr. Altamirano.  Apparently he has been having ED since his rectal surgery and would like to discuss options.  Recent dx with UTI and started abx today, otherwise voiding well.  Patient states he gets no erections after surgery, he has not tried any forms of medical therapy.  Libido and energy are still present.  6/4/20: PVR was 635 after our last visit and he was instructed in CIC.  Voiding normally he feels and hasn't done CIC in two weeks.  PVR today 105 ml.   5/6/20: Been on flomax since last visit.  Cysto/uroflow normal today.    4/20/20: 53 yo man had colon cancer with resection last week, referred by Dr. New.  Is in retention postoperatively.  No unusual abd/pelvic pain and no exac/rel factors.  No hematuria.  No urolithiasis.  PVR >700 ml.  No  history.      PMH:  Past Medical History:   Diagnosis Date    Anemia     Arthritis     Cancer     rectal    Renal disorder     kidney stones       PSH:  Past Surgical History:   Procedure Laterality Date    ABDOMINOPERINEAL RESECTION OF RECTUM N/A 4/9/2020    Procedure: PROCTECTOMY, ABDOMINOPERINEAL;  Surgeon: Jan New MD;  Location: [facility] OR;  Service: General;  Laterality: N/A;    COLECTOMY, TOTAL N/A 2/24/2022    Procedure: COLECTOMY, TOTAL;  Surgeon: Jan New MD;  Location: [facility] OR;  Service: Colon and Rectal;  Laterality: N/A;    COLON  SURGERY      COLONOSCOPY N/A 6/6/2019    Procedure: COLONOSCOPY;  Surgeon: Anjelica Saavedra MD;  Location: [facility] ENDO;  Service: Endoscopy;  Laterality: N/A;    COLONOSCOPY N/A 12/17/2021    Procedure: COLONOSCOPY;  Surgeon: Jan New MD;  Location: [facility] ENDO;  Service: General;  Laterality: N/A;    CREATION OF MUSCLE ROTATIONAL FLAP Left 4/9/2020    Procedure: CREATION, FLAP, MUSCLE ROTATION;  Surgeon: Sebastian Dan MD;  Location: [facility] OR;  Service: Plastics;  Laterality: Left;   VRAM    CYSTOSCOPIC LITHOLAPAXY N/A 2/24/2022    Procedure: CYSTOLITHOLAPAXY;  Surgeon: Sukhjinder Tucker MD;  Location: [facility];  Service: Urology;  Laterality: N/A;    CYSTOSCOPY WITH BIOPSY OF BLADDER N/A 9/11/2023    Procedure: CYSTOSCOPY, WITH BLADDER BIOPSY, WITH FULGURATION IF INDICATED;  Surgeon: Sukhjinder Tucker MD;  Location: [facility] OR;  Service: Urology;  Laterality: N/A;  , POSS TURBT    CYSTOSCOPY WITH URETEROSCOPY, RETROGRADE PYELOGRAPHY, AND INSERTION OF STENT N/A 9/11/2023    Procedure: CYSTOSCOPY, WITH RETROGRADE PYELOGRAM AND URETERAL STENT INSERTION;  Surgeon: Sukhjinder Tucker MD;  Location: [facility] OR;  Service: Urology;  Laterality: N/A;    DILATION OF URETHRA N/A 9/11/2023    Procedure: DILATION, URETHRA;  Surgeon: Sukhjinder Tucker MD;  Location: [facility] OR;  Service: Urology;  Laterality: N/A;    ESOPHAGOGASTRODUODENOSCOPY N/A 6/6/2019    Procedure: EGD (ESOPHAGOGASTRODUODENOSCOPY);  Surgeon: Anjelica Saavedra MD;  Location: [facility];  Service: Endoscopy;  Laterality: N/A;    FLAP GRAFT SURGERY N/A 4/9/2020    Procedure: FLAP GRAFT;  Surgeon: Sebastian Dan MD;  Location: [facility];  Service: Plastics;  Laterality: N/A;  left fasciocutaneous buttocks flap    HERNIA REPAIR  1995    INJECTION OF ANESTHETIC AGENT INTO TISSUE PLANE DEFINED BY TRANSVERSUS ABDOMINIS MUSCLE N/A 2/18/2020    Procedure: BLOCK, TRANSVERSUS ABDOMINIS PLANE;  Surgeon: Jan New MD;  Location: [facility] OR;   Service: General;  Laterality: N/A;    INJECTION OF ANESTHETIC AGENT INTO TISSUE PLANE DEFINED BY TRANSVERSUS ABDOMINIS MUSCLE  2/24/2022    Procedure: BLOCK, TRANSVERSUS ABDOMINIS PLANE;  Surgeon: Jan New MD;  Location: [facility] OR;  Service: Colon and Rectal;;    INSERTION OF TUNNELED CENTRAL VENOUS CATHETER (CVC) WITH SUBCUTANEOUS PORT N/A 10/8/2019    Procedure: [id number];  Surgeon: Jan New MD;  Location: [facility] OR;  Service: General;  Laterality: N/A;    LAPAROSCOPIC COLOSTOMY      MEDIPORT REMOVAL N/A 8/13/2020    Procedure: REMOVAL, CATHETER, CENTRAL VENOUS, TUNNELED, WITH PORT;  Surgeon: Sebastian Dan MD;  Location: [facility] OR;  Service: Plastics;  Laterality: N/A;    TONSILLECTOMY      TRANSURETHRAL RESECTION OF PROSTATE N/A 6/7/2021    Procedure: TURP (TRANSURETHRAL RESECTION OF PROSTATE), CYSTOLITHALOPAXY;  Surgeon: Sukhjinder Tucker MD;  Location: [facility] OR;  Service: Urology;  Laterality: N/A;    WOUND DEBRIDEMENT N/A 8/13/2020    Procedure: DEBRIDEMENT, WOUND;  Surgeon: Sebastian Dan MD;  Location: [facility] OR;  Service: Plastics;  Laterality: N/A;  layered closure       Family History:  Family History   Problem Relation Age of Onset    Intestinal malrotation Mother     Leukemia Father     No Known Problems Sister     Coronary artery disease Brother     Coronary artery disease Maternal Grandmother     Stomach cancer Maternal Grandfather        Social History:  Social History     Tobacco Use    Smoking status: Every Day     Current packs/day: 1.00     Average packs/day: 1 pack/day for 39.8 years (39.8 ttl pk-yrs)     Types: Cigarettes     Start date: 1/2/1984    Smokeless tobacco: Former     Types: Chew    Tobacco comments:     no smoking after m.n prior to sx   Substance Use Topics    Alcohol use: Yes     Alcohol/week: 46.0 standard drinks of alcohol     Types: 42 Cans of beer, 4 Shots of liquor per week     Comment: segrams 7, beer daily  hold now prior to surgery    Drug  use: Never        Review of Systems:  General: No fever, chills  Skin: No rashes  Chest:  Denies cough and sputum production  Heart: Denies chest pain  Resp: Denies dyspnea  Abdomen: Denies diarrhea, abdominal pain, hematemesis, or blood in stool.  Musculoskeletal: No joint stiffness or swelling. Denies back pain.  : see HPI  Neuro: no dizziness or weakness    Allergies:  Patient has no known allergies.    Medications:    Current Outpatient Medications:     acetaminophen (TYLENOL) 325 MG tablet, Take 2 tablets (650 mg total) by mouth every 6 (six) hours as needed., Disp: , Rfl: 0    finasteride (PROSCAR) 5 mg tablet, Take 1 tablet (5 mg total) by mouth once daily., Disp: 30 tablet, Rfl: 11    oxyCODONE (ROXICODONE) 10 mg Tab immediate release tablet, Take 1 tablet (10 mg total) by mouth every 8 (eight) hours as needed for Pain (severe)., Disp: 90 tablet, Rfl: 0    oxyCODONE (ROXICODONE) 5 MG immediate release tablet, Take 1 tablet (5 mg total) by mouth every 4 (four) hours as needed for Pain., Disp: 15 tablet, Rfl: 0    promethazine (PHENERGAN) 25 MG tablet, Take 1 tablet (25 mg total) by mouth every 4 (four) hours., Disp: 60 tablet, Rfl: 4    sildenafiL (VIAGRA) 100 MG tablet, Take 1 tablet (100 mg total) by mouth daily as needed for Erectile Dysfunction., Disp: 20 tablet, Rfl: 11    sulfamethoxazole-trimethoprim 800-160mg (BACTRIM DS) 800-160 mg Tab, Take 1 tablet by mouth 2 (two) times daily. for 14 days, Disp: 28 tablet, Rfl: 0    tamsulosin (FLOMAX) 0.4 mg Cap, Take 1 capsule (0.4 mg total) by mouth once daily., Disp: 90 capsule, Rfl: 3    Physical Exam:  Vitals:    10/06/23 0939   BP: 115/81   Pulse: 96     General: awake, alert, cooperative  Head: NC/AT  Ears: external ears normal  Eyes: sclera normal  Lungs: normal inspiration, NAD  Heart: well-perfused  Abdomen: Soft, NT, ND, incisions healed, ostomy healthy  Skin: The skin is warm and dry  Ext: No c/c/e.  Neuro: grossly intact, AOx3    RADIOLOGY:  CT  CHEST ABDOMEN PELVIS WITH CONTRAST (XPD)     CLINICAL HISTORY:  Colon cancer, assess treatment response;.     TECHNIQUE:  Low dose axial images, sagittal and coronal reformations were obtained from the thoracic inlet to the pubic symphysis following the IV administration of Omnipaque 350.     COMPARISON:  07/20/2022     FINDINGS:  CT chest:     Mild atelectasis right lower lobe lung.  Heart normal.  Aorta normal in caliber with minimal atherosclerotic calcification.  Negative for adenopathy throughout the chest.     Chest wall soft tissues are normal.  Regional bones unremarkable.     CT abdomen and pelvis:     Punctate calcified granuloma left lobe of the liver.  Normal gallbladder.  Spleen normal in size with multiple punctate calcified granulomas.  Normal pancreas.  Normal adrenal glands.  Normal caliber aorta and iliac vasculature with atherosclerotic calcification.  IVC normal.     Staghorn calculus lower pole left kidney measures up to 3 cm.  No left hydronephrosis.  Left ureter nondilated.     Mild to moderate right hydronephrosis with mild dilatation of the right ureter.  New since prior examination.  Far distal right ureter is not well visualized secondary to scarring within the pelvis.  No discrete obstructing lesion identified.  Findings could be due to a recently passed calculus or could indicate avascular necrosis.  Chronic obstruction secondary to scarring within the pelvis thought less likely.     Stomach and small intestine are normal.  Total colectomy has been performed.  Left lower quadrant ostomy noted.  Stomal hernia contains a short loop of small bowel.  Negative for obstruction.  Negative for inflammatory change.     Mild degenerative changes of the spine.  No suspicious osseous lesions.     Impression:     Status post total colectomy.  Left lower quadrant ileostomy.  Negative for evidence of metastatic disease.     Mild to moderate right hydronephrosis, new since 07/20/2022 with perinephric  stranding.  See above discussion.    LABS:  I personally reviewed the following lab values:  Lab Results   Component Value Date    WBC 9.41 10/02/2023    HGB 11.2 (L) 10/02/2023    HCT 34.9 (L) 10/02/2023     10/02/2023     10/02/2023    K 5.1 10/02/2023     10/02/2023    CREATININE 1.5 (H) 10/02/2023    BUN 17 10/02/2023    CO2 21 (L) 10/02/2023    TSH 0.966 10/02/2023    PSA 0.45 12/12/2022    INR 1.0 06/11/2019    CHOL 158 06/01/2019    TRIG 194 (H) 06/01/2019    HDL 30 (L) 06/01/2019    ALT 14 10/02/2023    AST 18 10/02/2023     Bladder Scan performed in office:   5/7/20: 600ml  6/4/20:  ml.  04/21/2021 - 13mL  01/05/2022 - 1mL    PSA  6/19: 0.37  12/20: 0.58    Urinalysis obtained in clinic today specific gravity 1.020 pH six moderate blood positive leukocyte esterase positive nitrites    Assessment/Plan:   Sukhjinder Presley is a 57 y.o. male with:    Right hydronephrosis and bladder wall thickening - to OR for reTURBT and stent placement today    Bladder stone and recurrent UTIs due to BPH - s/p TURP, continue flomax    UTI - grew out proteus which was intermediate to Rocephin, Rocephin given here today    Sukhjinder Tucker MD  Urology

## 2023-10-06 NOTE — OP NOTE
[facility] [place]  Operative Note    Date: 10/06/2023    Pre-Op Diagnosis:  Asymmetric right-sided bladder wall thickening on CT and right hydronephrosis    Post-Op Diagnosis: Bladder tumor    Procedure(s) Performed:   TURBT of >5cm bladder tumor, right ureteral stent placement    Specimen(s):  Bladder tumor for perm    Surgeon: Sukhjinder Tucker MD    Anesthesia: General endotracheal anesthesia    Indications: Sukhjinder Presley is a 57 y.o. male with asymmetric right-sided bladder wall thickening on CT as well as right-sided hydronephrosis concerning for possible malignancy.  He presents today for repeat TURBT.    Findings: Deep resection at the bladder neck revealed copious tumor, this was present from the 7 o'clock position to the 5 o'clock position and was noted to be very deep, going through the bladder neck and seeming to involve the surrounding fat in various places    Right retrograde ureteral pyelogram with interpretation less 1 hour - no filling defects, tortuosity noted with moderate right-sided hydronephrosis, mild calyceal blunting    Estimated Blood Loss: min    Drains: 18 Fr liz catheter    Procedure in detail:  After the risks, benefits and possible complications of the procedure were explained, consents were obtained. The patient was taken to the operating room and placed under anesthesia. Pre-operative antibiotics were administered 30 minutes prior to expected start time. The patient was placed in the dorsal lithotomy position and prepped and draped in the normal and sterile fashion. Time out was performed.      A resectoscope with visual obturator was inserted and this passed easily.  Complete cystoscopy was performed, there was no overt evidence of tumor.  Biopsies sites were in various stages of healing. Review of the patient's recent CTs was performed in order to guide our resection. Resection over the right UO revealed no obvious tumor. Resection over the right lateral wall was  unrevealing as well. Resection at the bladder neck revealed copious tumor. This resection was carried deep in an attempt to ascertain the extent of the tumor, and seemed to involve areas of perivesical fat. The resection was performed from the 7 o'clock position to the 5 o'clock position. After resection was complete, the tumor was evacuated with an Elik. Hemostasis was obtained, which was excellent considering the depth of the resection. The resectoscope was exchanged for the cystoscope. A motion wire was instered and over this wire a 7fr x 26 cm JJ ureteral stent was inserted. Good curls were noted both proximally and distally on fluoroscopy at the end of the case.      An 18 Lao Cooney catheter was inserted with 20 cc sterile water in the balloon.    The patient tolerated the procedure well and was transferred to recovery in stable condition.    Disposition: d/c home, call with path, will likely need referral to uro onc for consideration of cystectomy.     Sukhjinder Tucker MD

## 2023-10-09 ENCOUNTER — TELEPHONE (OUTPATIENT)
Dept: HEMATOLOGY/ONCOLOGY | Facility: CLINIC | Age: 58
End: 2023-10-09
Payer: COMMERCIAL

## 2023-10-09 ENCOUNTER — OFFICE VISIT (OUTPATIENT)
Dept: SPORTS MEDICINE | Facility: CLINIC | Age: 58
End: 2023-10-09
Payer: COMMERCIAL

## 2023-10-09 ENCOUNTER — PATIENT MESSAGE (OUTPATIENT)
Dept: UROLOGY | Facility: CLINIC | Age: 58
End: 2023-10-09
Payer: COMMERCIAL

## 2023-10-09 ENCOUNTER — HOSPITAL ENCOUNTER (OUTPATIENT)
Dept: RADIOLOGY | Facility: HOSPITAL | Age: 58
Discharge: HOME | End: 2023-10-09
Attending: FAMILY MEDICINE
Payer: COMMERCIAL

## 2023-10-09 ENCOUNTER — OFFICE VISIT (OUTPATIENT)
Dept: PRIMARY CARE CLINIC | Facility: CLINIC | Age: 58
End: 2023-10-09
Payer: COMMERCIAL

## 2023-10-09 VITALS
BODY MASS INDEX: 22.26 KG/M2 | WEIGHT: 168 LBS | TEMPERATURE: 99 F | OXYGEN SATURATION: 97 % | RESPIRATION RATE: 13 BRPM | HEIGHT: 73 IN | HEART RATE: 92 BPM | SYSTOLIC BLOOD PRESSURE: 133 MMHG | DIASTOLIC BLOOD PRESSURE: 75 MMHG

## 2023-10-09 VITALS
WEIGHT: 163.56 LBS | DIASTOLIC BLOOD PRESSURE: 80 MMHG | BODY MASS INDEX: 21.68 KG/M2 | SYSTOLIC BLOOD PRESSURE: 110 MMHG | HEART RATE: 104 BPM | OXYGEN SATURATION: 97 % | TEMPERATURE: 98 F | HEIGHT: 73 IN

## 2023-10-09 DIAGNOSIS — N40.0 BENIGN PROSTATIC HYPERPLASIA, UNSPECIFIED WHETHER LOWER URINARY TRACT SYMPTOMS PRESENT: ICD-10-CM

## 2023-10-09 DIAGNOSIS — Z90.49 H/O TOTAL COLECTOMY: ICD-10-CM

## 2023-10-09 DIAGNOSIS — Z93.2 ILEOSTOMY IN PLACE: ICD-10-CM

## 2023-10-09 DIAGNOSIS — Z87.39 HISTORY OF GOUT: ICD-10-CM

## 2023-10-09 DIAGNOSIS — R73.09 ELEVATED GLUCOSE: ICD-10-CM

## 2023-10-09 DIAGNOSIS — D64.9 NORMOCHROMIC NORMOCYTIC ANEMIA: ICD-10-CM

## 2023-10-09 DIAGNOSIS — Z13.220 ENCOUNTER FOR LIPID SCREENING FOR CARDIOVASCULAR DISEASE: ICD-10-CM

## 2023-10-09 DIAGNOSIS — Z76.89 ENCOUNTER TO ESTABLISH CARE: Primary | ICD-10-CM

## 2023-10-09 DIAGNOSIS — D49.4 BLADDER TUMOR: ICD-10-CM

## 2023-10-09 DIAGNOSIS — N17.9 AKI (ACUTE KIDNEY INJURY): ICD-10-CM

## 2023-10-09 DIAGNOSIS — M25.532 LEFT WRIST PAIN: ICD-10-CM

## 2023-10-09 DIAGNOSIS — Z13.6 ENCOUNTER FOR LIPID SCREENING FOR CARDIOVASCULAR DISEASE: ICD-10-CM

## 2023-10-09 PROBLEM — D50.0 ANEMIA DUE TO CHRONIC BLOOD LOSS: Status: ACTIVE | Noted: 2023-10-09

## 2023-10-09 PROCEDURE — 73110 X-RAY EXAM OF WRIST: CPT | Mod: 26,LT,, | Performed by: RADIOLOGY

## 2023-10-09 PROCEDURE — 99999 PR PBB SHADOW E&M-EST. PATIENT-LVL III: ICD-10-PCS | Mod: PBBFAC,,, | Performed by: STUDENT IN AN ORGANIZED HEALTH CARE EDUCATION/TRAINING PROGRAM

## 2023-10-09 PROCEDURE — 3079F PR MOST RECENT DIASTOLIC BLOOD PRESSURE 80-89 MM HG: ICD-10-PCS | Mod: CPTII,S$GLB,, | Performed by: FAMILY MEDICINE

## 2023-10-09 PROCEDURE — 99999 PR PBB SHADOW E&M-EST. PATIENT-LVL III: CPT | Mod: PBBFAC,,, | Performed by: STUDENT IN AN ORGANIZED HEALTH CARE EDUCATION/TRAINING PROGRAM

## 2023-10-09 PROCEDURE — 1159F PR MEDICATION LIST DOCUMENTED IN MEDICAL RECORD: ICD-10-PCS | Mod: CPTII,S$GLB,, | Performed by: STUDENT IN AN ORGANIZED HEALTH CARE EDUCATION/TRAINING PROGRAM

## 2023-10-09 PROCEDURE — 20606 INTERMEDIATE JOINT ASPIRATION/INJECTION: L RADIOCARPAL: ICD-10-PCS | Mod: LT,S$GLB,, | Performed by: STUDENT IN AN ORGANIZED HEALTH CARE EDUCATION/TRAINING PROGRAM

## 2023-10-09 PROCEDURE — 73110 X-RAY EXAM OF WRIST: CPT | Mod: TC,PN,LT

## 2023-10-09 PROCEDURE — 20606 DRAIN/INJ JOINT/BURSA W/US: CPT | Mod: LT,S$GLB,, | Performed by: STUDENT IN AN ORGANIZED HEALTH CARE EDUCATION/TRAINING PROGRAM

## 2023-10-09 PROCEDURE — 1160F PR REVIEW ALL MEDS BY PRESCRIBER/CLIN PHARMACIST DOCUMENTED: ICD-10-PCS | Mod: CPTII,S$GLB,, | Performed by: STUDENT IN AN ORGANIZED HEALTH CARE EDUCATION/TRAINING PROGRAM

## 2023-10-09 PROCEDURE — 97760 PR ORTHOTIC MGMT&TRAINJ INITIAL ENC EA 15 MINS: ICD-10-PCS | Mod: GP,S$GLB,, | Performed by: STUDENT IN AN ORGANIZED HEALTH CARE EDUCATION/TRAINING PROGRAM

## 2023-10-09 PROCEDURE — 99204 PR OFFICE/OUTPT VISIT, NEW, LEVL IV, 45-59 MIN: ICD-10-PCS | Mod: 25,S$GLB,, | Performed by: STUDENT IN AN ORGANIZED HEALTH CARE EDUCATION/TRAINING PROGRAM

## 2023-10-09 PROCEDURE — 99204 OFFICE O/P NEW MOD 45 MIN: CPT | Mod: 25,S$GLB,, | Performed by: STUDENT IN AN ORGANIZED HEALTH CARE EDUCATION/TRAINING PROGRAM

## 2023-10-09 PROCEDURE — 3008F PR BODY MASS INDEX (BMI) DOCUMENTED: ICD-10-PCS | Mod: CPTII,S$GLB,, | Performed by: FAMILY MEDICINE

## 2023-10-09 PROCEDURE — 73110 XR WRIST COMPLETE 3 VIEWS LEFT: ICD-10-PCS | Mod: 26,LT,, | Performed by: RADIOLOGY

## 2023-10-09 PROCEDURE — 99215 PR OFFICE/OUTPT VISIT, EST, LEVL V, 40-54 MIN: ICD-10-PCS | Mod: S$GLB,,, | Performed by: FAMILY MEDICINE

## 2023-10-09 PROCEDURE — 3074F PR MOST RECENT SYSTOLIC BLOOD PRESSURE < 130 MM HG: ICD-10-PCS | Mod: CPTII,S$GLB,, | Performed by: FAMILY MEDICINE

## 2023-10-09 PROCEDURE — 3074F SYST BP LT 130 MM HG: CPT | Mod: CPTII,S$GLB,, | Performed by: FAMILY MEDICINE

## 2023-10-09 PROCEDURE — 3079F DIAST BP 80-89 MM HG: CPT | Mod: CPTII,S$GLB,, | Performed by: FAMILY MEDICINE

## 2023-10-09 PROCEDURE — 97760 ORTHOTIC MGMT&TRAING 1ST ENC: CPT | Mod: GP,S$GLB,, | Performed by: STUDENT IN AN ORGANIZED HEALTH CARE EDUCATION/TRAINING PROGRAM

## 2023-10-09 PROCEDURE — 3008F BODY MASS INDEX DOCD: CPT | Mod: CPTII,S$GLB,, | Performed by: FAMILY MEDICINE

## 2023-10-09 PROCEDURE — 1160F RVW MEDS BY RX/DR IN RCRD: CPT | Mod: CPTII,S$GLB,, | Performed by: STUDENT IN AN ORGANIZED HEALTH CARE EDUCATION/TRAINING PROGRAM

## 2023-10-09 PROCEDURE — 99999 PR PBB SHADOW E&M-EST. PATIENT-LVL V: CPT | Mod: PBBFAC,,, | Performed by: FAMILY MEDICINE

## 2023-10-09 PROCEDURE — 1159F MED LIST DOCD IN RCRD: CPT | Mod: CPTII,S$GLB,, | Performed by: STUDENT IN AN ORGANIZED HEALTH CARE EDUCATION/TRAINING PROGRAM

## 2023-10-09 PROCEDURE — 99215 OFFICE O/P EST HI 40 MIN: CPT | Mod: S$GLB,,, | Performed by: FAMILY MEDICINE

## 2023-10-09 PROCEDURE — 99999 PR PBB SHADOW E&M-EST. PATIENT-LVL V: ICD-10-PCS | Mod: PBBFAC,,, | Performed by: FAMILY MEDICINE

## 2023-10-09 RX ORDER — DICLOFENAC SODIUM 50 MG/1
50 TABLET, DELAYED RELEASE ORAL 3 TIMES DAILY PRN
Qty: 30 TABLET | Refills: 0 | Status: SHIPPED | OUTPATIENT
Start: 2023-10-09 | End: 2023-10-21 | Stop reason: SDUPTHER

## 2023-10-09 RX ORDER — BETAMETHASONE SODIUM PHOSPHATE AND BETAMETHASONE ACETATE 3; 3 MG/ML; MG/ML
6 INJECTION, SUSPENSION INTRA-ARTICULAR; INTRALESIONAL; INTRAMUSCULAR; SOFT TISSUE
Status: DISCONTINUED | OUTPATIENT
Start: 2023-10-09 | End: 2023-10-09 | Stop reason: HOSPADM

## 2023-10-09 RX ADMIN — BETAMETHASONE SODIUM PHOSPHATE AND BETAMETHASONE ACETATE 6 MG: 3; 3 INJECTION, SUSPENSION INTRA-ARTICULAR; INTRALESIONAL; INTRAMUSCULAR; SOFT TISSUE at 03:10

## 2023-10-09 NOTE — PROGRESS NOTES
Subjective:       Patient ID: Sukhjinder Presley is a 57 y.o. male.    Chief Complaint: Cancer and Anemia    HPI: Mr. Presley is a 57 year old male who is following for his anemia and rectal adenocarcinoma which he is currently under surveillance for. He has not seen us thus far for anemia.   Cancer Hx: Rectal adenocarcinoma, MSI stable, cT3 N2, stage IIIB, diagnosed 06/06/2019, s/p neoadjuvant chemoradiation with capecitabine and FOLFOXIRI plus Avastin under direction of Dr. Aden, APR, 04/09/2020, ypT3 ypN0.   Pmhx: following with urology for hydronephrosis. Had stent removal 9/26/23, then had repeat right hydronephrosis on 10/6/23, and had TURBT on 10/6/23. Also has recurrent UTIs and BPH, on flomax    Today: Patient presents with significant other. This is the first time he is seeing us for iron. Does have ventral and parastomal hernia that is bothersome to him and would like evaluation for surgical intervention--> will address after urological concerns are taken care of. He is following with Dr. Tucker and Dr. New. Pathology from TURBT on 10/6/23 is still pending. He currently has ureter stent and catheter in now. He also has ileostomy. He denies any fevers, bleeding. He has previously taken oral iron and received IV iron as well while he was undergoing chemotherapy previously. He states he has been fatigued and feeling down.     Social History     Socioeconomic History    Marital status: Significant Other   Tobacco Use    Smoking status: Every Day     Current packs/day: 1.00     Average packs/day: 1 pack/day for 39.8 years (39.8 ttl pk-yrs)     Types: Cigarettes     Start date: 1/2/1984    Smokeless tobacco: Former     Types: Chew    Tobacco comments:     no smoking after m.n prior to sx   Substance and Sexual Activity    Alcohol use: Not Currently     Alcohol/week: 46.0 standard drinks of alcohol     Types: 42 Cans of beer, 4 Shots of liquor per week     Comment: segrams 7, beer daily  hold now prior to surgery     Drug use: Never    Sexual activity: Yes     Partners: Female     Birth control/protection: None     Social Determinants of Health     Financial Resource Strain: High Risk (10/9/2023)    Overall Financial Resource Strain (CARDIA)     Difficulty of Paying Living Expenses: Very hard   Food Insecurity: Food Insecurity Present (10/9/2023)    Hunger Vital Sign     Worried About Running Out of Food in the Last Year: Often true     Ran Out of Food in the Last Year: Often true   Transportation Needs: No Transportation Needs (10/9/2023)    PRAPARE - Transportation     Lack of Transportation (Medical): No     Lack of Transportation (Non-Medical): No   Physical Activity: Sufficiently Active (10/9/2023)    Exercise Vital Sign     Days of Exercise per Week: 7 days     Minutes of Exercise per Session: 60 min   Stress: Stress Concern Present (10/9/2023)    [employer] - Occupational Stress Questionnaire     Feeling of Stress : Very much   Social Connections: Unknown (10/9/2023)    Social Connection and Isolation Panel [NHANES]     Frequency of Communication with Friends and Family: More than three times a week     Frequency of Social Gatherings with Friends and Family: Once a week     Active Member of Clubs or Organizations: No     Attends Club or Organization Meetings: Never     Marital Status: Living with partner   Housing Stability: High Risk (10/9/2023)    Housing Stability Vital Sign     Unable to Pay for Housing in the Last Year: Yes     Number of Places Lived in the Last Year: 1     Unstable Housing in the Last Year: No       Past Medical History:   Diagnosis Date    Anemia     Arthritis     Cancer     rectal    Renal disorder     kidney stones       Family History   Problem Relation Age of Onset    Intestinal malrotation Mother     Leukemia Father     No Known Problems Sister     Coronary artery disease Brother     Coronary artery disease Maternal Grandmother     Stomach cancer Maternal  Grandfather        Past Surgical History:   Procedure Laterality Date    ABDOMINOPERINEAL RESECTION OF RECTUM N/A 4/9/2020    Procedure: PROCTECTOMY, ABDOMINOPERINEAL;  Surgeon: Jan New MD;  Location: [facility] OR;  Service: General;  Laterality: N/A;    COLECTOMY, TOTAL N/A 2/24/2022    Procedure: COLECTOMY, TOTAL;  Surgeon: Jan New MD;  Location: [facility] OR;  Service: Colon and Rectal;  Laterality: N/A;    COLON SURGERY      COLONOSCOPY N/A 6/6/2019    Procedure: COLONOSCOPY;  Surgeon: Anjelica Saavedra MD;  Location: [facility] ENDO;  Service: Endoscopy;  Laterality: N/A;    COLONOSCOPY N/A 12/17/2021    Procedure: COLONOSCOPY;  Surgeon: Jan New MD;  Location: [facility] ENDO;  Service: General;  Laterality: N/A;    CREATION OF MUSCLE ROTATIONAL FLAP Left 4/9/2020    Procedure: CREATION, FLAP, MUSCLE ROTATION;  Surgeon: Sebastian Dan MD;  Location: [facility] OR;  Service: Plastics;  Laterality: Left;   VRAM    CYSTOSCOPIC LITHOLAPAXY N/A 2/24/2022    Procedure: CYSTOLITHOLAPAXY;  Surgeon: Sukhjinder Tucker MD;  Location: [facility] OR;  Service: Urology;  Laterality: N/A;    CYSTOSCOPY W/ URETERAL STENT PLACEMENT Right 10/6/2023    Procedure: CYSTOSCOPY, WITH URETERAL STENT INSERTION;  Surgeon: Sukhjinder Tucker MD;  Location: [facility] OR;  Service: Urology;  Laterality: Right;    CYSTOSCOPY WITH BIOPSY OF BLADDER N/A 9/11/2023    Procedure: CYSTOSCOPY, WITH BLADDER BIOPSY, WITH FULGURATION IF INDICATED;  Surgeon: Sukhjinder Tucker MD;  Location: [facility] OR;  Service: Urology;  Laterality: N/A;  , POSS TURBT    CYSTOSCOPY WITH URETEROSCOPY, RETROGRADE PYELOGRAPHY, AND INSERTION OF STENT N/A 9/11/2023    Procedure: CYSTOSCOPY, WITH RETROGRADE PYELOGRAM AND URETERAL STENT INSERTION;  Surgeon: Sukhjinder Tucker MD;  Location: [facility] OR;  Service: Urology;  Laterality: N/A;    DILATION OF URETHRA N/A 9/11/2023    Procedure: DILATION, URETHRA;  Surgeon: Sukhjinder Tucker MD;  Location: [facility] OR;   Service: Urology;  Laterality: N/A;    ESOPHAGOGASTRODUODENOSCOPY N/A 6/6/2019    Procedure: EGD (ESOPHAGOGASTRODUODENOSCOPY);  Surgeon: Anjelica Saavedra MD;  Location: [facility] ENDO;  Service: Endoscopy;  Laterality: N/A;    FLAP GRAFT SURGERY N/A 4/9/2020    Procedure: FLAP GRAFT;  Surgeon: Sebastian Dan MD;  Location: [facility] OR;  Service: Plastics;  Laterality: N/A;  left fasciocutaneous buttocks flap    HERNIA REPAIR  1995    INJECTION OF ANESTHETIC AGENT INTO TISSUE PLANE DEFINED BY TRANSVERSUS ABDOMINIS MUSCLE N/A 2/18/2020    Procedure: BLOCK, TRANSVERSUS ABDOMINIS PLANE;  Surgeon: Jan New MD;  Location: [facility] OR;  Service: General;  Laterality: N/A;    INJECTION OF ANESTHETIC AGENT INTO TISSUE PLANE DEFINED BY TRANSVERSUS ABDOMINIS MUSCLE  2/24/2022    Procedure: BLOCK, TRANSVERSUS ABDOMINIS PLANE;  Surgeon: Jan New MD;  Location: [facility];  Service: Colon and Rectal;;    INSERTION OF TUNNELED CENTRAL VENOUS CATHETER (CVC) WITH SUBCUTANEOUS PORT N/A 10/8/2019    Procedure: [id number];  Surgeon: Jan New MD;  Location: [facility];  Service: General;  Laterality: N/A;    LAPAROSCOPIC COLOSTOMY      MEDIPORT REMOVAL N/A 8/13/2020    Procedure: REMOVAL, CATHETER, CENTRAL VENOUS, TUNNELED, WITH PORT;  Surgeon: Sebastian Dan MD;  Location: [facility] OR;  Service: Plastics;  Laterality: N/A;    TONSILLECTOMY      TRANSURETHRAL RESECTION OF PROSTATE N/A 6/7/2021    Procedure: TURP (TRANSURETHRAL RESECTION OF PROSTATE), CYSTOLITHALOPAXY;  Surgeon: Sukhjinder Tucker MD;  Location: [facility] OR;  Service: Urology;  Laterality: N/A;    TURBT (TRANSURETHRAL RESECTION OF BLADDER TUMOR) N/A 10/6/2023    Procedure: TURBT (TRANSURETHRAL RESECTION OF BLADDER TUMOR);  Surgeon: Sukhjinder Tucker MD;  Location: [facility] OR;  Service: Urology;  Laterality: N/A;    WOUND DEBRIDEMENT N/A 8/13/2020    Procedure: DEBRIDEMENT, WOUND;  Surgeon: Sebastian Dan MD;  Location: [facility] OR;  Service:  Plastics;  Laterality: N/A;  layered closure       Review of Systems      Medication List with Changes/Refills   Current Medications    ACETAMINOPHEN (TYLENOL) 325 MG TABLET    Take 2 tablets (650 mg total) by mouth every 6 (six) hours as needed.    DICLOFENAC (VOLTAREN) 50 MG EC TABLET    Take 1 tablet (50 mg total) by mouth 3 (three) times daily as needed (pain).    FINASTERIDE (PROSCAR) 5 MG TABLET    Take 1 tablet (5 mg total) by mouth once daily.    OXYCODONE (ROXICODONE) 10 MG TAB IMMEDIATE RELEASE TABLET    Take 1 tablet (10 mg total) by mouth every 8 (eight) hours as needed for Pain (severe).    OXYCODONE (ROXICODONE) 5 MG IMMEDIATE RELEASE TABLET    Take 1 tablet (5 mg total) by mouth every 4 (four) hours as needed for Pain.    PROMETHAZINE (PHENERGAN) 25 MG TABLET    Take 1 tablet (25 mg total) by mouth every 4 (four) hours.    SILDENAFIL (VIAGRA) 100 MG TABLET    Take 1 tablet (100 mg total) by mouth daily as needed for Erectile Dysfunction.    SULFAMETHOXAZOLE-TRIMETHOPRIM 800-160MG (BACTRIM DS) 800-160 MG TAB    Take 1 tablet by mouth 2 (two) times daily. for 14 days    TAMSULOSIN (FLOMAX) 0.4 MG CAP    Take 1 capsule (0.4 mg total) by mouth once daily.     Objective:     Vitals:    10/10/23 0727   BP: 122/86   Pulse: (!) 120   Temp: 98.2 °F (36.8 °C)       Physical Exam       Labs/Results:  Lab Results   Component Value Date    WBC 14.51 (H) 10/09/2023    RBC 4.21 (L) 10/09/2023    HGB 11.9 (L) 10/09/2023    HCT 36.6 (L) 10/09/2023    MCV 87 10/09/2023    MCH 28.3 10/09/2023    MCHC 32.5 10/09/2023    RDW 12.7 10/09/2023     (H) 10/09/2023    MPV 9.5 10/09/2023    GRAN 9.6 (H) 10/09/2023    GRAN 66.0 10/09/2023    LYMPH 3.7 10/09/2023    LYMPH 25.4 10/09/2023    MONO 0.7 10/09/2023    MONO 4.9 10/09/2023    EOS 0.4 10/09/2023    BASO 0.08 10/09/2023    EOSINOPHIL 2.5 10/09/2023    BASOPHIL 0.6 10/09/2023      Latest Reference Range & Units 10/09/23 14:53   Iron 45 - 160 ug/dL 20 (L)   TIBC 250 -  450 ug/dL 246 (L)   Saturated Iron 20 - 50 % 8 (L)   Transferrin 200 - 375 mg/dL 166 (L)   Ferritin 20.0 - 300.0 ng/mL 924 (H)   Folate 4.0 - 24.0 ng/mL 5.7   Vitamin B-12 210 - 950 pg/mL 423      Latest Reference Range & Units 10/09/23 14:53   Retic 0.4 - 2.0 % 1.3     CMP  Sodium   Date Value Ref Range Status   10/09/2023 137 136 - 145 mmol/L Final     Potassium   Date Value Ref Range Status   10/09/2023 4.6 3.5 - 5.1 mmol/L Final     Chloride   Date Value Ref Range Status   10/09/2023 102 95 - 110 mmol/L Final     CO2   Date Value Ref Range Status   10/09/2023 20 (L) 23 - 29 mmol/L Final     Glucose   Date Value Ref Range Status   10/09/2023 134 (H) 70 - 110 mg/dL Final     BUN   Date Value Ref Range Status   10/09/2023 12 6 - 20 mg/dL Final     Creatinine   Date Value Ref Range Status   10/09/2023 1.6 (H) 0.5 - 1.4 mg/dL Final     Calcium   Date Value Ref Range Status   10/09/2023 9.5 8.7 - 10.5 mg/dL Final     Total Protein   Date Value Ref Range Status   10/09/2023 8.6 (H) 6.0 - 8.4 g/dL Final     Albumin   Date Value Ref Range Status   10/09/2023 3.1 (L) 3.5 - 5.2 g/dL Final     Total Bilirubin   Date Value Ref Range Status   10/09/2023 0.2 0.1 - 1.0 mg/dL Final     Comment:     For infants and newborns, interpretation of results should be based  on gestational age, weight and in agreement with clinical  observations.    Premature Infant recommended reference ranges:  Up to 24 hours.............<8.0 mg/dL  Up to 48 hours............<12.0 mg/dL  3-5 days..................<15.0 mg/dL  6-29 days.................<15.0 mg/dL       Alkaline Phosphatase   Date Value Ref Range Status   10/09/2023 203 (H) 55 - 135 U/L Final     AST   Date Value Ref Range Status   10/09/2023 18 10 - 40 U/L Final     ALT   Date Value Ref Range Status   10/09/2023 25 10 - 44 U/L Final     Anion Gap   Date Value Ref Range Status   10/09/2023 15 8 - 16 mmol/L Final     eGFR   Date Value Ref Range Status   10/09/2023 50.3 (A) >60  mL/min/1.73 m^2 Final       Ct c/a/p 7/11/23  Impression:  Status post total colectomy.  Left lower quadrant ileostomy.  Negative for evidence of metastatic disease.    Assessment:     Problem List Items Addressed This Visit          Renal/    BPH (benign prostatic hyperplasia) - Primary    Hydronephrosis       Oncology    Iron deficiency anemia due to chronic blood loss (Chronic)    Rectal cancer    Anemia due to chronic blood loss       GI    Colostomy in place     Plan:     Rectal cancer  --diagnosed 06/06/2019  -- s/p neoadjuvant chemoradiation with capecitabine and FOLFOXIRI plus Avastin under direction of Dr. Aden, APR, 04/09/2020, ypT3 ypN0  --currently in surveillance  --Repeat colonoscopy December 2021 with adenomatous polyp status post completion colectomy/total colectomy with end ileostomy construction in February of 2022; final pathology with no evidence of malignancy present  --last CT c/a/p 7/11/23  --CEA due q 3 months  --CT c/a/p due next year  --continue to follow with colon surgery    Iron deficiency anemia due to chronic blood loss, Anemia due to chronic blood loss  --pending results    --pending results from recent TURBT on 10/6/23 by Dr. Tucker    Follow-Up: IV iron feraheme x 2 doses, one week apart. 2 months with cbc cmp iron/tibc ferritin prior     Shanae Serrano PA-C  Hematology Oncology

## 2023-10-09 NOTE — PROGRESS NOTES
Patient ID: Sukhjinder Presley  YOB: 1965  MRN: [id number]    Chief Complaint: Pain and Swelling of the Left Wrist    Referred By: Dr. Murphy    Occupation: superintendent-       History of Present Illness: Sukhjinder Presley is a left-hand dominant 57 y.o. male who presents today with Pain and Swelling of the Left Wrist    He complains of recent onset of left radial wrist pain and swelling that began without injury in early October 2023.  The pain worsens with reaching, grasping and strenuous use.  He was evaluated by Dr. Murphy this morning who referred for possible deQuervain's injection.  He has used topical lidocaine but no NSAIDs for pain.    Past Medical History:   Past Medical History:   Diagnosis Date    Anemia     Arthritis     Cancer     rectal    Renal disorder     kidney stones     Past Surgical History:   Procedure Laterality Date    ABDOMINOPERINEAL RESECTION OF RECTUM N/A 4/9/2020    Procedure: PROCTECTOMY, ABDOMINOPERINEAL;  Surgeon: Jan New MD;  Location: [facility] OR;  Service: General;  Laterality: N/A;    COLECTOMY, TOTAL N/A 2/24/2022    Procedure: COLECTOMY, TOTAL;  Surgeon: Jan New MD;  Location: [facility] OR;  Service: Colon and Rectal;  Laterality: N/A;    COLON SURGERY      COLONOSCOPY N/A 6/6/2019    Procedure: COLONOSCOPY;  Surgeon: Anjelica Saavedra MD;  Location: [facility];  Service: Endoscopy;  Laterality: N/A;    COLONOSCOPY N/A 12/17/2021    Procedure: COLONOSCOPY;  Surgeon: Jan New MD;  Location: [facility] ENDO;  Service: General;  Laterality: N/A;    CREATION OF MUSCLE ROTATIONAL FLAP Left 4/9/2020    Procedure: CREATION, FLAP, MUSCLE ROTATION;  Surgeon: Sebatsian Dan MD;  Location: [facility] OR;  Service: Plastics;  Laterality: Left;   VRAM    CYSTOSCOPIC LITHOLAPAXY N/A 2/24/2022    Procedure: CYSTOLITHOLAPAXY;  Surgeon: Sukhjinder Tucker MD;  Location: [facility];  Service: Urology;  Laterality: N/A;    CYSTOSCOPY W/ URETERAL STENT  PLACEMENT Right 10/6/2023    Procedure: CYSTOSCOPY, WITH URETERAL STENT INSERTION;  Surgeon: Sukhjinder Tucker MD;  Location: [facility] OR;  Service: Urology;  Laterality: Right;    CYSTOSCOPY WITH BIOPSY OF BLADDER N/A 9/11/2023    Procedure: CYSTOSCOPY, WITH BLADDER BIOPSY, WITH FULGURATION IF INDICATED;  Surgeon: Sukhjinder Tucker MD;  Location: [facility] OR;  Service: Urology;  Laterality: N/A;  , POSS TURBT    CYSTOSCOPY WITH URETEROSCOPY, RETROGRADE PYELOGRAPHY, AND INSERTION OF STENT N/A 9/11/2023    Procedure: CYSTOSCOPY, WITH RETROGRADE PYELOGRAM AND URETERAL STENT INSERTION;  Surgeon: Sukhjinder Tucker MD;  Location: [facility] OR;  Service: Urology;  Laterality: N/A;    DILATION OF URETHRA N/A 9/11/2023    Procedure: DILATION, URETHRA;  Surgeon: Sukhjinder Tucker MD;  Location: [facility] OR;  Service: Urology;  Laterality: N/A;    ESOPHAGOGASTRODUODENOSCOPY N/A 6/6/2019    Procedure: EGD (ESOPHAGOGASTRODUODENOSCOPY);  Surgeon: Anjelica Saavedra MD;  Location: [facility];  Service: Endoscopy;  Laterality: N/A;    FLAP GRAFT SURGERY N/A 4/9/2020    Procedure: FLAP GRAFT;  Surgeon: Sebastian Dan MD;  Location: [facility];  Service: Plastics;  Laterality: N/A;  left fasciocutaneous buttocks flap    HERNIA REPAIR  1995    INJECTION OF ANESTHETIC AGENT INTO TISSUE PLANE DEFINED BY TRANSVERSUS ABDOMINIS MUSCLE N/A 2/18/2020    Procedure: BLOCK, TRANSVERSUS ABDOMINIS PLANE;  Surgeon: Jan New MD;  Location: [facility] OR;  Service: General;  Laterality: N/A;    INJECTION OF ANESTHETIC AGENT INTO TISSUE PLANE DEFINED BY TRANSVERSUS ABDOMINIS MUSCLE  2/24/2022    Procedure: BLOCK, TRANSVERSUS ABDOMINIS PLANE;  Surgeon: Jan New MD;  Location: [facility] OR;  Service: Colon and Rectal;;    INSERTION OF TUNNELED CENTRAL VENOUS CATHETER (CVC) WITH SUBCUTANEOUS PORT N/A 10/8/2019    Procedure: WERMFOANT-LCAV-J-CATH;  Surgeon: Jan New MD;  Location: [facility] OR;  Service: General;  Laterality: N/A;     LAPAROSCOPIC COLOSTOMY      MEDIPORT REMOVAL N/A 8/13/2020    Procedure: REMOVAL, CATHETER, CENTRAL VENOUS, TUNNELED, WITH PORT;  Surgeon: Sebastian Dan MD;  Location: [facility] OR;  Service: Plastics;  Laterality: N/A;    TONSILLECTOMY      TRANSURETHRAL RESECTION OF PROSTATE N/A 6/7/2021    Procedure: TURP (TRANSURETHRAL RESECTION OF PROSTATE), CYSTOLITHALOPAXY;  Surgeon: Sukhjnider Tucker MD;  Location: [facility] OR;  Service: Urology;  Laterality: N/A;    TURBT (TRANSURETHRAL RESECTION OF BLADDER TUMOR) N/A 10/6/2023    Procedure: TURBT (TRANSURETHRAL RESECTION OF BLADDER TUMOR);  Surgeon: Sukhjinder Tucker MD;  Location: [facility] OR;  Service: Urology;  Laterality: N/A;    WOUND DEBRIDEMENT N/A 8/13/2020    Procedure: DEBRIDEMENT, WOUND;  Surgeon: Sebastian Dan MD;  Location: [facility] OR;  Service: Plastics;  Laterality: N/A;  layered closure     Family History   Problem Relation Age of Onset    Intestinal malrotation Mother     Leukemia Father     No Known Problems Sister     Coronary artery disease Brother     Coronary artery disease Maternal Grandmother     Stomach cancer Maternal Grandfather      Social History     Socioeconomic History    Marital status: Significant Other   Tobacco Use    Smoking status: Every Day     Current packs/day: 1.00     Average packs/day: 1 pack/day for 39.8 years (39.8 ttl pk-yrs)     Types: Cigarettes     Start date: 1/2/1984    Smokeless tobacco: Former     Types: Chew    Tobacco comments:     no smoking after m.n prior to sx   Substance and Sexual Activity    Alcohol use: Not Currently     Alcohol/week: 46.0 standard drinks of alcohol     Types: 42 Cans of beer, 4 Shots of liquor per week     Comment: segrams 7, beer daily  hold now prior to surgery    Drug use: Never    Sexual activity: Yes     Partners: Female     Birth control/protection: None     Social Determinants of Health     Financial Resource Strain: High Risk (10/9/2023)    Overall Financial Resource Strain (CARDIA)      Difficulty of Paying Living Expenses: Very hard   Food Insecurity: Food Insecurity Present (10/9/2023)    Hunger Vital Sign     Worried About Running Out of Food in the Last Year: Often true     Ran Out of Food in the Last Year: Often true   Transportation Needs: No Transportation Needs (10/9/2023)    PRAPARE - Transportation     Lack of Transportation (Medical): No     Lack of Transportation (Non-Medical): No   Physical Activity: Sufficiently Active (10/9/2023)    Exercise Vital Sign     Days of Exercise per Week: 7 days     Minutes of Exercise per Session: 60 min   Stress: Stress Concern Present (10/9/2023)    [employer] - Occupational Stress Questionnaire     Feeling of Stress : Very much   Social Connections: Unknown (10/9/2023)    Social Connection and Isolation Panel [NHANES]     Frequency of Communication with Friends and Family: More than three times a week     Frequency of Social Gatherings with Friends and Family: Once a week     Active Member of Clubs or Organizations: No     Attends Club or Organization Meetings: Never     Marital Status: Living with partner   Housing Stability: High Risk (10/9/2023)    Housing Stability Vital Sign     Unable to Pay for Housing in the Last Year: Yes     Number of Places Lived in the Last Year: 1     Unstable Housing in the Last Year: No     Medication List with Changes/Refills   Current Medications    ACETAMINOPHEN (TYLENOL) 325 MG TABLET    Take 2 tablets (650 mg total) by mouth every 6 (six) hours as needed.    DICLOFENAC (VOLTAREN) 50 MG EC TABLET    Take 1 tablet (50 mg total) by mouth 3 (three) times daily as needed (pain).    FINASTERIDE (PROSCAR) 5 MG TABLET    Take 1 tablet (5 mg total) by mouth once daily.    OXYCODONE (ROXICODONE) 10 MG TAB IMMEDIATE RELEASE TABLET    Take 1 tablet (10 mg total) by mouth every 8 (eight) hours as needed for Pain (severe).    OXYCODONE (ROXICODONE) 5 MG IMMEDIATE RELEASE TABLET    Take 1 tablet  (5 mg total) by mouth every 4 (four) hours as needed for Pain.    PROMETHAZINE (PHENERGAN) 25 MG TABLET    Take 1 tablet (25 mg total) by mouth every 4 (four) hours.    SILDENAFIL (VIAGRA) 100 MG TABLET    Take 1 tablet (100 mg total) by mouth daily as needed for Erectile Dysfunction.    SULFAMETHOXAZOLE-TRIMETHOPRIM 800-160MG (BACTRIM DS) 800-160 MG TAB    Take 1 tablet by mouth 2 (two) times daily. for 14 days    TAMSULOSIN (FLOMAX) 0.4 MG CAP    Take 1 capsule (0.4 mg total) by mouth once daily.     Review of patient's allergies indicates:  No Known Allergies    Physical Exam:   There is no height or weight on file to calculate BMI.    Physical Exam  Detailed MSK exam:     Left wrist  Inspection:  Diffuse swelling throughout the dorsal and volar aspects of the wrist.  No erythema.  No deformity.  Palpation:  Significant tenderness at the radiocarpal joint, both volarly and dorsally.  Tenderness at the distal radioulnar joint, and dorsally along the ulnocarpal space as well.  Limited range of motion due to pain, affecting extension flexion, and ulnar/radial deviation.    Negative Finkelstein's.    Negative TFCC grind  Negative CMC grind  Normal neurovascular exam    Imaging:  X-Ray Wrist Complete Left  Narrative: EXAMINATION:  XR WRIST COMPLETE 3 VIEWS LEFT    CLINICAL HISTORY:  Pain in left wrist    TECHNIQUE:  Standard radiography performed.  Four views.    COMPARISON:  None    FINDINGS:  Bone density and architecture are normal.  No acute findings.  Impression: Negative exam.    Electronically signed by: Mary Ann Beasley MD  Date:    10/09/2023  Time:    13:14      Relevant imaging results were reviewed and interpreted by me and per my read:  Normal appearing radiographs of the left wrist.  Normal alignment overall.  No significant degenerative or erosive changes.  No fractures or other acute abnormalities.    This was discussed with the patient and / or family today.     Patient Instructions   Assessment:  Sukhjinder  PRESTON Presley is a 57 y.o. male with a chief complaint of Pain and Swelling of the Left Wrist    Encounter Diagnosis   Name Primary?    Left wrist pain       Plan:  XR reviewed - normal appearing radiographs of the left wrist, no erosive or degenerative changes  Labs reviewed - Cr 1.5, GFR 54.3, LFTs WNL  History and clinical exam is concerning for inflammatory cause of wrist pain, most likely related to gout, given his history in relationship of wrist pain to recent gout flare in his foot.    Diagnosis, treatment options, prognosis discussed in detail  Left wrist brace provided today  At least 10 minutes were spent sizing, fitting, and educating regarding durable medical equipment today Gregorio Gordon MD.    Left wrist corticosteroid joint injection today  Proper protocols after the injection included: no submerging pools, baths tubs, or hot tubs for 24 hr.  Showering is okay today.  Side effects of the corticosteroid injection can include elevated blood glucose levels and blood pressures, so if you are taking medications for these, please monitor closely, and contact your PCP if any issues.  Red flag symptoms include fever, chills, nausea, vomiting, red, warm, tender joint at the area of injection.  If you are noticing these symptoms, they may be indicative of an infection, and please seek medical care immediately, either by calling our clinic or going to the emergency room.  Okay to take/trial Diclofenac 50 mg, every 8 hours as needed for pain    Follow-up:  2-3 weeks, if needed, or sooner if there are any problems between now and then.    Thank you for choosing [facility] and Dr. Gregorio Gordon for your orthopedic & sports medicine care. It is our goal to provide you with exceptional care that will help keep you healthy, active, and get you back in the game.    Please do not hesitate to reach out to us via email, phone, or MyChart with any questions, concerns, or feedback.    If you are  experiencing pain/discomfort ,or have questions after 5pm and would like to be connected to the [facility] [place] on-call team, please call this number and specify which Sports Medicine provider is treating you: [phone]       A copy of today's visit note has been sent to the referring provider.           Gregorio Gordon MD  Primary Care Sports Medicine    Disclaimer: This note was prepared using a voice recognition system and is likely to have sound alike errors within the text.

## 2023-10-09 NOTE — PATIENT INSTRUCTIONS
Assessment:  Sukhjinder Presley is a 57 y.o. male with a chief complaint of Pain and Swelling of the Left Wrist    Encounter Diagnosis   Name Primary?    Left wrist pain       Plan:  XR reviewed - normal appearing radiographs of the left wrist, no erosive or degenerative changes  Labs reviewed - Cr 1.5, GFR 54.3, LFTs WNL  History and clinical exam is concerning for inflammatory cause of wrist pain, most likely related to gout, given his history in relationship of wrist pain to recent gout flare in his foot.    Diagnosis, treatment options, prognosis discussed in detail  Left wrist brace provided today  At least 10 minutes were spent sizing, fitting, and educating regarding durable medical equipment today Gregorio Gordon MD.    Left wrist corticosteroid joint injection today  Proper protocols after the injection included: no submerging pools, baths tubs, or hot tubs for 24 hr.  Showering is okay today.  Side effects of the corticosteroid injection can include elevated blood glucose levels and blood pressures, so if you are taking medications for these, please monitor closely, and contact your PCP if any issues.  Red flag symptoms include fever, chills, nausea, vomiting, red, warm, tender joint at the area of injection.  If you are noticing these symptoms, they may be indicative of an infection, and please seek medical care immediately, either by calling our clinic or going to the emergency room.  Okay to take/trial Diclofenac 50 mg, every 8 hours as needed for pain    Follow-up:  2-3 weeks, if needed, or sooner if there are any problems between now and then.    Thank you for choosing [facility] [place] and Dr. Gregorio Gordon for your orthopedic & sports medicine care. It is our goal to provide you with exceptional care that will help keep you healthy, active, and get you back in the game.    Please do not hesitate to reach out to us via email, phone, or MyChart with any questions, concerns, or  feedback.    If you are experiencing pain/discomfort ,or have questions after 5pm and would like to be connected to the [facility] [place] on-call team, please call this number and specify which Sports Medicine provider is treating you: [phone]

## 2023-10-09 NOTE — PROGRESS NOTES
"    [facility]       [address] [place] [address]      Phone: [phone]      Fax: [phone]    Marito Murphy MD                Office Visit  10/09/2023        Subjective      HPI:  Sukhjinder Presley is a 57 y.o. male presents today in clinic for "Establish Care  ."     57-year-old gentleman presents today to establish care, discuss a couple of issues.      Patient states he has been tired, fatigued lately.  Saw someone else for this and they did some recent blood work.  Discovered an acute drop in his hemoglobin levels.  He has an appointment to see Hematology tomorrow.  Would like to get some repeat blood work done prior to this.  Wants to know if we can place orders?    Also having some issues with his left hand/wrist.  Started bothering him about one week ago.  No injury that he knows of.  In the past, someone told he had gout, but that affected his foot.  Does not feel the same.  Has been taking some OTC cherries, apple cider vinegar, alfalfa supplements.  No relief.  Pain goes from the wrist, up to his elbow, shoulder.  Hurts when he moves his hand certain ways.    Has history of colon cancer.  Had total colectomy, with ileostomy.  Follows with Colorectal regularly.  Getting good output from his ostomy bag.  No issues here.    Has also had kidney stones, bladder stones, BPH.  Was recently having some urinary issues, so he saw Urology.  Had a cystoscope done and biopsies were taken.  Came back inconclusive, just some inflammatory tissue.  Last week, he had a repeat procedure done.  Still waiting on pathology to come back.    PMH: Colon cancer.  Kidney/bladder stones.  BPH.  PSH:  Colectomy.  Ileostomy.  Inguinal hernia.    F MH: Leukemia.  Another cancer.    Allergies:  NKDA   Social: Works as a supervisor/.  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  T:  1/2-1 ppd times 30+ years   A:  Denies  D:  Denies    Exercise:  Walks a lot at work    Colon: " Virgen  Cardiology:  Harjeet/Zach        The following were updated and reviewed by myself in the chart: medications, past medical history, past surgical history, family history, social history, and allergies.     Medications:  Current Outpatient Medications on File Prior to Visit   Medication Sig Dispense Refill    acetaminophen (TYLENOL) 325 MG tablet Take 2 tablets (650 mg total) by mouth every 6 (six) hours as needed.  0    finasteride (PROSCAR) 5 mg tablet Take 1 tablet (5 mg total) by mouth once daily. 30 tablet 11    oxyCODONE (ROXICODONE) 10 mg Tab immediate release tablet Take 1 tablet (10 mg total) by mouth every 8 (eight) hours as needed for Pain (severe). 90 tablet 0    oxyCODONE (ROXICODONE) 5 MG immediate release tablet Take 1 tablet (5 mg total) by mouth every 4 (four) hours as needed for Pain. 30 tablet 0    promethazine (PHENERGAN) 25 MG tablet Take 1 tablet (25 mg total) by mouth every 4 (four) hours. 60 tablet 4    sildenafiL (VIAGRA) 100 MG tablet Take 1 tablet (100 mg total) by mouth daily as needed for Erectile Dysfunction. 20 tablet 11    sulfamethoxazole-trimethoprim 800-160mg (BACTRIM DS) 800-160 mg Tab Take 1 tablet by mouth 2 (two) times daily. for 14 days 28 tablet 0    tamsulosin (FLOMAX) 0.4 mg Cap Take 1 capsule (0.4 mg total) by mouth once daily. 90 capsule 3     No current facility-administered medications on file prior to visit.        PMHx:  Past Medical History:   Diagnosis Date    Anemia     Arthritis     Cancer     rectal    Renal disorder     kidney stones      Patient Active Problem List    Diagnosis Date Noted    Anemia due to chronic blood loss 10/09/2023    Bladder tumor 10/06/2023    Hydronephrosis 09/11/2023    Colonic mass 02/25/2022    Alcohol abuse with alcohol-induced disorder 06/21/2021    BPH (benign prostatic hyperplasia) 05/14/2021    Benign prostatic hyperplasia 07/01/2020    Urinary tract infection without hematuria 07/01/2020    Post-procedural erectile  dysfunction 07/01/2020    Rectal fistula with localized perforation 03/02/2020    Colostomy in place 02/24/2020    Prostate cancer screening 02/17/2020    Cachexia 12/04/2019    Iron deficiency anemia due to chronic blood loss 09/04/2019    Kidney stone 07/17/2019    Rectal cancer 06/24/2019    At risk for coronary artery disease 06/13/2019    Rectal bleeding 06/06/2019    Smoker 05/27/2019    Polycythemia 05/27/2019    Tobacco dependence syndrome 10/22/2018        PSHx:  Past Surgical History:   Procedure Laterality Date    ABDOMINOPERINEAL RESECTION OF RECTUM N/A 4/9/2020    Procedure: PROCTECTOMY, ABDOMINOPERINEAL;  Surgeon: Jan New MD;  Location: [facility];  Service: General;  Laterality: N/A;    COLECTOMY, TOTAL N/A 2/24/2022    Procedure: COLECTOMY, TOTAL;  Surgeon: Jan New MD;  Location: [facility];  Service: Colon and Rectal;  Laterality: N/A;    COLON SURGERY      COLONOSCOPY N/A 6/6/2019    Procedure: COLONOSCOPY;  Surgeon: Anjelica Saavedra MD;  Location: [facility];  Service: Endoscopy;  Laterality: N/A;    COLONOSCOPY N/A 12/17/2021    Procedure: COLONOSCOPY;  Surgeon: Jan New MD;  Location: [facility];  Service: General;  Laterality: N/A;    CREATION OF MUSCLE ROTATIONAL FLAP Left 4/9/2020    Procedure: CREATION, FLAP, MUSCLE ROTATION;  Surgeon: Sebastian Dan MD;  Location: [facility];  Service: Plastics;  Laterality: Left;   VRAM    CYSTOSCOPIC LITHOLAPAXY N/A 2/24/2022    Procedure: CYSTOLITHOLAPAXY;  Surgeon: Sukhjinder Tucker MD;  Location: [facility];  Service: Urology;  Laterality: N/A;    CYSTOSCOPY W/ URETERAL STENT PLACEMENT Right 10/6/2023    Procedure: CYSTOSCOPY, WITH URETERAL STENT INSERTION;  Surgeon: Sukhjinder Tucker MD;  Location: [facility];  Service: Urology;  Laterality: Right;    CYSTOSCOPY WITH BIOPSY OF BLADDER N/A 9/11/2023    Procedure: CYSTOSCOPY, WITH BLADDER BIOPSY, WITH FULGURATION IF INDICATED;  Surgeon: Sukhjinder Tucker MD;  Location:  [facility];  Service: Urology;  Laterality: N/A;  , POSS TURBT    CYSTOSCOPY WITH URETEROSCOPY, RETROGRADE PYELOGRAPHY, AND INSERTION OF STENT N/A 9/11/2023    Procedure: CYSTOSCOPY, WITH RETROGRADE PYELOGRAM AND URETERAL STENT INSERTION;  Surgeon: Sukhjinder Tucker MD;  Location: [facility];  Service: Urology;  Laterality: N/A;    DILATION OF URETHRA N/A 9/11/2023    Procedure: DILATION, URETHRA;  Surgeon: Sukhjidner Tucker MD;  Location: [facility];  Service: Urology;  Laterality: N/A;    ESOPHAGOGASTRODUODENOSCOPY N/A 6/6/2019    Procedure: EGD (ESOPHAGOGASTRODUODENOSCOPY);  Surgeon: Anjelica Saavedra MD;  Location: [facility];  Service: Endoscopy;  Laterality: N/A;    FLAP GRAFT SURGERY N/A 4/9/2020    Procedure: FLAP GRAFT;  Surgeon: Sebastian Dan MD;  Location: [facility];  Service: Plastics;  Laterality: N/A;  left fasciocutaneous buttocks flap    HERNIA REPAIR  1995    INJECTION OF ANESTHETIC AGENT INTO TISSUE PLANE DEFINED BY TRANSVERSUS ABDOMINIS MUSCLE N/A 2/18/2020    Procedure: BLOCK, TRANSVERSUS ABDOMINIS PLANE;  Surgeon: Jan New MD;  Location: [facility];  Service: General;  Laterality: N/A;    INJECTION OF ANESTHETIC AGENT INTO TISSUE PLANE DEFINED BY TRANSVERSUS ABDOMINIS MUSCLE  2/24/2022    Procedure: BLOCK, TRANSVERSUS ABDOMINIS PLANE;  Surgeon: Jan New MD;  Location: [facility];  Service: Colon and Rectal;;    INSERTION OF TUNNELED CENTRAL VENOUS CATHETER (CVC) WITH SUBCUTANEOUS PORT N/A 10/8/2019    Procedure: [id number];  Surgeon: Jan New MD;  Location: [facility];  Service: General;  Laterality: N/A;    LAPAROSCOPIC COLOSTOMY      MEDIPORT REMOVAL N/A 8/13/2020    Procedure: REMOVAL, CATHETER, CENTRAL VENOUS, TUNNELED, WITH PORT;  Surgeon: Sebastian Dan MD;  Location: [facility];  Service: Plastics;  Laterality: N/A;    TONSILLECTOMY      TRANSURETHRAL RESECTION OF PROSTATE N/A 6/7/2021    Procedure: TURP (TRANSURETHRAL RESECTION OF PROSTATE),  CYSTOLITHALOPAXY;  Surgeon: Sukhjinder Tucker MD;  Location: [facility] OR;  Service: Urology;  Laterality: N/A;    TURBT (TRANSURETHRAL RESECTION OF BLADDER TUMOR) N/A 10/6/2023    Procedure: TURBT (TRANSURETHRAL RESECTION OF BLADDER TUMOR);  Surgeon: Sukhjinder Tucker MD;  Location: [facility] OR;  Service: Urology;  Laterality: N/A;    WOUND DEBRIDEMENT N/A 8/13/2020    Procedure: DEBRIDEMENT, WOUND;  Surgeon: Sebastian Dan MD;  Location: [facility] OR;  Service: Plastics;  Laterality: N/A;  layered closure        FHx:  Family History   Problem Relation Age of Onset    Intestinal malrotation Mother     Leukemia Father     No Known Problems Sister     Coronary artery disease Brother     Coronary artery disease Maternal Grandmother     Stomach cancer Maternal Grandfather         Social:  Social History     Socioeconomic History    Marital status: Significant Other   Tobacco Use    Smoking status: Every Day     Current packs/day: 1.00     Average packs/day: 1 pack/day for 39.8 years (39.8 ttl pk-yrs)     Types: Cigarettes     Start date: 1/2/1984    Smokeless tobacco: Former     Types: Chew    Tobacco comments:     no smoking after m.n prior to sx   Substance and Sexual Activity    Alcohol use: Not Currently     Alcohol/week: 46.0 standard drinks of alcohol     Types: 42 Cans of beer, 4 Shots of liquor per week     Comment: segrams 7, beer daily  hold now prior to surgery    Drug use: Never    Sexual activity: Yes     Partners: Female     Birth control/protection: None     Social Determinants of Health     Financial Resource Strain: High Risk (10/9/2023)    Overall Financial Resource Strain (CARDIA)     Difficulty of Paying Living Expenses: Very hard   Food Insecurity: Food Insecurity Present (10/9/2023)    Hunger Vital Sign     Worried About Running Out of Food in the Last Year: Often true     Ran Out of Food in the Last Year: Often true   Transportation Needs: No Transportation Needs (10/9/2023)    PRAPARE -  "Transportation     Lack of Transportation (Medical): No     Lack of Transportation (Non-Medical): No   Physical Activity: Sufficiently Active (10/9/2023)    Exercise Vital Sign     Days of Exercise per Week: 7 days     Minutes of Exercise per Session: 60 min   Stress: Stress Concern Present (10/9/2023)    [place] [place] [employer] - Occupational Stress Questionnaire     Feeling of Stress : Very much   Social Connections: Unknown (10/9/2023)    Social Connection and Isolation Panel [NHANES]     Frequency of Communication with Friends and Family: More than three times a week     Frequency of Social Gatherings with Friends and Family: Once a week     Active Member of Clubs or Organizations: No     Attends Club or Organization Meetings: Never     Marital Status: Living with partner   Housing Stability: High Risk (10/9/2023)    Housing Stability Vital Sign     Unable to Pay for Housing in the Last Year: Yes     Number of Places Lived in the Last Year: 1     Unstable Housing in the Last Year: No        Allergies:  Review of patient's allergies indicates:  No Known Allergies     ROS:  Review of Systems   Constitutional:  Positive for activity change and fatigue. Negative for appetite change, chills and fever.   HENT:  Negative for congestion, postnasal drip, rhinorrhea, sore throat and trouble swallowing.    Respiratory:  Negative for cough and shortness of breath.    Cardiovascular:  Negative for chest pain and palpitations.   Gastrointestinal:  Negative for abdominal pain, constipation, diarrhea, nausea and vomiting.   Genitourinary:  Negative for difficulty urinating.   Musculoskeletal:  Positive for arthralgias.   Skin:  Negative for color change and rash.   Neurological:  Negative for headaches.   All other systems reviewed and are negative.         Objective      /80   Pulse 104   Temp 98 °F (36.7 °C)   Ht 6' 1" (1.854 m)   Wt 74.2 kg (163 lb 9.3 oz)   SpO2 97%   BMI 21.58 kg/m²   Ht " "Readings from Last 3 Encounters:   10/09/23 6' 1" (1.854 m)   10/06/23 6' 1" (1.854 m)   09/26/23 6' (1.829 m)     Wt Readings from Last 3 Encounters:   10/09/23 74.2 kg (163 lb 9.3 oz)   10/06/23 76.2 kg (168 lb)   10/06/23 74.6 kg (164 lb 7.4 oz)       PHYSICAL EXAM:  Physical Exam  Vitals and nursing note reviewed.   Constitutional:       General: He is not in acute distress.     Appearance: Normal appearance.   HENT:      Head: Normocephalic and atraumatic.      Right Ear: Tympanic membrane, ear canal and external ear normal.      Left Ear: Tympanic membrane, ear canal and external ear normal.      Nose: Nose normal. No congestion or rhinorrhea.      Mouth/Throat:      Mouth: Mucous membranes are moist.      Pharynx: Oropharynx is clear. No oropharyngeal exudate or posterior oropharyngeal erythema.   Eyes:      Extraocular Movements: Extraocular movements intact.      Conjunctiva/sclera: Conjunctivae normal.      Pupils: Pupils are equal, round, and reactive to light.   Cardiovascular:      Rate and Rhythm: Normal rate and regular rhythm.   Pulmonary:      Effort: Pulmonary effort is normal.      Breath sounds: No wheezing, rhonchi or rales.   Abdominal:      General: The ostomy site is clean.      Tenderness: There is no abdominal tenderness.   Musculoskeletal:      Left wrist: Tenderness present. No swelling or deformity. Decreased range of motion.      Cervical back: Normal range of motion.      Comments: Left wrist tender to palpation over radial head.  Finkelstein positive.   Lymphadenopathy:      Cervical: No cervical adenopathy.   Skin:     General: Skin is warm and dry.   Neurological:      General: No focal deficit present.      Mental Status: He is alert.              LABS / IMAGING:  Recent Results (from the past 4368 hour(s))   Comprehensive Metabolic Panel    Collection Time: 06/21/23 11:50 AM   Result Value Ref Range    Sodium 139 136 - 145 mmol/L    Potassium 4.4 3.5 - 5.1 mmol/L    Chloride 107 " 95 - 110 mmol/L    CO2 23 23 - 29 mmol/L    Glucose 110 70 - 110 mg/dL    BUN 11 6 - 20 mg/dL    Creatinine 1.0 0.5 - 1.4 mg/dL    Calcium 9.6 8.7 - 10.5 mg/dL    Total Protein 7.7 6.0 - 8.4 g/dL    Albumin 4.0 3.5 - 5.2 g/dL    Total Bilirubin 0.4 0.1 - 1.0 mg/dL    Alkaline Phosphatase 76 55 - 135 U/L    AST 16 10 - 40 U/L    ALT 14 10 - 44 U/L    eGFR >60 >60 mL/min/1.73 m^2    Anion Gap 9 8 - 16 mmol/L   CBC Auto Differential    Collection Time: 06/21/23 11:50 AM   Result Value Ref Range    WBC 8.81 3.90 - 12.70 K/uL    RBC 5.19 4.60 - 6.20 M/uL    Hemoglobin 15.5 14.0 - 18.0 g/dL    Hematocrit 45.9 40.0 - 54.0 %    MCV 88 82 - 98 fL    MCH 29.9 27.0 - 31.0 pg    MCHC 33.8 32.0 - 36.0 g/dL    RDW 12.9 11.5 - 14.5 %    Platelets 287 150 - 450 K/uL    MPV 10.0 9.2 - 12.9 fL    Immature Granulocytes 0.3 0.0 - 0.5 %    Gran # (ANC) 5.4 1.8 - 7.7 K/uL    Immature Grans (Abs) 0.03 0.00 - 0.04 K/uL    Lymph # 2.6 1.0 - 4.8 K/uL    Mono # 0.4 0.3 - 1.0 K/uL    Eos # 0.3 0.0 - 0.5 K/uL    Baso # 0.07 0.00 - 0.20 K/uL    nRBC 0 0 /100 WBC    Gran % 61.0 38.0 - 73.0 %    Lymph % 29.5 18.0 - 48.0 %    Mono % 4.7 4.0 - 15.0 %    Eosinophil % 3.7 0.0 - 8.0 %    Basophil % 0.8 0.0 - 1.9 %    Differential Method Automated    CEA    Collection Time: 06/21/23 11:50 AM   Result Value Ref Range    CEA 2.0 0.0 - 5.0 ng/mL   Lactate Dehydrogenase    Collection Time: 06/21/23 11:50 AM   Result Value Ref Range     110 - 260 U/L   Iron and TIBC    Collection Time: 06/21/23 11:50 AM   Result Value Ref Range    Iron 89 45 - 160 ug/dL    Transferrin 243 200 - 375 mg/dL    TIBC 360 250 - 450 ug/dL    Saturated Iron 25 20 - 50 %   Ferritin    Collection Time: 06/21/23 11:50 AM   Result Value Ref Range    Ferritin 246 20.0 - 300.0 ng/mL   CBC Auto Differential    Collection Time: 08/24/23 12:19 PM   Result Value Ref Range    WBC 10.48 3.90 - 12.70 K/uL    RBC 5.10 4.60 - 6.20 M/uL    Hemoglobin 14.7 14.0 - 18.0 g/dL    Hematocrit 46.8  40.0 - 54.0 %    MCV 92 82 - 98 fL    MCH 28.8 27.0 - 31.0 pg    MCHC 31.4 (L) 32.0 - 36.0 g/dL    RDW 12.9 11.5 - 14.5 %    Platelets 298 150 - 450 K/uL    MPV 9.7 9.2 - 12.9 fL    Immature Granulocytes 0.4 0.0 - 0.5 %    Gran # (ANC) 6.3 1.8 - 7.7 K/uL    Immature Grans (Abs) 0.04 0.00 - 0.04 K/uL    Lymph # 3.2 1.0 - 4.8 K/uL    Mono # 0.5 0.3 - 1.0 K/uL    Eos # 0.4 0.0 - 0.5 K/uL    Baso # 0.06 0.00 - 0.20 K/uL    nRBC 0 0 /100 WBC    Gran % 59.7 38.0 - 73.0 %    Lymph % 30.9 18.0 - 48.0 %    Mono % 4.5 4.0 - 15.0 %    Eosinophil % 3.9 0.0 - 8.0 %    Basophil % 0.6 0.0 - 1.9 %    Differential Method Automated    Basic Metabolic Panel    Collection Time: 08/24/23 12:19 PM   Result Value Ref Range    Sodium 140 136 - 145 mmol/L    Potassium 4.5 3.5 - 5.1 mmol/L    Chloride 107 95 - 110 mmol/L    CO2 25 23 - 29 mmol/L    Glucose 96 70 - 110 mg/dL    BUN 14 6 - 20 mg/dL    Creatinine 1.3 0.5 - 1.4 mg/dL    Calcium 10.2 8.7 - 10.5 mg/dL    Anion Gap 8 8 - 16 mmol/L    eGFR >60.0 >60 mL/min/1.73 m^2   CULTURE, URINE    Collection Time: 08/24/23  2:00 PM    Specimen: Urine, Clean Catch   Result Value Ref Range    Urine Culture, Routine PROTEUS MIRABILIS  50,000 - 99,999 cfu/ml   (A)        Susceptibility    Proteus mirabilis - CULTURE, URINE     Amp/Sulbactam >16/8 Resistant mcg/mL     Amikacin <=16 Sensitive mcg/mL     Ampicillin >16 Resistant mcg/mL     Amox/K Clav'ate >16/8 Resistant mcg/mL     Ceftriaxone 2 Intermediate mcg/mL     Cefazolin >16 Resistant mcg/mL     Ciprofloxacin <=1 Sensitive mcg/mL     Cefepime <=2 Sensitive mcg/mL     Ertapenem <=0.5 Sensitive mcg/mL     Gentamicin >8 Resistant mcg/mL     Levofloxacin <=2 Sensitive mcg/mL     Meropenem <=1 Sensitive mcg/mL     Piperacillin/Tazo <=16 Sensitive mcg/mL     Trimeth/Sulfa <=2/38 Sensitive mcg/mL     Tobramycin >8 Resistant mcg/mL   Specimen to Pathology, Surgery Urology    Collection Time: 09/11/23 12:18 PM   Result Value Ref Range    Final  Pathologic Diagnosis       1. &quot;bladder biopsy&quot;, biopsy:      - Completely denuded urothelial mucosa with mild chronic inflammation      2. &quot;right ureteral orifice&quot;, biopsy:      - Urothelial mucosa with mild acute and chronic inflammation      Gross       1: Surgery ID:  [id number]   Pathology ID:  [id number]  1. Received in formalin labeled &quot;bladder biopsy&quot; are 2 pink-red tissue fragments aggregating to less than 1 g and individually measuring 0.5 x 0.5 x 0.3 cm and 0.6 x 0.5 x 0.4 cm.  The specimen is submitted entirely in cassette 1A.    ZJG--1-A        Grossed by: Sudha Gaspar MS, PA([facility])    2: Surgery ID:  [id number]   Pathology ID:  [id number]  2. Received in formalin labeled &quot;right ureteral orifice&quot; are 2 pink-red tissue fragments measuring 0.7 x 0.4 x 0.4 cm and 0.9 x 0.5 x 0.4 cm.  The specimen is submitted entirely in cassette 2A.    ROU--2-A        Grossed by: Sudha Gaspar MS, PA([facility])      Disclaimer       Unless the case is a 'gross only' or additional testing only, the final diagnosis for each specimen is based on a microscopic examination of appropriate tissue sections.   CEA    Collection Time: 09/25/23  1:49 PM   Result Value Ref Range    CEA 3.8 0.0 - 5.0 ng/mL   URINALYSIS    Collection Time: 10/02/23  3:45 PM   Result Value Ref Range    Specimen UA Urine, Clean Catch     Color, UA Yellow Yellow, Straw, Reshma    Appearance, UA Hazy (A) Clear    pH, UA 7.0 5.0 - 8.0    Specific Gravity, UA 1.020 1.005 - 1.030    Protein, UA 1+ (A) Negative    Glucose, UA Negative Negative    Ketones, UA Negative Negative    Bilirubin (UA) Negative Negative    Occult Blood UA 1+ (A) Negative    Nitrite, UA Negative Negative    Leukocytes, UA 3+ (A) Negative   Urinalysis Microscopic    Collection Time: 10/02/23  3:45 PM   Result Value Ref Range    RBC, UA 2 0 - 4 /hpf    WBC, UA >100 (H) 0 - 5 /hpf    WBC Clumps, UA Few (A) None-Rare    Bacteria Occasional None-Occ  /hpf    Hyaline Casts, UA 0 0-1/lpf /lpf    Microscopic Comment SEE COMMENT    Urine culture    Collection Time: 10/02/23  3:54 PM    Specimen: Urine, Clean Catch   Result Value Ref Range    Urine Culture, Routine PROTEUS MIRABILIS  10,000 - 49,999 cfu/ml   (A)        Susceptibility    Proteus mirabilis - CULTURE, URINE     Amp/Sulbactam 16/8 Intermediate mcg/mL     Amikacin <=16 Sensitive mcg/mL     Ampicillin >16 Resistant mcg/mL     Amox/K Clav'ate >16/8 Resistant mcg/mL     Ceftriaxone 2 Intermediate mcg/mL     Cefazolin >16 Resistant mcg/mL     Ciprofloxacin <=1 Sensitive mcg/mL     Cefepime <=2 Sensitive mcg/mL     Ertapenem <=0.5 Sensitive mcg/mL     Gentamicin >8 Resistant mcg/mL     Levofloxacin <=2 Sensitive mcg/mL     Meropenem <=1 Sensitive mcg/mL     Piperacillin/Tazo <=16 Sensitive mcg/mL     Trimeth/Sulfa <=2/38 Sensitive mcg/mL     Tobramycin >8 Resistant mcg/mL   CBC Auto Differential    Collection Time: 10/02/23  4:03 PM   Result Value Ref Range    WBC 9.41 3.90 - 12.70 K/uL    RBC 4.06 (L) 4.60 - 6.20 M/uL    Hemoglobin 11.2 (L) 14.0 - 18.0 g/dL    Hematocrit 34.9 (L) 40.0 - 54.0 %    MCV 86 82 - 98 fL    MCH 27.6 27.0 - 31.0 pg    MCHC 32.1 32.0 - 36.0 g/dL    RDW 13.0 11.5 - 14.5 %    Platelets 373 150 - 450 K/uL    MPV 10.0 9.2 - 12.9 fL    Immature Granulocytes 1.1 (H) 0.0 - 0.5 %    Gran # (ANC) 5.3 1.8 - 7.7 K/uL    Immature Grans (Abs) 0.10 (H) 0.00 - 0.04 K/uL    Lymph # 3.2 1.0 - 4.8 K/uL    Mono # 0.7 0.3 - 1.0 K/uL    Eos # 0.1 0.0 - 0.5 K/uL    Baso # 0.07 0.00 - 0.20 K/uL    nRBC 0 0 /100 WBC    Gran % 55.9 38.0 - 73.0 %    Lymph % 33.9 18.0 - 48.0 %    Mono % 7.3 4.0 - 15.0 %    Eosinophil % 1.1 0.0 - 8.0 %    Basophil % 0.7 0.0 - 1.9 %    Platelet Estimate Clumped (A)     Smudge Cells Present     Differential Method Automated    Comprehensive Metabolic Panel    Collection Time: 10/02/23  4:03 PM   Result Value Ref Range    Sodium 136 136 - 145 mmol/L    Potassium 5.1 3.5 - 5.1  mmol/L    Chloride 102 95 - 110 mmol/L    CO2 21 (L) 23 - 29 mmol/L    Glucose 113 (H) 70 - 110 mg/dL    BUN 17 6 - 20 mg/dL    Creatinine 1.5 (H) 0.5 - 1.4 mg/dL    Calcium 10.3 8.7 - 10.5 mg/dL    Total Protein 8.6 (H) 6.0 - 8.4 g/dL    Albumin 3.3 (L) 3.5 - 5.2 g/dL    Total Bilirubin 0.5 0.1 - 1.0 mg/dL    Alkaline Phosphatase 95 55 - 135 U/L    AST 18 10 - 40 U/L    ALT 14 10 - 44 U/L    eGFR 54.3 (A) >60 mL/min/1.73 m^2    Anion Gap 13 8 - 16 mmol/L   TSH    Collection Time: 10/02/23  4:03 PM   Result Value Ref Range    TSH 0.966 0.400 - 4.000 uIU/mL         Assessment    1. Encounter to establish care    2. Left wrist pain    3. Normochromic normocytic anemia    4. NNAMDI (acute kidney injury)    5. History of gout    6. Benign prostatic hyperplasia, unspecified whether lower urinary tract symptoms present    7. Bladder tumor    8. H/O total colectomy    9. Ileostomy in place    10. Elevated glucose    11. Encounter for lipid screening for cardiovascular disease          Plan    Sukhjinder was seen today for establish care.    Diagnoses and all orders for this visit:    Encounter to establish care    Left wrist pain  -     X-Ray Wrist Complete Left; Future  -     diclofenac (VOLTAREN) 50 MG EC tablet; Take 1 tablet (50 mg total) by mouth 3 (three) times daily as needed (pain).  -     Ambulatory referral/consult to Sports Medicine; Future    Normochromic normocytic anemia  -     CBC Auto Differential; Future  -     Iron and TIBC; Future  -     Ferritin; Future  -     VITAMIN B12; Future  -     FOLATE; Future  -     RETICULOCYTES; Future  -     Pathologist Interpretation Differential; Future    NNAMDI (acute kidney injury)  -     Comprehensive Metabolic Panel; Future    History of gout  -     URIC ACID; Future    Benign prostatic hyperplasia, unspecified whether lower urinary tract symptoms present    Bladder tumor    H/O total colectomy    Ileostomy in place    Elevated glucose  -     Hemoglobin A1C; Future    Encounter  for lipid screening for cardiovascular disease  -     Lipid Panel; Future    All things considered, given his history, he looks pretty good today.      Left wrist is suspicious for AD Quervain tenosynovitis.  We will get x-rays today.  Dr. Gordon, Sports Medicine, has an appointment available this afternoon.  Will have him re-evaluate, possible injection.  Will send prescription for Voltaren to use as needed.  Ice, heat.    Recommended he keep his appointment with Hematology tomorrow.  We will repeat a CBC, also get iron studies, B12, folate, reticulocyte count.    With his history of gout, we will go ahead and check uric acid level.      Reviewing his recent lab work, there were some other metabolic abnormalities on the CMP.  We will recheck these, along with A1c, other screening labs.    FOLLOW-UP:  Follow up in about 4 months (around 2/9/2024) for check up.    I spent a total of 45 minutes face to face and non-face to face on the date of this visit.This includes time preparing to see the patient (eg, review of tests, notes), obtaining and/or reviewing additional history from an independent historian and/or outside medical records, documenting clinical information in the electronic health record, independently interpreting results and/or communicating results to the patient/family/caregiver, or care coordinator.    Signed by:  Marito Murphy MD

## 2023-10-09 NOTE — TELEPHONE ENCOUNTER
----- Message from Aracelis Valle LPN sent at 10/9/2023  2:16 PM CDT -----  Contact: pt's wife/Adelaida    ----- Message -----  From: Meagan Al  Sent: 10/9/2023   1:34 PM CDT  To: Harjeet WATSON Staff    Type:  Patient Returning Call    Who Called: Adelaida  Who Left Message for Patient: gali  Does the patient know what this is regarding?:   appt  Would the patient rather a call back or a response via [facility]? phone  Best Call Back Number:[phone]  Additional Information:

## 2023-10-09 NOTE — PATIENT INSTRUCTIONS
Let us get an x-ray of the left wrist today.  Would like to take a look inside.  We will contact you with the results as soon as they are available.      For pain, try using:              OTC Tylenol - two, 500mg tablets every 8 hours              Prescription diclofenac - one tablet every 8 hours              Alternate these throughout the day     Apply ice or cold compress 3-4 times per day, 10-15 minutes at a time  Alternate cold compress with warm, moist heat    My sports medicine specialist, Dr. Gordon, has an appointment open here, today at 3:20 p.m..  Let us have you see him to look at the x-rays and discuss additional options.    Keep your appointment with the hematologist tomorrow, as scheduled.  Let us get some blood work today so that they have some info for your visit.  In addition to the anemia workup, I am going to add in some other screening labs.    Continue to eat a healthy diet.  Be careful with portion sizes.  Includes lots of fresh fruits, vegetables, whole grains, lean proteins.  See info below.    Keep hydrated.  Be sure to drink at least 8-10, 8 oz, glasses of water every day.    Stay active.  Try to do some sort of physical activity every day.  Nothing outrageous, just try walking for 10-15 minutes each day.

## 2023-10-09 NOTE — PROCEDURES
Intermediate Joint Aspiration/Injection: L radiocarpal    Date/Time: 10/9/2023 3:20 PM    Performed by: Gregorio Gordon MD  Authorized by: Gregorio Gordon MD    Consent Done?:  Yes (Verbal)  Indications:  Arthritis, diagnostic evaluation and pain  Site marked: The procedure site was marked    Timeout: Prior to procedure the correct patient, procedure, and site was verified      Location:  Wrist  Site:  L radiocarpal  Ultrasonic Guidance for needle placement: Yes  Images are saved and documented.    Needle size:  25 G  Approach:  Dorsal  Medications:  6 mg betamethasone acetate-betamethasone sodium phosphate 6 mg/mL  Patient tolerance:  Patient tolerated the procedure well with no immediate complications       Additional Comments: Ultrasound guidance was used for needle localization. Images were saved and stored for documentation. The appropriate structures were visualized. Dynamic visualization of the needle was continuous throughout the procedures and maintained good position.     We discussed the proper protocols after the injection such as no submerging pools, baths tubs, or hot tubs for 24 hr.  Showering is okay today.  We also discussed that blood sugars can be elevated after an injection and asked patient to properly check their sugars over the next few days and contact their PCP if there are any concerns.  We discussed red flags such as fevers, chills, red, warm, tender joint at the area of injection to please seek medical care immediately.

## 2023-10-10 ENCOUNTER — OFFICE VISIT (OUTPATIENT)
Dept: HEMATOLOGY/ONCOLOGY | Facility: CLINIC | Age: 58
End: 2023-10-10
Payer: COMMERCIAL

## 2023-10-10 VITALS
HEIGHT: 73 IN | HEART RATE: 120 BPM | BODY MASS INDEX: 21.54 KG/M2 | TEMPERATURE: 98 F | SYSTOLIC BLOOD PRESSURE: 122 MMHG | OXYGEN SATURATION: 99 % | DIASTOLIC BLOOD PRESSURE: 86 MMHG | WEIGHT: 162.5 LBS

## 2023-10-10 DIAGNOSIS — N13.30 HYDRONEPHROSIS, UNSPECIFIED HYDRONEPHROSIS TYPE: ICD-10-CM

## 2023-10-10 DIAGNOSIS — Z93.3 COLOSTOMY IN PLACE: ICD-10-CM

## 2023-10-10 DIAGNOSIS — C20 RECTAL CANCER: ICD-10-CM

## 2023-10-10 DIAGNOSIS — D50.0 ANEMIA DUE TO CHRONIC BLOOD LOSS: ICD-10-CM

## 2023-10-10 DIAGNOSIS — D50.0 IRON DEFICIENCY ANEMIA DUE TO CHRONIC BLOOD LOSS: Chronic | ICD-10-CM

## 2023-10-10 DIAGNOSIS — N40.0 BENIGN PROSTATIC HYPERPLASIA, UNSPECIFIED WHETHER LOWER URINARY TRACT SYMPTOMS PRESENT: Primary | ICD-10-CM

## 2023-10-10 PROCEDURE — 3008F BODY MASS INDEX DOCD: CPT | Mod: CPTII,S$GLB,,

## 2023-10-10 PROCEDURE — 99215 OFFICE O/P EST HI 40 MIN: CPT | Mod: S$GLB,,,

## 2023-10-10 PROCEDURE — 99215 PR OFFICE/OUTPT VISIT, EST, LEVL V, 40-54 MIN: ICD-10-PCS | Mod: S$GLB,,,

## 2023-10-10 PROCEDURE — 3008F PR BODY MASS INDEX (BMI) DOCUMENTED: ICD-10-PCS | Mod: CPTII,S$GLB,,

## 2023-10-10 PROCEDURE — 3044F HG A1C LEVEL LT 7.0%: CPT | Mod: CPTII,S$GLB,,

## 2023-10-10 PROCEDURE — 99999 PR PBB SHADOW E&M-EST. PATIENT-LVL IV: ICD-10-PCS | Mod: PBBFAC,,,

## 2023-10-10 PROCEDURE — 3074F PR MOST RECENT SYSTOLIC BLOOD PRESSURE < 130 MM HG: ICD-10-PCS | Mod: CPTII,S$GLB,,

## 2023-10-10 PROCEDURE — 3079F PR MOST RECENT DIASTOLIC BLOOD PRESSURE 80-89 MM HG: ICD-10-PCS | Mod: CPTII,S$GLB,,

## 2023-10-10 PROCEDURE — 3074F SYST BP LT 130 MM HG: CPT | Mod: CPTII,S$GLB,,

## 2023-10-10 PROCEDURE — 1159F PR MEDICATION LIST DOCUMENTED IN MEDICAL RECORD: ICD-10-PCS | Mod: CPTII,S$GLB,,

## 2023-10-10 PROCEDURE — 99999 PR PBB SHADOW E&M-EST. PATIENT-LVL IV: CPT | Mod: PBBFAC,,,

## 2023-10-10 PROCEDURE — 3044F PR MOST RECENT HEMOGLOBIN A1C LEVEL <7.0%: ICD-10-PCS | Mod: CPTII,S$GLB,,

## 2023-10-10 PROCEDURE — 3079F DIAST BP 80-89 MM HG: CPT | Mod: CPTII,S$GLB,,

## 2023-10-10 PROCEDURE — 1159F MED LIST DOCD IN RCRD: CPT | Mod: CPTII,S$GLB,,

## 2023-10-11 ENCOUNTER — PATIENT MESSAGE (OUTPATIENT)
Dept: HEMATOLOGY/ONCOLOGY | Facility: CLINIC | Age: 58
End: 2023-10-11
Payer: COMMERCIAL

## 2023-10-11 ENCOUNTER — PATIENT MESSAGE (OUTPATIENT)
Dept: UROLOGY | Facility: CLINIC | Age: 58
End: 2023-10-11
Payer: COMMERCIAL

## 2023-10-11 NOTE — PROGRESS NOTES
Subjective:       Patient ID: Sukhjinder Presley is a 57 y.o. male.    Chief Complaint: Anemia    HPI: Mr. Presley is a 57 year old male who is following up for his recent anemia. He also follows us for hx of rectal adenocarcinoma which he is currently under surveillance for.  He has previously been treated with IV iron injectafer, last one 7/2020.  Cancer Hx: Rectal adenocarcinoma, MSI stable, cT3 N2, stage IIIB, diagnosed 06/06/2019, s/p neoadjuvant chemoradiation with capecitabine and FOLFOXIRI plus Avastin under direction of Dr. Aden, APR, 04/09/2020, ypT3 ypN0.   Pmhx: following with urology for hydronephrosis. Had stent removal 9/26/23, then had repeat right hydronephrosis on 10/6/23, and had TURBT on 10/6/23. Also has recurrent UTIs and BPH, on flomax. Pathology from recent TURBT pending. Does have ventral and parastomal hernia that is bothersome to him and would like evaluation for surgical intervention--> will address after urological concerns are taken care of.    Today: He has been feeling fatigued and down. Anxiously awaiting pathology from TURBT with Dr. Tucker. He has catheter.      Social History     Socioeconomic History    Marital status: Significant Other   Tobacco Use    Smoking status: Every Day     Current packs/day: 1.00     Average packs/day: 1 pack/day for 39.8 years (39.8 ttl pk-yrs)     Types: Cigarettes     Start date: 1/2/1984    Smokeless tobacco: Former     Types: Chew    Tobacco comments:     no smoking after m.n prior to sx   Substance and Sexual Activity    Alcohol use: Not Currently     Alcohol/week: 46.0 standard drinks of alcohol     Types: 42 Cans of beer, 4 Shots of liquor per week     Comment: segrams 7, beer daily  hold now prior to surgery    Drug use: Never    Sexual activity: Yes     Partners: Female     Birth control/protection: None     Social Determinants of Health     Financial Resource Strain: High Risk (10/11/2023)    Overall Financial Resource Strain (CARDIA)      Difficulty of Paying Living Expenses: Very hard   Food Insecurity: Food Insecurity Present (10/11/2023)    Hunger Vital Sign     Worried About Running Out of Food in the Last Year: Often true     Ran Out of Food in the Last Year: Often true   Transportation Needs: No Transportation Needs (10/11/2023)    PRAPARE - Transportation     Lack of Transportation (Medical): No     Lack of Transportation (Non-Medical): No   Physical Activity: Sufficiently Active (10/11/2023)    Exercise Vital Sign     Days of Exercise per Week: 7 days     Minutes of Exercise per Session: 60 min   Stress: Stress Concern Present (10/11/2023)    [employer] [place] [employer] - Occupational Stress Questionnaire     Feeling of Stress : Very much   Social Connections: Unknown (10/11/2023)    Social Connection and Isolation Panel [NHANES]     Frequency of Communication with Friends and Family: Twice a week     Frequency of Social Gatherings with Friends and Family: Twice a week     Active Member of Clubs or Organizations: No     Attends Club or Organization Meetings: Never     Marital Status: Living with partner   Housing Stability: High Risk (10/11/2023)    Housing Stability Vital Sign     Unable to Pay for Housing in the Last Year: Yes     Number of Places Lived in the Last Year: 1     Unstable Housing in the Last Year: No       Past Medical History:   Diagnosis Date    Anemia     Arthritis     Cancer     rectal    Renal disorder     kidney stones       Family History   Problem Relation Age of Onset    Intestinal malrotation Mother     Leukemia Father     No Known Problems Sister     Coronary artery disease Brother     Coronary artery disease Maternal Grandmother     Stomach cancer Maternal Grandfather        Past Surgical History:   Procedure Laterality Date    ABDOMINOPERINEAL RESECTION OF RECTUM N/A 4/9/2020    Procedure: PROCTECTOMY, ABDOMINOPERINEAL;  Surgeon: Jan New MD;  Location: [facility];  Service: General;   Laterality: N/A;    COLECTOMY, TOTAL N/A 2/24/2022    Procedure: COLECTOMY, TOTAL;  Surgeon: Jan New MD;  Location: [facility] OR;  Service: Colon and Rectal;  Laterality: N/A;    COLON SURGERY      COLONOSCOPY N/A 6/6/2019    Procedure: COLONOSCOPY;  Surgeon: Anjelica Saavedra MD;  Location: [facility] ENDO;  Service: Endoscopy;  Laterality: N/A;    COLONOSCOPY N/A 12/17/2021    Procedure: COLONOSCOPY;  Surgeon: Jan New MD;  Location: [facility] ENDO;  Service: General;  Laterality: N/A;    CREATION OF MUSCLE ROTATIONAL FLAP Left 4/9/2020    Procedure: CREATION, FLAP, MUSCLE ROTATION;  Surgeon: Sebastian Dan MD;  Location: [facility] OR;  Service: Plastics;  Laterality: Left;   VRAM    CYSTOSCOPIC LITHOLAPAXY N/A 2/24/2022    Procedure: CYSTOLITHOLAPAXY;  Surgeon: Sukhjinder Tucker MD;  Location: [facility] OR;  Service: Urology;  Laterality: N/A;    CYSTOSCOPY W/ URETERAL STENT PLACEMENT Right 10/6/2023    Procedure: CYSTOSCOPY, WITH URETERAL STENT INSERTION;  Surgeon: Sukhjinder Tucker MD;  Location: [facility] OR;  Service: Urology;  Laterality: Right;    CYSTOSCOPY WITH BIOPSY OF BLADDER N/A 9/11/2023    Procedure: CYSTOSCOPY, WITH BLADDER BIOPSY, WITH FULGURATION IF INDICATED;  Surgeon: Sukhjinder Tucker MD;  Location: [facility] OR;  Service: Urology;  Laterality: N/A;  , POSS TURBT    CYSTOSCOPY WITH URETEROSCOPY, RETROGRADE PYELOGRAPHY, AND INSERTION OF STENT N/A 9/11/2023    Procedure: CYSTOSCOPY, WITH RETROGRADE PYELOGRAM AND URETERAL STENT INSERTION;  Surgeon: Sukhjinder Tucker MD;  Location: [facility] OR;  Service: Urology;  Laterality: N/A;    DILATION OF URETHRA N/A 9/11/2023    Procedure: DILATION, URETHRA;  Surgeon: Sukhjinder Tucker MD;  Location: [facility] OR;  Service: Urology;  Laterality: N/A;    ESOPHAGOGASTRODUODENOSCOPY N/A 6/6/2019    Procedure: EGD (ESOPHAGOGASTRODUODENOSCOPY);  Surgeon: Anjelica Saavedra MD;  Location: [facility];  Service: Endoscopy;  Laterality: N/A;    FLAP GRAFT  SURGERY N/A 4/9/2020    Procedure: FLAP GRAFT;  Surgeon: Sebastian Dan MD;  Location: [facility] OR;  Service: Plastics;  Laterality: N/A;  left fasciocutaneous buttocks flap    HERNIA REPAIR  1995    INJECTION OF ANESTHETIC AGENT INTO TISSUE PLANE DEFINED BY TRANSVERSUS ABDOMINIS MUSCLE N/A 2/18/2020    Procedure: BLOCK, TRANSVERSUS ABDOMINIS PLANE;  Surgeon: Jan New MD;  Location: [facility] OR;  Service: General;  Laterality: N/A;    INJECTION OF ANESTHETIC AGENT INTO TISSUE PLANE DEFINED BY TRANSVERSUS ABDOMINIS MUSCLE  2/24/2022    Procedure: BLOCK, TRANSVERSUS ABDOMINIS PLANE;  Surgeon: Jan New MD;  Location: [facility] OR;  Service: Colon and Rectal;;    INSERTION OF TUNNELED CENTRAL VENOUS CATHETER (CVC) WITH SUBCUTANEOUS PORT N/A 10/8/2019    Procedure: [id number];  Surgeon: Jan New MD;  Location: [facility] OR;  Service: General;  Laterality: N/A;    LAPAROSCOPIC COLOSTOMY      MEDIPORT REMOVAL N/A 8/13/2020    Procedure: REMOVAL, CATHETER, CENTRAL VENOUS, TUNNELED, WITH PORT;  Surgeon: Sebastian Dan MD;  Location: [facility] OR;  Service: Plastics;  Laterality: N/A;    TONSILLECTOMY      TRANSURETHRAL RESECTION OF PROSTATE N/A 6/7/2021    Procedure: TURP (TRANSURETHRAL RESECTION OF PROSTATE), CYSTOLITHALOPAXY;  Surgeon: Sukhjinder Tucker MD;  Location: [facility] OR;  Service: Urology;  Laterality: N/A;    TURBT (TRANSURETHRAL RESECTION OF BLADDER TUMOR) N/A 10/6/2023    Procedure: TURBT (TRANSURETHRAL RESECTION OF BLADDER TUMOR);  Surgeon: Sukhjinder Tucker MD;  Location: [facility] OR;  Service: Urology;  Laterality: N/A;    WOUND DEBRIDEMENT N/A 8/13/2020    Procedure: DEBRIDEMENT, WOUND;  Surgeon: Sebastian Dan MD;  Location: [facility] OR;  Service: Plastics;  Laterality: N/A;  layered closure       Review of Systems   Constitutional:  Positive for fatigue. Negative for activity change, appetite change, chills, diaphoresis, fever and unexpected weight change.   HENT:  Negative for  congestion.    Respiratory:  Negative for cough and shortness of breath.    Cardiovascular:  Negative for chest pain and leg swelling.   Gastrointestinal:  Negative for abdominal pain, blood in stool, constipation, diarrhea, nausea and vomiting.   Genitourinary:  Positive for hematuria.   Skin:  Negative for color change and pallor.   Neurological:  Negative for dizziness, weakness, light-headedness, numbness and headaches.   Psychiatric/Behavioral:  The patient is nervous/anxious.          Medication List with Changes/Refills   Current Medications    ACETAMINOPHEN (TYLENOL) 325 MG TABLET    Take 2 tablets (650 mg total) by mouth every 6 (six) hours as needed.    DICLOFENAC (VOLTAREN) 50 MG EC TABLET    Take 1 tablet (50 mg total) by mouth 3 (three) times daily as needed (pain).    FINASTERIDE (PROSCAR) 5 MG TABLET    Take 1 tablet (5 mg total) by mouth once daily.    OXYCODONE (ROXICODONE) 10 MG TAB IMMEDIATE RELEASE TABLET    Take 1 tablet (10 mg total) by mouth every 8 (eight) hours as needed for Pain (severe).    OXYCODONE (ROXICODONE) 5 MG IMMEDIATE RELEASE TABLET    Take 1 tablet (5 mg total) by mouth every 4 (four) hours as needed for Pain.    PROMETHAZINE (PHENERGAN) 25 MG TABLET    Take 1 tablet (25 mg total) by mouth every 4 (four) hours.    SILDENAFIL (VIAGRA) 100 MG TABLET    Take 1 tablet (100 mg total) by mouth daily as needed for Erectile Dysfunction.    SULFAMETHOXAZOLE-TRIMETHOPRIM 800-160MG (BACTRIM DS) 800-160 MG TAB    Take 1 tablet by mouth 2 (two) times daily. for 14 days    TAMSULOSIN (FLOMAX) 0.4 MG CAP    Take 1 capsule (0.4 mg total) by mouth once daily.     Objective:     There were no vitals filed for this visit.      Physical Exam  Constitutional:       General: He is not in acute distress.     Appearance: He is not ill-appearing, toxic-appearing or diaphoretic.   Neurological:      Mental Status: He is alert.   Psychiatric:         Mood and Affect: Mood normal.          Physical exam  limited due to video visit    Labs/Results:  Lab Results   Component Value Date    WBC 14.51 (H) 10/09/2023    RBC 4.21 (L) 10/09/2023    HGB 11.9 (L) 10/09/2023    HCT 36.6 (L) 10/09/2023    MCV 87 10/09/2023    MCH 28.3 10/09/2023    MCHC 32.5 10/09/2023    RDW 12.7 10/09/2023     (H) 10/09/2023    MPV 9.5 10/09/2023    GRAN 9.6 (H) 10/09/2023    GRAN 66.0 10/09/2023    LYMPH 3.7 10/09/2023    LYMPH 25.4 10/09/2023    MONO 0.7 10/09/2023    MONO 4.9 10/09/2023    EOS 0.4 10/09/2023    BASO 0.08 10/09/2023    EOSINOPHIL 2.5 10/09/2023    BASOPHIL 0.6 10/09/2023      Latest Reference Range & Units 10/09/23 14:53   Iron 45 - 160 ug/dL 20 (L)   TIBC 250 - 450 ug/dL 246 (L)   Saturated Iron 20 - 50 % 8 (L)   Transferrin 200 - 375 mg/dL 166 (L)   Ferritin 20.0 - 300.0 ng/mL 924 (H)   Folate 4.0 - 24.0 ng/mL 5.7   Vitamin B-12 210 - 950 pg/mL 423      Latest Reference Range & Units 10/09/23 14:53   Retic 0.4 - 2.0 % 1.3     CMP  Sodium   Date Value Ref Range Status   10/09/2023 137 136 - 145 mmol/L Final     Potassium   Date Value Ref Range Status   10/09/2023 4.6 3.5 - 5.1 mmol/L Final     Chloride   Date Value Ref Range Status   10/09/2023 102 95 - 110 mmol/L Final     CO2   Date Value Ref Range Status   10/09/2023 20 (L) 23 - 29 mmol/L Final     Glucose   Date Value Ref Range Status   10/09/2023 134 (H) 70 - 110 mg/dL Final     BUN   Date Value Ref Range Status   10/09/2023 12 6 - 20 mg/dL Final     Creatinine   Date Value Ref Range Status   10/09/2023 1.6 (H) 0.5 - 1.4 mg/dL Final     Calcium   Date Value Ref Range Status   10/09/2023 9.5 8.7 - 10.5 mg/dL Final     Total Protein   Date Value Ref Range Status   10/09/2023 8.6 (H) 6.0 - 8.4 g/dL Final     Albumin   Date Value Ref Range Status   10/09/2023 3.1 (L) 3.5 - 5.2 g/dL Final     Total Bilirubin   Date Value Ref Range Status   10/09/2023 0.2 0.1 - 1.0 mg/dL Final     Comment:     For infants and newborns, interpretation of results should be based  on  gestational age, weight and in agreement with clinical  observations.    Premature Infant recommended reference ranges:  Up to 24 hours.............<8.0 mg/dL  Up to 48 hours............<12.0 mg/dL  3-5 days..................<15.0 mg/dL  6-29 days.................<15.0 mg/dL       Alkaline Phosphatase   Date Value Ref Range Status   10/09/2023 203 (H) 55 - 135 U/L Final     AST   Date Value Ref Range Status   10/09/2023 18 10 - 40 U/L Final     ALT   Date Value Ref Range Status   10/09/2023 25 10 - 44 U/L Final     Anion Gap   Date Value Ref Range Status   10/09/2023 15 8 - 16 mmol/L Final     eGFR   Date Value Ref Range Status   10/09/2023 50.3 (A) >60 mL/min/1.73 m^2 Final     Mild leukocytosis shows absolute neutrophilia with a mild left shift,   favor reactive.     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  Correlation with serum protein electrophoresis and   immunofixation should be considered.     Platelets are increased in number but overall morphologically   unremarkable.  Increased platelets can be seen as an acute phase   reactant among other causes.     Ct c/a/p 7/11/23  Impression:  Status post total colectomy.  Left lower quadrant ileostomy.  Negative for evidence of metastatic disease.    Assessment:     Problem List Items Addressed This Visit          Renal/    BPH (benign prostatic hyperplasia)    Hydronephrosis       Oncology    Iron deficiency anemia due to chronic blood loss - Primary (Chronic)    Rectal cancer    Anemia due to chronic blood loss       GI    Colostomy in place     Plan:     Rectal cancer  --diagnosed 06/06/2019  -- s/p neoadjuvant chemoradiation with capecitabine and FOLFOXIRI plus Avastin under direction of Dr. Aden, APR, 04/09/2020, ypT3 ypN0  --currently in surveillance  --Repeat colonoscopy December 2021 with adenomatous polyp status post completion colectomy/total colectomy with end ileostomy construction in February of 2022; final pathology  with no evidence of malignancy present  --last CT c/a/p 7/11/23  --CEA due q 3 months  --CT c/a/p due next year  --continue to follow with colon surgery    Iron deficiency anemia due to chronic blood loss, Anemia due to chronic blood loss  --iron: 20, sat: 8%, ferritin: 924, tibc: 246-iron def  --vitamin b12, retic and folic acid-WNL  --hgb: 11.9g/dL  --Normochromic normocytic anemia shows a suggestion of rouleaux Formation. Shown on path report. Will obtain spep wellington light chains  --IV iron injectafer x 2 doses, one week apart    --pending results from recent TURBT on 10/6/23 by Dr. Tucker    Follow-Up: IV iron injectafer x 2 doses, one week apart. 4 weeks with cbc cmp ldh spep [place] light chains immunoglobulins prior. VV ok.    Shanae Serrano PA-C  Hematology Oncology    The patient location is: home  The chief complaint leading to consultation is: iron def anemia     Visit type:  Synchronous audio video      Face to Face time with patient: minutes of total time spent on the encounter, which includes face to face time and non-face to face time preparing to see the patient (eg, review of tests), Obtaining and/or reviewing separately obtained history, Documenting clinical information in the electronic or other health record, Independently interpreting results (not separately reported) and communicating results to the patient/family/caregiver, or Care coordination (not separately reported).      Each patient to whom he or she provides medical services by telemedicine is:  (1) informed of the relationship between the provider and patient and the respective role of any other health care provider with respect to management of the patient; and (2) notified that he or she may decline to receive medical services     Route Chart for Scheduling    Med Onc Chart Routing      Follow up with physician    Follow up with REE Jolly-4 weeks with cbc cmp iron/tibc ferritin light chains spep wellington ldh prior -VV ok   Infusion  scheduling note   Iv iron injectafer x 2 doses one week apart.   Injection scheduling note    Labs CBC, CMP, ferritin, iron and TIBC, LDH, free light chains and SPEP   Scheduling:  Preferred lab:  Lab interval:     Imaging    Pharmacy appointment    Other referrals                    Supportive Plan Information  OP FERRIC CARBOXYMALTOSE Q2W   Shanae Serrano PA-C   Upcoming Treatment Dates - OP FERRIC CARBOXYMALTOSE Q2W    10/17/2023       Supportive Care       ferric carboxymaltose (INJECTAFER) 750 mg in sodium chloride 0.9% 265 mL infusion  10/24/2023       Supportive Care       ferric carboxymaltose (INJECTAFER) 750 mg in sodium chloride 0.9% 265 mL infusion

## 2023-10-12 ENCOUNTER — PATIENT MESSAGE (OUTPATIENT)
Dept: HEMATOLOGY/ONCOLOGY | Facility: CLINIC | Age: 58
End: 2023-10-12

## 2023-10-12 ENCOUNTER — OFFICE VISIT (OUTPATIENT)
Dept: HEMATOLOGY/ONCOLOGY | Facility: CLINIC | Age: 58
End: 2023-10-12
Payer: COMMERCIAL

## 2023-10-12 DIAGNOSIS — D50.0 ANEMIA DUE TO CHRONIC BLOOD LOSS: ICD-10-CM

## 2023-10-12 DIAGNOSIS — N13.30 HYDRONEPHROSIS, UNSPECIFIED HYDRONEPHROSIS TYPE: ICD-10-CM

## 2023-10-12 DIAGNOSIS — D50.0 IRON DEFICIENCY ANEMIA DUE TO CHRONIC BLOOD LOSS: Primary | Chronic | ICD-10-CM

## 2023-10-12 DIAGNOSIS — C20 RECTAL CANCER: ICD-10-CM

## 2023-10-12 DIAGNOSIS — N40.0 BENIGN PROSTATIC HYPERPLASIA, UNSPECIFIED WHETHER LOWER URINARY TRACT SYMPTOMS PRESENT: ICD-10-CM

## 2023-10-12 DIAGNOSIS — Z93.3 COLOSTOMY IN PLACE: ICD-10-CM

## 2023-10-12 PROCEDURE — 3044F HG A1C LEVEL LT 7.0%: CPT | Mod: CPTII,95,,

## 2023-10-12 PROCEDURE — 99214 PR OFFICE/OUTPT VISIT, EST, LEVL IV, 30-39 MIN: ICD-10-PCS | Mod: 95,,,

## 2023-10-12 PROCEDURE — 99214 OFFICE O/P EST MOD 30 MIN: CPT | Mod: 95,,,

## 2023-10-12 PROCEDURE — 3044F PR MOST RECENT HEMOGLOBIN A1C LEVEL <7.0%: ICD-10-PCS | Mod: CPTII,95,,

## 2023-10-12 RX ORDER — DIPHENHYDRAMINE HYDROCHLORIDE 50 MG/ML
50 INJECTION INTRAMUSCULAR; INTRAVENOUS ONCE AS NEEDED
Status: CANCELLED | OUTPATIENT
Start: 2023-10-17

## 2023-10-12 RX ORDER — EPINEPHRINE 0.3 MG/.3ML
0.3 INJECTION SUBCUTANEOUS ONCE AS NEEDED
Status: CANCELLED | OUTPATIENT
Start: 2023-10-17

## 2023-10-12 RX ORDER — SODIUM CHLORIDE 0.9 % (FLUSH) 0.9 %
10 SYRINGE (ML) INJECTION
Status: CANCELLED | OUTPATIENT
Start: 2023-10-17

## 2023-10-12 RX ORDER — DIPHENHYDRAMINE HYDROCHLORIDE 50 MG/ML
50 INJECTION INTRAMUSCULAR; INTRAVENOUS ONCE AS NEEDED
Status: CANCELLED | OUTPATIENT
Start: 2023-10-25

## 2023-10-12 RX ORDER — HEPARIN 100 UNIT/ML
500 SYRINGE INTRAVENOUS
Status: CANCELLED | OUTPATIENT
Start: 2023-10-17

## 2023-10-12 RX ORDER — SODIUM CHLORIDE 0.9 % (FLUSH) 0.9 %
10 SYRINGE (ML) INJECTION
Status: CANCELLED | OUTPATIENT
Start: 2023-10-25

## 2023-10-12 RX ORDER — EPINEPHRINE 0.3 MG/.3ML
0.3 INJECTION SUBCUTANEOUS ONCE AS NEEDED
Status: CANCELLED | OUTPATIENT
Start: 2023-10-25

## 2023-10-12 RX ORDER — HEPARIN 100 UNIT/ML
500 SYRINGE INTRAVENOUS
Status: CANCELLED | OUTPATIENT
Start: 2023-10-25

## 2023-10-16 ENCOUNTER — PATIENT MESSAGE (OUTPATIENT)
Dept: UROLOGY | Facility: CLINIC | Age: 58
End: 2023-10-16
Payer: COMMERCIAL

## 2023-10-17 ENCOUNTER — TELEPHONE (OUTPATIENT)
Dept: UROLOGY | Facility: CLINIC | Age: 58
End: 2023-10-17
Payer: COMMERCIAL

## 2023-10-17 ENCOUNTER — TUMOR BOARD CONFERENCE (OUTPATIENT)
Dept: UROLOGY | Facility: HOSPITAL | Age: 58
End: 2023-10-17
Payer: COMMERCIAL

## 2023-10-17 ENCOUNTER — PATIENT MESSAGE (OUTPATIENT)
Dept: UROLOGY | Facility: CLINIC | Age: 58
End: 2023-10-17
Payer: COMMERCIAL

## 2023-10-17 NOTE — INTERVAL H&P NOTE
The patient has been examined and the H&P has been reviewed:    I concur with the findings and no changes have occurred since H&P was written.    Anesthesia/Surgery risks, benefits and alternative options discussed and understood by patient/family.          Active Hospital Problems    Diagnosis  POA    *Bladder tumor [D49.4]  Yes    Hydronephrosis [N13.30]  Yes      Resolved Hospital Problems   No resolved problems to display.

## 2023-10-17 NOTE — TELEPHONE ENCOUNTER
Path is still pending message sent to pt on portal       ----- Message from Alejandro Gutierrez sent at 10/17/2023  9:35 AM CDT -----  Contact: Adelaida  Patients wife is calling to speak with nurse regarding catheter. Reports wanting to know when catheter will be removed due to patient experiencing a lot of pain that area and also bladder pain. Please give patients wife a call at [phone]

## 2023-10-17 NOTE — PROGRESS NOTES
Encounter created in error.    Pathology report pending. Will likely discuss at  tumor board 10/24/23. Please see future encounters/notes for disussion and recommendations.    Azael Cuevas MD  [facility] - PGY3

## 2023-10-20 DIAGNOSIS — C79.11 METASTATIC ADENOCARCINOMA TO BLADDER: Primary | ICD-10-CM

## 2023-10-20 LAB
COMMENT: NORMAL
FINAL PATHOLOGIC DIAGNOSIS: NORMAL
GROSS: NORMAL
Lab: NORMAL
MICROSCOPIC EXAM: NORMAL
SUPPLEMENTAL DIAGNOSIS: NORMAL

## 2023-10-21 DIAGNOSIS — M25.532 LEFT WRIST PAIN: ICD-10-CM

## 2023-10-21 DIAGNOSIS — C20 RECTAL CANCER: ICD-10-CM

## 2023-10-21 NOTE — TELEPHONE ENCOUNTER
No care due was identified.  Health [facility] Embedded Care Due Messages. Reference number: [id number].   10/21/2023 10:21:18 AM CDT

## 2023-10-23 RX ORDER — OXYCODONE HYDROCHLORIDE 10 MG/1
10 TABLET ORAL EVERY 8 HOURS PRN
Qty: 90 TABLET | Refills: 0 | Status: SHIPPED | OUTPATIENT
Start: 2023-10-23 | End: 2023-11-20 | Stop reason: SDUPTHER

## 2023-10-23 RX ORDER — DICLOFENAC SODIUM 50 MG/1
50 TABLET, DELAYED RELEASE ORAL 3 TIMES DAILY PRN
Qty: 30 TABLET | Refills: 0 | Status: SHIPPED | OUTPATIENT
Start: 2023-10-23 | End: 2023-11-04

## 2023-10-23 NOTE — TELEPHONE ENCOUNTER
Refill Routing Note   Medication(s) are not appropriate for processing by [facility] for the following reason(s):      Medication outside of protocol    [facility] action(s):  Route Care Due:  None identified          Appointments  past 12m or future 3m with PCP    Date Provider   Last Visit   10/9/2023 Marito Murphy MD   Next Visit   2/9/2024 Marito Murphy MD   ED visits in past 90 days: 0        Note composed:9:10 AM 10/23/2023

## 2023-10-24 ENCOUNTER — INFUSION (OUTPATIENT)
Dept: INFUSION THERAPY | Facility: HOSPITAL | Age: 58
End: 2023-10-24
Payer: COMMERCIAL

## 2023-10-24 VITALS
RESPIRATION RATE: 18 BRPM | SYSTOLIC BLOOD PRESSURE: 100 MMHG | HEART RATE: 87 BPM | TEMPERATURE: 98 F | OXYGEN SATURATION: 98 % | DIASTOLIC BLOOD PRESSURE: 62 MMHG

## 2023-10-24 DIAGNOSIS — D50.0 IRON DEFICIENCY ANEMIA DUE TO CHRONIC BLOOD LOSS: Primary | ICD-10-CM

## 2023-10-24 PROCEDURE — 96365 THER/PROPH/DIAG IV INF INIT: CPT

## 2023-10-24 PROCEDURE — 25000003 PHARM REV CODE 250

## 2023-10-24 PROCEDURE — 63600175 PHARM REV CODE 636 W HCPCS: Mod: JZ,JG

## 2023-10-24 RX ADMIN — FERRIC CARBOXYMALTOSE INJECTION 750 MG: 50 INJECTION, SOLUTION INTRAVENOUS at 01:10

## 2023-10-24 NOTE — DISCHARGE INSTRUCTIONS
Thank you for letting me take care of YOU!!    ITALO [facility]  [address] [facility]  [id number] [place] [address] Drive  [phone] phone     [phone] fax  Hours of Operation: Monday- Friday 8:00am- 5:00pm  After hours phone  [phone]  Hematology / Oncology Physicians on call:    Dr. Harjeet Patel        Nurse Practitioners:    LUCIA Zarate, DAMSAO Cedeno NP Khelsea Conley, LUCIA Manuel, NP      Please don't hesitate to call if you have any concerns.

## 2023-10-24 NOTE — PLAN OF CARE
Problem: Adult Inpatient Plan of Care  Goal: Plan of Care Review  Outcome: Ongoing, Progressing  Flowsheets (Taken 10/24/2023 1343)  Plan of Care Reviewed With: patient  Goal: Patient-Specific Goal (Individualized)  Outcome: Ongoing, Progressing  Flowsheets (Taken 10/24/2023 1343)  Anxieties, Fears or Concerns: denies  Individualized Care Needs:   feet elevated   snack/drink provided  Goal: Optimal Comfort and Wellbeing  Outcome: Ongoing, Progressing  Intervention: Monitor Pain and Promote Comfort  Flowsheets (Taken 10/24/2023 1343)  Pain Management Interventions:   quiet environment facilitated   relaxation techniques promoted  Intervention: Provide Person-Centered Care  Flowsheets (Taken 10/24/2023 1343)  Trust Relationship/Rapport:   care explained   questions answered   questions encouraged   choices provided   emotional support provided   reassurance provided   thoughts/feelings acknowledged   empathic listening provided     Problem: Anemia  Goal: Anemia Symptom Improvement  Outcome: Ongoing, Progressing  Intervention: Monitor and Manage Anemia  Flowsheets (Taken 10/24/2023 1343)  Safety Promotion/Fall Prevention:   nonskid shoes/socks when out of bed   in recliner, wheels locked   medications reviewed  Fatigue Management:   frequent rest breaks encouraged   paced activity encouraged

## 2023-10-24 NOTE — NURSING
Reviewed plan of care and discussed treatment with patient. Receiving Injectafer 1 of 2 to infuse over 16 minutes with a post observation of 45 minutes. Patient verbalized understanding. Stated having tolerated treatment in the past.   Addressed any ongoing symptoms; states feeling tired.     Tolerated treatment. No adverse reaction. RTC for next injectafer infusion on 10/31/2023.

## 2023-10-26 ENCOUNTER — PATIENT MESSAGE (OUTPATIENT)
Dept: UROLOGY | Facility: CLINIC | Age: 58
End: 2023-10-26
Payer: COMMERCIAL

## 2023-10-31 ENCOUNTER — INFUSION (OUTPATIENT)
Dept: INFUSION THERAPY | Facility: HOSPITAL | Age: 58
End: 2023-10-31
Payer: COMMERCIAL

## 2023-10-31 VITALS
WEIGHT: 169.31 LBS | DIASTOLIC BLOOD PRESSURE: 66 MMHG | RESPIRATION RATE: 16 BRPM | BODY MASS INDEX: 22.44 KG/M2 | TEMPERATURE: 98 F | HEART RATE: 90 BPM | SYSTOLIC BLOOD PRESSURE: 109 MMHG | OXYGEN SATURATION: 98 % | HEIGHT: 73 IN

## 2023-10-31 DIAGNOSIS — C79.11 METASTATIC ADENOCARCINOMA TO BLADDER: Primary | ICD-10-CM

## 2023-10-31 DIAGNOSIS — D50.0 IRON DEFICIENCY ANEMIA DUE TO CHRONIC BLOOD LOSS: Primary | ICD-10-CM

## 2023-10-31 DIAGNOSIS — D50.0 IRON DEFICIENCY ANEMIA DUE TO CHRONIC BLOOD LOSS: ICD-10-CM

## 2023-10-31 PROCEDURE — 96365 THER/PROPH/DIAG IV INF INIT: CPT

## 2023-10-31 PROCEDURE — 63600175 PHARM REV CODE 636 W HCPCS: Mod: JZ,JG

## 2023-10-31 PROCEDURE — 25000003 PHARM REV CODE 250

## 2023-10-31 RX ADMIN — FERRIC CARBOXYMALTOSE INJECTION 750 MG: 50 INJECTION, SOLUTION INTRAVENOUS at 12:10

## 2023-10-31 NOTE — NURSING
Reviewed plan of care and discussed treatment with patient. Receiving Injectafer 2 of 2 to infuse over 16 minutes with a 45 minute post observation. Patient verbalized understanding. Addressed any ongoing concerns/symptoms; states feeling fatigue.     Tolerated treatment. No adverse reaction.

## 2023-10-31 NOTE — PLAN OF CARE
Problem: Adult Inpatient Plan of Care  Goal: Plan of Care Review  Outcome: Ongoing, Progressing  Flowsheets (Taken 10/31/2023 1312)  Plan of Care Reviewed With: patient  Goal: Patient-Specific Goal (Individualized)  Outcome: Ongoing, Progressing  Flowsheets (Taken 10/31/2023 1312)  Anxieties, Fears or Concerns: fatigue  Individualized Care Needs:   feet elevated   warm blanket/pillow   lights off  Goal: Optimal Comfort and Wellbeing  Outcome: Ongoing, Progressing  Intervention: Monitor Pain and Promote Comfort  Flowsheets (Taken 10/31/2023 1312)  Pain Management Interventions:   warm blanket provided   relaxation techniques promoted   quiet environment facilitated   pillow support provided  Intervention: Provide Person-Centered Care  Flowsheets (Taken 10/31/2023 1312)  Trust Relationship/Rapport:   care explained   reassurance provided   thoughts/feelings acknowledged   choices provided   emotional support provided   empathic listening provided   questions answered   questions encouraged     Problem: Anemia  Goal: Anemia Symptom Improvement  Outcome: Ongoing, Progressing  Intervention: Monitor and Manage Anemia  Flowsheets (Taken 10/31/2023 1312)  Safety Promotion/Fall Prevention:   medications reviewed   in recliner, wheels locked   nonskid shoes/socks when out of bed  Fatigue Management:   frequent rest breaks encouraged   paced activity encouraged      LOV 12/16/2021    FU OV 06/20/2022    Medication was noted at LOV

## 2023-11-01 ENCOUNTER — HOSPITAL ENCOUNTER (OUTPATIENT)
Dept: RADIATION THERAPY | Facility: HOSPITAL | Age: 58
Discharge: HOME | End: 2023-11-01
Attending: SPECIALIST
Payer: COMMERCIAL

## 2023-11-02 ENCOUNTER — PATIENT MESSAGE (OUTPATIENT)
Dept: UROLOGY | Facility: CLINIC | Age: 58
End: 2023-11-02
Payer: COMMERCIAL

## 2023-11-02 ENCOUNTER — PATIENT MESSAGE (OUTPATIENT)
Dept: SPORTS MEDICINE | Facility: CLINIC | Age: 58
End: 2023-11-02
Payer: COMMERCIAL

## 2023-11-03 ENCOUNTER — TELEPHONE (OUTPATIENT)
Dept: UROLOGY | Facility: CLINIC | Age: 58
End: 2023-11-03
Payer: COMMERCIAL

## 2023-11-03 DIAGNOSIS — N39.0 URINARY TRACT INFECTION WITHOUT HEMATURIA, SITE UNSPECIFIED: Primary | ICD-10-CM

## 2023-11-03 NOTE — TELEPHONE ENCOUNTER
Called the patient, after verification of name and , the patient was informed that Dr Tucker is out of the office for today. I told pt we can get him to see the NP next week, he can go to an urgent care, or he can also see his PCP. I also offered to put orders in for pt to go to lab and give a urine sample. Pt said he preferred for me to put the orders in and he will go to the lab to give a sample on Monday. Advised pt that if things gets worse he should go to urgent care to get checked out. He voiced understanding     Orders placed for pt to go give a urine sample.    ----- Message from Maia Rain sent at 11/3/2023  2:36 PM CDT -----  Contact: Sukhjinder  Type:  Needs Medical Advice    Who Called: Sukhjinder  Symptoms (please be specific): Returning bladder infection   How long has patient had these symptoms:  2-3 days  Pharmacy name and phone #:    [facility] - [place] [address]   [address]  [place] [address]  Phone: [phone] Fax: [phone]  Would the patient rather a call back or a response via [facility]? call  Best Call Back Number: [phone]  Additional Information: Patient reports sending a message through [facility] regarding symptoms and request assistance.   Thank you,  GH

## 2023-11-06 ENCOUNTER — OFFICE VISIT (OUTPATIENT)
Dept: SURGERY | Facility: CLINIC | Age: 58
End: 2023-11-06
Payer: COMMERCIAL

## 2023-11-06 VITALS
RESPIRATION RATE: 19 BRPM | DIASTOLIC BLOOD PRESSURE: 77 MMHG | HEART RATE: 102 BPM | BODY MASS INDEX: 21.92 KG/M2 | SYSTOLIC BLOOD PRESSURE: 135 MMHG | WEIGHT: 166.13 LBS | OXYGEN SATURATION: 98 %

## 2023-11-06 DIAGNOSIS — C20 RECTAL CANCER: ICD-10-CM

## 2023-11-06 DIAGNOSIS — C79.89: ICD-10-CM

## 2023-11-06 DIAGNOSIS — C20: ICD-10-CM

## 2023-11-06 DIAGNOSIS — Z85.048 HISTORY OF RECTAL CANCER: Primary | ICD-10-CM

## 2023-11-06 PROCEDURE — 3078F PR MOST RECENT DIASTOLIC BLOOD PRESSURE < 80 MM HG: ICD-10-PCS | Mod: CPTII,S$GLB,, | Performed by: COLON & RECTAL SURGERY

## 2023-11-06 PROCEDURE — 3078F DIAST BP <80 MM HG: CPT | Mod: CPTII,S$GLB,, | Performed by: COLON & RECTAL SURGERY

## 2023-11-06 PROCEDURE — 99999 PR PBB SHADOW E&M-EST. PATIENT-LVL III: CPT | Mod: PBBFAC,,, | Performed by: COLON & RECTAL SURGERY

## 2023-11-06 PROCEDURE — 3075F SYST BP GE 130 - 139MM HG: CPT | Mod: CPTII,S$GLB,, | Performed by: COLON & RECTAL SURGERY

## 2023-11-06 PROCEDURE — 3075F PR MOST RECENT SYSTOLIC BLOOD PRESS GE 130-139MM HG: ICD-10-PCS | Mod: CPTII,S$GLB,, | Performed by: COLON & RECTAL SURGERY

## 2023-11-06 PROCEDURE — 3044F PR MOST RECENT HEMOGLOBIN A1C LEVEL <7.0%: ICD-10-PCS | Mod: CPTII,S$GLB,, | Performed by: COLON & RECTAL SURGERY

## 2023-11-06 PROCEDURE — 3008F PR BODY MASS INDEX (BMI) DOCUMENTED: ICD-10-PCS | Mod: CPTII,S$GLB,, | Performed by: COLON & RECTAL SURGERY

## 2023-11-06 PROCEDURE — 99215 PR OFFICE/OUTPT VISIT, EST, LEVL V, 40-54 MIN: ICD-10-PCS | Mod: S$GLB,,, | Performed by: COLON & RECTAL SURGERY

## 2023-11-06 PROCEDURE — 3044F HG A1C LEVEL LT 7.0%: CPT | Mod: CPTII,S$GLB,, | Performed by: COLON & RECTAL SURGERY

## 2023-11-06 PROCEDURE — 99215 OFFICE O/P EST HI 40 MIN: CPT | Mod: S$GLB,,, | Performed by: COLON & RECTAL SURGERY

## 2023-11-06 PROCEDURE — 1159F MED LIST DOCD IN RCRD: CPT | Mod: CPTII,S$GLB,, | Performed by: COLON & RECTAL SURGERY

## 2023-11-06 PROCEDURE — 3008F BODY MASS INDEX DOCD: CPT | Mod: CPTII,S$GLB,, | Performed by: COLON & RECTAL SURGERY

## 2023-11-06 PROCEDURE — 1159F PR MEDICATION LIST DOCUMENTED IN MEDICAL RECORD: ICD-10-PCS | Mod: CPTII,S$GLB,, | Performed by: COLON & RECTAL SURGERY

## 2023-11-06 PROCEDURE — 99999 PR PBB SHADOW E&M-EST. PATIENT-LVL III: ICD-10-PCS | Mod: PBBFAC,,, | Performed by: COLON & RECTAL SURGERY

## 2023-11-06 NOTE — Clinical Note
Complicated patient.  Previous perforated rectal cancer required APR with VRAM flap from [facility] in 2019.  Has recurrence in the pelvis now and is now invading the bladder which is where the biopsy confirming this came from.  Thank he saw your PA who ordered a CT for restaging although I think a PET scan would be better in his situation.  I have also ordered a repeat MRI rectal cancer protocol to evaluate the recurrence in the pelvis.  Thank you will likely need either radiation or chemo or both prior to any consideration of surgery which I am not sure we can get a negative margin on at this time but we will see once the MRI is completed.  I will plan to re-presented him at rectal cancer tumor board in 2 weeks once all this is completed.

## 2023-11-06 NOTE — PROGRESS NOTES
History & Physical    SUBJECTIVE:     CC: rectal adenocarcinoma  Ref: Anjelica Saavedra MD    History of Present Illness:  Patient is a 57 y.o. male presents for evaluation of rectal cancer.  Patient reports he has had increasing pelvic/rectal pain along with decreased bowel movements over the last 6 weeks.  Reports an uncomfortable feeling in his pelvis associated with mucous discharge from his rectum as well as bloody bowel movements that are thin and less than normal. He reports associated chills, fatigue and 16 lb weight loss over the past 5 months.  He has also noticed a decrease in appetite as well. He underwent a colonoscopy on 06/06/2019 where a nonobstructing rectal mass was found with biopsy showing well-differentiated adenocarcinoma.  He was then referred to Colorectal surgery Clinic for evaluation.  He has no family history of colorectal cancer or IBD.    8/13/19: Completed neoadj chemoXRT  2/18/2020: Laparoscopic loop sigmoid colostomy 2/2 rectal perforation on chemotx  4/9/2020: APR with extra anatomic resection of fistula tract and left gluteus muscle, omental pedicle flap and VRAM with skin paddle by plastics  2/24/22: Completion colectomy/total colectomy with end ileostomy construction    Interval history:  Since last clinic visit, the patient underwent cysto with biopsy of intra-bladder lesion which confirmed recurrence of his rectal adenocarcinoma with invasion into the bladder.  This is seen on recent CT urogram as well from 10/03/2023.  He continues to have abdominal pain from his ventral and parastomal hernias.  Denies any hematochezia or melena. Good ostomy output.     Review of patient's allergies indicates:  No Known Allergies    Current Outpatient Medications   Medication Sig Dispense Refill    acetaminophen (TYLENOL) 325 MG tablet Take 2 tablets (650 mg total) by mouth every 6 (six) hours as needed.  0    finasteride (PROSCAR) 5 mg tablet Take 1 tablet (5 mg total) by mouth once daily. 30  tablet 11    oxyCODONE (ROXICODONE) 10 mg Tab immediate release tablet Take 1 tablet (10 mg total) by mouth every 8 (eight) hours as needed for Pain (severe). 90 tablet 0    oxyCODONE (ROXICODONE) 5 MG immediate release tablet Take 1 tablet (5 mg total) by mouth every 4 (four) hours as needed for Pain. 30 tablet 0    promethazine (PHENERGAN) 25 MG tablet Take 1 tablet (25 mg total) by mouth every 4 (four) hours. 60 tablet 4    sildenafiL (VIAGRA) 100 MG tablet Take 1 tablet (100 mg total) by mouth daily as needed for Erectile Dysfunction. 20 tablet 11    tamsulosin (FLOMAX) 0.4 mg Cap Take 1 capsule (0.4 mg total) by mouth once daily. 90 capsule 3     No current facility-administered medications for this visit.       Past Medical History:   Diagnosis Date    Anemia     Arthritis     Cancer     rectal    Renal disorder     kidney stones     Past Surgical History:   Procedure Laterality Date    ABDOMINOPERINEAL RESECTION OF RECTUM N/A 4/9/2020    Procedure: PROCTECTOMY, ABDOMINOPERINEAL;  Surgeon: Jan New MD;  Location: [facility] OR;  Service: General;  Laterality: N/A;    COLECTOMY, TOTAL N/A 2/24/2022    Procedure: COLECTOMY, TOTAL;  Surgeon: Jan New MD;  Location: [facility] OR;  Service: Colon and Rectal;  Laterality: N/A;    COLON SURGERY      COLONOSCOPY N/A 6/6/2019    Procedure: COLONOSCOPY;  Surgeon: Anjelica Saavedra MD;  Location: [facility] ENDO;  Service: Endoscopy;  Laterality: N/A;    COLONOSCOPY N/A 12/17/2021    Procedure: COLONOSCOPY;  Surgeon: Jan New MD;  Location: [facility] ENDO;  Service: General;  Laterality: N/A;    CREATION OF MUSCLE ROTATIONAL FLAP Left 4/9/2020    Procedure: CREATION, FLAP, MUSCLE ROTATION;  Surgeon: Sebastian Dan MD;  Location: [facility] OR;  Service: Plastics;  Laterality: Left;   VRAM    CYSTOSCOPIC LITHOLAPAXY N/A 2/24/2022    Procedure: CYSTOLITHOLAPAXY;  Surgeon: Sukhjinder Tucker MD;  Location: [facility];  Service: Urology;  Laterality: N/A;    CYSTOSCOPY  W/ URETERAL STENT PLACEMENT Right 10/6/2023    Procedure: CYSTOSCOPY, WITH URETERAL STENT INSERTION;  Surgeon: Sukhjinder Tucker MD;  Location: [facility] OR;  Service: Urology;  Laterality: Right;    CYSTOSCOPY WITH BIOPSY OF BLADDER N/A 9/11/2023    Procedure: CYSTOSCOPY, WITH BLADDER BIOPSY, WITH FULGURATION IF INDICATED;  Surgeon: Sukhjinder Tucker MD;  Location: [facility] OR;  Service: Urology;  Laterality: N/A;  , POSS TURBT    CYSTOSCOPY WITH URETEROSCOPY, RETROGRADE PYELOGRAPHY, AND INSERTION OF STENT N/A 9/11/2023    Procedure: CYSTOSCOPY, WITH RETROGRADE PYELOGRAM AND URETERAL STENT INSERTION;  Surgeon: Sukhjinder Tucker MD;  Location: [facility] OR;  Service: Urology;  Laterality: N/A;    DILATION OF URETHRA N/A 9/11/2023    Procedure: DILATION, URETHRA;  Surgeon: Sukhjinder Tucker MD;  Location: [facility] OR;  Service: Urology;  Laterality: N/A;    ESOPHAGOGASTRODUODENOSCOPY N/A 6/6/2019    Procedure: EGD (ESOPHAGOGASTRODUODENOSCOPY);  Surgeon: Anjelica Saavedra MD;  Location: [facility];  Service: Endoscopy;  Laterality: N/A;    FLAP GRAFT SURGERY N/A 4/9/2020    Procedure: FLAP GRAFT;  Surgeon: Sebastian Dan MD;  Location: [facility];  Service: Plastics;  Laterality: N/A;  left fasciocutaneous buttocks flap    HERNIA REPAIR  1995    INJECTION OF ANESTHETIC AGENT INTO TISSUE PLANE DEFINED BY TRANSVERSUS ABDOMINIS MUSCLE N/A 2/18/2020    Procedure: BLOCK, TRANSVERSUS ABDOMINIS PLANE;  Surgeon: Jan New MD;  Location: [facility] OR;  Service: General;  Laterality: N/A;    INJECTION OF ANESTHETIC AGENT INTO TISSUE PLANE DEFINED BY TRANSVERSUS ABDOMINIS MUSCLE  2/24/2022    Procedure: BLOCK, TRANSVERSUS ABDOMINIS PLANE;  Surgeon: Jan New MD;  Location: [facility];  Service: Colon and Rectal;;    INSERTION OF TUNNELED CENTRAL VENOUS CATHETER (CVC) WITH SUBCUTANEOUS PORT N/A 10/8/2019    Procedure: [id number];  Surgeon: Jan New MD;  Location: [facility];  Service: General;   Laterality: N/A;    LAPAROSCOPIC COLOSTOMY      MEDIPORT REMOVAL N/A 8/13/2020    Procedure: REMOVAL, CATHETER, CENTRAL VENOUS, TUNNELED, WITH PORT;  Surgeon: Sebastian Dan MD;  Location: [facility] OR;  Service: Plastics;  Laterality: N/A;    TONSILLECTOMY      TRANSURETHRAL RESECTION OF PROSTATE N/A 6/7/2021    Procedure: TURP (TRANSURETHRAL RESECTION OF PROSTATE), CYSTOLITHALOPAXY;  Surgeon: Sukhjinder Tucker MD;  Location: [facility] OR;  Service: Urology;  Laterality: N/A;    TURBT (TRANSURETHRAL RESECTION OF BLADDER TUMOR) N/A 10/6/2023    Procedure: TURBT (TRANSURETHRAL RESECTION OF BLADDER TUMOR);  Surgeon: Sukhjinder Tukcer MD;  Location: [facility] OR;  Service: Urology;  Laterality: N/A;    WOUND DEBRIDEMENT N/A 8/13/2020    Procedure: DEBRIDEMENT, WOUND;  Surgeon: Sebastian Dan MD;  Location: [facility] OR;  Service: Plastics;  Laterality: N/A;  layered closure     Family History   Problem Relation Age of Onset    Intestinal malrotation Mother     Leukemia Father     No Known Problems Sister     Coronary artery disease Brother     Coronary artery disease Maternal Grandmother     Stomach cancer Maternal Grandfather      Social History     Tobacco Use    Smoking status: Every Day     Current packs/day: 1.00     Average packs/day: 1 pack/day for 39.8 years (39.8 ttl pk-yrs)     Types: Cigarettes     Start date: 1/2/1984    Smokeless tobacco: Former     Types: Chew    Tobacco comments:     no smoking after m.n prior to sx   Substance Use Topics    Alcohol use: Not Currently     Alcohol/week: 46.0 standard drinks of alcohol     Types: 42 Cans of beer, 4 Shots of liquor per week     Comment: segrams 7, beer daily  hold now prior to surgery    Drug use: Never        Review of Systems:  Review of Systems   Constitutional: Negative for activity change, appetite change, chills, fatigue, fever and unexpected weight change.   HENT: Negative for congestion, ear pain, sore throat and trouble swallowing.    Eyes: Negative for  pain, redness and itching.   Respiratory: Negative for cough, shortness of breath and wheezing.    Cardiovascular: Negative for chest pain, palpitations and leg swelling.   Gastrointestinal: Negative for abdominal distention, blood in stool, constipation, diarrhea, nausea, and vomiting.   Endocrine: Negative for cold intolerance, heat intolerance and polyuria.   Genitourinary: Negative for dysuria, flank pain, frequency and hematuria.   Musculoskeletal: Negative for gait problem, joint swelling and neck pain.   Skin: Negative for color change, rash and wound.   Allergic/Immunologic: Negative for environmental allergies and immunocompromised state.   Neurological: Negative for dizziness, speech difficulty, weakness and numbness.   Psychiatric/Behavioral: Negative for agitation, confusion and hallucinations.       OBJECTIVE:     Vital Signs (Most Recent)  Pulse: 102 (11/06/23 0843)  Resp: 19 (11/06/23 0843)  BP: 135/77 (11/06/23 0843)  SpO2: 98 % (11/06/23 0843)     75.4 kg (166 lb 1.9 oz)     Physical Exam:  Physical Exam  Constitutional:       Appearance: He is well-developed.   HENT:      Head: Normocephalic and atraumatic.   Eyes:      Conjunctiva/sclera: Conjunctivae normal.   Neck:      Thyroid: No thyromegaly.   Cardiovascular:      Rate and Rhythm: Regular rhythm.   Pulmonary:      Effort: Pulmonary effort is normal. No respiratory distress.   Abdominal:      Comments: Soft, nondistended; midline incision well-healed ostomy pink and viable with stool in bag; +ventral hernia at midline incision and parastomal hernia which is soft and reducible  Genitourinary:     Comments: Skin flap well-perfused and well-healed without drainage or evidence of fistula tracts; no perineal hernia  Musculoskeletal:         General: No tenderness. Normal range of motion.      Cervical back: Normal range of motion.   Skin:     General: Skin is warm and dry.      Capillary Refill: Capillary refill takes less than 2 seconds.       Findings: No rash.   Neurological:      Mental Status: He is alert and oriented to person, place, and time.       Laboratory  Lab Results   Component Value Date    WBC 14.51 (H) 10/09/2023    HGB 11.9 (L) 10/09/2023    HCT 36.6 (L) 10/09/2023     (H) 10/09/2023    CHOL 191 10/09/2023    TRIG 246 (H) 10/09/2023    HDL 23 (L) 10/09/2023    ALT 25 10/09/2023    AST 18 10/09/2023     10/09/2023    K 4.6 10/09/2023     10/09/2023    CREATININE 1.6 (H) 10/09/2023    BUN 12 10/09/2023    CO2 20 (L) 10/09/2023    TSH 0.966 10/02/2023    PSA 0.45 12/12/2022    INR 1.0 06/11/2019    HGBA1C 5.4 10/09/2023     Component Ref Range & Units 1 mo ago  (9/25/23) 4 mo ago  (6/21/23) 7 mo ago  (3/13/23) 10 mo ago  (12/12/22) 1 yr ago  (9/12/22) 1 yr ago  (6/13/22) 1 yr ago  (1/3/22)   CEA 0.0 - 5.0 ng/mL 3.8  2.0 CM  1.9 CM  <1.7 CM  <1.7 CM  1.7 CM  1.8 CM       Diagnostic Results:  CT Urogram October 2023  FINDINGS:  Lung bases are clear.  Heart sizes normal.  No pleural effusion or pericardial effusion.     Liver is normal in size with no focal abnormalities.  Small benign granuloma left hepatic lobe.  Gallbladder is unremarkable.  Spleen is enlarged measuring 15 cm with multiple liver granulomas.  Adrenals and pancreas are unremarkable.     Right kidney is enlarged with heterogeneous attenuation and moderate hydronephrosis and hydroureter without definite stone in could reflect distal ureteral stricture.  Delayed excretion of contrast noted within the right kidney.  Far distal right ureter is not well visualized secondary to scarring within the pelvis.  No discrete obstructing lesion identified.     Staghorn calculus lower pole left kidney measures up to 2.4 cm.  No left hydronephrosis.  Left ureter nondilated.  Nonspecific perinephric stranding.     Bladder is collapsed which limits evaluation.  Nonspecific bladder wall thickening noted.  Prostatomegaly noted.     Shotty nodes are seen within the  retroperitoneum.  Aorta demonstrates moderate atherosclerotic disease.     Small bowel demonstrate no evidence of obstruction or focal abnormality.  Suspect total colectomy.  Again a left lower quadrant stomal hernia noted with small bowel contents without definite obstruction.     No evidence of free-fluid or free-air within the abdomen or pelvis.  Presacral edema is seen.  Soft tissue thickening extends from the prostate along the course of the distal ureter.     Bones demonstrate moderate degenerative changes.     Impression:     Findings are concerning for malignant stricture involving the distal right ureter.  Findings are best demonstrated sequence 4 images 140 through 153.     Moderate right-sided hydronephrosis and hydroureter.        PATHOLOGY:  Bladder, right, transurethral resection:   - Metastatic colorectal adenocarcinoma, consistent with patient's history   - Muscularis propria is present and involved by metastatic carcinoma   - MMR immunohistochemical stains have been ordered and results will be       issued in a supplemental report     ASSESSMENT/PLAN:     57-year-old male with rectal adenocarcinoma with likely T3N1 disease based upon preop imaging without evidence of metastatic disease now s/p neoadj chemoXRT which completed on 8/13/19 but with post-tx MRI showing continued threatened mesorectal fascia who developed rectal perforation and abscess/fistula on cycle 11/12 of neoadj chemotx in Feb 2020 requiring lap diverting sigmoid colostomy with continued fluid collection/perforation/fistula on CT/MRI now s/p APR with extra-anatomic resection of fistula tract and left gluteus muscle, omental pedicle flap and VRAM with skin paddle by plastics on 4/9/2020 and now newly found unresectable polyps in the ascending and transverse colon who is s/p open completion colectomy/total colectomy with end ileostomy construction in Feb '22 with final path showing adenomas and now with recurrent rectal adenocarcinoma  in right pelvis, invading bladder    - repeat staging  - MRI rectal cancer protocol ordered to evaluate recurrence in pelvis and relationship to other structures  - PET scan ordered to eval for any metastatic disease outside pelvis  - Tumor board presentation following restaging completion  - RTC 3-4 weeks to discuss treatment going forward    Jan New MD  Colon and Rectal Surgery  [facility]

## 2023-11-06 NOTE — Clinical Note
Saw him in clinic today.  Going to restage him with imaging.  He is having trouble emptying his bladder and may need a catheter reinserted just FYI.  May want to follow up with him about this

## 2023-11-07 ENCOUNTER — LAB VISIT (OUTPATIENT)
Dept: LAB | Facility: HOSPITAL | Age: 58
End: 2023-11-07
Payer: COMMERCIAL

## 2023-11-07 DIAGNOSIS — D50.0 IRON DEFICIENCY ANEMIA DUE TO CHRONIC BLOOD LOSS: Chronic | ICD-10-CM

## 2023-11-07 DIAGNOSIS — D50.0 ANEMIA DUE TO CHRONIC BLOOD LOSS: ICD-10-CM

## 2023-11-07 DIAGNOSIS — N39.0 URINARY TRACT INFECTION WITHOUT HEMATURIA, SITE UNSPECIFIED: ICD-10-CM

## 2023-11-07 LAB
ALBUMIN SERPL BCP-MCNC: 3.4 G/DL (ref 3.5–5.2)
ALP SERPL-CCNC: 91 U/L (ref 55–135)
ALT SERPL W/O P-5'-P-CCNC: 10 U/L (ref 10–44)
ANION GAP SERPL CALC-SCNC: 14 MMOL/L (ref 8–16)
AST SERPL-CCNC: 15 U/L (ref 10–40)
BACTERIA #/AREA URNS AUTO: ABNORMAL /HPF
BASOPHILS # BLD AUTO: 0.06 K/UL (ref 0–0.2)
BASOPHILS NFR BLD: 0.7 % (ref 0–1.9)
BILIRUB SERPL-MCNC: 0.3 MG/DL (ref 0.1–1)
BILIRUB UR QL STRIP: NEGATIVE
BUN SERPL-MCNC: 9 MG/DL (ref 6–20)
CALCIUM SERPL-MCNC: 9.1 MG/DL (ref 8.7–10.5)
CHLORIDE SERPL-SCNC: 104 MMOL/L (ref 95–110)
CLARITY UR REFRACT.AUTO: ABNORMAL
CO2 SERPL-SCNC: 23 MMOL/L (ref 23–29)
COLOR UR AUTO: YELLOW
CREAT SERPL-MCNC: 1.3 MG/DL (ref 0.5–1.4)
DIFFERENTIAL METHOD: ABNORMAL
EOSINOPHIL # BLD AUTO: 0.5 K/UL (ref 0–0.5)
EOSINOPHIL NFR BLD: 5.2 % (ref 0–8)
ERYTHROCYTE [DISTWIDTH] IN BLOOD BY AUTOMATED COUNT: 14.4 % (ref 11.5–14.5)
EST. GFR  (NO RACE VARIABLE): >60 ML/MIN/1.73 M^2
GLUCOSE SERPL-MCNC: 142 MG/DL (ref 70–110)
GLUCOSE UR QL STRIP: NEGATIVE
HCT VFR BLD AUTO: 35.6 % (ref 40–54)
HGB BLD-MCNC: 10.9 G/DL (ref 14–18)
HGB UR QL STRIP: ABNORMAL
HYALINE CASTS UR QL AUTO: 0 /LPF
IMM GRANULOCYTES # BLD AUTO: 0.04 K/UL (ref 0–0.04)
IMM GRANULOCYTES NFR BLD AUTO: 0.5 % (ref 0–0.5)
IRON SERPL-MCNC: 38 UG/DL (ref 45–160)
KETONES UR QL STRIP: NEGATIVE
LDH SERPL L TO P-CCNC: 128 U/L (ref 110–260)
LEUKOCYTE ESTERASE UR QL STRIP: ABNORMAL
LYMPHOCYTES # BLD AUTO: 1.9 K/UL (ref 1–4.8)
LYMPHOCYTES NFR BLD: 21.7 % (ref 18–48)
MCH RBC QN AUTO: 27.7 PG (ref 27–31)
MCHC RBC AUTO-ENTMCNC: 30.6 G/DL (ref 32–36)
MCV RBC AUTO: 90 FL (ref 82–98)
MICROSCOPIC COMMENT: ABNORMAL
MONOCYTES # BLD AUTO: 0.6 K/UL (ref 0.3–1)
MONOCYTES NFR BLD: 6.5 % (ref 4–15)
NEUTROPHILS # BLD AUTO: 5.8 K/UL (ref 1.8–7.7)
NEUTROPHILS NFR BLD: 65.4 % (ref 38–73)
NITRITE UR QL STRIP: POSITIVE
NRBC BLD-RTO: 0 /100 WBC
PH UR STRIP: 8 [PH] (ref 5–8)
PLATELET # BLD AUTO: 384 K/UL (ref 150–450)
PMV BLD AUTO: 9.1 FL (ref 9.2–12.9)
POTASSIUM SERPL-SCNC: 3.8 MMOL/L (ref 3.5–5.1)
PROT SERPL-MCNC: 8.5 G/DL (ref 6–8.4)
PROT UR QL STRIP: ABNORMAL
RBC # BLD AUTO: 3.94 M/UL (ref 4.6–6.2)
RBC #/AREA URNS AUTO: 54 /HPF (ref 0–4)
SATURATED IRON: 16 % (ref 20–50)
SODIUM SERPL-SCNC: 141 MMOL/L (ref 136–145)
SP GR UR STRIP: 1.02 (ref 1–1.03)
TOTAL IRON BINDING CAPACITY: 241 UG/DL (ref 250–450)
TRANSFERRIN SERPL-MCNC: 163 MG/DL (ref 200–375)
URN SPEC COLLECT METH UR: ABNORMAL
WBC # BLD AUTO: 8.88 K/UL (ref 3.9–12.7)
WBC #/AREA URNS AUTO: >100 /HPF (ref 0–5)
WBC CLUMPS UR QL AUTO: ABNORMAL

## 2023-11-07 PROCEDURE — 83615 LACTATE (LD) (LDH) ENZYME: CPT

## 2023-11-07 PROCEDURE — 80053 COMPREHEN METABOLIC PANEL: CPT

## 2023-11-07 PROCEDURE — 84165 PATHOLOGIST INTERPRETATION SPE: ICD-10-PCS | Mod: 26,,, | Performed by: PATHOLOGY

## 2023-11-07 PROCEDURE — 84165 PROTEIN E-PHORESIS SERUM: CPT

## 2023-11-07 PROCEDURE — 84165 PROTEIN E-PHORESIS SERUM: CPT | Mod: 26,,, | Performed by: PATHOLOGY

## 2023-11-07 PROCEDURE — 81001 URINALYSIS AUTO W/SCOPE: CPT | Performed by: UROLOGY

## 2023-11-07 PROCEDURE — 83521 IG LIGHT CHAINS FREE EACH: CPT

## 2023-11-07 PROCEDURE — 36415 COLL VENOUS BLD VENIPUNCTURE: CPT | Mod: PN

## 2023-11-07 PROCEDURE — 85025 COMPLETE CBC W/AUTO DIFF WBC: CPT

## 2023-11-07 PROCEDURE — 82728 ASSAY OF FERRITIN: CPT

## 2023-11-07 PROCEDURE — 83540 ASSAY OF IRON: CPT

## 2023-11-07 PROCEDURE — 84466 ASSAY OF TRANSFERRIN: CPT

## 2023-11-08 LAB
ALBUMIN SERPL ELPH-MCNC: 3.53 G/DL (ref 3.35–5.55)
ALPHA1 GLOB SERPL ELPH-MCNC: 0.57 G/DL (ref 0.17–0.41)
ALPHA2 GLOB SERPL ELPH-MCNC: 1.08 G/DL (ref 0.43–0.99)
B-GLOBULIN SERPL ELPH-MCNC: 0.99 G/DL (ref 0.5–1.1)
FERRITIN SERPL-MCNC: 1975 NG/ML (ref 20–300)
GAMMA GLOB SERPL ELPH-MCNC: 1.52 G/DL (ref 0.67–1.58)
KAPPA LC SER QL IA: 7 MG/DL (ref 0.33–1.94)
KAPPA LC/LAMBDA SER IA: 1.43 (ref 0.26–1.65)
LAMBDA LC SER QL IA: 4.9 MG/DL (ref 0.57–2.63)
PATHOLOGIST INTERPRETATION SPE: NORMAL
PROT SERPL-MCNC: 7.7 G/DL (ref 6–8.4)

## 2023-11-09 ENCOUNTER — PATIENT MESSAGE (OUTPATIENT)
Dept: SURGERY | Facility: CLINIC | Age: 58
End: 2023-11-09
Payer: COMMERCIAL

## 2023-11-10 ENCOUNTER — HOSPITAL ENCOUNTER (OUTPATIENT)
Dept: RADIOLOGY | Facility: HOSPITAL | Age: 58
Discharge: HOME | End: 2023-11-10
Attending: COLON & RECTAL SURGERY
Payer: COMMERCIAL

## 2023-11-10 ENCOUNTER — OFFICE VISIT (OUTPATIENT)
Dept: HEMATOLOGY/ONCOLOGY | Facility: CLINIC | Age: 58
End: 2023-11-10
Payer: COMMERCIAL

## 2023-11-10 DIAGNOSIS — C79.89: ICD-10-CM

## 2023-11-10 DIAGNOSIS — Z93.3 COLOSTOMY IN PLACE: ICD-10-CM

## 2023-11-10 DIAGNOSIS — C20 RECTAL CANCER: ICD-10-CM

## 2023-11-10 DIAGNOSIS — C20 RECTAL CANCER: Primary | ICD-10-CM

## 2023-11-10 DIAGNOSIS — C20: ICD-10-CM

## 2023-11-10 DIAGNOSIS — D50.0 ANEMIA DUE TO CHRONIC BLOOD LOSS: ICD-10-CM

## 2023-11-10 DIAGNOSIS — D49.4 BLADDER TUMOR: ICD-10-CM

## 2023-11-10 DIAGNOSIS — Z85.048 HISTORY OF RECTAL CANCER: ICD-10-CM

## 2023-11-10 PROBLEM — K63.89 COLONIC MASS: Status: RESOLVED | Noted: 2022-02-25 | Resolved: 2023-11-10

## 2023-11-10 PROCEDURE — 1160F PR REVIEW ALL MEDS BY PRESCRIBER/CLIN PHARMACIST DOCUMENTED: ICD-10-PCS | Mod: CPTII,95,, | Performed by: INTERNAL MEDICINE

## 2023-11-10 PROCEDURE — 1159F PR MEDICATION LIST DOCUMENTED IN MEDICAL RECORD: ICD-10-PCS | Mod: CPTII,95,, | Performed by: INTERNAL MEDICINE

## 2023-11-10 PROCEDURE — 3044F HG A1C LEVEL LT 7.0%: CPT | Mod: CPTII,95,, | Performed by: INTERNAL MEDICINE

## 2023-11-10 PROCEDURE — 99214 OFFICE O/P EST MOD 30 MIN: CPT | Mod: 95,,, | Performed by: INTERNAL MEDICINE

## 2023-11-10 PROCEDURE — 3044F PR MOST RECENT HEMOGLOBIN A1C LEVEL <7.0%: ICD-10-PCS | Mod: CPTII,95,, | Performed by: INTERNAL MEDICINE

## 2023-11-10 PROCEDURE — 78816 PET IMAGE W/CT FULL BODY: CPT | Mod: 26,PI,, | Performed by: RADIOLOGY

## 2023-11-10 PROCEDURE — 1160F RVW MEDS BY RX/DR IN RCRD: CPT | Mod: CPTII,95,, | Performed by: INTERNAL MEDICINE

## 2023-11-10 PROCEDURE — 99214 PR OFFICE/OUTPT VISIT, EST, LEVL IV, 30-39 MIN: ICD-10-PCS | Mod: 95,,, | Performed by: INTERNAL MEDICINE

## 2023-11-10 PROCEDURE — 78816 PET IMAGE W/CT FULL BODY: CPT | Mod: TC

## 2023-11-10 PROCEDURE — 78816 NM PET CT FDG WHOLE BODY (MELANOMA): ICD-10-PCS | Mod: 26,PI,, | Performed by: RADIOLOGY

## 2023-11-10 PROCEDURE — 1159F MED LIST DOCD IN RCRD: CPT | Mod: CPTII,95,, | Performed by: INTERNAL MEDICINE

## 2023-11-10 NOTE — PROGRESS NOTES
Subjective:       Patient ID: Sukhjinder Presley is a 57 y.o. male.    Chief Complaint: Results and Rectal Cancer    HPI:  57-year-old male history of stage IIIB rectal carcinoma patient treatment consistent concurrent radiation subsequent chemotherapy.  In 2019 current office invasion bladder biopsy 44 was asked to see the patient virtual visit for treatment recommendations.  ECOG status to accompanied by his wife who herself has had a soft tissue sarcoma of her buttocks and has been successfully treated    Past Medical History:   Diagnosis Date    Anemia     Arthritis     Cancer     rectal    Renal disorder     kidney stones     Family History   Problem Relation Age of Onset    Intestinal malrotation Mother     Leukemia Father     No Known Problems Sister     Coronary artery disease Brother     Coronary artery disease Maternal Grandmother     Stomach cancer Maternal Grandfather      Social History     Socioeconomic History    Marital status: Significant Other   Tobacco Use    Smoking status: Every Day     Current packs/day: 1.00     Average packs/day: 1 pack/day for 39.9 years (39.9 ttl pk-yrs)     Types: Cigarettes     Start date: 1/2/1984    Smokeless tobacco: Former     Types: Chew    Tobacco comments:     no smoking after m.n prior to sx   Substance and Sexual Activity    Alcohol use: Not Currently     Alcohol/week: 46.0 standard drinks of alcohol     Types: 42 Cans of beer, 4 Shots of liquor per week     Comment: segrams 7, beer daily  hold now prior to surgery    Drug use: Never    Sexual activity: Yes     Partners: Female     Birth control/protection: None     Social Determinants of Health     Financial Resource Strain: High Risk (11/10/2023)    Overall Financial Resource Strain (CARDIA)     Difficulty of Paying Living Expenses: Very hard   Food Insecurity: Food Insecurity Present (11/10/2023)    Hunger Vital Sign     Worried About Running Out of Food in the Last Year: Often true     Ran Out of Food in the Last  Year: Often true   Transportation Needs: Unmet Transportation Needs (11/10/2023)    PRAPARE - Transportation     Lack of Transportation (Medical): Yes     Lack of Transportation (Non-Medical): No   Physical Activity: Sufficiently Active (11/10/2023)    Exercise Vital Sign     Days of Exercise per Week: 7 days     Minutes of Exercise per Session: 70 min   Stress: Stress Concern Present (11/10/2023)    [employer] - Occupational Stress Questionnaire     Feeling of Stress : Very much   Social Connections: Unknown (11/10/2023)    Social Connection and Isolation Panel [NHANES]     Frequency of Communication with Friends and Family: More than three times a week     Frequency of Social Gatherings with Friends and Family: Once a week     Active Member of Clubs or Organizations: Yes     Attends Club or Organization Meetings: 1 to 4 times per year     Marital Status: Living with partner   Housing Stability: High Risk (11/10/2023)    Housing Stability Vital Sign     Unable to Pay for Housing in the Last Year: Yes     Number of Places Lived in the Last Year: 1     Unstable Housing in the Last Year: No     Past Surgical History:   Procedure Laterality Date    ABDOMINOPERINEAL RESECTION OF RECTUM N/A 4/9/2020    Procedure: PROCTECTOMY, ABDOMINOPERINEAL;  Surgeon: Jan New MD;  Location: [facility] OR;  Service: General;  Laterality: N/A;    COLECTOMY, TOTAL N/A 2/24/2022    Procedure: COLECTOMY, TOTAL;  Surgeon: Jan New MD;  Location: [facility] OR;  Service: Colon and Rectal;  Laterality: N/A;    COLON SURGERY      COLONOSCOPY N/A 6/6/2019    Procedure: COLONOSCOPY;  Surgeon: Anjelica Saavedra MD;  Location: [facility] ENDO;  Service: Endoscopy;  Laterality: N/A;    COLONOSCOPY N/A 12/17/2021    Procedure: COLONOSCOPY;  Surgeon: Jan New MD;  Location: [facility] ENDO;  Service: General;  Laterality: N/A;    CREATION OF MUSCLE ROTATIONAL FLAP Left 4/9/2020    Procedure: CREATION, FLAP, MUSCLE  ROTATION;  Surgeon: Sebastian Dan MD;  Location: [facility];  Service: Plastics;  Laterality: Left;   VRAM    CYSTOSCOPIC LITHOLAPAXY N/A 2/24/2022    Procedure: CYSTOLITHOLAPAXY;  Surgeon: Sukhjinder Tucker MD;  Location: [facility];  Service: Urology;  Laterality: N/A;    CYSTOSCOPY W/ URETERAL STENT PLACEMENT Right 10/6/2023    Procedure: CYSTOSCOPY, WITH URETERAL STENT INSERTION;  Surgeon: Sukhjinder Tucker MD;  Location: [facility];  Service: Urology;  Laterality: Right;    CYSTOSCOPY WITH BIOPSY OF BLADDER N/A 9/11/2023    Procedure: CYSTOSCOPY, WITH BLADDER BIOPSY, WITH FULGURATION IF INDICATED;  Surgeon: Sukhjinder Tucker MD;  Location: [facility];  Service: Urology;  Laterality: N/A;  , POSS TURBT    CYSTOSCOPY WITH URETEROSCOPY, RETROGRADE PYELOGRAPHY, AND INSERTION OF STENT N/A 9/11/2023    Procedure: CYSTOSCOPY, WITH RETROGRADE PYELOGRAM AND URETERAL STENT INSERTION;  Surgeon: Sukhjinder Tucker MD;  Location: [facility];  Service: Urology;  Laterality: N/A;    DILATION OF URETHRA N/A 9/11/2023    Procedure: DILATION, URETHRA;  Surgeon: Sukhjinder Tucker MD;  Location: [facility];  Service: Urology;  Laterality: N/A;    ESOPHAGOGASTRODUODENOSCOPY N/A 6/6/2019    Procedure: EGD (ESOPHAGOGASTRODUODENOSCOPY);  Surgeon: Anjelica Saavedra MD;  Location: [facility];  Service: Endoscopy;  Laterality: N/A;    FLAP GRAFT SURGERY N/A 4/9/2020    Procedure: FLAP GRAFT;  Surgeon: Sebastian Dan MD;  Location: [facility];  Service: Plastics;  Laterality: N/A;  left fasciocutaneous buttocks flap    HERNIA REPAIR  1995    INJECTION OF ANESTHETIC AGENT INTO TISSUE PLANE DEFINED BY TRANSVERSUS ABDOMINIS MUSCLE N/A 2/18/2020    Procedure: BLOCK, TRANSVERSUS ABDOMINIS PLANE;  Surgeon: Jan New MD;  Location: [facility];  Service: General;  Laterality: N/A;    INJECTION OF ANESTHETIC AGENT INTO TISSUE PLANE DEFINED BY TRANSVERSUS ABDOMINIS MUSCLE  2/24/2022    Procedure: BLOCK, TRANSVERSUS ABDOMINIS PLANE;   Surgeon: Jan New MD;  Location: [facility] OR;  Service: Colon and Rectal;;    INSERTION OF TUNNELED CENTRAL VENOUS CATHETER (CVC) WITH SUBCUTANEOUS PORT N/A 10/8/2019    Procedure: [id number];  Surgeon: Jan New MD;  Location: [facility] OR;  Service: General;  Laterality: N/A;    LAPAROSCOPIC COLOSTOMY      MEDIPORT REMOVAL N/A 8/13/2020    Procedure: REMOVAL, CATHETER, CENTRAL VENOUS, TUNNELED, WITH PORT;  Surgeon: Sebastian Dan MD;  Location: [facility] OR;  Service: Plastics;  Laterality: N/A;    TONSILLECTOMY      TRANSURETHRAL RESECTION OF PROSTATE N/A 6/7/2021    Procedure: TURP (TRANSURETHRAL RESECTION OF PROSTATE), CYSTOLITHALOPAXY;  Surgeon: Sukhjinder Tucker MD;  Location: [facility] OR;  Service: Urology;  Laterality: N/A;    TURBT (TRANSURETHRAL RESECTION OF BLADDER TUMOR) N/A 10/6/2023    Procedure: TURBT (TRANSURETHRAL RESECTION OF BLADDER TUMOR);  Surgeon: Sukhjinder Tucker MD;  Location: [facility] OR;  Service: Urology;  Laterality: N/A;    WOUND DEBRIDEMENT N/A 8/13/2020    Procedure: DEBRIDEMENT, WOUND;  Surgeon: Sebastian Dan MD;  Location: [facility] OR;  Service: Plastics;  Laterality: N/A;  layered closure       Labs:  Lab Results   Component Value Date    WBC 8.88 11/07/2023    HGB 10.9 (L) 11/07/2023    HCT 35.6 (L) 11/07/2023    MCV 90 11/07/2023     11/07/2023     BMP  Lab Results   Component Value Date     11/07/2023    K 3.8 11/07/2023     11/07/2023    CO2 23 11/07/2023    BUN 9 11/07/2023    CREATININE 1.3 11/07/2023    CALCIUM 9.1 11/07/2023    ANIONGAP 14 11/07/2023    ESTGFRAFRICA >60 07/20/2022    EGFRNONAA >60 07/20/2022     Lab Results   Component Value Date    ALT 10 11/07/2023    AST 15 11/07/2023    ALKPHOS 91 11/07/2023    BILITOT 0.3 11/07/2023       Lab Results   Component Value Date    IRON 38 (L) 11/07/2023    TIBC 241 (L) 11/07/2023    FERRITIN 1,975 (H) 11/07/2023     Lab Results   Component Value Date    TLSHNARD11 423 10/09/2023      Lab Results   Component Value Date    FOLATE 5.7 10/09/2023     Lab Results   Component Value Date    TSH 0.966 10/02/2023         Review of Systems   Constitutional:  Positive for fatigue. Negative for activity change, appetite change, chills, diaphoresis, fever and unexpected weight change.   HENT:  Negative for congestion, dental problem, drooling, ear discharge, ear pain, facial swelling, hearing loss, mouth sores, nosebleeds, postnasal drip, rhinorrhea, sinus pressure, sneezing, sore throat, tinnitus, trouble swallowing and voice change.    Eyes:  Negative for photophobia, pain, discharge, redness, itching and visual disturbance.   Respiratory:  Negative for apnea, cough, choking, chest tightness, shortness of breath, wheezing and stridor.    Cardiovascular:  Negative for chest pain, palpitations and leg swelling.   Gastrointestinal:  Positive for rectal pain. Negative for abdominal distention, abdominal pain, anal bleeding, blood in stool, constipation, diarrhea, nausea and vomiting.   Endocrine: Negative for cold intolerance, heat intolerance, polydipsia, polyphagia and polyuria.   Genitourinary:  Negative for decreased urine volume, difficulty urinating, dysuria, enuresis, flank pain, frequency, genital sores, hematuria, penile discharge, penile pain, penile swelling, scrotal swelling, testicular pain and urgency.   Musculoskeletal:  Negative for arthralgias, back pain, gait problem, joint swelling, myalgias, neck pain and neck stiffness.   Skin:  Negative for color change, pallor, rash and wound.   Allergic/Immunologic: Negative for environmental allergies, food allergies and immunocompromised state.   Neurological:  Positive for weakness. Negative for dizziness, tremors, seizures, syncope, facial asymmetry, speech difficulty, light-headedness, numbness and headaches.   Hematological:  Negative for adenopathy. Does not bruise/bleed easily.   Psychiatric/Behavioral:  Positive for dysphoric mood. Negative  for agitation, behavioral problems, confusion, decreased concentration, hallucinations, self-injury, sleep disturbance and suicidal ideas. The patient is not nervous/anxious and is not hyperactive.        Objective:      Physical Exam  Constitutional:       Appearance: He is ill-appearing.             Assessment:      1. Rectal cancer    2. Bladder tumor    3. Anemia due to chronic blood loss    4. Colostomy in place           Med Onc Chart Routing      Follow up with physician . Nurse navigation to coordinate   Follow up with REE    Infusion scheduling note    Injection scheduling note    Labs    Imaging    Pharmacy appointment    Other referrals                   Plan:     The patient location is:  Home  The chief complaint leading to consultation is:  Rectal cancer    Visit type: audiovisual    Face to Face time with patient: 25 minutes of total time spent on the encounter, which includes face to face time and non-face to face time preparing to see the patient (eg, review of tests), Obtaining and/or reviewing separately obtained history, Documenting clinical information in the electronic or other health record, Independently interpreting results (not separately reported) and communicating results to the patient/family/caregiver, or Care coordination (not separately reported).         Each patient to whom he or she provides medical services by telemedicine is:  (1) informed of the relationship between the physician and patient and the respective role of any other health care provider with respect to management of the patient; and (2) notified that he or she may decline to receive medical services by telemedicine and may withdraw from such care at any time.    Notes:  Reviewed information with patient biopsy-proven rectal carcinoma in bladder.  At this time next generation sequencing has been done the patient is KRAS mutated.  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  I have talked to the patient about systemic chemotherapy wishes what I would recommend in terms of FOLFOX the patient is reluctant to proceed with this at this point does ask that Radiation Oncology consultation be determined to see whether not this is a potential course of action.  Referral has been placed Radiation Oncology and will discuss patient at next multidisciplinary tumor conference to review potential therapeutic options        Ken Cosby Jr, MD FACP

## 2023-11-13 ENCOUNTER — HOSPITAL ENCOUNTER (OUTPATIENT)
Dept: RADIOLOGY | Facility: HOSPITAL | Age: 58
Discharge: HOME | End: 2023-11-13
Attending: COLON & RECTAL SURGERY
Payer: COMMERCIAL

## 2023-11-13 DIAGNOSIS — Z85.048 HISTORY OF RECTAL CANCER: ICD-10-CM

## 2023-11-13 DIAGNOSIS — C79.89: ICD-10-CM

## 2023-11-13 DIAGNOSIS — C20: ICD-10-CM

## 2023-11-13 DIAGNOSIS — C20 RECTAL CANCER: ICD-10-CM

## 2023-11-13 PROCEDURE — 72195 MRI RECTAL CANCER WITHOUT CONTRAST: ICD-10-PCS | Mod: 26,,, | Performed by: RADIOLOGY

## 2023-11-13 PROCEDURE — 72195 MRI PELVIS W/O DYE: CPT | Mod: 26,,, | Performed by: RADIOLOGY

## 2023-11-13 PROCEDURE — 72195 MRI PELVIS W/O DYE: CPT | Mod: TC,PN

## 2023-11-14 ENCOUNTER — TELEPHONE (OUTPATIENT)
Dept: RADIATION ONCOLOGY | Facility: CLINIC | Age: 58
End: 2023-11-14
Payer: COMMERCIAL

## 2023-11-16 ENCOUNTER — TELEPHONE (OUTPATIENT)
Dept: HEMATOLOGY/ONCOLOGY | Facility: CLINIC | Age: 58
End: 2023-11-16
Payer: COMMERCIAL

## 2023-11-17 ENCOUNTER — TUMOR BOARD CONFERENCE (OUTPATIENT)
Dept: HEMATOLOGY/ONCOLOGY | Facility: CLINIC | Age: 58
End: 2023-11-17
Payer: COMMERCIAL

## 2023-11-17 DIAGNOSIS — D49.4 BLADDER TUMOR: ICD-10-CM

## 2023-11-17 DIAGNOSIS — C20 RECTAL CANCER: Primary | ICD-10-CM

## 2023-11-17 NOTE — PROGRESS NOTES
Tumor Board Documentation      Sukhjinder Presley was presented by Ken Cosby MD at our Tumor Board on 11/17/2023, which included representatives from Medical Oncology, Hematology, Radiation Oncology, Surgical Oncology, Pathology, Genetics, Navigation, Radiology, Gastrointestinal, Pulmonology, Urology.    Sukhjinder currently presents as a current patient, for new tumor(s) with Rectal cancer, with history of the following treatments: Neoadjuvant Radiation, Neoadjuvant Chemotherapy, Surgical Intervention(s).    Additionally, we reviewed previous medical and familial history, history of present illness, and recent lab results along with all available histopathologic and imaging studies. The tumor board considered available treatment options and made the following recommendations:  Radiation, Chemotherapy     Obtain pharmacogenomic testing/ Consult Radiation Oncology to assess role for XRT, after consult, Medical Oncology to discuss with pt consideration for systemic therapy with 5FU or Xeloda.     The following procedures/referrals were also placed: No orders of the defined types were placed in this encounter.      Clinical Trial Status: Not discussed     National site-specific guidelines were discussed with respect to the case.    Tumor board is a meeting of clinicians from various specialty areas who evaluate and discuss patients for whom a multidisciplinary approach is being considered. Final determinations in the plan of care are those of the provider(s). The responsibility for follow up of recommendations given during tumor board is that of the provider.     Ken Cosby MD

## 2023-11-20 DIAGNOSIS — C20 RECTAL CANCER: ICD-10-CM

## 2023-11-20 DIAGNOSIS — N52.39 OTHER POST-PROCEDURAL ERECTILE DYSFUNCTION: ICD-10-CM

## 2023-11-20 RX ORDER — OXYCODONE HYDROCHLORIDE 10 MG/1
10 TABLET ORAL EVERY 8 HOURS PRN
Qty: 90 TABLET | Refills: 0 | Status: SHIPPED | OUTPATIENT
Start: 2023-11-20 | End: 2023-12-11 | Stop reason: SDUPTHER

## 2023-11-21 RX ORDER — FINASTERIDE 5 MG/1
5 TABLET, FILM COATED ORAL DAILY
Qty: 30 TABLET | Refills: 11 | Status: SHIPPED | OUTPATIENT
Start: 2023-11-21 | End: 2024-11-20

## 2023-11-27 ENCOUNTER — OFFICE VISIT (OUTPATIENT)
Dept: RADIATION ONCOLOGY | Facility: CLINIC | Age: 58
End: 2023-11-27
Payer: COMMERCIAL

## 2023-11-27 ENCOUNTER — LAB VISIT (OUTPATIENT)
Dept: LAB | Facility: HOSPITAL | Age: 58
End: 2023-11-27
Attending: INTERNAL MEDICINE
Payer: COMMERCIAL

## 2023-11-27 ENCOUNTER — OFFICE VISIT (OUTPATIENT)
Dept: SURGERY | Facility: CLINIC | Age: 58
End: 2023-11-27
Payer: COMMERCIAL

## 2023-11-27 VITALS
BODY MASS INDEX: 22.03 KG/M2 | TEMPERATURE: 99 F | HEIGHT: 73 IN | HEART RATE: 103 BPM | DIASTOLIC BLOOD PRESSURE: 79 MMHG | WEIGHT: 166.25 LBS | SYSTOLIC BLOOD PRESSURE: 128 MMHG

## 2023-11-27 VITALS
DIASTOLIC BLOOD PRESSURE: 79 MMHG | WEIGHT: 161.38 LBS | SYSTOLIC BLOOD PRESSURE: 128 MMHG | HEART RATE: 103 BPM | TEMPERATURE: 99 F | HEIGHT: 73 IN | RESPIRATION RATE: 18 BRPM | OXYGEN SATURATION: 99 % | BODY MASS INDEX: 21.39 KG/M2

## 2023-11-27 DIAGNOSIS — C20: ICD-10-CM

## 2023-11-27 DIAGNOSIS — C79.11 METASTATIC ADENOCARCINOMA TO BLADDER: ICD-10-CM

## 2023-11-27 DIAGNOSIS — C20 RECTAL CANCER: ICD-10-CM

## 2023-11-27 DIAGNOSIS — Z85.048 HISTORY OF RECTAL CANCER: Primary | ICD-10-CM

## 2023-11-27 DIAGNOSIS — C79.89: ICD-10-CM

## 2023-11-27 PROCEDURE — 3074F PR MOST RECENT SYSTOLIC BLOOD PRESSURE < 130 MM HG: ICD-10-PCS | Mod: CPTII,S$GLB,, | Performed by: COLON & RECTAL SURGERY

## 2023-11-27 PROCEDURE — 3074F SYST BP LT 130 MM HG: CPT | Mod: CPTII,S$GLB,, | Performed by: COLON & RECTAL SURGERY

## 2023-11-27 PROCEDURE — 99214 PR OFFICE/OUTPT VISIT, EST, LEVL IV, 30-39 MIN: ICD-10-PCS | Mod: S$GLB,,, | Performed by: COLON & RECTAL SURGERY

## 2023-11-27 PROCEDURE — 3044F HG A1C LEVEL LT 7.0%: CPT | Mod: CPTII,S$GLB,, | Performed by: COLON & RECTAL SURGERY

## 2023-11-27 PROCEDURE — 99205 PR OFFICE/OUTPT VISIT, NEW, LEVL V, 60-74 MIN: ICD-10-PCS | Mod: S$GLB,,, | Performed by: SPECIALIST

## 2023-11-27 PROCEDURE — 1159F MED LIST DOCD IN RCRD: CPT | Mod: CPTII,S$GLB,, | Performed by: SPECIALIST

## 2023-11-27 PROCEDURE — 3008F BODY MASS INDEX DOCD: CPT | Mod: CPTII,S$GLB,, | Performed by: SPECIALIST

## 2023-11-27 PROCEDURE — 99205 OFFICE O/P NEW HI 60 MIN: CPT | Mod: S$GLB,,, | Performed by: SPECIALIST

## 2023-11-27 PROCEDURE — 3008F PR BODY MASS INDEX (BMI) DOCUMENTED: ICD-10-PCS | Mod: CPTII,S$GLB,, | Performed by: COLON & RECTAL SURGERY

## 2023-11-27 PROCEDURE — 3078F DIAST BP <80 MM HG: CPT | Mod: CPTII,S$GLB,, | Performed by: SPECIALIST

## 2023-11-27 PROCEDURE — 36415 COLL VENOUS BLD VENIPUNCTURE: CPT | Performed by: INTERNAL MEDICINE

## 2023-11-27 PROCEDURE — 3008F PR BODY MASS INDEX (BMI) DOCUMENTED: ICD-10-PCS | Mod: CPTII,S$GLB,, | Performed by: SPECIALIST

## 2023-11-27 PROCEDURE — 3078F PR MOST RECENT DIASTOLIC BLOOD PRESSURE < 80 MM HG: ICD-10-PCS | Mod: CPTII,S$GLB,, | Performed by: COLON & RECTAL SURGERY

## 2023-11-27 PROCEDURE — 3044F PR MOST RECENT HEMOGLOBIN A1C LEVEL <7.0%: ICD-10-PCS | Mod: CPTII,S$GLB,, | Performed by: COLON & RECTAL SURGERY

## 2023-11-27 PROCEDURE — 3074F SYST BP LT 130 MM HG: CPT | Mod: CPTII,S$GLB,, | Performed by: SPECIALIST

## 2023-11-27 PROCEDURE — 3078F DIAST BP <80 MM HG: CPT | Mod: CPTII,S$GLB,, | Performed by: COLON & RECTAL SURGERY

## 2023-11-27 PROCEDURE — 99214 OFFICE O/P EST MOD 30 MIN: CPT | Mod: S$GLB,,, | Performed by: COLON & RECTAL SURGERY

## 2023-11-27 PROCEDURE — 3044F HG A1C LEVEL LT 7.0%: CPT | Mod: CPTII,S$GLB,, | Performed by: SPECIALIST

## 2023-11-27 PROCEDURE — 3044F PR MOST RECENT HEMOGLOBIN A1C LEVEL <7.0%: ICD-10-PCS | Mod: CPTII,S$GLB,, | Performed by: SPECIALIST

## 2023-11-27 PROCEDURE — 99999 PR PBB SHADOW E&M-EST. PATIENT-LVL IV: ICD-10-PCS | Mod: PBBFAC,,, | Performed by: COLON & RECTAL SURGERY

## 2023-11-27 PROCEDURE — 99999 PR PBB SHADOW E&M-EST. PATIENT-LVL IV: CPT | Mod: PBBFAC,,, | Performed by: SPECIALIST

## 2023-11-27 PROCEDURE — 3008F BODY MASS INDEX DOCD: CPT | Mod: CPTII,S$GLB,, | Performed by: COLON & RECTAL SURGERY

## 2023-11-27 PROCEDURE — 3078F PR MOST RECENT DIASTOLIC BLOOD PRESSURE < 80 MM HG: ICD-10-PCS | Mod: CPTII,S$GLB,, | Performed by: SPECIALIST

## 2023-11-27 PROCEDURE — 99999 PR PBB SHADOW E&M-EST. PATIENT-LVL IV: ICD-10-PCS | Mod: PBBFAC,,, | Performed by: SPECIALIST

## 2023-11-27 PROCEDURE — 99999 PR PBB SHADOW E&M-EST. PATIENT-LVL IV: CPT | Mod: PBBFAC,,, | Performed by: COLON & RECTAL SURGERY

## 2023-11-27 PROCEDURE — 1159F PR MEDICATION LIST DOCUMENTED IN MEDICAL RECORD: ICD-10-PCS | Mod: CPTII,S$GLB,, | Performed by: COLON & RECTAL SURGERY

## 2023-11-27 PROCEDURE — 1159F PR MEDICATION LIST DOCUMENTED IN MEDICAL RECORD: ICD-10-PCS | Mod: CPTII,S$GLB,, | Performed by: SPECIALIST

## 2023-11-27 PROCEDURE — 3074F PR MOST RECENT SYSTOLIC BLOOD PRESSURE < 130 MM HG: ICD-10-PCS | Mod: CPTII,S$GLB,, | Performed by: SPECIALIST

## 2023-11-27 PROCEDURE — 1159F MED LIST DOCD IN RCRD: CPT | Mod: CPTII,S$GLB,, | Performed by: COLON & RECTAL SURGERY

## 2023-11-27 RX ORDER — ONDANSETRON 2 MG/ML
4 INJECTION INTRAMUSCULAR; INTRAVENOUS EVERY 12 HOURS PRN
OUTPATIENT
Start: 2023-11-27

## 2023-11-27 RX ORDER — CEFAZOLIN SODIUM 2 G/50ML
2 SOLUTION INTRAVENOUS
OUTPATIENT
Start: 2023-11-27

## 2023-11-27 RX ORDER — SODIUM CHLORIDE, SODIUM LACTATE, POTASSIUM CHLORIDE, CALCIUM CHLORIDE 600; 310; 30; 20 MG/100ML; MG/100ML; MG/100ML; MG/100ML
INJECTION, SOLUTION INTRAVENOUS CONTINUOUS
OUTPATIENT
Start: 2023-11-27

## 2023-11-27 NOTE — PROGRESS NOTES
History & Physical    SUBJECTIVE:     CC: rectal adenocarcinoma  Ref: Anjelica Saavedra MD    History of Present Illness:  Patient is a 57 y.o. male presents for evaluation of rectal cancer.  Patient reports he has had increasing pelvic/rectal pain along with decreased bowel movements over the last 6 weeks.  Reports an uncomfortable feeling in his pelvis associated with mucous discharge from his rectum as well as bloody bowel movements that are thin and less than normal. He reports associated chills, fatigue and 16 lb weight loss over the past 5 months.  He has also noticed a decrease in appetite as well. He underwent a colonoscopy on 06/06/2019 where a nonobstructing rectal mass was found with biopsy showing well-differentiated adenocarcinoma.  He was then referred to Colorectal [facility] for evaluation.  He has no family history of colorectal cancer or IBD.    8/13/19: Completed neoadj chemoXRT  2/18/2020: Laparoscopic loop sigmoid colostomy 2/2 rectal perforation on chemotx  4/9/2020: APR with extra anatomic resection of fistula tract and left gluteus muscle, omental pedicle flap and VRAM with skin paddle by plastics  2/24/22: Completion colectomy/total colectomy with end ileostomy construction    Interval history:  Since last clinic visit, the patient continues to have intermittent abdominal pain.  Denies any hematochezia or melena.  Is having good ostomy output.  Was presented at tumor board with recommendation to pursue systemic chemotherapy as well as radiation treatment.    Review of patient's allergies indicates:  No Known Allergies    Current Outpatient Medications   Medication Sig Dispense Refill    acetaminophen (TYLENOL) 325 MG tablet Take 2 tablets (650 mg total) by mouth every 6 (six) hours as needed.  0    finasteride (PROSCAR) 5 mg tablet Take 1 tablet (5 mg total) by mouth once daily. 30 tablet 11    oxyCODONE (ROXICODONE) 10 mg Tab immediate release tablet Take 1 tablet (10 mg total) by mouth  every 8 (eight) hours as needed for Pain (severe). 90 tablet 0    oxyCODONE (ROXICODONE) 5 MG immediate release tablet Take 1 tablet (5 mg total) by mouth every 4 (four) hours as needed for Pain. 30 tablet 0    promethazine (PHENERGAN) 25 MG tablet Take 1 tablet (25 mg total) by mouth every 4 (four) hours. 60 tablet 4    sildenafiL (VIAGRA) 100 MG tablet Take 1 tablet (100 mg total) by mouth daily as needed for Erectile Dysfunction. 20 tablet 11    tamsulosin (FLOMAX) 0.4 mg Cap Take 1 capsule (0.4 mg total) by mouth once daily. 90 capsule 3     No current facility-administered medications for this visit.       Past Medical History:   Diagnosis Date    Anemia     Arthritis     Cancer     rectal    Renal disorder     kidney stones     Past Surgical History:   Procedure Laterality Date    ABDOMINOPERINEAL RESECTION OF RECTUM N/A 4/9/2020    Procedure: PROCTECTOMY, ABDOMINOPERINEAL;  Surgeon: Jan New MD;  Location: [facility] OR;  Service: General;  Laterality: N/A;    COLECTOMY, TOTAL N/A 2/24/2022    Procedure: COLECTOMY, TOTAL;  Surgeon: Jan New MD;  Location: [facility] OR;  Service: Colon and Rectal;  Laterality: N/A;    COLON SURGERY      COLONOSCOPY N/A 6/6/2019    Procedure: COLONOSCOPY;  Surgeon: Anjelica Saavedra MD;  Location: [facility];  Service: Endoscopy;  Laterality: N/A;    COLONOSCOPY N/A 12/17/2021    Procedure: COLONOSCOPY;  Surgeon: Jan New MD;  Location: [facility] ENDO;  Service: General;  Laterality: N/A;    CREATION OF MUSCLE ROTATIONAL FLAP Left 4/9/2020    Procedure: CREATION, FLAP, MUSCLE ROTATION;  Surgeon: Sebastian Dan MD;  Location: [facility] OR;  Service: Plastics;  Laterality: Left;   VRAM    CYSTOSCOPIC LITHOLAPAXY N/A 2/24/2022    Procedure: CYSTOLITHOLAPAXY;  Surgeon: Sukhjinder Tucker MD;  Location: [facility] OR;  Service: Urology;  Laterality: N/A;    CYSTOSCOPY W/ URETERAL STENT PLACEMENT Right 10/6/2023    Procedure: CYSTOSCOPY, WITH URETERAL STENT INSERTION;   Surgeon: Sukhjinder Tucker MD;  Location: [facility] OR;  Service: Urology;  Laterality: Right;    CYSTOSCOPY WITH BIOPSY OF BLADDER N/A 9/11/2023    Procedure: CYSTOSCOPY, WITH BLADDER BIOPSY, WITH FULGURATION IF INDICATED;  Surgeon: Sukhjinder Tucker MD;  Location: [facility] OR;  Service: Urology;  Laterality: N/A;  , POSS TURBT    CYSTOSCOPY WITH URETEROSCOPY, RETROGRADE PYELOGRAPHY, AND INSERTION OF STENT N/A 9/11/2023    Procedure: CYSTOSCOPY, WITH RETROGRADE PYELOGRAM AND URETERAL STENT INSERTION;  Surgeon: Sukhjinder Tucker MD;  Location: [facility] OR;  Service: Urology;  Laterality: N/A;    DILATION OF URETHRA N/A 9/11/2023    Procedure: DILATION, URETHRA;  Surgeon: Sukhjinder Tucker MD;  Location: [facility] OR;  Service: Urology;  Laterality: N/A;    ESOPHAGOGASTRODUODENOSCOPY N/A 6/6/2019    Procedure: EGD (ESOPHAGOGASTRODUODENOSCOPY);  Surgeon: Anjelica Saavedra MD;  Location: [facility];  Service: Endoscopy;  Laterality: N/A;    FLAP GRAFT SURGERY N/A 4/9/2020    Procedure: FLAP GRAFT;  Surgeon: Sebastian Dan MD;  Location: [facility] OR;  Service: Plastics;  Laterality: N/A;  left fasciocutaneous buttocks flap    HERNIA REPAIR  1995    INJECTION OF ANESTHETIC AGENT INTO TISSUE PLANE DEFINED BY TRANSVERSUS ABDOMINIS MUSCLE N/A 2/18/2020    Procedure: BLOCK, TRANSVERSUS ABDOMINIS PLANE;  Surgeon: Jan New MD;  Location: [facility] OR;  Service: General;  Laterality: N/A;    INJECTION OF ANESTHETIC AGENT INTO TISSUE PLANE DEFINED BY TRANSVERSUS ABDOMINIS MUSCLE  2/24/2022    Procedure: BLOCK, TRANSVERSUS ABDOMINIS PLANE;  Surgeon: Jan New MD;  Location: [facility] OR;  Service: Colon and Rectal;;    INSERTION OF TUNNELED CENTRAL VENOUS CATHETER (CVC) WITH SUBCUTANEOUS PORT N/A 10/8/2019    Procedure: RYNYHGUDW-AISN-S-CATH;  Surgeon: Jan New MD;  Location: [facility] OR;  Service: General;  Laterality: N/A;    LAPAROSCOPIC COLOSTOMY      MEDIPORT REMOVAL N/A 8/13/2020    Procedure: REMOVAL,  CATHETER, CENTRAL VENOUS, TUNNELED, WITH PORT;  Surgeon: Sebastian Dan MD;  Location: [facility] OR;  Service: Plastics;  Laterality: N/A;    TONSILLECTOMY      TRANSURETHRAL RESECTION OF PROSTATE N/A 6/7/2021    Procedure: TURP (TRANSURETHRAL RESECTION OF PROSTATE), CYSTOLITHALOPAXY;  Surgeon: Sukhjinder Tucker MD;  Location: [facility] OR;  Service: Urology;  Laterality: N/A;    TURBT (TRANSURETHRAL RESECTION OF BLADDER TUMOR) N/A 10/6/2023    Procedure: TURBT (TRANSURETHRAL RESECTION OF BLADDER TUMOR);  Surgeon: Sukhjinder Tucker MD;  Location: [facility] OR;  Service: Urology;  Laterality: N/A;    WOUND DEBRIDEMENT N/A 8/13/2020    Procedure: DEBRIDEMENT, WOUND;  Surgeon: Sebastian Dan MD;  Location: [facility] OR;  Service: Plastics;  Laterality: N/A;  layered closure     Family History   Problem Relation Age of Onset    Intestinal malrotation Mother     Leukemia Father     No Known Problems Sister     Coronary artery disease Brother     Coronary artery disease Maternal Grandmother     Stomach cancer Maternal Grandfather      Social History     Tobacco Use    Smoking status: Every Day     Current packs/day: 1.00     Average packs/day: 1 pack/day for 39.9 years (39.9 ttl pk-yrs)     Types: Cigarettes     Start date: 1/2/1984    Smokeless tobacco: Former     Types: Chew    Tobacco comments:     no smoking after m.n prior to sx   Substance Use Topics    Alcohol use: Not Currently     Alcohol/week: 46.0 standard drinks of alcohol     Types: 42 Cans of beer, 4 Shots of liquor per week     Comment: segrams 7, beer daily  hold now prior to surgery    Drug use: Never        Review of Systems:  Review of Systems   Constitutional: Negative for activity change, appetite change, chills, fatigue, fever and unexpected weight change.   HENT: Negative for congestion, ear pain, sore throat and trouble swallowing.    Eyes: Negative for pain, redness and itching.   Respiratory: Negative for cough, shortness of breath and wheezing.   "  Cardiovascular: Negative for chest pain, palpitations and leg swelling.   Gastrointestinal: Negative for abdominal distention, blood in stool, constipation, diarrhea, nausea, and vomiting.   Endocrine: Negative for cold intolerance, heat intolerance and polyuria.   Genitourinary: Negative for dysuria, flank pain, frequency and hematuria.   Musculoskeletal: Negative for gait problem, joint swelling and neck pain.   Skin: Negative for color change, rash and wound.   Allergic/Immunologic: Negative for environmental allergies and immunocompromised state.   Neurological: Negative for dizziness, speech difficulty, weakness and numbness.   Psychiatric/Behavioral: Negative for agitation, confusion and hallucinations.       OBJECTIVE:     Vital Signs (Most Recent)  Temp: 99.2 °F (37.3 °C) (11/27/23 1350)  Pulse: 103 (11/27/23 1350)  BP: 128/79 (11/27/23 1350)  6' 1" (1.854 m)  75.4 kg (166 lb 3.6 oz)     Physical Exam:  Physical Exam  Constitutional:       Appearance: He is well-developed.   HENT:      Head: Normocephalic and atraumatic.   Eyes:      Conjunctiva/sclera: Conjunctivae normal.   Neck:      Thyroid: No thyromegaly.   Cardiovascular:      Rate and Rhythm: Regular rhythm.   Pulmonary:      Effort: Pulmonary effort is normal. No respiratory distress.   Abdominal:      Comments: Soft, nondistended; midline incision well-healed ostomy pink and viable with stool in bag; +ventral hernia at midline incision and parastomal hernia which is soft and reducible  Genitourinary:     Comments: Skin flap well-perfused and well-healed without drainage or evidence of fistula tracts; no perineal hernia  Musculoskeletal:         General: No tenderness. Normal range of motion.      Cervical back: Normal range of motion.   Skin:     General: Skin is warm and dry.      Capillary Refill: Capillary refill takes less than 2 seconds.      Findings: No rash.   Neurological:      Mental Status: He is alert and oriented to person, place, " and time.       Laboratory  Lab Results   Component Value Date    WBC 8.88 11/07/2023    HGB 10.9 (L) 11/07/2023    HCT 35.6 (L) 11/07/2023     11/07/2023    CHOL 191 10/09/2023    TRIG 246 (H) 10/09/2023    HDL 23 (L) 10/09/2023    ALT 10 11/07/2023    AST 15 11/07/2023     11/07/2023    K 3.8 11/07/2023     11/07/2023    CREATININE 1.3 11/07/2023    BUN 9 11/07/2023    CO2 23 11/07/2023    TSH 0.966 10/02/2023    PSA 0.45 12/12/2022    INR 1.0 06/11/2019    HGBA1C 5.4 10/09/2023     Component Ref Range & Units 1 mo ago  (9/25/23) 4 mo ago  (6/21/23) 7 mo ago  (3/13/23) 10 mo ago  (12/12/22) 1 yr ago  (9/12/22) 1 yr ago  (6/13/22) 1 yr ago  (1/3/22)   CEA 0.0 - 5.0 ng/mL 3.8  2.0 CM  1.9 CM  <1.7 CM  <1.7 CM  1.7 CM  1.8 CM       Diagnostic Results:  CT Urogram October 2023  FINDINGS:  Lung bases are clear.  Heart sizes normal.  No pleural effusion or pericardial effusion.     Liver is normal in size with no focal abnormalities.  Small benign granuloma left hepatic lobe.  Gallbladder is unremarkable.  Spleen is enlarged measuring 15 cm with multiple liver granulomas.  Adrenals and pancreas are unremarkable.     Right kidney is enlarged with heterogeneous attenuation and moderate hydronephrosis and hydroureter without definite stone in could reflect distal ureteral stricture.  Delayed excretion of contrast noted within the right kidney.  Far distal right ureter is not well visualized secondary to scarring within the pelvis.  No discrete obstructing lesion identified.     Staghorn calculus lower pole left kidney measures up to 2.4 cm.  No left hydronephrosis.  Left ureter nondilated.  Nonspecific perinephric stranding.     Bladder is collapsed which limits evaluation.  Nonspecific bladder wall thickening noted.  Prostatomegaly noted.     Shotty nodes are seen within the retroperitoneum.  Aorta demonstrates moderate atherosclerotic disease.     Small bowel demonstrate no evidence of obstruction or  focal abnormality.  Suspect total colectomy.  Again a left lower quadrant stomal hernia noted with small bowel contents without definite obstruction.     No evidence of free-fluid or free-air within the abdomen or pelvis.  Presacral edema is seen.  Soft tissue thickening extends from the prostate along the course of the distal ureter.     Bones demonstrate moderate degenerative changes.     Impression:     Findings are concerning for malignant stricture involving the distal right ureter.  Findings are best demonstrated sequence 4 images 140 through 153.     Moderate right-sided hydronephrosis and hydroureter.        MRI Pelvis:  FINDINGS:  There is an approximate 4.9 x 2.8 x 4.0 cm mass situated in the posterior right lower pelvis which corresponds to the hypermetabolic recurrent mass.  It is inseparable from the right posterolateral aspect of the urinary bladder which is asymmetrically thickened and correlates with the biopsy proven recurrence with bladder wall invasion.  There is an enlarged posterior right obturator lymph node immediately adjacent to this mass (series 8, image 22) which is also hypermetabolic on the comparison PET.  It measures 12 by 9 mm.     No additional adenopathy in the remainder of the pelvis.  No ascites.     There is a right-sided nephroureteral stent present.  Some tiny dependent bladder calculi are present with largest approximately 3 mm.     No suspicious osseous lesions.     Impression:     Large 4.9 cm mass posterior right pelvis with bladder wall invasion with adjacent enlarged posterior right obturator lymph node.     -This correlates with the hypermetabolic recurrence and bladder wall invasion seen on recent PET-CT and confirmed with cystoscopy.      PATHOLOGY:  Bladder, right, transurethral resection:   - Metastatic colorectal adenocarcinoma, consistent with patient's history   - Muscularis propria is present and involved by metastatic carcinoma   - MMR immunohistochemical stains  have been ordered and results will be       issued in a supplemental report     ASSESSMENT/PLAN:     57-year-old male with rectal adenocarcinoma with likely T3N1 disease based upon preop imaging without evidence of metastatic disease now s/p neoadj chemoXRT which completed on 8/13/19 but with post-tx MRI showing continued threatened mesorectal fascia who developed rectal perforation and abscess/fistula on cycle 11/12 of neoadj chemotx in Feb 2020 requiring lap diverting sigmoid colostomy with continued fluid collection/perforation/fistula on CT/MRI now s/p APR with extra-anatomic resection of fistula tract and left gluteus muscle, omental pedicle flap and VRAM with skin paddle by plastics on 4/9/2020 and now newly found unresectable polyps in the ascending and transverse colon who is s/p open completion colectomy/total colectomy with end ileostomy construction in Feb '22 with final path showing adenomas and now with recurrent rectal adenocarcinoma in right pelvis, invading bladder    - Long discussion with the patient regarding his recurrent cancer involving the right pelvic sidewall as well as the bladder.  We discussed that he was presented at tumor board with the recommendation to avoid surgery at the current time given the pelvic sidewall involvement as well as likely need for pelvic exenteration and pursue systemic chemotherapy with radiation treatment.  He voiced understanding of this and is willing to proceed with chemoradiation but is hesitant to pursue systemic chemotherapy given the side effects last time.  I encouraged him to reconsider this and we will be happy to place a MediPort in the future if he changes his mind.  - will plan for MediPort insertion if patient changes his mind  - All risks, benefits and alternatives fully explained to patient. Risks include, but are not limited to, bleeding, infection, pneumothorax requiring chest tube insertion, damage to great vessels and nerves, port malfunction,  port malposition, postoperative abscess, conversion to open operation, cardiac arrhythmia, perioperative MI, CVA and death.  All questions field and appropriately answered to patient's satisfaction.  Consent signed and placed on chart.  - Radiation oncology evaluation  - RTC 3 month    Jan New MD  Colon and Rectal Surgery  [facility]

## 2023-11-27 NOTE — PROGRESS NOTES
I have been asked to see this delightful 57-year-old with a pelvic recurrence after total neoadjuvant therapy and surgery for rectal cancer.  History of present illness:  Mr. Presley presented with pain and bleeding and was found to have a stage IIIB (T3 N2a M0 G2) adenocarcinoma of the rectum.  He was treated with long course radiotherapy completed 08/13/2019 with total neoadjuvant chemotherapy utilizing capecitabine FOLFOX series and Avastin but after 8 cycles suffered a rectal rupture requiring an abdominoperineal resection 04/09/2020 with excision of a fistula tract requiring a muscle flap for closure of the perineal defect.  He was taken back to the operating room on 02/04/2022 and underwent a total colectomy.  He has had a complicated urologic history with a known staghorn calculus in the left kidney and a previous TURP 06/17/2021.  He developed hydronephrosis and on 10/06/2023 underwent trans urethral resection of a bladder tumor obstructing the right ureteral orifice with path demonstrating metastatic colorectal cancer invading the muscularis propria.  He had a stent placed at that time.  He has been doing fairly well although he has nocturnal frequency as well as daytime small amounts of incontinence for which he does not wear a pad.  He states that his pain has been fairly consistent ever since his initial presentation in 2019 but that it is worsened lately.  He describes this as an 8/10 pain beginning at the right flank and radiating to the right groin.  He takes oxycodone 3 times a day and states the pain gets down to a 4 to 6/10.  He underwent imaging 11/13/2000 23 with an MRI showing 4.9 cm mass at the posterior lower right bladder and an enlarged posterior right obturator node.  This was also seen on a previous PET scan 11/10/2023 which also showed no other evidence of metastatic disease.  He has been discussed at our tumor board and we have recommended radiotherapy with concurrent  chemosensitization.  Past medical history:  No significant medical history  Past surgical history:  Surgeries per history of present illness  Left inguinal herniorrhaphy  Allergies:  No known allergies  Habits:  He began smoking at age 19 and smoked on average a pack a day.  He previously chewed tobacco but does not anymore.  He was a fairly heavy alcohol drinker but has had none for the last 3 months.  Family history: His father had leukemia.  His maternal grandfather had gastric cancer.  Social:  He lives in [place] with his fiprince who was present for this interview and supportive.  She was treated here for a high-grade pleomorphic retroperitoneal sarcoma.  He works as a .  They live in [place] with a 3 children.  Review of systems: Noncontributory  Physical exam:  He is a well-developed well-nourished gentleman in no apparent distress.  He is alert and oriented answering questions appropriately  He has no palpable supraclavicular adenopathy.  He does not have a MediPort in  On cardiovascular exam he has a regular rhythm and rate without murmur rub or gallop  His lungs are clear to auscultation  On abdominal exam he has a colostomy in the left lower quadrant.  He has multiple abdominal wall hernias.  On examination of his perineum he has a healed abdominoperineal resection scar with an intact flat.  There is no palpable or visible evidence of disease recurrence.  His ECOG performance status is 1  Impression:  Mr. Presley has a pelvic recurrence of rectal cancer after fairly intensive therapy.  We have discussed the consequences and prognosis.  I do believe that we can provide radiotherapy and would like chemosensitization as well.  I have informed him that whether it is traditionally or hypofractionated will depend on how things look at simulation.  We have discussed purely palliative radiotherapy versus an attempt to obtain a cure although that is low probability.  He understands that I  am concerned about a potential bladder perforation given the location and his previous radiotherapy which does overlap a little bit of what I will need to treat.  Plan:  He is scheduled for simulation.  We will have further discussions at that time.  I certainly appreciate participating in this delightful gentleman's care.

## 2023-11-28 ENCOUNTER — TELEPHONE (OUTPATIENT)
Dept: HEMATOLOGY/ONCOLOGY | Facility: CLINIC | Age: 58
End: 2023-11-28
Payer: COMMERCIAL

## 2023-11-28 NOTE — TELEPHONE ENCOUNTER
Called pt to offer f/u with med onc per Dr. Cosby to discuss tx options, 11/30 @ 240pm, after rad onc visit @ 1pm. Told pt to check in for Dr. Cosby after and  he will ry to see him sooner. Pt agreeable, voices understanding and doesn't have any other questions.     ----- Message from Aki Marte MD sent at 11/28/2023  8:25 AM CST -----  I have discussed with him and he is willing. Defer to you.  ----- Message -----  From: Ken Cosby MD  Sent: 11/27/2023   4:07 PM CST  To: Aki Marte MD; [facility] Navigation    Navigation team I am happy to see him back to start on oral Xeloda if the patient wishes for radiation sensitization.  Please let me know what he wants to do  ----- Message -----  From: Aki Marte MD  Sent: 11/27/2023   3:59 PM CST  To: Ken Cosby MD

## 2023-11-29 ENCOUNTER — PATIENT MESSAGE (OUTPATIENT)
Dept: HEMATOLOGY/ONCOLOGY | Facility: CLINIC | Age: 58
End: 2023-11-29
Payer: COMMERCIAL

## 2023-11-29 DIAGNOSIS — C20 RECTAL CANCER: Primary | ICD-10-CM

## 2023-11-29 RX ORDER — OXYCODONE HYDROCHLORIDE 5 MG/1
5 TABLET ORAL
Qty: 120 TABLET | Refills: 0 | Status: SHIPPED | OUTPATIENT
Start: 2023-11-29

## 2023-11-30 ENCOUNTER — DOCUMENTATION ONLY (OUTPATIENT)
Dept: HEMATOLOGY/ONCOLOGY | Facility: CLINIC | Age: 58
End: 2023-11-30
Payer: COMMERCIAL

## 2023-11-30 ENCOUNTER — HOSPITAL ENCOUNTER (OUTPATIENT)
Dept: RADIOLOGY | Facility: HOSPITAL | Age: 58
Discharge: HOME | End: 2023-11-30
Attending: INTERNAL MEDICINE
Payer: COMMERCIAL

## 2023-11-30 ENCOUNTER — OFFICE VISIT (OUTPATIENT)
Dept: HEMATOLOGY/ONCOLOGY | Facility: CLINIC | Age: 58
End: 2023-11-30
Payer: COMMERCIAL

## 2023-11-30 ENCOUNTER — HOSPITAL ENCOUNTER (OUTPATIENT)
Dept: RADIATION THERAPY | Facility: HOSPITAL | Age: 58
Discharge: HOME | End: 2023-11-30
Attending: INTERNAL MEDICINE
Payer: COMMERCIAL

## 2023-11-30 VITALS
WEIGHT: 165.81 LBS | OXYGEN SATURATION: 98 % | DIASTOLIC BLOOD PRESSURE: 76 MMHG | HEART RATE: 96 BPM | HEIGHT: 72 IN | SYSTOLIC BLOOD PRESSURE: 117 MMHG | BODY MASS INDEX: 22.46 KG/M2 | TEMPERATURE: 98 F

## 2023-11-30 DIAGNOSIS — D84.822 IMMUNODEFICIENCY SECONDARY TO RADIATION THERAPY: ICD-10-CM

## 2023-11-30 DIAGNOSIS — C20 RECTAL CANCER: Primary | ICD-10-CM

## 2023-11-30 DIAGNOSIS — T45.1X5A IMMUNODEFICIENCY DUE TO CHEMOTHERAPY: ICD-10-CM

## 2023-11-30 DIAGNOSIS — Z93.3 COLOSTOMY IN PLACE: ICD-10-CM

## 2023-11-30 DIAGNOSIS — D84.821 IMMUNODEFICIENCY DUE TO CHEMOTHERAPY: ICD-10-CM

## 2023-11-30 DIAGNOSIS — Z79.899 IMMUNODEFICIENCY DUE TO CHEMOTHERAPY: ICD-10-CM
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
## 2023-11-30 PROBLEM — Z79.69 IMMUNODEFICIENCY DUE TO CHEMOTHERAPY: Status: ACTIVE | Noted: 2023-11-30

## 2023-11-30 PROCEDURE — 3044F HG A1C LEVEL LT 7.0%: CPT | Mod: CPTII,S$GLB,, | Performed by: INTERNAL MEDICINE

## 2023-11-30 PROCEDURE — 77014 HC CT GUIDANCE RADIATION THERAPY FLDS PLACEMENT: CPT | Mod: TC | Performed by: SPECIALIST

## 2023-11-30 PROCEDURE — 3044F PR MOST RECENT HEMOGLOBIN A1C LEVEL <7.0%: ICD-10-PCS | Mod: CPTII,S$GLB,, | Performed by: INTERNAL MEDICINE

## 2023-11-30 PROCEDURE — 77334 RADIATION TREATMENT AID(S): CPT | Mod: TC | Performed by: SPECIALIST

## 2023-11-30 PROCEDURE — 3074F SYST BP LT 130 MM HG: CPT | Mod: CPTII,S$GLB,, | Performed by: INTERNAL MEDICINE

## 2023-11-30 PROCEDURE — 77334 PR  RADN TREATMENT AID(S) COMPLX: ICD-10-PCS | Mod: 26,,, | Performed by: SPECIALIST

## 2023-11-30 PROCEDURE — 99215 PR OFFICE/OUTPT VISIT, EST, LEVL V, 40-54 MIN: ICD-10-PCS | Mod: S$GLB,,, | Performed by: INTERNAL MEDICINE

## 2023-11-30 PROCEDURE — 3008F PR BODY MASS INDEX (BMI) DOCUMENTED: ICD-10-PCS | Mod: CPTII,S$GLB,, | Performed by: INTERNAL MEDICINE

## 2023-11-30 PROCEDURE — 99999 PR PBB SHADOW E&M-EST. PATIENT-LVL V: ICD-10-PCS | Mod: PBBFAC,,, | Performed by: INTERNAL MEDICINE

## 2023-11-30 PROCEDURE — 99999 PR PBB SHADOW E&M-EST. PATIENT-LVL V: CPT | Mod: PBBFAC,,, | Performed by: INTERNAL MEDICINE

## 2023-11-30 PROCEDURE — 99215 OFFICE O/P EST HI 40 MIN: CPT | Mod: S$GLB,,, | Performed by: INTERNAL MEDICINE

## 2023-11-30 PROCEDURE — 1159F PR MEDICATION LIST DOCUMENTED IN MEDICAL RECORD: ICD-10-PCS | Mod: CPTII,S$GLB,, | Performed by: INTERNAL MEDICINE

## 2023-11-30 PROCEDURE — 3008F BODY MASS INDEX DOCD: CPT | Mod: CPTII,S$GLB,, | Performed by: INTERNAL MEDICINE

## 2023-11-30 PROCEDURE — 77290 THER RAD SIMULAJ FIELD CPLX: CPT | Mod: TC | Performed by: SPECIALIST

## 2023-11-30 PROCEDURE — 77334 RADIATION TREATMENT AID(S): CPT | Mod: 26,,, | Performed by: SPECIALIST

## 2023-11-30 PROCEDURE — 3078F DIAST BP <80 MM HG: CPT | Mod: CPTII,S$GLB,, | Performed by: INTERNAL MEDICINE

## 2023-11-30 PROCEDURE — 1159F MED LIST DOCD IN RCRD: CPT | Mod: CPTII,S$GLB,, | Performed by: INTERNAL MEDICINE

## 2023-11-30 PROCEDURE — 3074F PR MOST RECENT SYSTOLIC BLOOD PRESSURE < 130 MM HG: ICD-10-PCS | Mod: CPTII,S$GLB,, | Performed by: INTERNAL MEDICINE

## 2023-11-30 PROCEDURE — 3078F PR MOST RECENT DIASTOLIC BLOOD PRESSURE < 80 MM HG: ICD-10-PCS | Mod: CPTII,S$GLB,, | Performed by: INTERNAL MEDICINE

## 2023-11-30 RX ORDER — CAPECITABINE 150 MG/1
600 TABLET, FILM COATED ORAL 2 TIMES DAILY
Qty: 600 TABLET | Refills: 11 | Status: ACTIVE | OUTPATIENT
Start: 2023-11-30

## 2023-11-30 RX ORDER — CAPECITABINE 500 MG/1
1000 TABLET, FILM COATED ORAL 2 TIMES DAILY
Qty: 300 TABLET | Refills: 11 | Status: ACTIVE | OUTPATIENT
Start: 2023-11-30

## 2023-11-30 NOTE — PROGRESS NOTES
Subjective:       Patient ID: Sukhjinder Presley is a 57 y.o. male.    Chief Complaint: Results, Rectal Cancer, and Chemotherapy    HPI:  57-year-old male history of recurrent rectal carcinoma.  Patient has been seen by radiation presented at multidisciplinary tumor conference initial presentation was recommended to begin systemic therapy.  Patient had declined patient is now agree to oral radiation sensitization with Xeloda.  Returns for consent and prescription.    Past Medical History:   Diagnosis Date    Anemia     Arthritis     Cancer     rectal    Renal disorder     kidney stones     Family History   Problem Relation Age of Onset    Intestinal malrotation Mother     Leukemia Father     No Known Problems Sister     Coronary artery disease Brother     Coronary artery disease Maternal Grandmother     Stomach cancer Maternal Grandfather      Social History     Socioeconomic History    Marital status: Significant Other   Tobacco Use    Smoking status: Every Day     Current packs/day: 1.00     Average packs/day: 1 pack/day for 39.9 years (39.9 ttl pk-yrs)     Types: Cigarettes     Start date: 1/2/1984    Smokeless tobacco: Former     Types: Chew    Tobacco comments:     no smoking after m.n prior to sx   Substance and Sexual Activity    Alcohol use: Not Currently     Alcohol/week: 46.0 standard drinks of alcohol     Types: 42 Cans of beer, 4 Shots of liquor per week     Comment: segrams 7, beer daily  hold now prior to surgery    Drug use: Never    Sexual activity: Yes     Partners: Female     Birth control/protection: None     Social Determinants of Health     Financial Resource Strain: High Risk (11/10/2023)    Overall Financial Resource Strain (CARDIA)     Difficulty of Paying Living Expenses: Very hard   Food Insecurity: Food Insecurity Present (11/10/2023)    Hunger Vital Sign     Worried About Running Out of Food in the Last Year: Often true     Ran Out of Food in the Last Year: Often true   Transportation Needs:  Unmet Transportation Needs (11/10/2023)    PRAPARE - Transportation     Lack of Transportation (Medical): Yes     Lack of Transportation (Non-Medical): No   Physical Activity: Sufficiently Active (11/10/2023)    Exercise Vital Sign     Days of Exercise per Week: 7 days     Minutes of Exercise per Session: 70 min   Stress: Stress Concern Present (11/10/2023)    [employer] - Occupational Stress Questionnaire     Feeling of Stress : Very much   Social Connections: Unknown (11/10/2023)    Social Connection and Isolation Panel [NHANES]     Frequency of Communication with Friends and Family: More than three times a week     Frequency of Social Gatherings with Friends and Family: Once a week     Active Member of Clubs or Organizations: Yes     Attends Club or Organization Meetings: 1 to 4 times per year     Marital Status: Living with partner   Housing Stability: High Risk (11/10/2023)    Housing Stability Vital Sign     Unable to Pay for Housing in the Last Year: Yes     Number of Places Lived in the Last Year: 1     Unstable Housing in the Last Year: No     Past Surgical History:   Procedure Laterality Date    ABDOMINOPERINEAL RESECTION OF RECTUM N/A 4/9/2020    Procedure: PROCTECTOMY, ABDOMINOPERINEAL;  Surgeon: Jan New MD;  Location: [facility] OR;  Service: General;  Laterality: N/A;    COLECTOMY, TOTAL N/A 2/24/2022    Procedure: COLECTOMY, TOTAL;  Surgeon: Jan New MD;  Location: [facility] OR;  Service: Colon and Rectal;  Laterality: N/A;    COLON SURGERY      COLONOSCOPY N/A 6/6/2019    Procedure: COLONOSCOPY;  Surgeon: Anjelica Saavedra MD;  Location: [facility] ENDO;  Service: Endoscopy;  Laterality: N/A;    COLONOSCOPY N/A 12/17/2021    Procedure: COLONOSCOPY;  Surgeon: Jan New MD;  Location: [facility] ENDO;  Service: General;  Laterality: N/A;    CREATION OF MUSCLE ROTATIONAL FLAP Left 4/9/2020    Procedure: CREATION, FLAP, MUSCLE ROTATION;  Surgeon: Sebastian Dan MD;   Location: [facility] OR;  Service: Plastics;  Laterality: Left;   VRAM    CYSTOSCOPIC LITHOLAPAXY N/A 2/24/2022    Procedure: CYSTOLITHOLAPAXY;  Surgeon: Sukhjinder Tucker MD;  Location: [facility] OR;  Service: Urology;  Laterality: N/A;    CYSTOSCOPY W/ URETERAL STENT PLACEMENT Right 10/6/2023    Procedure: CYSTOSCOPY, WITH URETERAL STENT INSERTION;  Surgeon: Sukhjinder Tucker MD;  Location: [facility] OR;  Service: Urology;  Laterality: Right;    CYSTOSCOPY WITH BIOPSY OF BLADDER N/A 9/11/2023    Procedure: CYSTOSCOPY, WITH BLADDER BIOPSY, WITH FULGURATION IF INDICATED;  Surgeon: Sukhjinder Tucker MD;  Location: [facility] OR;  Service: Urology;  Laterality: N/A;  , POSS TURBT    CYSTOSCOPY WITH URETEROSCOPY, RETROGRADE PYELOGRAPHY, AND INSERTION OF STENT N/A 9/11/2023    Procedure: CYSTOSCOPY, WITH RETROGRADE PYELOGRAM AND URETERAL STENT INSERTION;  Surgeon: Sukhjinder Tucker MD;  Location: [facility] OR;  Service: Urology;  Laterality: N/A;    DILATION OF URETHRA N/A 9/11/2023    Procedure: DILATION, URETHRA;  Surgeon: Sukhjinder Tucker MD;  Location: [facility] OR;  Service: Urology;  Laterality: N/A;    ESOPHAGOGASTRODUODENOSCOPY N/A 6/6/2019    Procedure: EGD (ESOPHAGOGASTRODUODENOSCOPY);  Surgeon: Anjelica Saavedra MD;  Location: [facility];  Service: Endoscopy;  Laterality: N/A;    FLAP GRAFT SURGERY N/A 4/9/2020    Procedure: FLAP GRAFT;  Surgeon: Sebastian Dan MD;  Location: [facility];  Service: Plastics;  Laterality: N/A;  left fasciocutaneous buttocks flap    HERNIA REPAIR  1995    INJECTION OF ANESTHETIC AGENT INTO TISSUE PLANE DEFINED BY TRANSVERSUS ABDOMINIS MUSCLE N/A 2/18/2020    Procedure: BLOCK, TRANSVERSUS ABDOMINIS PLANE;  Surgeon: Jan New MD;  Location: [facility];  Service: General;  Laterality: N/A;    INJECTION OF ANESTHETIC AGENT INTO TISSUE PLANE DEFINED BY TRANSVERSUS ABDOMINIS MUSCLE  2/24/2022    Procedure: BLOCK, TRANSVERSUS ABDOMINIS PLANE;  Surgeon: Jan New MD;   Location: [facility] OR;  Service: Colon and Rectal;;    INSERTION OF TUNNELED CENTRAL VENOUS CATHETER (CVC) WITH SUBCUTANEOUS PORT N/A 10/8/2019    Procedure: [id number];  Surgeon: Jan New MD;  Location: [facility] OR;  Service: General;  Laterality: N/A;    LAPAROSCOPIC COLOSTOMY      MEDIPORT REMOVAL N/A 8/13/2020    Procedure: REMOVAL, CATHETER, CENTRAL VENOUS, TUNNELED, WITH PORT;  Surgeon: Sebastian Dan MD;  Location: [facility] OR;  Service: Plastics;  Laterality: N/A;    TONSILLECTOMY      TRANSURETHRAL RESECTION OF PROSTATE N/A 6/7/2021    Procedure: TURP (TRANSURETHRAL RESECTION OF PROSTATE), CYSTOLITHALOPAXY;  Surgeon: Sukhjinder Tucker MD;  Location: [facility] OR;  Service: Urology;  Laterality: N/A;    TURBT (TRANSURETHRAL RESECTION OF BLADDER TUMOR) N/A 10/6/2023    Procedure: TURBT (TRANSURETHRAL RESECTION OF BLADDER TUMOR);  Surgeon: Sukhjinder Tucker MD;  Location: [facility] OR;  Service: Urology;  Laterality: N/A;    WOUND DEBRIDEMENT N/A 8/13/2020    Procedure: DEBRIDEMENT, WOUND;  Surgeon: Sebastian Dan MD;  Location: [facility] OR;  Service: Plastics;  Laterality: N/A;  layered closure       Labs:  Lab Results   Component Value Date    WBC 8.88 11/07/2023    HGB 10.9 (L) 11/07/2023    HCT 35.6 (L) 11/07/2023    MCV 90 11/07/2023     11/07/2023     BMP  Lab Results   Component Value Date     11/07/2023    K 3.8 11/07/2023     11/07/2023    CO2 23 11/07/2023    BUN 9 11/07/2023    CREATININE 1.3 11/07/2023    CALCIUM 9.1 11/07/2023    ANIONGAP 14 11/07/2023    ESTGFRAFRICA >60 07/20/2022    EGFRNONAA >60 07/20/2022     Lab Results   Component Value Date    ALT 10 11/07/2023    AST 15 11/07/2023    ALKPHOS 91 11/07/2023    BILITOT 0.3 11/07/2023       Lab Results   Component Value Date    IRON 38 (L) 11/07/2023    TIBC 241 (L) 11/07/2023    FERRITIN 1,975 (H) 11/07/2023     Lab Results   Component Value Date    GNKQVZSW47 423 10/09/2023     Lab Results   Component Value Date     FOLATE 5.7 10/09/2023     Lab Results   Component Value Date    TSH 0.966 10/02/2023         Review of Systems   Constitutional:  Positive for unexpected weight change. Negative for activity change, appetite change, chills, diaphoresis, fatigue and fever.   HENT:  Negative for congestion, dental problem, drooling, ear discharge, ear pain, facial swelling, hearing loss, mouth sores, nosebleeds, postnasal drip, rhinorrhea, sinus pressure, sneezing, sore throat, tinnitus, trouble swallowing and voice change.    Eyes:  Negative for photophobia, pain, discharge, redness, itching and visual disturbance.   Respiratory:  Negative for apnea, cough, choking, chest tightness, shortness of breath, wheezing and stridor.    Cardiovascular:  Negative for chest pain, palpitations and leg swelling.   Gastrointestinal:  Positive for rectal pain. Negative for abdominal distention, abdominal pain, anal bleeding, blood in stool, constipation, diarrhea, nausea and vomiting.   Endocrine: Negative for cold intolerance, heat intolerance, polydipsia, polyphagia and polyuria.   Genitourinary:  Negative for decreased urine volume, difficulty urinating, dysuria, enuresis, flank pain, frequency, genital sores, hematuria, penile discharge, penile pain, penile swelling, scrotal swelling, testicular pain and urgency.   Musculoskeletal:  Negative for arthralgias, back pain, gait problem, joint swelling, myalgias, neck pain and neck stiffness.   Skin:  Negative for color change, pallor, rash and wound.   Allergic/Immunologic: Negative for environmental allergies, food allergies and immunocompromised state.   Neurological:  Positive for weakness. Negative for dizziness, tremors, seizures, syncope, facial asymmetry, speech difficulty, light-headedness, numbness and headaches.   Hematological:  Negative for adenopathy. Does not bruise/bleed easily.   Psychiatric/Behavioral:  Positive for dysphoric mood. Negative for agitation, behavioral problems,  confusion, decreased concentration, hallucinations, self-injury, sleep disturbance and suicidal ideas. The patient is nervous/anxious. The patient is not hyperactive.        Objective:      Physical Exam  Vitals reviewed.   Constitutional:       General: He is not in acute distress.     Appearance: He is well-developed. He is ill-appearing. He is not diaphoretic.   HENT:      Head: Normocephalic.      Right Ear: External ear normal.      Left Ear: External ear normal.      Nose: Nose normal.      Right Sinus: No maxillary sinus tenderness or frontal sinus tenderness.      Left Sinus: No maxillary sinus tenderness or frontal sinus tenderness.      Mouth/Throat:      Pharynx: No oropharyngeal exudate.   Eyes:      General: Lids are normal. No scleral icterus.        Right eye: No discharge.         Left eye: No discharge.      Extraocular Movements:      Right eye: Normal extraocular motion.      Left eye: Normal extraocular motion.      Conjunctiva/sclera:      Right eye: Right conjunctiva is not injected. No hemorrhage.     Left eye: Left conjunctiva is not injected. No hemorrhage.     Pupils: Pupils are equal, round, and reactive to light.   Neck:      Thyroid: No thyromegaly.      Vascular: No JVD.      Trachea: No tracheal deviation.   Cardiovascular:      Rate and Rhythm: Normal rate.   Pulmonary:      Effort: Pulmonary effort is normal. No respiratory distress.      Breath sounds: No stridor.   Abdominal:      General: Bowel sounds are normal.      Palpations: Abdomen is soft. There is no hepatomegaly, splenomegaly or mass.      Tenderness: There is no abdominal tenderness.   Musculoskeletal:         General: No tenderness. Normal range of motion.      Cervical back: Normal range of motion and neck supple.   Lymphadenopathy:      Head:      Right side of head: No posterior auricular or occipital adenopathy.      Left side of head: No posterior auricular or occipital adenopathy.      Cervical: No cervical  adenopathy.      Right cervical: No superficial, deep or posterior cervical adenopathy.     Left cervical: No superficial, deep or posterior cervical adenopathy.      Upper Body:      Right upper body: No supraclavicular adenopathy.      Left upper body: No supraclavicular adenopathy.   Skin:     General: Skin is dry.      Findings: No erythema or rash.      Nails: There is no clubbing.   Neurological:      Mental Status: He is alert and oriented to person, place, and time.      Cranial Nerves: No cranial nerve deficit.      Motor: Weakness present.      Coordination: Coordination abnormal.      Gait: Gait abnormal.   Psychiatric:         Behavior: Behavior normal.         Thought Content: Thought content normal.         Judgment: Judgment normal.             Assessment:      1. Rectal cancer    2. Immunodeficiency due to chemotherapy    3. Immunodeficiency secondary to radiation therapy    4. Colostomy in place           Med Onc Chart Routing      Follow up with physician . Nurse navigation to coordinate   Follow up with REE    Infusion scheduling note    Injection scheduling note Xeloda oral   Labs    Imaging    Pharmacy appointment    Other referrals                   Plan:     Extensive conversation reviewed information with patient.  Consent for Xeloda signedConsented the patient to the treatment plan and the patient was educated on the planned duration of the treatment and schedule of the treatment administration.  Referral made for advanced care planningAdvance Care Planning prescription for Xeloda sent [facility].  Patient will proceed with referral to  he is had questions concerning possible disability so current rectal carcinoma discussed implications of answered questions would like to see patient back near start of treatment with radiation.  Should be December prescription for Xeloda should             Ken Cosby Jr, MD FACP

## 2023-11-30 NOTE — PROGRESS NOTES
GUERITA provided pt with a $50 [employer] gas card. No other needs expressed. SW will remain available.

## 2023-12-01 ENCOUNTER — PATIENT MESSAGE (OUTPATIENT)
Dept: ADMINISTRATIVE | Facility: OTHER | Age: 58
End: 2023-12-01
Payer: COMMERCIAL

## 2023-12-01 ENCOUNTER — HOSPITAL ENCOUNTER (OUTPATIENT)
Dept: RADIATION THERAPY | Facility: HOSPITAL | Age: 58
Discharge: HOME | End: 2023-12-01
Attending: SPECIALIST
Payer: COMMERCIAL

## 2023-12-01 ENCOUNTER — TELEPHONE (OUTPATIENT)
Dept: HEMATOLOGY/ONCOLOGY | Facility: CLINIC | Age: 58
End: 2023-12-01
Payer: COMMERCIAL

## 2023-12-01 ENCOUNTER — DOCUMENTATION ONLY (OUTPATIENT)
Dept: PALLIATIVE MEDICINE | Facility: CLINIC | Age: 58
End: 2023-12-01
Payer: COMMERCIAL

## 2023-12-01 ENCOUNTER — PATIENT MESSAGE (OUTPATIENT)
Dept: HEMATOLOGY/ONCOLOGY | Facility: CLINIC | Age: 58
End: 2023-12-01
Payer: COMMERCIAL

## 2023-12-01 NOTE — PROGRESS NOTES
Palliative Referral Nurse Note    Nurse reached out to pt for scheduling, his wife stated they were out eating lunch, please call back, nurse called back 2 hours later, no answer, unable to leave voice message.

## 2023-12-01 NOTE — PROGRESS NOTES
Nurse called pt and discussed his scheduled visit we discussed his concern about his pain, lower abdomen 7/10 before taking pain meds after the meds his pain is 5/10.pt stated Dr Cosby normally prescribed mac30xc however his pharmacy was out of the oxy 10 mg he received a new script of oxy 5 mg, pt stated he takes  currently oxy 5 mg two tabs every 4 hours for his pain, he stated this helps some. I explained to pt palliative med is to help with cancer related symptoms, our provider will discuss with him at his visit what the best treatment regiment for management of his pain. 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 Pt stated he has filed for disability to help with his finically imcome inorder to decrease the hours he currently have to work.pt is aware of date/time/location of his visit.

## 2023-12-04 ENCOUNTER — DOCUMENTATION ONLY (OUTPATIENT)
Dept: HEMATOLOGY/ONCOLOGY | Facility: CLINIC | Age: 58
End: 2023-12-04
Payer: COMMERCIAL

## 2023-12-04 DIAGNOSIS — C20 RECTAL CANCER: Primary | ICD-10-CM

## 2023-12-04 DIAGNOSIS — C79.11 METASTATIC ADENOCARCINOMA TO BLADDER: ICD-10-CM

## 2023-12-04 LAB
ONEOME COMMENT: NORMAL
ONEOME METHOD: NORMAL

## 2023-12-04 NOTE — PROGRESS NOTES
Called pt to explain oral chemo process which can take 2-6 weeks. [facility] to reach out once insurance  benefits investigation complete  and let him know his OOP if any. Told him if med copay is too expensive, [facility] will ask about /copay assist if any available. Told him will let rad/onc know b/c he is scheduled to start xrt next week and the goal is to start both at the same time. Also asked him to go to [facility] lab while at palliative visit 12/6, explained that first sample didn't contain enough sample to be processed. Asked pt if ok for me to send my ph# via my chart so that he can reach out to me once he has ship date from [facility], did tell him I will be following along as well. Voiced understanding all information, states he will call me once he knows he's getting med.   Oncology Navigation   Intake  Date Worked: 12/04/23     Treatment  Current Status: Active  Date Presented to Tumor Board: 11/17/23       Medical Oncologist: Dr. Cosby  Oral Therapy: Planned    Radiation Therapy: Planned  Radiation Oncologist: Dr. Aki Marte             Radiation Oncologist: Dr. Aki Marte        Acuity  Stage: 2  Systemic Treatment - predicted or initiated: More than one treatment modality concurrently (chemotherapy, radiation, etc.) (+2)  Navigation Acuity: 4     Follow Up  No follow-ups on file.

## 2023-12-05 LAB
DNA RANGE(S) EXAMINED NAR: NORMAL
GENE DIS ANL INTERP-IMP: NORMAL
GENE DIS ASSESSED: NORMAL
REASON FOR STUDY: NORMAL
TEMPUS PORTAL: NORMAL

## 2023-12-06 ENCOUNTER — OFFICE VISIT (OUTPATIENT)
Dept: PALLIATIVE MEDICINE | Facility: CLINIC | Age: 58
End: 2023-12-06
Payer: COMMERCIAL

## 2023-12-06 ENCOUNTER — DOCUMENTATION ONLY (OUTPATIENT)
Dept: HEMATOLOGY/ONCOLOGY | Facility: CLINIC | Age: 58
End: 2023-12-06
Payer: COMMERCIAL

## 2023-12-06 ENCOUNTER — LAB VISIT (OUTPATIENT)
Dept: LAB | Facility: HOSPITAL | Age: 58
End: 2023-12-06
Attending: INTERNAL MEDICINE
Payer: COMMERCIAL

## 2023-12-06 VITALS — BODY MASS INDEX: 21.98 KG/M2 | HEIGHT: 72 IN | WEIGHT: 162.25 LBS | OXYGEN SATURATION: 100 %

## 2023-12-06 DIAGNOSIS — G89.3 NEOPLASM RELATED PAIN: ICD-10-CM

## 2023-12-06 DIAGNOSIS — C20 RECTAL CANCER: ICD-10-CM

## 2023-12-06 DIAGNOSIS — C20 RECTAL CANCER: Primary | ICD-10-CM

## 2023-12-06 DIAGNOSIS — C79.11 METASTATIC ADENOCARCINOMA TO BLADDER: ICD-10-CM

## 2023-12-06 DIAGNOSIS — Z51.5 PALLIATIVE CARE ENCOUNTER: ICD-10-CM

## 2023-12-06 PROCEDURE — 99499 NO LOS: ICD-10-PCS | Mod: S$GLB,,,

## 2023-12-06 PROCEDURE — 99203 OFFICE O/P NEW LOW 30 MIN: CPT | Mod: S$GLB,,,

## 2023-12-06 PROCEDURE — 3044F HG A1C LEVEL LT 7.0%: CPT | Mod: CPTII,S$GLB,,

## 2023-12-06 PROCEDURE — 3008F BODY MASS INDEX DOCD: CPT | Mod: CPTII,S$GLB,,

## 2023-12-06 PROCEDURE — 3008F PR BODY MASS INDEX (BMI) DOCUMENTED: ICD-10-PCS | Mod: CPTII,S$GLB,,

## 2023-12-06 PROCEDURE — 99999 PR PBB SHADOW E&M-EST. PATIENT-LVL III: ICD-10-PCS | Mod: PBBFAC,,,

## 2023-12-06 PROCEDURE — 99999 PR PBB SHADOW E&M-EST. PATIENT-LVL III: CPT | Mod: PBBFAC,,,

## 2023-12-06 PROCEDURE — 99203 PR OFFICE/OUTPT VISIT, NEW, LEVL III, 30-44 MIN: ICD-10-PCS | Mod: S$GLB,,,

## 2023-12-06 PROCEDURE — 99497 ADVNCD CARE PLAN 30 MIN: CPT | Mod: S$GLB,,,

## 2023-12-06 PROCEDURE — 99499 UNLISTED E&M SERVICE: CPT | Mod: S$GLB,,,

## 2023-12-06 PROCEDURE — 3044F PR MOST RECENT HEMOGLOBIN A1C LEVEL <7.0%: ICD-10-PCS | Mod: CPTII,S$GLB,,

## 2023-12-06 PROCEDURE — 36415 COLL VENOUS BLD VENIPUNCTURE: CPT

## 2023-12-06 PROCEDURE — 99497 PR ADVNCD CARE PLAN 30 MIN: ICD-10-PCS | Mod: S$GLB,,,

## 2023-12-06 RX ORDER — NALOXONE HYDROCHLORIDE 4 MG/.1ML
1 SPRAY NASAL ONCE
Qty: 2 EACH | Refills: 0 | Status: SHIPPED | OUTPATIENT
Start: 2023-12-06 | End: 2023-12-09

## 2023-12-06 RX ORDER — OXYCODONE 9 MG/1
9 CAPSULE, EXTENDED RELEASE ORAL EVERY 12 HOURS
Qty: 28 EACH | Refills: 0 | Status: SHIPPED | OUTPATIENT
Start: 2023-12-06 | End: 2023-12-22

## 2023-12-06 NOTE — PROGRESS NOTES
SW met with pt and sig other-Adelaida aleman: need for completion of FMLA paperwork. Pt/sig other stated that pt is working with an external Co to apply for SS, but not sure if he will qualify. Pt works in construction as a supervisor, and will not be able to work when treatments start in January. Pt requesting a letter with treatment plan/duration to began leave from his job in January. SW also advised pt/sig other to contact Human Resources at pt's job to see if there's additional paperwork to be completed. Pt/sig other will contact SW if needed. SW will follow up once letter is complete. SW will remain available.

## 2023-12-06 NOTE — PROGRESS NOTES
Palliative Medicine  Consult  Consult Requested By: Dr. Ken Cosby  Reason for Consult: Symptom Management/Advance Care Planning/Goals of Care Discussion        SUBJECTIVE:     History of Present Illness:  Sukhjinder Presley is a 58 y.o. male with Recurrent Stage IIIB Rectal Cancer invading right pelvic sidewall and bladder. He is receiving CAPECITABINE 5 DAYS + RADIOTHERAPY. S/P radiotherapy (2019), neoadjuvant chemotherapy. His cancer treatment course was complicated by rectal rupture requiring abdominoperineal resection/loop sigmoid colostomy/fistula tract extraction/muscle flap closure of the perineum (April 2020). He underwent completion colectomy/total colectomy with end ileostomy construction (Feb. 2022)  He is followed by Dr. Cosby, Rad Onc, and Dr. New (Colon Surg).   The palliative care team was consulted to assist with symptom management, advance care planning, and goals of care discussion.       Pt attended clinic with his significant other, Ms. Son. He was in no acute distress. Vital signs stable on room air.   I explained the role palliative care often plays in supporting patients with serious illness and their families regarding symptom management, advance care planning, and goals of care discussion.  Pt and his S.O. verbalized understanding.     Pt reported persistent pelvic and rectal pain 7/10 worsening over the last month. He takes Oxycodone IR  5mg Q3H up to 8 doses/day with 1-2 hour relief after each dose. He was previously taking Oxycodone IR 10-15mg up to 4 doses per day, but ran out. He stated his pain was not well-managed on this regimen either. He has had instances of taking Oxycodone IR 20mg (2 tabs of 10mg) at a time to achieve pain relief, bue he finds that his pain would return after 3-4 hours. He is open to trying long-acting pain management in addition to Short-acting Oxycodone.   He also reported fatigue, agitation, and depression, related to cancer diagnosis and persistent  pain.     We discussed advance care planning, goals of care, and code status, Full Code vs. DNR including risks, benefits, and alternatives.   Pt stated he wishes to continue cancer treatment, symptom management, maintain independence and functional status. He is currently works as a , but currently applying for FMLA.   He wishes to elect his S.O., Ms. Adelaida Gutierrez: [phone] as HCPOA. They are currently engaged to be . They have 3 children ages:  15, 14, and 9.  We have discussed the [place] Hierarchy of Decision Making, and pt understands his family will be surrogate decision makers if health care power of  is not completed and witnessed.  He wishes to remain Full Code at this time. He will continue discussion with his S.O. and update me on his decision.      LA  reviewed and summarized:          Past Medical History:   Diagnosis Date    Anemia     Arthritis     Cancer     rectal    Renal disorder     kidney stones     Past Surgical History:   Procedure Laterality Date    ABDOMINOPERINEAL RESECTION OF RECTUM N/A 4/9/2020    Procedure: PROCTECTOMY, ABDOMINOPERINEAL;  Surgeon: Jan New MD;  Location: [facility] OR;  Service: General;  Laterality: N/A;    COLECTOMY, TOTAL N/A 2/24/2022    Procedure: COLECTOMY, TOTAL;  Surgeon: Jan New MD;  Location: [facility] OR;  Service: Colon and Rectal;  Laterality: N/A;    COLON SURGERY      COLONOSCOPY N/A 6/6/2019    Procedure: COLONOSCOPY;  Surgeon: Anjelica Saavedra MD;  Location: [facility] ENDO;  Service: Endoscopy;  Laterality: N/A;    COLONOSCOPY N/A 12/17/2021    Procedure: COLONOSCOPY;  Surgeon: Jan New MD;  Location: [facility] ENDO;  Service: General;  Laterality: N/A;    CREATION OF MUSCLE ROTATIONAL FLAP Left 4/9/2020    Procedure: CREATION, FLAP, MUSCLE ROTATION;  Surgeon: Sebastian Dan MD;  Location: [facility] OR;  Service: Plastics;  Laterality: Left;   VRAM    CYSTOSCOPIC LITHOLAPAXY N/A 2/24/2022     Procedure: CYSTOLITHOLAPAXY;  Surgeon: Sukhjinder Tucker MD;  Location: [facility] OR;  Service: Urology;  Laterality: N/A;    CYSTOSCOPY W/ URETERAL STENT PLACEMENT Right 10/6/2023    Procedure: CYSTOSCOPY, WITH URETERAL STENT INSERTION;  Surgeon: Sukhjinder Tucker MD;  Location: [facility] OR;  Service: Urology;  Laterality: Right;    CYSTOSCOPY WITH BIOPSY OF BLADDER N/A 9/11/2023    Procedure: CYSTOSCOPY, WITH BLADDER BIOPSY, WITH FULGURATION IF INDICATED;  Surgeon: Sukhjinder Tucker MD;  Location: [facility] OR;  Service: Urology;  Laterality: N/A;  , POSS TURBT    CYSTOSCOPY WITH URETEROSCOPY, RETROGRADE PYELOGRAPHY, AND INSERTION OF STENT N/A 9/11/2023    Procedure: CYSTOSCOPY, WITH RETROGRADE PYELOGRAM AND URETERAL STENT INSERTION;  Surgeon: Sukhjinder Tucker MD;  Location: [facility];  Service: Urology;  Laterality: N/A;    DILATION OF URETHRA N/A 9/11/2023    Procedure: DILATION, URETHRA;  Surgeon: Sukhjinder Tucker MD;  Location: [facility];  Service: Urology;  Laterality: N/A;    ESOPHAGOGASTRODUODENOSCOPY N/A 6/6/2019    Procedure: EGD (ESOPHAGOGASTRODUODENOSCOPY);  Surgeon: Anjelica Saavedra MD;  Location: [facility];  Service: Endoscopy;  Laterality: N/A;    FLAP GRAFT SURGERY N/A 4/9/2020    Procedure: FLAP GRAFT;  Surgeon: Sebastian Dan MD;  Location: [facility];  Service: Plastics;  Laterality: N/A;  left fasciocutaneous buttocks flap    HERNIA REPAIR  1995    INJECTION OF ANESTHETIC AGENT INTO TISSUE PLANE DEFINED BY TRANSVERSUS ABDOMINIS MUSCLE N/A 2/18/2020    Procedure: BLOCK, TRANSVERSUS ABDOMINIS PLANE;  Surgeon: Jan New MD;  Location: [facility];  Service: General;  Laterality: N/A;    INJECTION OF ANESTHETIC AGENT INTO TISSUE PLANE DEFINED BY TRANSVERSUS ABDOMINIS MUSCLE  2/24/2022    Procedure: BLOCK, TRANSVERSUS ABDOMINIS PLANE;  Surgeon: Jan New MD;  Location: [facility];  Service: Colon and Rectal;;    INSERTION OF TUNNELED CENTRAL VENOUS CATHETER (CVC) WITH  SUBCUTANEOUS PORT N/A 10/8/2019    Procedure: [id number];  Surgeon: Jan New MD;  Location: [facility] OR;  Service: General;  Laterality: N/A;    LAPAROSCOPIC COLOSTOMY      MEDIPORT REMOVAL N/A 8/13/2020    Procedure: REMOVAL, CATHETER, CENTRAL VENOUS, TUNNELED, WITH PORT;  Surgeon: Sebastian Dan MD;  Location: [facility] OR;  Service: Plastics;  Laterality: N/A;    TONSILLECTOMY      TRANSURETHRAL RESECTION OF PROSTATE N/A 6/7/2021    Procedure: TURP (TRANSURETHRAL RESECTION OF PROSTATE), CYSTOLITHALOPAXY;  Surgeon: Sukhjinder Tucker MD;  Location: [facility] OR;  Service: Urology;  Laterality: N/A;    TURBT (TRANSURETHRAL RESECTION OF BLADDER TUMOR) N/A 10/6/2023    Procedure: TURBT (TRANSURETHRAL RESECTION OF BLADDER TUMOR);  Surgeon: Sukhjinder Tucker MD;  Location: [facility] OR;  Service: Urology;  Laterality: N/A;    WOUND DEBRIDEMENT N/A 8/13/2020    Procedure: DEBRIDEMENT, WOUND;  Surgeon: Sebastian Dan MD;  Location: [facility] OR;  Service: Plastics;  Laterality: N/A;  layered closure     Family History   Problem Relation Age of Onset    Intestinal malrotation Mother     Leukemia Father     No Known Problems Sister     Coronary artery disease Brother     Coronary artery disease Maternal Grandmother     Stomach cancer Maternal Grandfather      Review of patient's allergies indicates:  No Known Allergies    Medications:    Current Outpatient Medications:     acetaminophen (TYLENOL) 325 MG tablet, Take 2 tablets (650 mg total) by mouth every 6 (six) hours as needed., Disp: , Rfl: 0    capecitabine (XELODA) 150 MG tablet, Take 4 tablets (600 mg total) by mouth 2 (two) times daily Take as directed Monday, Tuesday, Wednesday, Thursday, and Friday for the duration of radiation therapy. Take with food. with 1 other capecitabine prescription for 1,600 mg total., Disp: 600 tablet, Rfl: 11    capecitabine (XELODA) 500 MG Tab, Take 2 tablets (1,000 mg total) by mouth 2 (two) times daily Take as directed  Monday, Tuesday, Wednesday, Thursday, and Friday for the duration of radiation therapy. Take with food. with 1 other capecitabine prescription for 1,600 mg total., Disp: 300 tablet, Rfl: 11    finasteride (PROSCAR) 5 mg tablet, Take 1 tablet (5 mg total) by mouth once daily., Disp: 30 tablet, Rfl: 11    oxyCODONE (ROXICODONE) 10 mg Tab immediate release tablet, Take 1 tablet (10 mg total) by mouth every 8 (eight) hours as needed for Pain (severe)., Disp: 90 tablet, Rfl: 0    oxyCODONE (ROXICODONE) 5 MG immediate release tablet, Take 1 tablet (5 mg total) by mouth every 3 (three) hours as needed for Pain. Take 2 tabs every 3 hours for pain, Disp: 120 tablet, Rfl: 0    promethazine (PHENERGAN) 25 MG tablet, Take 1 tablet (25 mg total) by mouth every 4 (four) hours., Disp: 60 tablet, Rfl: 4    sildenafiL (VIAGRA) 100 MG tablet, Take 1 tablet (100 mg total) by mouth daily as needed for Erectile Dysfunction., Disp: 20 tablet, Rfl: 11    tamsulosin (FLOMAX) 0.4 mg Cap, Take 1 capsule (0.4 mg total) by mouth once daily., Disp: 90 capsule, Rfl: 3    OBJECTIVE:     ROS:  Review of Systems   Constitutional:  Negative for activity change, appetite change, fatigue, fever and unexpected weight change.   HENT:  Negative for trouble swallowing and voice change.    Eyes: Negative.    Respiratory:  Negative for cough, chest tightness, shortness of breath and wheezing.    Cardiovascular:  Negative for chest pain, palpitations and leg swelling.   Gastrointestinal:  Positive for rectal pain. Negative for abdominal distention, abdominal pain, constipation, diarrhea, nausea and vomiting.        Left Colostomy   Musculoskeletal:  Negative for arthralgias, back pain, joint swelling and myalgias.   Skin:  Positive for wound. Negative for color change, pallor and rash.   Neurological:  Negative for dizziness, tremors, speech difficulty, weakness, numbness and headaches.   Psychiatric/Behavioral:  Positive for agitation, dysphoric mood (Due  to cancer diagnosis) and sleep disturbance. Negative for behavioral problems, confusion and suicidal ideas. The patient is nervous/anxious (Due to cancer diagnosis).        Review of Symptoms      Symptom Assessment (ESAS 0-10 Scale)  Pain:  7  Dyspnea:  4  Anxiety:  5  Nausea:  0  Depression:  9  Anorexia:  0  Fatigue:  9  Insomnia:  0  Restlessness:  0  Agitation:  5       Anxiety:  Is nervous/anxious (Due to cancer diagnosis)  Constipation:  No constipation    Pain Assessment:    Location(s): abdomen    Abdomen       Location: anterior and posterior        Quality: Stabbing and aching        Quantity: 8/10 in intensity        Chronicity: Onset 4 year(s) ago, gradually worsening        Aggravating Factors: Bowel movement and movement        Alleviating Factors: Opiates        Associated Symptoms: Myalgias    Modified Ijeoma Scale:  0    Performance Status:  80    ECOG Performance Status ndGndrndanddndend:nd nd2nd Living Arrangements:  Lives with spouse    Psychosocial/Cultural:   See Palliative Psychosocial Note: Yes  Construction- Supervisor.   3 children- 15, 14, 9.  **Primary  to Follow**  Palliative Care  Consult: Yes      Advance Care Planning   Advance Directives:   Living Will: No    [place]: No    Do Not Resuscitate Status: No    Medical Power of : No    Agent's Name:  S.HEATH, Ms. Son Brenda: [phone]    Decision Making:  Patient answered questions and Family answered questions  Goals of Care: The patient endorses that what is most important right now is to focus on remaining as independent as possible, symptom/pain control, and curative/life-prolongation (regardless of treatment burdens)    Accordingly, we have decided that the best plan to meet the patient's goals includes continuing with treatment.    Pt stated he wishes to continue cancer treatment, symptom management, maintain independence and functional status. He is currently works as a , but currently  applying for FMLA.   He wishes to elect his S.O., Ms. Adelaida Gutierrez: [phone] as HCPOA. They are currently engaged to be . They have 3 children ages:  15, 14, and 9.  We have discussed the [place] Hierarchy of Decision Making, and pt understands his family will be surrogate decision makers if health care power of  is not completed and witnessed.  He wishes to remain Full Code at this time. He will continue discussion with his S.O. and update me on his decision.              Physical Exam:  Vitals:    Physical Exam  Vitals and nursing note reviewed.   Constitutional:       General: He is not in acute distress.     Appearance: Normal appearance. He is normal weight. He is ill-appearing.   HENT:      Head: Normocephalic and atraumatic.      Nose: Nose normal. No congestion or rhinorrhea.      Mouth/Throat:      Mouth: Mucous membranes are moist.      Pharynx: Oropharynx is clear. No oropharyngeal exudate or posterior oropharyngeal erythema.   Eyes:      General: No scleral icterus.        Right eye: No discharge.         Left eye: No discharge.      Conjunctiva/sclera: Conjunctivae normal.      Pupils: Pupils are equal, round, and reactive to light.   Cardiovascular:      Rate and Rhythm: Normal rate and regular rhythm.      Pulses: Normal pulses.      Heart sounds: Normal heart sounds. No murmur heard.     No gallop.   Pulmonary:      Effort: Pulmonary effort is normal. No respiratory distress.      Breath sounds: Normal breath sounds. No stridor. No wheezing.   Chest:      Chest wall: No tenderness.   Abdominal:      General: Bowel sounds are normal. There is no distension.      Palpations: Abdomen is soft.      Tenderness: There is abdominal tenderness.      Comments: Left Colostomy: Active with stool in bag, Stoma pink and healthy   Genitourinary:     Comments: Deferred  Musculoskeletal:         General: No swelling or tenderness. Normal range of motion.      Cervical back: Normal range of  motion and neck supple.      Right lower leg: No edema.      Left lower leg: No edema.   Skin:     General: Skin is warm and dry.      Capillary Refill: Capillary refill takes less than 2 seconds.      Coloration: Skin is not jaundiced or pale.      Findings: No rash.   Neurological:      Mental Status: He is alert and oriented to person, place, and time.      Motor: No weakness.   Psychiatric:         Mood and Affect: Mood normal.         Behavior: Behavior normal.         Thought Content: Thought content normal.         Judgment: Judgment normal.         Labs and radiology data reviewed    ASSESSMENT/PLAN:   Recurrent Stage IIIB Rectal Cancer invading right pelvic sidewall and bladder.   He is followed by Dr. Cosby, Rad Onc, and Dr. New (Colon Surg).   On CAPECITABINE 5 DAYS + RADIOTHERAPY. S/P radiotherapy (2019), neoadjuvant chemotherapy. His cancer treatment course was complicated by rectal rupture requiring abdominoperineal resection/loop sigmoid colostomy/fistula tract extraction/muscle flap closure of the perineum (April 2020). He underwent completion colectomy/total colectomy with end ileostomy construction (Feb. 2022)  MRI Rectal Cancer: 11/13/2023:  Large 4.9 cm mass posterior right pelvis with bladder wall invasion with adjacent enlarged posterior right obturator lymph node. This correlates with the hypermetabolic recurrence and bladder wall invasion seen on recent PET-CT and confirmed with cystoscopy.   PET 11/10/2023:  1. FDG marked avidity at the known site of recurrence in the right posterior aspect of the deep pelvis measuring approximately 4.0 x 3.5 cm. 2. No other sites of abnormal FDG avidity in the whole body.  Neoplasm related pain  -Uncontrolled: Likely related to mass invading pelvis/bladder wall and extensive surgery  -Start Xtampza 9mg BID  -Continue Oxycodone IR 5mg Q4H PRN  Narcan RX given and explained use to pt and family. Pt and family verbalized understanding.    Reviewed opioid  use safety with pt and his wife including avoiding ETOH use while taking opioids. Pt verbalized understanding stating he no longer drinks ETOH  -Patient instructed to contact me if 3 day supply of meds left.   -Pain Contract- 12/6/2023  -UDS-  Next visit  Encounter for Palliative Care  -Code status: Full Code  -HCPOA: To be completed:  Pt wishes to elect: S.O., Ms. Son [facility]: [phone]  -GO:  Continue cancer treatment, symptom management, maintain independence and functional status.   -See HPI for further details      Follow up: 2-3 weeks to review pain regimen      60 minutes total visit  40 minutes of total time spent on the encounter, which includes face to face time and non-face to face time preparing to see the patient (eg, review of tests), Obtaining and/or reviewing separately obtained history, Documenting clinical information in the electronic or other health record, Independently interpreting results (not separately reported) and communicating results to the patient/family/caregiver, or Care coordination (not separately reported).    20 minutes spent in discussing ACP    Signature: LUIS FELIPE KNIGHT NP

## 2023-12-06 NOTE — PATIENT INSTRUCTIONS
Start Xtampza 9mg (long-acting Oxycodone) every 12 hours twice a day.   Take Oxycodone 1-2 hours after your Xtampza.  [facility]   Narcan has been prescribed if there is any concern of opioid overdose.   Signs of opioid overdose include:   Loss of consciousness  Unresponsive to outside stimulus  Awake, but unable to talk  Breathing is very slow and shallow, erratic, or has stopped  For lighter skinned people, the skin tone turns bluish purple, for darker skinned people, it turns grayish or ashen.  Choking sounds, or a snore-like gurgling noise (sometimes called the death rattle)  Vomiting  Body is very limp  Face is very pale or clammy  Fingernails and lips turn blue or purplish black  Pulse (heartbeat) is slow, erratic, or not there at all.   If you administer this medicine immediately go to the nearest emergency department for evaluation and stop taking all opioids.

## 2023-12-08 LAB
DNA RANGE(S) EXAMINED NAR: NORMAL
GENE DIS ANL INTERP-IMP: POSITIVE
GENE DIS ASSESSED: NORMAL
GENE MUT TESTED BLD/T: 11.6 M/MB
MSI CA SPEC-IMP: NORMAL
REASON FOR STUDY: NORMAL
TEMPUS AMENDMENTNOTE1: NORMAL
TEMPUS FUSIONADDENDUM: NORMAL
TEMPUS PERTINENTNEGATIVES: NORMAL
TEMPUS PORTAL: NORMAL
TEMPUS THERAPY1: NORMAL
TEMPUS THERAPY2: NORMAL
TEMPUS THERAPYCOUNT: 2
TEMPUS TRIAL1: NORMAL
TEMPUS TRIAL2: NORMAL
TEMPUS TRIAL3: NORMAL
TEMPUS TRIALCOUNT: 3

## 2023-12-08 PROCEDURE — 77370 RADIATION PHYSICS CONSULT: CPT | Performed by: SPECIALIST

## 2023-12-08 PROCEDURE — 77301 PR  INTEN MOD RADIOTHER PLAN W/DOSE VOL HIST: ICD-10-PCS | Mod: 26,,, | Performed by: SPECIALIST

## 2023-12-08 PROCEDURE — 77301 RADIOTHERAPY DOSE PLAN IMRT: CPT | Mod: 26,,, | Performed by: SPECIALIST

## 2023-12-08 PROCEDURE — 77301 RADIOTHERAPY DOSE PLAN IMRT: CPT | Mod: TC | Performed by: SPECIALIST

## 2023-12-11 ENCOUNTER — OFFICE VISIT (OUTPATIENT)
Dept: HEMATOLOGY/ONCOLOGY | Facility: CLINIC | Age: 58
End: 2023-12-11
Payer: COMMERCIAL

## 2023-12-11 ENCOUNTER — DOCUMENTATION ONLY (OUTPATIENT)
Dept: RADIATION ONCOLOGY | Facility: CLINIC | Age: 58
End: 2023-12-11
Payer: COMMERCIAL

## 2023-12-11 ENCOUNTER — DOCUMENTATION ONLY (OUTPATIENT)
Dept: HEMATOLOGY/ONCOLOGY | Facility: CLINIC | Age: 58
End: 2023-12-11
Payer: COMMERCIAL

## 2023-12-11 ENCOUNTER — LAB VISIT (OUTPATIENT)
Dept: LAB | Facility: HOSPITAL | Age: 58
End: 2023-12-11
Attending: INTERNAL MEDICINE
Payer: COMMERCIAL

## 2023-12-11 VITALS
WEIGHT: 164.56 LBS | HEIGHT: 72 IN | OXYGEN SATURATION: 99 % | BODY MASS INDEX: 22.29 KG/M2 | SYSTOLIC BLOOD PRESSURE: 100 MMHG | HEART RATE: 89 BPM | TEMPERATURE: 97 F | DIASTOLIC BLOOD PRESSURE: 64 MMHG

## 2023-12-11 DIAGNOSIS — T45.1X5A IMMUNODEFICIENCY DUE TO CHEMOTHERAPY: ICD-10-CM

## 2023-12-11 DIAGNOSIS — Z93.3 COLOSTOMY IN PLACE: ICD-10-CM
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
## 2023-12-11 DIAGNOSIS — D84.822 IMMUNODEFICIENCY SECONDARY TO RADIATION THERAPY: Primary | ICD-10-CM

## 2023-12-11 DIAGNOSIS — D84.821 IMMUNODEFICIENCY DUE TO CHEMOTHERAPY: ICD-10-CM

## 2023-12-11 DIAGNOSIS — D49.4 BLADDER TUMOR: ICD-10-CM

## 2023-12-11 DIAGNOSIS — C20 RECTAL CANCER: ICD-10-CM

## 2023-12-11 DIAGNOSIS — D50.0 ANEMIA DUE TO CHRONIC BLOOD LOSS: ICD-10-CM

## 2023-12-11 DIAGNOSIS — Z79.899 IMMUNODEFICIENCY DUE TO CHEMOTHERAPY: ICD-10-CM

## 2023-12-11 LAB
ALBUMIN SERPL BCP-MCNC: 3.2 G/DL (ref 3.5–5.2)
ALP SERPL-CCNC: 86 U/L (ref 55–135)
ALT SERPL W/O P-5'-P-CCNC: 8 U/L (ref 10–44)
ANION GAP SERPL CALC-SCNC: 10 MMOL/L (ref 8–16)
AST SERPL-CCNC: 11 U/L (ref 10–40)
BASOPHILS # BLD AUTO: 0.06 K/UL (ref 0–0.2)
BASOPHILS NFR BLD: 0.8 % (ref 0–1.9)
BILIRUB SERPL-MCNC: 0.3 MG/DL (ref 0.1–1)
BUN SERPL-MCNC: 9 MG/DL (ref 6–20)
CALCIUM SERPL-MCNC: 9 MG/DL (ref 8.7–10.5)
CHLORIDE SERPL-SCNC: 103 MMOL/L (ref 95–110)
CO2 SERPL-SCNC: 25 MMOL/L (ref 23–29)
CREAT SERPL-MCNC: 1.1 MG/DL (ref 0.5–1.4)
DIFFERENTIAL METHOD: ABNORMAL
EOSINOPHIL # BLD AUTO: 0.2 K/UL (ref 0–0.5)
EOSINOPHIL NFR BLD: 2.7 % (ref 0–8)
ERYTHROCYTE [DISTWIDTH] IN BLOOD BY AUTOMATED COUNT: 13.6 % (ref 11.5–14.5)
EST. GFR  (NO RACE VARIABLE): >60 ML/MIN/1.73 M^2
GLUCOSE SERPL-MCNC: 144 MG/DL (ref 70–110)
HCT VFR BLD AUTO: 33.3 % (ref 40–54)
HGB BLD-MCNC: 11.2 G/DL (ref 14–18)
IMM GRANULOCYTES # BLD AUTO: 0.01 K/UL (ref 0–0.04)
IMM GRANULOCYTES NFR BLD AUTO: 0.1 % (ref 0–0.5)
LYMPHOCYTES # BLD AUTO: 2 K/UL (ref 1–4.8)
LYMPHOCYTES NFR BLD: 26.6 % (ref 18–48)
MCH RBC QN AUTO: 27.9 PG (ref 27–31)
MCHC RBC AUTO-ENTMCNC: 33.6 G/DL (ref 32–36)
MCV RBC AUTO: 83 FL (ref 82–98)
MONOCYTES # BLD AUTO: 0.5 K/UL (ref 0.3–1)
MONOCYTES NFR BLD: 6.3 % (ref 4–15)
NEUTROPHILS # BLD AUTO: 4.7 K/UL (ref 1.8–7.7)
NEUTROPHILS NFR BLD: 63.5 % (ref 38–73)
NRBC BLD-RTO: 0 /100 WBC
PLATELET # BLD AUTO: 299 K/UL (ref 150–450)
PMV BLD AUTO: 8.6 FL (ref 9.2–12.9)
POTASSIUM SERPL-SCNC: 4.3 MMOL/L (ref 3.5–5.1)
PROT SERPL-MCNC: 7.5 G/DL (ref 6–8.4)
RBC # BLD AUTO: 4.02 M/UL (ref 4.6–6.2)
SODIUM SERPL-SCNC: 138 MMOL/L (ref 136–145)
WBC # BLD AUTO: 7.34 K/UL (ref 3.9–12.7)

## 2023-12-11 PROCEDURE — 77427 RADIATION TX MANAGEMENT X5: CPT | Mod: ,,, | Performed by: SPECIALIST

## 2023-12-11 PROCEDURE — 77300 RADIATION THERAPY DOSE PLAN: CPT | Mod: 26,,, | Performed by: SPECIALIST

## 2023-12-11 PROCEDURE — 3078F PR MOST RECENT DIASTOLIC BLOOD PRESSURE < 80 MM HG: ICD-10-PCS | Mod: CPTII,S$GLB,, | Performed by: INTERNAL MEDICINE

## 2023-12-11 PROCEDURE — 77300 PR RADIATION THERAPY,DOSIMETRY PLAN: ICD-10-PCS | Mod: 26,,, | Performed by: SPECIALIST

## 2023-12-11 PROCEDURE — 77385 HC IMRT, SIMPLE: CPT | Performed by: SPECIALIST

## 2023-12-11 PROCEDURE — 3008F BODY MASS INDEX DOCD: CPT | Mod: CPTII,S$GLB,, | Performed by: INTERNAL MEDICINE

## 2023-12-11 PROCEDURE — 77338 DESIGN MLC DEVICE FOR IMRT: CPT | Mod: 26,,, | Performed by: SPECIALIST

## 2023-12-11 PROCEDURE — 99999 PR PBB SHADOW E&M-EST. PATIENT-LVL IV: ICD-10-PCS | Mod: PBBFAC,,, | Performed by: INTERNAL MEDICINE

## 2023-12-11 PROCEDURE — 3074F SYST BP LT 130 MM HG: CPT | Mod: CPTII,S$GLB,, | Performed by: INTERNAL MEDICINE

## 2023-12-11 PROCEDURE — 3044F HG A1C LEVEL LT 7.0%: CPT | Mod: CPTII,S$GLB,, | Performed by: INTERNAL MEDICINE

## 2023-12-11 PROCEDURE — 3044F PR MOST RECENT HEMOGLOBIN A1C LEVEL <7.0%: ICD-10-PCS | Mod: CPTII,S$GLB,, | Performed by: INTERNAL MEDICINE

## 2023-12-11 PROCEDURE — 77014 PR  CT GUIDANCE PLACEMENT RAD THERAPY FIELDS: CPT | Mod: 26,,, | Performed by: SPECIALIST

## 2023-12-11 PROCEDURE — 3074F PR MOST RECENT SYSTOLIC BLOOD PRESSURE < 130 MM HG: ICD-10-PCS | Mod: CPTII,S$GLB,, | Performed by: INTERNAL MEDICINE

## 2023-12-11 PROCEDURE — 77300 RADIATION THERAPY DOSE PLAN: CPT | Mod: TC | Performed by: SPECIALIST

## 2023-12-11 PROCEDURE — 3078F DIAST BP <80 MM HG: CPT | Mod: CPTII,S$GLB,, | Performed by: INTERNAL MEDICINE

## 2023-12-11 PROCEDURE — 36415 COLL VENOUS BLD VENIPUNCTURE: CPT

## 2023-12-11 PROCEDURE — 77014 PR  CT GUIDANCE PLACEMENT RAD THERAPY FIELDS: ICD-10-PCS | Mod: 26,,, | Performed by: SPECIALIST

## 2023-12-11 PROCEDURE — 99999 PR PBB SHADOW E&M-EST. PATIENT-LVL IV: CPT | Mod: PBBFAC,,, | Performed by: INTERNAL MEDICINE

## 2023-12-11 PROCEDURE — 77386 HC IMRT, COMPLEX: CPT | Performed by: SPECIALIST

## 2023-12-11 PROCEDURE — 1159F PR MEDICATION LIST DOCUMENTED IN MEDICAL RECORD: ICD-10-PCS | Mod: CPTII,S$GLB,, | Performed by: INTERNAL MEDICINE

## 2023-12-11 PROCEDURE — 77427 PR CHG RADIATION,MANGEMENT,5 TX'S: ICD-10-PCS | Mod: ,,, | Performed by: SPECIALIST

## 2023-12-11 PROCEDURE — 99214 OFFICE O/P EST MOD 30 MIN: CPT | Mod: S$GLB,,, | Performed by: INTERNAL MEDICINE

## 2023-12-11 PROCEDURE — 77338 DESIGN MLC DEVICE FOR IMRT: CPT | Mod: TC,59 | Performed by: SPECIALIST

## 2023-12-11 PROCEDURE — 1159F MED LIST DOCD IN RCRD: CPT | Mod: CPTII,S$GLB,, | Performed by: INTERNAL MEDICINE

## 2023-12-11 PROCEDURE — 85025 COMPLETE CBC W/AUTO DIFF WBC: CPT

## 2023-12-11 PROCEDURE — 77338 PR  MLC IMRT DESIGN & CONSTRUCTION PER IMRT PLAN: ICD-10-PCS | Mod: 26,,, | Performed by: SPECIALIST

## 2023-12-11 PROCEDURE — 99214 PR OFFICE/OUTPT VISIT, EST, LEVL IV, 30-39 MIN: ICD-10-PCS | Mod: S$GLB,,, | Performed by: INTERNAL MEDICINE

## 2023-12-11 PROCEDURE — 3008F PR BODY MASS INDEX (BMI) DOCUMENTED: ICD-10-PCS | Mod: CPTII,S$GLB,, | Performed by: INTERNAL MEDICINE

## 2023-12-11 PROCEDURE — 80053 COMPREHEN METABOLIC PANEL: CPT

## 2023-12-11 RX ORDER — OXYCODONE HYDROCHLORIDE 10 MG/1
10 TABLET ORAL EVERY 8 HOURS PRN
Qty: 90 TABLET | Refills: 0 | Status: SHIPPED | OUTPATIENT
Start: 2023-12-11 | End: 2024-01-09 | Stop reason: SDUPTHER

## 2023-12-11 NOTE — PROGRESS NOTES
Contacted pt to ask if he can come 30min earlier to Dr. Cosby appt, need blood drawn, as baseline to C1D1 oral chemo. Pt states he took first pill this am, told him that's fine, will still consider this baseline. Pt states he'll come earlier, knows XRT d1 is today also. Has no add'l questions, he does have my call back number.   Oncology Navigation   Intake  Date Worked: 23  Start of Treatment: 23     Treatment  Current Status: Active  Date Presented to Tumor Board: 23       Medical Oncologist: Dr. Cosby  Oral Therapy: Planned    Radiation Therapy: Planned  Radiation Oncologist: Dr. Aki Marte  Start Date: 23  End Date: 24    Procedures: Biopsy; Port / PICC; PET scan; MRI  Biopsy Schedule Date: 10/06/23  MRI Schedule Date: 23  PET Scan Schedule Date: 11/10/23  Port / PICC Schedule Date: 23 ([facility])          Radiation Oncologist: Dr. Aki Marte        Acuity  Stage: 2  Systemic Treatment - predicted or initiated: More than one treatment modality concurrently (chemotherapy, radiation, etc.) (+2)  ECO  Comorbidities in Medical History: 2  Support: 0  Verbalizes Financial Concerns: 0  Transportation: 0  Verbalizes the need for more education: 1  Navigation Acuity: 4     Follow Up  No follow-ups on file.

## 2023-12-11 NOTE — PLAN OF CARE
Day 1 of outpatient xrt to the rectum. Pelvis handout & verbal instructions were given to the patient. Skin care & side effects were reviewed. Contact info was provided. Patient verbalized understanding.

## 2023-12-11 NOTE — PROGRESS NOTES
Subjective:       Patient ID: Sukhjinder Presley is a 58 y.o. male.    Chief Complaint: Results, Pain, and Rectal Cancer    HPI:  58 year  male with recurrent rectal carcinoma bladder patient is here for follow-up recent seen by palliative care started on long-acting oxycodone requesting relation of short-acting oxycodone to 10 mg possible.  Patient is concerned over extent of disease ECOG status 1 reports blood in urine    Past Medical History:   Diagnosis Date    Anemia     Arthritis     Cancer     rectal    Renal disorder     kidney stones     Family History   Problem Relation Age of Onset    Intestinal malrotation Mother     Leukemia Father     No Known Problems Sister     Coronary artery disease Brother     Coronary artery disease Maternal Grandmother     Stomach cancer Maternal Grandfather      Social History     Socioeconomic History    Marital status: Significant Other   Tobacco Use    Smoking status: Every Day     Current packs/day: 1.00     Average packs/day: 1 pack/day for 39.9 years (39.9 ttl pk-yrs)     Types: Cigarettes     Start date: 1/2/1984    Smokeless tobacco: Former     Types: Chew    Tobacco comments:     no smoking after m.n prior to sx   Substance and Sexual Activity    Alcohol use: Not Currently     Alcohol/week: 46.0 standard drinks of alcohol     Types: 42 Cans of beer, 4 Shots of liquor per week     Comment: segrams 7, beer daily  hold now prior to surgery    Drug use: Never    Sexual activity: Yes     Partners: Female     Birth control/protection: None     Social Determinants of Health     Financial Resource Strain: High Risk (11/10/2023)    Overall Financial Resource Strain (CARDIA)     Difficulty of Paying Living Expenses: Very hard   Food Insecurity: Food Insecurity Present (11/10/2023)    Hunger Vital Sign     Worried About Running Out of Food in the Last Year: Often true     Ran Out of Food in the Last Year: Often true   Transportation Needs: Unmet Transportation Needs (11/10/2023)     PRAPARE - Transportation     Lack of Transportation (Medical): Yes     Lack of Transportation (Non-Medical): No   Physical Activity: Sufficiently Active (11/10/2023)    Exercise Vital Sign     Days of Exercise per Week: 7 days     Minutes of Exercise per Session: 70 min   Stress: Stress Concern Present (11/10/2023)    [employer] [place] [employer] - Occupational Stress Questionnaire     Feeling of Stress : Very much   Social Connections: Unknown (11/10/2023)    Social Connection and Isolation Panel [NHANES]     Frequency of Communication with Friends and Family: More than three times a week     Frequency of Social Gatherings with Friends and Family: Once a week     Active Member of Clubs or Organizations: Yes     Attends Club or Organization Meetings: 1 to 4 times per year     Marital Status: Living with partner   Housing Stability: High Risk (11/10/2023)    Housing Stability Vital Sign     Unable to Pay for Housing in the Last Year: Yes     Number of Places Lived in the Last Year: 1     Unstable Housing in the Last Year: No     Past Surgical History:   Procedure Laterality Date    ABDOMINOPERINEAL RESECTION OF RECTUM N/A 4/9/2020    Procedure: PROCTECTOMY, ABDOMINOPERINEAL;  Surgeon: Jan New MD;  Location: [facility];  Service: General;  Laterality: N/A;    COLECTOMY, TOTAL N/A 2/24/2022    Procedure: COLECTOMY, TOTAL;  Surgeon: Jan New MD;  Location: [facility] OR;  Service: Colon and Rectal;  Laterality: N/A;    COLON SURGERY      COLONOSCOPY N/A 6/6/2019    Procedure: COLONOSCOPY;  Surgeon: Anjelica Saavedra MD;  Location: [facility] ENDO;  Service: Endoscopy;  Laterality: N/A;    COLONOSCOPY N/A 12/17/2021    Procedure: COLONOSCOPY;  Surgeon: Jan New MD;  Location: [facility] ENDO;  Service: General;  Laterality: N/A;    CREATION OF MUSCLE ROTATIONAL FLAP Left 4/9/2020    Procedure: CREATION, FLAP, MUSCLE ROTATION;  Surgeon: Sebastian Dan MD;  Location: [facility] OR;  Service: Plastics;   Laterality: Left;   VRAM    CYSTOSCOPIC LITHOLAPAXY N/A 2/24/2022    Procedure: CYSTOLITHOLAPAXY;  Surgeon: Sukhjinder Tucker MD;  Location: [facility] OR;  Service: Urology;  Laterality: N/A;    CYSTOSCOPY W/ URETERAL STENT PLACEMENT Right 10/6/2023    Procedure: CYSTOSCOPY, WITH URETERAL STENT INSERTION;  Surgeon: Sukhjinder Tucker MD;  Location: [facility] OR;  Service: Urology;  Laterality: Right;    CYSTOSCOPY WITH BIOPSY OF BLADDER N/A 9/11/2023    Procedure: CYSTOSCOPY, WITH BLADDER BIOPSY, WITH FULGURATION IF INDICATED;  Surgeon: Sukhjinder Tucker MD;  Location: [facility] OR;  Service: Urology;  Laterality: N/A;  , POSS TURBT    CYSTOSCOPY WITH URETEROSCOPY, RETROGRADE PYELOGRAPHY, AND INSERTION OF STENT N/A 9/11/2023    Procedure: CYSTOSCOPY, WITH RETROGRADE PYELOGRAM AND URETERAL STENT INSERTION;  Surgeon: Sukhjinder Tucker MD;  Location: [facility] OR;  Service: Urology;  Laterality: N/A;    DILATION OF URETHRA N/A 9/11/2023    Procedure: DILATION, URETHRA;  Surgeon: Sukhjinder Tucker MD;  Location: [facility] OR;  Service: Urology;  Laterality: N/A;    ESOPHAGOGASTRODUODENOSCOPY N/A 6/6/2019    Procedure: EGD (ESOPHAGOGASTRODUODENOSCOPY);  Surgeon: Anjelica Saavedra MD;  Location: [facility];  Service: Endoscopy;  Laterality: N/A;    FLAP GRAFT SURGERY N/A 4/9/2020    Procedure: FLAP GRAFT;  Surgeon: Sebastian Dan MD;  Location: [facility] OR;  Service: Plastics;  Laterality: N/A;  left fasciocutaneous buttocks flap    HERNIA REPAIR  1995    INJECTION OF ANESTHETIC AGENT INTO TISSUE PLANE DEFINED BY TRANSVERSUS ABDOMINIS MUSCLE N/A 2/18/2020    Procedure: BLOCK, TRANSVERSUS ABDOMINIS PLANE;  Surgeon: Jan New MD;  Location: [facility] OR;  Service: General;  Laterality: N/A;    INJECTION OF ANESTHETIC AGENT INTO TISSUE PLANE DEFINED BY TRANSVERSUS ABDOMINIS MUSCLE  2/24/2022    Procedure: BLOCK, TRANSVERSUS ABDOMINIS PLANE;  Surgeon: Jan New MD;  Location: [facility] OR;  Service: Colon and Rectal;;     INSERTION OF TUNNELED CENTRAL VENOUS CATHETER (CVC) WITH SUBCUTANEOUS PORT N/A 10/8/2019    Procedure: [id number]-G-[id number];  Surgeon: Jan New MD;  Location: [facility] OR;  Service: General;  Laterality: N/A;    LAPAROSCOPIC COLOSTOMY      MEDIPORT REMOVAL N/A 8/13/2020    Procedure: REMOVAL, CATHETER, CENTRAL VENOUS, TUNNELED, WITH PORT;  Surgeon: Sebastian Dan MD;  Location: [facility] OR;  Service: Plastics;  Laterality: N/A;    TONSILLECTOMY      TRANSURETHRAL RESECTION OF PROSTATE N/A 6/7/2021    Procedure: TURP (TRANSURETHRAL RESECTION OF PROSTATE), CYSTOLITHALOPAXY;  Surgeon: Sukhjinder Tucker MD;  Location: [facility] OR;  Service: Urology;  Laterality: N/A;    TURBT (TRANSURETHRAL RESECTION OF BLADDER TUMOR) N/A 10/6/2023    Procedure: TURBT (TRANSURETHRAL RESECTION OF BLADDER TUMOR);  Surgeon: Sukhjinder Tucker MD;  Location: [facility] OR;  Service: Urology;  Laterality: N/A;    WOUND DEBRIDEMENT N/A 8/13/2020    Procedure: DEBRIDEMENT, WOUND;  Surgeon: Sebastian Dan MD;  Location: [facility] OR;  Service: Plastics;  Laterality: N/A;  layered closure       Labs:  Lab Results   Component Value Date    WBC 7.34 12/11/2023    HGB 11.2 (L) 12/11/2023    HCT 33.3 (L) 12/11/2023    MCV 83 12/11/2023     12/11/2023     BMP  Lab Results   Component Value Date     11/07/2023    K 3.8 11/07/2023     11/07/2023    CO2 23 11/07/2023    BUN 9 11/07/2023    CREATININE 1.3 11/07/2023    CALCIUM 9.1 11/07/2023    ANIONGAP 14 11/07/2023    ESTGFRAFRICA >60 07/20/2022    EGFRNONAA >60 07/20/2022     Lab Results   Component Value Date    ALT 10 11/07/2023    AST 15 11/07/2023    ALKPHOS 91 11/07/2023    BILITOT 0.3 11/07/2023       Lab Results   Component Value Date    IRON 38 (L) 11/07/2023    TIBC 241 (L) 11/07/2023    FERRITIN 1,975 (H) 11/07/2023     Lab Results   Component Value Date    CIOBDOFF22 423 10/09/2023     Lab Results   Component Value Date    FOLATE 5.7 10/09/2023     Lab Results    Component Value Date    TSH 0.966 10/02/2023         Review of Systems   Constitutional:  Negative for activity change, appetite change, chills, diaphoresis, fatigue, fever and unexpected weight change.   HENT:  Negative for congestion, dental problem, drooling, ear discharge, ear pain, facial swelling, hearing loss, mouth sores, nosebleeds, postnasal drip, rhinorrhea, sinus pressure, sneezing, sore throat, tinnitus, trouble swallowing and voice change.    Eyes:  Negative for photophobia, pain, discharge, redness, itching and visual disturbance.   Respiratory:  Negative for apnea, cough, choking, chest tightness, shortness of breath, wheezing and stridor.    Cardiovascular:  Negative for chest pain, palpitations and leg swelling.   Gastrointestinal:  Positive for rectal pain. Negative for abdominal distention, abdominal pain, anal bleeding, blood in stool, constipation, diarrhea, nausea and vomiting.   Endocrine: Negative for cold intolerance, heat intolerance, polydipsia, polyphagia and polyuria.   Genitourinary:  Positive for hematuria. Negative for decreased urine volume, difficulty urinating, dysuria, enuresis, flank pain, frequency, genital sores, penile discharge, penile pain, penile swelling, scrotal swelling, testicular pain and urgency.   Musculoskeletal:  Negative for arthralgias, back pain, gait problem, joint swelling, myalgias, neck pain and neck stiffness.   Skin:  Negative for color change, pallor, rash and wound.   Allergic/Immunologic: Negative for environmental allergies, food allergies and immunocompromised state.   Neurological:  Negative for dizziness, tremors, seizures, syncope, facial asymmetry, speech difficulty, weakness, light-headedness, numbness and headaches.   Hematological:  Negative for adenopathy. Does not bruise/bleed easily.   Psychiatric/Behavioral:  Negative for agitation, behavioral problems, confusion, decreased concentration, dysphoric mood, hallucinations, self-injury,  sleep disturbance and suicidal ideas. The patient is not nervous/anxious and is not hyperactive.        Objective:      Physical Exam  Vitals reviewed.   Constitutional:       General: He is not in acute distress.     Appearance: He is well-developed. He is ill-appearing. He is not diaphoretic.   HENT:      Head: Normocephalic.      Right Ear: External ear normal.      Left Ear: External ear normal.      Nose: Nose normal.      Right Sinus: No maxillary sinus tenderness or frontal sinus tenderness.      Left Sinus: No maxillary sinus tenderness or frontal sinus tenderness.      Mouth/Throat:      Pharynx: No oropharyngeal exudate.   Eyes:      General: Lids are normal. No scleral icterus.        Right eye: No discharge.         Left eye: No discharge.      Extraocular Movements:      Right eye: Normal extraocular motion.      Left eye: Normal extraocular motion.      Conjunctiva/sclera:      Right eye: Right conjunctiva is not injected. No hemorrhage.     Left eye: Left conjunctiva is not injected. No hemorrhage.     Pupils: Pupils are equal, round, and reactive to light.   Neck:      Thyroid: No thyromegaly.      Vascular: No JVD.      Trachea: No tracheal deviation.   Cardiovascular:      Rate and Rhythm: Normal rate.   Pulmonary:      Effort: Pulmonary effort is normal. No respiratory distress.      Breath sounds: No stridor.   Abdominal:      General: Bowel sounds are normal.      Palpations: Abdomen is soft. There is no hepatomegaly, splenomegaly or mass.      Tenderness: There is no abdominal tenderness.   Musculoskeletal:         General: No tenderness. Normal range of motion.      Cervical back: Normal range of motion and neck supple.   Lymphadenopathy:      Head:      Right side of head: No posterior auricular or occipital adenopathy.      Left side of head: No posterior auricular or occipital adenopathy.      Cervical: No cervical adenopathy.      Right cervical: No superficial, deep or posterior cervical  adenopathy.     Left cervical: No superficial, deep or posterior cervical adenopathy.      Upper Body:      Right upper body: No supraclavicular adenopathy.      Left upper body: No supraclavicular adenopathy.   Skin:     General: Skin is dry.      Findings: No erythema or rash.      Nails: There is no clubbing.   Neurological:      Mental Status: He is alert and oriented to person, place, and time.      Cranial Nerves: No cranial nerve deficit.      Coordination: Coordination normal.   Psychiatric:         Behavior: Behavior normal.         Thought Content: Thought content normal.         Judgment: Judgment normal.             Assessment:      1. Immunodeficiency secondary to radiation therapy    2. Rectal cancer    3. Immunodeficiency due to chemotherapy    4. Bladder tumor    5. Colostomy in place           Med Onc Chart Routing      Follow up with physician . Return to clinic to see me between 3 and 4 weeks   Follow up with REE    Infusion scheduling note    Injection scheduling note    Labs    Imaging    Pharmacy appointment    Other referrals                   Plan:      Extensive conversation with patient at this time will proceed with evaluation with radiation with Xeloda twice a day during days of radiation I have talked to the patient about the extent of cancer the potential for metastatic spread.  This point next generation sequencing demonstrates patient is not have actionable mutation.        Ken Cosby Jr, MD FACP

## 2023-12-12 ENCOUNTER — DOCUMENTATION ONLY (OUTPATIENT)
Dept: RADIATION ONCOLOGY | Facility: CLINIC | Age: 58
End: 2023-12-12
Payer: COMMERCIAL

## 2023-12-12 ENCOUNTER — TELEPHONE (OUTPATIENT)
Dept: HEMATOLOGY/ONCOLOGY | Facility: CLINIC | Age: 58
End: 2023-12-12
Payer: COMMERCIAL

## 2023-12-12 PROCEDURE — 77014 PR  CT GUIDANCE PLACEMENT RAD THERAPY FIELDS: ICD-10-PCS | Mod: 26,,, | Performed by: SPECIALIST

## 2023-12-12 PROCEDURE — 77386 HC IMRT, COMPLEX: CPT | Performed by: SPECIALIST

## 2023-12-12 PROCEDURE — 77014 PR  CT GUIDANCE PLACEMENT RAD THERAPY FIELDS: CPT | Mod: 26,,, | Performed by: SPECIALIST

## 2023-12-12 NOTE — TELEPHONE ENCOUNTER
11:12 am- F/U call. GUERITA spoke with sig bimal-Adelaida. Pt started Rad treatment and chemo pills on yesterday. Pt needs FMLA letter as he spoke with HR at his job and there are no specific forms. GUERITA will coordinate letter with provider and email to sig other once completed. GUERITA will remain available.    4:12 pm- GUERITA emailed signed FMLA letter to sig bimal at [email or web address] per her request. GUERITA will remain available.

## 2023-12-12 NOTE — PLAN OF CARE
Day 2 of outpatient xrt to the rectum. This is Mr. Presley 2nd treatment. He did have a little bit of hematuria last night. His pain is unchanged. Will continue to monitor.

## 2023-12-13 PROCEDURE — 77014 PR  CT GUIDANCE PLACEMENT RAD THERAPY FIELDS: ICD-10-PCS | Mod: 26,,, | Performed by: SPECIALIST

## 2023-12-13 PROCEDURE — 77014 PR  CT GUIDANCE PLACEMENT RAD THERAPY FIELDS: CPT | Mod: 26,,, | Performed by: SPECIALIST

## 2023-12-13 PROCEDURE — 77386 HC IMRT, COMPLEX: CPT | Performed by: SPECIALIST

## 2023-12-14 PROCEDURE — 77014 PR  CT GUIDANCE PLACEMENT RAD THERAPY FIELDS: CPT | Mod: 26,,, | Performed by: SPECIALIST

## 2023-12-14 PROCEDURE — 77014 PR  CT GUIDANCE PLACEMENT RAD THERAPY FIELDS: ICD-10-PCS | Mod: 26,,, | Performed by: SPECIALIST

## 2023-12-14 PROCEDURE — 77386 HC IMRT, COMPLEX: CPT | Performed by: SPECIALIST

## 2023-12-15 LAB
DNA RANGE(S) EXAMINED NAR: NORMAL
GENE DIS ANL INTERP-IMP: POSITIVE
GENE DIS ASSESSED: NORMAL
REASON FOR STUDY: NORMAL
TEMPUS LCA: NORMAL
TEMPUS PORTAL: NORMAL
TEMPUS THERAPY1: NORMAL
TEMPUS THERAPY2: NORMAL
TEMPUS THERAPYCOUNT: 2
TEMPUS TRIAL1: NORMAL
TEMPUS TRIAL2: NORMAL
TEMPUS TRIAL3: NORMAL
TEMPUS TRIALCOUNT: 3

## 2023-12-15 PROCEDURE — 77338 DESIGN MLC DEVICE FOR IMRT: CPT | Mod: TC | Performed by: SPECIALIST

## 2023-12-15 PROCEDURE — 77338 DESIGN MLC DEVICE FOR IMRT: CPT | Mod: 26,,, | Performed by: SPECIALIST

## 2023-12-15 PROCEDURE — 77300 PR RADIATION THERAPY,DOSIMETRY PLAN: ICD-10-PCS | Mod: 26,,, | Performed by: SPECIALIST

## 2023-12-15 PROCEDURE — 77300 RADIATION THERAPY DOSE PLAN: CPT | Mod: 26,,, | Performed by: SPECIALIST

## 2023-12-15 PROCEDURE — 77338 PR  MLC IMRT DESIGN & CONSTRUCTION PER IMRT PLAN: ICD-10-PCS | Mod: 26,,, | Performed by: SPECIALIST

## 2023-12-15 PROCEDURE — 77300 RADIATION THERAPY DOSE PLAN: CPT | Mod: TC | Performed by: SPECIALIST

## 2023-12-18 ENCOUNTER — DOCUMENTATION ONLY (OUTPATIENT)
Dept: RADIATION ONCOLOGY | Facility: CLINIC | Age: 58
End: 2023-12-18
Payer: COMMERCIAL

## 2023-12-18 PROCEDURE — 77386 HC IMRT, COMPLEX: CPT | Performed by: SPECIALIST

## 2023-12-18 PROCEDURE — 77427 RADIATION TX MANAGEMENT X5: CPT | Mod: ,,, | Performed by: SPECIALIST

## 2023-12-18 PROCEDURE — 77427 PR CHG RADIATION,MANGEMENT,5 TX'S: ICD-10-PCS | Mod: ,,, | Performed by: SPECIALIST

## 2023-12-18 PROCEDURE — 77014 PR  CT GUIDANCE PLACEMENT RAD THERAPY FIELDS: ICD-10-PCS | Mod: 26,,, | Performed by: SPECIALIST

## 2023-12-18 PROCEDURE — 77014 PR  CT GUIDANCE PLACEMENT RAD THERAPY FIELDS: CPT | Mod: 26,,, | Performed by: SPECIALIST

## 2023-12-18 NOTE — PLAN OF CARE
Day 5 of outpatient xrt to the rectum. Mr. Presley has much less pain. He spent the weekend relaxing and not doing any kind of work. He has now begun a b.i.d. fractionation. Will continue to monitor.

## 2023-12-19 PROCEDURE — 77386 HC IMRT, COMPLEX: CPT | Performed by: SPECIALIST

## 2023-12-19 PROCEDURE — 77014 PR  CT GUIDANCE PLACEMENT RAD THERAPY FIELDS: ICD-10-PCS | Mod: 26,,, | Performed by: SPECIALIST

## 2023-12-19 PROCEDURE — 77336 RADIATION PHYSICS CONSULT: CPT | Performed by: SPECIALIST

## 2023-12-19 PROCEDURE — 77014 PR  CT GUIDANCE PLACEMENT RAD THERAPY FIELDS: CPT | Mod: 26,,, | Performed by: SPECIALIST

## 2023-12-20 ENCOUNTER — PATIENT MESSAGE (OUTPATIENT)
Dept: PALLIATIVE MEDICINE | Facility: CLINIC | Age: 58
End: 2023-12-20
Payer: COMMERCIAL

## 2023-12-20 ENCOUNTER — OFFICE VISIT (OUTPATIENT)
Dept: UROLOGY | Facility: CLINIC | Age: 58
End: 2023-12-20
Payer: COMMERCIAL

## 2023-12-20 VITALS
HEIGHT: 73 IN | SYSTOLIC BLOOD PRESSURE: 110 MMHG | WEIGHT: 164 LBS | HEART RATE: 88 BPM | DIASTOLIC BLOOD PRESSURE: 62 MMHG | BODY MASS INDEX: 21.74 KG/M2

## 2023-12-20 DIAGNOSIS — Z01.818 PRE-OP TESTING: ICD-10-CM

## 2023-12-20 DIAGNOSIS — N13.30 HYDRONEPHROSIS OF RIGHT KIDNEY: Primary | ICD-10-CM

## 2023-12-20 LAB
BILIRUB UR QL STRIP: NEGATIVE
GLUCOSE UR QL STRIP: NEGATIVE
KETONES UR QL STRIP: NEGATIVE
LEUKOCYTE ESTERASE UR QL STRIP: POSITIVE
PH, POC UA: 7
POC BLOOD, URINE: POSITIVE
POC NITRATES, URINE: POSITIVE
PROT UR QL STRIP: POSITIVE
SP GR UR STRIP: 1.02 (ref 1–1.03)
UROBILINOGEN UR STRIP-ACNC: 0.2 (ref 0.3–2.2)

## 2023-12-20 PROCEDURE — 3078F PR MOST RECENT DIASTOLIC BLOOD PRESSURE < 80 MM HG: ICD-10-PCS | Mod: CPTII,S$GLB,, | Performed by: UROLOGY

## 2023-12-20 PROCEDURE — 99214 OFFICE O/P EST MOD 30 MIN: CPT | Mod: S$GLB,,, | Performed by: UROLOGY

## 2023-12-20 PROCEDURE — 3044F PR MOST RECENT HEMOGLOBIN A1C LEVEL <7.0%: ICD-10-PCS | Mod: CPTII,S$GLB,, | Performed by: UROLOGY

## 2023-12-20 PROCEDURE — 87186 SC STD MICRODIL/AGAR DIL: CPT | Performed by: UROLOGY

## 2023-12-20 PROCEDURE — 99999 PR PBB SHADOW E&M-EST. PATIENT-LVL IV: CPT | Mod: PBBFAC,,, | Performed by: UROLOGY

## 2023-12-20 PROCEDURE — 3008F BODY MASS INDEX DOCD: CPT | Mod: CPTII,S$GLB,, | Performed by: UROLOGY

## 2023-12-20 PROCEDURE — 3078F DIAST BP <80 MM HG: CPT | Mod: CPTII,S$GLB,, | Performed by: UROLOGY

## 2023-12-20 PROCEDURE — 1160F PR REVIEW ALL MEDS BY PRESCRIBER/CLIN PHARMACIST DOCUMENTED: ICD-10-PCS | Mod: CPTII,S$GLB,, | Performed by: UROLOGY

## 2023-12-20 PROCEDURE — 87088 URINE BACTERIA CULTURE: CPT | Performed by: UROLOGY

## 2023-12-20 PROCEDURE — 1159F PR MEDICATION LIST DOCUMENTED IN MEDICAL RECORD: ICD-10-PCS | Mod: CPTII,S$GLB,, | Performed by: UROLOGY

## 2023-12-20 PROCEDURE — 3074F PR MOST RECENT SYSTOLIC BLOOD PRESSURE < 130 MM HG: ICD-10-PCS | Mod: CPTII,S$GLB,, | Performed by: UROLOGY

## 2023-12-20 PROCEDURE — 3074F SYST BP LT 130 MM HG: CPT | Mod: CPTII,S$GLB,, | Performed by: UROLOGY

## 2023-12-20 PROCEDURE — 1159F MED LIST DOCD IN RCRD: CPT | Mod: CPTII,S$GLB,, | Performed by: UROLOGY

## 2023-12-20 PROCEDURE — 77014 PR  CT GUIDANCE PLACEMENT RAD THERAPY FIELDS: CPT | Mod: 26,,, | Performed by: SPECIALIST

## 2023-12-20 PROCEDURE — 87086 URINE CULTURE/COLONY COUNT: CPT | Performed by: UROLOGY

## 2023-12-20 PROCEDURE — 81003 URINALYSIS AUTO W/O SCOPE: CPT | Mod: QW,S$GLB,, | Performed by: UROLOGY

## 2023-12-20 PROCEDURE — 87077 CULTURE AEROBIC IDENTIFY: CPT | Performed by: UROLOGY

## 2023-12-20 PROCEDURE — 3008F PR BODY MASS INDEX (BMI) DOCUMENTED: ICD-10-PCS | Mod: CPTII,S$GLB,, | Performed by: UROLOGY

## 2023-12-20 PROCEDURE — 1160F RVW MEDS BY RX/DR IN RCRD: CPT | Mod: CPTII,S$GLB,, | Performed by: UROLOGY

## 2023-12-20 PROCEDURE — 99214 PR OFFICE/OUTPT VISIT, EST, LEVL IV, 30-39 MIN: ICD-10-PCS | Mod: S$GLB,,, | Performed by: UROLOGY

## 2023-12-20 PROCEDURE — 81003 POCT URINALYSIS, DIPSTICK, AUTOMATED, W/O SCOPE: ICD-10-PCS | Mod: QW,S$GLB,, | Performed by: UROLOGY

## 2023-12-20 PROCEDURE — 77014 PR  CT GUIDANCE PLACEMENT RAD THERAPY FIELDS: ICD-10-PCS | Mod: 26,,, | Performed by: SPECIALIST

## 2023-12-20 PROCEDURE — 3044F HG A1C LEVEL LT 7.0%: CPT | Mod: CPTII,S$GLB,, | Performed by: UROLOGY

## 2023-12-20 PROCEDURE — 99999 PR PBB SHADOW E&M-EST. PATIENT-LVL IV: ICD-10-PCS | Mod: PBBFAC,,, | Performed by: UROLOGY

## 2023-12-20 PROCEDURE — 77386 HC IMRT, COMPLEX: CPT | Performed by: SPECIALIST

## 2023-12-20 RX ORDER — CEFAZOLIN SODIUM 2 G/50ML
2 SOLUTION INTRAVENOUS
Status: CANCELLED | OUTPATIENT
Start: 2023-12-20

## 2023-12-20 NOTE — PROGRESS NOTES
Chief Complaint: voiding issues    HPI:   12/20/2023 - returns today for follow-up, radiation going well so far, radio sensitization chemotherapy has not been as bad as his prior rounds of chemo, having some incontinence, sounds like stress incontinence, but not wearing any depends or pads regularly, also having some cloudy urine    10/06/2023 - messaged the day after his stent was removed with worsening right-sided flank pain and feeling poorly, CT obtained two days ago showed recurrence of his right-sided hydronephrosis with delayed nephrogram and delayed excretion, patient feels like he is undergoing chemo, feeling very worn out, also notes that his urine looks milky, urine culture from earlier this week showed Proteus but he has not started his antibiotics yet    09/26/2023 - stent removal    08/24/2023 - returns today for follow-up, had a CT obtained by Dr. Reyes 10 which showed right-sided hydronephrosis which is new, this also showed some abnormal bladder wall thickening on the right side, patient notes that he has been urinating without difficulty since his TURP, no gross hematuria or dysuria, urine today shows positive nitrites positive leukocyte esterase    10/05/2022 - patient returns today for follow-up, no issues in the interim, was catheterizing the after surgery but not recently, feels like he empties well, no gross hematuria or UTIs    04/05/2022 - patient returns today for f/u, no issues since his surgery, has been healing well, colon path negative for cancer, voiding with a good stream and feels like he empties    02/24/2022 - cystolitholapaxy and completion colectomy    01/05/2022 - patient returns today for follow-up, denies any issues in the interim, voiding with a good stream and emptying his bladder well, denies any gross hematuria or dysuria, had a CT last month which showed a new bladder stone    06/07/2021 - TURP with cystolitholapaxy    05/14/2021 - patient presents today for follow-up,  he underwent urodynamics 2 weeks ago which showed a decent bladder contraction with the obstruction, he presents today for discussion of options, still notes urgency but denies any of the dysuria that he presented with initially, denies gross hematuria the    04/21/2021 - patient returns today for follow-up, notes a 3-4 months history of dysuria, urgency, frequency, weak stream, and incomplete bladder emptying, still taking the flomax and finasteride, was previiously intstructed how to perform CIC but has not done this in some time, no GH but states that he 'keeps a bladder infection'  IPSS - 4/1/5/5/5/5/4 = 29 QOL - 6(terrible)    12/21/20: Sildenafil 100 working well enough.  Stream sometimes good sometimes feels he has to strain, most of the time it is good.  3 cups coffee a day and some sodas.  Retrograde ejaculation.  8/5/20- sildenafil 100mg is working well.  7/1/20: patient normally goes to Dr. Altamirano.  Apparently he has been having ED since his rectal surgery and would like to discuss options.  Recent dx with UTI and started abx today, otherwise voiding well.  Patient states he gets no erections after surgery, he has not tried any forms of medical therapy.  Libido and energy are still present.  6/4/20: PVR was 635 after our last visit and he was instructed in CIC.  Voiding normally he feels and hasn't done CIC in two weeks.  PVR today 105 ml.   5/6/20: Been on flomax since last visit.  Cysto/uroflow normal today.    4/20/20: 55 yo man had colon cancer with resection last week, referred by Dr. New.  Is in retention postoperatively.  No unusual abd/pelvic pain and no exac/rel factors.  No hematuria.  No urolithiasis.  PVR >700 ml.  No  history.      PMH:  Past Medical History:   Diagnosis Date    Anemia     Arthritis     Cancer     rectal    Renal disorder     kidney stones       PSH:  Past Surgical History:   Procedure Laterality Date    ABDOMINOPERINEAL RESECTION OF RECTUM N/A 4/9/2020    Procedure:  PROCTECTOMY, ABDOMINOPERINEAL;  Surgeon: Jan New MD;  Location: [facility] OR;  Service: General;  Laterality: N/A;    COLECTOMY, TOTAL N/A 2/24/2022    Procedure: COLECTOMY, TOTAL;  Surgeon: Jan New MD;  Location: [facility] OR;  Service: Colon and Rectal;  Laterality: N/A;    COLON SURGERY      COLONOSCOPY N/A 6/6/2019    Procedure: COLONOSCOPY;  Surgeon: Anjelica Saavedra MD;  Location: [facility] ENDO;  Service: Endoscopy;  Laterality: N/A;    COLONOSCOPY N/A 12/17/2021    Procedure: COLONOSCOPY;  Surgeon: Jan New MD;  Location: [facility] ENDO;  Service: General;  Laterality: N/A;    CREATION OF MUSCLE ROTATIONAL FLAP Left 4/9/2020    Procedure: CREATION, FLAP, MUSCLE ROTATION;  Surgeon: Sebastian Dan MD;  Location: [facility] OR;  Service: Plastics;  Laterality: Left;   VRAM    CYSTOSCOPIC LITHOLAPAXY N/A 2/24/2022    Procedure: CYSTOLITHOLAPAXY;  Surgeon: Sukhjinder Tucker MD;  Location: [facility] OR;  Service: Urology;  Laterality: N/A;    CYSTOSCOPY W/ URETERAL STENT PLACEMENT Right 10/6/2023    Procedure: CYSTOSCOPY, WITH URETERAL STENT INSERTION;  Surgeon: Sukhjinder Tucker MD;  Location: [facility] OR;  Service: Urology;  Laterality: Right;    CYSTOSCOPY WITH BIOPSY OF BLADDER N/A 9/11/2023    Procedure: CYSTOSCOPY, WITH BLADDER BIOPSY, WITH FULGURATION IF INDICATED;  Surgeon: Sukhjinder Tucker MD;  Location: [facility] OR;  Service: Urology;  Laterality: N/A;  , POSS TURBT    CYSTOSCOPY WITH URETEROSCOPY, RETROGRADE PYELOGRAPHY, AND INSERTION OF STENT N/A 9/11/2023    Procedure: CYSTOSCOPY, WITH RETROGRADE PYELOGRAM AND URETERAL STENT INSERTION;  Surgeon: Sukhjinder Tucker MD;  Location: [facility] OR;  Service: Urology;  Laterality: N/A;    DILATION OF URETHRA N/A 9/11/2023    Procedure: DILATION, URETHRA;  Surgeon: Sukhjinder Tucker MD;  Location: [facility] OR;  Service: Urology;  Laterality: N/A;    ESOPHAGOGASTRODUODENOSCOPY N/A 6/6/2019    Procedure: EGD (ESOPHAGOGASTRODUODENOSCOPY);   Surgeon: Anjelica Saavedra MD;  Location: [facility];  Service: Endoscopy;  Laterality: N/A;    FLAP GRAFT SURGERY N/A 4/9/2020    Procedure: FLAP GRAFT;  Surgeon: Sebastian Dan MD;  Location: [facility] OR;  Service: Plastics;  Laterality: N/A;  left fasciocutaneous buttocks flap    HERNIA REPAIR  1995    INJECTION OF ANESTHETIC AGENT INTO TISSUE PLANE DEFINED BY TRANSVERSUS ABDOMINIS MUSCLE N/A 2/18/2020    Procedure: BLOCK, TRANSVERSUS ABDOMINIS PLANE;  Surgeon: Jan New MD;  Location: [facility] OR;  Service: General;  Laterality: N/A;    INJECTION OF ANESTHETIC AGENT INTO TISSUE PLANE DEFINED BY TRANSVERSUS ABDOMINIS MUSCLE  2/24/2022    Procedure: BLOCK, TRANSVERSUS ABDOMINIS PLANE;  Surgeon: Jan New MD;  Location: [facility];  Service: Colon and Rectal;;    INSERTION OF TUNNELED CENTRAL VENOUS CATHETER (CVC) WITH SUBCUTANEOUS PORT N/A 10/8/2019    Procedure: [id number];  Surgeon: Jan New MD;  Location: [facility] OR;  Service: General;  Laterality: N/A;    LAPAROSCOPIC COLOSTOMY      MEDIPORT REMOVAL N/A 8/13/2020    Procedure: REMOVAL, CATHETER, CENTRAL VENOUS, TUNNELED, WITH PORT;  Surgeon: Sebastian Dan MD;  Location: [facility];  Service: Plastics;  Laterality: N/A;    TONSILLECTOMY      TRANSURETHRAL RESECTION OF PROSTATE N/A 6/7/2021    Procedure: TURP (TRANSURETHRAL RESECTION OF PROSTATE), CYSTOLITHALOPAXY;  Surgeon: Sukhjinder Tucker MD;  Location: [facility];  Service: Urology;  Laterality: N/A;    TURBT (TRANSURETHRAL RESECTION OF BLADDER TUMOR) N/A 10/6/2023    Procedure: TURBT (TRANSURETHRAL RESECTION OF BLADDER TUMOR);  Surgeon: Sukhjinder Tucker MD;  Location: [facility] OR;  Service: Urology;  Laterality: N/A;    WOUND DEBRIDEMENT N/A 8/13/2020    Procedure: DEBRIDEMENT, WOUND;  Surgeon: Sebastian Dan MD;  Location: [facility] OR;  Service: Plastics;  Laterality: N/A;  layered closure       Family History:  Family History   Problem Relation Age of Onset    Intestinal  malrotation Mother     Leukemia Father     No Known Problems Sister     Coronary artery disease Brother     Coronary artery disease Maternal Grandmother     Stomach cancer Maternal Grandfather        Social History:  Social History     Tobacco Use    Smoking status: Every Day     Current packs/day: 1.00     Average packs/day: 1 pack/day for 40.0 years (40.0 ttl pk-yrs)     Types: Cigarettes     Start date: 1/2/1984    Smokeless tobacco: Former     Types: Chew    Tobacco comments:     no smoking after m.n prior to sx   Substance Use Topics    Alcohol use: Not Currently     Alcohol/week: 46.0 standard drinks of alcohol     Types: 42 Cans of beer, 4 Shots of liquor per week     Comment: segrams 7, beer daily  hold now prior to surgery    Drug use: Never        Review of Systems:  General: No fever, chills  Skin: No rashes  Chest:  Denies cough and sputum production  Heart: Denies chest pain  Resp: Denies dyspnea  Abdomen: Denies diarrhea, abdominal pain, hematemesis, or blood in stool.  Musculoskeletal: No joint stiffness or swelling. Denies back pain.  : see HPI  Neuro: no dizziness or weakness    Allergies:  Patient has no known allergies.    Medications:    Current Outpatient Medications:     acetaminophen (TYLENOL) 325 MG tablet, Take 2 tablets (650 mg total) by mouth every 6 (six) hours as needed., Disp: , Rfl: 0    capecitabine (XELODA) 150 MG tablet, Take 4 tablets (600 mg total) by mouth 2 (two) times daily Take as directed Monday, Tuesday, Wednesday, Thursday, and Friday for the duration of radiation therapy. Take with food. with 1 other capecitabine prescription for 1,600 mg total., Disp: 600 tablet, Rfl: 11    capecitabine (XELODA) 500 MG Tab, Take 2 tablets (1,000 mg total) by mouth 2 (two) times daily Take as directed Monday, Tuesday, Wednesday, Thursday, and Friday for the duration of radiation therapy. Take with food. with 1 other capecitabine prescription for 1,600 mg total., Disp: 300 tablet, Rfl:  11    finasteride (PROSCAR) 5 mg tablet, Take 1 tablet (5 mg total) by mouth once daily., Disp: 30 tablet, Rfl: 11    oxyCODONE (ROXICODONE) 10 mg Tab immediate release tablet, Take 1 tablet (10 mg total) by mouth every 8 (eight) hours as needed for Pain (severe)., Disp: 90 tablet, Rfl: 0    oxyCODONE (ROXICODONE) 5 MG immediate release tablet, Take 1 tablet (5 mg total) by mouth every 3 (three) hours as needed for Pain. Take 2 tabs every 3 hours for pain, Disp: 120 tablet, Rfl: 0    oxyCODONE myristate (XTAMPZA ER) 9 mg CSpT, Take 1 capsule (9 mg) by mouth every 12 (twelve) hours. for 14 days, Disp: 28 each, Rfl: 0    promethazine (PHENERGAN) 25 MG tablet, Take 1 tablet (25 mg total) by mouth every 4 (four) hours., Disp: 60 tablet, Rfl: 4    sildenafiL (VIAGRA) 100 MG tablet, Take 1 tablet (100 mg total) by mouth daily as needed for Erectile Dysfunction., Disp: 20 tablet, Rfl: 11    tamsulosin (FLOMAX) 0.4 mg Cap, Take 1 capsule (0.4 mg total) by mouth once daily., Disp: 90 capsule, Rfl: 3    Physical Exam:  Vitals:    12/20/23 1123   BP: 110/62   Pulse: 88     General: awake, alert, cooperative  Head: NC/AT  Ears: external ears normal  Eyes: sclera normal  Lungs: normal inspiration, NAD  Heart: well-perfused  Abdomen: Soft, NT, ND, incisions healed, ostomy healthy  Skin: The skin is warm and dry  Ext: No c/c/e.  Neuro: grossly intact, AOx3    RADIOLOGY:  CT CHEST ABDOMEN PELVIS WITH CONTRAST (XPD)     CLINICAL HISTORY:  Colon cancer, assess treatment response;.     TECHNIQUE:  Low dose axial images, sagittal and coronal reformations were obtained from the thoracic inlet to the pubic symphysis following the IV administration of Omnipaque 350.     COMPARISON:  07/20/2022     FINDINGS:  CT chest:     Mild atelectasis right lower lobe lung.  Heart normal.  Aorta normal in caliber with minimal atherosclerotic calcification.  Negative for adenopathy throughout the chest.     Chest wall soft tissues are normal.  Regional  bones unremarkable.     CT abdomen and pelvis:     Punctate calcified granuloma left lobe of the liver.  Normal gallbladder.  Spleen normal in size with multiple punctate calcified granulomas.  Normal pancreas.  Normal adrenal glands.  Normal caliber aorta and iliac vasculature with atherosclerotic calcification.  IVC normal.     Staghorn calculus lower pole left kidney measures up to 3 cm.  No left hydronephrosis.  Left ureter nondilated.     Mild to moderate right hydronephrosis with mild dilatation of the right ureter.  New since prior examination.  Far distal right ureter is not well visualized secondary to scarring within the pelvis.  No discrete obstructing lesion identified.  Findings could be due to a recently passed calculus or could indicate avascular necrosis.  Chronic obstruction secondary to scarring within the pelvis thought less likely.     Stomach and small intestine are normal.  Total colectomy has been performed.  Left lower quadrant ostomy noted.  Stomal hernia contains a short loop of small bowel.  Negative for obstruction.  Negative for inflammatory change.     Mild degenerative changes of the spine.  No suspicious osseous lesions.     Impression:     Status post total colectomy.  Left lower quadrant ileostomy.  Negative for evidence of metastatic disease.     Mild to moderate right hydronephrosis, new since 07/20/2022 with perinephric stranding.  See above discussion.    LABS:  I personally reviewed the following lab values:  Lab Results   Component Value Date    WBC 7.34 12/11/2023    HGB 11.2 (L) 12/11/2023    HCT 33.3 (L) 12/11/2023     12/11/2023     12/11/2023    K 4.3 12/11/2023     12/11/2023    CREATININE 1.1 12/11/2023    BUN 9 12/11/2023    CO2 25 12/11/2023    TSH 0.966 10/02/2023    PSA 0.45 12/12/2022    INR 1.0 06/11/2019    HGBA1C 5.4 10/09/2023    CHOL 191 10/09/2023    TRIG 246 (H) 10/09/2023    HDL 23 (L) 10/09/2023    ALT 8 (L) 12/11/2023    AST 11  12/11/2023     Bladder Scan performed in office:   5/7/20: 600ml  6/4/20:  ml.  04/21/2021 - 13mL  01/05/2022 - 1mL    PSA  6/19: 0.37  12/20: 0.58    Urinalysis obtained in clinic today specific gravity 1.020 pH six moderate blood positive leukocyte esterase positive nitrites    Assessment/Plan:   Sukhjinder Presley is a 58 y.o. male with:    Right hydronephrosis - due for stent exchange, will plan for 1/8    Bladder stone and recurrent UTIs due to BPH - s/p TURP, continue flomax    Cloudy urine - urine for culture today    Sukhjinder Tucker MD  Urology

## 2023-12-21 PROCEDURE — 77336 RADIATION PHYSICS CONSULT: CPT | Performed by: SPECIALIST

## 2023-12-21 PROCEDURE — 77386 HC IMRT, COMPLEX: CPT | Performed by: SPECIALIST

## 2023-12-21 PROCEDURE — 77014 PR  CT GUIDANCE PLACEMENT RAD THERAPY FIELDS: ICD-10-PCS | Mod: 26,,, | Performed by: SPECIALIST

## 2023-12-21 PROCEDURE — 77427 PR CHG RADIATION,MANGEMENT,5 TX'S: ICD-10-PCS | Mod: ,,, | Performed by: SPECIALIST

## 2023-12-21 PROCEDURE — 77014 PR  CT GUIDANCE PLACEMENT RAD THERAPY FIELDS: CPT | Mod: 26,,, | Performed by: SPECIALIST

## 2023-12-21 PROCEDURE — 77427 RADIATION TX MANAGEMENT X5: CPT | Mod: ,,, | Performed by: SPECIALIST

## 2023-12-22 ENCOUNTER — DOCUMENTATION ONLY (OUTPATIENT)
Dept: HEMATOLOGY/ONCOLOGY | Facility: CLINIC | Age: 58
End: 2023-12-22
Payer: COMMERCIAL

## 2023-12-22 PROCEDURE — 77014 PR  CT GUIDANCE PLACEMENT RAD THERAPY FIELDS: ICD-10-PCS | Mod: 26,,, | Performed by: SPECIALIST

## 2023-12-22 PROCEDURE — 77014 PR  CT GUIDANCE PLACEMENT RAD THERAPY FIELDS: CPT | Mod: 26,,, | Performed by: SPECIALIST

## 2023-12-22 PROCEDURE — 77386 HC IMRT, COMPLEX: CPT | Performed by: SPECIALIST

## 2023-12-22 NOTE — PROGRESS NOTES
SW met with pt and provided a $50 [employer] gas card and 1 case of Vanilla Boost-regular. Pt stated that he's not diabetic. No other needs expressed. SW will remain available.

## 2023-12-23 LAB — BACTERIA UR CULT: ABNORMAL

## 2023-12-26 ENCOUNTER — DOCUMENTATION ONLY (OUTPATIENT)
Dept: RADIATION ONCOLOGY | Facility: CLINIC | Age: 58
End: 2023-12-26
Payer: COMMERCIAL

## 2023-12-26 PROCEDURE — 77014 PR  CT GUIDANCE PLACEMENT RAD THERAPY FIELDS: CPT | Mod: 26,,, | Performed by: SPECIALIST

## 2023-12-26 PROCEDURE — 77385 HC IMRT, SIMPLE: CPT | Performed by: SPECIALIST

## 2023-12-26 PROCEDURE — 77386 HC IMRT, COMPLEX: CPT | Performed by: SPECIALIST

## 2023-12-26 PROCEDURE — 77427 RADIATION TX MANAGEMENT X5: CPT | Mod: ,,, | Performed by: SPECIALIST

## 2023-12-26 PROCEDURE — 77014 PR  CT GUIDANCE PLACEMENT RAD THERAPY FIELDS: ICD-10-PCS | Mod: 26,,, | Performed by: SPECIALIST

## 2023-12-26 PROCEDURE — 77427 PR CHG RADIATION,MANGEMENT,5 TX'S: ICD-10-PCS | Mod: ,,, | Performed by: SPECIALIST

## 2023-12-26 NOTE — PROGRESS NOTES
Tx 15 outpatient xrt to the rectum.he reports persistent pain as at baseline, usually well controlled with his oxycodone an unknown ER medication. We discussed the importance of taking the ER medication even when he does not hurt.. will continue to monitor.

## 2023-12-27 PROCEDURE — 77014 PR  CT GUIDANCE PLACEMENT RAD THERAPY FIELDS: CPT | Mod: 26,,, | Performed by: SPECIALIST

## 2023-12-27 PROCEDURE — 77336 RADIATION PHYSICS CONSULT: CPT | Performed by: SPECIALIST

## 2023-12-27 PROCEDURE — 77386 HC IMRT, COMPLEX: CPT | Performed by: SPECIALIST

## 2023-12-27 PROCEDURE — 77014 PR  CT GUIDANCE PLACEMENT RAD THERAPY FIELDS: ICD-10-PCS | Mod: 26,,, | Performed by: SPECIALIST

## 2023-12-28 ENCOUNTER — OFFICE VISIT (OUTPATIENT)
Dept: PALLIATIVE MEDICINE | Facility: CLINIC | Age: 58
End: 2023-12-28
Payer: COMMERCIAL

## 2023-12-28 ENCOUNTER — DOCUMENTATION ONLY (OUTPATIENT)
Dept: RADIATION ONCOLOGY | Facility: CLINIC | Age: 58
End: 2023-12-28
Payer: COMMERCIAL

## 2023-12-28 VITALS
DIASTOLIC BLOOD PRESSURE: 73 MMHG | HEART RATE: 99 BPM | HEIGHT: 72 IN | OXYGEN SATURATION: 98 % | WEIGHT: 162.06 LBS | SYSTOLIC BLOOD PRESSURE: 109 MMHG | BODY MASS INDEX: 21.95 KG/M2 | RESPIRATION RATE: 18 BRPM

## 2023-12-28 DIAGNOSIS — G89.3 NEOPLASM RELATED PAIN: ICD-10-CM

## 2023-12-28 DIAGNOSIS — F51.02 ADJUSTMENT INSOMNIA: ICD-10-CM

## 2023-12-28 DIAGNOSIS — C20 RECTAL CANCER: Primary | ICD-10-CM

## 2023-12-28 DIAGNOSIS — Z51.5 PALLIATIVE CARE ENCOUNTER: ICD-10-CM

## 2023-12-28 PROCEDURE — 99499 UNLISTED E&M SERVICE: CPT | Mod: S$GLB,,,

## 2023-12-28 PROCEDURE — 3078F DIAST BP <80 MM HG: CPT | Mod: CPTII,S$GLB,,

## 2023-12-28 PROCEDURE — 3008F BODY MASS INDEX DOCD: CPT | Mod: CPTII,S$GLB,,

## 2023-12-28 PROCEDURE — 3074F SYST BP LT 130 MM HG: CPT | Mod: CPTII,S$GLB,,

## 2023-12-28 PROCEDURE — 99214 OFFICE O/P EST MOD 30 MIN: CPT | Mod: S$GLB,,,

## 2023-12-28 PROCEDURE — 77014 PR  CT GUIDANCE PLACEMENT RAD THERAPY FIELDS: CPT | Mod: 26,,, | Performed by: SPECIALIST

## 2023-12-28 PROCEDURE — 77014 PR  CT GUIDANCE PLACEMENT RAD THERAPY FIELDS: ICD-10-PCS | Mod: 26,,, | Performed by: SPECIALIST

## 2023-12-28 PROCEDURE — 99999 PR PBB SHADOW E&M-EST. PATIENT-LVL IV: CPT | Mod: PBBFAC,,,

## 2023-12-28 PROCEDURE — 77386 HC IMRT, COMPLEX: CPT | Performed by: SPECIALIST

## 2023-12-28 RX ORDER — OXYCODONE 9 MG/1
9 CAPSULE, EXTENDED RELEASE ORAL EVERY 12 HOURS
Qty: 60 EACH | Refills: 0 | Status: SHIPPED | OUTPATIENT
Start: 2023-12-28 | End: 2024-02-10 | Stop reason: SDUPTHER

## 2023-12-28 NOTE — PROGRESS NOTES
"Palliative Medicine  Follow-up  Consult Requested By: No ref. provider found  Reason for Consult: Symptom Management/Advance Care Planning/Goals of Care Discussion        SUBJECTIVE:     History of Present Illness:  Sukhjinder Presley is a 58 y.o. male with Recurrent Stage IIIB Rectal Cancer invading right pelvic sidewall and bladder. He is receiving CAPECITABINE 5 DAYS + RADIOTHERAPY. S/P radiotherapy (2019), neoadjuvant chemotherapy. His cancer treatment course was complicated by rectal rupture requiring abdominoperineal resection/loop sigmoid colostomy/fistula tract extraction/muscle flap closure of the perineum (April 2020). He underwent completion colectomy/total colectomy with end ileostomy construction (Feb. 2022)  He is followed by Dr. Cosby, Rad Onc, and Dr. New (Colon Surg).   The palliative care team was consulted to assist with symptom management, advance care planning, and goals of care discussion.       Chief complaint: ABD pain    12/28/2023: Pt attended clinic alone. He was in no acute distress. Vital signs stable on room air.   He reported abdominal pain is better 6/10. He is taking Xtampza 9mg BID and Oxycodone IR 10mg TID PRN. He is unsure of how many Xtampza pills he has left.   He notes "feeling wired at night" with some intermittent/sporadic itching at night, but no rash. He is quitting smoking without NRT. He is down to 1 to 0.5 pack/day from 1.5 packs/day. He notes he was unsuccessful with Chantix and Nicotine patch. He is no longer drinking ETOH at night.   He still has insomnia, and willing to try Melatonin.     SHELLEY POPE Reviewed and Summarized:            Past Visits:    12/06/2023: Pt attended clinic with his significant other, Ms. Son. He was in no acute distress. Vital signs stable on room air.   I explained the role palliative care often plays in supporting patients with serious illness and their families regarding symptom management, advance care planning, and goals of care discussion. "  Pt and his S.O. verbalized understanding.     Pt reported persistent pelvic and rectal pain 7/10 worsening over the last month. He takes Oxycodone IR  5mg Q3H up to 8 doses/day with 1-2 hour relief after each dose. He was previously taking Oxycodone IR 10-15mg up to 4 doses per day, but ran out. He stated his pain was not well-managed on this regimen either. He has had instances of taking Oxycodone IR 20mg (2 tabs of 10mg) at a time to achieve pain relief, bue he finds that his pain would return after 3-4 hours. He is open to trying long-acting pain management in addition to Short-acting Oxycodone.   He also reported fatigue, agitation, and depression, related to cancer diagnosis and persistent pain.     We discussed advance care planning, goals of care, and code status, Full Code vs. DNR including risks, benefits, and alternatives.   Pt stated he wishes to continue cancer treatment, symptom management, maintain independence and functional status. He is currently works as a , but currently applying for [place].   He wishes to elect his S.O., Ms. Son Brenda: [phone] as HCPOA. They are currently engaged to be . They have 3 children ages:  15, 14, and 9.  We have discussed the [place] Hierarchy of Decision Making, and pt understands his family will be surrogate decision makers if health care power of  is not completed and witnessed.  He wishes to remain Full Code at this time. He will continue discussion with his S.O. and update me on his decision.      SHELLEY POPE reviewed and summarized:          Past Medical History:   Diagnosis Date    Anemia     Arthritis     Cancer     rectal    Renal disorder     kidney stones     Past Surgical History:   Procedure Laterality Date    ABDOMINOPERINEAL RESECTION OF RECTUM N/A 4/9/2020    Procedure: PROCTECTOMY, ABDOMINOPERINEAL;  Surgeon: Jan New MD;  Location: [facility];  Service: General;  Laterality: N/A;    COLECTOMY, TOTAL N/A  2/24/2022    Procedure: COLECTOMY, TOTAL;  Surgeon: Jan New MD;  Location: [facility];  Service: Colon and Rectal;  Laterality: N/A;    COLON SURGERY      COLONOSCOPY N/A 6/6/2019    Procedure: COLONOSCOPY;  Surgeon: Anjelica Saavedra MD;  Location: [facility];  Service: Endoscopy;  Laterality: N/A;    COLONOSCOPY N/A 12/17/2021    Procedure: COLONOSCOPY;  Surgeon: Jan New MD;  Location: [facility] ENDO;  Service: General;  Laterality: N/A;    CREATION OF MUSCLE ROTATIONAL FLAP Left 4/9/2020    Procedure: CREATION, FLAP, MUSCLE ROTATION;  Surgeon: Sebastian Dan MD;  Location: [facility] OR;  Service: Plastics;  Laterality: Left;   VRAM    CYSTOSCOPIC LITHOLAPAXY N/A 2/24/2022    Procedure: CYSTOLITHOLAPAXY;  Surgeon: Sukhjinder Tucker MD;  Location: [facility];  Service: Urology;  Laterality: N/A;    CYSTOSCOPY W/ URETERAL STENT PLACEMENT Right 10/6/2023    Procedure: CYSTOSCOPY, WITH URETERAL STENT INSERTION;  Surgeon: Sukhjinder Tucker MD;  Location: [facility];  Service: Urology;  Laterality: Right;    CYSTOSCOPY WITH BIOPSY OF BLADDER N/A 9/11/2023    Procedure: CYSTOSCOPY, WITH BLADDER BIOPSY, WITH FULGURATION IF INDICATED;  Surgeon: Sukhjinder Tucker MD;  Location: [facility] OR;  Service: Urology;  Laterality: N/A;  , POSS TURBT    CYSTOSCOPY WITH URETEROSCOPY, RETROGRADE PYELOGRAPHY, AND INSERTION OF STENT N/A 9/11/2023    Procedure: CYSTOSCOPY, WITH RETROGRADE PYELOGRAM AND URETERAL STENT INSERTION;  Surgeon: Sukhjinder Tucker MD;  Location: [facility] OR;  Service: Urology;  Laterality: N/A;    DILATION OF URETHRA N/A 9/11/2023    Procedure: DILATION, URETHRA;  Surgeon: Sukhjinder Tucker MD;  Location: [facility] OR;  Service: Urology;  Laterality: N/A;    ESOPHAGOGASTRODUODENOSCOPY N/A 6/6/2019    Procedure: EGD (ESOPHAGOGASTRODUODENOSCOPY);  Surgeon: Anjelica Saavedra MD;  Location: [facility];  Service: Endoscopy;  Laterality: N/A;    FLAP GRAFT SURGERY N/A 4/9/2020    Procedure: FLAP GRAFT;   Surgeon: Sebastian Dan MD;  Location: [facility];  Service: Plastics;  Laterality: N/A;  left fasciocutaneous buttocks flap    HERNIA REPAIR  1995    INJECTION OF ANESTHETIC AGENT INTO TISSUE PLANE DEFINED BY TRANSVERSUS ABDOMINIS MUSCLE N/A 2/18/2020    Procedure: BLOCK, TRANSVERSUS ABDOMINIS PLANE;  Surgeon: Jan New MD;  Location: [facility] OR;  Service: General;  Laterality: N/A;    INJECTION OF ANESTHETIC AGENT INTO TISSUE PLANE DEFINED BY TRANSVERSUS ABDOMINIS MUSCLE  2/24/2022    Procedure: BLOCK, TRANSVERSUS ABDOMINIS PLANE;  Surgeon: Jan New MD;  Location: [facility];  Service: Colon and Rectal;;    INSERTION OF TUNNELED CENTRAL VENOUS CATHETER (CVC) WITH SUBCUTANEOUS PORT N/A 10/8/2019    Procedure: [id number];  Surgeon: Jan New MD;  Location: [facility];  Service: General;  Laterality: N/A;    LAPAROSCOPIC COLOSTOMY      MEDIPORT REMOVAL N/A 8/13/2020    Procedure: REMOVAL, CATHETER, CENTRAL VENOUS, TUNNELED, WITH PORT;  Surgeon: Sebastian Dan MD;  Location: [facility];  Service: Plastics;  Laterality: N/A;    TONSILLECTOMY      TRANSURETHRAL RESECTION OF PROSTATE N/A 6/7/2021    Procedure: TURP (TRANSURETHRAL RESECTION OF PROSTATE), CYSTOLITHALOPAXY;  Surgeon: Sukhjinder Tucker MD;  Location: [facility] OR;  Service: Urology;  Laterality: N/A;    TURBT (TRANSURETHRAL RESECTION OF BLADDER TUMOR) N/A 10/6/2023    Procedure: TURBT (TRANSURETHRAL RESECTION OF BLADDER TUMOR);  Surgeon: Sukhjinder Tucker MD;  Location: [facility] OR;  Service: Urology;  Laterality: N/A;    WOUND DEBRIDEMENT N/A 8/13/2020    Procedure: DEBRIDEMENT, WOUND;  Surgeon: Sebastian Dan MD;  Location: [facility];  Service: Plastics;  Laterality: N/A;  layered closure     Family History   Problem Relation Age of Onset    Intestinal malrotation Mother     Leukemia Father     No Known Problems Sister     Coronary artery disease Brother     Coronary artery disease Maternal Grandmother     Stomach cancer  Maternal Grandfather      Review of patient's allergies indicates:  No Known Allergies    Medications:    Current Outpatient Medications:     acetaminophen (TYLENOL) 325 MG tablet, Take 2 tablets (650 mg total) by mouth every 6 (six) hours as needed., Disp: , Rfl: 0    capecitabine (XELODA) 150 MG tablet, Take 4 tablets (600 mg total) by mouth 2 (two) times daily Take as directed Monday, Tuesday, Wednesday, Thursday, and Friday for the duration of radiation therapy. Take with food. with 1 other capecitabine prescription for 1,600 mg total., Disp: 600 tablet, Rfl: 11    capecitabine (XELODA) 500 MG Tab, Take 2 tablets (1,000 mg total) by mouth 2 (two) times daily Take as directed Monday, Tuesday, Wednesday, Thursday, and Friday for the duration of radiation therapy. Take with food. with 1 other capecitabine prescription for 1,600 mg total., Disp: 300 tablet, Rfl: 11    finasteride (PROSCAR) 5 mg tablet, Take 1 tablet (5 mg total) by mouth once daily., Disp: 30 tablet, Rfl: 11    oxyCODONE (ROXICODONE) 10 mg Tab immediate release tablet, Take 1 tablet (10 mg total) by mouth every 8 (eight) hours as needed for Pain (severe)., Disp: 90 tablet, Rfl: 0    oxyCODONE (ROXICODONE) 5 MG immediate release tablet, Take 1 tablet (5 mg total) by mouth every 3 (three) hours as needed for Pain. Take 2 tabs every 3 hours for pain, Disp: 120 tablet, Rfl: 0    promethazine (PHENERGAN) 25 MG tablet, Take 1 tablet (25 mg total) by mouth every 4 (four) hours., Disp: 60 tablet, Rfl: 4    sildenafiL (VIAGRA) 100 MG tablet, Take 1 tablet (100 mg total) by mouth daily as needed for Erectile Dysfunction., Disp: 20 tablet, Rfl: 11    tamsulosin (FLOMAX) 0.4 mg Cap, Take 1 capsule (0.4 mg total) by mouth once daily., Disp: 90 capsule, Rfl: 3    OBJECTIVE:     ROS:  Review of Systems   Constitutional:  Negative for activity change, appetite change, fatigue, fever and unexpected weight change.   HENT:  Negative for trouble swallowing and voice  change.    Eyes: Negative.    Respiratory:  Negative for cough, chest tightness, shortness of breath and wheezing.    Cardiovascular:  Negative for chest pain, palpitations and leg swelling.   Gastrointestinal:  Positive for rectal pain. Negative for abdominal distention, abdominal pain, constipation, diarrhea, nausea and vomiting.        Left Colostomy   Musculoskeletal:  Negative for arthralgias, back pain, joint swelling and myalgias.   Skin:  Positive for wound. Negative for color change, pallor and rash.   Neurological:  Negative for dizziness, tremors, speech difficulty, weakness, numbness and headaches.   Psychiatric/Behavioral:  Positive for sleep disturbance. Negative for agitation, behavioral problems, confusion, dysphoric mood (Due to cancer diagnosis) and suicidal ideas. The patient is not nervous/anxious (Due to cancer diagnosis).        Review of Symptoms      Symptom Assessment (ESAS 0-10 Scale)  Pain:  6  Dyspnea:  0  Anxiety:  0  Nausea:  0  Depression:  0  Anorexia:  0  Fatigue:  0  Insomnia:  8  Restlessness:  8  Agitation:  0     CAM / Delirium:  Negative  Constipation:  Negative (Colostomy active)  Diarrhea:  Negative    Anxiety:  Is not nervous/anxious (Due to cancer diagnosis)  Constipation:  No constipation    Bowel Management Plan (BMP):  No      Pain Assessment:    Location(s): abdomen    Abdomen       Location: anterior and posterior        Quality: Stabbing and aching        Quantity: 6/10 in intensity        Chronicity: Onset 4 year(s) ago, gradually worsening        Aggravating Factors: Bowel movement and movement        Alleviating Factors: Opiates        Associated Symptoms: Myalgias    Modified Ijeoma Scale:  0    Performance Status:  80    ECOG Performance Status ndGndrndanddndend:nd nd2nd Living Arrangements:  Lives with spouse    Psychosocial/Cultural:   See Palliative Psychosocial Note: Yes  Construction- Supervisor.   3 children- 15, 14, 9.  **Primary  to Follow**  Palliative Care   Consult: Yes      Advance Care Planning   Advance Directives:   Living Will: No    [place]POST: No    Do Not Resuscitate Status: No    Medical Power of : No    Agent's Name:  CHIQUIS, Ms. Son [facility]: [phone]    Decision Making:  Patient answered questions and Family answered questions  Goals of Care: What is most important right now is to focus on remaining as independent as possible, symptom/pain control, curative/life-prolongation (regardless of treatment burdens). Accordingly, we have decided that the best plan to meet the patient's goals includes continuing with treatment.            Physical Exam:  Vitals: Pulse: 99 (12/28/23 0837)  Resp: 18 (12/28/23 0837)  BP: 109/73 (12/28/23 0837)  SpO2: 98 % (12/28/23 0837)  Physical Exam  Vitals and nursing note reviewed.   Constitutional:       General: He is not in acute distress.     Appearance: Normal appearance. He is normal weight. He is ill-appearing.   HENT:      Head: Normocephalic and atraumatic.      Nose: Nose normal. No congestion or rhinorrhea.      Mouth/Throat:      Mouth: Mucous membranes are moist.      Pharynx: Oropharynx is clear. No oropharyngeal exudate or posterior oropharyngeal erythema.   Eyes:      General: No scleral icterus.        Right eye: No discharge.         Left eye: No discharge.      Conjunctiva/sclera: Conjunctivae normal.      Pupils: Pupils are equal, round, and reactive to light.   Cardiovascular:      Rate and Rhythm: Normal rate and regular rhythm.      Pulses: Normal pulses.      Heart sounds: Normal heart sounds. No murmur heard.     No gallop.   Pulmonary:      Effort: Pulmonary effort is normal. No respiratory distress.      Breath sounds: Normal breath sounds. No stridor. No wheezing.   Chest:      Chest wall: No tenderness.   Abdominal:      General: Bowel sounds are normal. There is no distension.      Palpations: Abdomen is soft.      Tenderness: There is abdominal tenderness.      Hernia: A hernia  (Umbilical Hernia Reducible) is present.      Comments: Left Colostomy: Active with stool in bag, Stoma pink and healthy   Genitourinary:     Comments: Deferred  Musculoskeletal:         General: No swelling or tenderness. Normal range of motion.      Cervical back: Normal range of motion and neck supple.      Right lower leg: No edema.      Left lower leg: No edema.   Skin:     General: Skin is warm and dry.      Capillary Refill: Capillary refill takes less than 2 seconds.      Coloration: Skin is not jaundiced or pale.      Findings: No rash.   Neurological:      Mental Status: He is alert and oriented to person, place, and time.      Motor: No weakness.   Psychiatric:         Mood and Affect: Mood normal.         Behavior: Behavior normal.         Thought Content: Thought content normal.         Judgment: Judgment normal.         Labs and radiology data reviewed    ASSESSMENT/PLAN:   Recurrent Stage IIIB Rectal Cancer invading right pelvic sidewall and bladder.   He is followed by Dr. Cosby, Rad Onc, and Dr. New (Colon Surg).   On CAPECITABINE 5 DAYS + RADIOTHERAPY. S/P radiotherapy (2019), neoadjuvant chemotherapy. His cancer treatment course was complicated by rectal rupture requiring abdominoperineal resection/loop sigmoid colostomy/fistula tract extraction/muscle flap closure of the perineum (April 2020). He underwent completion colectomy/total colectomy with end ileostomy construction (Feb. 2022)  MRI Rectal Cancer: 11/13/2023:  Large 4.9 cm mass posterior right pelvis with bladder wall invasion with adjacent enlarged posterior right obturator lymph node. This correlates with the hypermetabolic recurrence and bladder wall invasion seen on recent PET-CT and confirmed with cystoscopy.   PET 11/10/2023:  1. FDG marked avidity at the known site of recurrence in the right posterior aspect of the deep pelvis measuring approximately 4.0 x 3.5 cm. 2. No other sites of abnormal FDG avidity in the whole  body.  Neoplasm related pain  -Improving: Likely related to mass invading pelvis/bladder wall and extensive surgery  -Continue Xtampza 9mg BID- Pt to inform me if he experiences itching/rash- He verbalized understanding  -Continue Oxycodone IR 10mg TID PRN   Narcan RX given and explained use to pt and family. Pt and family verbalized understanding.    Reviewed opioid use safety with pt and his wife including avoiding ETOH use while taking opioids. Pt verbalized understanding stating he no longer drinks ETOH  -Patient instructed to contact me if 3 day supply of meds left.   -Pain Contract- 12/6/2023  -UDS-  12/28/2023- results pending   Insomnia  Multifactorial- Cancer diagnosis, urination, smoking cessation, pain  Pt educated on sleep hygiene, avoiding caffeine in the evening, reducing fluid intake at bedtime.   Trial Melatonin 1mg QHS PRN. Can titrate dose to effect.   Encounter for Palliative Care  -Code status: Full Code  -HCPOA: To be completed:  Pt wishes to elect: S.ODuy, Ms. Adela[facility]: [phone]  -GO:  Continue cancer treatment, symptom management, maintain independence and functional status.   -See HPI for further details      Follow up: 2-3 weeks to review pain regimen        35 minutes of total time spent on the encounter, which includes face to face time and non-face to face time preparing to see the patient (eg, review of tests), Obtaining and/or reviewing separately obtained history, Documenting clinical information in the electronic or other health record, Independently interpreting results (not separately reported) and communicating results to the patient/family/caregiver, or Care coordination (not separately reported).      Signature: LUIS FELIPE KNIGHT NP

## 2023-12-28 NOTE — PATIENT INSTRUCTIONS
"Please send me a pill count for your Xtampza on Mychart  when you get home.   Please bring health care power of  back to the palliative care clinic  We will complete the "witness" part  You do not have to make an appointment to bring the form back.    Avoid drinking fluids close to bedtime, so you do not have to get  up to the bathroom too often in the middle of the night  You can try Melatonin 1mg for sleep. Take Melatonin 1-2 hours before bedtime.   "

## 2023-12-28 NOTE — PLAN OF CARE
Day 20 of outpatient xrt to the rectum. He reports no change or complaint from earlier this week. He is pleased with his current pain regimen. Will continue to monitor.

## 2023-12-29 PROCEDURE — 77427 PR CHG RADIATION,MANGEMENT,5 TX'S: ICD-10-PCS | Mod: ,,, | Performed by: SPECIALIST

## 2023-12-29 PROCEDURE — 77386 HC IMRT, COMPLEX: CPT | Performed by: SPECIALIST

## 2023-12-29 PROCEDURE — 77427 RADIATION TX MANAGEMENT X5: CPT | Mod: ,,, | Performed by: SPECIALIST

## 2023-12-29 PROCEDURE — 77014 PR  CT GUIDANCE PLACEMENT RAD THERAPY FIELDS: CPT | Mod: 26,,, | Performed by: SPECIALIST

## 2023-12-29 PROCEDURE — 77014 PR  CT GUIDANCE PLACEMENT RAD THERAPY FIELDS: ICD-10-PCS | Mod: 26,,, | Performed by: SPECIALIST

## 2024-01-01 PROCEDURE — 77336 RADIATION PHYSICS CONSULT: CPT | Performed by: SPECIALIST

## 2024-01-02 ENCOUNTER — DOCUMENTATION ONLY (OUTPATIENT)
Dept: RADIATION ONCOLOGY | Facility: CLINIC | Age: 59
End: 2024-01-02
Payer: COMMERCIAL

## 2024-01-02 ENCOUNTER — HOSPITAL ENCOUNTER (OUTPATIENT)
Dept: RADIATION THERAPY | Facility: HOSPITAL | Age: 59
Discharge: HOME | End: 2024-01-02
Attending: SPECIALIST
Payer: COMMERCIAL

## 2024-01-02 PROCEDURE — 77386 HC IMRT, COMPLEX: CPT | Performed by: SPECIALIST

## 2024-01-02 PROCEDURE — 77014 PR  CT GUIDANCE PLACEMENT RAD THERAPY FIELDS: CPT | Mod: 26,,, | Performed by: SPECIALIST

## 2024-01-02 NOTE — PLAN OF CARE
Treatment #23 of outpatient xrt to the rectum. Mr. Presley still has pain that is controlled as long as he stays faithful with his medicine. Will continue to monitor.

## 2024-01-04 ENCOUNTER — TELEPHONE (OUTPATIENT)
Dept: PREADMISSION TESTING | Facility: HOSPITAL | Age: 59
End: 2024-01-04
Payer: COMMERCIAL

## 2024-01-04 NOTE — TELEPHONE ENCOUNTER
Pre op instructions reviewed with pt over telephone, verbalized understanding.    To confirm, Surgery is scheduled on 1/8/24. We will call you late afternoon the business day prior to surgery with your arrival time.    *Please report to the [facility] Lobby (1st Floor) located off of [facility] (2nd Entrance/Building on the left, in front of the flag pole).  Address: [address] [place] [address] [place] [id number]      INSTRUCTIONS IMPORTANT!!!  DO NOT Eat, Drink, or Smoke after 12 midnight unless instructed otherwise by your Surgeon. OK to brush teeth, no gum, candy or mints!    >>>MEDICATION INSTRUCTIONS<<<: Morning of Surgery, take small sip of water with ONLY these medications:  Flomax       *Patients should HOLD all vitamins, herbal supplements, weight loss medication, aspirin products & NSAIDS 7 days prior to surgery, as these can thin the blood. Ok to take Tylenol.    ____  Avoid Alcoholic beverages 3 days prior to surgery, as it can thin the blood.  ____  NO Acrylic/fake nails or nail polish worn day of surgery (specifically hand/arm & foot surgeries).  ____  NO powder, lotions, deodorants, oils or cream on body.  ____  Remove all jewelry & piercings & foreign objects before arrival & leave at home.  ____  Remove Dentures, Hearing Aids & Contact Lens prior to surgery.  ____  Bring photo ID and insurance information to hospital (Leave Valuables at Home).  ____  If going home the same day, arrange for a ride home. You will not be able to drive for 24 hrs if Anesthesia was used.   ____  Females (ages 11-60): may need to give a urine sample the morning of surgery; please see Pre op Nurse prior to using the restroom.  ____  Males: Stop ED medications (Viagra, Cialis) 24 hrs prior to surgery.  ____  Wear clean, loose fitting clothing to allow for dressings/ bandages.      Bathing Instructions:    -Shower with anti-bacterial Soap (Hibiclens or Dial) the night before surgery and the morning of.   -Do not use  Hibiclens on your face or genitals.   -Apply clean clothes after shower.  -Do not shave your face or body 2 days prior to surgery unless instructed otherwise by your Surgeon.  -Do not shave pubic hair 7 days prior to surgery (gyn pt's).    [facility]/Ride Policy:  Only 2 adults allowed in pre op/recovery area during your procedure. You MUST HAVE A RIDE HOME from a responsible adult that you know and trust. Medical Transport, Uber or Lyft can ONLY be used if patient has a responsible adult to accompany them during ride home.       *Signs and symptoms of Infection Before or After Surgery:               !!!If you experience any fever, chills, nausea/ vomiting, foul odor/ excessive drainage from surgical site, flu-like symptoms, new wounds or cuts, PLEASE CALL THE SURGEON OFFICE at [phone] or SEND MESSAGE THROUGH [employer]!!!       *If you are running late the morning of surgery, please call the [facility] Surgery Dept @ [phone].     *Billing questions:  [phone]       Thank you,  -[facility] Pre Admit Dept.  [phone] or [phone]  M-F 7:30 am-4:00 pm (Closed Major Holidays)

## 2024-01-05 ENCOUNTER — TELEPHONE (OUTPATIENT)
Dept: PREADMISSION TESTING | Facility: HOSPITAL | Age: 59
End: 2024-01-05
Payer: COMMERCIAL

## 2024-01-05 NOTE — TELEPHONE ENCOUNTER
Called and spoke with patient about the following:     Please arrive to [facility] at 11:30AM on 1/08/24 for your scheduled procedure.  Address: [address] [place] [address] [place] [address] (2nd Building on left, 1st Floor Lobby)  >>>NO eating or drinking after midnight unless instructed otherwise by your Surgeon<<<    Thank you,  -[facility] Pre [facility] Testing Dept.  Mon-Fri 7:30 am - 4 pm [phone]

## 2024-01-08 ENCOUNTER — TELEPHONE (OUTPATIENT)
Dept: UROLOGY | Facility: CLINIC | Age: 59
End: 2024-01-08
Payer: COMMERCIAL

## 2024-01-08 ENCOUNTER — PATIENT MESSAGE (OUTPATIENT)
Dept: UROLOGY | Facility: CLINIC | Age: 59
End: 2024-01-08

## 2024-01-08 ENCOUNTER — ANESTHESIA EVENT (OUTPATIENT)
Dept: SURGERY | Facility: HOSPITAL | Age: 59
End: 2024-01-08
Payer: COMMERCIAL

## 2024-01-08 ENCOUNTER — ANESTHESIA (OUTPATIENT)
Dept: SURGERY | Facility: HOSPITAL | Age: 59
End: 2024-01-08
Payer: COMMERCIAL

## 2024-01-08 ENCOUNTER — HOSPITAL ENCOUNTER (OUTPATIENT)
Facility: HOSPITAL | Age: 59
Discharge: HOME | End: 2024-01-08
Attending: UROLOGY | Admitting: UROLOGY
Payer: COMMERCIAL

## 2024-01-08 DIAGNOSIS — N13.30 HYDRONEPHROSIS OF RIGHT KIDNEY: Primary | ICD-10-CM

## 2024-01-08 PROCEDURE — 37000008 HC ANESTHESIA 1ST 15 MINUTES: Performed by: UROLOGY

## 2024-01-08 PROCEDURE — C2617 STENT, NON-COR, TEM W/O DEL: HCPCS | Performed by: UROLOGY

## 2024-01-08 PROCEDURE — 74420 UROGRAPHY RTRGR +-KUB: CPT | Mod: 26,,, | Performed by: UROLOGY

## 2024-01-08 PROCEDURE — 63600175 PHARM REV CODE 636 W HCPCS: Performed by: NURSE ANESTHETIST, CERTIFIED REGISTERED

## 2024-01-08 PROCEDURE — C1769 GUIDE WIRE: HCPCS | Performed by: UROLOGY

## 2024-01-08 PROCEDURE — C1758 CATHETER, URETERAL: HCPCS | Performed by: UROLOGY

## 2024-01-08 PROCEDURE — 52332 CYSTOSCOPY AND TREATMENT: CPT | Mod: RT,,, | Performed by: UROLOGY

## 2024-01-08 PROCEDURE — 25000003 PHARM REV CODE 250: Performed by: NURSE ANESTHETIST, CERTIFIED REGISTERED

## 2024-01-08 PROCEDURE — 36000706: Performed by: UROLOGY

## 2024-01-08 PROCEDURE — 37000009 HC ANESTHESIA EA ADD 15 MINS: Performed by: UROLOGY

## 2024-01-08 PROCEDURE — 27200651 HC AIRWAY, LMA: Performed by: ANESTHESIOLOGY

## 2024-01-08 PROCEDURE — 36000707: Performed by: UROLOGY

## 2024-01-08 PROCEDURE — 25500020 PHARM REV CODE 255: Performed by: UROLOGY

## 2024-01-08 PROCEDURE — 71000015 HC POSTOP RECOV 1ST HR: Performed by: UROLOGY

## 2024-01-08 PROCEDURE — 71000033 HC RECOVERY, INTIAL HOUR: Performed by: UROLOGY

## 2024-01-08 DEVICE — STENT URETERAL UNIV 7FR 26CM: Type: IMPLANTABLE DEVICE | Site: URETER | Status: FUNCTIONAL

## 2024-01-08 RX ORDER — SODIUM CHLORIDE, SODIUM LACTATE, POTASSIUM CHLORIDE, CALCIUM CHLORIDE 600; 310; 30; 20 MG/100ML; MG/100ML; MG/100ML; MG/100ML
INJECTION, SOLUTION INTRAVENOUS CONTINUOUS PRN
Status: DISCONTINUED | OUTPATIENT
Start: 2024-01-08 | End: 2024-01-08

## 2024-01-08 RX ORDER — SODIUM CHLORIDE 0.9 % (FLUSH) 0.9 %
2 SYRINGE (ML) INJECTION EVERY 6 HOURS
Status: DISCONTINUED | OUTPATIENT
Start: 2024-01-08 | End: 2024-01-08 | Stop reason: HOSPADM

## 2024-01-08 RX ORDER — ONDANSETRON 2 MG/ML
4 INJECTION INTRAMUSCULAR; INTRAVENOUS DAILY PRN
Status: DISCONTINUED | OUTPATIENT
Start: 2024-01-08 | End: 2024-01-08 | Stop reason: HOSPADM

## 2024-01-08 RX ORDER — MIDAZOLAM HYDROCHLORIDE 1 MG/ML
INJECTION INTRAMUSCULAR; INTRAVENOUS
Status: DISCONTINUED | OUTPATIENT
Start: 2024-01-08 | End: 2024-01-08

## 2024-01-08 RX ORDER — ONDANSETRON 2 MG/ML
INJECTION INTRAMUSCULAR; INTRAVENOUS
Status: DISCONTINUED | OUTPATIENT
Start: 2024-01-08 | End: 2024-01-08

## 2024-01-08 RX ORDER — CEFAZOLIN SODIUM 2 G/50ML
2 SOLUTION INTRAVENOUS
Status: DISCONTINUED | OUTPATIENT
Start: 2024-01-08 | End: 2024-01-08 | Stop reason: HOSPADM

## 2024-01-08 RX ORDER — LIDOCAINE HYDROCHLORIDE 10 MG/ML
INJECTION, SOLUTION EPIDURAL; INFILTRATION; INTRACAUDAL; PERINEURAL
Status: DISCONTINUED | OUTPATIENT
Start: 2024-01-08 | End: 2024-01-08

## 2024-01-08 RX ORDER — OXYCODONE AND ACETAMINOPHEN 5; 325 MG/1; MG/1
1 TABLET ORAL
Status: DISCONTINUED | OUTPATIENT
Start: 2024-01-08 | End: 2024-01-08 | Stop reason: HOSPADM

## 2024-01-08 RX ORDER — CEFAZOLIN SODIUM 1 G/3ML
INJECTION, POWDER, FOR SOLUTION INTRAMUSCULAR; INTRAVENOUS
Status: DISCONTINUED | OUTPATIENT
Start: 2024-01-08 | End: 2024-01-08

## 2024-01-08 RX ORDER — FENTANYL CITRATE 50 UG/ML
25 INJECTION, SOLUTION INTRAMUSCULAR; INTRAVENOUS EVERY 5 MIN PRN
Status: DISCONTINUED | OUTPATIENT
Start: 2024-01-08 | End: 2024-01-08 | Stop reason: HOSPADM

## 2024-01-08 RX ORDER — SOLIFENACIN SUCCINATE 5 MG/1
5 TABLET, FILM COATED ORAL DAILY
Qty: 90 TABLET | Refills: 3 | Status: SHIPPED | OUTPATIENT
Start: 2024-01-08 | End: 2025-01-07

## 2024-01-08 RX ORDER — PROPOFOL 10 MG/ML
VIAL (ML) INTRAVENOUS
Status: DISCONTINUED | OUTPATIENT
Start: 2024-01-08 | End: 2024-01-08

## 2024-01-08 RX ORDER — HYDROMORPHONE HYDROCHLORIDE 2 MG/ML
0.2 INJECTION, SOLUTION INTRAMUSCULAR; INTRAVENOUS; SUBCUTANEOUS EVERY 5 MIN PRN
Status: DISCONTINUED | OUTPATIENT
Start: 2024-01-08 | End: 2024-01-08 | Stop reason: HOSPADM

## 2024-01-08 RX ORDER — FENTANYL CITRATE 50 UG/ML
INJECTION, SOLUTION INTRAMUSCULAR; INTRAVENOUS
Status: DISCONTINUED | OUTPATIENT
Start: 2024-01-08 | End: 2024-01-08

## 2024-01-08 RX ORDER — MEPERIDINE HYDROCHLORIDE 25 MG/ML
12.5 INJECTION INTRAMUSCULAR; INTRAVENOUS; SUBCUTANEOUS ONCE AS NEEDED
Status: DISCONTINUED | OUTPATIENT
Start: 2024-01-08 | End: 2024-01-08 | Stop reason: HOSPADM

## 2024-01-08 RX ADMIN — FENTANYL CITRATE 100 MCG: 50 INJECTION, SOLUTION INTRAMUSCULAR; INTRAVENOUS at 08:01

## 2024-01-08 RX ADMIN — PROPOFOL 80 MG: 10 INJECTION, EMULSION INTRAVENOUS at 08:01

## 2024-01-08 RX ADMIN — LIDOCAINE HYDROCHLORIDE 50 MG: 10 SOLUTION INTRAVENOUS at 08:01

## 2024-01-08 RX ADMIN — MIDAZOLAM HYDROCHLORIDE 2 MG: 1 INJECTION, SOLUTION INTRAMUSCULAR; INTRAVENOUS at 08:01

## 2024-01-08 RX ADMIN — ONDANSETRON 4 MG: 2 INJECTION INTRAMUSCULAR; INTRAVENOUS at 08:01

## 2024-01-08 RX ADMIN — CEFAZOLIN 2 G: 330 INJECTION, POWDER, FOR SOLUTION INTRAMUSCULAR; INTRAVENOUS at 08:01

## 2024-01-08 RX ADMIN — SODIUM CHLORIDE, SODIUM LACTATE, POTASSIUM CHLORIDE, AND CALCIUM CHLORIDE: 600; 310; 30; 20 INJECTION, SOLUTION INTRAVENOUS at 08:01

## 2024-01-08 NOTE — OP NOTE
[facility]     Date: 01/08/2024    Pre-Op Diagnosis: Right hydronephrosis    Op Diagnosis: same    Procedure(s) Performed:    1. Cystoscopy with right ureteral JJ stent placement  2. Right retrograde ureteral pyelogram with interpretation less 1 hour    Specimen(s): none    Surgeon: Sukhjinder Tucker MD    Anesthesia: general LMA    Indications: Sukhjinder Presley is a 58 y.o. male with right hydronephrosis presents today for stent exchange.    Findings:  Moderately calcified right ureteral stent, unable to pass a wire alongside, unable to pass a wire through the stent initially but was ultimately successful, stent replaced    Right retrograde ureteral pyelogram with interpretation less 1 hour - no extravasation of contrast, minimal dilation of the ureter and renal pelvis, no overt hydronephrosis, no filling defects, no calyceal blunting    Estimated Blood Loss: min    Drains:  7 Moroccan x 26 cm right JJ ureteral stent without strings    Procedure in Detail:  After risks, benefits and possible complications of the procedure were explained, the patient elected to undergo the procedure and informed consent was obtained. All questions were answered in the luiz-operative area. The patient was transferred to the cystoscopy suite and placed on the fluoroscopy table in the supine position.  SCDs were applied and working. Time out was performed, luiz-procedural antibiotics were given. Anesthesia was administered.  After adequate anesthesia the patient was placed in dorsal lithotomy position and prepped and draped in the usual sterile fashion.     A rigid cystoscope in a 22 Fr sheath was introduced into the patients bladder per urethra. This passed easily.  The entire urethra was visualized and revealed no strictures or masses.  Cystoscopy was performed which showed the right and left ureteral orifices in the normal anatomic position.  There were no bladder tumors, no  trabeculations, and no stones.      Our attention  was turned to the patient's right ureteral orifice.  A Motion wire was attempted to be advanced up the right ureteral orifice but was unsuccessful.  Stent graspers were introduced in the distal curl of the stent was brought out through the meatus.  A motion wire was inserted through the stent but could not be advanced past the proximal curl, likely due to calcification.  The scope was then reintroduced and a wire was attempted to be passed alongside the stent again, again this was unsuccessful.  The scope was then removed.  The proximal portion of the motion wire was utilized and was ultimately successful and obtain a access through the stent.  The stent was then removed leaving the wire in place.  A dual-lumen catheter was inserted and a retrograde was obtained, see above for findings.  A 2nd motion wire was inserted and the dual-lumen was removed.  The wire was then backloaded through the scope.     We then passed a 7 Fr x 26 cm JJ ureteral stent without strings over the wire to the level of the renal pelvis under direct vision as well as flouroscopy. The guide wire was removed.  A 180 degree coil was observed in the renal pelvis as well as the bladder using fluoroscopy.  A 180 degree coil was also seen using direct visualization in the bladder.     The patient tolerated the procedure well and was transferred to the recovery room in stable condition.      Disposition: The patient we will DC home, he will follow-up in two months for preoperative appointment.      Sukhjinder Tucker MD

## 2024-01-08 NOTE — ANESTHESIA POSTPROCEDURE EVALUATION
Anesthesia Post Evaluation    Patient: Sukhjinder Presley    Procedure(s) Performed: Procedure(s) (LRB):  CYSTOSCOPY, WITH RETROGRADE PYELOGRAM AND URETERAL STENT INSERTION (Right)    Final Anesthesia Type: general      Patient location during evaluation: PACU  Patient participation: Yes- Able to Participate  Level of consciousness: awake and alert, oriented and awake  Post-procedure vital signs: reviewed and stable  Pain management: adequate  Airway patency: patent    PONV status at discharge: No PONV  Anesthetic complications: no      Cardiovascular status: blood pressure returned to baseline  Respiratory status: unassisted  Hydration status: euvolemic  Follow-up not needed.              Vitals Value Taken Time   /75 01/08/24 0950   Temp 36.5 °C (97.7 °F) 01/08/24 0915   Pulse 76 01/08/24 0953   Resp 87 01/08/24 0952   SpO2 99 % 01/08/24 0953   Vitals shown include unvalidated device data.      Event Time   Out of Recovery 09:55:46         Pain/Stacy Score: Stacy Score: 10 (1/8/2024  9:45 AM)

## 2024-01-08 NOTE — TRANSFER OF CARE
Anesthesia Transfer of Care Note    Patient: Sukhjinder Presley    Procedure(s) Performed: Procedure(s) (LRB):  CYSTOSCOPY, WITH RETROGRADE PYELOGRAM AND URETERAL STENT INSERTION (Right)    Patient location: PACU    Anesthesia Type: general    Transport from OR: Transported from OR on room air with adequate spontaneous ventilation    Post pain: adequate analgesia    Post assessment: no apparent anesthetic complications and tolerated procedure well    Post vital signs: stable    Level of consciousness: responds to stimulation    Nausea/Vomiting: no nausea/vomiting    Complications: none    Transfer of care protocol was followed      Last vitals: Visit Vitals  /75   Pulse 87   Temp 36.5 °C (97.7 °F) (Temporal)   Resp 16   Ht 6' (1.829 m)   Wt 70.7 kg (155 lb 13.8 oz)   SpO2 98%   BMI 21.14 kg/m²

## 2024-01-08 NOTE — DISCHARGE SUMMARY
[facility])  Discharge Note  Short Stay    Procedure(s) (LRB):  CYSTOSCOPY, WITH RETROGRADE PYELOGRAM AND URETERAL STENT INSERTION (Right)      OUTCOME: Patient tolerated treatment/procedure well without complication and is now ready for discharge.    DISPOSITION: Home or Self Care    FINAL DIAGNOSIS:  Rectal cancer    FOLLOWUP: In clinic    DISCHARGE INSTRUCTIONS:    Discharge Procedure Orders   Diet Adult Regular     Notify your health care provider if you experience any of the following:  temperature >100.4     Notify your health care provider if you experience any of the following:  persistent nausea and vomiting or diarrhea     Notify your health care provider if you experience any of the following:  severe uncontrolled pain     Notify your health care provider if you experience any of the following:  difficulty breathing or increased cough     Notify your health care provider if you experience any of the following:  severe persistent headache     Notify your health care provider if you experience any of the following:  worsening rash     Notify your health care provider if you experience any of the following:  persistent dizziness, light-headedness, or visual disturbances     Notify your health care provider if you experience any of the following:  increased confusion or weakness     Activity as tolerated

## 2024-01-08 NOTE — ANESTHESIA PREPROCEDURE EVALUATION
01/08/2024  Sukhjinder Presley is a 58 y.o., male.      Pre-op Assessment    I have reviewed the Patient Summary Reports.     I have reviewed the Nursing Notes. I have reviewed the NPO Status.      Review of Systems  Renal/:  Chronic Renal Disease                  Patient Active Problem List   Diagnosis    Smoker    Polycythemia    Rectal bleeding    At risk for coronary artery disease    Rectal cancer    Kidney stone    Iron deficiency anemia due to chronic blood loss    Cachexia    Prostate cancer screening    Colostomy in place    Rectal fistula with localized perforation    Benign prostatic hyperplasia    Urinary tract infection without hematuria    Post-procedural erectile dysfunction    Tobacco dependence syndrome    BPH (benign prostatic hyperplasia)    Alcohol abuse with alcohol-induced disorder    Hydronephrosis    Bladder tumor    Anemia due to chronic blood loss    Immunodeficiency due to chemotherapy    Immunodeficiency secondary to radiation therapy         Physical Exam  General: Cooperative and Alert    Airway:  Mallampati: II / II  Mouth Opening: Normal  TM Distance: Normal  Tongue: Normal  Neck ROM: Normal ROM    Dental:  Intact        Anesthesia Plan  Type of Anesthesia, risks & benefits discussed:    Anesthesia Type: Gen ETT  Intra-op Monitoring Plan: Standard ASA Monitors  Post Op Pain Control Plan: multimodal analgesia  Induction:  IV  Airway Plan: Direct  Informed Consent: Informed consent signed with the Patient and all parties understand the risks and agree with anesthesia plan.  All questions answered.   ASA Score: 3  Day of Surgery Review of History & Physical: H&P Update referred to the surgeon/provider.I have interviewed and examined the patient. I have reviewed the patient's H&P dated: There are no significant changes. H&P completed by Anesthesiologist.    Ready For Surgery From  Anesthesia Perspective.     .

## 2024-01-08 NOTE — ANESTHESIA PROCEDURE NOTES
Intubation    Date/Time: 1/8/2024 8:39 AM    Performed by: César Grimaldo CRNA  Authorized by: Ayan Beasley MD    Intubation:     Induction:  Intravenous    Intubated:  Postinduction    Mask Ventilation:  Not attempted    Attempts:  1    Attempted By:  CRNA    Difficult Airway Encountered?: No      Complications:  None    Airway Device:  Supraglottic airway/LMA    Airway Device Size:  4.0    Style/Cuff Inflation:  Cuffed (inflated to minimal occlusive pressure)    Secured at:  The lips    Placement Verified By:  Capnometry    Complicating Factors:  None    Findings Post-Intubation:  BS equal bilateral and atraumatic/condition of teeth unchanged

## 2024-01-08 NOTE — TELEPHONE ENCOUNTER
Appointment scheduled, patient notified through the portal.     Loraine Milanes RN     ----- Message from Suhkjinder Tucker MD sent at 1/8/2024  9:45 AM CST -----  F/u 2 months for pre-op appt, thanks

## 2024-01-09 ENCOUNTER — OFFICE VISIT (OUTPATIENT)
Dept: HEMATOLOGY/ONCOLOGY | Facility: CLINIC | Age: 59
End: 2024-01-09
Payer: COMMERCIAL

## 2024-01-09 VITALS
HEIGHT: 72 IN | WEIGHT: 155.88 LBS | DIASTOLIC BLOOD PRESSURE: 73 MMHG | BODY MASS INDEX: 21.11 KG/M2 | HEART RATE: 75 BPM | RESPIRATION RATE: 10 BRPM | TEMPERATURE: 98 F | OXYGEN SATURATION: 100 % | SYSTOLIC BLOOD PRESSURE: 115 MMHG

## 2024-01-09 VITALS
DIASTOLIC BLOOD PRESSURE: 68 MMHG | SYSTOLIC BLOOD PRESSURE: 104 MMHG | HEIGHT: 72 IN | TEMPERATURE: 97 F | HEART RATE: 94 BPM | OXYGEN SATURATION: 98 % | BODY MASS INDEX: 21.19 KG/M2 | WEIGHT: 156.44 LBS

## 2024-01-09 DIAGNOSIS — K63.1 RECTAL PERFORATION: ICD-10-CM

## 2024-01-09 DIAGNOSIS — D84.821 IMMUNODEFICIENCY DUE TO CHEMOTHERAPY: ICD-10-CM

## 2024-01-09 DIAGNOSIS — Z93.3 COLOSTOMY IN PLACE: ICD-10-CM

## 2024-01-09 DIAGNOSIS — D84.822 IMMUNODEFICIENCY SECONDARY TO RADIATION THERAPY: ICD-10-CM

## 2024-01-09 DIAGNOSIS — D49.4 BLADDER TUMOR: ICD-10-CM

## 2024-01-09 DIAGNOSIS — Z79.899 IMMUNODEFICIENCY DUE TO CHEMOTHERAPY: ICD-10-CM

## 2024-01-09 DIAGNOSIS — T45.1X5A IMMUNODEFICIENCY DUE TO CHEMOTHERAPY: ICD-10-CM

## 2024-01-09 DIAGNOSIS — C20 RECTAL CANCER: Primary | ICD-10-CM

## 2024-01-09 DIAGNOSIS — C20 RECTAL CANCER: ICD-10-CM
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
## 2024-01-09 PROCEDURE — 99214 OFFICE O/P EST MOD 30 MIN: CPT | Mod: S$GLB,,, | Performed by: INTERNAL MEDICINE

## 2024-01-09 PROCEDURE — 99999 PR PBB SHADOW E&M-EST. PATIENT-LVL IV: CPT | Mod: PBBFAC,,, | Performed by: INTERNAL MEDICINE

## 2024-01-09 PROCEDURE — 3008F BODY MASS INDEX DOCD: CPT | Mod: CPTII,S$GLB,, | Performed by: INTERNAL MEDICINE

## 2024-01-09 PROCEDURE — 3074F SYST BP LT 130 MM HG: CPT | Mod: CPTII,S$GLB,, | Performed by: INTERNAL MEDICINE

## 2024-01-09 PROCEDURE — 3078F DIAST BP <80 MM HG: CPT | Mod: CPTII,S$GLB,, | Performed by: INTERNAL MEDICINE

## 2024-01-09 PROCEDURE — 1160F RVW MEDS BY RX/DR IN RCRD: CPT | Mod: CPTII,S$GLB,, | Performed by: INTERNAL MEDICINE

## 2024-01-09 PROCEDURE — 1159F MED LIST DOCD IN RCRD: CPT | Mod: CPTII,S$GLB,, | Performed by: INTERNAL MEDICINE

## 2024-01-09 NOTE — PROGRESS NOTES
Subjective:       Patient ID: Sukhjinder Presley is a 58 y.o. male.    Chief Complaint: Results, Pain, and Colon Cancer    HPI:  58-year-old male history of locally advanced rectal carcinoma invading bladder.  Patient had stent replacement yesterday patient reports increasing abdominal pain.  ECOG status 2 completion of radiation and Xeloda sensitization    Past Medical History:   Diagnosis Date    Anemia     Arthritis     Cancer     rectal    Renal disorder     kidney stones     Family History   Problem Relation Age of Onset    Intestinal malrotation Mother     Leukemia Father     No Known Problems Sister     Coronary artery disease Brother     Coronary artery disease Maternal Grandmother     Stomach cancer Maternal Grandfather      Social History     Socioeconomic History    Marital status: Significant Other   Tobacco Use    Smoking status: Every Day     Current packs/day: 1.00     Average packs/day: 1 pack/day for 40.0 years (40.0 ttl pk-yrs)     Types: Cigarettes     Start date: 1/2/1984    Smokeless tobacco: Former     Types: Chew    Tobacco comments:     no smoking after m.n prior to sx   Substance and Sexual Activity    Alcohol use: Not Currently     Alcohol/week: 46.0 standard drinks of alcohol     Types: 42 Cans of beer, 4 Shots of liquor per week     Comment: segrams 7, beer daily  hold now prior to surgery    Drug use: Never    Sexual activity: Yes     Partners: Female     Birth control/protection: None     Social Determinants of Health     Financial Resource Strain: High Risk (11/10/2023)    Overall Financial Resource Strain (CARDIA)     Difficulty of Paying Living Expenses: Very hard   Food Insecurity: Food Insecurity Present (11/10/2023)    Hunger Vital Sign     Worried About Running Out of Food in the Last Year: Often true     Ran Out of Food in the Last Year: Often true   Transportation Needs: Unmet Transportation Needs (11/10/2023)    PRAPARE - Transportation     Lack of Transportation (Medical): Yes      Lack of Transportation (Non-Medical): No   Physical Activity: Sufficiently Active (11/10/2023)    Exercise Vital Sign     Days of Exercise per Week: 7 days     Minutes of Exercise per Session: 70 min   Stress: Stress Concern Present (11/10/2023)    Citizen of [place] [employer] Occupational [employer] - Occupational Stress Questionnaire     Feeling of Stress : Very much   Social Connections: Unknown (11/10/2023)    Social Connection and Isolation Panel [NHANES]     Frequency of Communication with Friends and Family: More than three times a week     Frequency of Social Gatherings with Friends and Family: Once a week     Active Member of Clubs or Organizations: Yes     Attends Club or Organization Meetings: 1 to 4 times per year     Marital Status: Living with partner   Housing Stability: High Risk (11/10/2023)    Housing Stability Vital Sign     Unable to Pay for Housing in the Last Year: Yes     Number of Places Lived in the Last Year: 1     Unstable Housing in the Last Year: No     Past Surgical History:   Procedure Laterality Date    ABDOMINOPERINEAL RESECTION OF RECTUM N/A 4/9/2020    Procedure: PROCTECTOMY, ABDOMINOPERINEAL;  Surgeon: Jan New MD;  Location: [facility];  Service: General;  Laterality: N/A;    COLECTOMY, TOTAL N/A 2/24/2022    Procedure: COLECTOMY, TOTAL;  Surgeon: Jan New MD;  Location: [facility] OR;  Service: Colon and Rectal;  Laterality: N/A;    COLON SURGERY      COLONOSCOPY N/A 6/6/2019    Procedure: COLONOSCOPY;  Surgeon: Anjelica Saavedra MD;  Location: [facility] ENDO;  Service: Endoscopy;  Laterality: N/A;    COLONOSCOPY N/A 12/17/2021    Procedure: COLONOSCOPY;  Surgeon: Jan New MD;  Location: [facility] ENDO;  Service: General;  Laterality: N/A;    CREATION OF MUSCLE ROTATIONAL FLAP Left 4/9/2020    Procedure: CREATION, FLAP, MUSCLE ROTATION;  Surgeon: Sebastian Dan MD;  Location: [facility] OR;  Service: Plastics;  Laterality: Left;   VRAM    CYSTOSCOPIC LITHOLAPAXY N/A 2/24/2022     Procedure: CYSTOLITHOLAPAXY;  Surgeon: Sukhjinder Tucker MD;  Location: [facility] OR;  Service: Urology;  Laterality: N/A;    CYSTOSCOPY W/ URETERAL STENT PLACEMENT Right 10/6/2023    Procedure: CYSTOSCOPY, WITH URETERAL STENT INSERTION;  Surgeon: Sukhjinder Tucker MD;  Location: [facility] OR;  Service: Urology;  Laterality: Right;    CYSTOSCOPY WITH BIOPSY OF BLADDER N/A 9/11/2023    Procedure: CYSTOSCOPY, WITH BLADDER BIOPSY, WITH FULGURATION IF INDICATED;  Surgeon: Sukhjinder Tucker MD;  Location: [facility] OR;  Service: Urology;  Laterality: N/A;  , POSS TURBT    CYSTOSCOPY WITH URETEROSCOPY, RETROGRADE PYELOGRAPHY, AND INSERTION OF STENT N/A 9/11/2023    Procedure: CYSTOSCOPY, WITH RETROGRADE PYELOGRAM AND URETERAL STENT INSERTION;  Surgeon: Sukhjinder Tucker MD;  Location: [facility] OR;  Service: Urology;  Laterality: N/A;    CYSTOSCOPY WITH URETEROSCOPY, RETROGRADE PYELOGRAPHY, AND INSERTION OF STENT Right 1/8/2024    Procedure: CYSTOSCOPY, WITH RETROGRADE PYELOGRAM AND URETERAL STENT INSERTION;  Surgeon: Sukhjinder Tucker MD;  Location: [facility] OR;  Service: Urology;  Laterality: Right;  stent exchange    DILATION OF URETHRA N/A 9/11/2023    Procedure: DILATION, URETHRA;  Surgeon: Sukhjinder Tucker MD;  Location: [facility];  Service: Urology;  Laterality: N/A;    ESOPHAGOGASTRODUODENOSCOPY N/A 6/6/2019    Procedure: EGD (ESOPHAGOGASTRODUODENOSCOPY);  Surgeon: Anjelica Saavedra MD;  Location: [facility];  Service: Endoscopy;  Laterality: N/A;    FLAP GRAFT SURGERY N/A 4/9/2020    Procedure: FLAP GRAFT;  Surgeon: Sebastian Dan MD;  Location: [facility] OR;  Service: Plastics;  Laterality: N/A;  left fasciocutaneous buttocks flap    HERNIA REPAIR  1995    INJECTION OF ANESTHETIC AGENT INTO TISSUE PLANE DEFINED BY TRANSVERSUS ABDOMINIS MUSCLE N/A 2/18/2020    Procedure: BLOCK, TRANSVERSUS ABDOMINIS PLANE;  Surgeon: Jan New MD;  Location: [facility] OR;  Service: General;  Laterality: N/A;    INJECTION  OF ANESTHETIC AGENT INTO TISSUE PLANE DEFINED BY TRANSVERSUS ABDOMINIS MUSCLE  2/24/2022    Procedure: BLOCK, TRANSVERSUS ABDOMINIS PLANE;  Surgeon: Jan New MD;  Location: [facility] OR;  Service: Colon and Rectal;;    INSERTION OF TUNNELED CENTRAL VENOUS CATHETER (CVC) WITH SUBCUTANEOUS PORT N/A 10/8/2019    Procedure: [id number];  Surgeon: Jan New MD;  Location: [facility] OR;  Service: General;  Laterality: N/A;    LAPAROSCOPIC COLOSTOMY      MEDIPORT REMOVAL N/A 8/13/2020    Procedure: REMOVAL, CATHETER, CENTRAL VENOUS, TUNNELED, WITH PORT;  Surgeon: Sebastian Dan MD;  Location: [facility] OR;  Service: Plastics;  Laterality: N/A;    TONSILLECTOMY      TRANSURETHRAL RESECTION OF PROSTATE N/A 6/7/2021    Procedure: TURP (TRANSURETHRAL RESECTION OF PROSTATE), CYSTOLITHALOPAXY;  Surgeon: Sukhjinder Tucker MD;  Location: [facility] OR;  Service: Urology;  Laterality: N/A;    TURBT (TRANSURETHRAL RESECTION OF BLADDER TUMOR) N/A 10/6/2023    Procedure: TURBT (TRANSURETHRAL RESECTION OF BLADDER TUMOR);  Surgeon: Sukhjinder Tukcer MD;  Location: [facility] OR;  Service: Urology;  Laterality: N/A;    WOUND DEBRIDEMENT N/A 8/13/2020    Procedure: DEBRIDEMENT, WOUND;  Surgeon: Sebastian Dan MD;  Location: [facility] OR;  Service: Plastics;  Laterality: N/A;  layered closure       Labs:  Lab Results   Component Value Date    WBC 7.34 12/11/2023    HGB 11.2 (L) 12/11/2023    HCT 33.3 (L) 12/11/2023    MCV 83 12/11/2023     12/11/2023     BMP  Lab Results   Component Value Date     12/11/2023    K 4.3 12/11/2023     12/11/2023    CO2 25 12/11/2023    BUN 9 12/11/2023    CREATININE 1.1 12/11/2023    CALCIUM 9.0 12/11/2023    ANIONGAP 10 12/11/2023    ESTGFRAFRICA >60 07/20/2022    EGFRNONAA >60 07/20/2022     Lab Results   Component Value Date    ALT 8 (L) 12/11/2023    AST 11 12/11/2023    ALKPHOS 86 12/11/2023    BILITOT 0.3 12/11/2023       Lab Results   Component Value Date    IRON 38 (L)  11/07/2023    TIBC 241 (L) 11/07/2023    FERRITIN 1,975 (H) 11/07/2023     Lab Results   Component Value Date    YZUGKZTV27 423 10/09/2023     Lab Results   Component Value Date    FOLATE 5.7 10/09/2023     Lab Results   Component Value Date    TSH 0.966 10/02/2023         Review of Systems   Constitutional:  Positive for activity change, appetite change, fatigue and unexpected weight change. Negative for chills, diaphoresis and fever.   HENT:  Negative for congestion, dental problem, drooling, ear discharge, ear pain, facial swelling, hearing loss, mouth sores, nosebleeds, postnasal drip, rhinorrhea, sinus pressure, sneezing, sore throat, tinnitus, trouble swallowing and voice change.    Eyes:  Negative for photophobia, pain, discharge, redness, itching and visual disturbance.   Respiratory:  Negative for apnea, cough, choking, chest tightness, shortness of breath, wheezing and stridor.    Cardiovascular:  Negative for chest pain, palpitations and leg swelling.   Gastrointestinal:  Positive for abdominal pain. Negative for anal bleeding, blood in stool, constipation, diarrhea, nausea, rectal pain and vomiting.   Endocrine: Negative for cold intolerance, heat intolerance, polydipsia, polyphagia and polyuria.   Genitourinary:  Negative for decreased urine volume, difficulty urinating, dysuria, enuresis, flank pain, frequency, genital sores, hematuria, penile discharge, penile pain, penile swelling, scrotal swelling, testicular pain and urgency.   Musculoskeletal:  Negative for arthralgias, back pain, gait problem, joint swelling, myalgias, neck pain and neck stiffness.   Skin:  Negative for color change, pallor, rash and wound.   Allergic/Immunologic: Negative for environmental allergies, food allergies and immunocompromised state.   Neurological:  Positive for weakness. Negative for dizziness, tremors, seizures, syncope, facial asymmetry, speech difficulty, light-headedness, numbness and headaches.   Hematological:   Negative for adenopathy. Does not bruise/bleed easily.   Psychiatric/Behavioral:  Positive for dysphoric mood. Negative for agitation, behavioral problems, confusion, decreased concentration, hallucinations, self-injury, sleep disturbance and suicidal ideas. The patient is nervous/anxious. The patient is not hyperactive.        Objective:      Physical Exam  Vitals reviewed.   Constitutional:       General: He is not in acute distress.     Appearance: He is well-developed. He is ill-appearing. He is not diaphoretic.   HENT:      Head: Normocephalic.      Right Ear: External ear normal.      Left Ear: External ear normal.      Nose: Nose normal.      Right Sinus: No maxillary sinus tenderness or frontal sinus tenderness.      Left Sinus: No maxillary sinus tenderness or frontal sinus tenderness.      Mouth/Throat:      Pharynx: No oropharyngeal exudate.   Eyes:      General: Lids are normal. No scleral icterus.        Right eye: No discharge.         Left eye: No discharge.      Extraocular Movements:      Right eye: Normal extraocular motion.      Left eye: Normal extraocular motion.      Conjunctiva/sclera:      Right eye: Right conjunctiva is not injected. No hemorrhage.     Left eye: Left conjunctiva is not injected. No hemorrhage.     Pupils: Pupils are equal, round, and reactive to light.   Neck:      Thyroid: No thyromegaly.      Vascular: No JVD.      Trachea: No tracheal deviation.   Cardiovascular:      Rate and Rhythm: Normal rate.   Pulmonary:      Effort: Pulmonary effort is normal. No respiratory distress.      Breath sounds: Normal breath sounds. No stridor.   Abdominal:      General: Bowel sounds are normal.      Palpations: Abdomen is soft. There is no hepatomegaly, splenomegaly or mass.      Tenderness: There is no abdominal tenderness.   Musculoskeletal:         General: No tenderness. Normal range of motion.      Cervical back: Normal range of motion and neck supple.   Lymphadenopathy:      Head:       Right side of head: No posterior auricular or occipital adenopathy.      Left side of head: No posterior auricular or occipital adenopathy.      Cervical: No cervical adenopathy.      Right cervical: No superficial, deep or posterior cervical adenopathy.     Left cervical: No superficial, deep or posterior cervical adenopathy.      Upper Body:      Right upper body: No supraclavicular adenopathy.      Left upper body: No supraclavicular adenopathy.   Skin:     General: Skin is dry.      Findings: No erythema or rash.      Nails: There is no clubbing.   Neurological:      Mental Status: He is alert and oriented to person, place, and time.      Cranial Nerves: No cranial nerve deficit.      Motor: Weakness present.      Coordination: Coordination abnormal.      Gait: Gait abnormal.   Psychiatric:         Behavior: Behavior normal.         Thought Content: Thought content normal.         Judgment: Judgment normal.             Assessment:      1. Rectal cancer    2. Colostomy in place    3. Rectal fistula with localized perforation    4. Bladder tumor    5. Immunodeficiency due to chemotherapy    6. Immunodeficiency secondary to radiation therapy           Med Onc Chart Routing      Follow up with physician 6 weeks. Return to clinic 6 weeks with CBC CMP iron status CEA and CT chest abdomen pelvis prior   Follow up with REE    Infusion scheduling note    Injection scheduling note    Labs    Imaging    Pharmacy appointment    Other referrals                   Plan:     Completion of external beam radiation therapy with Xeloda sensitization.  Return in 6 weeks with CBC CMP LDH iron status prior.  Discussed implications of answered questions told to take short-acting pain medicines increased abdominal pain probably secondary to stent replacement.  CT chest abdomen pelvis prior to next visit pain management through palliative care        Ken Cosby Jr, MD FACP

## 2024-01-10 RX ORDER — OXYCODONE HYDROCHLORIDE 10 MG/1
10 TABLET ORAL EVERY 8 HOURS PRN
Qty: 90 TABLET | Refills: 0 | Status: SHIPPED | OUTPATIENT
Start: 2024-01-10 | End: 2024-02-10 | Stop reason: SDUPTHER

## 2024-01-19 ENCOUNTER — TUMOR BOARD CONFERENCE (OUTPATIENT)
Dept: HEMATOLOGY/ONCOLOGY | Facility: CLINIC | Age: 59
End: 2024-01-19
Payer: COMMERCIAL

## 2024-01-19 NOTE — PROGRESS NOTES
Tumor Board Documentation      Sukhjinder Presley was presented by Aki Marte MD at our Tumor Board on 1/19/2024, which included representatives from Medical Oncology, Hematology, Radiation Oncology, Surgical Oncology, Pathology, Genetics, Navigation, Radiology, Urology.    Sukhjinder currently presents as a current patient with Rectal cancer, with history of the following treatments: Neoadjuvant Radiation, Surgical Intervention(s), Neoadjuvant Chemotherapy.    Additionally, we reviewed previous medical and familial history, history of present illness, and recent lab results along with all available histopathologic and imaging studies. The tumor board considered available treatment options and made the following recommendations:  Surgery     Rad/Onc to discuss with patient possible referral to uro oncology     The following procedures/referrals were also placed: No orders of the defined types were placed in this encounter.      Clinical Trial Status:       National site-specific guidelines were discussed with respect to the case.    Tumor board is a meeting of clinicians from various specialty areas who evaluate and discuss patients for whom a multidisciplinary approach is being considered. Final determinations in the plan of care are those of the provider(s). The responsibility for follow up of recommendations given during tumor board is that of the provider.     Aki Marte MD

## 2024-01-24 ENCOUNTER — OFFICE VISIT (OUTPATIENT)
Dept: RADIATION ONCOLOGY | Facility: CLINIC | Age: 59
End: 2024-01-24
Payer: COMMERCIAL

## 2024-01-24 ENCOUNTER — E-CONSULT (OUTPATIENT)
Dept: UROLOGY | Facility: CLINIC | Age: 59
End: 2024-01-24
Payer: COMMERCIAL

## 2024-01-24 ENCOUNTER — LAB VISIT (OUTPATIENT)
Dept: LAB | Facility: HOSPITAL | Age: 59
End: 2024-01-24
Attending: SPECIALIST
Payer: COMMERCIAL

## 2024-01-24 VITALS
WEIGHT: 157.88 LBS | RESPIRATION RATE: 16 BRPM | DIASTOLIC BLOOD PRESSURE: 73 MMHG | TEMPERATURE: 98 F | BODY MASS INDEX: 21.38 KG/M2 | SYSTOLIC BLOOD PRESSURE: 112 MMHG | OXYGEN SATURATION: 98 % | HEIGHT: 72 IN | HEART RATE: 94 BPM

## 2024-01-24 DIAGNOSIS — C20 RECTAL CANCER: ICD-10-CM

## 2024-01-24 DIAGNOSIS — C20 RECTAL CANCER: Primary | ICD-10-CM

## 2024-01-24 DIAGNOSIS — D49.4 BLADDER TUMOR: Primary | ICD-10-CM

## 2024-01-24 PROCEDURE — 1159F MED LIST DOCD IN RCRD: CPT | Mod: CPTII,S$GLB,, | Performed by: SPECIALIST

## 2024-01-24 PROCEDURE — 99499 UNLISTED E&M SERVICE: CPT | Mod: S$GLB,,, | Performed by: SPECIALIST

## 2024-01-24 PROCEDURE — 99999 PR PBB SHADOW E&M-EST. PATIENT-LVL IV: CPT | Mod: PBBFAC,,, | Performed by: SPECIALIST

## 2024-01-24 PROCEDURE — 3008F BODY MASS INDEX DOCD: CPT | Mod: CPTII,S$GLB,, | Performed by: SPECIALIST

## 2024-01-24 PROCEDURE — 99499 UNLISTED E&M SERVICE: CPT | Mod: S$GLB,,, | Performed by: UROLOGY

## 2024-01-24 PROCEDURE — 3074F SYST BP LT 130 MM HG: CPT | Mod: CPTII,S$GLB,, | Performed by: SPECIALIST

## 2024-01-24 PROCEDURE — 3078F DIAST BP <80 MM HG: CPT | Mod: CPTII,S$GLB,, | Performed by: SPECIALIST

## 2024-01-24 PROCEDURE — 36415 COLL VENOUS BLD VENIPUNCTURE: CPT | Performed by: SPECIALIST

## 2024-01-24 PROCEDURE — 82378 CARCINOEMBRYONIC ANTIGEN: CPT | Performed by: SPECIALIST

## 2024-01-24 NOTE — PROGRESS NOTES
Mr. Presley returns for follow-up after completing accelerated hypofractionated radiotherapy to a right pelvic sidewall and bladder recurrence of rectal cancer.  He still has intermittent pain since treatment but has had no other untoward side effects.  We have had an extensive discussion regarding where to go from here and the only curative option would be a pelvic exenteration.  I have discussed this with him briefly.  He does believe that the current quality of his life is acceptable.  I defer the suitability of surgery to the urologic oncologists and Colorectal team in [place].  He would like a conversation regarding this before making any decisions.  With this in mind I have ordered a repeat PET scan to ensure no other metastatic disease.  I have also ordered a CEA.  Plan:  He will call me back once he has been seen.     The right coronary artery was selectively engaged and injected. A Catheter  Jr Cor 4 Crv 6fr 100cm Supertorque + was used. Multiple views of the injected vessel were taken.

## 2024-01-24 NOTE — CONSULTS
[facility]  Response for E-Consult     Patient Name: Sukhjinder Presley  MRN: [id number]  Primary Care Provider: Marito Murphy MD   Requesting Provider: Aki Marte MD  Consults    After evaluation of your eConsult clinical questions, I believe the patient should be scheduled for an office visit in our specialty due to face to face was requested.    Total time of Consultation: 0    *This eConsult is based on the clinical data available to me and is furnished without benefit of a physician examination.  The eConsult will need to be interpreted in light of any clinical issues of changes in patient status not available to me at the time rime of filing this eConsults.  Significant changes in patient condition of level of acuity should result in a referral for in person consultation and reevaluation.  Please alert me if you have any furth questions.     Thank you for this eConsult referral.     MD [facility]

## 2024-01-25 LAB — CEA SERPL-MCNC: 1.7 NG/ML (ref 0–5)

## 2024-02-05 ENCOUNTER — OFFICE VISIT (OUTPATIENT)
Dept: URGENT CARE | Facility: CLINIC | Age: 59
End: 2024-02-05
Payer: COMMERCIAL

## 2024-02-05 VITALS
DIASTOLIC BLOOD PRESSURE: 66 MMHG | RESPIRATION RATE: 18 BRPM | SYSTOLIC BLOOD PRESSURE: 92 MMHG | BODY MASS INDEX: 21.26 KG/M2 | HEART RATE: 105 BPM | WEIGHT: 157 LBS | HEIGHT: 72 IN | TEMPERATURE: 101 F | OXYGEN SATURATION: 97 %

## 2024-02-05 DIAGNOSIS — J10.1 INFLUENZA A: Primary | ICD-10-CM

## 2024-02-05 DIAGNOSIS — R05.8 COUGH WITH CONGESTION OF PARANASAL SINUS: ICD-10-CM

## 2024-02-05 DIAGNOSIS — R50.9 FEVER IN ADULT: ICD-10-CM

## 2024-02-05 DIAGNOSIS — R51.9 BAD HEADACHE: ICD-10-CM

## 2024-02-05 DIAGNOSIS — R09.81 COUGH WITH CONGESTION OF PARANASAL SINUS: ICD-10-CM

## 2024-02-05 LAB
CTP QC/QA: YES
CTP QC/QA: YES
POC MOLECULAR INFLUENZA A AGN: POSITIVE
POC MOLECULAR INFLUENZA B AGN: NEGATIVE
SARS-COV-2 AG RESP QL IA.RAPID: NEGATIVE

## 2024-02-05 PROCEDURE — 87811 SARS-COV-2 COVID19 W/OPTIC: CPT | Mod: QW,S$GLB,, | Performed by: NURSE PRACTITIONER

## 2024-02-05 PROCEDURE — 99213 OFFICE O/P EST LOW 20 MIN: CPT | Mod: S$GLB,,, | Performed by: NURSE PRACTITIONER

## 2024-02-05 PROCEDURE — 87502 INFLUENZA DNA AMP PROBE: CPT | Mod: QW,S$GLB,, | Performed by: NURSE PRACTITIONER

## 2024-02-05 RX ORDER — PROMETHAZINE HYDROCHLORIDE AND DEXTROMETHORPHAN HYDROBROMIDE 6.25; 15 MG/5ML; MG/5ML
5 SYRUP ORAL NIGHTLY PRN
Qty: 118 ML | Refills: 0 | Status: SHIPPED | OUTPATIENT
Start: 2024-02-05 | End: 2024-02-10 | Stop reason: SDUPTHER

## 2024-02-05 RX ORDER — BENZONATATE 100 MG/1
100 CAPSULE ORAL 3 TIMES DAILY PRN
Qty: 30 CAPSULE | Refills: 0 | Status: SHIPPED | OUTPATIENT
Start: 2024-02-05 | End: 2024-02-15

## 2024-02-05 NOTE — PROGRESS NOTES
Subjective:      Patient ID: Sukhjinder Presley is a 58 y.o. male.    Vitals:  height is 6' (1.829 m) and weight is 71.2 kg (157 lb). His oral temperature is 101.1 °F (38.4 °C) (abnormal). His blood pressure is 92/66 and his pulse is 105. His respiration is 18 and oxygen saturation is 97%.     Chief Complaint: Fever    Pt c/o a fever on/off x 3 days, headache,cough,congestion x 3 days ago    Fever   This is a new problem. The current episode started in the past 7 days. The problem has been gradually worsening. The maximum temperature noted was 101 to 101.9 F. The temperature was taken using an oral thermometer. Associated symptoms include congestion, coughing and headaches. Pertinent negatives include no abdominal pain, chest pain, diarrhea, ear pain, muscle aches, nausea, rash, sleepiness, sore throat, urinary pain, vomiting or wheezing. He has tried acetaminophen for the symptoms. The treatment provided mild relief.   Risk factors: no contaminated food, no contaminated water, no hx of cancer, no immunosuppression, no occupational exposure, no recent sickness, no recent travel and no sick contacts        Constitution: Positive for fever.   HENT:  Positive for congestion. Negative for ear pain and sore throat.    Cardiovascular:  Negative for chest pain.   Respiratory:  Positive for cough. Negative for wheezing.    Gastrointestinal:  Negative for abdominal pain, nausea, vomiting and diarrhea.   Genitourinary:  Negative for dysuria.   Skin:  Negative for rash.   Neurological:  Positive for headaches.      Objective:     Physical Exam   Constitutional: He is oriented to person, place, and time. He appears well-developed. He is cooperative.  Non-toxic appearance. He does not appear ill. No distress.   HENT:   Head: Normocephalic and atraumatic.   Ears:   Right Ear: Hearing, tympanic membrane, external ear and ear canal normal.   Left Ear: Hearing, tympanic membrane, external ear and ear canal normal.   Nose: Mucosal edema,  rhinorrhea and purulent discharge present. No nasal deformity. No epistaxis. Right sinus exhibits frontal sinus tenderness. Right sinus exhibits no maxillary sinus tenderness. Left sinus exhibits frontal sinus tenderness. Left sinus exhibits no maxillary sinus tenderness.   Mouth/Throat: Uvula is midline and mucous membranes are normal. No trismus in the jaw. Normal dentition. No uvula swelling. Posterior oropharyngeal edema and posterior oropharyngeal erythema present. No oropharyngeal exudate.   Eyes: Conjunctivae and lids are normal. No scleral icterus.   Neck: Trachea normal and phonation normal. Neck supple. No edema present. No erythema present. No neck rigidity present.   Cardiovascular: Normal rate, regular rhythm, normal heart sounds and normal pulses.   Pulmonary/Chest: Effort normal and breath sounds normal. No respiratory distress. He has no decreased breath sounds. He has no rhonchi.   Abdominal: Normal appearance.   Musculoskeletal: Normal range of motion.         General: No deformity. Normal range of motion.   Lymphadenopathy:        Head (right side): Submental and submandibular adenopathy present.        Head (left side): Submental and submandibular adenopathy present.   Neurological: He is alert and oriented to person, place, and time. He exhibits normal muscle tone. Coordination normal.   Skin: Skin is warm, dry, intact, not diaphoretic and not pale.   Psychiatric: His speech is normal and behavior is normal. Judgment and thought content normal.   Nursing note and vitals reviewed.    Assessment:     1. Fever in adult    2. Cough with congestion of paranasal sinus    3. Bad headache    4. Influenza A        Plan:     .this  Fever in adult  -     POCT Influenza A/B MOLECULAR  -     SARS Coronavirus 2 Antigen, POCT Manual Read    Cough with congestion of paranasal sinus  -     POCT Influenza A/B MOLECULAR  -     SARS Coronavirus 2 Antigen, POCT Manual Read    Bad headache  -     POCT Influenza A/B  MOLECULAR  -     SARS Coronavirus 2 Antigen, POCT Manual Read    Influenza A      Results for orders placed or performed in visit on 02/05/24   POCT Influenza A/B MOLECULAR   Result Value Ref Range    POC Molecular Influenza A Ag Positive (A) Negative, Not Reported    POC Molecular Influenza B Ag Negative Negative, Not Reported     Acceptable Yes    SARS Coronavirus 2 Antigen, POCT Manual Read   Result Value Ref Range    SARS Coronavirus 2 Antigen Negative Negative     Acceptable Yes      *Note: Due to a large number of results and/or encounters for the requested time period, some results have not been displayed. A complete set of results can be found in Results Review.         Isolation for 5 days and an additional 5 days of wearing an N95 mask around others.  Increase fluids.  Get plenty of rest.   Normal saline nasal wash to irrigate sinuses and for congestion/runny nose.  Cool mist humidifier/vaporizer.  Practice good handwashing.  Mucinex for cough and chest congestion.  Tylenol or Ibuprofen for fever, headache and body aches.  Warm salt water gargles for throat comfort.  Chloraseptic spray or lozenges for throat comfort.  See PCP or go to ER if symptoms worsen or fail to improve with treatment.

## 2024-02-05 NOTE — LETTER
February 5, 2024      [facility]  [address], [address]  [place] [address]  Phone: [phone]       Patient: Sukhjinder Presley   YOB: 1965  Date of Visit: 02/05/2024    To Whom It May Concern:    Torrie Presley  was at [facility] on 02/05/2024. The patient may return to work/school on 02/10/2024 with no restrictions. If you have any questions or concerns, or if I can be of further assistance, please do not hesitate to contact me.    Sincerely,          Maritza Mckeon NP

## 2024-02-05 NOTE — PATIENT INSTRUCTIONS
Isolation for 5 days and an additional 5 days of wearing an N95 mask around others.  Increase fluids.  Get plenty of rest.   Normal saline nasal wash to irrigate sinuses and for congestion/runny nose.  Cool mist humidifier/vaporizer.  Practice good handwashing.  Mucinex for cough and chest congestion.  Tylenol or Ibuprofen for fever, headache and body aches.  Warm salt water gargles for throat comfort.  Chloraseptic spray or lozenges for throat comfort.  See PCP or go to ER if symptoms worsen or fail to improve with treatment.      Please arrange follow up with your primary medical clinic as soon as possible. You must understand that you've received an Urgent Care treatment only and that you may be released before all of your medical problems are known or treated. You, the patient, will arrange for follow up as instructed. If your symptoms worsen or fail to improve you should go to the Emergency Room.         Go to the Emergency Department for any new or worsening symptoms including: worsening abdominal pain, dark\black\bloody bowel movements, vomiting blood, hard abdomen, fever, chest pain, shortness of breath, loss of consciousness or any other concerns.

## 2024-02-10 DIAGNOSIS — C20 RECTAL CANCER: Primary | ICD-10-CM

## 2024-02-10 DIAGNOSIS — R09.81 COUGH WITH CONGESTION OF PARANASAL SINUS: ICD-10-CM

## 2024-02-10 DIAGNOSIS — R05.8 COUGH WITH CONGESTION OF PARANASAL SINUS: ICD-10-CM

## 2024-02-10 DIAGNOSIS — C20 RECTAL CANCER: ICD-10-CM

## 2024-02-10 DIAGNOSIS — G89.3 NEOPLASM RELATED PAIN: ICD-10-CM

## 2024-02-10 DIAGNOSIS — J10.1 INFLUENZA A: ICD-10-CM

## 2024-02-12 ENCOUNTER — TELEPHONE (OUTPATIENT)
Dept: HEMATOLOGY/ONCOLOGY | Facility: CLINIC | Age: 59
End: 2024-02-12

## 2024-02-12 RX ORDER — OXYCODONE HYDROCHLORIDE 10 MG/1
10 TABLET ORAL EVERY 8 HOURS PRN
Qty: 90 TABLET | Refills: 0 | Status: SHIPPED | OUTPATIENT
Start: 2024-02-12 | End: 2024-03-16

## 2024-02-12 RX ORDER — OXYCODONE 9 MG/1
9 CAPSULE, EXTENDED RELEASE ORAL EVERY 12 HOURS
Qty: 60 EACH | Refills: 0 | Status: SHIPPED | OUTPATIENT
Start: 2024-02-12 | End: 2024-03-16

## 2024-02-12 NOTE — TELEPHONE ENCOUNTER
Attempted to call patient to reschedule upcoming appointment on 2/21/24 and there was no answer. Voicemail was left for patient to contact the clinic at their earliest convenience.

## 2024-02-13 RX ORDER — PROMETHAZINE HYDROCHLORIDE AND DEXTROMETHORPHAN HYDROBROMIDE 6.25; 15 MG/5ML; MG/5ML
5 SYRUP ORAL NIGHTLY PRN
Qty: 118 ML | Refills: 0 | Status: SHIPPED | OUTPATIENT
Start: 2024-02-13 | End: 2024-03-08

## 2024-02-15 ENCOUNTER — HOSPITAL ENCOUNTER (OUTPATIENT)
Dept: RADIOLOGY | Facility: HOSPITAL | Age: 59
Discharge: HOME | End: 2024-02-15
Attending: SPECIALIST
Payer: COMMERCIAL

## 2024-02-15 DIAGNOSIS — C20 RECTAL CANCER: ICD-10-CM

## 2024-02-15 PROCEDURE — A9552 F18 FDG: HCPCS

## 2024-02-15 PROCEDURE — 78815 PET IMAGE W/CT SKULL-THIGH: CPT | Mod: 26,PS,, | Performed by: RADIOLOGY

## 2024-02-15 PROCEDURE — 78815 PET IMAGE W/CT SKULL-THIGH: CPT | Mod: TC

## 2024-02-19 ENCOUNTER — HOSPITAL ENCOUNTER (OUTPATIENT)
Dept: RADIOLOGY | Facility: HOSPITAL | Age: 59
Discharge: HOME | End: 2024-02-19
Attending: INTERNAL MEDICINE
Payer: COMMERCIAL

## 2024-02-19 ENCOUNTER — PATIENT MESSAGE (OUTPATIENT)
Dept: HEMATOLOGY/ONCOLOGY | Facility: CLINIC | Age: 59
End: 2024-02-19

## 2024-02-19 DIAGNOSIS — C20 RECTAL CANCER: ICD-10-CM

## 2024-02-20 ENCOUNTER — PATIENT MESSAGE (OUTPATIENT)
Dept: UROLOGY | Facility: CLINIC | Age: 59
End: 2024-02-20

## 2024-02-21 ENCOUNTER — OFFICE VISIT (OUTPATIENT)
Dept: HEMATOLOGY/ONCOLOGY | Facility: CLINIC | Age: 59
End: 2024-02-21
Payer: COMMERCIAL

## 2024-02-21 ENCOUNTER — TELEPHONE (OUTPATIENT)
Dept: HEMATOLOGY/ONCOLOGY | Facility: CLINIC | Age: 59
End: 2024-02-21

## 2024-02-21 VITALS
OXYGEN SATURATION: 99 % | SYSTOLIC BLOOD PRESSURE: 114 MMHG | HEIGHT: 72 IN | HEART RATE: 97 BPM | TEMPERATURE: 101 F | RESPIRATION RATE: 18 BRPM | BODY MASS INDEX: 20.82 KG/M2 | DIASTOLIC BLOOD PRESSURE: 75 MMHG | WEIGHT: 153.69 LBS

## 2024-02-21 DIAGNOSIS — C20 RECTAL CANCER: Primary | ICD-10-CM
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
## 2024-02-21 DIAGNOSIS — R64 CACHEXIA: ICD-10-CM

## 2024-02-21 DIAGNOSIS — D49.4 BLADDER TUMOR: ICD-10-CM

## 2024-02-21 DIAGNOSIS — C78.01 MALIGNANT NEOPLASM METASTATIC TO BOTH LUNGS: ICD-10-CM

## 2024-02-21 DIAGNOSIS — Z79.899 IMMUNODEFICIENCY DUE TO CHEMOTHERAPY: ICD-10-CM

## 2024-02-21 DIAGNOSIS — D84.821 IMMUNODEFICIENCY DUE TO CHEMOTHERAPY: ICD-10-CM

## 2024-02-21 DIAGNOSIS — C78.02 MALIGNANT NEOPLASM METASTATIC TO BOTH LUNGS: ICD-10-CM

## 2024-02-21 DIAGNOSIS — D84.822 IMMUNODEFICIENCY SECONDARY TO RADIATION THERAPY: ICD-10-CM

## 2024-02-21 DIAGNOSIS — T45.1X5A IMMUNODEFICIENCY DUE TO CHEMOTHERAPY: ICD-10-CM

## 2024-02-21 DIAGNOSIS — C78.6 SECONDARY ADENOCARCINOMA OF RETROPERITONEUM: ICD-10-CM

## 2024-02-21 PROCEDURE — 99999 PR PBB SHADOW E&M-EST. PATIENT-LVL V: CPT | Mod: PBBFAC,,, | Performed by: INTERNAL MEDICINE

## 2024-02-21 PROCEDURE — 99215 OFFICE O/P EST HI 40 MIN: CPT | Mod: S$PBB,,, | Performed by: INTERNAL MEDICINE

## 2024-02-21 NOTE — PROGRESS NOTES
Subjective:       Patient ID: Sukhjinder Presley is a 58 y.o. male.    Chief Complaint: Results and Rectal Cancer    HPI:  58-year-old male history of metastatic progressive rectal carcinoma.  Patient returns with PET scan for review after demonstration of widespread metastatic disease ECOG status 3 a    Past Medical History:   Diagnosis Date    Anemia     Arthritis     Cancer     rectal    Renal disorder     kidney stones     Family History   Problem Relation Age of Onset    Intestinal malrotation Mother     Leukemia Father     No Known Problems Sister     Coronary artery disease Brother     Coronary artery disease Maternal Grandmother     Stomach cancer Maternal Grandfather      Social History     Socioeconomic History    Marital status: Significant Other   Tobacco Use    Smoking status: Every Day     Current packs/day: 1.00     Average packs/day: 1 pack/day for 40.1 years (40.1 ttl pk-yrs)     Types: Cigarettes     Start date: 1/2/1984    Smokeless tobacco: Former     Types: Chew    Tobacco comments:     no smoking after m.n prior to sx   Substance and Sexual Activity    Alcohol use: Not Currently     Alcohol/week: 46.0 standard drinks of alcohol     Types: 42 Cans of beer, 4 Shots of liquor per week     Comment: segrams 7, beer daily  hold now prior to surgery    Drug use: Never    Sexual activity: Yes     Partners: Female     Birth control/protection: None     Social Determinants of Health     Financial Resource Strain: High Risk (11/10/2023)    Overall Financial Resource Strain (CARDIA)     Difficulty of Paying Living Expenses: Very hard   Food Insecurity: Food Insecurity Present (11/10/2023)    Hunger Vital Sign     Worried About Running Out of Food in the Last Year: Often true     Ran Out of Food in the Last Year: Often true   Transportation Needs: Unmet Transportation Needs (11/10/2023)    PRAPARE - Transportation     Lack of Transportation (Medical): Yes     Lack of Transportation (Non-Medical): No   Physical  Activity: Sufficiently Active (11/10/2023)    Exercise Vital Sign     Days of Exercise per Week: 7 days     Minutes of Exercise per Session: 70 min   Stress: Stress Concern Present (11/10/2023)    [employer] - Occupational Stress Questionnaire     Feeling of Stress : Very much   Social Connections: Unknown (11/10/2023)    Social Connection and Isolation Panel [NHANES]     Frequency of Communication with Friends and Family: More than three times a week     Frequency of Social Gatherings with Friends and Family: Once a week     Active Member of Clubs or Organizations: Yes     Attends Club or Organization Meetings: 1 to 4 times per year     Marital Status: Living with partner   Housing Stability: High Risk (11/10/2023)    Housing Stability Vital Sign     Unable to Pay for Housing in the Last Year: Yes     Number of Places Lived in the Last Year: 1     Unstable Housing in the Last Year: No     Past Surgical History:   Procedure Laterality Date    ABDOMINOPERINEAL RESECTION OF RECTUM N/A 4/9/2020    Procedure: PROCTECTOMY, ABDOMINOPERINEAL;  Surgeon: Jan New MD;  Location: [facility] OR;  Service: General;  Laterality: N/A;    COLECTOMY, TOTAL N/A 2/24/2022    Procedure: COLECTOMY, TOTAL;  Surgeon: Jan New MD;  Location: [facility] OR;  Service: Colon and Rectal;  Laterality: N/A;    COLON SURGERY      COLONOSCOPY N/A 6/6/2019    Procedure: COLONOSCOPY;  Surgeon: Anjelica Saavedra MD;  Location: [facility] ENDO;  Service: Endoscopy;  Laterality: N/A;    COLONOSCOPY N/A 12/17/2021    Procedure: COLONOSCOPY;  Surgeon: Jan New MD;  Location: [facility] ENDO;  Service: General;  Laterality: N/A;    CREATION OF MUSCLE ROTATIONAL FLAP Left 4/9/2020    Procedure: CREATION, FLAP, MUSCLE ROTATION;  Surgeon: Sebastian Dan MD;  Location: [facility] OR;  Service: Plastics;  Laterality: Left;   VRAM    CYSTOSCOPIC LITHOLAPAXY N/A 2/24/2022    Procedure: CYSTOLITHOLAPAXY;  Surgeon: Sukhjinder PHILLIP  MD Garrett;  Location: [facility];  Service: Urology;  Laterality: N/A;    CYSTOSCOPY W/ URETERAL STENT PLACEMENT Right 10/6/2023    Procedure: CYSTOSCOPY, WITH URETERAL STENT INSERTION;  Surgeon: Sukhjinder Tucker MD;  Location: [facility] OR;  Service: Urology;  Laterality: Right;    CYSTOSCOPY WITH BIOPSY OF BLADDER N/A 9/11/2023    Procedure: CYSTOSCOPY, WITH BLADDER BIOPSY, WITH FULGURATION IF INDICATED;  Surgeon: Sukhjinder Tucker MD;  Location: [facility] OR;  Service: Urology;  Laterality: N/A;  , POSS TURBT    CYSTOSCOPY WITH URETEROSCOPY, RETROGRADE PYELOGRAPHY, AND INSERTION OF STENT N/A 9/11/2023    Procedure: CYSTOSCOPY, WITH RETROGRADE PYELOGRAM AND URETERAL STENT INSERTION;  Surgeon: Sukhjinder Tucker MD;  Location: [facility];  Service: Urology;  Laterality: N/A;    CYSTOSCOPY WITH URETEROSCOPY, RETROGRADE PYELOGRAPHY, AND INSERTION OF STENT Right 1/8/2024    Procedure: CYSTOSCOPY, WITH RETROGRADE PYELOGRAM AND URETERAL STENT INSERTION;  Surgeon: Sukhjinder Tucker MD;  Location: [facility] OR;  Service: Urology;  Laterality: Right;  stent exchange    DILATION OF URETHRA N/A 9/11/2023    Procedure: DILATION, URETHRA;  Surgeon: Sukhjinder Tucker MD;  Location: [facility];  Service: Urology;  Laterality: N/A;    ESOPHAGOGASTRODUODENOSCOPY N/A 6/6/2019    Procedure: EGD (ESOPHAGOGASTRODUODENOSCOPY);  Surgeon: Anjelica Saavedra MD;  Location: [facility];  Service: Endoscopy;  Laterality: N/A;    FLAP GRAFT SURGERY N/A 4/9/2020    Procedure: FLAP GRAFT;  Surgeon: Sebastian Dan MD;  Location: [facility] OR;  Service: Plastics;  Laterality: N/A;  left fasciocutaneous buttocks flap    HERNIA REPAIR  1995    INJECTION OF ANESTHETIC AGENT INTO TISSUE PLANE DEFINED BY TRANSVERSUS ABDOMINIS MUSCLE N/A 2/18/2020    Procedure: BLOCK, TRANSVERSUS ABDOMINIS PLANE;  Surgeon: Jan New MD;  Location: [facility];  Service: General;  Laterality: N/A;    INJECTION OF ANESTHETIC AGENT INTO TISSUE PLANE DEFINED BY  TRANSVERSUS ABDOMINIS MUSCLE  2/24/2022    Procedure: BLOCK, TRANSVERSUS ABDOMINIS PLANE;  Surgeon: Jan New MD;  Location: [facility] OR;  Service: Colon and Rectal;;    INSERTION OF TUNNELED CENTRAL VENOUS CATHETER (CVC) WITH SUBCUTANEOUS PORT N/A 10/8/2019    Procedure: [id number];  Surgeon: Jan New MD;  Location: [facility] OR;  Service: General;  Laterality: N/A;    LAPAROSCOPIC COLOSTOMY      MEDIPORT REMOVAL N/A 8/13/2020    Procedure: REMOVAL, CATHETER, CENTRAL VENOUS, TUNNELED, WITH PORT;  Surgeon: Sebastian Dan MD;  Location: [facility] OR;  Service: Plastics;  Laterality: N/A;    TONSILLECTOMY      TRANSURETHRAL RESECTION OF PROSTATE N/A 6/7/2021    Procedure: TURP (TRANSURETHRAL RESECTION OF PROSTATE), CYSTOLITHALOPAXY;  Surgeon: Sukhjinder Tucker MD;  Location: [facility] OR;  Service: Urology;  Laterality: N/A;    TURBT (TRANSURETHRAL RESECTION OF BLADDER TUMOR) N/A 10/6/2023    Procedure: TURBT (TRANSURETHRAL RESECTION OF BLADDER TUMOR);  Surgeon: Sukhjinder Tucker MD;  Location: [facility] OR;  Service: Urology;  Laterality: N/A;    WOUND DEBRIDEMENT N/A 8/13/2020    Procedure: DEBRIDEMENT, WOUND;  Surgeon: Sebastian Dan MD;  Location: [facility] OR;  Service: Plastics;  Laterality: N/A;  layered closure       Labs:  Lab Results   Component Value Date    WBC 7.97 02/19/2024    HGB 10.4 (L) 02/19/2024    HCT 31.5 (L) 02/19/2024    MCV 85 02/19/2024     02/19/2024     BMP  Lab Results   Component Value Date     (L) 02/19/2024    K 4.5 02/19/2024     02/19/2024    CO2 20 (L) 02/19/2024    BUN 25 (H) 02/19/2024    CREATININE 1.7 (H) 02/19/2024    CALCIUM 9.3 02/19/2024    ANIONGAP 10 02/19/2024    ESTGFRAFRICA >60 07/20/2022    EGFRNONAA >60 07/20/2022     Lab Results   Component Value Date    ALT 9 (L) 02/19/2024    AST 9 (L) 02/19/2024    ALKPHOS 135 02/19/2024    BILITOT 0.4 02/19/2024       Lab Results   Component Value Date    IRON 24 (L) 02/19/2024    TIBC 237 (L)  02/19/2024    FERRITIN 1,157 (H) 02/19/2024     Lab Results   Component Value Date    CNOLICJU15 423 10/09/2023     Lab Results   Component Value Date    FOLATE 5.7 10/09/2023     Lab Results   Component Value Date    TSH 0.966 10/02/2023         Review of Systems   Constitutional:  Positive for fatigue. Negative for activity change, appetite change, chills, diaphoresis, fever and unexpected weight change.   HENT:  Negative for congestion, dental problem, drooling, ear discharge, ear pain, facial swelling, hearing loss, mouth sores, nosebleeds, postnasal drip, rhinorrhea, sinus pressure, sneezing, sore throat, tinnitus, trouble swallowing and voice change.    Eyes:  Negative for photophobia, pain, discharge, redness, itching and visual disturbance.   Respiratory:  Negative for apnea, cough, choking, chest tightness, shortness of breath, wheezing and stridor.    Cardiovascular:  Negative for chest pain, palpitations and leg swelling.   Gastrointestinal:  Negative for abdominal distention, abdominal pain, anal bleeding, blood in stool, constipation, diarrhea, nausea, rectal pain and vomiting.   Endocrine: Negative for cold intolerance, heat intolerance, polydipsia, polyphagia and polyuria.   Genitourinary:  Negative for decreased urine volume, difficulty urinating, dysuria, enuresis, flank pain, frequency, genital sores, hematuria, penile discharge, penile pain, penile swelling, scrotal swelling, testicular pain and urgency.   Musculoskeletal:  Negative for arthralgias, back pain, gait problem, joint swelling, myalgias, neck pain and neck stiffness.   Skin:  Negative for color change, pallor, rash and wound.   Allergic/Immunologic: Negative for environmental allergies, food allergies and immunocompromised state.   Neurological:  Positive for weakness. Negative for dizziness, tremors, seizures, syncope, facial asymmetry, speech difficulty, light-headedness, numbness and headaches.   Hematological:  Negative for  adenopathy. Does not bruise/bleed easily.   Psychiatric/Behavioral:  Positive for dysphoric mood. Negative for agitation, behavioral problems, confusion, decreased concentration, hallucinations, self-injury, sleep disturbance and suicidal ideas. The patient is nervous/anxious. The patient is not hyperactive.        Objective:      Physical Exam  Vitals reviewed.   Constitutional:       General: He is not in acute distress.     Appearance: He is well-developed. He is ill-appearing. He is not diaphoretic.   HENT:      Head: Normocephalic.      Right Ear: External ear normal.      Left Ear: External ear normal.      Nose: Nose normal.      Right Sinus: No maxillary sinus tenderness or frontal sinus tenderness.      Left Sinus: No maxillary sinus tenderness or frontal sinus tenderness.      Mouth/Throat:      Pharynx: No oropharyngeal exudate.   Eyes:      General: Lids are normal. No scleral icterus.        Right eye: No discharge.         Left eye: No discharge.      Extraocular Movements:      Right eye: Normal extraocular motion.      Left eye: Normal extraocular motion.      Conjunctiva/sclera:      Right eye: Right conjunctiva is not injected. No hemorrhage.     Left eye: Left conjunctiva is not injected. No hemorrhage.     Pupils: Pupils are equal, round, and reactive to light.   Neck:      Thyroid: No thyromegaly.      Vascular: No JVD.      Trachea: No tracheal deviation.   Cardiovascular:      Rate and Rhythm: Normal rate.   Pulmonary:      Effort: Pulmonary effort is normal. No respiratory distress.      Breath sounds: Normal breath sounds. No stridor.   Abdominal:      General: Bowel sounds are normal.      Palpations: Abdomen is soft. There is no hepatomegaly, splenomegaly or mass.      Tenderness: There is no abdominal tenderness.   Musculoskeletal:         General: No tenderness. Normal range of motion.      Cervical back: Normal range of motion and neck supple.   Lymphadenopathy:      Head:      Right  side of head: No posterior auricular or occipital adenopathy.      Left side of head: No posterior auricular or occipital adenopathy.      Cervical: No cervical adenopathy.      Right cervical: No superficial, deep or posterior cervical adenopathy.     Left cervical: No superficial, deep or posterior cervical adenopathy.      Upper Body:      Right upper body: No supraclavicular adenopathy.      Left upper body: No supraclavicular adenopathy.   Skin:     General: Skin is dry.      Findings: No erythema or rash.      Nails: There is no clubbing.   Neurological:      Mental Status: He is alert and oriented to person, place, and time.      Cranial Nerves: No cranial nerve deficit.      Motor: Weakness present.      Coordination: Coordination abnormal.      Gait: Gait abnormal.   Psychiatric:         Behavior: Behavior normal.         Thought Content: Thought content normal.         Judgment: Judgment normal.             Assessment:      1. Rectal cancer    2. 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 Immunodeficiency due to chemotherapy    5. Malignant neoplasm metastatic to both lungs    6. Cachexia    7. Secondary adenocarcinoma of retroperitoneum           Med Onc Chart Routing      Follow up with physician    Follow up with REE    Infusion scheduling note    Injection scheduling note    Labs    Imaging    Pharmacy appointment    Other referrals         Referral to [facility]              Plan:     Extensive conversation reviewed information with patient and wife reviewed images personally patient now has widespread disease declining performance status ECOG status 3 I have talked to them about the pros and cons of systemic treatment in this setting.  At this time they made a decision to proceed with hospice care discussed hospice philosophy with them in outpatient will ask  to see make referral I have told him to cancel all further appointments with physicians and  will allow hospice to resume in presumed care.  Discussed implications of answered questions with him        Ken Cosby Jr, MD FACP

## 2024-02-21 NOTE — TELEPHONE ENCOUNTER
SW consulted re: [facility] referral per message from clinic staff per pt/family request. SW attempted to reach pt/spouse via phone. Left vm on pt's phone. SW faxed referral to [facility]. P. [phone], F. [phone]. GUERITA will follow up later this week to confirm status of the above. SW will remain available.

## 2024-02-22 ENCOUNTER — DOCUMENTATION ONLY (OUTPATIENT)
Dept: HEMATOLOGY/ONCOLOGY | Facility: CLINIC | Age: 59
End: 2024-02-22

## 2024-02-22 NOTE — PROGRESS NOTES
Marie with [facility] [phone] confirmed that referral was received and they are meeting with pt/family today. [facility] will contact SW with outcome of visit. SW will remain available.

## 2024-02-26 ENCOUNTER — DOCUMENTATION ONLY (OUTPATIENT)
Dept: HEMATOLOGY/ONCOLOGY | Facility: CLINIC | Age: 59
End: 2024-02-26

## 2024-02-26 NOTE — PROGRESS NOTES
SW confirmed with [facility] ([facility]) that pt admitted to their services on 2/23/24. SW will remain available.

## 2024-03-11 DIAGNOSIS — G89.3 NEOPLASM RELATED PAIN: ICD-10-CM

## 2024-03-11 DIAGNOSIS — C20 RECTAL CANCER: ICD-10-CM

## 2024-03-11 RX ORDER — OXYCODONE 9 MG/1
9 CAPSULE, EXTENDED RELEASE ORAL EVERY 12 HOURS
Qty: 60 EACH | Refills: 0 | OUTPATIENT
Start: 2024-03-11 | End: 2024-04-10

## 2024-03-22 ENCOUNTER — PATIENT MESSAGE (OUTPATIENT)
Dept: HEMATOLOGY/ONCOLOGY | Facility: CLINIC | Age: 59
End: 2024-03-22

## 2024-03-25 ENCOUNTER — TELEPHONE (OUTPATIENT)
Dept: HEMATOLOGY/ONCOLOGY | Facility: CLINIC | Age: 59
End: 2024-03-25

## 2024-03-25 NOTE — TELEPHONE ENCOUNTER
GUERITA spoke with sig bimal Son re: pt's request for a medical emergency letter for [employer] as SW unable to reach pt. Sig other informed that MD is out of the office today, but SW will f/u to inform when letter is ready. No other needs expressed. SW will remain available.

## 2024-04-01 ENCOUNTER — TELEPHONE (OUTPATIENT)
Dept: HEMATOLOGY/ONCOLOGY | Facility: CLINIC | Age: 59
End: 2024-04-01

## 2024-04-01 NOTE — TELEPHONE ENCOUNTER
GASTROENTEROLOGY CONSULTATION      Date of Admission: 6/2/2020       Date of Consult: 6/6/2020          Chief Complaint:   We were asked by Pio Vallejo MD  to evaluate this patient with altered mental status         ASSESSMENT AND RECOMMENDATIONS:   Assessment:  Monalisa Olguin is a 54 year old female with a history of decompensated ETOH cirrhosis complicated by hepatic encephalopathy and ascites, medication non-adherence, CKD, solitary kidney, who presented with encephalopathy, found to have electrolytes derangement and ALFREDO. GI has been consulted for further evaluation.     # Decompensated liver cirrhosis:  - Etiology: Alcohol  - MELD-Na, MELD: 33  - HE: grade II on lactulose 20 mg TID and rifaximin 550 mg BID. Encephalopathy likely multifactorial from electrolytes disturbances, HE related to non-compliance  - Ascites: Moderate on US from 04/2020, gets PRN paracentesis and on 20 mg lasix at home. Diuretics limited by kidney function and solitary kidney   - EV: Not seen on EGD 4/29/19  - HCC screening: No focal lesions on most recent abdominal imaging  - Liver Tx evaluation: Undergoing evaluation with Dr. Kovacs, limited by neuropsychiatric status and concern for underlying dementia most likely secondary to chronic EtOH    Recommendations  - Neuro-checks Q4H  - Continue with rifaximin and lactulose, titrate for 3 BMs daily  - Please obtain a RUQ US with doppler  - Diagnostic paracentesis if + for ascites. Please send fluid for WBCs, diff, albumin, protein, gram stain and culture  - Daily MELD labs  - Continue urosdiol BID  - Low salt diet  - Protein calorie supplementation  - Will continue to follow with you    Gastroenterology follow up recommendations: TBD      Patient care plan discussed with Dr. Leventhal, GI staff physician. Thank you for involving us in this patient's care. Please do not hesitate to contact the GI service with any questions or concerns.     Imelda Carrasco MD  GI  GUERITA spoke with sig other Adelaida to inform that medical necessity letter for [employer] was ready. GUERITA emailed a copy and placed original in mail per Adelaida's request. Adelaida also inquired about a disability letter. GUERITA explained that MD provided an FMLA letter in 12/2023 good for 6 months that GUERITA can re-send to her, but any additional letters would need to be discussed with nurse RODRIGO Prater that handles FMLA/Disability. GUERITA emailed a copy of the FMLA letter as well. No other needs expressed. GUERITA will remain available.    Fellow  #[id number]  -------------------------------------------------------------------------------------------------------------------   History is obtained from the patient and the medical record.          History of Present Illness:   Monalisa Olguin is a 54 year old female with a history of decompensated ETOH cirrhosis complicated by hepatic encephalopathy and ascites, medication non-adherence, CKD, solitary kidney, who presented with encephalopathy, found to have electrolytes derangement and ALFREDO. GI has been consulted for further evaluation.   Patient brought to ED by her  4 day ago with increased confusion and N/V. She was also feeling weak. Per patient she was compliant to her home medications. She denies recent alcohol use. No reported abdominal pain, GI bleeding, fevers or chills. No other Gi or systemic symptoms. Of note she had a recent fall with reported pain in her foot on presentation. Now more hx is limited by mental status             Past Medical History:   Reviewed and edited as appropriate  Past Medical History:   Diagnosis Date     Acute alcoholic intoxication in alcoholism (H) 3/27/2018     Acute renal failure (H) 11/11/2019     Alcohol abuse 5/2/2013     Alcohol dependence 5/25/2013     Alcohol withdrawal 5/2/2013     Alcoholic cirrhosis (H)      Anxiety 10/10/2011     CKD (chronic kidney disease) stage 3, GFR 30-59 ml/min (H) 2006    last GFR was 42     CKD (chronic kidney disease) stage 3, GFR 30-59 ml/min (H) 2/22/2011     CKD (chronic kidney disease) stage 5, GFR less than 15 ml/min (H) 4/14/2020     Depressive disorder      Esophageal reflux 10/7/2002     Hematochezia-patient reported 11/11/2019     Heme positive stool 3/27/2018     Hepatic encephalopathy (H) 11/11/2019     History of blood transfusion     2020 [facility]     Hypertension goal BP (blood pressure) < 130/80 7/12/2011     Hyponatremia 11/11/2019     Moderate Depression [296.32] 12/22/2009    stable on wellbutrin     Other  internal derangement of knee(717.89)     ACL Internal derangement, knee/ original injury in 5th grade, torn cartilage     Pap smear     no abnormals, due for paps q 2-3 yrs     SBP (spontaneous bacterial peritonitis) (H) 2019     Thyroid disease      Unspecified essential hypertension             Past Surgical History:   Reviewed and edited as appropriate   Past Surgical History:   Procedure Laterality Date     C  DELIVERY ONLY      , Low Cervical     C RMV,KIDNEY,DONOR,LIVING      donated kidney to sister     COLONOSCOPY N/A 2017    Procedure: COLONOSCOPY;  Colonoscopy;  Surgeon: Sai Johnston MD;  Location:  GI     ESOPHAGOSCOPY, GASTROSCOPY, DUODENOSCOPY (EGD), COMBINED N/A 2017    Procedure: COMBINED ESOPHAGOSCOPY, GASTROSCOPY, DUODENOSCOPY (EGD), BIOPSY SINGLE OR MULTIPLE;  ESOPHAGOSCOPY, GASTROSCOPY, DUODENOSCOPY (EGD) with biopsies;  Surgeon: Sai Johnston MD;  Location:  GI     ESOPHAGOSCOPY, GASTROSCOPY, DUODENOSCOPY (EGD), COMBINED N/A 2019    Procedure: ESOPHAGOGASTRODUODENOSCOPY, WITH BIOPSY;  Surgeon: Edgardo Scott DO;  Location: [facility]     HC KNEE SCOPE, DIAGNOSTIC  01    Arthroscopy, Lt Knee     LAPAROSCOPIC CHOLECYSTECTOMY N/A 2017    Procedure: LAPAROSCOPIC CHOLECYSTECTOMY;  laparoscopic cholecystectomy;  Surgeon: Romeo Pastor MD;  Location: [facility] OR     OPEN REDUCTION INTERNAL FIXATION WRIST Right 2017    Procedure: OPEN REDUCTION INTERNAL FIXATION WRIST;  OPEN REDUCTION INTERNAL FIXATION RIGHT WRIST;  Surgeon: Edgardo Almendarez MD;  Location: [facility] OR     TRANSPLANT      Donated Left Kidney in             Previous Endoscopy:     Results for orders placed or performed during the hospital encounter of 17   COLONOSCOPY   Result Value Ref Range    COLONOSCOPY       [address] [address] [phone]     Endoscopy  Department  _______________________________________________________________________________  Patient Name: Monalisa Olguin          Procedure Date: 9/8/2017 2:39 PM  MRN: [id number]                       Account Number: [id number]  YOB: 1965              Admit Type: Outpatient  Age: 51                               Room: Room 1  Gender: Female                        Note Status: Finalized  Attending MD: Sai Johnston MD      Total Sedation Time:   _______________________________________________________________________________     Procedure:           Colonoscopy  Indications:         Screening for colorectal malignant neoplasm, This is the                        patient's first colonoscopy  Providers:           Sai Johnston MD  Referring MD:        Efren Bustos MD  Medicines:           Midazolam 5 mg IV, Fentanyl 50 micrograms IV  Complications:       No immedia te complications.  _______________________________________________________________________________  Procedure:           Pre-Anesthesia Assessment:                       - ASA Grade Assessment: I - A normal, healthy patient.                       After obtaining informed consent, the colonoscope was                        passed under direct vision. Throughout the procedure,                        the patient's blood pressure, pulse, and oxygen                        saturations were monitored continuously. The Colonoscope                        was introduced through the anus with the intention of                        advancing to the cecum. The scope was advanced to the                        transverse colon before the procedure was aborted.                        Medications were given. The patient tolerated the                        procedure well. The quality of the bowel preparation was                        excellent.                                                                                    Findings:        Internal hemorrhoids were found during retroflexion. The hemorrhoids        were Grade I (internal hemorrhoids that do not prolapse).       The proximal transverse colon and the hepatic flexure were significantly        angulated and produced significant looping of the colonoscope despite        different maneuvers, positions, etc. The procedure was aborted due to        patient discomfort (additional medications were given to the point of        vital sgn tolerence and despite this patient discomfort continued).       No recurrent neoplastic or malignant processes were noted during this        study to the extent it was visualized.                                                                                   Moderate Sedation:       Moderate (conscious) sedation was administered by the endoscopy nurse        and supervised by the endoscopist. The following parameters were        monitored: oxygen saturation, heart rate, blood pressure, res piratory        rate, EKG, adequacy of pulmonary ventilation, and response to care.        Total physician intraservice time was 16 minutes.  Impression:          - Internal hemorrhoids.                       - There was significant looping of the colon.                       - No specimens collected.                       - The exam was suboptimal due to patient discomfort.  Recommendation:      - Discharge patient to home.                       - Reschedule colonoscopy with MAC                       - This patient may now resume his/her routine                        activities, diet, and medications.                       - Patient has a contact number available for                        emergencies. The signs and symptoms of potential delayed                        complications were discussed with the patient. Return to                        normal activities tomorrow. Written discharge                        instructions were provided to the patient.                                                                                       _________________  Sai Johnston MD  9/8/2017 3:15:28 PM  I was physically present for the entire viewing portion of the exam.Sai Johnston MD  Number of Addenda: 0    Note Initiated On: 9/8/2017 2:39 PM              Social History:   Reviewed and edited as appropriate  Social History     Socioeconomic History     Marital status:      Spouse name: Eron     Number of children: 3     Years of education: 14     Highest education level: Not on file   Occupational History     Occupation: Homemaker   Social Needs     Financial resource strain: Not very hard     Food insecurity     Worry: Never true     Inability: Never true     Transportation needs     Medical: No     Non-medical: No   Tobacco Use     Smoking status: Current Every Day Smoker     Packs/day: 0.50     Years: 10.00     Pack years: 5.00     Types: Cigarettes     Smokeless tobacco: Never Used     Tobacco comment: pt quit 1992 after smoking for 8 yrs, started again in 2004   Substance and Sexual Activity     Alcohol use: Not Currently     Alcohol/week: 0.0 standard drinks     Comment: Quit drinking 9/2019     Drug use: Not Currently     Types: Marijuana     Sexual activity: Yes     Partners: Male     Birth control/protection: Surgical     Comment:  had vasectomy   Lifestyle     Physical activity     Days per week: 0 days     Minutes per session: 0 min     Stress: Rather much   Relationships     Social connections     Talks on phone: Not on file     Gets together: Not on file     Attends Qu[employer] service: Not on file     Active member of club or organization: Not on file     Attends meetings of clubs or organizations: Not on file     Relationship status: Not on file     Intimate partner violence     Fear of current or ex partner: Not on file     Emotionally abused: Not on file     Physically abused: Not on file     Forced sexual activity: Not on file   Other Topics Concern      " Service No     Blood Transfusions No     Caffeine Concern No     Occupational Exposure No     Hobby Hazards No     Sleep Concern No     Stress Concern No     Weight Concern No     Special Diet No     Back Care No     Exercise No     Bike Helmet No     Seat Belt Yes     Self-Exams No     Parent/sibling w/ CABG, MI or angioplasty before 65F 55M? Yes   Social History Narrative     and lives at home with her  and her son, her two daughters are out of the house            Family History:   Reviewed and edited as appropriate  Family History   Problem Relation Age of Onset     Hypertension Mother         80 years old     Heart Disease Paternal Grandmother      Heart Disease Paternal Grandfather      Cerebrovascular Disease Maternal Grandmother      Cerebrovascular Disease Maternal Grandfather      Alcohol/Drug Maternal Grandfather      Substance Abuse Maternal Grandfather      Heart Disease Father         Silent MI stents x 2     Diabetes Sister 17        older sister also had kidney and pancreas transplant     Heart Disease Sister         MI secondary to diabetes     Genitourinary Problems Sister 43        kidney transplant from renal failure     Other - See Comments Sister         older     Other - See Comments Sister         younger     Diabetes Sister 9        youngest, juvenile type I (onset age 9 with no complications)     Cancer Paternal Aunt         ?kind           Allergies:   Reviewed and edited as appropriate     Allergies   Allergen Reactions     Albuterol      Tongue \"hardened and painful\"            Medications:     Current Facility-Administered Medications   Medication     acetaminophen (TYLENOL) tablet 500 mg     albumin human 25 % injection 50 g     [Held by provider] ARIPiprazole (ABILIFY) tablet 5 mg     [Held by provider] busPIRone (BUSPAR) tablet 15 mg     ciprofloxacin (CIPRO) tablet 250 mg     Daily 2 GRAM acetaminophen limit, unless fulminent liver failure or transaminases " greater than or equal to 300 - 400, then none     FLUoxetine (PROzac) capsule 60 mg     folic acid (FOLVITE) tablet 1 mg     [Held by provider] gabapentin (NEURONTIN) capsule 300 mg     heparin lock flush 10 UNIT/ML injection 2-5 mL     lactulose (CHRONULAC) solution 20 g     lactulose (CHRONULAC) solution 20 g     lidocaine (LMX4) cream     lidocaine (LMX4) cream     lidocaine 1 % 0.1-1 mL     lidocaine 1 % 0.1-1 mL     melatonin tablet 1 mg     midodrine (PROAMATINE) tablet 10 mg     naloxone (NARCAN) injection 0.1-0.4 mg     omeprazole (priLOSEC) CR capsule 20 mg     ondansetron (ZOFRAN-ODT) ODT tab 4 mg    Or     ondansetron (ZOFRAN) injection 4 mg     prochlorperazine (COMPAZINE) injection 10 mg    Or     prochlorperazine (COMPAZINE) tablet 10 mg    Or     prochlorperazine (COMPAZINE) Suppository 25 mg     rifaximin (XIFAXAN) tablet 550 mg     sodium chloride (PF) 0.9% PF flush 3 mL     sodium chloride (PF) 0.9% PF flush 3 mL     sodium chloride (PF) 0.9% PF flush 5-50 mL     [START ON 6/7/2020] thiamine (B-1) 250 mg in sodium chloride 0.9 % 50 mL intermittent infusion    Followed by     [START ON 6/12/2020] vitamin B1 (THIAMINE) tablet 100 mg     [Held by provider] traZODone (DESYREL) tablet 100 mg     ursodiol (ACTIGALL) tablet 500 mg             Review of Systems:   A complete review of systems was performed and is negative except as noted in the HPI           Physical Exam:   /50 (BP Location: Right arm)   Pulse 70   Temp 97.3  F (36.3  C) (Oral)   Resp 16   Ht 1.524 m (5')   Wt 60.8 kg (134 lb)   LMP 05/16/2012   SpO2 94%   BMI 26.17 kg/m    Wt:   Wt Readings from Last 2 Encounters:   06/05/20 60.8 kg (134 lb)   05/26/20 (P) 61.2 kg (135 lb)      Constitutional: confused, no acute distress  Eyes: Sclera icteric/injected  Neck: supple, thyroid normal size  CV: No edema  Respiratory: Unlabored breathing  Lymph: No axillary, submandibular, supraclavicular or inguinal lymphadenopathy  Abd:  Nondistended, +bs, no hepatosplenomegaly, nontender, no peritoneal signs  Skin: warm, perfused, + jaundice  Neuro: AAO x 2, +asterixis         Data:   Labs and imaging below were independently reviewed and interpreted    BMP  Recent Labs   Lab 06/06/20 0441 06/05/20  1611 06/05/20  0530 06/04/20 2020    134 136 132*   POTASSIUM 3.5 3.4 4.0 4.2   CHLORIDE 112* 108 109 105   ELSIE 9.6 9.5 9.8 9.6   CO2 19* 22 22 22   BUN 57* 55* 59* 55*   CR 2.83* 2.80* 2.91* 2.46*   GLC 90 108* 97 108*     CBC  Recent Labs   Lab 06/06/20 0441 06/05/20  0729 06/05/20 0530 06/04/20  0536   WBC 6.6  --  6.6 6.5 3.3*   RBC 2.29*  --  2.10* 2.00* 2.13*   HGB 7.6*   < > 7.2* 6.7* 7.2*   HCT 23.8*  --  22.1* 20.9* 21.9*   *  --  105* 105* 103*   MCH 33.2*  --  34.3* 33.5* 33.8*   MCHC 31.9  --  32.6 32.1 32.9   RDW 24.4*  --  23.8* 23.7* 22.9*   PLT 36*  --  42* 37* 41*    < > = values in this interval not displayed.     INR  Recent Labs   Lab 06/06/20 0441 06/05/20 0530 06/04/20  0536 06/03/20  0459   INR 2.09* 2.24* 1.91* 1.87*     LFTs  Recent Labs   Lab 06/05/20 0530 06/04/20  0536 06/03/20  1737 06/03/20 0459 06/02/20  1744   ALKPHOS 193* 211*  --  244* 267*   AST 38 38  --  37 42   ALT 22 22  --  21 24   BILITOTAL 9.2* 10.5*  --  13.0* 14.8*   PROTTOTAL 4.1* 4.6*  --  5.0* 5.4*   ALBUMIN 2.0* 2.3* 2.5* 2.5* 2.8*      PANCNo lab results found in last 7 days.    Imaging

## 2024-06-19 NOTE — TELEPHONE ENCOUNTER
Returned call to pt's wife in response to her email.  She states that she sent the email after 6pm on Friday and really didn't expect anyone to see it until Monday morning.  She states pt have quite a bit of rectal pain and having a lot of mucous in very loose bowel movements.  Pt had a colonoscopy last week and a colonic mass was found in the rectum.  He has an appt to see Dr. New tomorrow and she states the pt is at work today.  They are fine to wait until tomorrow.   Patient/Caregiver provided printed discharge information.

## 2024-08-14 ENCOUNTER — OFFICE VISIT (OUTPATIENT)
Dept: HEMATOLOGY/ONCOLOGY | Facility: CLINIC | Age: 59
End: 2024-08-14

## 2024-08-14 ENCOUNTER — TELEPHONE (OUTPATIENT)
Dept: HEMATOLOGY/ONCOLOGY | Facility: CLINIC | Age: 59
End: 2024-08-14

## 2024-08-14 ENCOUNTER — LAB VISIT (OUTPATIENT)
Dept: LAB | Facility: HOSPITAL | Age: 59
End: 2024-08-14
Attending: INTERNAL MEDICINE

## 2024-08-14 VITALS
HEART RATE: 71 BPM | BODY MASS INDEX: 19.92 KG/M2 | WEIGHT: 147.06 LBS | TEMPERATURE: 98 F | OXYGEN SATURATION: 98 % | DIASTOLIC BLOOD PRESSURE: 64 MMHG | SYSTOLIC BLOOD PRESSURE: 105 MMHG | HEIGHT: 72 IN

## 2024-08-14 DIAGNOSIS — C78.02 MALIGNANT NEOPLASM METASTATIC TO BOTH LUNGS: ICD-10-CM

## 2024-08-14 DIAGNOSIS — C78.01 MALIGNANT NEOPLASM METASTATIC TO BOTH LUNGS: ICD-10-CM

## 2024-08-14 DIAGNOSIS — C78.6 SECONDARY ADENOCARCINOMA OF RETROPERITONEUM: ICD-10-CM

## 2024-08-14 DIAGNOSIS — D75.1 POLYCYTHEMIA: ICD-10-CM

## 2024-08-14 DIAGNOSIS — C20 RECTAL CANCER: Primary | ICD-10-CM

## 2024-08-14 DIAGNOSIS — D50.0 IRON DEFICIENCY ANEMIA DUE TO CHRONIC BLOOD LOSS: Chronic | ICD-10-CM

## 2024-08-14 DIAGNOSIS — C20 RECTAL CANCER: ICD-10-CM

## 2024-08-14 LAB
ALBUMIN SERPL BCP-MCNC: 3.6 G/DL (ref 3.5–5.2)
ALP SERPL-CCNC: 86 U/L (ref 55–135)
ALT SERPL W/O P-5'-P-CCNC: 11 U/L (ref 10–44)
ANION GAP SERPL CALC-SCNC: 9 MMOL/L (ref 8–16)
AST SERPL-CCNC: 12 U/L (ref 10–40)
BASOPHILS # BLD AUTO: 0.06 K/UL (ref 0–0.2)
BASOPHILS NFR BLD: 0.7 % (ref 0–1.9)
BILIRUB SERPL-MCNC: 0.2 MG/DL (ref 0.1–1)
BUN SERPL-MCNC: 27 MG/DL (ref 6–20)
CALCIUM SERPL-MCNC: 9.6 MG/DL (ref 8.7–10.5)
CEA SERPL-MCNC: 2.6 NG/ML (ref 0–5)
CHLORIDE SERPL-SCNC: 107 MMOL/L (ref 95–110)
CO2 SERPL-SCNC: 21 MMOL/L (ref 23–29)
CREAT SERPL-MCNC: 1.2 MG/DL (ref 0.5–1.4)
DIFFERENTIAL METHOD BLD: ABNORMAL
EOSINOPHIL # BLD AUTO: 0.3 K/UL (ref 0–0.5)
EOSINOPHIL NFR BLD: 3.1 % (ref 0–8)
ERYTHROCYTE [DISTWIDTH] IN BLOOD BY AUTOMATED COUNT: 13.3 % (ref 11.5–14.5)
EST. GFR  (NO RACE VARIABLE): >60 ML/MIN/1.73 M^2
GLUCOSE SERPL-MCNC: 98 MG/DL (ref 70–110)
HCT VFR BLD AUTO: 37.4 % (ref 40–54)
HGB BLD-MCNC: 12.5 G/DL (ref 14–18)
IMM GRANULOCYTES # BLD AUTO: 0.02 K/UL (ref 0–0.04)
IMM GRANULOCYTES NFR BLD AUTO: 0.2 % (ref 0–0.5)
LDH SERPL L TO P-CCNC: 221 U/L (ref 110–260)
LYMPHOCYTES # BLD AUTO: 2 K/UL (ref 1–4.8)
LYMPHOCYTES NFR BLD: 22.9 % (ref 18–48)
MCH RBC QN AUTO: 27.9 PG (ref 27–31)
MCHC RBC AUTO-ENTMCNC: 33.4 G/DL (ref 32–36)
MCV RBC AUTO: 84 FL (ref 82–98)
MONOCYTES # BLD AUTO: 0.4 K/UL (ref 0.3–1)
MONOCYTES NFR BLD: 4.6 % (ref 4–15)
NEUTROPHILS # BLD AUTO: 6 K/UL (ref 1.8–7.7)
NEUTROPHILS NFR BLD: 68.5 % (ref 38–73)
NRBC BLD-RTO: 0 /100 WBC
PLATELET # BLD AUTO: 272 K/UL (ref 150–450)
PMV BLD AUTO: 8.7 FL (ref 9.2–12.9)
POTASSIUM SERPL-SCNC: 4.6 MMOL/L (ref 3.5–5.1)
PROT SERPL-MCNC: 8.1 G/DL (ref 6–8.4)
RBC # BLD AUTO: 4.48 M/UL (ref 4.6–6.2)
SODIUM SERPL-SCNC: 137 MMOL/L (ref 136–145)
WBC # BLD AUTO: 8.76 K/UL (ref 3.9–12.7)

## 2024-08-14 PROCEDURE — 85025 COMPLETE CBC W/AUTO DIFF WBC: CPT | Performed by: INTERNAL MEDICINE

## 2024-08-14 PROCEDURE — 99214 OFFICE O/P EST MOD 30 MIN: CPT | Mod: S$PBB,,, | Performed by: INTERNAL MEDICINE

## 2024-08-14 PROCEDURE — 82378 CARCINOEMBRYONIC ANTIGEN: CPT | Performed by: INTERNAL MEDICINE

## 2024-08-14 PROCEDURE — 36415 COLL VENOUS BLD VENIPUNCTURE: CPT | Performed by: INTERNAL MEDICINE

## 2024-08-14 PROCEDURE — 99999 PR PBB SHADOW E&M-EST. PATIENT-LVL V: CPT | Mod: PBBFAC,,, | Performed by: INTERNAL MEDICINE

## 2024-08-14 PROCEDURE — 80053 COMPREHEN METABOLIC PANEL: CPT | Performed by: INTERNAL MEDICINE

## 2024-08-14 PROCEDURE — 99215 OFFICE O/P EST HI 40 MIN: CPT | Mod: PBBFAC | Performed by: INTERNAL MEDICINE

## 2024-08-14 PROCEDURE — 83615 LACTATE (LD) (LDH) ENZYME: CPT | Performed by: INTERNAL MEDICINE

## 2024-08-14 RX ORDER — OXYCODONE HYDROCHLORIDE 15 MG/1
TABLET ORAL
COMMUNITY
Start: 2024-08-08

## 2024-08-14 NOTE — PROGRESS NOTES
Subjective:       Patient ID: Sukhjinder Presley is a 58 y.o. male.    Chief Complaint: Results and Rectal Cancer    HPI:  58-year-old male with recurrent rectal cancer invading into bladder.  The patient has been enrolled in hospice patient has continued to remain relatively healthy.  Disenrolled in hospice and would like to have repeat status of disease state confirmed ECOG status 2 accompanied by wife    Past Medical History:   Diagnosis Date    Anemia     Arthritis     Cancer     rectal    Renal disorder     kidney stones     Family History   Problem Relation Name Age of Onset    Intestinal malrotation Mother      Leukemia Father      No Known Problems Sister      Coronary artery disease Brother      Coronary artery disease Maternal Grandmother      Stomach cancer Maternal Grandfather       Social History     Socioeconomic History    Marital status: Significant Other   Tobacco Use    Smoking status: Every Day     Current packs/day: 1.00     Average packs/day: 1 pack/day for 40.6 years (40.6 ttl pk-yrs)     Types: Cigarettes     Start date: 1/2/1984    Smokeless tobacco: Former     Types: Chew    Tobacco comments:     no smoking after m.n prior to sx   Substance and Sexual Activity    Alcohol use: Not Currently     Alcohol/week: 46.0 standard drinks of alcohol     Types: 42 Cans of beer, 4 Shots of liquor per week     Comment: segrams 7, beer daily  hold now prior to surgery    Drug use: Never    Sexual activity: Yes     Partners: Female     Birth control/protection: None     Social Determinants of Health     Financial Resource Strain: High Risk (11/10/2023)    Overall Financial Resource Strain (CARDIA)     Difficulty of Paying Living Expenses: Very hard   Food Insecurity: Food Insecurity Present (11/10/2023)    Hunger Vital Sign     Worried About Running Out of Food in the Last Year: Often true     Ran Out of Food in the Last Year: Often true   Transportation Needs: Unmet Transportation Needs  (11/10/2023)    PRAPARE - Transportation     Lack of Transportation (Medical): Yes     Lack of Transportation (Non-Medical): No   Physical Activity: Sufficiently Active (11/10/2023)    Exercise Vital Sign     Days of Exercise per Week: 7 days     Minutes of Exercise per Session: 70 min   Stress: Stress Concern Present (11/10/2023)    [employer] [place] [employer] - Occupational Stress Questionnaire     Feeling of Stress : Very much   Housing Stability: High Risk (11/10/2023)    Housing Stability Vital Sign     Unable to Pay for Housing in the Last Year: Yes     Number of Places Lived in the Last Year: 1     Unstable Housing in the Last Year: No     Past Surgical History:   Procedure Laterality Date    ABDOMINOPERINEAL RESECTION OF RECTUM N/A 4/9/2020    Procedure: PROCTECTOMY, ABDOMINOPERINEAL;  Surgeon: Jan New MD;  Location: [facility];  Service: General;  Laterality: N/A;    COLECTOMY, TOTAL N/A 2/24/2022    Procedure: COLECTOMY, TOTAL;  Surgeon: Jan New MD;  Location: [facility] OR;  Service: Colon and Rectal;  Laterality: N/A;    COLON SURGERY      COLONOSCOPY N/A 6/6/2019    Procedure: COLONOSCOPY;  Surgeon: Anjelica Savaedra MD;  Location: [facility];  Service: Endoscopy;  Laterality: N/A;    COLONOSCOPY N/A 12/17/2021    Procedure: COLONOSCOPY;  Surgeon: Jan New MD;  Location: [facility] ENDO;  Service: General;  Laterality: N/A;    CREATION OF MUSCLE ROTATIONAL FLAP Left 4/9/2020    Procedure: CREATION, FLAP, MUSCLE ROTATION;  Surgeon: Sebastian Dan MD;  Location: [facility] OR;  Service: Plastics;  Laterality: Left;   VRAM    CYSTOSCOPIC LITHOLAPAXY N/A 2/24/2022    Procedure: CYSTOLITHOLAPAXY;  Surgeon: Sukhjinder Tucker MD;  Location: [facility] OR;  Service: Urology;  Laterality: N/A;    CYSTOSCOPY W/ URETERAL STENT PLACEMENT Right 10/6/2023    Procedure: CYSTOSCOPY, WITH URETERAL STENT INSERTION;  Surgeon: Sukhjinder Tucker MD;  Location: [facility] OR;  Service:  Urology;  Laterality: Right;    CYSTOSCOPY WITH BIOPSY OF BLADDER N/A 9/11/2023    Procedure: CYSTOSCOPY, WITH BLADDER BIOPSY, WITH FULGURATION IF INDICATED;  Surgeon: Sukhjinder Tucker MD;  Location: [facility];  Service: Urology;  Laterality: N/A;  , POSS TURBT    CYSTOSCOPY WITH URETEROSCOPY, RETROGRADE PYELOGRAPHY, AND INSERTION OF STENT N/A 9/11/2023    Procedure: CYSTOSCOPY, WITH RETROGRADE PYELOGRAM AND URETERAL STENT INSERTION;  Surgeon: Sukhjinder Tucker MD;  Location: [facility];  Service: Urology;  Laterality: N/A;    CYSTOSCOPY WITH URETEROSCOPY, RETROGRADE PYELOGRAPHY, AND INSERTION OF STENT Right 1/8/2024    Procedure: CYSTOSCOPY, WITH RETROGRADE PYELOGRAM AND URETERAL STENT INSERTION;  Surgeon: Sukhjinder Tucker MD;  Location: [facility];  Service: Urology;  Laterality: Right;  stent exchange    DILATION OF URETHRA N/A 9/11/2023    Procedure: DILATION, URETHRA;  Surgeon: Sukhjinder Tucker MD;  Location: [facility];  Service: Urology;  Laterality: N/A;    ESOPHAGOGASTRODUODENOSCOPY N/A 6/6/2019    Procedure: EGD (ESOPHAGOGASTRODUODENOSCOPY);  Surgeon: Anjelica Saavedra MD;  Location: [facility];  Service: Endoscopy;  Laterality: N/A;    FLAP GRAFT SURGERY N/A 4/9/2020    Procedure: FLAP GRAFT;  Surgeon: Sebastian Dan MD;  Location: [facility];  Service: Plastics;  Laterality: N/A;  left fasciocutaneous buttocks flap    HERNIA REPAIR  1995    INJECTION OF ANESTHETIC AGENT INTO TISSUE PLANE DEFINED BY TRANSVERSUS ABDOMINIS MUSCLE N/A 2/18/2020    Procedure: BLOCK, TRANSVERSUS ABDOMINIS PLANE;  Surgeon: Jan New MD;  Location: [facility];  Service: General;  Laterality: N/A;    INJECTION OF ANESTHETIC AGENT INTO TISSUE PLANE DEFINED BY TRANSVERSUS ABDOMINIS MUSCLE  2/24/2022    Procedure: BLOCK, TRANSVERSUS ABDOMINIS PLANE;  Surgeon: Jan New MD;  Location: [facility];  Service: Colon and Rectal;;    INSERTION OF TUNNELED CENTRAL VENOUS CATHETER (CVC) WITH SUBCUTANEOUS  PORT N/A 10/8/2019    Procedure: [id number];  Surgeon: Jan New MD;  Location: [facility] OR;  Service: General;  Laterality: N/A;    LAPAROSCOPIC COLOSTOMY      MEDIPORT REMOVAL N/A 8/13/2020    Procedure: REMOVAL, CATHETER, CENTRAL VENOUS, TUNNELED, WITH PORT;  Surgeon: Sebastian Dan MD;  Location: [facility] OR;  Service: Plastics;  Laterality: N/A;    TONSILLECTOMY      TRANSURETHRAL RESECTION OF PROSTATE N/A 6/7/2021    Procedure: TURP (TRANSURETHRAL RESECTION OF PROSTATE), CYSTOLITHALOPAXY;  Surgeon: Sukhjinder Tucker MD;  Location: [facility] OR;  Service: Urology;  Laterality: N/A;    TURBT (TRANSURETHRAL RESECTION OF BLADDER TUMOR) N/A 10/6/2023    Procedure: TURBT (TRANSURETHRAL RESECTION OF BLADDER TUMOR);  Surgeon: Sukhjinder Tucker MD;  Location: [facility] OR;  Service: Urology;  Laterality: N/A;    WOUND DEBRIDEMENT N/A 8/13/2020    Procedure: DEBRIDEMENT, WOUND;  Surgeon: Sebastian Dan MD;  Location: [facility] OR;  Service: Plastics;  Laterality: N/A;  layered closure       Labs:  Lab Results   Component Value Date    WBC 7.97 02/19/2024    HGB 10.4 (L) 02/19/2024    HCT 31.5 (L) 02/19/2024    MCV 85 02/19/2024     02/19/2024     BMP  Lab Results   Component Value Date     (L) 02/19/2024    K 4.5 02/19/2024     02/19/2024    CO2 20 (L) 02/19/2024    BUN 25 (H) 02/19/2024    CREATININE 1.7 (H) 02/19/2024    CALCIUM 9.3 02/19/2024    ANIONGAP 10 02/19/2024    ESTGFRAFRICA >60 07/20/2022    EGFRNONAA >60 07/20/2022     Lab Results   Component Value Date    ALT 9 (L) 02/19/2024    AST 9 (L) 02/19/2024    ALKPHOS 135 02/19/2024    BILITOT 0.4 02/19/2024       Lab Results   Component Value Date    IRON 24 (L) 02/19/2024    TIBC 237 (L) 02/19/2024    FERRITIN 1,157 (H) 02/19/2024     Lab Results   Component Value Date    VQYTEOOV78 423 10/09/2023     Lab Results   Component Value Date    FOLATE 5.7 10/09/2023     Lab Results   Component Value Date    TSH 0.966 10/02/2023          Review of Systems   Constitutional:  Negative for activity change, appetite change, chills, diaphoresis, fatigue, fever and unexpected weight change.   HENT:  Negative for congestion, dental problem, drooling, ear discharge, ear pain, facial swelling, hearing loss, mouth sores, nosebleeds, postnasal drip, rhinorrhea, sinus pressure, sneezing, sore throat, tinnitus, trouble swallowing and voice change.    Eyes:  Negative for photophobia, pain, discharge, redness, itching and visual disturbance.   Respiratory:  Negative for apnea, cough, choking, chest tightness, shortness of breath, wheezing and stridor.    Cardiovascular:  Negative for chest pain, palpitations and leg swelling.   Gastrointestinal:  Negative for abdominal distention, abdominal pain, anal bleeding, blood in stool, constipation, diarrhea, nausea, rectal pain and vomiting.   Endocrine: Negative for cold intolerance, heat intolerance, polydipsia, polyphagia and polyuria.   Genitourinary:  Negative for decreased urine volume, difficulty urinating, dysuria, enuresis, flank pain, frequency, genital sores, hematuria, penile discharge, penile pain, penile swelling, scrotal swelling, testicular pain and urgency.   Musculoskeletal:  Positive for back pain. Negative for arthralgias, gait problem, joint swelling, myalgias, neck pain and neck stiffness.   Skin:  Negative for color change, pallor, rash and wound.   Allergic/Immunologic: Negative for environmental allergies, food allergies and immunocompromised state.   Neurological:  Negative for dizziness, tremors, seizures, syncope, facial asymmetry, speech difficulty, weakness, light-headedness, numbness and headaches.   Hematological:  Negative for adenopathy. Does not bruise/bleed easily.   Psychiatric/Behavioral:  Negative for agitation, behavioral problems, confusion, decreased concentration, dysphoric mood, hallucinations, self-injury, sleep disturbance and suicidal ideas. The patient is not  nervous/anxious and is not hyperactive.        Objective:      Physical Exam  Vitals reviewed.   Constitutional:       General: He is not in acute distress.     Appearance: He is well-developed. He is not diaphoretic.   HENT:      Head: Normocephalic.      Right Ear: External ear normal.      Left Ear: External ear normal.      Nose: Nose normal.      Right Sinus: No maxillary sinus tenderness or frontal sinus tenderness.      Left Sinus: No maxillary sinus tenderness or frontal sinus tenderness.      Mouth/Throat:      Pharynx: No oropharyngeal exudate.   Eyes:      General: Lids are normal. No scleral icterus.        Right eye: No discharge.         Left eye: No discharge.      Extraocular Movements:      Right eye: Normal extraocular motion.      Left eye: Normal extraocular motion.      Conjunctiva/sclera:      Right eye: Right conjunctiva is not injected. No hemorrhage.     Left eye: Left conjunctiva is not injected. No hemorrhage.     Pupils: Pupils are equal, round, and reactive to light.   Neck:      Thyroid: No thyromegaly.      Vascular: No JVD.      Trachea: No tracheal deviation.   Cardiovascular:      Rate and Rhythm: Normal rate.   Pulmonary:      Effort: Pulmonary effort is normal. No respiratory distress.      Breath sounds: No stridor.   Abdominal:      General: Bowel sounds are normal.      Palpations: Abdomen is soft. There is no hepatomegaly, splenomegaly or mass.      Tenderness: There is no abdominal tenderness.   Musculoskeletal:         General: No tenderness. Normal range of motion.      Cervical back: Normal range of motion and neck supple.   Lymphadenopathy:      Head:      Right side of head: No posterior auricular or occipital adenopathy.      Left side of head: No posterior auricular or occipital adenopathy.      Cervical: No cervical adenopathy.      Right cervical: No superficial, deep or posterior cervical adenopathy.     Left cervical: No superficial, deep or posterior cervical  adenopathy.      Upper Body:      Right upper body: No supraclavicular adenopathy.      Left upper body: No supraclavicular adenopathy.   Skin:     General: Skin is dry.      Findings: No erythema or rash.      Nails: There is no clubbing.   Neurological:      Mental Status: He is alert and oriented to person, place, and time.      Cranial Nerves: No cranial nerve deficit.      Coordination: Coordination normal.   Psychiatric:         Behavior: Behavior normal.         Thought Content: Thought content normal.         Judgment: Judgment normal.           Assessment:      1. Rectal cancer    2. Malignant neoplasm metastatic to both lungs    3. Iron deficiency anemia due to chronic blood loss    4. Polycythemia    5. Secondary adenocarcinoma of retroperitoneum           Med Onc Chart Routing      Follow up with physician . Return to clinic to see me after laboratory studies drawn today as well as PET scan next week for disease status   Follow up with REE    Infusion scheduling note    Injection scheduling note    Labs    Imaging    Pharmacy appointment    Other referrals         Referral           Plan:     Referral made for .  Discussed implications with him.  Will have laboratory studies today and return after PET scan for review        Ken Cosby Jr, MD FACP

## 2024-08-14 NOTE — TELEPHONE ENCOUNTER
Pt's SO reported financial struggles for pt. SW sent referral to cancer services for financial assistance.

## 2024-08-16 ENCOUNTER — OFFICE VISIT (OUTPATIENT)
Dept: PRIMARY CARE CLINIC | Facility: CLINIC | Age: 59
End: 2024-08-16

## 2024-08-16 VITALS
WEIGHT: 150.13 LBS | DIASTOLIC BLOOD PRESSURE: 60 MMHG | SYSTOLIC BLOOD PRESSURE: 110 MMHG | HEART RATE: 86 BPM | HEIGHT: 72 IN | BODY MASS INDEX: 20.34 KG/M2

## 2024-08-16 DIAGNOSIS — C20 RECTAL CANCER: ICD-10-CM

## 2024-08-16 DIAGNOSIS — G89.3 CANCER RELATED PAIN: ICD-10-CM

## 2024-08-16 DIAGNOSIS — Z93.3 COLOSTOMY PRESENT: ICD-10-CM

## 2024-08-16 DIAGNOSIS — Z96.0 URETERAL STENT PRESENT: ICD-10-CM

## 2024-08-16 DIAGNOSIS — N13.30 HYDRONEPHROSIS, UNSPECIFIED HYDRONEPHROSIS TYPE: Primary | ICD-10-CM

## 2024-08-16 PROCEDURE — 99999 PR PBB SHADOW E&M-EST. PATIENT-LVL V: CPT | Mod: PBBFAC,,, | Performed by: FAMILY MEDICINE

## 2024-08-16 PROCEDURE — 99215 OFFICE O/P EST HI 40 MIN: CPT | Mod: PBBFAC,PN | Performed by: FAMILY MEDICINE

## 2024-08-16 NOTE — PATIENT INSTRUCTIONS
All things considered, you look pretty good today.      Handicap form filled out.    Definitely recommend talking with so security and/or a  about getting Medicare added to your disability.  With a terminal illness, I would expect that you should qualify for Medicare.  Also, continue applying for Medicaid, as well.      The PET scan back in February showed the presence of a ureteral stent, which looks obstructed.  Let us get you in with one of our urologists to take a look at it and potentially discuss removal.  Referral placed today.      I am going to reach out to Dr. Cosby to see what his plan is regarding pain management since hospice will be ending.  It is possible that he may be able to get you back on hospice, if indicated.  Otherwise, either he or palliative care should  and manage your pain medicines.  If you do get into a bind where you are out for a day or two before you can see him, please let me know and I will be happy to send a refill to bridge you.    Continue to eat a healthy diet.  Be careful with portion sizes.  Includes lots of fresh fruits, vegetables, whole grains, lean proteins.  See info below.    Keep hydrated.  Be sure to drink at least 8-10, 8 oz, glasses of water every day.    Stay active.  Try to do some sort of physical activity every day.  Nothing outrageous, just try walking for 10-15 minutes each day.

## 2024-08-16 NOTE — PROGRESS NOTES
"    [facility]       [address] [place] [address]      Phone: [phone]      Fax: [phone]    Marito Murphy MD                Office Visit  08/16/2024        Subjective      HPI:  Sukhjinder Presley is a 58 y.o. male presents today in clinic for "Follow-up  ."     58-year-old gentleman presents today to follow up on multiple issues.      His wife, Adelaida, is present with him.  She provides portions of the history.      Patient states that he was put on hospice for his rectal cancer/metastases.  At that time, he was given six months to live.  Hospice change some of his medications around, but he otherwise was doing well.  States he is about to be discharged from hospice because he has not shown any type of decline.  Has not had any weight loss, if anything he has gained a couple of lb since being on hospice.  Functionally, he was able to move around, so they have decided to he is no longer a candidate.    He has a follow-up appointment scheduled with his oncologist in about two weeks, on 08/30.  He will be getting a PET scan prior to that visit.  He had labs done earlier this week.    Still has a lot of pain.  Mostly in the abdominal area.  Hospice has him on fentanyl/oxycodone.  Hurts worse if he does not take it, but does not completely provide relief.  He is not sure what will happen to his pain medicines when hospice discharges.    He sometimes has difficulty urinating.  Occasionally will have some blood in his stool.  His last PET scan showed new metastases in the penile area, which sometimes obstruct his urine.  Was following with Urology, but has not been reestablished with them since coming off of hospice.  Additionally, his last PET scan in February showed some mild hydronephrosis and possible obstruction to a ureteral stent.  Has not seen urology about getting it removed.  He also has had issues with kidney stones, bladder stones, BPH in the " "past.    With his rectal/colon cancer, he has had total colectomy, with ileostomy.  Good output from ostomy bag.  No issues with it.    PMH: Colon cancer.  Kidney/bladder stones.  BPH.  PSH:  Colectomy.  Ileostomy.  Inguinal hernia.    F MH: Leukemia.  Another cancer.    Allergies:  NKDA   Social: Works as a supervisor/.  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  T:  1/2-1 ppd times 30+ years   A:  Denies  D:  Denies    Exercise:  Walks a lot at work    Colon: Giglia  Cardiology:  Harjeet/Zach        The following were updated and reviewed by myself in the chart: medications, past medical history, past surgical history, family history, social history, and allergies.     Medications:  Current Outpatient Medications on File Prior to Visit   Medication Sig Dispense Refill    acetaminophen (TYLENOL) 325 MG tablet Take 2 tablets (650 mg total) by mouth every 6 (six) hours as needed.  0    acetaminophen (TYLENOL) 650 MG Supp Place one suppository rectally every 4 hours as needed for fever 4 suppository 0    atropine 1% (ISOPTO ATROPINE) 1 % Drop Give 2 drops sublingual every 2 hours as needed for inreased secretions or "rattling" 5 mL 0    finasteride (PROSCAR) 5 mg tablet Take 1 tablet (5 mg total) by mouth once daily. 30 tablet 11    LORazepam (ATIVAN) 1 MG tablet Give one tablet by mouth every 4 hours as needed for shortness of breath, anxiety or trouble sleeping 10 tablet 0    oxyCODONE (ROXICODONE) 15 MG Tab Take by mouth.      oxyCODONE (ROXICODONE) 5 MG immediate release tablet Take 1 tablet (5 mg total) by mouth every 3 (three) hours as needed for Pain. Take 2 tabs every 3 hours for pain 120 tablet 0    prochlorperazine (COMPAZINE) 25 MG suppository Place 1 suppository in the rectum twice daily as needed for nausea 4 suppository 0    promethazine (PHENERGAN) 25 MG tablet Take 1 tablet (25 mg total) by mouth every 4 (four) hours. 60 tablet 4    sildenafiL (VIAGRA) 100 MG tablet Take 1 tablet (100 mg total) by mouth " daily as needed for Erectile Dysfunction. 20 tablet 11    solifenacin (VESICARE) 5 MG tablet Take 1 tablet (5 mg total) by mouth once daily. 90 tablet 3    tamsulosin (FLOMAX) 0.4 mg Cap Take 1 capsule (0.4 mg total) by mouth once daily. 90 capsule 3    capecitabine (XELODA) 150 MG tablet Take 4 tablets (600 mg total) by mouth 2 (two) times daily Take as directed Monday, Tuesday, Wednesday, Thursday, and Friday for the duration of radiation therapy. Take with food. with 1 other capecitabine prescription for 1,600 mg total. 600 tablet 11    capecitabine (XELODA) 500 MG Tab Take 2 tablets (1,000 mg total) by mouth 2 (two) times daily Take as directed Monday, Tuesday, Wednesday, Thursday, and Friday for the duration of radiation therapy. Take with food. with 1 other capecitabine prescription for 1,600 mg total. 300 tablet 11    [DISCONTINUED] oxyCODONE 20 mg/ml oral conc (ROXICODONE INTENSOL) 20 mg/mL concentrated solution Give 0.5ml sublinqual every 2 hours as needed for pain, shortness of breath or rapid respirations 30 mL 0     No current facility-administered medications on file prior to visit.        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    Immunodeficiency secondary to radiation therapy 11/30/2023    Anemia due to chronic blood loss 10/09/2023    Bladder tumor 10/06/2023    Hydronephrosis 09/11/2023    Alcohol abuse with alcohol-induced disorder 06/21/2021    BPH (benign prostatic hyperplasia) 05/14/2021    Benign prostatic hyperplasia 07/01/2020    Urinary tract infection without hematuria 07/01/2020    Post-procedural erectile dysfunction 07/01/2020    Colostomy in place 02/24/2020    Prostate cancer  screening 02/17/2020    Cachexia 12/04/2019    Iron deficiency anemia due to chronic blood loss 09/04/2019    Kidney stone 07/17/2019    Rectal cancer 06/24/2019    At risk for coronary artery disease 06/13/2019    Rectal bleeding 06/06/2019    Smoker 05/27/2019    Polycythemia 05/27/2019    Tobacco dependence syndrome 10/22/2018        PSHx:  Past Surgical History:   Procedure Laterality Date    ABDOMINOPERINEAL RESECTION OF RECTUM N/A 4/9/2020    Procedure: PROCTECTOMY, ABDOMINOPERINEAL;  Surgeon: Jan New MD;  Location: [facility];  Service: General;  Laterality: N/A;    COLECTOMY, TOTAL N/A 2/24/2022    Procedure: COLECTOMY, TOTAL;  Surgeon: Jan New MD;  Location: [facility] OR;  Service: Colon and Rectal;  Laterality: N/A;    COLON SURGERY      COLONOSCOPY N/A 6/6/2019    Procedure: COLONOSCOPY;  Surgeon: Anjelica Saavedra MD;  Location: [facility];  Service: Endoscopy;  Laterality: N/A;    COLONOSCOPY N/A 12/17/2021    Procedure: COLONOSCOPY;  Surgeon: Jan New MD;  Location: [facility];  Service: General;  Laterality: N/A;    CREATION OF MUSCLE ROTATIONAL FLAP Left 4/9/2020    Procedure: CREATION, FLAP, MUSCLE ROTATION;  Surgeon: Sebastian Dan MD;  Location: [facility];  Service: Plastics;  Laterality: Left;   VRAM    CYSTOSCOPIC LITHOLAPAXY N/A 2/24/2022    Procedure: CYSTOLITHOLAPAXY;  Surgeon: Sukhjinder Tucker MD;  Location: [facility] OR;  Service: Urology;  Laterality: N/A;    CYSTOSCOPY W/ URETERAL STENT PLACEMENT Right 10/6/2023    Procedure: CYSTOSCOPY, WITH URETERAL STENT INSERTION;  Surgeon: Sukhjinder Tucker MD;  Location: [facility] OR;  Service: Urology;  Laterality: Right;    CYSTOSCOPY WITH BIOPSY OF BLADDER N/A 9/11/2023    Procedure: CYSTOSCOPY, WITH BLADDER BIOPSY, WITH FULGURATION IF INDICATED;  Surgeon: Sukhjinder Tucker MD;  Location: [facility] OR;  Service: Urology;  Laterality: N/A;  , POSS TURBT    CYSTOSCOPY WITH URETEROSCOPY, RETROGRADE PYELOGRAPHY, AND INSERTION  OF STENT N/A 9/11/2023    Procedure: CYSTOSCOPY, WITH RETROGRADE PYELOGRAM AND URETERAL STENT INSERTION;  Surgeon: Sukhjinder Tucker MD;  Location: [facility] OR;  Service: Urology;  Laterality: N/A;    CYSTOSCOPY WITH URETEROSCOPY, RETROGRADE PYELOGRAPHY, AND INSERTION OF STENT Right 1/8/2024    Procedure: CYSTOSCOPY, WITH RETROGRADE PYELOGRAM AND URETERAL STENT INSERTION;  Surgeon: Sukhjinder Tucker MD;  Location: [facility] OR;  Service: Urology;  Laterality: Right;  stent exchange    DILATION OF URETHRA N/A 9/11/2023    Procedure: DILATION, URETHRA;  Surgeon: Sukhjinder Tucker MD;  Location: [facility] OR;  Service: Urology;  Laterality: N/A;    ESOPHAGOGASTRODUODENOSCOPY N/A 6/6/2019    Procedure: EGD (ESOPHAGOGASTRODUODENOSCOPY);  Surgeon: Anjelica Saavedra MD;  Location: [facility];  Service: Endoscopy;  Laterality: N/A;    FLAP GRAFT SURGERY N/A 4/9/2020    Procedure: FLAP GRAFT;  Surgeon: Sebastian Dan MD;  [facility];  Service: Plastics;  Laterality: N/A;  left fasciocutaneous buttocks flap    HERNIA REPAIR  1995    INJECTION OF ANESTHETIC AGENT INTO TISSUE PLANE DEFINED BY TRANSVERSUS ABDOMINIS MUSCLE N/A 2/18/2020    Procedure: BLOCK, TRANSVERSUS ABDOMINIS PLANE;  Surgeon: Jan New MD;  Location: [facility] OR;  Service: General;  Laterality: N/A;    INJECTION OF ANESTHETIC AGENT INTO TISSUE PLANE DEFINED BY TRANSVERSUS ABDOMINIS MUSCLE  2/24/2022    Procedure: BLOCK, TRANSVERSUS ABDOMINIS PLANE;  Surgeon: Jan New MD;  [facility];  Service: Colon and Rectal;;    INSERTION OF TUNNELED CENTRAL VENOUS CATHETER (CVC) WITH SUBCUTANEOUS PORT N/A 10/8/2019    Procedure: [id number];  Surgeon: Jan New MD;  Location: [facility] OR;  Service: General;  Laterality: N/A;    LAPAROSCOPIC COLOSTOMY      MEDIPORT REMOVAL N/A 8/13/2020    Procedure: REMOVAL, CATHETER, CENTRAL VENOUS, TUNNELED, WITH PORT;  Surgeon: Sebastian Dan MD;  [facility];  Service: Plastics;   Laterality: N/A;    TONSILLECTOMY      TRANSURETHRAL RESECTION OF PROSTATE N/A 6/7/2021    Procedure: TURP (TRANSURETHRAL RESECTION OF PROSTATE), CYSTOLITHALOPAXY;  Surgeon: Sukhjinder Tucker MD;  Location: [facility] OR;  Service: Urology;  Laterality: N/A;    TURBT (TRANSURETHRAL RESECTION OF BLADDER TUMOR) N/A 10/6/2023    Procedure: TURBT (TRANSURETHRAL RESECTION OF BLADDER TUMOR);  Surgeon: Sukhjinder Tucker MD;  Location: [facility] OR;  Service: Urology;  Laterality: N/A;    WOUND DEBRIDEMENT N/A 8/13/2020    Procedure: DEBRIDEMENT, WOUND;  Surgeon: Sebastian Dan MD;  Location: [facility] OR;  Service: Plastics;  Laterality: N/A;  layered closure        FHx:  Family History   Problem Relation Name Age of Onset    Intestinal malrotation Mother      Leukemia Father      No Known Problems Sister      Coronary artery disease Brother      Coronary artery disease Maternal Grandmother      Stomach cancer Maternal Grandfather          Social:  Social History     Socioeconomic History    Marital status: Significant Other   Tobacco Use    Smoking status: Every Day     Current packs/day: 1.00     Average packs/day: 1 pack/day for 40.6 years (40.6 ttl pk-yrs)     Types: Cigarettes     Start date: 1/2/1984    Smokeless tobacco: Former     Types: Chew    Tobacco comments:     no smoking after m.n prior to sx   Substance and Sexual Activity    Alcohol use: Not Currently     Alcohol/week: 46.0 standard drinks of alcohol     Types: 42 Cans of beer, 4 Shots of liquor per week     Comment: segrams 7, beer daily  hold now prior to surgery    Drug use: Never    Sexual activity: Yes     Partners: Female     Birth control/protection: None     Social Determinants of Health     Financial Resource Strain: High Risk (11/10/2023)    Overall Financial Resource Strain (CARDIA)     Difficulty of Paying Living Expenses: Very hard   Food Insecurity: Food Insecurity Present (11/10/2023)    Hunger Vital Sign     Worried About Running Out of Food  in the Last Year: Often true     Ran Out of Food in the Last Year: Often true   Transportation Needs: Unmet Transportation Needs (11/10/2023)    PRAPARE - Transportation     Lack of Transportation (Medical): Yes     Lack of Transportation (Non-Medical): No   Physical Activity: Sufficiently Active (11/10/2023)    Exercise Vital Sign     Days of Exercise per Week: 7 days     Minutes of Exercise per Session: 70 min   Stress: Stress Concern Present (11/10/2023)    [employer] - Occupational Stress Questionnaire     Feeling of Stress : Very much   Housing Stability: High Risk (11/10/2023)    Housing Stability Vital Sign     Unable to Pay for Housing in the Last Year: Yes     Number of Places Lived in the Last Year: 1     Unstable Housing in the Last Year: No        Allergies:  Review of patient's allergies indicates:  No Known Allergies     ROS:  Review of Systems   Constitutional:  Positive for activity change and unexpected weight change.   HENT:  Positive for hearing loss. Negative for rhinorrhea and trouble swallowing.    Eyes:  Negative for discharge and visual disturbance.   Respiratory:  Positive for wheezing. Negative for chest tightness.    Cardiovascular:  Negative for chest pain and palpitations.   Gastrointestinal:  Negative for blood in stool, constipation, diarrhea and vomiting.   Endocrine: Negative for polydipsia and polyuria.   Genitourinary:  Positive for difficulty urinating, hematuria and urgency.   Musculoskeletal:  Negative for arthralgias, joint swelling and neck pain.   Neurological:  Positive for weakness. Negative for headaches.   Psychiatric/Behavioral:  Positive for dysphoric mood. Negative for confusion.           Objective      /60   Pulse 86   Ht 6' (1.829 m)   Wt 68.1 kg (150 lb 2.1 oz)   BMI 20.36 kg/m²   Ht Readings from Last 3 Encounters:   08/16/24 6' (1.829 m)   08/14/24 6' (1.829 m)   02/21/24 6' (1.829 m)     Wt Readings from Last 3 Encounters:    08/16/24 68.1 kg (150 lb 2.1 oz)   08/14/24 66.7 kg (147 lb 0.8 oz)   02/21/24 69.7 kg (153 lb 10.6 oz)       PHYSICAL EXAM:  Physical Exam  Vitals and nursing note reviewed.   Constitutional:       General: He is not in acute distress.     Appearance: Normal appearance.   HENT:      Head: Normocephalic and atraumatic.      Right Ear: Tympanic membrane, ear canal and external ear normal.      Left Ear: Tympanic membrane, ear canal and external ear normal.      Nose: Nose normal. No congestion or rhinorrhea.      Mouth/Throat:      Mouth: Mucous membranes are moist.      Pharynx: Oropharynx is clear. No oropharyngeal exudate or posterior oropharyngeal erythema.   Eyes:      Extraocular Movements: Extraocular movements intact.      Conjunctiva/sclera: Conjunctivae normal.      Pupils: Pupils are equal, round, and reactive to light.   Cardiovascular:      Rate and Rhythm: Normal rate and regular rhythm.   Pulmonary:      Effort: Pulmonary effort is normal.      Breath sounds: No wheezing, rhonchi or rales.   Abdominal:      Hernia: A hernia is present. Hernia is present in the ventral area.      Comments: Ostomy bag in place.  No leakage.   Musculoskeletal:         General: Normal range of motion.      Cervical back: Normal range of motion.   Lymphadenopathy:      Cervical: No cervical adenopathy.   Skin:     General: Skin is warm and dry.   Neurological:      General: No focal deficit present.      Mental Status: He is alert.              LABS / IMAGING:  Recent Results (from the past 4368 hour(s))   Comprehensive Metabolic Panel    Collection Time: 02/19/24 11:40 AM   Result Value Ref Range    Sodium 134 (L) 136 - 145 mmol/L    Potassium 4.5 3.5 - 5.1 mmol/L    Chloride 104 95 - 110 mmol/L    CO2 20 (L) 23 - 29 mmol/L    Glucose 122 (H) 70 - 110 mg/dL    BUN 25 (H) 6 - 20 mg/dL    Creatinine 1.7 (H) 0.5 - 1.4 mg/dL    Calcium 9.3 8.7 - 10.5 mg/dL    Total Protein 8.2 6.0 - 8.4 g/dL    Albumin 3.0 (L) 3.5 - 5.2 g/dL     Total Bilirubin 0.4 0.1 - 1.0 mg/dL    Alkaline Phosphatase 135 55 - 135 U/L    AST 9 (L) 10 - 40 U/L    ALT 9 (L) 10 - 44 U/L    eGFR 46 (A) >60 mL/min/1.73 m^2    Anion Gap 10 8 - 16 mmol/L   CBC Auto Differential    Collection Time: 02/19/24 11:40 AM   Result Value Ref Range    WBC 7.97 3.90 - 12.70 K/uL    RBC 3.70 (L) 4.60 - 6.20 M/uL    Hemoglobin 10.4 (L) 14.0 - 18.0 g/dL    Hematocrit 31.5 (L) 40.0 - 54.0 %    MCV 85 82 - 98 fL    MCH 28.1 27.0 - 31.0 pg    MCHC 33.0 32.0 - 36.0 g/dL    RDW 13.9 11.5 - 14.5 %    Platelets 337 150 - 450 K/uL    MPV 8.9 (L) 9.2 - 12.9 fL    Immature Granulocytes 0.3 0.0 - 0.5 %    Gran # (ANC) 5.8 1.8 - 7.7 K/uL    Immature Grans (Abs) 0.02 0.00 - 0.04 K/uL    Lymph # 1.3 1.0 - 4.8 K/uL    Mono # 0.6 0.3 - 1.0 K/uL    Eos # 0.2 0.0 - 0.5 K/uL    Baso # 0.04 0.00 - 0.20 K/uL    nRBC 0 0 /100 WBC    Gran % 72.9 38.0 - 73.0 %    Lymph % 16.4 (L) 18.0 - 48.0 %    Mono % 7.8 4.0 - 15.0 %    Eosinophil % 2.1 0.0 - 8.0 %    Basophil % 0.5 0.0 - 1.9 %    Differential Method Automated    CEA    Collection Time: 02/19/24 11:40 AM   Result Value Ref Range    CEA <1.7 0.0 - 5.0 ng/mL   Iron and TIBC    Collection Time: 02/19/24 11:40 AM   Result Value Ref Range    Iron 24 (L) 45 - 160 ug/dL    Transferrin 160 (L) 200 - 375 mg/dL    TIBC 237 (L) 250 - 450 ug/dL    Saturated Iron 10 (L) 20 - 50 %   Lactate Dehydrogenase    Collection Time: 02/19/24 11:40 AM   Result Value Ref Range     110 - 260 U/L   Ferritin    Collection Time: 02/19/24 11:42 AM   Result Value Ref Range    Ferritin 1,157 (H) 20.0 - 300.0 ng/mL   Comprehensive Metabolic Panel    Collection Time: 08/14/24  2:51 PM   Result Value Ref Range    Sodium 137 136 - 145 mmol/L    Potassium 4.6 3.5 - 5.1 mmol/L    Chloride 107 95 - 110 mmol/L    CO2 21 (L) 23 - 29 mmol/L    Glucose 98 70 - 110 mg/dL    BUN 27 (H) 6 - 20 mg/dL    Creatinine 1.2 0.5 - 1.4 mg/dL    Calcium 9.6 8.7 - 10.5 mg/dL    Total Protein 8.1 6.0 - 8.4  g/dL    Albumin 3.6 3.5 - 5.2 g/dL    Total Bilirubin 0.2 0.1 - 1.0 mg/dL    Alkaline Phosphatase 86 55 - 135 U/L    AST 12 10 - 40 U/L    ALT 11 10 - 44 U/L    eGFR >60 >60 mL/min/1.73 m^2    Anion Gap 9 8 - 16 mmol/L   CBC Auto Differential    Collection Time: 08/14/24  2:51 PM   Result Value Ref Range    WBC 8.76 3.90 - 12.70 K/uL    RBC 4.48 (L) 4.60 - 6.20 M/uL    Hemoglobin 12.5 (L) 14.0 - 18.0 g/dL    Hematocrit 37.4 (L) 40.0 - 54.0 %    MCV 84 82 - 98 fL    MCH 27.9 27.0 - 31.0 pg    MCHC 33.4 32.0 - 36.0 g/dL    RDW 13.3 11.5 - 14.5 %    Platelets 272 150 - 450 K/uL    MPV 8.7 (L) 9.2 - 12.9 fL    Immature Granulocytes 0.2 0.0 - 0.5 %    Gran # (ANC) 6.0 1.8 - 7.7 K/uL    Immature Grans (Abs) 0.02 0.00 - 0.04 K/uL    Lymph # 2.0 1.0 - 4.8 K/uL    Mono # 0.4 0.3 - 1.0 K/uL    Eos # 0.3 0.0 - 0.5 K/uL    Baso # 0.06 0.00 - 0.20 K/uL    nRBC 0 0 /100 WBC    Gran % 68.5 38.0 - 73.0 %    Lymph % 22.9 18.0 - 48.0 %    Mono % 4.6 4.0 - 15.0 %    Eosinophil % 3.1 0.0 - 8.0 %    Basophil % 0.7 0.0 - 1.9 %    Differential Method Automated    Lactate Dehydrogenase    Collection Time: 08/14/24  2:51 PM   Result Value Ref Range     110 - 260 U/L   CEA    Collection Time: 08/14/24  2:51 PM   Result Value Ref Range    CEA 2.6 0.0 - 5.0 ng/mL         Assessment    1. Hydronephrosis, unspecified hydronephrosis type    2. Ureteral stent present    3. Rectal cancer    4. Colostomy present    5. Cancer related pain          Plan    Sukhjinder was seen today for follow-up.    Diagnoses and all orders for this visit:    Hydronephrosis, unspecified hydronephrosis type  -     Ambulatory referral/consult to Urology; Future    Ureteral stent present  -     Ambulatory referral/consult to Urology; Future    Rectal cancer    Colostomy present    Cancer related pain      Despite his diagnoses of rectal cancer, with metastases, he actually does look pretty well.    Recommended he keep the appointment for PET scan, follow up with Oncology  scheduled.  We will reach out to his oncologist today to see what the plan is moving forward guarding hospice, pain control, etc..  If indeed he is coming off of hospice, we would like to get him reestablished with palliative care sooner, rather than later.    Apparently, he was on so security disability, but does not have Medicare, nor Medicaid.  No other commercial insurance at this time.  They are applying for financial assistance through [facility].  Recommended that they talk with so security about qualifications for Medicare.  They may want to talk with a  to see if they can help with insurance coverage.    FOLLOW-UP:  Follow up in about 6 months (around 2/16/2025) for check up.    I spent a total of 40 minutes face to face and non-face to face on the date of this visit.This includes time preparing to see the patient (eg, review of tests, notes), obtaining and/or reviewing additional history from an independent historian and/or outside medical records, documenting clinical information in the electronic health record, independently interpreting results and/or communicating results to the patient/family/caregiver, or care coordinator.  Visit today included increased complexity associated with the care of the episodic problem addressed and managing the longitudinal care of the patient due to the serious and/or complex managed problem(s).    Signed by:  Marito Murphy MD

## 2024-08-20 ENCOUNTER — PATIENT MESSAGE (OUTPATIENT)
Dept: PRIMARY CARE CLINIC | Facility: CLINIC | Age: 59
End: 2024-08-20

## 2024-08-20 ENCOUNTER — PATIENT MESSAGE (OUTPATIENT)
Dept: SURGERY | Facility: CLINIC | Age: 59
End: 2024-08-20
Payer: MEDICAID

## 2024-08-22 ENCOUNTER — TELEPHONE (OUTPATIENT)
Dept: UROLOGY | Facility: CLINIC | Age: 59
End: 2024-08-22

## 2024-08-22 NOTE — TELEPHONE ENCOUNTER
.Outgoing call placed to patient, patient verified name and date of birth, patient notified that he was scheduled with the wrong provider and needed to be rescheduled, patient verbalized understanding and new appointment scheduled as requested after his imaging appointment on next week.

## 2024-08-26 ENCOUNTER — HOSPITAL ENCOUNTER (OUTPATIENT)
Dept: RADIOLOGY | Facility: HOSPITAL | Age: 59
Discharge: HOME | End: 2024-08-26
Attending: INTERNAL MEDICINE
Payer: MEDICAID

## 2024-08-26 ENCOUNTER — PATIENT MESSAGE (OUTPATIENT)
Dept: HEMATOLOGY/ONCOLOGY | Facility: CLINIC | Age: 59
End: 2024-08-26

## 2024-08-26 DIAGNOSIS — C20 RECTAL CANCER: ICD-10-CM

## 2024-08-26 PROCEDURE — 78815 PET IMAGE W/CT SKULL-THIGH: CPT | Mod: 26,PS,, | Performed by: RADIOLOGY

## 2024-08-26 PROCEDURE — A9552 F18 FDG: HCPCS | Performed by: INTERNAL MEDICINE

## 2024-08-26 PROCEDURE — 78815 PET IMAGE W/CT SKULL-THIGH: CPT | Mod: TC,PI

## 2024-08-26 RX ORDER — FLUDEOXYGLUCOSE F18 500 MCI/ML
14.11 INJECTION INTRAVENOUS
Status: COMPLETED | OUTPATIENT
Start: 2024-08-26 | End: 2024-08-26

## 2024-08-26 RX ADMIN — FLUDEOXYGLUCOSE F-18 14.11 MILLICURIE: 500 INJECTION INTRAVENOUS at 08:08

## 2024-08-27 ENCOUNTER — OFFICE VISIT (OUTPATIENT)
Dept: UROLOGY | Facility: CLINIC | Age: 59
End: 2024-08-27
Payer: MEDICAID

## 2024-08-27 VITALS
DIASTOLIC BLOOD PRESSURE: 76 MMHG | SYSTOLIC BLOOD PRESSURE: 124 MMHG | BODY MASS INDEX: 19.88 KG/M2 | WEIGHT: 146.63 LBS | HEART RATE: 73 BPM

## 2024-08-27 DIAGNOSIS — N13.30 HYDRONEPHROSIS, UNSPECIFIED HYDRONEPHROSIS TYPE: ICD-10-CM

## 2024-08-27 DIAGNOSIS — D49.4 BLADDER TUMOR: Primary | ICD-10-CM

## 2024-08-27 DIAGNOSIS — Z96.0 URETERAL STENT PRESENT: ICD-10-CM

## 2024-08-27 LAB
BILIRUB UR QL STRIP: NEGATIVE
GLUCOSE UR QL STRIP: POSITIVE
KETONES UR QL STRIP: NEGATIVE
LEUKOCYTE ESTERASE UR QL STRIP: POSITIVE
PH, POC UA: 8.5
POC BLOOD, URINE: POSITIVE
POC NITRATES, URINE: NEGATIVE
PROT UR QL STRIP: POSITIVE
SP GR UR STRIP: 1.03 (ref 1–1.03)
UROBILINOGEN UR STRIP-ACNC: 0.2 (ref 0.3–2.2)

## 2024-08-27 PROCEDURE — 1160F RVW MEDS BY RX/DR IN RCRD: CPT | Mod: CPTII,,, | Performed by: UROLOGY

## 2024-08-27 PROCEDURE — 99999 PR PBB SHADOW E&M-EST. PATIENT-LVL IV: CPT | Mod: PBBFAC,,, | Performed by: UROLOGY

## 2024-08-27 PROCEDURE — 81003 URINALYSIS AUTO W/O SCOPE: CPT | Mod: PBBFAC | Performed by: UROLOGY

## 2024-08-27 PROCEDURE — 3074F SYST BP LT 130 MM HG: CPT | Mod: CPTII,,, | Performed by: UROLOGY

## 2024-08-27 PROCEDURE — 3078F DIAST BP <80 MM HG: CPT | Mod: CPTII,,, | Performed by: UROLOGY

## 2024-08-27 PROCEDURE — 99999PBSHW POCT URINALYSIS, DIPSTICK, AUTOMATED, W/O SCOPE: Mod: PBBFAC,,,

## 2024-08-27 PROCEDURE — 99214 OFFICE O/P EST MOD 30 MIN: CPT | Mod: S$PBB,,, | Performed by: UROLOGY

## 2024-08-27 PROCEDURE — 99214 OFFICE O/P EST MOD 30 MIN: CPT | Mod: PBBFAC | Performed by: UROLOGY

## 2024-08-27 PROCEDURE — 3008F BODY MASS INDEX DOCD: CPT | Mod: CPTII,,, | Performed by: UROLOGY

## 2024-08-27 PROCEDURE — 1159F MED LIST DOCD IN RCRD: CPT | Mod: CPTII,,, | Performed by: UROLOGY

## 2024-08-27 NOTE — PROGRESS NOTES
Chief Complaint: voiding issues    HPI:   08/27/2024 - returns today for follow-up, went on hospice for about six or seven months but came off as he was doing quite well, labs look good, renal function stable, had a PET scan yesterday which shows interval worsening of his metastatic disease, continues to note intermittent weak stream as well as hematuria, also notes incontinence at night    12/20/2023 - returns today for follow-up, radiation going well so far, radio sensitization chemotherapy has not been as bad as his prior rounds of chemo, having some incontinence, sounds like stress incontinence, but not wearing any depends or pads regularly, also having some cloudy urine    10/06/2023 - messaged the day after his stent was removed with worsening right-sided flank pain and feeling poorly, CT obtained two days ago showed recurrence of his right-sided hydronephrosis with delayed nephrogram and delayed excretion, patient feels like he is undergoing chemo, feeling very worn out, also notes that his urine looks milky, urine culture from earlier this week showed Proteus but he has not started his antibiotics yet    09/26/2023 - stent removal    08/24/2023 - returns today for follow-up, had a CT obtained by Dr. Reyes 10 which showed right-sided hydronephrosis which is new, this also showed some abnormal bladder wall thickening on the right side, patient notes that he has been urinating without difficulty since his TURP, no gross hematuria or dysuria, urine today shows positive nitrites positive leukocyte esterase    10/05/2022 - patient returns today for follow-up, no issues in the interim, was catheterizing the after surgery but not recently, feels like he empties well, no gross hematuria or UTIs    04/05/2022 - patient returns today for f/u, no issues since his surgery, has been healing well, colon path negative for cancer, voiding with a good stream and feels like he empties    02/24/2022 - cystolitholapaxy and  completion colectomy    01/05/2022 - patient returns today for follow-up, denies any issues in the interim, voiding with a good stream and emptying his bladder well, denies any gross hematuria or dysuria, had a CT last month which showed a new bladder stone    06/07/2021 - TURP with cystolitholapaxy    05/14/2021 - patient presents today for follow-up, he underwent urodynamics 2 weeks ago which showed a decent bladder contraction with the obstruction, he presents today for discussion of options, still notes urgency but denies any of the dysuria that he presented with initially, denies gross hematuria the    04/21/2021 - patient returns today for follow-up, notes a 3-4 months history of dysuria, urgency, frequency, weak stream, and incomplete bladder emptying, still taking the flomax and finasteride, was previiously intstructed how to perform CIC but has not done this in some time, no GH but states that he 'keeps a bladder infection'  IPSS - 4/1/5/5/5/5/4 = 29 QOL - 6(terrible)    12/21/20: Sildenafil 100 working well enough.  Stream sometimes good sometimes feels he has to strain, most of the time it is good.  3 cups coffee a day and some sodas.  Retrograde ejaculation.  8/5/20- sildenafil 100mg is working well.  7/1/20: patient normally goes to Dr. Altamirano.  Apparently he has been having ED since his rectal surgery and would like to discuss options.  Recent dx with UTI and started abx today, otherwise voiding well.  Patient states he gets no erections after surgery, he has not tried any forms of medical therapy.  Libido and energy are still present.  6/4/20: PVR was 635 after our last visit and he was instructed in CIC.  Voiding normally he feels and hasn't done CIC in two weeks.  PVR today 105 ml.   5/6/20: Been on flomax since last visit.  Cysto/uroflow normal today.    4/20/20: 55 yo man had colon cancer with resection last week, referred by Dr. New.  Is in retention postoperatively.  No unusual abd/pelvic  pain and no exac/rel factors.  No hematuria.  No urolithiasis.  PVR >700 ml.  No  history.      PMH:  Past Medical History:   Diagnosis Date    Anemia     Arthritis     Cancer     rectal    Renal disorder     kidney stones       PSH:  Past Surgical History:   Procedure Laterality Date    ABDOMINOPERINEAL RESECTION OF RECTUM N/A 4/9/2020    Procedure: PROCTECTOMY, ABDOMINOPERINEAL;  Surgeon: Jan New MD;  Location: [facility];  Service: General;  Laterality: N/A;    COLECTOMY, TOTAL N/A 2/24/2022    Procedure: COLECTOMY, TOTAL;  Surgeon: Jan New MD;  Location: [facility];  Service: Colon and Rectal;  Laterality: N/A;    COLON SURGERY      COLONOSCOPY N/A 6/6/2019    Procedure: COLONOSCOPY;  Surgeon: Anjelica Saavedra MD;  Location: [facility];  Service: Endoscopy;  Laterality: N/A;    COLONOSCOPY N/A 12/17/2021    Procedure: COLONOSCOPY;  Surgeon: Jan New MD;  Location: [facility] ENDO;  Service: General;  Laterality: N/A;    CREATION OF MUSCLE ROTATIONAL FLAP Left 4/9/2020    Procedure: CREATION, FLAP, MUSCLE ROTATION;  Surgeon: Sebastian Dan MD;  Location: [facility];  Service: Plastics;  Laterality: Left;   VRAM    CYSTOSCOPIC LITHOLAPAXY N/A 2/24/2022    Procedure: CYSTOLITHOLAPAXY;  Surgeon: Sukhjinder Tucker MD;  Location: [facility];  Service: Urology;  Laterality: N/A;    CYSTOSCOPY W/ URETERAL STENT PLACEMENT Right 10/6/2023    Procedure: CYSTOSCOPY, WITH URETERAL STENT INSERTION;  Surgeon: Sukhjinder Tucker MD;  Location: [facility];  Service: Urology;  Laterality: Right;    CYSTOSCOPY WITH BIOPSY OF BLADDER N/A 9/11/2023    Procedure: CYSTOSCOPY, WITH BLADDER BIOPSY, WITH FULGURATION IF INDICATED;  Surgeon: Sukhjinder Tucker MD;  Location: [facility];  Service: Urology;  Laterality: N/A;  , POSS TURBT    CYSTOSCOPY WITH URETEROSCOPY, RETROGRADE PYELOGRAPHY, AND INSERTION OF STENT N/A 9/11/2023    Procedure: CYSTOSCOPY, WITH RETROGRADE PYELOGRAM AND URETERAL STENT INSERTION;   Surgeon: Sukhjinder Tucker MD;  Location: [facility];  Service: Urology;  Laterality: N/A;    CYSTOSCOPY WITH URETEROSCOPY, RETROGRADE PYELOGRAPHY, AND INSERTION OF STENT Right 1/8/2024    Procedure: CYSTOSCOPY, WITH RETROGRADE PYELOGRAM AND URETERAL STENT INSERTION;  Surgeon: Sukhjinder Tucker MD;  Location: [facility];  Service: Urology;  Laterality: Right;  stent exchange    DILATION OF URETHRA N/A 9/11/2023    Procedure: DILATION, URETHRA;  Surgeon: Sukhjinder Tucker MD;  Location: [facility];  Service: Urology;  Laterality: N/A;    ESOPHAGOGASTRODUODENOSCOPY N/A 6/6/2019    Procedure: EGD (ESOPHAGOGASTRODUODENOSCOPY);  Surgeon: Anjelica Saavedra MD;  Location: [facility];  Service: Endoscopy;  Laterality: N/A;    FLAP GRAFT SURGERY N/A 4/9/2020    Procedure: FLAP GRAFT;  Surgeon: Sebastian Dan MD;  Location: [facility];  Service: Plastics;  Laterality: N/A;  left fasciocutaneous buttocks flap    HERNIA REPAIR  1995    INJECTION OF ANESTHETIC AGENT INTO TISSUE PLANE DEFINED BY TRANSVERSUS ABDOMINIS MUSCLE N/A 2/18/2020    Procedure: BLOCK, TRANSVERSUS ABDOMINIS PLANE;  Surgeon: Jan New MD;  Location: [facility];  Service: General;  Laterality: N/A;    INJECTION OF ANESTHETIC AGENT INTO TISSUE PLANE DEFINED BY TRANSVERSUS ABDOMINIS MUSCLE  2/24/2022    Procedure: BLOCK, TRANSVERSUS ABDOMINIS PLANE;  Surgeon: Jan New MD;  Location: [facility];  Service: Colon and Rectal;;    INSERTION OF TUNNELED CENTRAL VENOUS CATHETER (CVC) WITH SUBCUTANEOUS PORT N/A 10/8/2019    Procedure: [id number];  Surgeon: Jan New MD;  Location: [facility];  Service: General;  Laterality: N/A;    LAPAROSCOPIC COLOSTOMY      MEDIPORT REMOVAL N/A 8/13/2020    Procedure: REMOVAL, CATHETER, CENTRAL VENOUS, TUNNELED, WITH PORT;  Surgeon: Sebastian Dan MD;  Location: [facility];  Service: Plastics;  Laterality: N/A;    TONSILLECTOMY      TRANSURETHRAL RESECTION OF PROSTATE N/A 6/7/2021    Procedure: TURP  (TRANSURETHRAL RESECTION OF PROSTATE), CYSTOLITHALOPAXY;  Surgeon: Sukhjinder Tucker MD;  Location: [facility] OR;  Service: Urology;  Laterality: N/A;    TURBT (TRANSURETHRAL RESECTION OF BLADDER TUMOR) N/A 10/6/2023    Procedure: TURBT (TRANSURETHRAL RESECTION OF BLADDER TUMOR);  Surgeon: Sukhjinder Tucker MD;  Location: [facility] OR;  Service: Urology;  Laterality: N/A;    WOUND DEBRIDEMENT N/A 8/13/2020    Procedure: DEBRIDEMENT, WOUND;  Surgeon: Sebastian Dan MD;  Location: [facility] OR;  Service: Plastics;  Laterality: N/A;  layered closure       Family History:  Family History   Problem Relation Name Age of Onset    Intestinal malrotation Mother      Leukemia Father      No Known Problems Sister      Coronary artery disease Brother      Coronary artery disease Maternal Grandmother      Stomach cancer Maternal Grandfather         Social History:  Social History     Tobacco Use    Smoking status: Every Day     Current packs/day: 1.00     Average packs/day: 1 pack/day for 40.7 years (40.7 ttl pk-yrs)     Types: Cigarettes     Start date: 1/2/1984    Smokeless tobacco: Former     Types: Chew    Tobacco comments:     no smoking after m.n prior to sx   Substance Use Topics    Alcohol use: Not Currently     Alcohol/week: 46.0 standard drinks of alcohol     Types: 42 Cans of beer, 4 Shots of liquor per week     Comment: segrams 7, beer daily  hold now prior to surgery    Drug use: Never        Review of Systems:  General: No fever, chills  Skin: No rashes  Chest:  Denies cough and sputum production  Heart: Denies chest pain  Resp: Denies dyspnea  Abdomen: Denies diarrhea, abdominal pain, hematemesis, or blood in stool.  Musculoskeletal: No joint stiffness or swelling. Denies back pain.  : see HPI  Neuro: no dizziness or weakness    Allergies:  Patient has no known allergies.    Medications:    Current Outpatient Medications:     acetaminophen (TYLENOL) 325 MG tablet, Take 2 tablets (650 mg total) by mouth every 6  "(six) hours as needed., Disp: , Rfl: 0    acetaminophen (TYLENOL) 650 MG Supp, Place one suppository rectally every 4 hours as needed for fever, Disp: 4 suppository, Rfl: 0    atropine 1% (ISOPTO ATROPINE) 1 % Drop, Give 2 drops sublingual every 2 hours as needed for inreased secretions or "rattling", Disp: 5 mL, Rfl: 0    capecitabine (XELODA) 150 MG tablet, Take 4 tablets (600 mg total) by mouth 2 (two) times daily Take as directed Monday, Tuesday, Wednesday, Thursday, and Friday for the duration of radiation therapy. Take with food. with 1 other capecitabine prescription for 1,600 mg total., Disp: 600 tablet, Rfl: 11    capecitabine (XELODA) 500 MG Tab, Take 2 tablets (1,000 mg total) by mouth 2 (two) times daily Take as directed Monday, Tuesday, Wednesday, Thursday, and Friday for the duration of radiation therapy. Take with food. with 1 other capecitabine prescription for 1,600 mg total., Disp: 300 tablet, Rfl: 11    finasteride (PROSCAR) 5 mg tablet, Take 1 tablet (5 mg total) by mouth once daily., Disp: 30 tablet, Rfl: 11    LORazepam (ATIVAN) 1 MG tablet, Give one tablet by mouth every 4 hours as needed for shortness of breath, anxiety or trouble sleeping, Disp: 10 tablet, Rfl: 0    oxyCODONE (ROXICODONE) 15 MG Tab, Take by mouth., Disp: , Rfl:     oxyCODONE (ROXICODONE) 5 MG immediate release tablet, Take 1 tablet (5 mg total) by mouth every 3 (three) hours as needed for Pain. Take 2 tabs every 3 hours for pain, Disp: 120 tablet, Rfl: 0    prochlorperazine (COMPAZINE) 25 MG suppository, Place 1 suppository in the rectum twice daily as needed for nausea, Disp: 4 suppository, Rfl: 0    promethazine (PHENERGAN) 25 MG tablet, Take 1 tablet (25 mg total) by mouth every 4 (four) hours., Disp: 60 tablet, Rfl: 4    sildenafiL (VIAGRA) 100 MG tablet, Take 1 tablet (100 mg total) by mouth daily as needed for Erectile Dysfunction., Disp: 20 tablet, Rfl: 11    solifenacin (VESICARE) 5 MG tablet, Take 1 tablet (5 mg " total) by mouth once daily., Disp: 90 tablet, Rfl: 3    tamsulosin (FLOMAX) 0.4 mg Cap, Take 1 capsule (0.4 mg total) by mouth once daily., Disp: 90 capsule, Rfl: 3    Physical Exam:  Vitals:    08/27/24 0829   BP: 124/76   Pulse: 73     General: awake, alert, cooperative  Head: NC/AT  Ears: external ears normal  Eyes: sclera normal  Lungs: normal inspiration, NAD  Heart: well-perfused  Abdomen: Soft, NT, ND, incisions healed, ostomy healthy  Skin: The skin is warm and dry  Ext: No c/c/e.  Neuro: grossly intact, AOx3    RADIOLOGY:  CT CHEST ABDOMEN PELVIS WITH CONTRAST (XPD)     CLINICAL HISTORY:  Colon cancer, assess treatment response;.     TECHNIQUE:  Low dose axial images, sagittal and coronal reformations were obtained from the thoracic inlet to the pubic symphysis following the IV administration of Omnipaque 350.     COMPARISON:  07/20/2022     FINDINGS:  CT chest:     Mild atelectasis right lower lobe lung.  Heart normal.  Aorta normal in caliber with minimal atherosclerotic calcification.  Negative for adenopathy throughout the chest.     Chest wall soft tissues are normal.  Regional bones unremarkable.     CT abdomen and pelvis:     Punctate calcified granuloma left lobe of the liver.  Normal gallbladder.  Spleen normal in size with multiple punctate calcified granulomas.  Normal pancreas.  Normal adrenal glands.  Normal caliber aorta and iliac vasculature with atherosclerotic calcification.  IVC normal.     Staghorn calculus lower pole left kidney measures up to 3 cm.  No left hydronephrosis.  Left ureter nondilated.     Mild to moderate right hydronephrosis with mild dilatation of the right ureter.  New since prior examination.  Far distal right ureter is not well visualized secondary to scarring within the pelvis.  No discrete obstructing lesion identified.  Findings could be due to a recently passed calculus or could indicate avascular necrosis.  Chronic obstruction secondary to scarring within the  pelvis thought less likely.     Stomach and small intestine are normal.  Total colectomy has been performed.  Left lower quadrant ostomy noted.  Stomal hernia contains a short loop of small bowel.  Negative for obstruction.  Negative for inflammatory change.     Mild degenerative changes of the spine.  No suspicious osseous lesions.     Impression:     Status post total colectomy.  Left lower quadrant ileostomy.  Negative for evidence of metastatic disease.     Mild to moderate right hydronephrosis, new since 07/20/2022 with perinephric stranding.  See above discussion.    LABS:  I personally reviewed the following lab values:  Lab Results   Component Value Date    WBC 8.76 08/14/2024    HGB 12.5 (L) 08/14/2024    HCT 37.4 (L) 08/14/2024     08/14/2024     08/14/2024    K 4.6 08/14/2024     08/14/2024    CREATININE 1.2 08/14/2024    BUN 27 (H) 08/14/2024    CO2 21 (L) 08/14/2024    TSH 0.966 10/02/2023    PSA 0.45 12/12/2022    INR 1.0 06/11/2019    HGBA1C 5.4 10/09/2023    CHOL 191 10/09/2023    TRIG 246 (H) 10/09/2023    HDL 23 (L) 10/09/2023    ALT 11 08/14/2024    AST 12 08/14/2024     Bladder Scan performed in office:   5/7/20: 600ml  6/4/20:  ml.  04/21/2021 - 13mL  01/05/2022 - 1mL    PSA  6/19: 0.37  12/20: 0.58    Urinalysis obtained in clinic today specific gravity 1.020 pH six moderate blood positive leukocyte esterase positive nitrites    Assessment/Plan:   Sukhjinder Presley is a 58 y.o. male with:    Right hydronephrosis - stent appears calcified on recent PET scan, reviewed options, as patient has widely metastatic disease and normal kidney function, I would favor reduced procedures for the time being    Bladder stone and recurrent UTIs due to BPH - s/p TURP, continue flomax      Sukhjinder Tucker MD  Urology

## 2024-08-30 ENCOUNTER — OFFICE VISIT (OUTPATIENT)
Dept: HEMATOLOGY/ONCOLOGY | Facility: CLINIC | Age: 59
End: 2024-08-30
Payer: MEDICAID

## 2024-08-30 ENCOUNTER — DOCUMENTATION ONLY (OUTPATIENT)
Dept: HEMATOLOGY/ONCOLOGY | Facility: CLINIC | Age: 59
End: 2024-08-30
Payer: MEDICAID

## 2024-08-30 VITALS
HEART RATE: 85 BPM | SYSTOLIC BLOOD PRESSURE: 106 MMHG | BODY MASS INDEX: 20 KG/M2 | OXYGEN SATURATION: 99 % | HEIGHT: 72 IN | WEIGHT: 147.69 LBS | TEMPERATURE: 97 F | DIASTOLIC BLOOD PRESSURE: 67 MMHG
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
## 2024-08-30 DIAGNOSIS — C78.02 MALIGNANT NEOPLASM METASTATIC TO BOTH LUNGS: ICD-10-CM

## 2024-08-30 DIAGNOSIS — C20 RECTAL CANCER: Primary | ICD-10-CM

## 2024-08-30 DIAGNOSIS — D84.822 IMMUNODEFICIENCY SECONDARY TO RADIATION THERAPY: ICD-10-CM

## 2024-08-30 DIAGNOSIS — C78.6 SECONDARY ADENOCARCINOMA OF RETROPERITONEUM: ICD-10-CM

## 2024-08-30 DIAGNOSIS — T45.1X5A IMMUNODEFICIENCY DUE TO CHEMOTHERAPY: ICD-10-CM

## 2024-08-30 DIAGNOSIS — D49.4 BLADDER TUMOR: ICD-10-CM

## 2024-08-30 DIAGNOSIS — D84.821 IMMUNODEFICIENCY DUE TO CHEMOTHERAPY: ICD-10-CM

## 2024-08-30 DIAGNOSIS — C78.01 MALIGNANT NEOPLASM METASTATIC TO BOTH LUNGS: ICD-10-CM

## 2024-08-30 DIAGNOSIS — Z79.899 IMMUNODEFICIENCY DUE TO CHEMOTHERAPY: ICD-10-CM

## 2024-08-30 PROCEDURE — 99999 PR PBB SHADOW E&M-EST. PATIENT-LVL IV: CPT | Mod: PBBFAC,,, | Performed by: INTERNAL MEDICINE

## 2024-08-30 PROCEDURE — 99214 OFFICE O/P EST MOD 30 MIN: CPT | Mod: PBBFAC | Performed by: INTERNAL MEDICINE

## 2024-08-30 RX ORDER — FENTANYL 75 UG/H
1 PATCH TRANSDERMAL
COMMUNITY
Start: 2024-08-08

## 2024-08-30 NOTE — PROGRESS NOTES
GUERITA consulted to assist with [facility] referral as pt has utilized their services in the past. GUERITA faxed face sheet, MD orders and MD progress note to [facility]. P. [phone], F. [phone]. GUERITA called [facility] to inform that fax was coming. SW will remain available.

## 2024-08-30 NOTE — PROGRESS NOTES
Subjective:       Patient ID: Sukhjinder Presley is a 58 y.o. male.    Chief Complaint: Results and Rectal Cancer    HPI:  58-year-old male returns previous history of rectal carcinoma has been enrolled in hospice discontinued for PET scan for documentation of disease progression patient returns with his wife for review ECOG status 2    Past Medical History:   Diagnosis Date    Anemia     Arthritis     Cancer     rectal    Renal disorder     kidney stones     Family History   Problem Relation Name Age of Onset    Intestinal malrotation Mother      Leukemia Father      No Known Problems Sister      Coronary artery disease Brother      Coronary artery disease Maternal Grandmother      Stomach cancer Maternal Grandfather       Social History     Socioeconomic History    Marital status: Significant Other   Tobacco Use    Smoking status: Every Day     Current packs/day: 1.00     Average packs/day: 1 pack/day for 40.7 years (40.7 ttl pk-yrs)     Types: Cigarettes     Start date: 1/2/1984    Smokeless tobacco: Former     Types: Chew    Tobacco comments:     no smoking after m.n prior to sx   Substance and Sexual Activity    Alcohol use: Not Currently     Alcohol/week: 46.0 standard drinks of alcohol     Types: 42 Cans of beer, 4 Shots of liquor per week     Comment: nancie 7, beer daily  hold now prior to surgery    Drug use: Never    Sexual activity: Yes     Partners: Female     Birth control/protection: None     Social Determinants of Health     Financial Resource Strain: High Risk (11/10/2023)    Overall Financial Resource Strain (CARDIA)     Difficulty of Paying Living Expenses: Very hard   Food Insecurity: Food Insecurity Present (11/10/2023)    Hunger Vital Sign     Worried About Running Out of Food in the Last Year: Often true     Ran Out of Food in the Last Year: Often true   Transportation Needs: Unmet Transportation Needs (11/10/2023)    PRAPARE - Transportation     Lack of Transportation (Medical): Yes     Lack of  Transportation (Non-Medical): No   Physical Activity: Sufficiently Active (11/10/2023)    Exercise Vital Sign     Days of Exercise per Week: 7 days     Minutes of Exercise per Session: 70 min   Stress: Stress Concern Present (11/10/2023)    [employer] [place] [employer] - Occupational Stress Questionnaire     Feeling of Stress : Very much   Housing Stability: High Risk (11/10/2023)    Housing Stability Vital Sign     Unable to Pay for Housing in the Last Year: Yes     Number of Places Lived in the Last Year: 1     Unstable Housing in the Last Year: No     Past Surgical History:   Procedure Laterality Date    ABDOMINOPERINEAL RESECTION OF RECTUM N/A 4/9/2020    Procedure: PROCTECTOMY, ABDOMINOPERINEAL;  Surgeon: Jan New MD;  Location: [facility];  Service: General;  Laterality: N/A;    COLECTOMY, TOTAL N/A 2/24/2022    Procedure: COLECTOMY, TOTAL;  Surgeon: Jan New MD;  Location: [facility] OR;  Service: Colon and Rectal;  Laterality: N/A;    COLON SURGERY      COLONOSCOPY N/A 6/6/2019    Procedure: COLONOSCOPY;  Surgeon: Anjelica Saavedra MD;  Location: [facility] ENDO;  Service: Endoscopy;  Laterality: N/A;    COLONOSCOPY N/A 12/17/2021    Procedure: COLONOSCOPY;  Surgeon: Jan New MD;  Location: [facility] ENDO;  Service: General;  Laterality: N/A;    CREATION OF MUSCLE ROTATIONAL FLAP Left 4/9/2020    Procedure: CREATION, FLAP, MUSCLE ROTATION;  Surgeon: Sebastian Dan MD;  Location: [facility] OR;  Service: Plastics;  Laterality: Left;   VRAM    CYSTOSCOPIC LITHOLAPAXY N/A 2/24/2022    Procedure: CYSTOLITHOLAPAXY;  Surgeon: Sukhjinder Tucker MD;  Location: [facility] OR;  Service: Urology;  Laterality: N/A;    CYSTOSCOPY W/ URETERAL STENT PLACEMENT Right 10/6/2023    Procedure: CYSTOSCOPY, WITH URETERAL STENT INSERTION;  Surgeon: Sukhjinder Tucker MD;  Location: [facility] OR;  Service: Urology;  Laterality: Right;    CYSTOSCOPY WITH BIOPSY OF BLADDER N/A 9/11/2023    Procedure: CYSTOSCOPY, WITH  BLADDER BIOPSY, WITH FULGURATION IF INDICATED;  Surgeon: Sukhjinder Tucker MD;  Location: [facility] OR;  Service: Urology;  Laterality: N/A;  , POSS TURBT    CYSTOSCOPY WITH URETEROSCOPY, RETROGRADE PYELOGRAPHY, AND INSERTION OF STENT N/A 9/11/2023    Procedure: CYSTOSCOPY, WITH RETROGRADE PYELOGRAM AND URETERAL STENT INSERTION;  Surgeon: Sukhjinder Tucker MD;  Location: [facility] OR;  Service: Urology;  Laterality: N/A;    CYSTOSCOPY WITH URETEROSCOPY, RETROGRADE PYELOGRAPHY, AND INSERTION OF STENT Right 1/8/2024    Procedure: CYSTOSCOPY, WITH RETROGRADE PYELOGRAM AND URETERAL STENT INSERTION;  Surgeon: Sukhjinder Tucker MD;  Location: [facility] OR;  Service: Urology;  Laterality: Right;  stent exchange    DILATION OF URETHRA N/A 9/11/2023    Procedure: DILATION, URETHRA;  Surgeon: Sukhjinder Tucker MD;  Location: [facility] OR;  Service: Urology;  Laterality: N/A;    ESOPHAGOGASTRODUODENOSCOPY N/A 6/6/2019    Procedure: EGD (ESOPHAGOGASTRODUODENOSCOPY);  Surgeon: Anjelica Saavedra MD;  Location: [facility];  Service: Endoscopy;  Laterality: N/A;    FLAP GRAFT SURGERY N/A 4/9/2020    Procedure: FLAP GRAFT;  Surgeon: Sebastian Dan MD;  Location: [facility];  Service: Plastics;  Laterality: N/A;  left fasciocutaneous buttocks flap    HERNIA REPAIR  1995    INJECTION OF ANESTHETIC AGENT INTO TISSUE PLANE DEFINED BY TRANSVERSUS ABDOMINIS MUSCLE N/A 2/18/2020    Procedure: BLOCK, TRANSVERSUS ABDOMINIS PLANE;  Surgeon: Jan New MD;  Location: [facility] OR;  Service: General;  Laterality: N/A;    INJECTION OF ANESTHETIC AGENT INTO TISSUE PLANE DEFINED BY TRANSVERSUS ABDOMINIS MUSCLE  2/24/2022    Procedure: BLOCK, TRANSVERSUS ABDOMINIS PLANE;  Surgeon: Jan New MD;  Location: [facility];  Service: Colon and Rectal;;    INSERTION OF TUNNELED CENTRAL VENOUS CATHETER (CVC) WITH SUBCUTANEOUS PORT N/A 10/8/2019    Procedure: [id number];  Surgeon: Jan New MD;  Location: [facility] OR;  Service:  General;  Laterality: N/A;    LAPAROSCOPIC COLOSTOMY      MEDIPORT REMOVAL N/A 8/13/2020    Procedure: REMOVAL, CATHETER, CENTRAL VENOUS, TUNNELED, WITH PORT;  Surgeon: Sebastian Dan MD;  Location: [facility] OR;  Service: Plastics;  Laterality: N/A;    TONSILLECTOMY      TRANSURETHRAL RESECTION OF PROSTATE N/A 6/7/2021    Procedure: TURP (TRANSURETHRAL RESECTION OF PROSTATE), CYSTOLITHALOPAXY;  Surgeon: Sukhjinder Tucker MD;  Location: [facility] OR;  Service: Urology;  Laterality: N/A;    TURBT (TRANSURETHRAL RESECTION OF BLADDER TUMOR) N/A 10/6/2023    Procedure: TURBT (TRANSURETHRAL RESECTION OF BLADDER TUMOR);  Surgeon: Sukhjinder Tucker MD;  Location: [facility] OR;  Service: Urology;  Laterality: N/A;    WOUND DEBRIDEMENT N/A 8/13/2020    Procedure: DEBRIDEMENT, WOUND;  Surgeon: Sebastian Dan MD;  Location: [facility] OR;  Service: Plastics;  Laterality: N/A;  layered closure       Labs:  Lab Results   Component Value Date    WBC 8.76 08/14/2024    HGB 12.5 (L) 08/14/2024    HCT 37.4 (L) 08/14/2024    MCV 84 08/14/2024     08/14/2024     BMP  Lab Results   Component Value Date     08/14/2024    K 4.6 08/14/2024     08/14/2024    CO2 21 (L) 08/14/2024    BUN 27 (H) 08/14/2024    CREATININE 1.2 08/14/2024    CALCIUM 9.6 08/14/2024    ANIONGAP 9 08/14/2024    ESTGFRAFRICA >60 07/20/2022    EGFRNONAA >60 07/20/2022     Lab Results   Component Value Date    ALT 11 08/14/2024    AST 12 08/14/2024    ALKPHOS 86 08/14/2024    BILITOT 0.2 08/14/2024       Lab Results   Component Value Date    IRON 24 (L) 02/19/2024    TIBC 237 (L) 02/19/2024    FERRITIN 1,157 (H) 02/19/2024     Lab Results   Component Value Date    LPSODYQE64 423 10/09/2023     Lab Results   Component Value Date    FOLATE 5.7 10/09/2023     Lab Results   Component Value Date    TSH 0.966 10/02/2023         Review of Systems   Constitutional:  Positive for activity change, appetite change, fatigue and unexpected weight change. Negative for  chills, diaphoresis and fever.   HENT:  Negative for congestion, dental problem, drooling, ear discharge, ear pain, facial swelling, hearing loss, mouth sores, nosebleeds, postnasal drip, rhinorrhea, sinus pressure, sneezing, sore throat, tinnitus, trouble swallowing and voice change.    Eyes:  Negative for photophobia, pain, discharge, redness, itching and visual disturbance.   Respiratory:  Negative for apnea, cough, choking, chest tightness, shortness of breath, wheezing and stridor.    Cardiovascular:  Negative for chest pain, palpitations and leg swelling.   Gastrointestinal:  Negative for abdominal distention, abdominal pain, anal bleeding, blood in stool, constipation, diarrhea, nausea, rectal pain and vomiting.   Endocrine: Negative for cold intolerance, heat intolerance, polydipsia, polyphagia and polyuria.   Genitourinary:  Negative for decreased urine volume, difficulty urinating, dysuria, enuresis, flank pain, frequency, genital sores, hematuria, penile discharge, penile pain, penile swelling, scrotal swelling, testicular pain and urgency.   Musculoskeletal:  Positive for arthralgias, gait problem and myalgias. Negative for back pain, joint swelling, neck pain and neck stiffness.   Skin:  Negative for color change, pallor, rash and wound.   Allergic/Immunologic: Negative for environmental allergies, food allergies and immunocompromised state.   Neurological:  Positive for weakness. Negative for dizziness, tremors, seizures, syncope, facial asymmetry, speech difficulty, light-headedness, numbness and headaches.   Hematological:  Negative for adenopathy. Does not bruise/bleed easily.   Psychiatric/Behavioral:  Positive for decreased concentration. Negative for agitation, behavioral problems, confusion, dysphoric mood, hallucinations, self-injury, sleep disturbance and suicidal ideas. The patient is nervous/anxious. The patient is not hyperactive.        Objective:      Physical Exam  Vitals reviewed.    Constitutional:       General: He is not in acute distress.     Appearance: He is well-developed. He is not diaphoretic.   HENT:      Head: Normocephalic.      Right Ear: External ear normal.      Left Ear: External ear normal.      Nose: Nose normal.      Right Sinus: No maxillary sinus tenderness or frontal sinus tenderness.      Left Sinus: No maxillary sinus tenderness or frontal sinus tenderness.      Mouth/Throat:      Pharynx: No oropharyngeal exudate.   Eyes:      General: Lids are normal. No scleral icterus.        Right eye: No discharge.         Left eye: No discharge.      Extraocular Movements:      Right eye: Normal extraocular motion.      Left eye: Normal extraocular motion.      Conjunctiva/sclera:      Right eye: Right conjunctiva is not injected. No hemorrhage.     Left eye: Left conjunctiva is not injected. No hemorrhage.     Pupils: Pupils are equal, round, and reactive to light.   Neck:      Thyroid: No thyromegaly.      Vascular: No JVD.      Trachea: No tracheal deviation.   Cardiovascular:      Rate and Rhythm: Normal rate.   Pulmonary:      Effort: Pulmonary effort is normal. No respiratory distress.      Breath sounds: No stridor.   Abdominal:      General: Bowel sounds are normal.      Palpations: Abdomen is soft. There is no hepatomegaly, splenomegaly or mass.      Tenderness: There is no abdominal tenderness.   Musculoskeletal:         General: No tenderness. Normal range of motion.      Cervical back: Normal range of motion and neck supple.   Lymphadenopathy:      Head:      Right side of head: No posterior auricular or occipital adenopathy.      Left side of head: No posterior auricular or occipital adenopathy.      Cervical: No cervical adenopathy.      Right cervical: No superficial, deep or posterior cervical adenopathy.     Left cervical: No superficial, deep or posterior cervical adenopathy.      Upper Body:      Right upper body: No supraclavicular adenopathy.      Left upper  body: No supraclavicular adenopathy.   Skin:     General: Skin is dry.      Findings: No erythema or rash.      Nails: There is no clubbing.   Neurological:      Mental Status: He is alert and oriented to person, place, and time.      Cranial Nerves: No cranial nerve deficit.      Motor: Weakness present.      Coordination: Coordination normal.      Gait: Gait abnormal.   Psychiatric:         Behavior: Behavior normal.         Thought Content: Thought content normal.         Judgment: Judgment normal.             Assessment:      1. Rectal cancer    2. Malignant neoplasm metastatic to both lungs    3. Bladder tumor    4. 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 Secondary adenocarcinoma of retroperitoneum           Med Onc Chart Routing      Follow up with physician No follow up needed. Referral to [facility]   Follow up with REE    Infusion scheduling note    Injection scheduling note    Labs    Imaging    Pharmacy appointment    Other referrals                   Plan:     Reviewed PET scan demonstrating clear interval progression mainly in lung.  As well as pelvis at this time patient is not interested in proceeding with the additional chemotherapy limited therapeutic options at this time patient would like to proceed with return to [facility] referral has been made.  Orders written reviewed copies of this note along with his PET scan demonstrating clear progression in an incurable malignancy has been sent to [facility] for documentation        Ken Cosby Jr, MD FACP

## 2024-09-03 ENCOUNTER — DOCUMENTATION ONLY (OUTPATIENT)
Dept: HEMATOLOGY/ONCOLOGY | Facility: CLINIC | Age: 59
End: 2024-09-03

## 2024-09-03 NOTE — PROGRESS NOTES
Pt hospice referral faxed to [facility]. Intake will return call on status of referral, pending clinical review. Swer to provide update to pt/family on call back.     12:10 pm - [facility] sending NP out for face to face and will update on pt admit.

## 2024-09-04 ENCOUNTER — TELEPHONE (OUTPATIENT)
Dept: HEMATOLOGY/ONCOLOGY | Facility: CLINIC | Age: 59
End: 2024-09-04

## 2024-09-04 NOTE — TELEPHONE ENCOUNTER
9:32 am - [facility] called to report agency is going out to see patient to sign consents today and will admit on today. Pt assessed on yesterday by NP. Shahnazr provided update to appropriate staff.

## 2024-09-17 ENCOUNTER — TELEPHONE (OUTPATIENT)
Dept: UROLOGY | Facility: CLINIC | Age: 59
End: 2024-09-17

## 2024-09-17 NOTE — TELEPHONE ENCOUNTER
Returned pt call there was no answer         ----- Message from Cristian Archer sent at 9/17/2024  3:14 PM CDT -----  .Type: Patient Call Back        Who called:      Sharla with Hospice   What is the request in detail:    Called in concerning making patient more comfortable . Please call back   Can the clinic reply by [facility]?           Would the patient rather a call back or a response via [facility]?        Best call back number:

## 2025-06-02 ENCOUNTER — TELEPHONE (OUTPATIENT)
Dept: HEMATOLOGY/ONCOLOGY | Facility: CLINIC | Age: 60
End: 2025-06-02

## 2025-07-21 NOTE — PLAN OF CARE
Copied from CRM #[id number]. Topic: MW Messaging - MW Patient Request  >> Jul 21, 2025  8:24 AM Estrella COBOS wrote:  --DO NOT REPLY - Sent from PACT - If sent to wrong pool, reroute to P ECO Reroute pool --    General Message: Patient has appointment 7/23, patient is new listed as new patient for an establish care visit, patient states she is an established patient and saw Dr Murray 3/24 last year and will not be able to keep appointment with a $600 copay. Patient states it needs to be changed and can be alerted via phone or [employer] message.   Callback #: [phone]  Can a detailed message be left? Yes - Voicemail   Caller has been advised this message will be addressed within:1 business day [high priority]     Pt wheeled out at this time with all personal belongings to private vehicle. JEANA

## (undated) DEVICE — CATH POLLACK OPEN-END FLEXI-TI

## (undated) DEVICE — CATH URETERAL DUAL LUMEN 10FR

## (undated) DEVICE — Device

## (undated) DEVICE — KIT ANTIFOG

## (undated) DEVICE — GLOVE SURG BIOGEL LATEX SZ 7.5

## (undated) DEVICE — BOWL STERILE LARGE 32OZ

## (undated) DEVICE — DEVICE ENSEAL X1 LARGE JAW

## (undated) DEVICE — SUT SILK 3-0 SH DETACH 30IN

## (undated) DEVICE — SYR 50ML CATH TIP

## (undated) DEVICE — GOWN POLY REINF BRTH SLV XL

## (undated) DEVICE — SEE MEDLINE ITEM 152622

## (undated) DEVICE — SPONGE COTTON TRAY 4X4IN

## (undated) DEVICE — SUT X424H ETHIBOND 0-0

## (undated) DEVICE — SYR 3CC LUER LOC

## (undated) DEVICE — SOL WATER STRL IRR 1000ML

## (undated) DEVICE — SEE MEDLINE ITEM 157117

## (undated) DEVICE — SYR 50CC LL

## (undated) DEVICE — SOL NACL IRR 3000ML

## (undated) DEVICE — PAD ABD 8X10 STERILE

## (undated) DEVICE — SUT 2/0 30IN SILK BLK BRAI

## (undated) DEVICE — TRAY SKIN SCRUB WET 4 COMPART

## (undated) DEVICE — HOOK STAY ELAS 5MM 8EA/PK

## (undated) DEVICE — FIBER HOLM LSR SMARTSYNC R 550

## (undated) DEVICE — SEE MEDLINE ITEM 157148

## (undated) DEVICE — SYR LUER LOCK STERILE 10ML

## (undated) DEVICE — SUT PROLENE 2-0 SH 36IN BLU

## (undated) DEVICE — UNDERGLOVES BIOGEL PI SZ 6 LF

## (undated) DEVICE — CANISTER SUCTION JUMBO 12L

## (undated) DEVICE — SOL NS 1000CC

## (undated) DEVICE — POSITIONER HEAD DONUT 9IN FOAM

## (undated) DEVICE — NDL SAFETY 25G X 1.5 ECLIPSE

## (undated) DEVICE — APPLICATOR CHLORAPREP ORN 26ML

## (undated) DEVICE — SUT VICRYL 0 SH

## (undated) DEVICE — TOWEL OR DISP STRL BLUE 4/PK

## (undated) DEVICE — SUT PDS II 3-0 PS-1 CLEAR M

## (undated) DEVICE — NDL SPINAL 20GX3.5 HUB

## (undated) DEVICE — SUT MONOCYRL 4-0 PS2 UND

## (undated) DEVICE — SUT VICRYL CTD 2-0 GI 27 SH

## (undated) DEVICE — COVER OVERHEAD SURG LT BLUE

## (undated) DEVICE — DRAPE T CYSTOSCOPY STERILE

## (undated) DEVICE — APPLIER CLIP LIAGCLIP 9.375IN

## (undated) DEVICE — GUIDEWIRE AMPLATZ .035X145CM

## (undated) DEVICE — GOWN POLY REINF X-LONG XL

## (undated) DEVICE — UROVIEW 2600/2800

## (undated) DEVICE — ELECTRODE BLD EXT 6.50 ST DISP

## (undated) DEVICE — SOL 9P NACL IRR PIC IL

## (undated) DEVICE — ADHESIVE MASTISOL VIAL 48/BX

## (undated) DEVICE — SEE MEDLINE ITEM 157181

## (undated) DEVICE — SYR 10CC LUER LOCK

## (undated) DEVICE — CONTAINER SPECIMEN OR STER 4OZ

## (undated) DEVICE — SOL IRRI STRL WATER 1000ML

## (undated) DEVICE — ADHESIVE DERMABOND ADVANCED

## (undated) DEVICE — SYR B-D DISP CONTROL 10CC100/C

## (undated) DEVICE — KIT GELPORT LAPAROSCOPIC ABD

## (undated) DEVICE — BLADE SURG CARBON STEEL #10

## (undated) DEVICE — NDL SAFETY 22G X 1.5 ECLIPSE

## (undated) DEVICE — ELECTRODE REM PLYHSV RETURN 9

## (undated) DEVICE — DRAPE ABDOMINAL TIBURON 14X11

## (undated) DEVICE — SEE MEDLINE ITEM 157027

## (undated) DEVICE — UNDERGLOVES BIOGEL PI SIZE 7.5

## (undated) DEVICE — IRRIGATION SET Y-TYPE TUR/BLAD

## (undated) DEVICE — MANIFOLD 4 PORT

## (undated) DEVICE — SUT VICRYL PLUS 3-0 SH 18IN

## (undated) DEVICE — SCRUB 10% POVIDONE IODINE 4OZ

## (undated) DEVICE — SUT ETHICON 3-0 BLK MONO PS

## (undated) DEVICE — SUT SILK 0 SUTUPAK SA86H

## (undated) DEVICE — GAUZE SPONGE 4X4 12PLY

## (undated) DEVICE — SUT PDS II 1 TP-1 VIL

## (undated) DEVICE — GOWN SMARTGOWN LVL4 X-LONG XL

## (undated) DEVICE — SYR ONLY LUER LOCK 20CC

## (undated) DEVICE — SUT VICRYL PLUS 0 CT1 36IN

## (undated) DEVICE — CORD BIPOLAR ELECTROSURGICAL

## (undated) DEVICE — NDL PNEUMO INSUFFLATI 120MM

## (undated) DEVICE — SUT SILK 2-0 STRANDS 30IN

## (undated) DEVICE — SEE MEDLINE ITEM 152739

## (undated) DEVICE — SEE MEDLINE ITEM 157185

## (undated) DEVICE — TAPE SURG MEDIPORE 6X72IN

## (undated) DEVICE — SET IRRIGATION WARMING NORMAL

## (undated) DEVICE — SUT 1 48IN PDS II VIO MONO

## (undated) DEVICE — NDL ECLIPSE SAFETY 18GX1-1/2IN

## (undated) DEVICE — CANNULA ENDOPATH XCEL 5X100MM

## (undated) DEVICE — SEE MEDLINE ITEM 157137

## (undated) DEVICE — TUBING HEATED INSUFFLATOR

## (undated) DEVICE — EVACUATOR BLADDER UROVAC ADPT

## (undated) DEVICE — SHEET THYROID W/ISO-BAC

## (undated) DEVICE — COVER LIGHT HANDLE 80/CA

## (undated) DEVICE — CUTTER PROXIMATE BLUE 75MM

## (undated) DEVICE — GUIDE WIRE MOTION .035 X 150CM

## (undated) DEVICE — BAG DRAIN URINARY CONT IRRG

## (undated) DEVICE — BAG POSTOP W/ACCESS CUT TO FIT

## (undated) DEVICE — ELECTRODE LOOP CUTTING BIPOLAR

## (undated) DEVICE — SPONGE LAP 18X18 PREWASHED

## (undated) DEVICE — PORT POWER CLEAR VIEW: Type: IMPLANTABLE DEVICE | Site: CHEST  WALL | Status: NON-FUNCTIONAL

## (undated) DEVICE — CHLORAPREP 10.5 ML APPLICATOR

## (undated) DEVICE — KIT WING PAD POSITIONING

## (undated) DEVICE — SUT VICRYL 3-0 27 SH

## (undated) DEVICE — FIBER LASER HOLMIUM 550 MICRON

## (undated) DEVICE — SUT CTD VICRYL 2-0 UND BR

## (undated) DEVICE — DECANTER VIAL ASEPTIC TRANSFER

## (undated) DEVICE — SUT 3/0 18IN PDS II CLR MON

## (undated) DEVICE — SKIN MARKER DEVON 160

## (undated) DEVICE — SOL IRR NACL .9% 3000ML

## (undated) DEVICE — TRAY SKIN SCRUB WET PREMIUM

## (undated) DEVICE — SUT MONOCRYL PLUS UD 3-0 27

## (undated) DEVICE — PACK DRAPE PERI/GYN TIBURON

## (undated) DEVICE — STAPLER SKIN PROXIMATE WIDE

## (undated) DEVICE — SET IRR URLGY 2LINE UNIV SPIKE

## (undated) DEVICE — TAPE CLOTH SOFT MEDIPORE 4IN

## (undated) DEVICE — DRESSING GAUZE XEROFORM 5X9

## (undated) DEVICE — SEE MEDLINE ITEM 154981

## (undated) DEVICE — CATH FOLLOWER HEYMAN 22FR

## (undated) DEVICE — SUT SILK 3-0 STRANDS 30IN

## (undated) DEVICE — SEE MEDLINE ITEM 152487

## (undated) DEVICE — CORD LAP 10 DISP

## (undated) DEVICE — CLIP LIGATING TITANIUM SMALL

## (undated) DEVICE — SUT VICRYL 4-0 RB1 27IN UD

## (undated) DEVICE — SUPPORT ULNA NERVE PROTECTOR

## (undated) DEVICE — SUT SILK 0 SH 30IN BLK BR

## (undated) DEVICE — PENCIL ELECTROCAUTERY W/ HLSTR

## (undated) DEVICE — TROCAR ENDOPATH XCEL 5X100MM

## (undated) DEVICE — CLOSURE SKIN STERI STRIP 1/2X4

## (undated) DEVICE — DRAPE MOBILE C-ARM

## (undated) DEVICE — SUT VICRYL 2-0 27 CT-1

## (undated) DEVICE — SUT MONOCRYL 4.0 PS2 CP496G

## (undated) DEVICE — CONTAINER SPECIMEN STRL 4OZ

## (undated) DEVICE — SPONGE DERMACEA 4X4IN 12PLY

## (undated) DEVICE — SUT 1 36IN PDS II VIO MONO

## (undated) DEVICE — PACK DRAPE UNIVERSAL CONVERTOR

## (undated) DEVICE — SYR 30CC LUER LOCK

## (undated) DEVICE — EVACUATOR WOUND BULB 100CC

## (undated) DEVICE — SEE MEDLINE ITEM 146345